# Patient Record
Sex: MALE | Race: WHITE | NOT HISPANIC OR LATINO | Employment: OTHER | URBAN - METROPOLITAN AREA
[De-identification: names, ages, dates, MRNs, and addresses within clinical notes are randomized per-mention and may not be internally consistent; named-entity substitution may affect disease eponyms.]

---

## 2017-01-03 ENCOUNTER — TELEPHONE (OUTPATIENT)
Dept: FAMILY MEDICINE | Facility: CLINIC | Age: 62
End: 2017-01-03

## 2017-01-03 NOTE — TELEPHONE ENCOUNTER
Office visit scheduled for 1/5/17 with Johnson Clements MD to discuss controlled substance agreement

## 2017-01-11 ENCOUNTER — HOSPITAL ENCOUNTER (EMERGENCY)
Facility: CLINIC | Age: 62
Discharge: HOME | End: 2017-01-11
Attending: FAMILY MEDICINE | Admitting: FAMILY MEDICINE
Payer: COMMERCIAL

## 2017-01-11 ENCOUNTER — OFFICE VISIT (OUTPATIENT)
Dept: OTOLARYNGOLOGY | Facility: CLINIC | Age: 62
End: 2017-01-11

## 2017-01-11 VITALS
TEMPERATURE: 97.9 F | DIASTOLIC BLOOD PRESSURE: 99 MMHG | WEIGHT: 265 LBS | SYSTOLIC BLOOD PRESSURE: 137 MMHG | OXYGEN SATURATION: 97 % | BODY MASS INDEX: 40.3 KG/M2

## 2017-01-11 DIAGNOSIS — R04.0 EPISTAXIS: ICD-10-CM

## 2017-01-11 DIAGNOSIS — R04.0 EPISTAXIS: Primary | ICD-10-CM

## 2017-01-11 PROCEDURE — 99281 EMR DPT VST MAYX REQ PHY/QHP: CPT

## 2017-01-11 PROCEDURE — 99283 EMERGENCY DEPT VISIT LOW MDM: CPT | Mod: Z6 | Performed by: FAMILY MEDICINE

## 2017-01-11 NOTE — PROGRESS NOTES
HISTORY OF PRESENT ILLNESS:  Arnaud Bird is a patient who I saw in 2012 for recurrent acute epistaxis.  He called the clinic this morning and has begun having difficulty with nasal bleeding.  He relates to me that he has had an upper respiratory infection, has been taking Sudafed, and has been blowing his nose a lot.  He spoke with our clinic and was not actively bleeding.  We set up an appointment for him to be seen in a couple of weeks and gave him some recommendations for local nasal cares.  Unfortunately, he subsequently developed a brisk nosebleed, placed several pieces of cotton in his nose, and his wife brought him to the emergency room.  He was not actively bleeding at the [facility].  They contacted us as they were concerned that he may have hereditary hemorrhagic telangiectasia.  I reviewed his notes, and he does not have HHT.  He had a lesion in his nose that was characterized as a telangiectasia.  We suggested they treat him as a typical nosebleed.  Unfortunately, they were not comfortable with this, and they transferred him by shuttle to our clinic.      PHYSICAL EXAMINATION:  The patient is alert, oriented, in no acute distress, not actively bleeding.  He has his left nose packed with cotton.      PROCEDURE:  Topical anesthetic decongestant solution is sprayed medial to the cotton pack, and the patient's nose is examined.  I sequentially removed cotton pieces.  He had no subsequent bleeding; however, I did see a small lesion along the floor of the nose with some fibrinous debris overlying it.  I cauterized this with silver nitrate.  As I cauterized it, there was a slight oozing so this certainly is a very likely source of his bleeding.  With repeat cauterization, it stopped easily.  The rest of his nose was cleaned and examined using a 0-degree rigid endoscope.  I see no other likely sources of his nasal bleeding.        ASSESSMENT AND PLAN:  We gave him recommendations for local  nasal cares and hydration. I will see him back in a couple of weeks to reexamine him.  He is comfortable with this plan.

## 2017-01-11 NOTE — Clinical Note
1/11/2017       RE: Arnaud Bird  [address] [facility] [address]     Dear Colleague,    Thank you for referring your patient, Arnaud Bird, to the [facility] EAR NOSE AND THROAT at [facility]. Please see a copy of my visit note below.    HISTORY OF PRESENT ILLNESS:  Arnaud Bird is a patient who I saw in 2012 for recurrent acute epistaxis.  He called the clinic this morning and has begun having difficulty with nasal bleeding.  He relates to me that he has had an upper respiratory infection, has been taking Sudafed, and has been blowing his nose a lot.  He spoke with our clinic and was not actively bleeding.  We set up an appointment for him to be seen in a couple of weeks and gave him some recommendations for local nasal cares.  Unfortunately, he subsequently developed a brisk nosebleed, placed several pieces of cotton in his nose, and his wife brought him to the emergency room.  He was not actively bleeding at the [facility].  They contacted us as they were concerned that he may have hereditary hemorrhagic telangiectasia.  I reviewed his notes, and he does not have HHT.  He had a lesion in his nose that was characterized as a telangiectasia.  We suggested they treat him as a typical nosebleed.  Unfortunately, they were not comfortable with this, and they transferred him by shuttle to our clinic.      PHYSICAL EXAMINATION:  The patient is alert, oriented, in no acute distress, not actively bleeding.  He has his left nose packed with cotton.      PROCEDURE:  Topical anesthetic decongestant solution is sprayed medial to the cotton pack, and the patient's nose is examined.  I sequentially removed cotton pieces.  He had no subsequent bleeding; however, I did see a small lesion along the floor of the nose with some fibrinous debris overlying it.  I cauterized this with silver nitrate.  As I cauterized it, there was a slight oozing so this  certainly is a very likely source of his bleeding.  With repeat cauterization, it stopped easily.  The rest of his nose was cleaned and examined using a 0-degree rigid endoscope.  I see no other likely sources of his nasal bleeding.        ASSESSMENT AND PLAN:  We gave him recommendations for local nasal cares and hydration. I will see him back in a couple of weeks to reexamine him.  He is comfortable with this plan.         Again, thank you for allowing me to participate in the care of your patient.      Sincerely,    Susy Barnes MD

## 2017-01-11 NOTE — ED AVS SNAPSHOT
[facility], Emergency Department    [address]    [place] [address]    Phone:  [phone]    Fax:  [phone]                                       Arnaud Bird   MRN: [id number]    Department:  [facility], Emergency Department   Date of Visit:  1/11/2017           Patient Information     Date Of Birth          1955        Your diagnoses for this visit were:     Epistaxis        You were seen by Roberto Lagunas MD.      Follow-up Information     Follow up with Dr Barnes at ENT clinic for further treatment.        Discharge Instructions       Discharge from the ER with plans to see your ENT specialist in the clinic today for further intervention.    Future Appointments        Provider Department Dept Phone Center    1/25/2017 3:30 PM Susy Barnes MD [facility] Ear Nose and Throat [phone] [facility]      24 Hour Appointment Hotline       To make an appointment at any [facility]3], call [phone] ([phone]). If you don't have a family doctor or clinic, we will help you find one. [facility] are conveniently located to serve the needs of you and your family.             Review of your medicines      Our records show that you are taking the medicines listed below. If these are incorrect, please call your family doctor or clinic.        Dose / Directions Last dose taken    acyclovir 400 MG tablet   Commonly known as:  ZOVIRAX   Quantity:  28 tablet        TAKE TWO TABLETS BY MOUTH TWICE DAILY   Refills:  0        albuterol 108 (90 BASE) MCG/ACT Inhaler   Commonly known as:  PROAIR HFA/PROVENTIL HFA/VENTOLIN HFA   Dose:  2 puff   Quantity:  1 Inhaler        Inhale 2 puffs into the lungs every 6 hours as needed for shortness of breath / dyspnea   Refills:  11        azithromycin 500 MG tablet   Commonly known as:  ZITHROMAX   Dose:  500 mg   Quantity:  3 tablet        Take 1 tablet (500 mg) by mouth daily   Refills:  1        clindamycin 1 % solution   Commonly  known as:  CLEOCIN T   Quantity:  60 mL        Apply topically 2 times daily Profile Rx: patient will contact pharmacy when needed   Refills:  11        diclofenac 1 % Gel topical gel   Commonly known as:  VOLTAREN   Quantity:  100 g        apply 2g topically 4 times daily as needed to involved area   Refills:  1        fluticasone 50 MCG/ACT spray   Commonly known as:  FLONASE   Dose:  2 spray   Quantity:  16 g        Spray 2 sprays into both nostrils daily   Refills:  11        fluticasone-salmeterol 100-50 MCG/DOSE diskus inhaler   Commonly known as:  ADVAIR   Dose:  1 puff   Quantity:  3 Inhaler        Inhale 1 puff into the lungs 2 times daily   Refills:  3        HYDROcodone-acetaminophen 7.5-325 MG per tablet   Commonly known as:  NORCO   Dose:  1 tablet   Quantity:  120 tablet        Take 1 tablet by mouth every 4 hours as needed for pain (4 x daily as needed  maxium 4 per day)   Refills:  0        predniSONE 20 MG tablet   Commonly known as:  DELTASONE   Dose:  20 mg   Quantity:  10 tablet        Take 1 tablet (20 mg) by mouth 2 times daily   Refills:  0        sildenafil 100 MG cap/tab   Commonly known as:  VIAGRA   Dose:   mg   Quantity:  100 tablet        Take 0.5-1 tablets ( mg) by mouth daily as needed for erectile dysfunction Take 30 min to 4 hours before intercourse.  Never use with nitroglycerin, terazosin or doxazosin.   Refills:  3        simvastatin 40 MG tablet   Commonly known as:  ZOCOR   Dose:  40 mg   Quantity:  30 tablet        Take 1 tablet (40 mg) by mouth At Bedtime   Refills:  11        SUDAFED PO   Dose:  30 mg        Take 30 mg by mouth   Refills:  0                Orders Needing Specimen Collection     None      Pending Results     No orders found from 1/10/2017 to 1/12/2017.            Pending Culture Results     No orders found from 1/10/2017 to 1/12/2017.            Thank you for choosing [facility]       Thank you for choosing [facility] for your care. Our goal is always  "to provide you with excellent care. Hearing back from our patients is one way we can continue to improve our services. Please take a few minutes to complete the written survey that you may receive in the mail after you visit with us. Thank you!        [employer] Information     [employer] lets you send messages to your doctor, view your test results, renew your prescriptions, schedule appointments and more. To sign up, go to [email or web address] . Click on \"Log in\" on the left side of the screen, which will take you to the Welcome page. Then click on \"Sign up Now\" on the right side of the page.     You will be asked to enter the access code listed below, as well as some personal information. Please follow the directions to create your username and password.     Your access code is: [email or web address]  Expires: 2017 12:36 PM     Your access code will  in 90 days. If you need help or a new code, please call your [place] clinic or [phone].        Care EveryWhere ID     This is your Care EveryWhere ID. This could be used by other organizations to access your [place] medical records  [id number]        After Visit Summary       This is your record. Keep this with you and show to your community pharmacist(s) and doctor(s) at your next visit.                  "

## 2017-01-11 NOTE — ED NOTES
Pt developed a bloody nose last night. Pt states there was a lot of blood, but got the bleeding to stop before bed. Pt woke up this AM and left side of his nose started bleeding again. Patient states he has not been able to get his nose to stop bleeding since, states he has 3-4 cotton balls in his nose right now. Pt reports having to a polyp in this nostril cauterized 4-5 years ago.

## 2017-01-11 NOTE — ED AVS SNAPSHOT
[facility], [place], Emergency Department    [address]    [place] [address]    Phone:  [phone]    Fax:  [phone]                                       Arnaud Bird   MRN: [id number]    Department:  [facility], Emergency Department   Date of Visit:  1/11/2017           After Visit Summary Signature Page     I have received my discharge instructions, and my questions have been answered. I have discussed any challenges I see with this plan with the nurse or doctor.    ..........................................................................................................................................  Patient/Patient Representative Signature      ..........................................................................................................................................  Patient Representative Print Name and Relationship to Patient    ..................................................               ................................................  Date                                            Time    ..........................................................................................................................................  Reviewed by Signature/Title    ...................................................              ..............................................  Date                                                            Time

## 2017-01-11 NOTE — ED PROVIDER NOTES
History     Chief Complaint   Patient presents with     Epistaxis     constant heavy bleeding since last night, has happened before. had to have a polyp in his nose cauterized in the past, 4-5 years ago      HPI  Arnaud Bird is a 61 year old male who presents emergency room after having extensive  Nosebleed starting last night.  Patient states that he was able to get the bleeding to stop last night then this morning had recurrence left nostril he has put packing in the left nostril patient notes that he has a history of an abnormal polyp or telangiectasia that required cauterization several years ago.  He has been doing well with no further bleeding until last night.  Patient states that he attempted to contact the ENT clinic where he is followed at [facility] and has been unable to connect with them so came to the emergency room in hopes that he would be seen by his ENT.  Patient is followed by Dr. Barnes at the ENT clinic.  Patient denies any other acute medical concerns at this time.          I have reviewed the Medications, Allergies, Past Medical and Surgical History, and Social History in the Epic system.    PERSONAL MEDICAL HISTORY  Past Medical History   Diagnosis Date     Arthritis      Unspecified asthma(493.90) 10/17/2012     Unspecified asthma, with exacerbation 10/17/2012     LEFT WORSE THAN RIGHT  10/18/2012     Carpal tunnel syndrome      mild     PAST SURGICAL HISTORY  Past Surgical History   Procedure Laterality Date     Tonsillectomy       age 2 or 3     Hc tooth extraction w/forcep       Orthopedic surgery       bilateral total knee replacements     FAMILY HISTORY  Family History   Problem Relation Age of Onset     CANCER Father      Family History Negative Father      Family History Negative Mother      SOCIAL HISTORY  Social History   Substance Use Topics     Smoking status: Never Smoker      Smokeless tobacco: Never Used     Alcohol Use: Yes      Comment: case beer a week      MEDICATIONS  No current facility-administered medications for this encounter.     Current Outpatient Prescriptions   Medication     Pseudoephedrine HCl (SUDAFED PO)     predniSONE (DELTASONE) 20 MG tablet     azithromycin (ZITHROMAX) 500 MG tablet     fluticasone (FLONASE) 50 MCG/ACT spray     clindamycin (CLEOCIN T) 1 % external solution     acyclovir (ZOVIRAX) 400 MG tablet     simvastatin (ZOCOR) 40 MG tablet     fluticasone-salmeterol (ADVAIR) 100-50 MCG/DOSE diskus inhaler     HYDROcodone-acetaminophen (NORCO) 7.5-325 MG per tablet     diclofenac (VOLTAREN) 1 % GEL     sildenafil (VIAGRA) 100 MG tablet     albuterol (PROAIR HFA, PROVENTIL HFA, VENTOLIN HFA) 108 (90 BASE) MCG/ACT inhaler     ALLERGIES  Allergies   Allergen Reactions     Seasonal Allergies            Review of Systems   Constitutional: Negative for fever.   HENT: Positive for nosebleeds.    Respiratory: Negative for shortness of breath.    Cardiovascular: Negative for chest pain.   Gastrointestinal: Negative for abdominal pain.   All other systems reviewed and are negative.      Physical Exam   BP: (!) 137/99 mmHg  Heart Rate: 85  Temp: 97.9  F (36.6  C)  Weight: 120.203 kg (265 lb)  SpO2: 97 %  Physical Exam   Constitutional: No distress.   HENT:   Head: Atraumatic.   Mouth/Throat: Oropharynx is clear and moist. No oropharyngeal exudate.   Patient has packing in the left nostril there does not appear to be active bleeding at this time no further bleeding with hemostasis.   Eyes: Pupils are equal, round, and reactive to light. No scleral icterus.   Cardiovascular: Normal heart sounds and intact distal pulses.    Pulmonary/Chest: Breath sounds normal. No respiratory distress.   Abdominal: Soft. Bowel sounds are normal. There is no tenderness.   Musculoskeletal: He exhibits no edema or tenderness.   Skin: Skin is warm. No rash noted. He is not diaphoretic.       ED Course   Procedures         Critical Care time:  none  Patient was discussed  with the on-call ENT resident patient is requesting evaluation at the ENT clinic I discussed the possibility of removing the packing and either attempting cautery or repacking and knows he is expressing preference to be seen in the ENT clinic.    Assessments & Plan (with Medical Decision Making)     I have reviewed the nursing notes.    I have reviewed the findings, diagnosis, plan and need for follow up with the patient.  Patient with epistaxis currently controlled patient is requesting evaluation at the ENT clinic and at this time patient will be transported to the ENT clinic to be seen with possible cauterization of the bleeding site.    New Prescriptions    No medications on file       Final diagnoses:   Epistaxis       1/11/2017   [facility], [place], EMERGENCY DEPARTMENT      Roberto Lagunas MD  01/12/17 0989

## 2017-01-11 NOTE — DISCHARGE INSTRUCTIONS
Discharge from the ER with plans to see your ENT specialist in the clinic today for further intervention.

## 2017-01-12 ASSESSMENT — ENCOUNTER SYMPTOMS
FEVER: 0
ABDOMINAL PAIN: 0
SHORTNESS OF BREATH: 0

## 2017-01-20 ENCOUNTER — TELEPHONE (OUTPATIENT)
Dept: FAMILY MEDICINE | Facility: CLINIC | Age: 62
End: 2017-01-20

## 2017-01-20 NOTE — TELEPHONE ENCOUNTER
Patient called reporting ongoing HA for 1 wk. He was seen at ED for epitaxis and further referred to ENT. Headaches are intermittent improved with use of caffeine. Does reports an episode of vomiting prior to ED visit. Denies vision problems, vomiting,trauma or fever now. Asked if HA could be associated with Lyme disease as he was out in the woods some time ago. Informed pt of symptoms associated with Lyme's disease. Advised try HC treatments and follow with PCP/clinic or UC if symptom persist. HC instructions-try OTC tylenol,use cold pack on forehead, and humidifier. Avoid NSAID or Aspirin due to nose bleeds. Pt verbalized understanding and agreement with plan. UC information also given.

## 2017-02-02 DIAGNOSIS — G89.4 CHRONIC PAIN SYNDROME: Primary | Chronic | ICD-10-CM

## 2017-02-02 NOTE — TELEPHONE ENCOUNTER
Reason for Call:  Medication or medication refill:    Do you use a [facility]?  Name of the pharmacy and phone number for the current request:  Written   Patient will  at [facility]    Name of the medication requested: HYDROcodone-acetaminophen (NORCO) 7.5-325 MG per tablet      Other request   Please call when ready    Patient will  at [facility]    Can we leave a detailed message on this number? YES    Phone number patient can be reached at: Home number on file [phone] (home)    Best Time:  Anytime    Call taken on 2/2/2017 at 3:07 PM by HELEN JACOBS

## 2017-02-03 NOTE — TELEPHONE ENCOUNTER
Controlled Substance Refill Request for HYDROcodone-acetaminophen (NORCO) 7.5-325 MG     Problem List Complete:  Yes  Patient is followed by SCOTTY TOLEDO MD for ongoing prescription of pain medication.  All refills should be approved by this provider only at face-to-face appointments - not by phone request.    Medication(s): Norco 7.5mg .   Maximum quantity per month: 120  Clinic visit frequency required: Q 3 months     Controlled substance agreement:  Encounter-Level CSA - 10/31/16:       checked in past 6 months?  Yes 10/31/2016

## 2017-02-06 RX ORDER — HYDROCODONE BITARTRATE AND ACETAMINOPHEN 7.5; 325 MG/1; MG/1
1 TABLET ORAL EVERY 4 HOURS PRN
Qty: 120 TABLET | Refills: 0 | Status: SHIPPED | OUTPATIENT
Start: 2017-02-06 | End: 2017-03-08

## 2017-03-08 DIAGNOSIS — G89.4 CHRONIC PAIN SYNDROME: Chronic | ICD-10-CM

## 2017-03-08 NOTE — TELEPHONE ENCOUNTER
HYDROcodone-acetaminophen (NORCO) 7.5-325 MG per tablet      Last Written Prescription Date:  02/06/17  Last Fill Quantity: 120,   # refills: 0  Last Office Visit with [facility] or [facility] prescribing provider: 1/05/17  Future Office visit:       Routing refill request to provider for review/approval because:  Drug not on the [facility], [facility] or [facility] refill protocol or controlled substance

## 2017-03-08 NOTE — TELEPHONE ENCOUNTER
Controlled Substance Refill Request for HYDROcodone-acetaminophen (NORCO) 7.5-325 MG per tablet  Problem List Complete:  Yes    Patient is followed by SCOTTY TOLEDO MD for ongoing prescription of pain medication.  All refills should be approved by this provider only at face-to-face appointments - not by phone request.    Medication(s): Norco 7.5 mg.   Maximum quantity per month: #120  Clinic visit frequency required: Q 3 months     Controlled substance agreement:  Encounter-Level CSA - 10/31/16:               Controlled Substance Agreement - Scan on 11/15/2016  7:53 AM : CONTROLLED SUBSTANCE AGREEMENT (below)            Pain Clinic evaluation in the past: No    DIRE Total Score(s):    7/2/2016   Total Score 18       Last [facility] website verification:  done on 1/20/17   [email or web address]         checked in past 6 months?  Yes 1/20/17

## 2017-03-09 DIAGNOSIS — M25.661 STIFFNESS OF KNEE JOINT, RIGHT: Chronic | ICD-10-CM

## 2017-03-09 DIAGNOSIS — M17.10 PRIMARY LOCALIZED OSTEOARTHROSIS, LOWER LEG, UNSPECIFIED LATERALITY: ICD-10-CM

## 2017-03-09 DIAGNOSIS — G56.00 CARPAL TUNNEL SYNDROME, UNSPECIFIED LATERALITY: ICD-10-CM

## 2017-03-09 DIAGNOSIS — M51.16 LUMBAR DISC DISEASE WITH RADICULOPATHY: ICD-10-CM

## 2017-03-09 RX ORDER — HYDROCODONE BITARTRATE AND ACETAMINOPHEN 7.5; 325 MG/1; MG/1
1 TABLET ORAL EVERY 4 HOURS PRN
Qty: 120 TABLET | Refills: 0 | Status: SHIPPED | OUTPATIENT
Start: 2017-03-09 | End: 2017-04-10

## 2017-03-09 NOTE — TELEPHONE ENCOUNTER
diclofenac (VOLTAREN) 1 % GEL      Last Written Prescription Date: 10/31/16  Last Quantity: 100 g, # refills: 1  Last Office Visit with [facility] or [facility] prescribing provider: 1/5/17       Creatinine   Date Value Ref Range Status   10/31/2016 0.88 0.66 - 1.25 mg/dL Final     Lab Results   Component Value Date    AST 17 07/08/2015     Lab Results   Component Value Date    ALT 35 10/31/2016     BP Readings from Last 3 Encounters:   01/11/17 (!) 137/99   12/30/16 108/64   10/31/16 118/76

## 2017-03-10 NOTE — TELEPHONE ENCOUNTER
Prescription approved per [facility] Refill Protocol.  Katelyn Delgadillo RN- Triage [employer]

## 2017-04-06 ENCOUNTER — OFFICE VISIT (OUTPATIENT)
Dept: ORTHOPEDICS | Facility: CLINIC | Age: 62
End: 2017-04-06

## 2017-04-06 DIAGNOSIS — G56.03 BILATERAL CARPAL TUNNEL SYNDROME: Primary | ICD-10-CM

## 2017-04-06 NOTE — MR AVS SNAPSHOT
After Visit Summary   2017    Arnuad Bird    MRN: [id number]           Patient Information     Date Of Birth          1955        Visit Information        Provider Department      2017 8:40 AM Anna Watkins MD [facility]        Today's Diagnoses     Bilateral carpal tunnel syndrome    -  1       Follow-ups after your visit        Who to contact     Please call your clinic at [phone] to:    Ask questions about your health    Make or cancel appointments    Discuss your medicines    Learn about your test results    Speak to your doctor   If you have compliments or concerns about an experience at your clinic, or if you wish to file a complaint, please contact [facility] Patient Relations at [phone] or email us at [email or web address]         Additional Information About Your Visit        MyChart Information     [employer] is an electronic gateway that provides easy, online access to your medical records. With [employer] Check, you can request a clinic appointment, read your test results, renew a prescription or communicate with your care team.     To sign up for [employer] visit the website at [email or web address]   You will be asked to enter the access code listed below, as well as some personal information. Please follow the directions to create your username and password.     Your access code is: [email or web address]  Expires: 2017  1:36 PM     Your access code will  in 90 days. If you need help or a new code, please contact your [facility] or call [phone] for assistance.        Care EveryWhere ID     This is your Care EveryWhere ID. This could be used by other organizations to access your [facility] medical records  [id number]         Blood Pressure from Last 3 Encounters:   17 (!) 137/99   16 108/64   10/31/16 118/76    Weight from Last 3 Encounters:   17  120.2 kg (265 lb)   12/30/16 118.4 kg (261 lb)   10/31/16 122.9 kg (271 lb)              Today, you had the following     No orders found for display       Primary Care Provider Office Phone # Fax #    Johnson Clements [phone]       [facility] [address]  [place] [address]        Thank you!     Thank you for choosing [facility]  for your care. Our goal is always to provide you with excellent care. Hearing back from our patients is one way we can continue to improve our services. Please take a few minutes to complete the written survey that you may receive in the mail after your visit with us. Thank you!             Your Updated Medication List - Protect others around you: Learn how to safely use, store and throw away your medicines at www.disposemymeds.org.          This list is accurate as of: 4/6/17 11:59 PM.  Always use your most recent med list.                   Brand Name Dispense Instructions for use    acyclovir 400 MG tablet    ZOVIRAX    28 tablet    TAKE TWO TABLETS BY MOUTH TWICE DAILY       albuterol 108 (90 BASE) MCG/ACT Inhaler    PROAIR HFA/PROVENTIL HFA/VENTOLIN HFA    1 Inhaler    Inhale 2 puffs into the lungs every 6 hours as needed for shortness of breath / dyspnea       azithromycin 500 MG tablet    ZITHROMAX    3 tablet    Take 1 tablet (500 mg) by mouth daily       clindamycin 1 % solution    CLEOCIN T    60 mL    Apply topically 2 times daily Profile Rx: patient will contact pharmacy when needed       diclofenac 1 % Gel topical gel    VOLTAREN    100 g    apply 2g topically 4 times daily as needed to involved area       fluticasone 50 MCG/ACT spray    FLONASE    16 g    Spray 2 sprays into both nostrils daily       fluticasone-salmeterol 100-50 MCG/DOSE diskus inhaler    ADVAIR    3 Inhaler    Inhale 1 puff into the lungs 2 times daily       HYDROcodone-acetaminophen 7.5-325 MG per tablet    NORCO    120 tablet    Take 1  tablet by mouth every 4 hours as needed for pain (4 x daily as needed  maxium 4 per day)       predniSONE 20 MG tablet    DELTASONE    10 tablet    Take 1 tablet (20 mg) by mouth 2 times daily       sildenafil 100 MG cap/tab    VIAGRA    100 tablet    Take 0.5-1 tablets ( mg) by mouth daily as needed for erectile dysfunction Take 30 min to 4 hours before intercourse.  Never use with nitroglycerin, terazosin or doxazosin.       simvastatin 40 MG tablet    ZOCOR    30 tablet    Take 1 tablet (40 mg) by mouth At Bedtime       SUDAFED PO      Take 30 mg by mouth

## 2017-04-06 NOTE — NURSING NOTE
Reason For Visit:   Chief Complaint   Patient presents with     RECHECK     Follow up, bilateral carpal tunnel, (injected 12/8/16)     Age: 61 year old    Smoker: No    Pain Assessment  Patient Currently in Pain: Yes (Great improvement)  Primary Pain Location: Wrist  Pain Orientation: Right, Left  Alleviating Factors: [place] steroid  Aggravating Factors:  (Still concerned about riding motorcycle)    Hand Dominance Evaluation  Hand Dominance: Right      force  R hand  level 2 force: 29.5 kg (65 lb)  L hand  level  2 force: 31.8 kg (70 lb)

## 2017-04-06 NOTE — PROGRESS NOTES
HISTORY OF PRESENT ILLNESS:  Mr. Bird is a 61-year-old, right hand-dominant man who presents with bilateral carpal tunnel syndrome.  He underwent bilateral carpal tunnel injections on 12/08/2016.  He has had excellent pain relief from his bilateral carpal tunnel injections.  He states that his pain has gone away and he has minimal to no numbness and tingling in his hands.  He is no longer using night splints, as he does not feel like they are necessary.  He has recently retired from  and feels like this may have helped his symptoms as well.  He does have some numbness in his whole hand on the left side which is positional when he is lying on his right side and goes away when he adjusts his shoulder and his neck.  His symptoms are primarily exacerbated by use of his motorcycle, which he has not been doing lately.       PHYSICAL EXAMINATION:     GENERAL:  Pleasant, age-appropriate male in no acute distress.   RESPIRATORY:  Nonlabored.   MUSCULOSKELETAL:  Bilateral upper extremities show negative Phalen's and Tinel's and Madeleine's over the carpal tunnels.  APB strength 5/5.       RESULTS:  EMG in 10/2016 showed peak latency 3.6 milliseconds right, 3.49 left.  Conduction velocity was 4.4 bilaterally.       ASSESSMENT AND PLAN:  Bilateral carpal tunnel syndrome.        At this time, he is doing well after his steroid injection.  He has mild carpal tunnel syndrome as diagnosed on EMG.  We discussed that sometimes the steroid injection can completely resolve the symptoms.  It is possible that his symptoms will return.  He was encouraged to use padded gloves while using his motorcycle and use a night splint if his symptoms return.  He will follow up on an as-needed basis if he has further questions or concerns.       The patient was seen and examined with Dr. Watkins.       Dictated by Heriberto Orellana MD, Resident     I have personally examined this patient and have reviewed the clinical presentation and  progress note with the resident. I agree with the treatment plan as outlined. The plan was formulated with the resident on the day of the resident's dictation.

## 2017-04-06 NOTE — LETTER
4/6/2017       RE: Arnaud Bird  [address]  [place] [address]     Dear Colleague,    Thank you for referring your patient, Arnaud Bird, to the [facility] at [facility]. Please see a copy of my visit note below.    HISTORY OF PRESENT ILLNESS:  Mr. Bird is a 61-year-old, right hand-dominant man who presents with bilateral carpal tunnel syndrome.  He underwent bilateral carpal tunnel injections on 12/08/2016.  He has had excellent pain relief from his bilateral carpal tunnel injections.  He states that his pain has gone away and he has minimal to no numbness and tingling in his hands.  He is no longer using night splints, as he does not feel like they are necessary.  He has recently retired from  and feels like this may have helped his symptoms as well.  He does have some numbness in his whole hand on the left side which is positional when he is lying on his right side and goes away when he adjusts his shoulder and his neck.  His symptoms are primarily exacerbated by use of his motorcycle, which he has not been doing lately.       PHYSICAL EXAMINATION:     GENERAL:  Pleasant, age-appropriate male in no acute distress.   RESPIRATORY:  Nonlabored.   MUSCULOSKELETAL:  Bilateral upper extremities show negative Phalen's and Tinel's and Madeleine's over the carpal tunnels.  APB strength 5/5.       RESULTS:  EMG in 10/2016 showed peak latency 3.6 milliseconds right, 3.49 left.  Conduction velocity was 4.4 bilaterally.       ASSESSMENT AND PLAN:  Bilateral carpal tunnel syndrome.        At this time, he is doing well after his steroid injection.  He has mild carpal tunnel syndrome as diagnosed on EMG.  We discussed that sometimes the steroid injection can completely resolve the symptoms.  It is possible that his symptoms will return.  He was encouraged to use padded gloves while using his motorcycle and use a night splint if his  symptoms return.  He will follow up on an as-needed basis if he has further questions or concerns.       The patient was seen and examined with Dr. Watkins.       Dictated by Heriberto Orellana MD, Resident     I have personally examined this patient and have reviewed the clinical presentation and progress note with the resident. I agree with the treatment plan as outlined. The plan was formulated with the resident on the day of the resident's dictation.     Again, thank you for allowing me to participate in the care of your patient.      Sincerely,    Anna Watkins MD

## 2017-04-10 DIAGNOSIS — G89.4 CHRONIC PAIN SYNDROME: Chronic | ICD-10-CM

## 2017-04-10 NOTE — TELEPHONE ENCOUNTER
Reason for Call:  Medication or medication refill:    Do you use a [facility]?  Name of the pharmacy and phone number for the current request:  not applicable    Name of the medication requested: HYDROcodone-acetaminophen (NORCO) 7.5-325 MG per tablet    Other request:     Can we leave a detailed message on this number? YES    Phone number patient can be reached at: Home number on file [phone] (home)    Best Time:     Call taken on 4/10/2017 at 8:56 AM by CATALINA KHAN

## 2017-04-10 NOTE — TELEPHONE ENCOUNTER
Controlled Substance Refill Request for HYDROcodone-acetaminophen (NORCO) 7.5-325 MG per tablet  Problem List Complete:  YesPatient is followed by SCOTTY TOLEDO MD for ongoing prescription of pain medication.  All refills should be approved by this provider only at face-to-face appointments - not by phone request.    Medication(s): Norco 7.5 mg.   Maximum quantity per month: #120  Clinic visit frequency required: Q 3 months     Controlled substance agreement:  Encounter-Level CSA - 10/31/16:               Controlled Substance Agreement - Scan on 11/15/2016  7:53 AM : CONTROLLED SUBSTANCE AGREEMENT (below)            Pain Clinic evaluation in the past: No    DIRE Total Score(s):    7/2/2016   Total Score 18       Last [facility] website verification:  done on 1/2017     checked in past 6 months?  Yes 01/2017     Last Written Prescription Date: 03/09/2017  Last Fill Quantity: 120,  # refills: 0   Last Office Visit with G, P or [facility] prescribing provider: 0105/2017

## 2017-04-13 RX ORDER — HYDROCODONE BITARTRATE AND ACETAMINOPHEN 7.5; 325 MG/1; MG/1
1 TABLET ORAL EVERY 4 HOURS PRN
Qty: 120 TABLET | Refills: 0 | Status: SHIPPED | OUTPATIENT
Start: 2017-04-13 | End: 2017-05-09

## 2017-04-27 ENCOUNTER — TELEPHONE (OUTPATIENT)
Dept: FAMILY MEDICINE | Facility: CLINIC | Age: 62
End: 2017-04-27

## 2017-04-27 NOTE — TELEPHONE ENCOUNTER
Patient is calling cortisone shot in shoulder (rotator cuff tear) can we make an appointment? Has had the shots done by Dr. Hi in the past.   Writer scheduled an appointment for the patient.  Tabby Patel RN  04/27/17  1:18 PM

## 2017-05-01 ENCOUNTER — TELEPHONE (OUTPATIENT)
Dept: FAMILY MEDICINE | Facility: CLINIC | Age: 62
End: 2017-05-01

## 2017-05-01 NOTE — TELEPHONE ENCOUNTER
Panel Management Review      Patient has the following on his problem list:     Asthma review     ACT Total Scores 10/31/2016   ACT TOTAL SCORE -   ASTHMA ER VISITS -   ASTHMA HOSPITALIZATIONS -   ACT TOTAL SCORE (Goal Greater than or Equal to 20) 22   In the past 12 months, how many times did you visit the emergency room for your asthma without being admitted to the hospital? 0   In the past 12 months, how many times were you hospitalized overnight because of your asthma? 0      1. Is Asthma diagnosis on the Problem List? Yes    2. Is Asthma listed on Health Maintenance? Yes    3. Patient is due for:  ACT and AAP    Hypertension   Last three blood pressure readings:  BP Readings from Last 3 Encounters:   01/11/17 (!) 137/99   12/30/16 108/64   10/31/16 118/76     Blood pressure: FAILED    HTN Guidelines:  Age 18-59 BP range:  Less than 140/90  Age 60-85 with Diabetes:  Less than 140/90  Age 60-85 without Diabetes:  less than 150/90      Composite cancer screening  Chart review shows that this patient is due/due soon for the following Colonoscopy  Summary:    Patient is due/failing the following:   AAP, ACT and BP CHECK    Action needed:   Patient needs office visit for asthma, pain follow up.    Type of outreach:    Phone, spoke to patient.  he states he already has an appt 5/4    Questions for provider review:    None                                                                                                                                    Radha Tucker CMA

## 2017-05-09 ENCOUNTER — OFFICE VISIT (OUTPATIENT)
Dept: FAMILY MEDICINE | Facility: CLINIC | Age: 62
End: 2017-05-09
Payer: COMMERCIAL

## 2017-05-09 VITALS
DIASTOLIC BLOOD PRESSURE: 64 MMHG | HEART RATE: 63 BPM | BODY MASS INDEX: 41.66 KG/M2 | SYSTOLIC BLOOD PRESSURE: 110 MMHG | WEIGHT: 274 LBS | TEMPERATURE: 96.8 F | RESPIRATION RATE: 20 BRPM | OXYGEN SATURATION: 98 %

## 2017-05-09 DIAGNOSIS — Z96.652 STATUS POST TOTAL LEFT KNEE REPLACEMENT: ICD-10-CM

## 2017-05-09 DIAGNOSIS — M25.512 CHRONIC LEFT SHOULDER PAIN: ICD-10-CM

## 2017-05-09 DIAGNOSIS — E78.49 OTHER HYPERLIPIDEMIA: ICD-10-CM

## 2017-05-09 DIAGNOSIS — G89.29 CHRONIC LEFT SHOULDER PAIN: ICD-10-CM

## 2017-05-09 DIAGNOSIS — E55.9 VITAMIN D DEFICIENCY: ICD-10-CM

## 2017-05-09 DIAGNOSIS — E66.01 MORBID OBESITY DUE TO EXCESS CALORIES (H): ICD-10-CM

## 2017-05-09 DIAGNOSIS — J45.30 MILD PERSISTENT ASTHMA WITHOUT COMPLICATION: Chronic | ICD-10-CM

## 2017-05-09 DIAGNOSIS — G89.4 CHRONIC PAIN SYNDROME: Primary | Chronic | ICD-10-CM

## 2017-05-09 DIAGNOSIS — E78.5 HYPERLIPIDEMIA WITH TARGET LDL LESS THAN 130: Chronic | ICD-10-CM

## 2017-05-09 DIAGNOSIS — M25.512 PAIN IN JOINT OF LEFT SHOULDER: ICD-10-CM

## 2017-05-09 PROCEDURE — 20610 DRAIN/INJ JOINT/BURSA W/O US: CPT | Performed by: FAMILY MEDICINE

## 2017-05-09 PROCEDURE — 99214 OFFICE O/P EST MOD 30 MIN: CPT | Mod: 25 | Performed by: FAMILY MEDICINE

## 2017-05-09 RX ORDER — HYDROCODONE BITARTRATE AND ACETAMINOPHEN 7.5; 325 MG/1; MG/1
1 TABLET ORAL EVERY 4 HOURS PRN
Qty: 120 TABLET | Refills: 0 | Status: SHIPPED | OUTPATIENT
Start: 2017-05-17 | End: 2017-05-09

## 2017-05-09 RX ORDER — HYDROCODONE BITARTRATE AND ACETAMINOPHEN 7.5; 325 MG/1; MG/1
1 TABLET ORAL EVERY 4 HOURS PRN
Qty: 120 TABLET | Refills: 0 | Status: SHIPPED | OUTPATIENT
Start: 2017-07-17 | End: 2017-08-14

## 2017-05-09 RX ORDER — PHENTERMINE HYDROCHLORIDE 37.5 MG/1
TABLET ORAL
Qty: 30 TABLET | Refills: 0 | Status: SHIPPED | OUTPATIENT
Start: 2017-06-09 | End: 2017-05-09

## 2017-05-09 RX ORDER — PHENTERMINE HYDROCHLORIDE 37.5 MG/1
TABLET ORAL
Qty: 30 TABLET | Refills: 0 | Status: SHIPPED | OUTPATIENT
Start: 2017-05-09 | End: 2017-05-09

## 2017-05-09 RX ORDER — PHENTERMINE HYDROCHLORIDE 37.5 MG/1
TABLET ORAL
Qty: 30 TABLET | Refills: 0 | Status: SHIPPED | OUTPATIENT
Start: 2017-07-09 | End: 2017-08-22

## 2017-05-09 RX ORDER — TOPIRAMATE 25 MG/1
25 TABLET, FILM COATED ORAL 2 TIMES DAILY
Qty: 60 TABLET | Refills: 2 | Status: SHIPPED | OUTPATIENT
Start: 2017-05-09 | End: 2017-09-04

## 2017-05-09 RX ORDER — HYDROCODONE BITARTRATE AND ACETAMINOPHEN 7.5; 325 MG/1; MG/1
1 TABLET ORAL EVERY 4 HOURS PRN
Qty: 120 TABLET | Refills: 0 | Status: SHIPPED | OUTPATIENT
Start: 2017-06-17 | End: 2017-05-09

## 2017-05-09 NOTE — PATIENT INSTRUCTIONS
CODMAN'S EXERCISES  ,THAT IS PENDULUM RANGE OF MOTION 30 EACH TWICE DAILY    THUMB UP EXERCISES INTO PAIN 30 EACH TWICE DAILY    INTERNAL  AND EXTERNAL ROTATION  with AND WITHOUT RESISTANCE OR WEIGHT 30 EACH TWICE DAILY    SWORD SHEATH EXERCISE 30 EACH TWICE DAILY    WALL STRETCH EXERCISE 30 SECONDS EACH TWICE DAILY    POSTERIOR SHOULDER STRETCH AND HOLD 3 10 SECOND STRETCHES TWICE DAILY    ISOMETRIC EXERCISE 60 DEGREES ABDUCTION, ADDUCTION, 90 DEGREE THUMB UP POSITION    AVOID PAINFUL ARC    ICE TWICE DAILY X 5 MINUTES PRIOR TO EXERCISE OR AS NEEDED FOR PAIN CONTROL  .1. MODIFIED FAST 250 CALORIES TWICE DAILY FEMALES  300 CALORIES TWICE DAILY FOR MALES   OATMEAL AND COTTAGE CHEESE WORK NICELY   COPIOUS WATER BETWEEN   5/2 PLAN DR FERGUSON [facility]  NORMAL DIET 5 DAYS PER WEEK  SEMIFAST 2 DAYS PER WEEK  LOOK UP 5-2 PLAN ON THE INTERNET    Weight Loss Tips  1. Do not eat after 6 hrs before your expected bedtime  2. Have your heaviest meal for breakfast, a slightly lighter meal at lunch and a snack 6 hrs before bed  3. No sugar/calorie drinks except milk ie no fruit juice, pop, alcohol.  4. Drink milk OR WATER  30min before meals to decrease your hunger. Also it is excellent as part of your last meal of the day snack  5. Drink lots of water  6. Increase fiber in diet: all bran cereal, salads, popcorn etc  7. Have only one small serving of fruit a day about 1/2 cup (as this is high in sugar)  8. EXERCISE is the bottom line. Without it, you will gain weight even on a low calorie diet. Best if done 2-3X a day as can    2. The only way known to prevent diabetes or keep it from getting worse is exercise, 20-40 minutes 3 times a day around the time of meals      SLEEP 8 HOURS PER DAY    BLACK AND BLUEBERRIES EVERY DAY ANTINFLAMMATORY BENEFIT     PLAIN YOGURT SEVERAL TIMES DAILY AS TOLERATED IF NOT ALLERGIC     AVOID HIGH FRUCTOSE SYRUP AND OLENE IN YOUR DIET     GREEN TEA EXTRACT    PROBIOTIC CAPSULE DAILY  HIGH  QUALITY     DON'T EAT LATE AT NIGHT    CARALLUMA FIMBRIATA HELPS WITH APPETITE    KEEP A BLESS[employer]Job JOURNAL    888 BREATHER WHEN STRESSED OR COUNT BACK FROM 100 WITH SLOW BREATHING    POSITIVE AFFIRMATIONS         FIT BIT OR PEDOMETER 10,000 STEPS PER DAY     FIT BIT JEN RECOMMENDED        TREATMENT PROGNOSIS BENEFITS AND RISKS DISCUSSED   ANATOMIC SITE: LEFT POSTERIOR SHOULDER   POSTERIOR APPROACH   PROCEDURE:  INJECTION   BETADYNE CLEANSING WITHOUT COMPLICATION   1:1 MIXTURE KENALOG 1% LIDOCAINE WITHOUT COMPLICATION   AMOUNT OF KENALOMG   NDC NUMBER [id number]  INJECTION WITH NO TOUCH TECHNIQUE  SIDE EFFECTS BENEFITS AND RISKS DISCUSSED    TREATMENT PROGNOSIS BENEFITS AND RISKS DISCUSSED   MONITOR FOR ALLERGIC AND VASCULAR SIDE EFFECTS  MEDICATION RISKS SIDE EFFECTS BENEFITS AND RISKS DISCUSSED   SCOTTY TOLEDO JR., MD   17

## 2017-05-09 NOTE — PROGRESS NOTES
"  SUBJECTIVE:                                                    Arnaud Bird is a 62 year old male who presents to clinic today for the following health issues:  Health Maintenance Due   Topic Date Due     URINE DRUG SCREEN Q1 YR  04/27/1970     COLON CANCER SCREEN (SYSTEM ASSIGNED)  11/13/2016     ASTHMA ACTION PLAN Q1 YR (NO INBASKET)  03/08/2017     ASTHMA CONTROL TEST Q6 MOS (NO INBASKET)  04/30/2017     Health Maintenance reviewed at today's visit patient asked to schedule/complete:   Asthma:  Completed at today's visit.  Colon Cancer:  Patient agrees to schedule      Musculoskeletal problem/pain      Duration: 1 month    Description  Location: left shoulder    Intensity:  2-3/10    Accompanying signs and symptoms: tingling    History  Previous similar problem: YES  Previous evaluation:  x-ray    Precipitating or alleviating factors:  Trauma or overuse: YES- curls at the gym  Aggravating factors include: lifting and laying on it    Therapies tried and outcome: heat and ice     RIGHT SHOULDER PAIN     LEFT WORSE THAN RIGHT     BOTH  PINCHING SURGICAL INTERVENTION    WATCHING 3 YO GRANDDAUGHTER    KNEES SOME STIFFNESS     RIGHT WORSE THAN LEFT BILATERAL TOTAL KNEE ARTHROPLASTY      WORK IN GARAGE    CARPAL TUNNEL SYNDROME  INJECTION FEELING BETTER     CENTRAL OBESITY NEED TO WORK ON THAT    CHRONIC LOWER BACK PAIN RADICULOPATHY NOT SIGNIFICANT OTHER MUCH  STIFFNESS AND ACHINESS    ASTHMA WELL CONTROLLED     GROIN STRAIN IMPROVED    LDL OR \"BAD\" CHOLESTEROL  GOAL < 100     CHRONIC PAIN SYNDROME     NUMBNESS IN HAND     HISTORY OF SCAPHOLUNATE WRIST COLAPSE    has Stiffness of knee joint, right; Gait difficulty; S/P TKR (total knee replacement); Bilateral carpal tunnel syndrome; Vitamin D deficiency; Plantar       Hyperlipidemia Follow-Up      Rate your low fat/cholesterol diet?: good    Taking statin?  Yes, no muscle aches from statin    Other lipid medications/supplements?:  none       Chronic Pain " Follow-Up       Type / Location of Pain:  SEE ABOVE   Analgesia/pain control:       Recent changes:  improved      Overall control: Tolerable with discomfort  Activity level/function:      Daily activities:  Able to do light housework, cooking    Work:  not applicable  Adverse effects:  No  Adherance    Taking medication as directed?  Yes    Participating in other treatments: yes  Risk Factors:    Sleep:  Good    Mood/anxiety:  controlled    Recent family or social stressors:  none noted    Other aggravating factors: none  PHQ-9 SCORE 10/31/2016   Total Score 2     SOFIYA-7 SCORE 10/31/2016   Total Score 2     Encounter-Level CSA - 10/31/2016:                 Controlled Substance Agreement - Scan on 11/15/2016  7:53 AM : CONTROLLED SUBSTANCE AGREEMENT (below)                 Problem list and histories reviewed & adjusted, as indicated.  Additional history: as documented      Patient Active Problem List   Diagnosis     Stiffness of knee joint, right     Gait difficulty     S/P TKR (total knee replacement)     Bilateral carpal tunnel syndrome     Vitamin D deficiency     Plantar fascial fibromatosis     Asthma with exacerbation     Hypertrophy of prostate without urinary obstruction     Obesity     Hyperlipidemia     Spondylosis with myelopathy, lumbar region     LEFT WORSE THAN RIGHT      CARDIOVASCULAR SCREENING; LDL GOAL LESS THAN 100     BMI 40.0-44.9, adult (H)     Mild persistent asthma     Hyperlipidemia with target LDL less than 130     Lumbar disc disease with radiculopathy     Groin strain, initial encounter     Sexual dysfunction     ACP (advance care planning)     SLAC (scapholunate advanced collapse) of wrist, right     Right wrist pain     Numbness and tingling in right hand     Chronic pain syndrome     Past Surgical History:   Procedure Laterality Date     HC TOOTH EXTRACTION W/FORCEP       ORTHOPEDIC SURGERY      bilateral total knee replacements     TONSILLECTOMY      age 2 or 3       Social History    Substance Use Topics     Smoking status: Never Smoker     Smokeless tobacco: Never Used     Alcohol use Yes      Comment: case beer a week     Family History   Problem Relation Age of Onset     CANCER Father      Family History Negative Father      Family History Negative Mother          Current Outpatient Prescriptions   Medication Sig Dispense Refill     [START ON 7/17/2017] HYDROcodone-acetaminophen (NORCO) 7.5-325 MG per tablet Take 1 tablet by mouth every 4 hours as needed for pain (4 x daily as needed  maxium 4 per day) 120 tablet 0     [START ON 7/9/2017] phentermine (ADIPEX-P) 37.5 MG tablet TAKE ONE TABLET BY MOUTH EVERY DAY IN THE MORNING. 30 tablet 0     topiramate (TOPAMAX) 25 MG tablet Take 1 tablet (25 mg) by mouth 2 times daily 60 tablet 2     diclofenac (VOLTAREN) 1 % GEL topical gel apply 2g topically 4 times daily as needed to involved area 100 g 1     fluticasone (FLONASE) 50 MCG/ACT spray Spray 2 sprays into both nostrils daily 16 g 11     clindamycin (CLEOCIN T) 1 % external solution Apply topically 2 times daily Profile Rx: patient will contact pharmacy when needed 60 mL 11     sildenafil (VIAGRA) 100 MG tablet Take 0.5-1 tablets ( mg) by mouth daily as needed for erectile dysfunction Take 30 min to 4 hours before intercourse.  Never use with nitroglycerin, terazosin or doxazosin. 100 tablet 3     acyclovir (ZOVIRAX) 400 MG tablet TAKE TWO TABLETS BY MOUTH TWICE DAILY 28 tablet 0     simvastatin (ZOCOR) 40 MG tablet Take 1 tablet (40 mg) by mouth At Bedtime 30 tablet 11     albuterol (PROAIR HFA, PROVENTIL HFA, VENTOLIN HFA) 108 (90 BASE) MCG/ACT inhaler Inhale 2 puffs into the lungs every 6 hours as needed for shortness of breath / dyspnea 1 Inhaler 11     fluticasone-salmeterol (ADVAIR) 100-50 MCG/DOSE diskus inhaler Inhale 1 puff into the lungs 2 times daily 3 Inhaler 3     Pseudoephedrine HCl (SUDAFED PO) Take 30 mg by mouth Reported on 5/9/2017       Allergies   Allergen Reactions      Seasonal Allergies      Recent Labs   Lab Test  12/30/16   0804  10/31/16   0822  07/08/15   0528  02/05/14   0750  06/23/12   1545   05/10/11   1233   A1C  5.4   --    --    --    --    --    --    LDL   --   85   --   106  104*   < >  80   HDL   --   55   --   45  50   --   50   TRIG   --   94   --   112   --    --   199*   ALT   --   35  28   --   34.0   < >   --    CR   --   0.88  0.83  1.20  0.7   < >  0.85   GFRESTIMATED   --   87  >90  Non  GFR Calc    62   --    --    --    GFRESTBLACK   --   >90  >90  African American GFR Calc    75   --    --    --    POTASSIUM   --    --   4.3  4.4  4.2   < >  4.3    < > = values in this interval not displayed.      BP Readings from Last 3 Encounters:   05/09/17 110/64   01/11/17 (!) 137/99   12/30/16 108/64    Wt Readings from Last 3 Encounters:   05/09/17 274 lb (124.3 kg)   01/11/17 265 lb (120.2 kg)   12/30/16 261 lb (118.4 kg)                  Labs reviewed in EPIC    Reviewed and updated as needed this visit by clinical staff  Tobacco  Allergies  Meds  Problems  Med Hx  Surg Hx  Fam Hx  Soc Hx        Reviewed and updated as needed this visit by Provider  Allergies  Meds  Problems         ROS: has Stiffness of knee joint, right; Gait difficulty; S/P TKR (total knee replacement); Bilateral carpal tunnel syndrome; Vitamin D deficiency; Plantar fascial fibromatosis; Asthma with exacerbation; Hypertrophy of prostate without urinary obstruction; Obesity; Hyperlipidemia; Spondylosis with myelopathy, lumbar region; LEFT WORSE THAN RIGHT ; CARDIOVASCULAR SCREENING; LDL GOAL LESS THAN 100; BMI 40.0-44.9, adult (H); Mild persistent asthma; Hyperlipidemia with target LDL less than 130; Lumbar disc disease with radiculopathy; Groin strain, initial encounter; Sexual dysfunction; ACP (advance care planning); SLAC (scapholunate advanced collapse) of wrist, right; Right wrist pain; Numbness and tingling in right hand; and Chronic pain syndrome on his  problem list.  C: NEGATIVE for fever, chills, change in weight  I: NEGATIVE for worrisome rashes, moles or lesions  E: NEGATIVE for vision changes or irritation  E/M: NEGATIVE for ear, mouth and throat problems  R: NEGATIVE for significant cough or SOB  B: NEGATIVE for masses, tenderness or discharge  CV: NEGATIVE for chest pain, palpitations or peripheral edema  GI: NEGATIVE for nausea, abdominal pain, heartburn, or change in bowel habits  : NEGATIVE for frequency, dysuria, or hematuria  M: LOWER BACK PAIN with RADICULOPATHY    WRIST  PAIN RIGHT   NEURO:  NUMBNESS OF HAND   E: NEGATIVE for temperature intolerance, skin/hair changes  H: NEGATIVE for bleeding problems  P: NEGATIVE for changes in mood or affect    OBJECTIVE:                                                    /64  Pulse 63  Temp 96.8  F (36  C) (Tympanic)  Resp 20  Wt 274 lb (124.3 kg)  SpO2 98%  BMI 41.66 kg/m2  Body mass index is 41.66 kg/(m^2).  GENERAL: healthy, alert and no distress  EYES: Eyes grossly normal to inspection, PERRL and conjunctivae and sclerae normal  HENT: ear canals and TM's normal, nose and mouth without ulcers or lesions  NECK: no adenopathy, no asymmetry, masses, or scars and thyroid normal to palpation  RESP: lungs clear to auscultation - no rales, rhonchi or wheezes  CV: regular rate and rhythm, normal S1 S2, no S3 or S4, no murmur, click or rub, no peripheral edema and peripheral pulses strong  ABDOMEN: soft, nontender, no hepatosplenomegaly, no masses and bowel sounds normal   (male): normal male genitalia without lesions or urethral discharge, no hernia  MS: no gross musculoskeletal defects noted, no edema  SKIN: no suspicious lesions or rashes  NEURO: Normal strength and tone, mentation intact and speech normal  PSYCH: mentation appears normal, affect normal/bright  LYMPH: no cervical, supraclavicular, axillary, or inguinal adenopathy    Diagnostic Test Results:  Results for orders placed or performed in  visit on 12/30/16   Hemoglobin A1c   Result Value Ref Range    Hemoglobin A1C 5.4 4.3 - 6.0 %   Hepatitis C antibody   Result Value Ref Range    Hepatitis C Antibody  NR     Nonreactive   Assay performance characteristics have not been established for newborns,   infants, and children          ASSESSMENT/PLAN:                                                        ICD-10-CM    1. Chronic pain syndrome G89.4 HYDROcodone-acetaminophen (NORCO) 7.5-325 MG per tablet     DISCONTINUED: HYDROcodone-acetaminophen (NORCO) 7.5-325 MG per tablet     DISCONTINUED: HYDROcodone-acetaminophen (NORCO) 7.5-325 MG per tablet   2. Hyperlipidemia with target LDL less than 130 E78.5    3. BMI 40.0-44.9, adult (H) Z68.41 phentermine (ADIPEX-P) 37.5 MG tablet     topiramate (TOPAMAX) 25 MG tablet     DISCONTINUED: phentermine (ADIPEX-P) 37.5 MG tablet     DISCONTINUED: phentermine (ADIPEX-P) 37.5 MG tablet     DISCONTINUED: phentermine (ADIPEX-P) 37.5 MG tablet   4. Mild persistent asthma without complication J45.30    5. Vitamin D deficiency E55.9    6. Morbid obesity due to excess calories (H) E66.01    7. Other hyperlipidemia E78.4    8. Status post total left knee replacement Z96.652       39475-09,  (TRIAMCINOLONE) and 09341   Patient Instructions     CODMAN'S EXERCISES  ,THAT IS PENDULUM RANGE OF MOTION 30 EACH TWICE DAILY    THUMB UP EXERCISES INTO PAIN 30 EACH TWICE DAILY    INTERNAL  AND EXTERNAL ROTATION  with AND WITHOUT RESISTANCE OR WEIGHT 30 EACH TWICE DAILY    SWORD SHEATH EXERCISE 30 EACH TWICE DAILY    WALL STRETCH EXERCISE 30 SECONDS EACH TWICE DAILY    POSTERIOR SHOULDER STRETCH AND HOLD 3 10 SECOND STRETCHES TWICE DAILY    ISOMETRIC EXERCISE 60 DEGREES ABDUCTION, ADDUCTION, 90 DEGREE THUMB UP POSITION    AVOID PAINFUL ARC    ICE TWICE DAILY X 5 MINUTES PRIOR TO EXERCISE OR AS NEEDED FOR PAIN CONTROL  .1. MODIFIED FAST 250 CALORIES TWICE DAILY FEMALES  300 CALORIES TWICE DAILY FOR MALES   OATMEAL AND COTTAGE CHEESE  WORK NICELY   COPIOUS WATER BETWEEN    PLAN DR FERGUSON [facility]  NORMAL DIET 5 DAYS PER WEEK  SEMIFAST 2 DAYS PER WEEK  LOOK UP 5-2 PLAN ON THE INTERNET    Weight Loss Tips  1. Do not eat after 6 hrs before your expected bedtime  2. Have your heaviest meal for breakfast, a slightly lighter meal at lunch and a snack 6 hrs before bed  3. No sugar/calorie drinks except milk ie no fruit juice, pop, alcohol.  4. Drink milk OR WATER  30min before meals to decrease your hunger. Also it is excellent as part of your last meal of the day snack  5. Drink lots of water  6. Increase fiber in diet: all bran cereal, salads, popcorn etc  7. Have only one small serving of fruit a day about 1/2 cup (as this is high in sugar)  8. EXERCISE is the bottom line. Without it, you will gain weight even on a low calorie diet. Best if done 2-3X a day as can    2. The only way known to prevent diabetes or keep it from getting worse is exercise, 20-40 minutes 3 times a day around the time of meals      SLEEP 8 HOURS PER DAY    BLACK AND BLUEBERRIES EVERY DAY ANTINFLAMMATORY BENEFIT     PLAIN YOGURT SEVERAL TIMES DAILY AS TOLERATED IF NOT ALLERGIC     AVOID HIGH FRUCTOSE SYRUP AND OLENE IN YOUR DIET     GREEN TEA EXTRACT    PROBIOTIC CAPSULE DAILY  HIGH QUALITY     DON'T EAT LATE AT NIGHT    CARALLUMA FIMBRIATA HELPS WITH APPETITE    KEEP A BLESSINGS JOURNAL    888 BREATHER WHEN STRESSED OR COUNT BACK FROM 100 WITH SLOW BREATHING    POSITIVE AFFIRMATIONS         FIT BIT OR PEDOMETER 10,000 STEPS PER DAY     FIT BIT CHRISTIANOICH RECOMMENDED        TREATMENT PROGNOSIS BENEFITS AND RISKS DISCUSSED   ANATOMIC SITE: LEFT POSTERIOR SHOULDER   POSTERIOR APPROACH   PROCEDURE:  INJECTION   BETADYNE CLEANSING WITHOUT COMPLICATION   1:1 MIXTURE KENALOG 1% LIDOCAINE WITHOUT COMPLICATION   AMOUNT OF KENALOMG   NDC NUMBER [id number]  INJECTION WITH NO TOUCH TECHNIQUE  SIDE EFFECTS BENEFITS AND RISKS DISCUSSED    TREATMENT PROGNOSIS BENEFITS  AND RISKS DISCUSSED   MONITOR FOR ALLERGIC AND VASCULAR SIDE EFFECTS  MEDICATION RISKS SIDE EFFECTS BENEFITS AND RISKS DISCUSSED   SCOTTY TOLEDO JR., MD   05/09/17                  SCOTTY TOLEDO MD  [facility]

## 2017-05-09 NOTE — MR AVS SNAPSHOT
After Visit Summary   5/9/2017    Arnaud Bird    MRN: [id number]           Patient Information     Date Of Birth          1955        Visit Information        Provider Department      5/9/2017 7:30 AM Johnson Clements MD [facility]        Today's Diagnoses     Chronic pain syndrome    -  1    Hyperlipidemia with target LDL less than 130        BMI 40.0-44.9, adult (H)        Mild persistent asthma without complication        Vitamin D deficiency        Morbid obesity due to excess calories (H)        Other hyperlipidemia        Status post total left knee replacement          Care Instructions      CODMAN'S EXERCISES  ,THAT IS PENDULUM RANGE OF MOTION 30 EACH TWICE DAILY    THUMB UP EXERCISES INTO PAIN 30 EACH TWICE DAILY    INTERNAL  AND EXTERNAL ROTATION  with AND WITHOUT RESISTANCE OR WEIGHT 30 EACH TWICE DAILY    SWORD SHEATH EXERCISE 30 EACH TWICE DAILY    WALL STRETCH EXERCISE 30 SECONDS EACH TWICE DAILY    POSTERIOR SHOULDER STRETCH AND HOLD 3 10 SECOND STRETCHES TWICE DAILY    ISOMETRIC EXERCISE 60 DEGREES ABDUCTION, ADDUCTION, 90 DEGREE THUMB UP POSITION    AVOID PAINFUL ARC    ICE TWICE DAILY X 5 MINUTES PRIOR TO EXERCISE OR AS NEEDED FOR PAIN CONTROL  .1. MODIFIED FAST 250 CALORIES TWICE DAILY FEMALES  300 CALORIES TWICE DAILY FOR MALES   OATMEAL AND COTTAGE CHEESE WORK NICELY   COPIOUS WATER BETWEEN   5/2 PLAN DR FERGUSON [facility]  NORMAL DIET 5 DAYS PER WEEK  SEMIFAST 2 DAYS PER WEEK  LOOK UP 5-2 PLAN ON THE INTERNET    Weight Loss Tips  1. Do not eat after 6 hrs before your expected bedtime  2. Have your heaviest meal for breakfast, a slightly lighter meal at lunch and a snack 6 hrs before bed  3. No sugar/calorie drinks except milk ie no fruit juice, pop, alcohol.  4. Drink milk OR WATER  30min before meals to decrease your hunger. Also it is excellent as part of your last meal of the day snack  5. Drink lots of water  6. Increase  fiber in diet: all bran cereal, salads, popcorn etc  7. Have only one small serving of fruit a day about 1/2 cup (as this is high in sugar)  8. EXERCISE is the bottom line. Without it, you will gain weight even on a low calorie diet. Best if done 2-3X a day as can    2. The only way known to prevent diabetes or keep it from getting worse is exercise, 20-40 minutes 3 times a day around the time of meals      SLEEP 8 HOURS PER DAY    BLACK AND BLUEBERRIES EVERY DAY ANTINFLAMMATORY BENEFIT     PLAIN YOGURT SEVERAL TIMES DAILY AS TOLERATED IF NOT ALLERGIC     AVOID HIGH FRUCTOSE SYRUP AND OLENE IN YOUR DIET     GREEN TEA EXTRACT    PROBIOTIC CAPSULE DAILY  HIGH QUALITY     DON'T EAT LATE AT NIGHT    CARALLUMA FIMBRIATA HELPS WITH APPETITE    KEEP A [employer] JOURNAL    888 BREATHER WHEN STRESSED OR COUNT BACK FROM 100 WITH SLOW BREATHING    POSITIVE AFFIRMATIONS         FIT BIT OR PEDOMETER 10,000 STEPS PER DAY     FIT CARYN LI RECOMMENDED              Follow-ups after your visit        Follow-up notes from your care team     Return in about 3 months (around 8/9/2017).      Who to contact     If you have questions or need follow up information about today's clinic visit or your schedule please contact [facility] directly at [phone].  Normal or non-critical lab and imaging results will be communicated to you by [email or web address], letter or phone within 4 business days after the clinic has received the results. If you do not hear from us within 7 days, please contact the clinic through [employer] or phone. If you have a critical or abnormal lab result, we will notify you by phone as soon as possible.  Submit refill requests through [employer]i or call your pharmacy and they will forward the refill request to us. Please allow 3 business days for your refill to be completed.          Additional Information About Your Visit        Towi Information     Towi lets you send messages to your doctor,  "view your test results, renew your prescriptions, schedule appointments and more. To sign up, go to [email or web address] . Click on \"Log in\" on the left side of the screen, which will take you to the Welcome page. Then click on \"Sign up Now\" on the right side of the page.     You will be asked to enter the access code listed below, as well as some personal information. Please follow the directions to create your username and password.     Your access code is: [email or web address]  Expires: 2017  8:07 AM     Your access code will  in 90 days. If you need help or a new code, please call your [employer] clinic or [phone].        Care EveryWhere ID     This is your Care EveryWhere ID. This could be used by other organizations to access your [employer] medical records  [id number]        Your Vitals Were     Pulse Temperature Respirations Pulse Oximetry BMI (Body Mass Index)       63 96.8  F (36  C) (Tympanic) 20 98% 41.66 kg/m2        Blood Pressure from Last 3 Encounters:   17 110/64   17 (!) 137/99   16 108/64    Weight from Last 3 Encounters:   17 274 lb (124.3 kg)   17 265 lb (120.2 kg)   16 261 lb (118.4 kg)              Today, you had the following     No orders found for display         Today's Medication Changes          These changes are accurate as of: 17  8:07 AM.  If you have any questions, ask your nurse or doctor.               Start taking these medicines.        Dose/Directions    HYDROcodone-acetaminophen 7.5-325 MG per tablet   Commonly known as:  NORCO   Used for:  Chronic pain syndrome   Started by:  Johnson Clements MD        Dose:  1 tablet   Start taking on:  2017   Take 1 tablet by mouth every 4 hours as needed for pain (4 x daily as needed  maxium 4 per day)   Quantity:  120 tablet   Refills:  0       phentermine 37.5 MG tablet   Commonly known as:  ADIPEX-P   Used for:  BMI 40.0-44.9, adult (H)   Started by:  Johnson Clements, " MD        Start taking on:  7/9/2017   TAKE ONE TABLET BY MOUTH EVERY DAY IN THE MORNING.   Quantity:  30 tablet   Refills:  0       topiramate 25 MG tablet   Commonly known as:  TOPAMAX   Used for:  BMI 40.0-44.9, adult (H)   Started by:  Johnson Clements MD        Dose:  25 mg   Take 1 tablet (25 mg) by mouth 2 times daily   Quantity:  60 tablet   Refills:  2            Where to get your medicines      These medications were sent to [place] [address] [place] [address]  [address] [facility] [address]     Phone:  [phone]     topiramate 25 MG tablet         Some of these will need a paper prescription and others can be bought over the counter.  Ask your nurse if you have questions.     Bring a paper prescription for each of these medications     HYDROcodone-acetaminophen 7.5-325 MG per tablet    phentermine 37.5 MG tablet                Primary Care Provider Office Phone # Fax #    Johnson Clements [phone]       [facility] [address] [facility] [address]        Thank you!     Thank you for choosing [facility]  for your care. Our goal is always to provide you with excellent care. Hearing back from our patients is one way we can continue to improve our services. Please take a few minutes to complete the written survey that you may receive in the mail after your visit with us. Thank you!             Your Updated Medication List - Protect others around you: Learn how to safely use, store and throw away your medicines at www.disposemymeds.org.          This list is accurate as of: 5/9/17  8:07 AM.  Always use your most recent med list.                   Brand Name Dispense Instructions for use    acyclovir 400 MG tablet    ZOVIRAX    28 tablet    TAKE TWO TABLETS BY MOUTH TWICE DAILY       albuterol 108 (90 BASE) MCG/ACT Inhaler    PROAIR HFA/PROVENTIL HFA/VENTOLIN HFA    1 Inhaler    Inhale 2  puffs into the lungs every 6 hours as needed for shortness of breath / dyspnea       clindamycin 1 % solution    CLEOCIN T    60 mL    Apply topically 2 times daily Profile Rx: patient will contact pharmacy when needed       diclofenac 1 % Gel topical gel    VOLTAREN    100 g    apply 2g topically 4 times daily as needed to involved area       fluticasone 50 MCG/ACT spray    FLONASE    16 g    Spray 2 sprays into both nostrils daily       fluticasone-salmeterol 100-50 MCG/DOSE diskus inhaler    ADVAIR    3 Inhaler    Inhale 1 puff into the lungs 2 times daily       HYDROcodone-acetaminophen 7.5-325 MG per tablet   Start taking on:  7/17/2017    NORCO    120 tablet    Take 1 tablet by mouth every 4 hours as needed for pain (4 x daily as needed  maxium 4 per day)       phentermine 37.5 MG tablet   Start taking on:  7/9/2017    ADIPEX-P    30 tablet    TAKE ONE TABLET BY MOUTH EVERY DAY IN THE MORNING.       sildenafil 100 MG cap/tab    VIAGRA    100 tablet    Take 0.5-1 tablets ( mg) by mouth daily as needed for erectile dysfunction Take 30 min to 4 hours before intercourse.  Never use with nitroglycerin, terazosin or doxazosin.       simvastatin 40 MG tablet    ZOCOR    30 tablet    Take 1 tablet (40 mg) by mouth At Bedtime       SUDAFED PO      Take 30 mg by mouth Reported on 5/9/2017       topiramate 25 MG tablet    TOPAMAX    60 tablet    Take 1 tablet (25 mg) by mouth 2 times daily

## 2017-05-09 NOTE — NURSING NOTE
"Chief Complaint   Patient presents with     Pain     shoulder       Initial /64  Pulse 63  Temp 96.8  F (36  C) (Tympanic)  Resp 20  Wt 274 lb (124.3 kg)  SpO2 98%  BMI 41.66 kg/m2 Estimated body mass index is 41.66 kg/(m^2) as calculated from the following:    Height as of 9/8/16: 5' 8\" (1.727 m).    Weight as of this encounter: 274 lb (124.3 kg).  Medication Reconciliation: complete   Radha Tucker CMA    "

## 2017-05-31 RX ORDER — LIDOCAINE HYDROCHLORIDE 10 MG/ML
1 INJECTION, SOLUTION INFILTRATION; PERINEURAL ONCE
Qty: 1 ML | Refills: 0 | OUTPATIENT
Start: 2017-05-31 | End: 2017-05-31

## 2017-07-28 ENCOUNTER — OFFICE VISIT (OUTPATIENT)
Dept: FAMILY MEDICINE | Facility: CLINIC | Age: 62
End: 2017-07-28
Payer: COMMERCIAL

## 2017-07-28 VITALS
RESPIRATION RATE: 20 BRPM | SYSTOLIC BLOOD PRESSURE: 131 MMHG | WEIGHT: 262 LBS | BODY MASS INDEX: 37.51 KG/M2 | HEIGHT: 70 IN | TEMPERATURE: 97.8 F | OXYGEN SATURATION: 95 % | DIASTOLIC BLOOD PRESSURE: 82 MMHG | HEART RATE: 72 BPM

## 2017-07-28 DIAGNOSIS — R42 VERTIGO: Primary | ICD-10-CM

## 2017-07-28 PROCEDURE — 99214 OFFICE O/P EST MOD 30 MIN: CPT | Performed by: FAMILY MEDICINE

## 2017-07-28 RX ORDER — MECLIZINE HYDROCHLORIDE 25 MG/1
25 TABLET ORAL EVERY 6 HOURS PRN
Qty: 30 TABLET | Refills: 1 | Status: SHIPPED | OUTPATIENT
Start: 2017-07-28 | End: 2017-07-28

## 2017-07-28 RX ORDER — MECLIZINE HYDROCHLORIDE 25 MG/1
25 TABLET ORAL EVERY 6 HOURS PRN
Qty: 30 TABLET | Refills: 1 | Status: SHIPPED | OUTPATIENT
Start: 2017-07-28 | End: 2017-12-07

## 2017-07-28 NOTE — PROGRESS NOTES
SUBJECTIVE:                                                    Arnaud Bird is a 62 year old male who presents to clinic today for the following health issues:    Dizziness      Duration: x 2-3 days    Description   Feeling faint:  no   Feeling like the surroundings are moving: YES  Loss of consciousness or falls: no     Intensity:  moderate    Accompanying signs and symptoms:   Nausea/vomitting: YES  Palpitations: no   Weakness in arms or legs: no   Vision or speech changes: no   Ringing in ears (Tinnitus): YES  Hearing loss related to dizziness: YES  Other (fevers/chills/sweating/dyspnea): YES- Headache and Ear Ache    History (similar episodes/head trauma/previous evaluation/recent bleeding): None    Precipitating or alleviating factors (new meds/chemicals): None  Worse with activity/head movement: YES    Therapies tried and outcome: None    PROBLEMS TO ADD ON...    Problem list and histories reviewed & adjusted, as indicated.  Additional history: as documented    chroinc pain now headaches and dizziness.   Patient Active Problem List   Diagnosis     Stiffness of knee joint, right     Gait difficulty     S/P TKR (total knee replacement)     Bilateral carpal tunnel syndrome     Vitamin D deficiency     Plantar fascial fibromatosis     Asthma with exacerbation     Hypertrophy of prostate without urinary obstruction     Obesity     Hyperlipidemia     Spondylosis with myelopathy, lumbar region     LEFT WORSE THAN RIGHT      CARDIOVASCULAR SCREENING; LDL GOAL LESS THAN 100     BMI 40.0-44.9, adult (H)     Mild persistent asthma     Hyperlipidemia with target LDL less than 130     Lumbar disc disease with radiculopathy     Groin strain, initial encounter     Sexual dysfunction     ACP (advance care planning)     SLAC (scapholunate advanced collapse) of wrist, right     Right wrist pain     Numbness and tingling in right hand     Chronic pain syndrome     Past Surgical History:   Procedure Laterality Date     HC  TOOTH EXTRACTION W/FORCEP       ORTHOPEDIC SURGERY      bilateral total knee replacements     TONSILLECTOMY      age 2 or 3       Social History   Substance Use Topics     Smoking status: Never Smoker     Smokeless tobacco: Never Used     Alcohol use Yes      Comment: case beer a week     Family History   Problem Relation Age of Onset     CANCER Father      Family History Negative Father      Family History Negative Mother      DIABETES No family hx of      Coronary Artery Disease No family hx of      Hypertension No family hx of      Hyperlipidemia No family hx of      CEREBROVASCULAR DISEASE No family hx of      Breast Cancer No family hx of      Colon Cancer No family hx of      Prostate Cancer No family hx of      Other Cancer No family hx of      Depression No family hx of      Anxiety Disorder No family hx of      MENTAL ILLNESS No family hx of      Substance Abuse No family hx of      Anesthesia Reaction No family hx of      Asthma No family hx of      OSTEOPOROSIS No family hx of      Genetic Disorder No family hx of      Thyroid Disease No family hx of      Obesity No family hx of      Unknown/Adopted No family hx of              Reviewed and updated as needed this visit by clinical staffTobacco  Allergies  Meds  Problems  Med Hx  Surg Hx  Fam Hx  Soc Hx        Reviewed and updated as needed this visit by Provider         ROS:  CONSTITUTIONAL:NEGATIVE for fever, chills, change in weight  INTEGUMENTARY/SKIN: NEGATIVE for worrisome rashes, moles or lesions  EYES: NEGATIVE for vision changes or irritation  ENT/MOUTH: dizziness  RESP:NEGATIVE for significant cough or SOB  CV: NEGATIVE for chest pain, palpitations or peripheral edema  GI: NEGATIVE for nausea, abdominal pain, heartburn, or change in bowel habits  : negative for dysuria, hematuria, decreased urinary stream, erectile dysfunction  MUSCULOSKELETAL: back pain  NEURO: some dizziness   ENDOCRINE: NEGATIVE for temperature intolerance, skin/hair  "changes and overweight  HEME/ALLERGY/IMMUNE: NEGATIVE for bleeding problems    OBJECTIVE:                                                    /82  Pulse 72  Temp 97.8  F (36.6  C) (Oral)  Resp 20  Ht 5' 10\" (1.778 m)  Wt 262 lb (118.8 kg)  SpO2 95%  BMI 37.59 kg/m2  Body mass index is 37.59 kg/(m^2).  GENERAL APPEARANCE: healthy, alert and mild distress  EYES: Eyes grossly normal to inspection, PERRL and conjunctivae and sclerae normal  HENT: ear canals and TM's normal and nose and mouth without ulcers or lesions  RESP: lungs clear to auscultation - no rales, rhonchi or wheezes  CV: regular rates and rhythm, normal S1 S2, no S3 or S4 and no murmur, click or rub  ABDOMEN: soft, nontender, without hepatosplenomegaly or masses and bowel sounds normal  MS: extremities normal- no gross deformities noted  SKIN: no suspicious lesions or rashes  NEURO: Normal strength and tone, mentation intact, speech normal, cranial nerves 2-12 intact and gait and balance normal.     Diagnostic test results:  Diagnostic Test Results:  Labs as ordered.        ASSESSMENT/PLAN:                                                    1. Vertigo    - meclizine (ANTIVERT) 25 MG tablet; Take 1 tablet (25 mg) by mouth every 6 hours as needed for dizziness  Dispense: 30 tablet; Refill: 1    2- chroic pain  3- overweight.   Follow up with Provider - 2-4 weeks or as needed.      Mariano Sullivan MD  [facility]    "

## 2017-07-28 NOTE — MR AVS SNAPSHOT
"              After Visit Summary   2017    Arnaud Bird    MRN: [id number]           Patient Information     Date Of Birth          1955        Visit Information        Provider Department      2017 8:45 AM Mariano Sullivan MD [facility]        Today's Diagnoses     Vertigo    -  1       Follow-ups after your visit        Who to contact     If you have questions or need follow up information about today's clinic visit or your schedule please contact [facility] directly at [phone].  Normal or non-critical lab and imaging results will be communicated to you by 4momshart, letter or phone within 4 business days after the clinic has received the results. If you do not hear from us within 7 days, please contact the clinic through 4momshart or phone. If you have a critical or abnormal lab result, we will notify you by phone as soon as possible.  Submit refill requests through [employer] or call your pharmacy and they will forward the refill request to us. Please allow 3 business days for your refill to be completed.          Additional Information About Your Visit        MyChart Information     [employer] lets you send messages to your doctor, view your test results, renew your prescriptions, schedule appointments and more. To sign up, go to [email or web address] . Click on \"Log in\" on the left side of the screen, which will take you to the Welcome page. Then click on \"Sign up Now\" on the right side of the page.     You will be asked to enter the access code listed below, as well as some personal information. Please follow the directions to create your username and password.     Your access code is: [email or web address]  Expires: 2017  8:07 AM     Your access code will  in 90 days. If you need help or a new code, please call your [facility] or [phone].        Care EveryWhere ID     This is your Care EveryWhere ID. This " "could be used by other organizations to access your [facility] medical records  [id number]        Your Vitals Were     Pulse Temperature Respirations Height Pulse Oximetry BMI (Body Mass Index)    72 97.8  F (36.6  C) (Oral) 20 5' 10\" (1.778 m) 95% 37.59 kg/m2       Blood Pressure from Last 3 Encounters:   07/28/17 131/82   05/09/17 110/64   01/11/17 (!) 137/99    Weight from Last 3 Encounters:   07/28/17 262 lb (118.8 kg)   05/09/17 274 lb (124.3 kg)   01/11/17 265 lb (120.2 kg)              We Performed the Following     Asthma Action Plan (AAP)          Today's Medication Changes          These changes are accurate as of: 7/28/17  1:05 PM.  If you have any questions, ask your nurse or doctor.               Start taking these medicines.        Dose/Directions    meclizine 25 MG tablet   Commonly known as:  ANTIVERT   Used for:  Vertigo   Started by:  Mariano Sullivan MD        Dose:  25 mg   Take 1 tablet (25 mg) by mouth every 6 hours as needed for dizziness   Quantity:  30 tablet   Refills:  1            Where to get your medicines      These medications were sent to [place] [address] [place] [facility] [address] [address]     Phone:  [phone]     meclizine 25 MG tablet                Primary Care Provider Office Phone # Fax #    Johnson Jesica Clements [phone]       [facility] [address] [facility] [address]        Equal Access to Services     [facility]NA : Hadii lainey quinteroo Sogisselle, waaxda luqadaha, qaybta kaalmada gee chapman . So [facility] [phone].    ATENCIÓN: Si habla español, tiene a stewart disposición servicios gratuitos de asistencia lingüística. Llame al [phone].    We comply with applicable federal civil rights laws and [place] laws. We do not discriminate on the basis of race, color, national origin, age, disability sex, sexual orientation or gender " identity.            Thank you!     Thank you for choosing [facility]  for your care. Our goal is always to provide you with excellent care. Hearing back from our patients is one way we can continue to improve our services. Please take a few minutes to complete the written survey that you may receive in the mail after your visit with us. Thank you!             Your Updated Medication List - Protect others around you: Learn how to safely use, store and throw away your medicines at www.disposemymeds.org.          This list is accurate as of: 7/28/17  1:05 PM.  Always use your most recent med list.                   Brand Name Dispense Instructions for use Diagnosis    acyclovir 400 MG tablet    ZOVIRAX    28 tablet    TAKE TWO TABLETS BY MOUTH TWICE DAILY    HSV (herpes simplex virus) infection       albuterol 108 (90 BASE) MCG/ACT Inhaler    PROAIR HFA/PROVENTIL HFA/VENTOLIN HFA    1 Inhaler    Inhale 2 puffs into the lungs every 6 hours as needed for shortness of breath / dyspnea    Mild persistent asthma, uncomplicated       clindamycin 1 % solution    CLEOCIN T    60 mL    Apply topically 2 times daily Profile Rx: patient will contact pharmacy when needed    Folliculitis       diclofenac 1 % Gel topical gel    VOLTAREN    100 g    apply 2g topically 4 times daily as needed to involved area    Stiffness of knee joint, right, Carpal tunnel syndrome, unspecified laterality, Primary localized osteoarthrosis, lower leg, unspecified laterality, Lumbar disc disease with radiculopathy       fluticasone 50 MCG/ACT spray    FLONASE    16 g    Spray 2 sprays into both nostrils daily    Seasonal allergic rhinitis due to pollen       fluticasone-salmeterol 100-50 MCG/DOSE diskus inhaler    ADVAIR    3 Inhaler    Inhale 1 puff into the lungs 2 times daily    Mild persistent asthma, uncomplicated       HYDROcodone-acetaminophen 7.5-325 MG per tablet    NORCO    120 tablet    Take 1 tablet by  mouth every 4 hours as needed for pain (4 x daily as needed  maxium 4 per day)    Chronic pain syndrome       meclizine 25 MG tablet    ANTIVERT    30 tablet    Take 1 tablet (25 mg) by mouth every 6 hours as needed for dizziness    Vertigo       phentermine 37.5 MG tablet    ADIPEX-P    30 tablet    TAKE ONE TABLET BY MOUTH EVERY DAY IN THE MORNING.    BMI 40.0-44.9, adult (H)       sildenafil 100 MG cap/tab    VIAGRA    100 tablet    Take 0.5-1 tablets ( mg) by mouth daily as needed for erectile dysfunction Take 30 min to 4 hours before intercourse.  Never use with nitroglycerin, terazosin or doxazosin.    Sexual dysfunction       simvastatin 40 MG tablet    ZOCOR    30 tablet    Take 1 tablet (40 mg) by mouth At Bedtime    Hyperlipidemia with target LDL less than 130       SUDAFED PO      Take 30 mg by mouth Reported on 5/9/2017        topiramate 25 MG tablet    TOPAMAX    60 tablet    Take 1 tablet (25 mg) by mouth 2 times daily    BMI 40.0-44.9, adult (H)

## 2017-07-28 NOTE — NURSING NOTE
"Chief Complaint   Patient presents with     Dizziness     /82  Pulse 72  Temp 97.8  F (36.6  C) (Oral)  Resp 20  Ht 5' 10\" (1.778 m)  Wt 262 lb (118.8 kg)  SpO2 95%  BMI 37.59 kg/m2 Estimated body mass index is 37.59 kg/(m^2) as calculated from the following:    Height as of this encounter: 5' 10\" (1.778 m).    Weight as of this encounter: 262 lb (118.8 kg).  BP completed using cuff size: nick Montero CMA    Health Maintenance Due   Topic Date Due     URINE DRUG SCREEN Q1 YR  04/27/1968     COLON CANCER SCREEN (SYSTEM ASSIGNED)  11/13/2016     ASTHMA CONTROL TEST Q6 MOS  04/30/2017     Health Maintenance reviewed at today's visit patient asked to schedule/complete:   Asthma:  Patient agrees to schedule  Colon Cancer:  Patient agrees to schedule    "

## 2017-07-29 ASSESSMENT — ASTHMA QUESTIONNAIRES: ACT_TOTALSCORE: 22

## 2017-08-01 ENCOUNTER — CARE COORDINATION (OUTPATIENT)
Dept: OTOLARYNGOLOGY | Facility: CLINIC | Age: 62
End: 2017-08-01

## 2017-08-01 NOTE — PROGRESS NOTES
Transfer call from call center: patient is waiting for call back from ENT to see neurotologist for new onset of vertigo (one week).  Has seen Dr Barnes in past to epistaxis and some sinus issues.  Now wondering if there is anything he could do until he gets in to see ENT.  Primary provider gave him some meclazine-helps but makes him very tired.  Also used some old Zofran he had around the house, which helped with nausea, but only had two tabs and is now out.  Encouraged patient to call primary and ask for more Zofran since that was effective and continue with meclazine.  Patient wondering if he should go to the ED but I suggested only if symptoms worsen or if he should start vomiting.  Patient agreed with plan, will wait to hear from ENT with appt.

## 2017-08-14 DIAGNOSIS — G89.4 CHRONIC PAIN SYNDROME: Chronic | ICD-10-CM

## 2017-08-14 NOTE — TELEPHONE ENCOUNTER
Controlled Substance Refill Request for HYDROcodone-acetaminophen (NORCO) 7.5-325 MG per tablet  Problem List Complete:  YesPatient is followed by SCOTTY TOLEDO MD for ongoing prescription of pain medication.  All refills should only be approved by this provider, or covering partner.    Medication(s):  NORCO  7.5MG PO FOUR TIMES DAILY .   Maximum quantity per month:  120   Clinic visit frequency required: Q 3 months     Controlled substance agreement:  Encounter-Level CSA - 10/31/2016:                 Controlled Substance Agreement - Scan on 11/15/2016  7:53 AM : CONTROLLED SUBSTANCE AGREEMENT (below)            Pain Clinic evaluation in the past: No    DIRE Total Score(s):    7/2/2016   Total Score 18       Last [facility] website verification:  done on 06//20/2017   [email or web address]         checked in past 6 months?  Yes 06/20/2017       Last Written Prescription Date: 07/17/2017  Last Fill Quantity: 120,  # refills: 0   Last Office Visit with [facility], [facility] or  Health prescribing provider: 0728/2017

## 2017-08-14 NOTE — TELEPHONE ENCOUNTER
Reason for Call:  Medication or medication refill:    Do you use a [facility]?  Name of the pharmacy and phone number for the current request:  [facility]     Name of the medication requested: Norco 7.5-325 mg    Other request: Please call when ready.  [place]    Can we leave a detailed message on this number? YES    Phone number patient can be reached at: Home number on file [phone] (home)    Best Time: any    Call taken on 8/14/2017 at 11:01 AM by KEANU SCHULTE

## 2017-08-15 RX ORDER — HYDROCODONE BITARTRATE AND ACETAMINOPHEN 7.5; 325 MG/1; MG/1
1 TABLET ORAL EVERY 4 HOURS PRN
Qty: 30 TABLET | Refills: 0 | Status: SHIPPED | OUTPATIENT
Start: 2017-08-15 | End: 2017-08-22

## 2017-08-15 NOTE — TELEPHONE ENCOUNTER
Called patient to let him know RX is ready for  in [place].  Told him to make an appt when he picks it up. States he was in and saw Dr Sullivan last week.

## 2017-08-22 ENCOUNTER — OFFICE VISIT (OUTPATIENT)
Dept: FAMILY MEDICINE | Facility: CLINIC | Age: 62
End: 2017-08-22
Payer: COMMERCIAL

## 2017-08-22 VITALS
RESPIRATION RATE: 20 BRPM | TEMPERATURE: 96.8 F | HEART RATE: 63 BPM | BODY MASS INDEX: 37.81 KG/M2 | WEIGHT: 263.5 LBS | DIASTOLIC BLOOD PRESSURE: 66 MMHG | OXYGEN SATURATION: 96 % | SYSTOLIC BLOOD PRESSURE: 112 MMHG

## 2017-08-22 DIAGNOSIS — G89.4 CHRONIC PAIN SYNDROME: Chronic | ICD-10-CM

## 2017-08-22 DIAGNOSIS — M1A.9XX0 CHRONIC GOUT INVOLVING TOE OF RIGHT FOOT WITHOUT TOPHUS, UNSPECIFIED CAUSE: ICD-10-CM

## 2017-08-22 DIAGNOSIS — R37 SEXUAL DYSFUNCTION: ICD-10-CM

## 2017-08-22 DIAGNOSIS — Z12.11 SCREEN FOR COLON CANCER: Primary | ICD-10-CM

## 2017-08-22 PROCEDURE — 99214 OFFICE O/P EST MOD 30 MIN: CPT | Performed by: FAMILY MEDICINE

## 2017-08-22 RX ORDER — HYDROCODONE BITARTRATE AND ACETAMINOPHEN 7.5; 325 MG/1; MG/1
1 TABLET ORAL EVERY 4 HOURS PRN
Qty: 120 TABLET | Refills: 0 | Status: SHIPPED | OUTPATIENT
Start: 2017-09-22 | End: 2017-08-22

## 2017-08-22 RX ORDER — PHENTERMINE HYDROCHLORIDE 37.5 MG/1
TABLET ORAL
Qty: 30 TABLET | Refills: 0 | Status: SHIPPED | OUTPATIENT
Start: 2017-09-22 | End: 2017-08-22

## 2017-08-22 RX ORDER — SILDENAFIL 100 MG/1
50-100 TABLET, FILM COATED ORAL DAILY PRN
Qty: 100 TABLET | Refills: 3 | Status: SHIPPED | OUTPATIENT
Start: 2017-08-22 | End: 2018-06-04

## 2017-08-22 RX ORDER — PHENTERMINE HYDROCHLORIDE 37.5 MG/1
TABLET ORAL
Qty: 30 TABLET | Refills: 0 | Status: SHIPPED | OUTPATIENT
Start: 2017-08-22 | End: 2017-08-22

## 2017-08-22 RX ORDER — HYDROCODONE BITARTRATE AND ACETAMINOPHEN 7.5; 325 MG/1; MG/1
1 TABLET ORAL EVERY 4 HOURS PRN
Qty: 120 TABLET | Refills: 0 | Status: SHIPPED | OUTPATIENT
Start: 2017-10-22 | End: 2017-11-20

## 2017-08-22 RX ORDER — PHENTERMINE HYDROCHLORIDE 37.5 MG/1
TABLET ORAL
Qty: 30 TABLET | Refills: 0 | Status: SHIPPED | OUTPATIENT
Start: 2017-10-22 | End: 2017-12-07

## 2017-08-22 RX ORDER — ALLOPURINOL 100 MG/1
100 TABLET ORAL DAILY
Qty: 30 TABLET | Refills: 1 | Status: SHIPPED | OUTPATIENT
Start: 2017-08-22 | End: 2017-10-11

## 2017-08-22 RX ORDER — HYDROCODONE BITARTRATE AND ACETAMINOPHEN 7.5; 325 MG/1; MG/1
1 TABLET ORAL EVERY 4 HOURS PRN
Qty: 120 TABLET | Refills: 0 | Status: SHIPPED | OUTPATIENT
Start: 2017-08-22 | End: 2017-08-22

## 2017-08-22 NOTE — NURSING NOTE
"Chief Complaint   Patient presents with     Pain       Initial /66  Pulse 63  Temp 96.8  F (36  C) (Tympanic)  Resp 20  Wt 263 lb 8 oz (119.5 kg)  SpO2 96%  BMI 37.81 kg/m2 Estimated body mass index is 37.81 kg/(m^2) as calculated from the following:    Height as of 7/28/17: 5' 10\" (1.778 m).    Weight as of this encounter: 263 lb 8 oz (119.5 kg).  Medication Reconciliation: complete   Radha Tucker CMA    "

## 2017-08-22 NOTE — MR AVS SNAPSHOT
"              After Visit Summary   8/22/2017    Arnaud Bird    MRN: [id number]           Patient Information     Date Of Birth          1955        Visit Information        Provider Department      8/22/2017 7:30 AM Johnson Clements MD [facility]        Today's Diagnoses     Screen for colon cancer    -  1    Chronic pain syndrome        Chronic gout involving toe 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 tophus, unspecified cause        BMI 40.0-44.9, adult (H)        Sexual dysfunction          Care Instructions      Diabetes: Exchange List    What are the exchange lists?     The exchange lists show you portions of food that equal 1 exchange. Foods are divided into food lists. The foods on each list are called exchanges because they have a similar number of calories, protein, carbohydrate and fat content. Foods from each list can be traded or \"exchanged\" for any other food on the same list because they all have a similar exchange value. A dietitian will help you plan how much food your child should eat at each meal and from what lists the foods should come from. At first you should measure your food until you are able to make good estimates about serving sizes. The following list is a sample of foods found on the exchange lists. For more information, you can buy the Exchange Lists for Meal Planning from: The American Diabetes Association [address] [place] [address] [phone] http://www.diabetes.org    Carbohydrate group     Starch List: One starch exchange contains about 15 grams of carbohydrate, 3 grams of protein, 0 to 1 grams of fat, and 80 calories. A starch exchange is sometimes called a carb exchange. Examples of one starch (carb) exchange are:     one slice of bread     1/2 hamburger or hot dog bun     3/4 cup of unsweetened cereal     1/3 cup pasta     3 cups popcorn     crackers (6 small saltines, 3 squares of justin crackers, 3 of most other crackers)     1 " "pancake or waffle (5 inch)     15 to 20 fat-free or baked potato or corn chips.     The vegetables included in the starch exchanges include:     corn (1/2 cup or 1/2 cob)     white potato (1/4 large baked with skin or 1/2 cup mashed)     yam or sweet potato (1/2 cup)     green peas (1/2 cup)     squash, winter (1 cup)     lima beans (2/3 cup).     Fruit List: 1 fruit exchange contains about 15 grams of carbohydrate and 60 calories. Examples of one fruit exchange are:     grape juice (1/3 cup)     apple or pineapple juice (1/2 cup)     orange or grapefruit juice (1/2 cup)     1 small apple     orange or peach     1/2 banana     1 cup raspberries     1/3 of a small cantaloupe     1 slice of watermelon.     Milk List: 1 milk exchange contains about 8 grams of protein and 12 grams of carbohydrate. Items on the milk list are divided into fat-free, reduced fat, and whole milk depending on the number of fat grams in the exchange. Examples of one milk exchange are: Fat-Free (0 to 3 grams of fat)     1 cup of skim or non-fat milk     1 cup of 1% milk (also includes 1/2 fat exchange)     6 ounces flavored fat-free yogurt     Reduced-Fat (5 grams of fat)     6 ounces of plain, low-fat yogurt     1 cup 2% milk (also includes one fat exchange).     Whole Milk (8 grams of fat)     8 ounces of plain yogurt (made from whole milk)     1 cup whole milk.     Vegetable List: One vegetable exchange has 5 grams of carbohydrate, 2 grams of protein, no fat, and 25 calories. One-half cup of cooked or a cup of raw vegetables is a good measure for 1 exchange of most vegetables. Raw lettuce may be taken in larger quantities, but salad dressing usually equals 1 fat exchange. Other Carbohydrates List: One \"other carbohydrate\" exchange has 15 grams of carbohydrate. Many of these foods count as a starch exchange and one or more fat exchanges.     brownie (2 inch square) = 1 carb, 1 fat exchange     fruit snack roll = 1 carb exchange     granola " bar = 1 and 1/2 carb exchanges     ice cream (1/2 cup) = 1 carb, 2 fat exchanges     frozen yogurt (1/2 cup) = 1 carb, 0 to 1 fat exchanges     tortilla chips (6-12) = 1 carb, 2 fat exchanges.     Meat and Meat Substitute Group     Meats are divided into very lean meats, lean meats, medium-fat meats, and high-fat meats. People with diabetes should try to eat more lean and medium fat meats and stay away from the high fat choices. The Very Lean meat group includes foods that contain 7 grams of protein, 0 to 1 gram of fat, and 35 calories for 1 exchange. Examples include:     1 ounce chicken or turkey (white meat, no skin)     1 ounce fresh fish     1 ounce fat-free cheese     2 egg whites     The Lean meat group includes foods that contain 7 grams of protein, 3 grams of fat, and 55 calories for 1 meat exchange. Examples include:     1 ounce chicken or turkey (dark meat, no skin)     1 ounce fish     1 ounce lean pork     1 ounce USDA Select or Choice grades of lean beef     1 ounce cheese (with 3 grams of fat or less per ounce).     The Medium-Fat group includes foods that have 7 grams of protein, 5 grams of fat, and 75 calories for 1 meat exchange. Examples include:     1 ounce of ground beef, most cuts of beef, pork, lamb or veal     1 ounce of cheese (5 grams of fat per ounce or less)     1 egg     1 ounce fried fish.     The High-Fat foods have 7 grams of protein, 8 grams of fat, and 100 calories for 1 meat exchange. This group includes:     1 ounce of pork sausage     1 ounce of spare ribs     1 oz of regular cheese (American, Swiss etc.)     1 oz of lunch meat     1 hot dog (turkey or chicken).     Fat Group     Fat List: Fat is necessary for the body and is particularly important during periods of fasting (overnight), when it is very slowly absorbed. 1 fat exchange contains 5 grams of fat and 45 calories. The monounsaturated fats and polyunsaturated fats are better for us than saturated fats. The fat list  includes: 1 exchange of monounsaturated fats equals:     1/2 Tbsp peanut butter     6 almonds     1 tsp of oil (olive, peanut, canola).     1 exchange of polyunsaturated fats equals:     1 tsp margarine     1 tsp of any vegetable oil (except coconut).     1 exchange of saturated fat includes:     1 tsp butter     1 strip of farah     2 Tbsp of cream (half and half).     Free Foods     A free food contains less than 20 calories or less than 5 grams of carbohydrate per serving. If the food has a serving size listed on its package, it should be limited to 3 servings spread throughout the day. Examples of free foods include:     4 Tbsp fat-free margarine     1 Tbsp fat-free Miracle Whip     sugar-free gelatin     diet soft drinks     catsup     soy sauce     spices.     Combination Foods     Many foods, such as casseroles, are mixed together. Your dietitian can help you figure out how many exchanges to count for combination foods. For example:     lasagna (1 cup) = 2 carb exchanges and 2 medium-fat meat exchanges     spaghetti with meatballs (1 cup) = 2 carb exchanges and 2 medium-fat meat exchanges     pizza, cheese (1/4 of 12 in.) = 2 carbs, 2 medium-fat meats     chicken noodle soup (1 cup) = 1 carb exchange     frozen entrée (less than 300 calories) = 2 carbs, 3 lean meat exchanges     macaroni and cheese (1 cup) = 2 carb exchanges and 2 medium-fat meat exchanges.     1. MODIFIED FAST 250 CALORIES TWICE DAILY FEMALES  300 CALORIES TWICE DAILY FOR MALES   OATMEAL AND COTTAGE CHEESE WORK NICELY   COPIOUS WATER BETWEEN   5/2 PLAN DR [facility]ON [facility]  NORMAL DIET 5 DAYS PER WEEK  SEMIFAST 2 DAYS PER WEEK  LOOK UP 5-2 PLAN ON THE INTERNET    Weight Loss Tips  1. Do not eat after 6 hrs before your expected bedtime  2. Have your heaviest meal for breakfast, a slightly lighter meal at lunch and a snack 6 hrs before bed  3. No sugar/calorie drinks except milk ie no fruit juice, pop, alcohol.  4. Drink milk OR WATER   30min before meals to decrease your hunger. Also it is excellent as part of your last meal of the day snack  5. Drink lots of water  6. Increase fiber in diet: all bran cereal, salads, popcorn etc  7. Have only one small serving of fruit a day about 1/2 cup (as this is high in sugar)  8. EXERCISE is the bottom line. Without it, you will gain weight even on a low calorie diet. Best if done 2-3X a day as can    2. The only way known to prevent diabetes or keep it from getting worse is exercise, 20-40 minutes 3 times a day around the time of meals      SLEEP 8 HOURS PER DAY    BLACK AND BLUEBERRIES EVERY DAY ANTINFLAMMATORY BENEFIT     PLAIN YOGURT SEVERAL TIMES DAILY AS TOLERATED IF NOT ALLERGIC     AVOID HIGH FRUCTOSE SYRUP AND OLENE IN YOUR DIET     GREEN TEA EXTRACT    PROBIOTIC CAPSULE DAILY  HIGH QUALITY     DON'T EAT LATE AT NIGHT    CARCARLEYUMA FIMBRIATA HELPS WITH APPETITE    KEEP A Boxxet JOURNAL    888 BREATHER WHEN STRESSED OR COUNT BACK FROM 100 WITH SLOW BREATHING    POSITIVE AFFIRMATIONS         FIT BIT OR PEDOMETER 10,000 STEPS PER DAY     FIT BIT JEN RECOMMENDED      (Z12.11) Screen for colon cancer  (primary encounter diagnosis)  Comment:    Plan: GASTROENTEROLOGY ADULT REF PROCEDURE ONLY             (G89.4) Chronic pain syndrome  Comment:    Plan: HYDROcodone-acetaminophen (NORCO) 7.5-325 MG         per tablet, DISCONTINUED:         HYDROcodone-acetaminophen (NORCO) 7.5-325 MG         per tablet, DISCONTINUED:         HYDROcodone-acetaminophen (NORCO) 7.5-325 MG         per tablet             (M1A.9XX0) Chronic gout involving toe 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 tophus, unspecified cause  Comment:    Plan: allopurinol (ZYLOPRIM) 100 MG tablet             (Z68.41) BMI 40.0-44.9, adult (H)  Comment:    Plan: phentermine (ADIPEX-P) 37.5 MG tablet,         DISCONTINUED: phentermine (ADIPEX-P) 37.5 MG         tablet, DISCONTINUED: phentermine (ADIPEX-P)         37.5 MG tablet             (R37) Sexual  dysfunction  Comment:    Plan: sildenafil (VIAGRA) 100 MG cap/tab                         Follow-ups after your visit        Additional Services     GASTROENTEROLOGY ADULT REF PROCEDURE ONLY       Last Lab Result: Creatinine (mg/dL)       Date                     Value                 10/31/2016               0.88             ----------  Body mass index is 37.81 kg/(m^2).     Needed:  No  Language:  English    Patient will be contacted to schedule procedure.     Please be aware that coverage of these services is subject to the terms and limitations of your health insurance plan.  Call member services at your health plan with any benefit or coverage questions.  Any procedures must be performed at a [facility] facility OR coordinated by your clinic's referral office.    Please bring the following with you to your appointment:    (1) Any X-Rays, CTs or MRIs which have been performed.  Contact the facility where they were done to arrange for  prior to your scheduled appointment.    (2) List of current medications   (3) This referral request   (4) Any documents/labs given to you for this referral                  Your next 10 appointments already scheduled     Sep 15, 2017  8:30 AM CDT   Walk In From ENT with Angélica Grey   [facility] ([facility])    [address] [place] [id number] [phone]            Sep 15, 2017  9:30 AM CDT   (Arrive by 9:15 AM)   New Patient Visit with Alex Singh MD   [facility] ([facility])    [address] [place] [id number] [phone]              Who to contact     If you have questions or need follow up information about today's clinic visit or your schedule please contact [facility] directly at [phone].  Normal or non-critical lab and imaging results will be communicated to you by  "MyChart, letter or phone within 4 business days after the clinic has received the results. If you do not hear from us within 7 days, please contact the clinic through [facility] or phone. If you have a critical or abnormal lab result, we will notify you by phone as soon as possible.  Submit refill requests through [employer] or call your pharmacy and they will forward the refill request to us. Please allow 3 business days for your refill to be completed.          Additional Information About Your Visit        [facility] Information     [employer]visor[employer] lets you send messages to your doctor, view your test results, renew your prescriptions, schedule appointments and more. To sign up, go to [email or web address] . Click on \"Log in\" on the left side of the screen, which will take you to the Welcome page. Then click on \"Sign up Now\" on the right side of the page.     You will be asked to enter the access code listed below, as well as some personal information. Please follow the directions to create your username and password.     Your access code is: [email or web address]  Expires: 2017 10:09 AM     Your access code will  in 90 days. If you need help or a new code, please call your [facility] clinic or [phone].        Care EveryWhere ID     This is your Care EveryWhere ID. This could be used by other organizations to access your [facility] medical records  [id number]        Your Vitals Were     Pulse Temperature Respirations Pulse Oximetry BMI (Body Mass Index)       63 96.8  F (36  C) (Tympanic) 20 96% 37.81 kg/m2        Blood Pressure from Last 3 Encounters:   17 112/66   17 131/82   17 110/64    Weight from Last 3 Encounters:   17 263 lb 8 oz (119.5 kg)   17 262 lb (118.8 kg)   17 274 lb (124.3 kg)              We Performed the Following     GASTROENTEROLOGY ADULT REF PROCEDURE ONLY          Today's Medication Changes          These changes are accurate as of: 17  8:05 AM.  If you have " any questions, ask your nurse or doctor.               Start taking these medicines.        Dose/Directions    allopurinol 100 MG tablet   Commonly known as:  ZYLOPRIM   Used for:  Chronic gout involving toe 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 tophus, unspecified cause   Started by:  Johnson Clements MD        Dose:  100 mg   Take 1 tablet (100 mg) by mouth daily   Quantity:  30 tablet   Refills:  1       HYDROcodone-acetaminophen 7.5-325 MG per tablet   Commonly known as:  NORCO   Used for:  Chronic pain syndrome   Started by:  Johnson Clements MD        Dose:  1 tablet   Start taking on:  10/22/2017   Take 1 tablet by mouth every 4 hours as needed for pain (4 x daily as needed  maxium 4 per day)   Quantity:  120 tablet   Refills:  0       phentermine 37.5 MG tablet   Commonly known as:  ADIPEX-P   Used for:  BMI 40.0-44.9, adult (H)   Started by:  Johnson Clements MD        Start taking on:  10/22/2017   TAKE ONE TABLET BY MOUTH EVERY DAY IN THE MORNING.   Quantity:  30 tablet   Refills:  0            Where to get your medicines      These medications were sent to [facility] [id number] [place] [facility] [address] [facility]  [address] [id number]     Phone:  [phone]     allopurinol 100 MG tablet         Some of these will need a paper prescription and others can be bought over the counter.  Ask your nurse if you have questions.     Bring a paper prescription for each of these medications     HYDROcodone-acetaminophen 7.5-325 MG per tablet    phentermine 37.5 MG tablet    sildenafil 100 MG cap/tab                Primary Care Provider Office Phone # Fax #    Johnson Clements [phone] [address]  [place] [address]        Equal Access to Services     [facility] LY : Tina Paula, wahumphreyda lueboni, qaybta kaalamanda chapman, gee mirza. So [facility] [phone].    ATENCIÓN: Si maciej stone, casper kyle stewart  disposición servicios gratuitos de asistencia lingüística. Arsen nunez [phone].    We comply with applicable federal civil rights laws and [place] laws. We do not discriminate on the basis of race, color, national origin, age, disability sex, sexual orientation or gender identity.            Thank you!     Thank you for choosing [facility]  for your care. Our goal is always to provide you with excellent care. Hearing back from our patients is one way we can continue to improve our services. Please take a few minutes to complete the written survey that you may receive in the mail after your visit with us. Thank you!             Your Updated Medication List - Protect others around you: Learn how to safely use, store and throw away your medicines at www.disposemymeds.org.          This list is accurate as of: 8/22/17  8:05 AM.  Always use your most recent med list.                   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 tophus, unspecified cause       clindamycin 1 % solution    CLEOCIN T    60 mL    Apply topically 2 times daily Profile Rx: patient will contact pharmacy when needed    Folliculitis       diclofenac 1 % Gel topical gel    VOLTAREN    100 g    apply 2g topically 4 times daily as needed to involved area    Stiffness of knee joint, right, Carpal tunnel syndrome, unspecified laterality, Primary localized osteoarthrosis, lower leg, unspecified  laterality, Lumbar disc disease with radiculopathy       fluticasone 50 MCG/ACT spray    FLONASE    16 g    Spray 2 sprays into both nostrils daily    Seasonal allergic rhinitis due to pollen       fluticasone-salmeterol 100-50 MCG/DOSE diskus inhaler    ADVAIR    3 Inhaler    Inhale 1 puff into the lungs 2 times daily    Mild persistent asthma, uncomplicated       HYDROcodone-acetaminophen 7.5-325 MG per tablet   Start taking on:  10/22/2017    NORCO    120 tablet    Take 1 tablet by mouth every 4 hours as needed for pain (4 x daily as needed  maxium 4 per day)    Chronic pain syndrome       meclizine 25 MG tablet    ANTIVERT    30 tablet    Take 1 tablet (25 mg) by mouth every 6 hours as needed for dizziness    Vertigo       phentermine 37.5 MG tablet   Start taking on:  10/22/2017    ADIPEX-P    30 tablet    TAKE ONE TABLET BY MOUTH EVERY DAY IN THE MORNING.    BMI 40.0-44.9, adult (H)       sildenafil 100 MG cap/tab    VIAGRA    100 tablet    Take 0.5-1 tablets ( mg) by mouth daily as needed for erectile dysfunction Take 30 min to 4 hours before intercourse.  Never use with nitroglycerin, terazosin or doxazosin.    Sexual dysfunction       simvastatin 40 MG tablet    ZOCOR    30 tablet    Take 1 tablet (40 mg) by mouth At Bedtime    Hyperlipidemia with target LDL less than 130       SUDAFED PO      Take 30 mg by mouth Reported on 5/9/2017        topiramate 25 MG tablet    TOPAMAX    60 tablet    Take 1 tablet (25 mg) by mouth 2 times daily    BMI 40.0-44.9, adult (H)

## 2017-08-22 NOTE — PROGRESS NOTES
SUBJECTIVE:   Arnaud Bird is a 62 year old male who presents to clinic today for the following health issues:      Chronic Pain Follow-Up       Type / Location of Pain: back, shoulders, knees  Analgesia/pain control:       Recent changes:  same      Overall control: Comfortably manageable  Activity level/function:      Daily activities:  Able to do all daily activities    Work:  not applicable  Adverse effects:  No  Adherance    Taking medication as directed?  Yes    Participating in other treatments: not applicable  Risk Factors:    Sleep:  Good    Mood/anxiety:  controlled    Recent family or social stressors:  none noted    Other aggravating factors: none  PHQ-9 SCORE 10/31/2016   Total Score 2     SOFIYA-7 SCORE 10/31/2016   Total Score 2     Encounter-Level CSA - 10/31/2016:          Controlled Substance Agreement - Scan on 11/15/2016  7:53 AM : CONTROLLED SUBSTANCE AGREEMENT (below)                  Amount of exercise or physical activity: 4-5 days/week for an average of greater than 60 minutes    Problems taking medications regularly: No    Medication side effects: none  Diet: regular (no restrictions)  OSTEOARTHRITIS STIFFNESS  TOTAL KNEE ARTHROPLASTY RIGHT   BILATERAL CARPAL TUNNEL SYNDROME   VITAMIN D REPLACEMENT    PLANTAR FASCIITIS INSOLES HELPING   ASTHMA BETTER MILD PERSISTENT   LUMBAR DISC RADICULOPATHY   SEXUAL DYSFUNCTION GOING TO ELYSE  GOUT ATTACK RECENTLY RESPONDING TO MORAN JUICE            Problem list and histories reviewed & adjusted, as indicated.  Additional history: as documented      Patient Active Problem List   Diagnosis     Stiffness of knee joint, right     Gait difficulty     S/P TKR (total knee replacement)     Bilateral carpal tunnel syndrome     Vitamin D deficiency     Plantar fascial fibromatosis     Asthma with exacerbation     Hypertrophy of prostate without urinary obstruction     Obesity     Hyperlipidemia     Spondylosis with myelopathy, lumbar region     LEFT WORSE  THAN RIGHT      CARDIOVASCULAR SCREENING; LDL GOAL LESS THAN 100     BMI 40.0-44.9, adult (H)     Mild persistent asthma     Hyperlipidemia with target LDL less than 130     Lumbar disc disease with radiculopathy     Groin strain, initial encounter     Sexual dysfunction     ACP (advance care planning)     SLAC (scapholunate advanced collapse) of wrist, right     Right wrist pain     Numbness and tingling in right hand     Chronic pain syndrome     Chronic gout involving toe 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 tophus, unspecified cause     Past Surgical History:   Procedure Laterality Date     HC TOOTH EXTRACTION W/FORCEP       ORTHOPEDIC SURGERY      bilateral total knee replacements     TONSILLECTOMY      age 2 or 3       Social History   Substance Use Topics     Smoking status: Never Smoker     Smokeless tobacco: Never Used     Alcohol use Yes      Comment: case beer a week     Family History   Problem Relation Age of Onset     CANCER Father      Family History Negative Father      Family History Negative Mother      DIABETES No family hx of      Coronary Artery Disease No family hx of      Hypertension No family hx of      Hyperlipidemia No family hx of      CEREBROVASCULAR DISEASE No family hx of      Breast Cancer No family hx of      Colon Cancer No family hx of      Prostate Cancer No family hx of      Other Cancer No family hx of      Depression No family hx of      Anxiety Disorder No family hx of      MENTAL ILLNESS No family hx of      Substance Abuse No family hx of      Anesthesia Reaction No family hx of      Asthma No family hx of      OSTEOPOROSIS No family hx of      Genetic Disorder No family hx of      Thyroid Disease No family hx of      Obesity No family hx of      Unknown/Adopted No family hx of          Current Outpatient Prescriptions   Medication Sig Dispense Refill     [START ON 10/22/2017] HYDROcodone-acetaminophen (NORCO) 7.5-325 MG per tablet Take 1 tablet by mouth every 4 hours as needed for pain (4  x daily as needed  maxium 4 per day) 120 tablet 0     allopurinol (ZYLOPRIM) 100 MG tablet Take 1 tablet (100 mg) by mouth daily 30 tablet 1     sildenafil (VIAGRA) 100 MG cap/tab Take 0.5-1 tablets ( mg) by mouth daily as needed for erectile dysfunction Take 30 min to 4 hours before intercourse.  Never use with nitroglycerin, terazosin or doxazosin. 100 tablet 3     [START ON 10/22/2017] phentermine (ADIPEX-P) 37.5 MG tablet TAKE ONE TABLET BY MOUTH EVERY DAY IN THE MORNING. 30 tablet 0     topiramate (TOPAMAX) 25 MG tablet Take 1 tablet (25 mg) by mouth 2 times daily 60 tablet 2     diclofenac (VOLTAREN) 1 % GEL topical gel apply 2g topically 4 times daily as needed to involved area 100 g 1     Pseudoephedrine HCl (SUDAFED PO) Take 30 mg by mouth Reported on 5/9/2017       fluticasone (FLONASE) 50 MCG/ACT spray Spray 2 sprays into both nostrils daily 16 g 11     clindamycin (CLEOCIN T) 1 % external solution Apply topically 2 times daily Profile Rx: patient will contact pharmacy when needed 60 mL 11     acyclovir (ZOVIRAX) 400 MG tablet TAKE TWO TABLETS BY MOUTH TWICE DAILY 28 tablet 0     simvastatin (ZOCOR) 40 MG tablet Take 1 tablet (40 mg) by mouth At Bedtime 30 tablet 11     albuterol (PROAIR HFA, PROVENTIL HFA, VENTOLIN HFA) 108 (90 BASE) MCG/ACT inhaler Inhale 2 puffs into the lungs every 6 hours as needed for shortness of breath / dyspnea 1 Inhaler 11     fluticasone-salmeterol (ADVAIR) 100-50 MCG/DOSE diskus inhaler Inhale 1 puff into the lungs 2 times daily 3 Inhaler 3     meclizine (ANTIVERT) 25 MG tablet Take 1 tablet (25 mg) by mouth every 6 hours as needed for dizziness (Patient not taking: Reported on 8/22/2017) 30 tablet 1     [DISCONTINUED] sildenafil (VIAGRA) 100 MG tablet Take 0.5-1 tablets ( mg) by mouth daily as needed for erectile dysfunction Take 30 min to 4 hours before intercourse.  Never use with nitroglycerin, terazosin or doxazosin. 100 tablet 3     Allergies   Allergen  Reactions     Seasonal Allergies      Recent Labs   Lab Test  12/30/16   0804  10/31/16   0822  07/08/15   0528  02/05/14   0750   06/23/12   1545   05/10/11   1233   A1C  5.4   --    --    --    --    --    --    --    LDL   --   85   --   106   --   104*   < >  80   HDL   --   55   --   45   --   50   --   50   TRIG   --   94   --   112   --    --    --   199*   ALT   --   35  28   --    --   34.0   < >   --    CR   --   0.88  0.83  1.20   --   0.7   < >  0.85   GFRESTIMATED   --   87  >90  Non  GFR Calc    62   < >   --    --    --    GFRESTBLACK   --   >90  >90  African American GFR Calc    75   < >   --    --    --    POTASSIUM   --    --   4.3  4.4   --   4.2   < >  4.3    < > = values in this interval not displayed.      BP Readings from Last 3 Encounters:   08/22/17 112/66   07/28/17 131/82   05/09/17 110/64    Wt Readings from Last 3 Encounters:   08/22/17 263 lb 8 oz (119.5 kg)   07/28/17 262 lb (118.8 kg)   05/09/17 274 lb (124.3 kg)                  Labs reviewed in EPIC          Reviewed and updated as needed this visit by clinical staffTobacco  Allergies  Meds  Problems  Med Hx  Surg Hx  Fam Hx  Soc Hx        Reviewed and updated as needed this visit by Provider  Allergies  Meds  Problems         ROS: has Stiffness of knee joint, right; Gait difficulty; S/P TKR (total knee replacement); Bilateral carpal tunnel syndrome; Vitamin D deficiency; Plantar fascial fibromatosis; Asthma with exacerbation; Hypertrophy of prostate without urinary obstruction; Obesity; Hyperlipidemia; Spondylosis with myelopathy, lumbar region; LEFT WORSE THAN RIGHT ; CARDIOVASCULAR SCREENING; LDL GOAL LESS THAN 100; BMI 40.0-44.9, adult (H); Mild persistent asthma; Hyperlipidemia with target LDL less than 130; Lumbar disc disease with radiculopathy; Groin strain, initial encounter; Sexual dysfunction; ACP (advance care planning); SLAC (scapholunate advanced collapse) of wrist, right; Right wrist pain;  Numbness and tingling in right hand; Chronic pain syndrome; and Chronic gout involving toe 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 tophus, unspecified cause on his problem list.    C: NEGATIVE for fever, chills, change in weight  I: NEGATIVE for worrisome rashes, moles or lesions  E: NEGATIVE for vision changes or irritation  E/M: NEGATIVE for ear, mouth and throat problems  R: NEGATIVE for significant cough or SOB  B: NEGATIVE for masses, tenderness or discharge  GI: NEGATIVE for nausea, abdominal pain, heartburn, or change in bowel habits  : NEGATIVE for frequency, dysuria, or hematuria  M: NEGATIVE for significant arthralgias or myalgia  N: NEGATIVE for weakness, dizziness or paresthesias  E: NEGATIVE for temperature intolerance, skin/hair changes  H: NEGATIVE for bleeding problems  P: NEGATIVE for changes in mood or affect    OBJECTIVE:     /66  Pulse 63  Temp 96.8  F (36  C) (Tympanic)  Resp 20  Wt 263 lb 8 oz (119.5 kg)  SpO2 96%  BMI 37.81 kg/m2  Body mass index is 37.81 kg/(m^2).  GENERAL: healthy, alert and no distress  EYES: Eyes grossly normal to inspection, PERRL and conjunctivae and sclerae normal  HENT: ear canals and TM's normal, nose and mouth without ulcers or lesions  NECK: no adenopathy, no asymmetry, masses, or scars and thyroid normal to palpation  RESP: lungs clear to auscultation - no rales, rhonchi or wheezes  CV: regular rate and rhythm, normal S1 S2, no S3 or S4, no murmur, click or rub, no peripheral edema and peripheral pulses strong  ABDOMEN: soft, nontender, no hepatosplenomegaly, no masses and bowel sounds normal    MS:  RANGE OF MOTION OF BACK RESTRICTED     MUSCLE SPASM    SACROILIAC JOINT TENDERNESS:  NNONE     FLEXION:  RESTRICTION     EXTENSION:  OK     LATERAL BENDING: OK     ROTATION  NORMAL     HEEL WALK:  NORMAL     SKIN EXAM  NORMAL     UPPER BACK RANGE OF MOTION  NORMAL     DEFORMITIES  NORMAL      STRAIGHT LEG RAISING TEST  WITHIN NORMAL LIMITS     FEMORAL STRETCH TEST  NORMAL   "NORMAL     REFLEXES ANKLES AND KNEES  NORMAL     NUMBNESS:  WITHIN NORMAL LIMITS     TENDERNESS: LUMBOSACRAL     PRONE PRESSUPS  NOT APPLICABLE     FLEXION IN SUPINE POSITION  NOT APPLICABLE     CLARISSE'S TEST  NOT APPLICABLE     SKIN: no suspicious lesions or rashes  NEURO: Normal strength and tone, mentation intact and speech normal  PSYCH: mentation appears normal, affect normal/bright    Diagnostic Test Results:  Results for orders placed or performed in visit on 12/30/16   Hemoglobin A1c   Result Value Ref Range    Hemoglobin A1C 5.4 4.3 - 6.0 %   Hepatitis C antibody   Result Value Ref Range    Hepatitis C Antibody  NR     Nonreactive   Assay performance characteristics have not been established for newborns,   infants, and children         ASSESSMENT/PLAN:           ICD-10-CM    1. Screen for colon cancer Z12.11 GASTROENTEROLOGY ADULT REF PROCEDURE ONLY   2. Chronic pain syndrome G89.4 HYDROcodone-acetaminophen (NORCO) 7.5-325 MG per tablet     DISCONTINUED: HYDROcodone-acetaminophen (NORCO) 7.5-325 MG per tablet     DISCONTINUED: HYDROcodone-acetaminophen (NORCO) 7.5-325 MG per tablet   3. Chronic gout involving toe 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 tophus, unspecified cause [id number].[id number] allopurinol (ZYLOPRIM) 100 MG tablet   4. BMI 40.0-44.9, adult (H) Z68.41 phentermine (ADIPEX-P) 37.5 MG tablet     DISCONTINUED: phentermine (ADIPEX-P) 37.5 MG tablet     DISCONTINUED: phentermine (ADIPEX-P) 37.5 MG tablet   5. Sexual dysfunction R37 sildenafil (VIAGRA) 100 MG cap/tab       Patient Instructions     Diabetes: Exchange List    What are the exchange lists?     The exchange lists show you portions of food that equal 1 exchange. Foods are divided into food lists. The foods on each list are called exchanges because they have a similar number of calories, protein, carbohydrate and fat content. Foods from each list can be traded or \"exchanged\" for any other food on the same list because they all have a similar exchange value. A " dietitian will help you plan how much food your child should eat at each meal and from what lists the foods should come from. At first you should measure your food until you are able to make good estimates about serving sizes. The following list is a sample of foods found on the exchange lists. For more information, you can buy the Exchange Lists for Meal Planning from: The American Diabetes Association [address] [place] [id number] [phone] http://www.diabetes.org    Carbohydrate group     Starch List: One starch exchange contains about 15 grams of carbohydrate, 3 grams of protein, 0 to 1 grams of fat, and 80 calories. A starch exchange is sometimes called a carb exchange. Examples of one starch (carb) exchange are:     one slice of bread     1/2 hamburger or hot dog bun     3/4 cup of unsweetened cereal     1/3 cup pasta     3 cups popcorn     crackers (6 small saltines, 3 squares of justin crackers, 3 of most other crackers)     1 pancake or waffle (5 inch)     15 to 20 fat-free or baked potato or corn chips.     The vegetables included in the starch exchanges include:     corn (1/2 cup or 1/2 cob)     white potato (1/4 large baked with skin or 1/2 cup mashed)     yam or sweet potato (1/2 cup)     green peas (1/2 cup)     squash, winter (1 cup)     lima beans (2/3 cup).     Fruit List: 1 fruit exchange contains about 15 grams of carbohydrate and 60 calories. Examples of one fruit exchange are:     grape juice (1/3 cup)     apple or pineapple juice (1/2 cup)     orange or grapefruit juice (1/2 cup)     1 small apple     orange or peach     1/2 banana     1 cup raspberries     1/3 of a small cantaloupe     1 slice of watermelon.     Milk List: 1 milk exchange contains about 8 grams of protein and 12 grams of carbohydrate. Items on the milk list are divided into fat-free, reduced fat, and whole milk depending on the number of fat grams in the exchange. Examples of one milk exchange are: Fat-Free (0 to 3  "grams of fat)     1 cup of skim or non-fat milk     1 cup of 1% milk (also includes 1/2 fat exchange)     6 ounces flavored fat-free yogurt     Reduced-Fat (5 grams of fat)     6 ounces of plain, low-fat yogurt     1 cup 2% milk (also includes one fat exchange).     Whole Milk (8 grams of fat)     8 ounces of plain yogurt (made from whole milk)     1 cup whole milk.     Vegetable List: One vegetable exchange has 5 grams of carbohydrate, 2 grams of protein, no fat, and 25 calories. One-half cup of cooked or a cup of raw vegetables is a good measure for 1 exchange of most vegetables. Raw lettuce may be taken in larger quantities, but salad dressing usually equals 1 fat exchange. Other Carbohydrates List: One \"other carbohydrate\" exchange has 15 grams of carbohydrate. Many of these foods count as a starch exchange and one or more fat exchanges.     brownie (2 inch square) = 1 carb, 1 fat exchange     fruit snack roll = 1 carb exchange     granola bar = 1 and 1/2 carb exchanges     ice cream (1/2 cup) = 1 carb, 2 fat exchanges     frozen yogurt (1/2 cup) = 1 carb, 0 to 1 fat exchanges     tortilla chips (6-12) = 1 carb, 2 fat exchanges.     Meat and Meat Substitute Group     Meats are divided into very lean meats, lean meats, medium-fat meats, and high-fat meats. People with diabetes should try to eat more lean and medium fat meats and stay away from the high fat choices. The Very Lean meat group includes foods that contain 7 grams of protein, 0 to 1 gram of fat, and 35 calories for 1 exchange. Examples include:     1 ounce chicken or turkey (white meat, no skin)     1 ounce fresh fish     1 ounce fat-free cheese     2 egg whites     The Lean meat group includes foods that contain 7 grams of protein, 3 grams of fat, and 55 calories for 1 meat exchange. Examples include:     1 ounce chicken or turkey (dark meat, no skin)     1 ounce fish     1 ounce lean pork     1 ounce USDA Select or Choice grades of lean beef     1 " ounce cheese (with 3 grams of fat or less per ounce).     The Medium-Fat group includes foods that have 7 grams of protein, 5 grams of fat, and 75 calories for 1 meat exchange. Examples include:     1 ounce of ground beef, most cuts of beef, pork, lamb or veal     1 ounce of cheese (5 grams of fat per ounce or less)     1 egg     1 ounce fried fish.     The High-Fat foods have 7 grams of protein, 8 grams of fat, and 100 calories for 1 meat exchange. This group includes:     1 ounce of pork sausage     1 ounce of spare ribs     1 oz of regular cheese (American, Swiss etc.)     1 oz of lunch meat     1 hot dog (turkey or chicken).     Fat Group     Fat List: Fat is necessary for the body and is particularly important during periods of fasting (overnight), when it is very slowly absorbed. 1 fat exchange contains 5 grams of fat and 45 calories. The monounsaturated fats and polyunsaturated fats are better for us than saturated fats. The fat list includes: 1 exchange of monounsaturated fats equals:     1/2 Tbsp peanut butter     6 almonds     1 tsp of oil (olive, peanut, canola).     1 exchange of polyunsaturated fats equals:     1 tsp margarine     1 tsp of any vegetable oil (except coconut).     1 exchange of saturated fat includes:     1 tsp butter     1 strip of farah     2 Tbsp of cream (half and half).     Free Foods     A free food contains less than 20 calories or less than 5 grams of carbohydrate per serving. If the food has a serving size listed on its package, it should be limited to 3 servings spread throughout the day. Examples of free foods include:     4 Tbsp fat-free margarine     1 Tbsp fat-free Miracle Whip     sugar-free gelatin     diet soft drinks     catsup     soy sauce     spices.     Combination Foods     Many foods, such as casseroles, are mixed together. Your dietitian can help you figure out how many exchanges to count for combination foods. For example:     lasagna (1 cup) = 2 carb exchanges  and 2 medium-fat meat exchanges     spaghetti with meatballs (1 cup) = 2 carb exchanges and 2 medium-fat meat exchanges     pizza, cheese (1/4 of 12 in.) = 2 carbs, 2 medium-fat meats     chicken noodle soup (1 cup) = 1 carb exchange     frozen entrée (less than 300 calories) = 2 carbs, 3 lean meat exchanges     macaroni and cheese (1 cup) = 2 carb exchanges and 2 medium-fat meat exchanges.     1. MODIFIED FAST 250 CALORIES TWICE DAILY FEMALES  300 CALORIES TWICE DAILY FOR MALES   OATMEAL AND COTTAGE CHEESE WORK NICELY   COPIOUS WATER BETWEEN   5/2 PLAN DR FERGUSON [facility]  NORMAL DIET 5 DAYS PER WEEK  SEMIFAST 2 DAYS PER WEEK  LOOK UP 5-2 PLAN ON THE INTERNET    Weight Loss Tips  1. Do not eat after 6 hrs before your expected bedtime  2. Have your heaviest meal for breakfast, a slightly lighter meal at lunch and a snack 6 hrs before bed  3. No sugar/calorie drinks except milk ie no fruit juice, pop, alcohol.  4. Drink milk OR WATER  30min before meals to decrease your hunger. Also it is excellent as part of your last meal of the day snack  5. Drink lots of water  6. Increase fiber in diet: all bran cereal, salads, popcorn etc  7. Have only one small serving of fruit a day about 1/2 cup (as this is high in sugar)  8. EXERCISE is the bottom line. Without it, you will gain weight even on a low calorie diet. Best if done 2-3X a day as can    2. The only way known to prevent diabetes or keep it from getting worse is exercise, 20-40 minutes 3 times a day around the time of meals      SLEEP 8 HOURS PER DAY    BLACK AND BLUEBERRIES EVERY DAY ANTINFLAMMATORY BENEFIT     PLAIN YOGURT SEVERAL TIMES DAILY AS TOLERATED IF NOT ALLERGIC     AVOID HIGH FRUCTOSE SYRUP AND OLENE IN YOUR DIET     GREEN TEA EXTRACT    PROBIOTIC CAPSULE DAILY  HIGH QUALITY     DON'T EAT LATE AT NIGHT    GALLO REYESA HELPS WITH APPETITE    KEEP A BLESSINGS JOURNAL    888 BREATHER WHEN STRESSED OR COUNT BACK FROM 100 WITH SLOW  BREATHING    POSITIVE AFFIRMATIONS         FIT BIT OR PEDOMETER 10,000 STEPS PER DAY     FIT BIT CHRISTIANOICH RECOMMENDED      (Z12.11) Screen for colon cancer  (primary encounter diagnosis)  Comment:    Plan: GASTROENTEROLOGY ADULT REF PROCEDURE ONLY             (G89.4) Chronic pain syndrome  Comment:    Plan: HYDROcodone-acetaminophen (NORCO) 7.5-325 MG         per tablet, DISCONTINUED:         HYDROcodone-acetaminophen (NORCO) 7.5-325 MG         per tablet, DISCONTINUED:         HYDROcodone-acetaminophen (NORCO) 7.5-325 MG         per tablet             (M1A.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                     SCOTTY TOLEDO MD  [facility]

## 2017-09-05 NOTE — TELEPHONE ENCOUNTER
Topiramate 25 mg       Last Written Prescription Date: 5/9/17  Last Fill Quantity: 60, # refills: 2    Last Office Visit with G, P or [facility] prescribing provider:  8/22/17   Future Office Visit:      Creatinine   Date Value Ref Range Status   10/31/2016 0.88 0.66 - 1.25 mg/dL Final

## 2017-09-07 RX ORDER — TOPIRAMATE 25 MG/1
TABLET, FILM COATED ORAL
Qty: 60 TABLET | Refills: 1 | Status: SHIPPED | OUTPATIENT
Start: 2017-09-07 | End: 2017-10-11

## 2017-09-18 DIAGNOSIS — M25.661 STIFFNESS OF KNEE JOINT, RIGHT: Chronic | ICD-10-CM

## 2017-09-18 DIAGNOSIS — G56.00 CARPAL TUNNEL SYNDROME, UNSPECIFIED LATERALITY: ICD-10-CM

## 2017-09-18 DIAGNOSIS — M51.16 LUMBAR DISC DISEASE WITH RADICULOPATHY: ICD-10-CM

## 2017-09-18 DIAGNOSIS — M17.10 PRIMARY LOCALIZED OSTEOARTHROSIS, LOWER LEG, UNSPECIFIED LATERALITY: ICD-10-CM

## 2017-09-18 NOTE — TELEPHONE ENCOUNTER
diclofenac (VOLTAREN) 1 % GEL topical gel      Last Written Prescription Date: 3/10/17  Last Quantity: 100g, # refills: 1  Last Office Visit with G, P or [facility] prescribing provider: 8/22/17       Creatinine   Date Value Ref Range Status   10/31/2016 0.88 0.66 - 1.25 mg/dL Final     Lab Results   Component Value Date    AST 17 07/08/2015     Lab Results   Component Value Date    ALT 35 10/31/2016     BP Readings from Last 3 Encounters:   08/22/17 112/66   07/28/17 131/82   05/09/17 110/64

## 2017-09-19 ENCOUNTER — TELEPHONE (OUTPATIENT)
Dept: GASTROENTEROLOGY | Facility: CLINIC | Age: 62
End: 2017-09-19

## 2017-10-11 ENCOUNTER — TELEPHONE (OUTPATIENT)
Dept: GASTROENTEROLOGY | Facility: OUTPATIENT CENTER | Age: 62
End: 2017-10-11

## 2017-10-11 DIAGNOSIS — Z12.11 ENCOUNTER FOR SCREENING COLONOSCOPY: Primary | ICD-10-CM

## 2017-10-11 DIAGNOSIS — M1A.9XX0 CHRONIC GOUT INVOLVING TOE OF RIGHT FOOT WITHOUT TOPHUS, UNSPECIFIED CAUSE: ICD-10-CM

## 2017-10-11 RX ORDER — ALLOPURINOL 100 MG/1
100 TABLET ORAL DAILY
Qty: 30 TABLET | Refills: 0 | Status: SHIPPED | OUTPATIENT
Start: 2017-10-11 | End: 2017-11-30

## 2017-10-11 NOTE — TELEPHONE ENCOUNTER
Patient taking any blood thinners ? no    Heart disease ? denies    Lung disease ? Asthma. Advised patient to bring inhaler      Sleep apnea ? denies    Diabetic ? denies    Kidney disease ? denies    Dialysis ? n/a     Electronic implanted medical devices ? denies    Are you taking any narcotic pain medication ? Norco prn. Patient denies constipation.  What is your daily dosage ?    PTSD ? n/a    Prep instructions reviewed with patient ? Instructions,  policy, MAC sedation plan reviewed. Advised patient to have someone stay with him post exam    Pharmacy : [facility]    Indication for procedure :Screen for colon cancer [Z12.11]    Referring provider :Johnson Clements MD

## 2017-10-11 NOTE — TELEPHONE ENCOUNTER
allopurinol (ZYLOPRIM) 100 MG tablet       Last Written Prescription Date: 8/22/17  Last Fill Quantity: 30, # refills: 1  Last Office Visit with G, P or [facility] prescribing provider:  8/22/17  90 day supply each fill        No results found for: URIC]  Creatinine   Date Value Ref Range Status   10/31/2016 0.88 0.66 - 1.25 mg/dL Final   ]  Lab Results   Component Value Date    WBC 9.0 07/08/2015     Lab Results   Component Value Date    RBC 4.99 07/08/2015     Lab Results   Component Value Date    HGB 15.7 07/08/2015     Lab Results   Component Value Date    HCT 46.2 07/08/2015     No components found for: MCT  Lab Results   Component Value Date    MCV 93 07/08/2015     Lab Results   Component Value Date    MCH 31.5 07/08/2015     Lab Results   Component Value Date    MCHC 34.0 07/08/2015     Lab Results   Component Value Date    RDW 13.3 07/08/2015     Lab Results   Component Value Date     07/08/2015     Lab Results   Component Value Date    AST 17 07/08/2015     Lab Results   Component Value Date    ALT 35 10/31/2016

## 2017-10-16 ENCOUNTER — TELEPHONE (OUTPATIENT)
Dept: FAMILY MEDICINE | Facility: CLINIC | Age: 62
End: 2017-10-16

## 2017-10-16 DIAGNOSIS — G89.4 CHRONIC PAIN SYNDROME: Primary | Chronic | ICD-10-CM

## 2017-10-16 DIAGNOSIS — M51.16 LUMBAR DISC DISEASE WITH RADICULOPATHY: ICD-10-CM

## 2017-10-16 NOTE — TELEPHONE ENCOUNTER
Reason for Call:  Other     Detailed comments: would like spinal epidural. Done at [facility] in [place].  It's been over 1 year.     Phone Number Patient can be reached at: Home number on file [phone] (home)    Best Time: any    Can we leave a detailed message on this number? YES    Call taken on 10/16/2017 at 3:41 PM by TR PEÑALOZA

## 2017-10-18 ENCOUNTER — TRANSFERRED RECORDS (OUTPATIENT)
Dept: HEALTH INFORMATION MANAGEMENT | Facility: CLINIC | Age: 62
End: 2017-10-18

## 2017-10-18 ENCOUNTER — DOCUMENTATION ONLY (OUTPATIENT)
Dept: GASTROENTEROLOGY | Facility: OUTPATIENT CENTER | Age: 62
End: 2017-10-18

## 2017-10-19 LAB — COPATH REPORT: NORMAL

## 2017-10-23 ENCOUNTER — TRANSFERRED RECORDS (OUTPATIENT)
Dept: HEALTH INFORMATION MANAGEMENT | Facility: CLINIC | Age: 62
End: 2017-10-23

## 2017-10-24 DIAGNOSIS — G89.4 CHRONIC PAIN SYNDROME: Primary | Chronic | ICD-10-CM

## 2017-10-25 ENCOUNTER — OFFICE VISIT (OUTPATIENT)
Dept: ORTHOPEDICS | Facility: CLINIC | Age: 62
End: 2017-10-25

## 2017-10-25 VITALS — WEIGHT: 250 LBS | RESPIRATION RATE: 16 BRPM | HEIGHT: 70 IN | BODY MASS INDEX: 35.79 KG/M2

## 2017-10-25 DIAGNOSIS — M19.012 PRIMARY OSTEOARTHRITIS OF LEFT SHOULDER: Primary | ICD-10-CM

## 2017-10-25 RX ORDER — TRIAMCINOLONE ACETONIDE 40 MG/ML
40 INJECTION, SUSPENSION INTRA-ARTICULAR; INTRAMUSCULAR ONCE
Qty: 1 ML | Refills: 0 | OUTPATIENT
Start: 2017-10-25 | End: 2017-10-25

## 2017-10-25 RX ORDER — CARISOPRODOL 350 MG/1
TABLET ORAL
Qty: 30 TABLET | OUTPATIENT
Start: 2017-10-25

## 2017-10-25 NOTE — MR AVS SNAPSHOT
After Visit Summary   10/25/2017    Arnaud Bird    MRN: [id number]           Patient Information     Date Of Birth          1955        Visit Information        Provider Department      10/25/2017 10:00 AM Carmel Chan MD [facility]        Today's Diagnoses     Primary osteoarthritis of left shoulder    -  1       Follow-ups after your visit        Follow-up notes from your care team     Return in about 6 months (around 2018), or if symptoms worsen or fail to improve.      Who to contact     Please call your clinic at [phone] to:    Ask questions about your health    Make or cancel appointments    Discuss your medicines    Learn about your test results    Speak to your doctor   If you have compliments or concerns about an experience at your clinic, or if you wish to file a complaint, please contact [facility] Patient Relations at [phone] or email us at [email or web address]         Additional Information About Your Visit        MyChart Information     Clicker is an electronic gateway that provides easy, online access to your medical records. With Clicker, you can request a clinic appointment, read your test results, renew a prescription or communicate with your care team.     To sign up for [employer] visit the website at [email or web address]   You will be asked to enter the access code listed below, as well as some personal information. Please follow the directions to create your username and password.     Your access code is: [email or web address]  Expires: 2017 10:09 AM     Your access code will  in 90 days. If you need help or a new code, please contact your [facility] or call [phone] for assistance.        Care EveryWhere ID     This is your Care EveryWhere ID. This could be used by other organizations to access your [facility] medical records  [id number]       "  Your Vitals Were     Respirations Height BMI (Body Mass Index)             16 5' 10\" (1.778 m) 35.87 kg/m2          Blood Pressure from Last 3 Encounters:   08/22/17 112/66   07/28/17 131/82   05/09/17 110/64    Weight from Last 3 Encounters:   10/25/17 250 lb (113.4 kg)   08/22/17 263 lb 8 oz (119.5 kg)   07/28/17 262 lb (118.8 kg)              We Performed the Following     Large Joint/Bursa injection and/or drainage - Unilateral (Shoulder, Knee) [20610]     TRIAMCINOLONE ACET INJ NOS          Today's Medication Changes          These changes are accurate as of: 10/25/17 10:55 AM.  If you have any questions, ask your nurse or doctor.               Start taking these medicines.        Dose/Directions    triamcinolone acetonide 40 MG/ML injection   Commonly known as:  KENALOG   Used for:  Primary osteoarthritis of left shoulder   Started by:  Carmel Chan MD        Dose:  40 mg   1 mL (40 mg) by INTRA-ARTICULAR route once for 1 dose   Quantity:  1 mL   Refills:  0            Where to get your medicines      Some of these will need a paper prescription and others can be bought over the counter.  Ask your nurse if you have questions.     You don't need a prescription for these medications     triamcinolone acetonide 40 MG/ML injection                Primary Care Provider Office Phone # Fax #    Johnson Jesica Clements [phone] [address] [facility] [address]        Equal Access to Services     [facility] LY AH: Hadii aad ku hadasho Soomaali, waaxda luqadaha, qaybta kaalmada adeegyada, waxay edwin sparks . So [facility] [phone].    ATENCIÓN: Si habla español, tiene a stewart disposición servicios gratuitos de asistencia lingüística. Llame al [phone].    We comply with applicable federal civil rights laws and [place] laws. We do not discriminate on the basis of race, color, national origin, age, disability, sex, sexual orientation, or gender " identity.            Thank you!     Thank you for choosing [facility]  for your care. Our goal is always to provide you with excellent care. Hearing back from our patients is one way we can continue to improve our services. Please take a few minutes to complete the written survey that you may receive in the mail after your visit with us. Thank you!             Your Updated Medication List - Protect others around you: Learn how to safely use, store and throw away your medicines at www.disposemymeds.org.          This list is accurate as of: 10/25/17 10:55 AM.  Always use your most recent med list.                   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 tophus, unspecified cause       clindamycin 1 % solution    CLEOCIN T    60 mL    Apply topically 2 times daily Profile Rx: patient will contact pharmacy when needed    Folliculitis       diclofenac 1 % Gel topical gel    VOLTAREN    100 g    APPLY 2GRAMS TOPICALLY 4 TIMES A DAY AS NEEDED FOR INVOLVED AREA    Stiffness of knee joint, right, Carpal tunnel syndrome, unspecified laterality, Primary localized osteoarthrosis, lower leg, unspecified laterality, Lumbar disc disease with radiculopathy       fluticasone 50 MCG/ACT spray    FLONASE    16 g    Spray 2 sprays into both nostrils daily    Seasonal allergic rhinitis due to pollen       fluticasone-salmeterol 100-50 MCG/DOSE diskus inhaler    ADVAIR    3 Inhaler    Inhale 1 puff into the  lungs 2 times daily    Mild persistent asthma, uncomplicated       HYDROcodone-acetaminophen 7.5-325 MG per tablet    NORCO    120 tablet    Take 1 tablet by mouth every 4 hours as needed for pain (4 x daily as needed  maxium 4 per day)    Chronic pain syndrome       meclizine 25 MG tablet    ANTIVERT    30 tablet    Take 1 tablet (25 mg) by mouth every 6 hours as needed for dizziness    Vertigo       phentermine 37.5 MG tablet    ADIPEX-P    30 tablet    TAKE ONE TABLET BY MOUTH EVERY DAY IN THE MORNING.    BMI 40.0-44.9, adult (H)       sildenafil 100 MG tablet    VIAGRA    100 tablet    Take 0.5-1 tablets ( mg) by mouth daily as needed for erectile dysfunction Take 30 min to 4 hours before intercourse.  Never use with nitroglycerin, terazosin or doxazosin.    Sexual dysfunction       simvastatin 40 MG tablet    ZOCOR    30 tablet    Take 1 tablet (40 mg) by mouth At Bedtime    Hyperlipidemia with target LDL less than 130       SUDAFED PO      Take 30 mg by mouth Reported on 5/9/2017        triamcinolone acetonide 40 MG/ML injection    KENALOG    1 mL    1 mL (40 mg) by INTRA-ARTICULAR route once for 1 dose    Primary osteoarthritis of left shoulder

## 2017-10-25 NOTE — TELEPHONE ENCOUNTER
Patient called back, he does not use Soma very often because it makes him sleepy. He had a flare up of pain in his shoulders, unable to get in with Dr. Johnson Cunningham.   He saw a provider at [facility] for a cortisone shot. And Couple days ago got a spinal epidural in the back which are both working.   He would like to have some Soma on hand for severe pain for spasms and trouble sleeping at night.     carisoprodol (SOMA) 350 MG tablet     Last Written Prescription Date: 1/19/16  Last Fill Quantity: 30,  # refills: 3    [facility] Office Visit with [facility] or [facility] prescribing provider: 8/22/17 Dr. Johnson Clements                                              Routing refill request to provider for review/approval because:  Drug not on the [facility] refill protocol

## 2017-10-25 NOTE — TELEPHONE ENCOUNTER
Unfortunately his provider is out of town, and the med he is requesting is a controlled substance and so we cannot fill.  Additionally it's not a very safe medicine to take with the hydrocodone he is on.   He could make an appointment with one of the providers to discuss options if he would like; or wait until PCP returns.  Dr. Kristin Levin MD/[facility]

## 2017-10-25 NOTE — TELEPHONE ENCOUNTER
Per chart review, Soma was D/c 12/30/2016.  Left voice message asking patient to call triage back.

## 2017-10-25 NOTE — LETTER
10/25/2017      RE: Arnaud Bird  [address] [facility] [address]        Subjective:   Arnaud Bird is a 62 year old male who is here for a left shoulder pain.  Nice trailer and getting ready for deer hunting.  Left shoulder hurts after shoveling.  Had a back injection and has a headache.  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  Using for chores and dragging deer.  Here for another shoulder injection.  Denies DM and is retired.    Date of injury: N/A  Date last seen: Visit date not found  Following Therapeutic Plan: Yes   Pain: Worsening  Function: Worsening  Interval History: N/A     PAST MEDICAL, SOCIAL, SURGICAL AND FAMILY HISTORY: He  has a past medical history of Arthritis; Carpal tunnel syndrome; LEFT WORSE THAN RIGHT  (10/18/2012); Unspecified asthma(493.90) (10/17/2012); and Unspecified asthma, with exacerbation (10/17/2012). He also has no past medical history of Congestive heart failure, unspecified; COPD (chronic obstructive pulmonary disease) (H); Coronary artery disease; Diabetes mellitus (H); History of blood transfusion; Hypertension; Malignant neoplasm (H); Thyroid disease; or Unspecified cerebral artery occlusion with cerebral infarction.  He  has a past surgical history that includes tonsillectomy; TOOTH EXTRACTION W/FORCEP; and orthopedic surgery.  His family history includes CANCER in his father; Family History Negative in his father and mother. There is no history of DIABETES, Coronary Artery Disease, Hypertension, Hyperlipidemia, CEREBROVASCULAR DISEASE, Breast Cancer, Colon Cancer, Prostate Cancer, Other Cancer, Depression, Anxiety Disorder, MENTAL ILLNESS, Substance Abuse, Anesthesia Reaction, Asthma, OSTEOPOROSIS, Genetic Disorder, Thyroid Disease, Obesity, or Unknown/Adopted.  He reports that he has never smoked. He has never used smokeless tobacco. He reports that he drinks alcohol. He reports that he does not use illicit  "drugs.      ALLERGIES: He is allergic to seasonal allergies.    CURRENT MEDICATIONS: He has a current medication list which includes the following prescription(s): allopurinol, diclofenac, hydrocodone-acetaminophen, sildenafil, phentermine, pseudoephedrine hcl, fluticasone, clindamycin, acyclovir, simvastatin, albuterol, fluticasone-salmeterol, and meclizine.     REVIEW OF SYSTEMS: 10 point review of systems is negative except as noted above.     Exam:   Resp 16  Ht 5' 10\" (1.778 m)  Wt 250 lb (113.4 kg)  BMI 35.87 kg/m2           CONSTITUTIONAL: healthy, alert, no distress and cooperative  HEAD: Normocephalic. No masses, lesions, tenderness or abnormalities  SKIN: no suspicious lesions or rashes  GAIT: normal  NEUROLOGIC: Non-focal, Normal muscle tone and strength, reflexes normal, sensation grossly normal.  PSYCHIATRIC: affect normal/bright and mentation appears normal.    MUSCULOSKELETAL: left shoulder pain    Palpation:  Non-tender SC joint, clavicle, acromion, subacromial space, proximal bicep tendon and upper trapezius muscle   Tender: Mild AC joint tenderness, tenderness into anterior shoulder  Range of Motion        Active:can lift in flexion and extension        Passive: all normal  Strength: rotator cuff strength full  Special tests: Negative Neer's test, Negative Ko, Negative Cross-Arm adduction,  Negative Angel's    Procedure: risks and benefits discussed, consented, 1 cc 40mg kenalog, 9 cc 1% lidocaine, posterior approach, band aid, no complications or bleeding     Assessment/Plan:   Pt is a 63 yo white male with PMHx of carpal tunnel presenting with left shoulder pain  1. Chronic left shoulder pain- OA- here for an injection, pt had 1 year of relief before  PT states PT made things worse  Continue until he can't go and then might consider surgical procedure  RTC prn    X-RAY INTERPRETATION:   X-Ray of the Left Shoulder: 3-view, ap, y, axillary ordered and interpreted in the office today was " positive for IMPRESSION:   1. On the right, moderate to high-grade osteoarthritic changes of the  glenohumeral joint and mild to moderate degenerative changes of the  acromioclavicular joint.  2. On the left, mild degenerative changes of the glenohumeral joint.  Moderate grade osteoarthritic changes of the acromioclavicular joint  with subacromial spurring.    Carmel Chan MD

## 2017-10-25 NOTE — PROGRESS NOTES
Subjective:   Arnaud Bird is a 62 year old male who is here for a left shoulder pain.  Nice trailer and getting ready for deer hunting.  Left shoulder hurts after shoveling.  Had a back injection and has a headache.  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  Using for chores and dragging deer.  Here for another shoulder injection.  Denies DM and is retired.    Date of injury: N/A  Date last seen: Visit date not found  Following Therapeutic Plan: Yes   Pain: Worsening  Function: Worsening  Interval History: N/A     PAST MEDICAL, SOCIAL, SURGICAL AND FAMILY HISTORY: He  has a past medical history of Arthritis; Carpal tunnel syndrome; LEFT WORSE THAN RIGHT  (10/18/2012); Unspecified asthma(493.90) (10/17/2012); and Unspecified asthma, with exacerbation (10/17/2012). He also has no past medical history of Congestive heart failure, unspecified; COPD (chronic obstructive pulmonary disease) (H); Coronary artery disease; Diabetes mellitus (H); History of blood transfusion; Hypertension; Malignant neoplasm (H); Thyroid disease; or Unspecified cerebral artery occlusion with cerebral infarction.  He  has a past surgical history that includes tonsillectomy; TOOTH EXTRACTION W/FORCEP; and orthopedic surgery.  His family history includes CANCER in his father; Family History Negative in his father and mother. There is no history of DIABETES, Coronary Artery Disease, Hypertension, Hyperlipidemia, CEREBROVASCULAR DISEASE, Breast Cancer, Colon Cancer, Prostate Cancer, Other Cancer, Depression, Anxiety Disorder, MENTAL ILLNESS, Substance Abuse, Anesthesia Reaction, Asthma, OSTEOPOROSIS, Genetic Disorder, Thyroid Disease, Obesity, or Unknown/Adopted.  He reports that he has never smoked. He has never used smokeless tobacco. He reports that he drinks alcohol. He reports that he does not use illicit drugs.      ALLERGIES: He is allergic to seasonal allergies.    CURRENT MEDICATIONS: He has a current  "medication list which includes the following prescription(s): allopurinol, diclofenac, hydrocodone-acetaminophen, sildenafil, phentermine, pseudoephedrine hcl, fluticasone, clindamycin, acyclovir, simvastatin, albuterol, fluticasone-salmeterol, and meclizine.     REVIEW OF SYSTEMS: 10 point review of systems is negative except as noted above.     Exam:   Resp 16  Ht 5' 10\" (1.778 m)  Wt 250 lb (113.4 kg)  BMI 35.87 kg/m2           CONSTITUTIONAL: healthy, alert, no distress and cooperative  HEAD: Normocephalic. No masses, lesions, tenderness or abnormalities  SKIN: no suspicious lesions or rashes  GAIT: normal  NEUROLOGIC: Non-focal, Normal muscle tone and strength, reflexes normal, sensation grossly normal.  PSYCHIATRIC: affect normal/bright and mentation appears normal.    MUSCULOSKELETAL: left shoulder pain    Palpation:  Non-tender SC joint, clavicle, acromion, subacromial space, proximal bicep tendon and upper trapezius muscle   Tender: Mild AC joint tenderness, tenderness into anterior shoulder  Range of Motion        Active:can lift in flexion and extension        Passive: all normal  Strength: rotator cuff strength full  Special tests: Negative Neer's test, Negative Ko, Negative Cross-Arm adduction,  Negative Angel's    Procedure: risks and benefits discussed, consented, 1 cc 40mg kenalog, 9 cc 1% lidocaine, posterior approach, band aid, no complications or bleeding     Assessment/Plan:   Pt is a 63 yo white male with PMHx of carpal tunnel presenting with left shoulder pain  1. Chronic left shoulder pain- OA- here for an injection, pt had 1 year of relief before  PT states PT made things worse  Continue until he can't go and then might consider surgical procedure  RTC prn    X-RAY INTERPRETATION:   X-Ray of the Left Shoulder: 3-view, ap, y, axillary ordered and interpreted in the office today was positive for IMPRESSION:   1. On the right, moderate to high-grade osteoarthritic changes of " the  glenohumeral joint and mild to moderate degenerative changes of the  acromioclavicular joint.  2. On the left, mild degenerative changes of the glenohumeral joint.  Moderate grade osteoarthritic changes of the acromioclavicular joint  with subacromial spurring.

## 2017-10-25 NOTE — NURSING NOTE
[address] [address]  Dept: [phone]  ______________________________________________________________________________    Patient: Arnaud Bird   : 1955   MRN: [id number]   2017    INVASIVE PROCEDURE SAFETY CHECKLIST    Date: 2017   Procedure:Left shoulder kenalog injection  Patient Name: Arnaud Bird  MRN: [id number]  YOB: 1955    Action: Complete sections as appropriate. Any discrepancy results in a HARD COPY until resolved.     PRE PROCEDURE:  Patient ID verified with 2 identifiers (name and  or MRN): Yes  Procedure and site verified with patient/designee (when able): Yes  Accurate consent documentation in medical record: Yes  H&P (or appropriate assessment) documented in medical record: Yes  H&P must be up to 20 days prior to procedure and updates within 24 hours of procedure as applicable: NA  Relevant diagnostic and radiology test results appropriately labeled and displayed as applicable: Yes  Procedure site(s) marked with provider initials: NA    TIMEOUT:  Time-Out performed immediately prior to starting procedure, including verbal and active participation of all team members addressing the following:Yes  * Correct patient identify  * Confirmed that the correct side and site are marked  * An accurate procedure consent form  * Agreement on the procedure to be done  * Correct patient position  * Relevant images and results are properly labeled and appropriately displayed  * The need to administer antibiotics or fluids for irrigation purposes during the procedure as applicable   * Safety precautions based on patient history or medication use    DURING PROCEDURE: Verification of correct person, site, and procedures any time the responsibility for care of the patient is transferred to another member of the care team.     The following medication was given:     MEDICATION:  Kenalog 40 mg; 9 ml of 1%  lidocaine  ROUTE: Sub-Acromial  SITE: Left shoulder  DOSE:  Kenalog 40 mg; 9 ml of 1% lidocaine  LOT #: Kenalog: [id number]; Lidocaine: [id number]  :Kenalog: [employer]; Lidocaine: [employer]  EXPIRATION DATE: Kenalo/19; Lidocaine:   NDC#: Kenalog 1797-5676-28; Lidocaine: 48337-637-65   Was there drug waste? Yes  Amount of drug waste (mL): 11.  Reason for waste:  As per MD Kaelyn Olivares, ATC  2017

## 2017-10-31 NOTE — TELEPHONE ENCOUNTER
PLEASE HAVE PATIENT SEE IF HE WOULD LIKE TO TRY SOMETHING OTHER THAN SOMA     POTENTIAL ADDICTION ISSUES WITH THIS     SCOTTY TOLEDO JR., MD

## 2017-11-20 DIAGNOSIS — G89.4 CHRONIC PAIN SYNDROME: Chronic | ICD-10-CM

## 2017-11-20 RX ORDER — HYDROCODONE BITARTRATE AND ACETAMINOPHEN 7.5; 325 MG/1; MG/1
1 TABLET ORAL EVERY 4 HOURS PRN
Qty: 120 TABLET | Refills: 0 | Status: SHIPPED | OUTPATIENT
Start: 2017-11-20 | End: 2017-12-07

## 2017-11-20 NOTE — TELEPHONE ENCOUNTER
Reason for Call:  Medication or medication refill:    Do you use a [facility]?  Name of the pharmacy and phone number for the current request:  [facility]     Name of the medication requested: Norco 7.5-325 mg    Other request: Please call when ready.  [place]    Can we leave a detailed message on this number? YES    Phone number patient can be reached at: Home number on file [phone] (home)    Best Time: any    Call taken on 11/20/2017 at 8:16 AM by KEANU SCHULTE

## 2017-11-20 NOTE — TELEPHONE ENCOUNTER
Controlled Substance Refill Request for Norco    Last OV  8-22-17  Last refill  10-22-17  #120      Problem List Complete:  Yes    Patient is followed by SCOTTY TOLEDO MD for ongoing prescription of pain medication.  All refills should only be approved by this provider, or covering partner.    Medication(s):  NORCO  7.5MG PO FOUR TIMES DAILY .   Maximum quantity per month:  120   Clinic visit frequency required: Q 3 months     Controlled substance agreement:  Encounter-Level CSA - 10/31/2016:                 Controlled Substance Agreement - Scan on 11/15/2016  7:53 AM : CONTROLLED SUBSTANCE AGREEMENT (below)            Pain Clinic evaluation in the past: No    DIRE Total Score(s):    7/2/2016   Total Score 18       Last [facility] website verification:  done on 06//20/2017   [email or web address]

## 2017-11-30 DIAGNOSIS — M1A.9XX0 CHRONIC GOUT INVOLVING TOE OF RIGHT FOOT WITHOUT TOPHUS, UNSPECIFIED CAUSE: ICD-10-CM

## 2017-12-04 RX ORDER — ALLOPURINOL 100 MG/1
TABLET ORAL
Qty: 90 TABLET | Refills: 3 | Status: SHIPPED | OUTPATIENT
Start: 2017-12-04 | End: 2018-05-10

## 2017-12-04 NOTE — TELEPHONE ENCOUNTER
Patient was called, he is overdue for CBC, ALT, Creat, and Uric Acid. OV scheduled for this week. Will pend this refill and labs.

## 2017-12-07 ENCOUNTER — OFFICE VISIT (OUTPATIENT)
Dept: FAMILY MEDICINE | Facility: CLINIC | Age: 62
End: 2017-12-07
Payer: COMMERCIAL

## 2017-12-07 VITALS
RESPIRATION RATE: 16 BRPM | SYSTOLIC BLOOD PRESSURE: 126 MMHG | WEIGHT: 256.5 LBS | DIASTOLIC BLOOD PRESSURE: 76 MMHG | OXYGEN SATURATION: 97 % | TEMPERATURE: 97.7 F | HEART RATE: 96 BPM | BODY MASS INDEX: 36.8 KG/M2

## 2017-12-07 DIAGNOSIS — M1A.9XX0 CHRONIC GOUT INVOLVING TOE OF RIGHT FOOT WITHOUT TOPHUS, UNSPECIFIED CAUSE: ICD-10-CM

## 2017-12-07 DIAGNOSIS — H93.13 TINNITUS, BILATERAL: Primary | ICD-10-CM

## 2017-12-07 DIAGNOSIS — G89.4 CHRONIC PAIN SYNDROME: Chronic | ICD-10-CM

## 2017-12-07 DIAGNOSIS — E78.5 HYPERLIPIDEMIA WITH TARGET LDL LESS THAN 130: Chronic | ICD-10-CM

## 2017-12-07 DIAGNOSIS — J45.30 MILD PERSISTENT ASTHMA WITHOUT COMPLICATION: Chronic | ICD-10-CM

## 2017-12-07 DIAGNOSIS — G56.03 BILATERAL CARPAL TUNNEL SYNDROME: Chronic | ICD-10-CM

## 2017-12-07 DIAGNOSIS — M25.661 STIFFNESS OF KNEE JOINT, RIGHT: Chronic | ICD-10-CM

## 2017-12-07 LAB
BASOPHILS # BLD AUTO: 0 10E9/L (ref 0–0.2)
BASOPHILS NFR BLD AUTO: 0.2 %
DIFFERENTIAL METHOD BLD: NORMAL
EOSINOPHIL # BLD AUTO: 0.1 10E9/L (ref 0–0.7)
EOSINOPHIL NFR BLD AUTO: 0.8 %
ERYTHROCYTE [DISTWIDTH] IN BLOOD BY AUTOMATED COUNT: 14.7 % (ref 10–15)
HCT VFR BLD AUTO: 47.2 % (ref 40–53)
HGB BLD-MCNC: 15.5 G/DL (ref 13.3–17.7)
LYMPHOCYTES # BLD AUTO: 1.5 10E9/L (ref 0.8–5.3)
LYMPHOCYTES NFR BLD AUTO: 23.4 %
MCH RBC QN AUTO: 30.9 PG (ref 26.5–33)
MCHC RBC AUTO-ENTMCNC: 32.8 G/DL (ref 31.5–36.5)
MCV RBC AUTO: 94 FL (ref 78–100)
MONOCYTES # BLD AUTO: 0.4 10E9/L (ref 0–1.3)
MONOCYTES NFR BLD AUTO: 6.9 %
NEUTROPHILS # BLD AUTO: 4.3 10E9/L (ref 1.6–8.3)
NEUTROPHILS NFR BLD AUTO: 68.7 %
PLATELET # BLD AUTO: 226 10E9/L (ref 150–450)
RBC # BLD AUTO: 5.01 10E12/L (ref 4.4–5.9)
WBC # BLD AUTO: 6.2 10E9/L (ref 4–11)

## 2017-12-07 PROCEDURE — 85025 COMPLETE CBC W/AUTO DIFF WBC: CPT | Performed by: FAMILY MEDICINE

## 2017-12-07 PROCEDURE — 84460 ALANINE AMINO (ALT) (SGPT): CPT | Performed by: FAMILY MEDICINE

## 2017-12-07 PROCEDURE — 84550 ASSAY OF BLOOD/URIC ACID: CPT | Performed by: FAMILY MEDICINE

## 2017-12-07 PROCEDURE — 36415 COLL VENOUS BLD VENIPUNCTURE: CPT | Performed by: FAMILY MEDICINE

## 2017-12-07 PROCEDURE — 99214 OFFICE O/P EST MOD 30 MIN: CPT | Performed by: FAMILY MEDICINE

## 2017-12-07 PROCEDURE — 82565 ASSAY OF CREATININE: CPT | Performed by: FAMILY MEDICINE

## 2017-12-07 RX ORDER — HYDROCODONE BITARTRATE AND ACETAMINOPHEN 7.5; 325 MG/1; MG/1
1 TABLET ORAL EVERY 4 HOURS PRN
Qty: 120 TABLET | Refills: 0 | Status: SHIPPED | OUTPATIENT
Start: 2017-12-20 | End: 2017-12-07

## 2017-12-07 RX ORDER — PHENTERMINE HYDROCHLORIDE 37.5 MG/1
TABLET ORAL
Qty: 30 TABLET | Refills: 0 | Status: SHIPPED | OUTPATIENT
Start: 2017-12-07 | End: 2017-12-07

## 2017-12-07 RX ORDER — HYDROCODONE BITARTRATE AND ACETAMINOPHEN 7.5; 325 MG/1; MG/1
1 TABLET ORAL EVERY 4 HOURS PRN
Qty: 120 TABLET | Refills: 0 | Status: SHIPPED | OUTPATIENT
Start: 2018-01-20 | End: 2017-12-07

## 2017-12-07 RX ORDER — PHENTERMINE HYDROCHLORIDE 37.5 MG/1
TABLET ORAL
Qty: 30 TABLET | Refills: 0 | Status: SHIPPED | OUTPATIENT
Start: 2018-01-07 | End: 2017-12-07

## 2017-12-07 RX ORDER — PHENTERMINE HYDROCHLORIDE 37.5 MG/1
TABLET ORAL
Qty: 30 TABLET | Refills: 0 | Status: SHIPPED | OUTPATIENT
Start: 2018-02-07 | End: 2018-03-26

## 2017-12-07 RX ORDER — HYDROCODONE BITARTRATE AND ACETAMINOPHEN 7.5; 325 MG/1; MG/1
1 TABLET ORAL EVERY 4 HOURS PRN
Qty: 120 TABLET | Refills: 0 | Status: SHIPPED | OUTPATIENT
Start: 2018-02-20 | End: 2018-03-26

## 2017-12-07 NOTE — MR AVS SNAPSHOT
After Visit Summary   12/7/2017    Arnaud Bird    MRN: [id number]           Patient Information     Date Of Birth          1955        Visit Information        Provider Department      12/7/2017 9:45 AM Johnson Clements MD [facility]        Today's Diagnoses     Tinnitus, bilateral    -  1    BMI 40.0-44.9, adult (H)        Chronic pain syndrome        Stiffness of knee joint, right        Bilateral carpal tunnel syndrome        Mild persistent asthma without complication        Hyperlipidemia with target LDL less than 130        Chronic gout involving toe 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 tophus, unspecified cause          Care Instructions      (H93.13) Tinnitus, bilateral  (primary encounter diagnosis)    Comment:      Plan: AUDIOLOGY ADULT REFERRAL                   (Z68.41) BMI 40.0-44.9, adult (H)    Comment:      Plan: phentermine (ADIPEX-P) 37.5 MG tablet,           DISCONTINUED: phentermine (ADIPEX-P) 37.5 MG           tablet, DISCONTINUED: phentermine (ADIPEX-P)           37.5 MG tablet                   (G89.4) Chronic pain syndrome    Comment:      Plan: HYDROcodone-acetaminophen (NORCO) 7.5-325 MG           per tablet, DISCONTINUED:           HYDROcodone-acetaminophen (NORCO) 7.5-325 MG           per tablet, DISCONTINUED:           HYDROcodone-acetaminophen (NORCO) 7.5-325 MG           per tablet                   (M25.661) Stiffness of knee joint, right    Comment:      Plan:          (G56.03) Bilateral carpal tunnel syndrome    Comment:      Plan:          (J45.30) Mild persistent asthma without complication    Comment:      Plan:          (E78.5) Hyperlipidemia with target LDL less than 130    Comment:      Plan:          (M1A.9XX0) Chronic gout involving toe 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 tophus, unspecified cause    Comment:      Plan:             '           Follow-ups after your visit        Additional Services     AUDIOLOGY ADULT REFERRAL       Your  "provider has referred you to: [facility]: [facility] [phone]   [email or web address]    Specialty Testing:  Audiogram w/Tymps and Reflexes (Comprehensive Audiology Evaluation) and  Tinnitus training bilateral                  Who to contact     If you have questions or need follow up information about today's clinic visit or your schedule please contact [facility] directly at [phone].  Normal or non-critical lab and imaging results will be communicated to you by Viralizehart, letter or phone within 4 business days after the clinic has received the results. If you do not hear from us within 7 days, please contact the clinic through Viralizehart or phone. If you have a critical or abnormal lab result, we will notify you by phone as soon as possible.  Submit refill requests through [facility] or call your pharmacy and they will forward the refill request to us. Please allow 3 business days for your refill to be completed.          Additional Information About Your Visit        MyChart Information     [facility] lets you send messages to your doctor, view your test results, renew your prescriptions, schedule appointments and more. To sign up, go to [email or web address] . Click on \"Log in\" on the left side of the screen, which will take you to the Welcome page. Then click on \"Sign up Now\" on the right side of the page.     You will be asked to enter the access code listed below, as well as some personal information. Please follow the directions to create your username and password.     Your access code is: [email or web address]  Expires: 3/7/2018 10:23 AM     Your access code will  in 90 days. If you need help or a new code, please call your [facility] or [phone].        Care EveryWhere ID     This is your Care EveryWhere ID. This could be used by other organizations to access your " [place] medical records  [id number]        Your Vitals Were     Pulse Temperature Respirations Pulse Oximetry BMI (Body Mass Index)       96 97.7  F (36.5  C) (Tympanic) 16 97% 36.8 kg/m2        Blood Pressure from Last 3 Encounters:   12/07/17 126/76   08/22/17 112/66   07/28/17 131/82    Weight from Last 3 Encounters:   12/07/17 256 lb 8 oz (116.3 kg)   10/25/17 250 lb (113.4 kg)   08/22/17 263 lb 8 oz (119.5 kg)              We Performed the Following     ALT     AUDIOLOGY ADULT REFERRAL     CBC with platelets and differential     Creatinine     Uric acid          Today's Medication Changes          These changes are accurate as of: 12/7/17 10:23 AM.  If you have any questions, ask your nurse or doctor.               Start taking these medicines.        Dose/Directions    HYDROcodone-acetaminophen 7.5-325 MG per tablet   Commonly known as:  NORCO   Used for:  Chronic pain syndrome   Started by:  Johnson Clements MD        Dose:  1 tablet   Start taking on:  2/20/2018   Take 1 tablet by mouth every 4 hours as needed for pain (4 x daily as needed  maxium 4 per day)   Quantity:  120 tablet   Refills:  0       phentermine 37.5 MG tablet   Commonly known as:  ADIPEX-P   Used for:  BMI 40.0-44.9, adult (H)   Started by:  Johnson Clements MD        Start taking on:  2/7/2018   TAKE ONE TABLET BY MOUTH EVERY DAY IN THE MORNING.   Quantity:  30 tablet   Refills:  0            Where to get your medicines      Some of these will need a paper prescription and others can be bought over the counter.  Ask your nurse if you have questions.     Bring a paper prescription for each of these medications     HYDROcodone-acetaminophen 7.5-325 MG per tablet    phentermine 37.5 MG tablet                Primary Care Provider Office Phone # Fax #    Johnson Clements [phone] [address] [facility] [id number]        Equal Access to Services     JACINTO MODI AH: Tina horton  Soalexisali, waaxda luqadaha, qaybta kaalmada adela, gee mirza. So [facility] [phone].    ATENCIÓN: Si maciej stone, tiene a stewart disposición servicios gratuitos de asistencia lingüística. Arsen al [phone].    We comply with applicable federal civil rights laws and [place] laws. We do not discriminate on the basis of race, color, national origin, age, disability, sex, sexual orientation, or gender identity.            Thank you!     Thank you for choosing [facility]  for your care. Our goal is always to provide you with excellent care. Hearing back from our patients is one way we can continue to improve our services. Please take a few minutes to complete the written survey that you may receive in the mail after your visit with us. Thank you!             Your Updated Medication List - Protect others around you: Learn how to safely use, store and throw away your medicines at www.disposemymeds.org.          This list is accurate as of: 12/7/17 10:23 AM.  Always use your most recent med list.                   Brand Name Dispense Instructions for use Diagnosis    acyclovir 400 MG tablet    ZOVIRAX    28 tablet    TAKE TWO TABLETS BY MOUTH TWICE DAILY    HSV (herpes simplex virus) infection       albuterol 108 (90 BASE) MCG/ACT Inhaler    PROAIR HFA/PROVENTIL HFA/VENTOLIN HFA    1 Inhaler    Inhale 2 puffs into the lungs every 6 hours as needed for shortness of breath / dyspnea    Mild persistent asthma, uncomplicated       allopurinol 100 MG tablet    ZYLOPRIM    90 tablet    TAKE 1 TABLET BY MOUTH EVERY DAY    Chronic gout involving toe 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 tophus, unspecified cause       clindamycin 1 % solution    CLEOCIN T    60 mL    Apply topically 2 times daily Profile Rx: patient will contact pharmacy when needed    Folliculitis       diclofenac 1 % Gel topical gel    VOLTAREN    100 g    APPLY 2GRAMS TOPICALLY 4 TIMES A DAY AS NEEDED FOR  INVOLVED AREA    Stiffness of knee joint, right, Carpal tunnel syndrome, unspecified laterality, Primary localized osteoarthrosis, lower leg, unspecified laterality, Lumbar disc disease with radiculopathy       fluticasone 50 MCG/ACT spray    FLONASE    16 g    Spray 2 sprays into both nostrils daily    Seasonal allergic rhinitis due to pollen       fluticasone-salmeterol 100-50 MCG/DOSE diskus inhaler    ADVAIR    3 Inhaler    Inhale 1 puff into the lungs 2 times daily    Mild persistent asthma, uncomplicated       HYDROcodone-acetaminophen 7.5-325 MG per tablet   Start taking on:  2/20/2018    NORCO    120 tablet    Take 1 tablet by mouth every 4 hours as needed for pain (4 x daily as needed  maxium 4 per day)    Chronic pain syndrome       phentermine 37.5 MG tablet   Start taking on:  2/7/2018    ADIPEX-P    30 tablet    TAKE ONE TABLET BY MOUTH EVERY DAY IN THE MORNING.    BMI 40.0-44.9, adult (H)       sildenafil 100 MG tablet    VIAGRA    100 tablet    Take 0.5-1 tablets ( mg) by mouth daily as needed for erectile dysfunction Take 30 min to 4 hours before intercourse.  Never use with nitroglycerin, terazosin or doxazosin.    Sexual dysfunction       simvastatin 40 MG tablet    ZOCOR    30 tablet    Take 1 tablet (40 mg) by mouth At Bedtime    Hyperlipidemia with target LDL less than 130

## 2017-12-07 NOTE — PATIENT INSTRUCTIONS
(H93.13) Tinnitus, bilateral  (primary encounter diagnosis)    Comment:      Plan: AUDIOLOGY ADULT REFERRAL                   (Z68.41) BMI 40.0-44.9, adult (H)    Comment:      Plan: phentermine (ADIPEX-P) 37.5 MG tablet,           DISCONTINUED: phentermine (ADIPEX-P) 37.5 MG           tablet, DISCONTINUED: phentermine (ADIPEX-P)           37.5 MG tablet                   (G89.4) Chronic pain syndrome    Comment:      Plan: HYDROcodone-acetaminophen (NORCO) 7.5-325 MG           per tablet, DISCONTINUED:           HYDROcodone-acetaminophen (NORCO) 7.5-325 MG           per tablet, DISCONTINUED:           HYDROcodone-acetaminophen (NORCO) 7.5-325 MG           per tablet                   (M25.661) Stiffness of knee joint, right    Comment:      Plan:          (G56.03) Bilateral carpal tunnel syndrome    Comment:      Plan:          (J45.30) Mild persistent asthma without complication    Comment:      Plan:          (E78.5) Hyperlipidemia with target LDL less than 130    Comment:      Plan:          (M1A.9XX0) Chronic gout involving toe 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 tophus, unspecified cause    Comment:      Plan:             '

## 2017-12-07 NOTE — NURSING NOTE
"Chief Complaint   Patient presents with     Recheck Medication     gout       Initial /76  Pulse 96  Temp 97.7  F (36.5  C) (Tympanic)  Resp 16  Wt 256 lb 8 oz (116.3 kg)  SpO2 97%  BMI 36.8 kg/m2 Estimated body mass index is 36.8 kg/(m^2) as calculated from the following:    Height as of 10/25/17: 5' 10\" (1.778 m).    Weight as of this encounter: 256 lb 8 oz (116.3 kg).  Medication Reconciliation: complete   Radha Tucker CMA    "

## 2017-12-07 NOTE — LETTER
[facility]  [address]  [address]  [place] [address] [phone]                                                                                                           Arnaud Bird  [address]  [facility] [address]    December 11, 2017      Dear Arnaud,    NORMAL COMPLETE BLOOD PANEL WBCS RBCS AND PLATELETS     NORMAL LIVER FUNCTION TEST   NORMAL RENAL FUNCTION   NORMAL GOUT LEVEL   KEEP THE SAME MEDICATIONS   LCJ   Results for orders placed or performed in visit on 12/07/17   CBC with platelets and differential   Result Value Ref Range    WBC 6.2 4.0 - 11.0 10e9/L    RBC Count 5.01 4.4 - 5.9 10e12/L    Hemoglobin 15.5 13.3 - 17.7 g/dL    Hematocrit 47.2 40.0 - 53.0 %    MCV 94 78 - 100 fl    MCH 30.9 26.5 - 33.0 pg    MCHC 32.8 31.5 - 36.5 g/dL    RDW 14.7 10.0 - 15.0 %    Platelet Count 226 150 - 450 10e9/L    Diff Method Automated Method     % Neutrophils 68.7 %    % Lymphocytes 23.4 %    % Monocytes 6.9 %    % Eosinophils 0.8 %    % Basophils 0.2 %    Absolute Neutrophil 4.3 1.6 - 8.3 10e9/L    Absolute Lymphocytes 1.5 0.8 - 5.3 10e9/L    Absolute Monocytes 0.4 0.0 - 1.3 10e9/L    Absolute Eosinophils 0.1 0.0 - 0.7 10e9/L    Absolute Basophils 0.0 0.0 - 0.2 10e9/L   ALT   Result Value Ref Range    ALT 37 0 - 70 U/L   Creatinine   Result Value Ref Range    Creatinine 0.85 0.66 - 1.25 mg/dL    GFR Estimate >90 >60 mL/min/1.7m2    GFR Estimate If Black >90 >60 mL/min/1.7m2   Uric acid   Result Value Ref Range    Uric Acid 5.5 3.5 - 7.2 mg/dL         Thank you for choosing [facility].  We appreciate the opportunity to serve you and look forward to supporting your healthcare needs in the future.    If you have any questions or concerns, please call me or my staff at [phone].      Sincerely,    Johnson Clements Jr MD

## 2017-12-07 NOTE — PROGRESS NOTES
SUBJECTIVE:   Arnaud Bird is a 62 year old male who presents to clinic today for the following health issues:      Medication Followup of gout meds    Taking Medication as prescribed: taked allopurinal every other day    Side Effects:  None    Medication Helping Symptoms:  yes       STIFFNESS RIGHT KNEE PAIN     RIGHT TOTAL KNEE ARTHROPLASTY     BILATERAL CARPAL TUNNEL SYNDROME     VITAMIN D REPLACEMENT      BILATERAL FOOT AND HEEL PAIN    ASTHMA EXACERBATION     BENIGN PROSTATIC HYPERTROPHY IMPROVED     MORBID OBESITY    CHRONIC LOWER BACK PAIN     RIGHT WRIST PAIN     CHRONIC PAIN SYNDROME     HISTORY OF GOUT       .  Current Outpatient Prescriptions   Medication Sig Dispense Refill     [START ON 2/7/2018] phentermine (ADIPEX-P) 37.5 MG tablet TAKE ONE TABLET BY MOUTH EVERY DAY IN THE MORNING. 30 tablet 0     [START ON 2/20/2018] HYDROcodone-acetaminophen (NORCO) 7.5-325 MG per tablet Take 1 tablet by mouth every 4 hours as needed for pain (4 x daily as needed  maxium 4 per day) 120 tablet 0     allopurinol (ZYLOPRIM) 100 MG tablet TAKE 1 TABLET BY MOUTH EVERY DAY 90 tablet 3     diclofenac (VOLTAREN) 1 % GEL topical gel APPLY 2GRAMS TOPICALLY 4 TIMES A DAY AS NEEDED FOR INVOLVED AREA 100 g 1     sildenafil (VIAGRA) 100 MG cap/tab Take 0.5-1 tablets ( mg) by mouth daily as needed for erectile dysfunction Take 30 min to 4 hours before intercourse.  Never use with nitroglycerin, terazosin or doxazosin. 100 tablet 3     fluticasone (FLONASE) 50 MCG/ACT spray Spray 2 sprays into both nostrils daily 16 g 11     clindamycin (CLEOCIN T) 1 % external solution Apply topically 2 times daily Profile Rx: patient will contact pharmacy when needed 60 mL 11     acyclovir (ZOVIRAX) 400 MG tablet TAKE TWO TABLETS BY MOUTH TWICE DAILY 28 tablet 0     simvastatin (ZOCOR) 40 MG tablet Take 1 tablet (40 mg) by mouth At Bedtime 30 tablet 11     albuterol (PROAIR HFA, PROVENTIL HFA, VENTOLIN HFA) 108 (90 BASE) MCG/ACT  inhaler Inhale 2 puffs into the lungs every 6 hours as needed for shortness of breath / dyspnea 1 Inhaler 11     fluticasone-salmeterol (ADVAIR) 100-50 MCG/DOSE diskus inhaler Inhale 1 puff into the lungs 2 times daily 3 Inhaler 3          Allergies   Allergen Reactions     Seasonal Allergies        Immunization History   Administered Date(s) Administered     Influenza (IIV3) PF 10/18/2012         reports that he drinks alcohol.      reports that he does not use illicit drugs.    family history includes CANCER in his father; Family History Negative in his father and mother. There is no history of DIABETES, Coronary Artery Disease, Hypertension, Hyperlipidemia, CEREBROVASCULAR DISEASE, Breast Cancer, Colon Cancer, Prostate Cancer, Other Cancer, Depression, Anxiety Disorder, MENTAL ILLNESS, Substance Abuse, Anesthesia Reaction, Asthma, OSTEOPOROSIS, Genetic Disorder, Thyroid Disease, Obesity, or Unknown/Adopted.    indicated that the status of his no family hx of is unknown. He indicated that his mother is . He indicated that his father is . He indicated that his sister is alive. He indicated that both of his brothers are alive.      has a past surgical history that includes tonsillectomy; TOOTH EXTRACTION W/FORCEP; and orthopedic surgery.     reports that he currently engages in sexual activity and has had female partners.  .  Pediatric History   Patient Guardian Status     Not on file.     Other Topics Concern     Parent/Sibling W/ Cabg, Mi Or Angioplasty Before 65f 55m? No     Social History Narrative         reports that he has never smoked. He has never used smokeless tobacco.    Medical, social, surgical, and family histories reviewed.    Labs reviewed in EPIC  Patient Active Problem List   Diagnosis     Stiffness of knee joint, right     Gait difficulty     S/P TKR (total knee replacement)     Bilateral carpal tunnel syndrome     Vitamin D deficiency     Plantar fascial fibromatosis     Asthma  with exacerbation     Hypertrophy of prostate without urinary obstruction     Obesity     Hyperlipidemia     Spondylosis with myelopathy, lumbar region     LEFT WORSE THAN RIGHT      CARDIOVASCULAR SCREENING; LDL GOAL LESS THAN 100     BMI 40.0-44.9, adult (H)     Mild persistent asthma     Hyperlipidemia with target LDL less than 130     Lumbar disc disease with radiculopathy     Groin strain, initial encounter     Sexual dysfunction     ACP (advance care planning)     Slac (scapholunate advanced collapse) of wrist, right     Right wrist pain     Numbness and tingling in right hand     Chronic pain syndrome     Chronic gout involving toe 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 tophus, unspecified cause     Past Surgical History:   Procedure Laterality Date     HC TOOTH EXTRACTION W/FORCEP       ORTHOPEDIC SURGERY      bilateral total knee replacements     TONSILLECTOMY      age 2 or 3       Social History   Substance Use Topics     Smoking status: Never Smoker     Smokeless tobacco: Never Used     Alcohol use Yes      Comment: case beer a week     Family History   Problem Relation Age of Onset     CANCER Father      Family History Negative Father      Family History Negative Mother      DIABETES No family hx of      Coronary Artery Disease No family hx of      Hypertension No family hx of      Hyperlipidemia No family hx of      CEREBROVASCULAR DISEASE No family hx of      Breast Cancer No family hx of      Colon Cancer No family hx of      Prostate Cancer No family hx of      Other Cancer No family hx of      Depression No family hx of      Anxiety Disorder No family hx of      MENTAL ILLNESS No family hx of      Substance Abuse No family hx of      Anesthesia Reaction No family hx of      Asthma No family hx of      OSTEOPOROSIS No family hx of      Genetic Disorder No family hx of      Thyroid Disease No family hx of      Obesity No family hx of      Unknown/Adopted No family hx of          Allergies   Allergen Reactions      Seasonal Allergies      Recent Labs   Lab Test  12/30/16   0804  10/31/16   0822  07/08/15   0528  02/05/14   0750   06/23/12   1545   05/10/11   1233   A1C  5.4   --    --    --    --    --    --    --    LDL   --   85   --   106   --   104*   < >  80   HDL   --   55   --   45   --   50   --   50   TRIG   --   94   --   112   --    --    --   199*   ALT   --   35  28   --    --   34.0   < >   --    CR   --   0.88  0.83  1.20   --   0.7   < >  0.85   GFRESTIMATED   --   87  >90  Non  GFR Calc    62   < >   --    --    --    GFRESTBLACK   --   >90  >90  African American GFR Calc    75   < >   --    --    --    POTASSIUM   --    --   4.3  4.4   --   4.2   < >  4.3    < > = values in this interval not displayed.        BP Readings from Last 6 Encounters:   12/07/17 126/76   08/22/17 112/66   07/28/17 131/82   05/09/17 110/64   01/11/17 (!) 137/99   12/30/16 108/64       Wt Readings from Last 3 Encounters:   12/07/17 256 lb 8 oz (116.3 kg)   10/25/17 250 lb (113.4 kg)   08/22/17 263 lb 8 oz (119.5 kg)         Positive symptoms or findings indicated by bold designation:     ROS: 10 point ROS neg other than the symptoms noted above in the HPI.except  has Stiffness of knee joint, right; Gait difficulty; S/P TKR (total knee replacement); Bilateral carpal tunnel syndrome; Vitamin D deficiency; Plantar fascial fibromatosis; Asthma with exacerbation; Hypertrophy of prostate without urinary obstruction; Obesity; Hyperlipidemia; Spondylosis with myelopathy, lumbar region; LEFT WORSE THAN RIGHT ; CARDIOVASCULAR SCREENING; LDL GOAL LESS THAN 100; BMI 40.0-44.9, adult (H); Mild persistent asthma; Hyperlipidemia with target LDL less than 130; Lumbar disc disease with radiculopathy; Groin strain, initial encounter; Sexual dysfunction; ACP (advance care planning); Slac (scapholunate advanced collapse) of wrist, right; Right wrist pain; Numbness and tingling in right hand; Chronic pain syndrome; and Chronic gout  involving toe 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 tophus, unspecified cause on his problem list.   Constitutional: The patient denied fatigue, fever, insomnia, night sweats, recent illness and weight loss.      Eyes: The patient denied blindness, eye pain, eye tearing, photophobia, vision change and visual disturbance. NORMAL       Ears/Nose/Throat/Neck: The patient denied dizziness, facial pain, hearing loss, nasal discharge, oral pain, otalgia, postnasal drip, sinus congestion, sore throat, tinnitus and voice change.   NORMAL HEARING AND BALANCE      Cardiovascular: The patient denied arrhythmia, chest pain/pressure, claudication, edema, exercise intolerance, fatigue, orthopnea, palpitations and syncope.      Respiratory: The patient denied asthma, chest congestion, cough, dyspnea on exertion, dyspnea/shortness of breath, hemoptysis, pedal edema, pleuritic pain, productive sputum, snoring and wheezing.     Gastrointestinal: The patient denied abdominal pain, anorexia, constipation, diarrhea, dysphagia, gastroesophageal reflux, hematochezia, hemorrhoids, melena, nausea and vomiting . GASTROESOPHAGEAL REFLUX DISEASE WITHOUT ESOPHAGITIS  CONTROLLED     Genitourinary/Nephrology: The patient denied breast complaint, dysuria, nocturia sexual dysfunction, t, urinary frequency, urinary incontinence, urinary urgency        Musculoskeletal:   TOTAL KNEE ARTHROPLASTY SHOULDER LOWER BACK PAIN AND KNEE PAIN     Dermatoligic:: The patient denied acne, dermatitis, ecchymosis, itching, mole change, rash, skin cancer, skin lesion and sores.      Neurologic: The patient denied dizziness, gait abnormality, headache, memory loss, mental status change, paresis, paresthesia, seizure, syncope, tremor and vision change.     Psychiatric: The patient denied anxiety, depression, disturbances of memory, drug abuse, insomnia, mood swings and relationship difficulties.      Endocrine: The patient denied , goiter, obesity, polyuria and thyroid disease.       Hematologic/Lymphatic: The patient denied abnormal bleeding and bruising, abnormal ecchymoses, anemia, lymph node enlargement/mass, petechiae and venous  Thrombosis.      Allergy/Immunology: The patient denied food allergy and  Allergic rhinitis or conjunctivitis.        PE:  /76  Pulse 96  Temp 97.7  F (36.5  C) (Tympanic)  Resp 16  Wt 256 lb 8 oz (116.3 kg)  SpO2 97%  BMI 36.8 kg/m2 Body mass index is 36.8 kg/(m^2).    Constitutional: general appearance, well nourished, well developed, in no acute distress, well developed, appears stated age, normal body habitus,  NORMAL     Eyes:; The patient has normal eyelids sclerae and conjunctivae :NL      Ears/Nose/Throat: external ear, overall: normal appearance; external nose, overall: benign appearance, normal moujth gums and lips  The patient has: NORMAL     Neck: thyroid, overall: normal size, normal consistency, nontender, NORMAL     Respiratory:  palpation of chest, overall: normal excursion,  NORMAL   Clear to percussion and auscultation  NORMAL     Tachypnea  NORMAL  Color   NORMAL     Cardiovascular:  Good color with no peripheral edema  NORMAL   Regular sinus rhythm without murmur. Physiologic heart sounds Heart is unelarged  .   Chest/Breast: normal shape  NORMAL      Abdominal exam,  Liver and spleen are  unenlarged  NORMAL       Tenderness  NORMAL   Scars  NORMAL     Urogenital; no renal, flank or bladder  tenderness; NORMAL     Lymphatic: neck nodes, NORMAL    Other nodes  Na     Musculoskeletal:  Brief ortho exam normal except:       Integument: inspection of skin, no rash, lesions; and, palpation, no induration, no tenderness.  NORMAL     Neurologic mental status, overall: alert and oriented; gait, no ataxia, no unsteadiness; coordination, no tremors; cranial nerves, overall: normal motor, overall: normal bulk, tone. NORMAL     Psychiatric: orientation/consciousness, overall: oriented to person, place and time; behavior/psychomotor activity,  no tics, normal psychomotor activity; mood and affect, overall: normal mood and affect; appearance, overall: well-groomed, good eye contact; speech, overall: normal quality, no aphasia and normal quality, quantity, intact. NORMAL     Diagnostic Test Results:  Results for orders placed or performed in visit on 12/07/17   CBC with platelets and differential   Result Value Ref Range    WBC 6.2 4.0 - 11.0 10e9/L    RBC Count 5.01 4.4 - 5.9 10e12/L    Hemoglobin 15.5 13.3 - 17.7 g/dL    Hematocrit 47.2 40.0 - 53.0 %    MCV 94 78 - 100 fl    MCH 30.9 26.5 - 33.0 pg    MCHC 32.8 31.5 - 36.5 g/dL    RDW 14.7 10.0 - 15.0 %    Platelet Count 226 150 - 450 10e9/L    Diff Method Automated Method     % Neutrophils 68.7 %    % Lymphocytes 23.4 %    % Monocytes 6.9 %    % Eosinophils 0.8 %    % Basophils 0.2 %    Absolute Neutrophil 4.3 1.6 - 8.3 10e9/L    Absolute Lymphocytes 1.5 0.8 - 5.3 10e9/L    Absolute Monocytes 0.4 0.0 - 1.3 10e9/L    Absolute Eosinophils 0.1 0.0 - 0.7 10e9/L    Absolute Basophils 0.0 0.0 - 0.2 10e9/L         ICD-10-CM    1. Tinnitus, bilateral H93.13 AUDIOLOGY ADULT REFERRAL   2. BMI 40.0-44.9, adult (H) Z68.41 phentermine (ADIPEX-P) 37.5 MG tablet     DISCONTINUED: phentermine (ADIPEX-P) 37.5 MG tablet     DISCONTINUED: phentermine (ADIPEX-P) 37.5 MG tablet   3. Chronic pain syndrome G89.4 HYDROcodone-acetaminophen (NORCO) 7.5-325 MG per tablet     DISCONTINUED: HYDROcodone-acetaminophen (NORCO) 7.5-325 MG per tablet     DISCONTINUED: HYDROcodone-acetaminophen (NORCO) 7.5-325 MG per tablet   4. Stiffness of knee joint, right M25.661    5. Bilateral carpal tunnel syndrome G56.03    6. Mild persistent asthma without complication J45.30    7. Hyperlipidemia with target LDL less than 130 E78.5    8. Chronic gout involving toe 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 tophus, unspecified cause [id number] CBC with platelets and differential     ALT     Creatinine     Uric acid        .    Side effects benefits and risks thoroughly  discussed. .he may come in early if unimproved or getting worse          Importance of adhering to regimen discussed and if medications were dispensed, the importance of taking medications discussed and bringing in the medications after every visit for chronic problems         Please drink 2 glasses of water prior to meals and walk 15-30 minutes after meals    I spent 25 MINUTES SPENT   with patient discussing the following issues   The primary encounter diagnosis was Tinnitus, bilateral. Diagnoses of BMI 40.0-44.9, adult (H), Chronic pain syndrome, Stiffness of knee joint, right, Bilateral carpal tunnel syndrome, Mild persistent asthma without complication, Hyperlipidemia with target LDL less than 130, and Chronic gout involving toe 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 tophus, unspecified cause were also pertinent to this visit. over half of which involved counseling and coordination of care.    Patient Instructions     (H93.13) Tinnitus, bilateral  (primary encounter diagnosis)    Comment:      Plan: AUDIOLOGY ADULT REFERRAL                   (Z68.41) BMI 40.0-44.9, adult (H)    Comment:      Plan: phentermine (ADIPEX-P) 37.5 MG tablet,           DISCONTINUED: phentermine (ADIPEX-P) 37.5 MG           tablet, DISCONTINUED: phentermine (ADIPEX-P)           37.5 MG tablet                   (G89.4) Chronic pain syndrome    Comment:      Plan: HYDROcodone-acetaminophen (NORCO) 7.5-325 MG           per tablet, DISCONTINUED:           HYDROcodone-acetaminophen (NORCO) 7.5-325 MG           per tablet, DISCONTINUED:           HYDROcodone-acetaminophen (NORCO) 7.5-325 MG           per tablet                   (M25.661) Stiffness of knee joint, right    Comment:      Plan:          (G56.03) Bilateral carpal tunnel syndrome    Comment:      Plan:          (J45.30) Mild persistent asthma without complication    Comment:      Plan:          (E78.5) Hyperlipidemia with target LDL less than 130    Comment:      Plan:          (M1A.9XX0) Chronic  gout involving toe 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 tophus, unspecified cause    Comment:      Plan:             '         ALL THE ABOVE PROBLEMS ARE STABLE AND MED CHANGES AS NOTED    Diet:  MEDITERRANEAN DIET AND WEIGHT LOSS     Exercise:  UPPER EXTREMETIES AND LOWER EXTREMITY AND AEROBIC AND FOOT AND KNEE PAIN AND LOWER BACK PAIN   Exercises Range of motion, balance, isometric, and strengthening exercises 30 repetitions twice daily of involved joints      .SCOTTY TOLEDO MD 12/7/2017 7:34 PM  December 7, 2017

## 2017-12-08 LAB
ALT SERPL W P-5'-P-CCNC: 37 U/L (ref 0–70)
CREAT SERPL-MCNC: 0.85 MG/DL (ref 0.66–1.25)
GFR SERPL CREATININE-BSD FRML MDRD: >90 ML/MIN/1.7M2
URATE SERPL-MCNC: 5.5 MG/DL (ref 3.5–7.2)

## 2017-12-09 NOTE — PROGRESS NOTES
Please send normal lab letter when labs are complete  NORMAL COMPLETE BLOOD PANEL WBCS RBCS AND PLATELETS    NORMAL LIVER FUNCTION TEST  NORMAL RENAL FUNCTION  NORMAL GOUT LEVEL   KEEP THE SAME MEDICATIONS   MARLENA

## 2017-12-20 ENCOUNTER — OFFICE VISIT (OUTPATIENT)
Dept: FAMILY MEDICINE | Facility: CLINIC | Age: 62
End: 2017-12-20
Payer: COMMERCIAL

## 2017-12-20 VITALS
DIASTOLIC BLOOD PRESSURE: 82 MMHG | TEMPERATURE: 98.7 F | BODY MASS INDEX: 37.44 KG/M2 | WEIGHT: 261.5 LBS | OXYGEN SATURATION: 98 % | SYSTOLIC BLOOD PRESSURE: 122 MMHG | HEIGHT: 70 IN | HEART RATE: 101 BPM

## 2017-12-20 DIAGNOSIS — R05.9 COUGH: ICD-10-CM

## 2017-12-20 DIAGNOSIS — J45.30 MILD PERSISTENT ASTHMA WITHOUT COMPLICATION: Chronic | ICD-10-CM

## 2017-12-20 DIAGNOSIS — J10.1 INFLUENZA A: Primary | ICD-10-CM

## 2017-12-20 LAB
DEPRECATED S PYO AG THROAT QL EIA: NORMAL
FLUAV+FLUBV AG SPEC QL: NEGATIVE
FLUAV+FLUBV AG SPEC QL: POSITIVE
SPECIMEN SOURCE: ABNORMAL
SPECIMEN SOURCE: NORMAL

## 2017-12-20 PROCEDURE — 87804 INFLUENZA ASSAY W/OPTIC: CPT | Performed by: FAMILY MEDICINE

## 2017-12-20 PROCEDURE — 87880 STREP A ASSAY W/OPTIC: CPT | Performed by: FAMILY MEDICINE

## 2017-12-20 PROCEDURE — 87081 CULTURE SCREEN ONLY: CPT | Performed by: FAMILY MEDICINE

## 2017-12-20 PROCEDURE — 99214 OFFICE O/P EST MOD 30 MIN: CPT | Performed by: FAMILY MEDICINE

## 2017-12-20 RX ORDER — OSELTAMIVIR PHOSPHATE 75 MG/1
75 CAPSULE ORAL 2 TIMES DAILY
Qty: 10 CAPSULE | Refills: 0 | Status: SHIPPED | OUTPATIENT
Start: 2017-12-20 | End: 2018-02-15

## 2017-12-20 NOTE — PATIENT INSTRUCTIONS
Influenza (Adult)    Influenza is also called the flu. It is a viral illness that affects the air passages of your lungs. It is different from the common cold. The flu can easily be passed from one to person to another. It may be spread through the air by coughing and sneezing. Or it can be spread by touching the sick person and then touching your own eyes, nose, or mouth.  The flu starts 1 to 3 days after you are exposed to the flu virus. It may last for 1 to 2 weeks but many people feel tired or fatigued for many weeks afterward. You usually don t need to take antibiotics unless you have a complication. This might be an ear or sinus infection or pneumonia.  Symptoms of the flu may be mild or severe. They can include extreme tiredness (wanting to stay in bed all day), chills, fevers, muscle aches, soreness with eye movement, headache, and a dry, hacking cough.  Home care  Follow these guidelines when caring for yourself at home:    Avoid being around cigarette smoke, whether yours or other people s.    Acetaminophen or ibuprofen will help ease your fever, muscle aches, and headache. Don t give aspirin to anyone younger than 18 who has the flu. Aspirin can harm the liver.    Nausea and loss of appetite are common with the flu. Eat light meals. Drink 6 to 8 glasses of liquids every day. Good choices are water, sport drinks, soft drinks without caffeine, juices, tea, and soup. Extra fluids will also help loosen secretions in your nose and lungs.    Over-the-counter cold medicines will not make the flu go away faster. But the medicines may help with coughing, sore throat, and congestion in your nose and sinuses. Don t use a decongestant if you have high blood pressure.    Stay home until your fever has been gone for at least 24 hours without using medicine to reduce fever.  Follow-up care  Follow up with your healthcare provider, or as advised, if you are not getting better over the next week.  If you are age 65 or  older, talk with your provider about getting a pneumococcal vaccine every 5 years. You should also get this vaccine if you have chronic asthma or COPD. All adults should get a flu vaccine every fall. Ask your provider about this.  When to seek medical advice  Call your healthcare provider right away if any of these occur:    Cough with lots of colored mucus (sputum) or blood in your mucus    Chest pain, shortness of breath, wheezing, or trouble breathing    Severe headache, or face, neck, or ear pain    New rash with fever    Fever of 100.4 F (38 C) or higher, or as directed by your healthcare provider    Confusion, behavior change, or seizure    Severe weakness or dizziness    You get a new fever or cough after getting better for a few days  Date Last Reviewed: 1/1/2017 2000-2017 [employer]. [address] [place] [address], [place] [address]. All rights reserved. This information is not intended as a substitute for professional medical care. Always follow your healthcare professional's instructions.

## 2017-12-20 NOTE — MR AVS SNAPSHOT
After Visit Summary   12/20/2017    Arnaud Bird    MRN: [id number]           Patient Information     Date Of Birth          1955        Visit Information        Provider Department      12/20/2017 2:40 PM Kristin Levin MD [facility]        Today's Diagnoses     Cough    -  1    Mild persistent asthma without complication        Influenza A          Care Instructions      Influenza (Adult)    Influenza is also called the flu. It is a viral illness that affects the air passages of your lungs. It is different from the common cold. The flu can easily be passed from one to person to another. It may be spread through the air by coughing and sneezing. Or it can be spread by touching the sick person and then touching your own eyes, nose, or mouth.  The flu starts 1 to 3 days after you are exposed to the flu virus. It may last for 1 to 2 weeks but many people feel tired or fatigued for many weeks afterward. You usually don t need to take antibiotics unless you have a complication. This might be an ear or sinus infection or pneumonia.  Symptoms of the flu may be mild or severe. They can include extreme tiredness (wanting to stay in bed all day), chills, fevers, muscle aches, soreness with eye movement, headache, and a dry, hacking cough.  Home care  Follow these guidelines when caring for yourself at home:    Avoid being around cigarette smoke, whether yours or other people s.    Acetaminophen or ibuprofen will help ease your fever, muscle aches, and headache. Don t give aspirin to anyone younger than 18 who has the flu. Aspirin can harm the liver.    Nausea and loss of appetite are common with the flu. Eat light meals. Drink 6 to 8 glasses of liquids every day. Good choices are water, sport drinks, soft drinks without caffeine, juices, tea, and soup. Extra fluids will also help loosen secretions in your nose and lungs.    Over-the-counter cold medicines will  not make the flu go away faster. But the medicines may help with coughing, sore throat, and congestion in your nose and sinuses. Don t use a decongestant if you have high blood pressure.    Stay home until your fever has been gone for at least 24 hours without using medicine to reduce fever.  Follow-up care  Follow up with your healthcare provider, or as advised, if you are not getting better over the next week.  If you are age 65 or older, talk with your provider about getting a pneumococcal vaccine every 5 years. You should also get this vaccine if you have chronic asthma or COPD. All adults should get a flu vaccine every fall. Ask your provider about this.  When to seek medical advice  Call your healthcare provider right away if any of these occur:    Cough with lots of colored mucus (sputum) or blood in your mucus    Chest pain, shortness of breath, wheezing, or trouble breathing    Severe headache, or face, neck, or ear pain    New rash with fever    Fever of 100.4 F (38 C) or higher, or as directed by your healthcare provider    Confusion, behavior change, or seizure    Severe weakness or dizziness    You get a new fever or cough after getting better for a few days  Date Last Reviewed: 1/1/2017 2000-2017 [employer]. [address] [place] [address]. All rights reserved. This information is not intended as a substitute for professional medical care. Always follow your healthcare professional's instructions.                Follow-ups after your visit        Who to contact     If you have questions or need follow up information about today's clinic visit or your schedule please contact [facility] directly at [phone].  Normal or non-critical lab and imaging results will be communicated to you by MyChart, letter or phone within 4 business days after the clinic has received the results. If you do not hear from us within 7 days, please contact the  "clinic through MyCosm[facility]art or phone. If you have a critical or abnormal lab result, we will notify you by phone as soon as possible.  Submit refill requests through [employer] or call your pharmacy and they will forward the refill request to us. Please allow 3 business days for your refill to be completed.          Additional Information About Your Visit        MyCosmikharSiamosoci Information     Zipnosis lets you send messages to your doctor, view your test results, renew your prescriptions, schedule appointments and more. To sign up, go to [email or web address] . Click on \"Log in\" on the left side of the screen, which will take you to the Welcome page. Then click on \"Sign up Now\" on the right side of the page.     You will be asked to enter the access code listed below, as well as some personal information. Please follow the directions to create your username and password.     Your access code is: [email or web address]  Expires: 3/7/2018 10:23 AM     Your access code will  in 90 days. If you need help or a new code, please call your [facility] clinic or [phone].        Care EveryWhere ID     This is your Care EveryWhere ID. This could be used by other organizations to access your [facility] medical records  [id number]        Your Vitals Were     Pulse Temperature Height Pulse Oximetry BMI (Body Mass Index)       101 98.7  F (37.1  C) (Tympanic) 5' 10\" (1.778 m) 98% 37.52 kg/m2        Blood Pressure from Last 3 Encounters:   17 122/82   17 126/76   17 112/66    Weight from Last 3 Encounters:   17 261 lb 8 oz (118.6 kg)   17 256 lb 8 oz (116.3 kg)   10/25/17 250 lb (113.4 kg)              We Performed the Following     Beta strep group A culture     Influenza A/B antigen     Strep, Rapid Screen          Today's Medication Changes          These changes are accurate as of: 17  3:20 PM.  If you have any questions, ask your nurse or doctor.               Start taking these medicines.        " Dose/Directions    fluticasone 100 MCG/BLIST Aepb   Commonly known as:  FLOVENT DISKUS   Used for:  Mild persistent asthma without complication   Started by:  Kristin Levin MD        Dose:  1 puff   Inhale 1 puff into the lungs 2 times daily   Quantity:  1 Inhaler   Refills:  1       oseltamivir 75 MG capsule   Commonly known as:  TAMIFLU   Used for:  Influenza A   Started by:  Kristin Levin MD        Dose:  75 mg   Take 1 capsule (75 mg) by mouth 2 times daily   Quantity:  10 capsule   Refills:  0         Stop taking these medicines if you haven't already. Please contact your care team if you have questions.     fluticasone-salmeterol 100-50 MCG/DOSE diskus inhaler   Commonly known as:  ADVAIR   Stopped by:  Kristin Levin MD                Where to get your medicines      These medications were sent to [facility] [id number] [place] [facility] [address] [facility]  [address] [facility] [address]     Phone:  [phone]     fluticasone 100 MCG/BLIST Aepb    oseltamivir 75 MG capsule                Primary Care Provider Office Phone # Fax #    Johnson Jesica Clements [phone] [address] [facility] [address]        Equal Access to Services     ARVIND MODI : Hadii lainey ray hadasho Soalexisali, waaxda luqadaha, qaybta kaalmada adeegyada, gee mirza. So [facility] [phone].    ATENCIÓN: Si habla español, tiene a stewart disposición servicios gratuitos de asistencia lingüística. Llame al [phone].    We comply with applicable federal civil rights laws and [place] laws. We do not discriminate on the basis of race, color, national origin, age, disability, sex, sexual orientation, or gender identity.            Thank you!     Thank you for choosing [facility]  for your care. Our goal is always to provide you with excellent care. Hearing back from our patients is one way we can continue to improve our  services. Please take a few minutes to complete the written survey that you may receive in the mail after your visit with us. Thank you!             Your Updated Medication List - Protect others around you: Learn how to safely use, store and throw away your medicines at www.disposemymeds.org.          This list is accurate as of: 12/20/17  3:20 PM.  Always use your most recent med list.                   Brand Name Dispense Instructions for use Diagnosis    acyclovir 400 MG tablet    ZOVIRAX    28 tablet    TAKE TWO TABLETS BY MOUTH TWICE DAILY    HSV (herpes simplex virus) infection       albuterol 108 (90 BASE) MCG/ACT Inhaler    PROAIR HFA/PROVENTIL HFA/VENTOLIN HFA    1 Inhaler    Inhale 2 puffs into the lungs every 6 hours as needed for shortness of breath / dyspnea    Mild persistent asthma, uncomplicated       allopurinol 100 MG tablet    ZYLOPRIM    90 tablet    TAKE 1 TABLET BY MOUTH EVERY DAY    Chronic gout involving toe 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 tophus, unspecified cause       clindamycin 1 % solution    CLEOCIN T    60 mL    Apply topically 2 times daily Profile Rx: patient will contact pharmacy when needed    Folliculitis       diclofenac 1 % Gel topical gel    VOLTAREN    100 g    APPLY 2GRAMS TOPICALLY 4 TIMES A DAY AS NEEDED FOR INVOLVED AREA    Stiffness of knee joint, right, Carpal tunnel syndrome, unspecified laterality, Primary localized osteoarthrosis, lower leg, unspecified laterality, Lumbar disc disease with radiculopathy       fluticasone 100 MCG/BLIST Aepb    FLOVENT DISKUS    1 Inhaler    Inhale 1 puff into the lungs 2 times daily    Mild persistent asthma without complication       fluticasone 50 MCG/ACT spray    FLONASE    16 g    Spray 2 sprays into both nostrils daily    Seasonal allergic rhinitis due to pollen       HYDROcodone-acetaminophen 7.5-325 MG per tablet   Start taking on:  2/20/2018    NORCO    120 tablet    Take 1 tablet by mouth every 4 hours as needed for pain (4 x daily as  needed  maxium 4 per day)    Chronic pain syndrome       oseltamivir 75 MG capsule    TAMIFLU    10 capsule    Take 1 capsule (75 mg) by mouth 2 times daily    Influenza A       phentermine 37.5 MG tablet   Start taking on:  2/7/2018    ADIPEX-P    30 tablet    TAKE ONE TABLET BY MOUTH EVERY DAY IN THE MORNING.    BMI 40.0-44.9, adult (H)       sildenafil 100 MG tablet    VIAGRA    100 tablet    Take 0.5-1 tablets ( mg) by mouth daily as needed for erectile dysfunction Take 30 min to 4 hours before intercourse.  Never use with nitroglycerin, terazosin or doxazosin.    Sexual dysfunction       simvastatin 40 MG tablet    ZOCOR    30 tablet    Take 1 tablet (40 mg) by mouth At Bedtime    Hyperlipidemia with target LDL less than 130

## 2017-12-20 NOTE — PROGRESS NOTES
SUBJECTIVE:   Arnaud Bird is a 62 year old male who presents to clinic today for the following health issues:      Acute Illness   Acute illness concerns: URI   Onset: x 1 day     Fever: YES    Chills/Sweats: YES    Headache (location?): YES    Sinus Pressure:YES    Conjunctivitis:  YES: bilateral    Ear Pain: YES: right    Rhinorrhea: YES     Congestion: YES    Sore Throat: YES     Cough: YES-productive of yellow sputum, productive of green sputum    Wheeze: YES    Decreased Appetite: YES    Nausea: no    Vomiting: no    Diarrhea:  no    Dysuria/Freq.: no    Fatigue/Achiness: YES    Sick/Strep Exposure: YES     Therapies Tried and outcome: Nyquil - shows improvement     62 year old with morbid obesity and mild asthma not on a controller with 1 day history of upper respiratory infection.    Course of illness is same.    Severity moderate  Current and Associated symptoms: chills, runny nose, stuffy nose, cough - productive, headache, body aches and fatigue  Treatment measures tried include Tylenol/Ibuprofen and OTC Cough med.  Predisposing factors include ill contact: Family member .    Past Medical History:   Diagnosis Date     Arthritis      Carpal tunnel syndrome     mild     LEFT WORSE THAN RIGHT  10/18/2012     Unspecified asthma(493.90) 10/17/2012     Unspecified asthma, with exacerbation 10/17/2012     Current Outpatient Prescriptions   Medication Sig Dispense Refill     fluticasone (FLOVENT DISKUS) 100 MCG/BLIST AEPB Inhale 1 puff into the lungs 2 times daily 1 Inhaler 1     oseltamivir (TAMIFLU) 75 MG capsule Take 1 capsule (75 mg) by mouth 2 times daily 10 capsule 0     [START ON 2/7/2018] phentermine (ADIPEX-P) 37.5 MG tablet TAKE ONE TABLET BY MOUTH EVERY DAY IN THE MORNING. 30 tablet 0     [START ON 2/20/2018] HYDROcodone-acetaminophen (NORCO) 7.5-325 MG per tablet Take 1 tablet by mouth every 4 hours as needed for pain (4 x daily as needed  maxium 4 per day) 120 tablet 0     allopurinol  (ZYLOPRIM) 100 MG tablet TAKE 1 TABLET BY MOUTH EVERY DAY 90 tablet 3     diclofenac (VOLTAREN) 1 % GEL topical gel APPLY 2GRAMS TOPICALLY 4 TIMES A DAY AS NEEDED FOR INVOLVED AREA 100 g 1     fluticasone (FLONASE) 50 MCG/ACT spray Spray 2 sprays into both nostrils daily 16 g 11     clindamycin (CLEOCIN T) 1 % external solution Apply topically 2 times daily Profile Rx: patient will contact pharmacy when needed 60 mL 11     simvastatin (ZOCOR) 40 MG tablet Take 1 tablet (40 mg) by mouth At Bedtime 30 tablet 11     sildenafil (VIAGRA) 100 MG cap/tab Take 0.5-1 tablets ( mg) by mouth daily as needed for erectile dysfunction Take 30 min to 4 hours before intercourse.  Never use with nitroglycerin, terazosin or doxazosin. 100 tablet 3     acyclovir (ZOVIRAX) 400 MG tablet TAKE TWO TABLETS BY MOUTH TWICE DAILY 28 tablet 0     albuterol (PROAIR HFA, PROVENTIL HFA, VENTOLIN HFA) 108 (90 BASE) MCG/ACT inhaler Inhale 2 puffs into the lungs every 6 hours as needed for shortness of breath / dyspnea 1 Inhaler 11     Social History   Substance Use Topics     Smoking status: Never Smoker     Smokeless tobacco: Never Used     Alcohol use Yes      Comment: case beer a week       ROS:  CONSTITUTIONAL:NEGATIVE for fever, chills, change in weight  INTEGUMENTARY/SKIN: NEGATIVE for worrisome rashes, moles or lesions  EYES: NEGATIVE for vision changes or irritation  ENT/MOUTH: NEGATIVE for ear, mouth and throat problems  CV: NEGATIVE for chest pain, palpitations or peripheral edema  GI: NEGATIVE for nausea, abdominal pain, heartburn, or change in bowel habits  MUSCULOSKELETAL: NEGATIVE for significant arthralgias or myalgia  NEURO: NEGATIVE for weakness, dizziness or paresthesias  ENDOCRINE: NEGATIVE for temperature intolerance, skin/hair changes  PSYCHIATRIC: NEGATIVE for changes in mood or affect    OBJECTIVE:  /82 (BP Location: Left arm, Patient Position: Chair, Cuff Size: Adult Large)  Pulse 101  Temp 98.7  F (37.1  C)  "(Tympanic)  Ht 5' 10\" (1.778 m)  Wt 261 lb 8 oz (118.6 kg)  SpO2 98%  BMI 37.52 kg/m2  GENERAL APPEARANCE: alert, ill but in no acute distress, cooperative and over weight  EYES: EOMI,  PERRL, conjunctiva clear  HENT: TM's normal bilaterally, nose and mouth without erythema, ulcers or lesions and rhinorrhea clear  NECK: supple, nontender, no lymphadenopathy  RESP: lungs clear to auscultation - no rales, rhonchi or wheezes  CV: regular rates and rhythm, normal S1 S2, no murmur noted  ABDOMEN:  soft, nontender, no HSM or masses and bowel sounds normal  NEURO: Normal strength and tone, sensory exam grossly normal,  normal speech and mentation  SKIN: no suspicious lesions or rashes    Results for orders placed or performed in visit on 12/20/17   Strep, Rapid Screen   Result Value Ref Range    Specimen Description Throat     Rapid Strep A Screen       NEGATIVE: No Group A streptococcal antigen detected by immunoassay, await culture report.   Influenza A/B antigen   Result Value Ref Range    Influenza A/B Agn Specimen Nasopharyngeal     Influenza A Positive (A) NEG^Negative    Influenza B Negative NEG^Negative       ASSESSMENT:  Influenza.  Tamiflu as also has asthma.  For controller - can't afford advair, switch to flovent.  Follow up if worsens or doesn't improve.    PLAN:  Tylenol, Ibuprofen, Fluids, Rest, OTC cough suppressant/expectorant and start asthma controller med; sent flovent as advair too expensive for patient.    Patient Instructions     Influenza (Adult)    Influenza is also called the flu. It is a viral illness that affects the air passages of your lungs. It is different from the common cold. The flu can easily be passed from one to person to another. It may be spread through the air by coughing and sneezing. Or it can be spread by touching the sick person and then touching your own eyes, nose, or mouth.  The flu starts 1 to 3 days after you are exposed to the flu virus. It may last for 1 to 2 weeks " but many people feel tired or fatigued for many weeks afterward. You usually don t need to take antibiotics unless you have a complication. This might be an ear or sinus infection or pneumonia.  Symptoms of the flu may be mild or severe. They can include extreme tiredness (wanting to stay in bed all day), chills, fevers, muscle aches, soreness with eye movement, headache, and a dry, hacking cough.  Home care  Follow these guidelines when caring for yourself at home:    Avoid being around cigarette smoke, whether yours or other people s.    Acetaminophen or ibuprofen will help ease your fever, muscle aches, and headache. Don t give aspirin to anyone younger than 18 who has the flu. Aspirin can harm the liver.    Nausea and loss of appetite are common with the flu. Eat light meals. Drink 6 to 8 glasses of liquids every day. Good choices are water, sport drinks, soft drinks without caffeine, juices, tea, and soup. Extra fluids will also help loosen secretions in your nose and lungs.    Over-the-counter cold medicines will not make the flu go away faster. But the medicines may help with coughing, sore throat, and congestion in your nose and sinuses. Don t use a decongestant if you have high blood pressure.    Stay home until your fever has been gone for at least 24 hours without using medicine to reduce fever.  Follow-up care  Follow up with your healthcare provider, or as advised, if you are not getting better over the next week.  If you are age 65 or older, talk with your provider about getting a pneumococcal vaccine every 5 years. You should also get this vaccine if you have chronic asthma or COPD. All adults should get a flu vaccine every fall. Ask your provider about this.  When to seek medical advice  Call your healthcare provider right away if any of these occur:    Cough with lots of colored mucus (sputum) or blood in your mucus    Chest pain, shortness of breath, wheezing, or trouble breathing    Severe  headache, or face, neck, or ear pain    New rash with fever    Fever of 100.4 F (38 C) or higher, or as directed by your healthcare provider    Confusion, behavior change, or seizure    Severe weakness or dizziness    You get a new fever or cough after getting better for a few days  Date Last Reviewed: 1/1/2017 2000-2017 [employer]. [address] [place] [address]. All rights reserved. This information is not intended as a substitute for professional medical care. Always follow your healthcare professional's instructions.

## 2017-12-20 NOTE — PROGRESS NOTES
Positive flu swab was reviewed with patient in office; see my office visit note for details.   Kristin Levin MD

## 2017-12-21 LAB
BACTERIA SPEC CULT: NORMAL
SPECIMEN SOURCE: NORMAL

## 2017-12-29 ENCOUNTER — TELEPHONE (OUTPATIENT)
Dept: FAMILY MEDICINE | Facility: CLINIC | Age: 62
End: 2017-12-29

## 2017-12-29 NOTE — TELEPHONE ENCOUNTER
"Patient called reporting \"feeling crappy\" after finishing tamiflu. He has sinus HA wondered if he could another dose of tamiflu. Informed pt medication is only given within 3 days of onset of symptoms. He should try symptom Tx. If worsening symptoms breathing, cough or fever asked him to follow up with clinic or UC. Pt asked if labs showed he had protein deficiency. Informed him current labs are normal. He verbalized understanding and agreement with plan.  "

## 2018-02-07 DIAGNOSIS — J45.30 MILD PERSISTENT ASTHMA WITHOUT COMPLICATION: Chronic | ICD-10-CM

## 2018-02-08 NOTE — TELEPHONE ENCOUNTER
"Requested Prescriptions   Pending Prescriptions Disp Refills     fluticasone (FLOVENT DISKUS) 100 MCG/BLIST AEPB  Last Written Prescription Date:  12/20/17  Last Fill Quantity: 1 inhaler,  # refills: 1   Last Office Visit  12/20/2017        with  [facility], [facility] or [facility] prescribing provider:     Future Office Visit:        1 Inhaler 1     Sig: Inhale 1 puff into the lungs 2 times daily    Inhaled Steroids Protocol Failed    2/7/2018 10:41 AM       Failed - Asthma control test 20 or greater in last 6 months    Please review ACT score.          Passed - Patient is age 12 or older       Passed - Recent (6 mo) or future visit with authorizing provider's specialty    Patient had office visit in the last 6 months or has a visit in the next 30 days with authorizing provider.  See \"Patient Info\" tab in inbasket, or \"Choose Columns\" in Meds & Orders section of the refill encounter.              "

## 2018-02-08 NOTE — TELEPHONE ENCOUNTER
Medication is being filled for 1 time refill only due to:  Patient needs to be seen because Needs asthma follow up,call and schedule appointment..

## 2018-02-14 NOTE — TELEPHONE ENCOUNTER
APPT INFO    Date /Time: 3/7/18 at 7:30AM   Reason for Appt: Tinnitus   Ref Provider/Clinic: [facility]   Are there internal records? Yes/No?  IF YES, list clinic names: [facility]   Are there outside records? Yes/No? No   Patient Contact (Y/N) & Call Details: No   Action: Chart reviewed

## 2018-02-15 ENCOUNTER — OFFICE VISIT (OUTPATIENT)
Dept: FAMILY MEDICINE | Facility: CLINIC | Age: 63
End: 2018-02-15
Payer: COMMERCIAL

## 2018-02-15 ENCOUNTER — RADIANT APPOINTMENT (OUTPATIENT)
Dept: GENERAL RADIOLOGY | Facility: CLINIC | Age: 63
End: 2018-02-15
Attending: FAMILY MEDICINE
Payer: COMMERCIAL

## 2018-02-15 VITALS
WEIGHT: 266 LBS | OXYGEN SATURATION: 98 % | BODY MASS INDEX: 38.08 KG/M2 | SYSTOLIC BLOOD PRESSURE: 120 MMHG | RESPIRATION RATE: 18 BRPM | DIASTOLIC BLOOD PRESSURE: 80 MMHG | HEIGHT: 70 IN | HEART RATE: 89 BPM

## 2018-02-15 DIAGNOSIS — M54.2 NECK PAIN: ICD-10-CM

## 2018-02-15 DIAGNOSIS — M54.2 NECK PAIN: Primary | ICD-10-CM

## 2018-02-15 DIAGNOSIS — S90.851A ACUTE FOREIGN BODY OF RIGHT HEEL, INITIAL ENCOUNTER: ICD-10-CM

## 2018-02-15 PROCEDURE — 99214 OFFICE O/P EST MOD 30 MIN: CPT | Mod: 25 | Performed by: FAMILY MEDICINE

## 2018-02-15 PROCEDURE — 72040 X-RAY EXAM NECK SPINE 2-3 VW: CPT | Mod: FY

## 2018-02-15 NOTE — NURSING NOTE
"Chief Complaint   Patient presents with     Neck Pain       Initial /80  Pulse 89  Resp 18  Ht 5' 10\" (1.778 m)  Wt 266 lb (120.7 kg)  SpO2 98%  BMI 38.17 kg/m2 Estimated body mass index is 38.17 kg/(m^2) as calculated from the following:    Height as of this encounter: 5' 10\" (1.778 m).    Weight as of this encounter: 266 lb (120.7 kg).  Medication Reconciliation: nick Dickerson CMA      "

## 2018-02-15 NOTE — PROGRESS NOTES
SUBJECTIVE:   Arnaud Bird is a 62 year old male who presents to clinic today for the following health issues:      Neck Pain      Duration: since November    Description:  Location: neck pain  Radiation: none    Intensity:  moderate    Accompanying signs and symptoms: none    History (similar episodes/previous evaluation): None    Precipitating or alleviating factors: None    Therapies tried and outcome: pt has been to chiropractor without relief.  Pt would like to try PT    Pt also would like to have what he thinks is a sliver in the bottom of right foot looked at      Foreign body      Duration: Weeks    Description (location/character/radiation): Right heel    Intensity:  mild    Accompanying signs and symptoms: none    History (similar episodes/previous evaluation): None    Precipitating or alleviating factors: None    Therapies tried and outcome: None       Problem list and histories reviewed & adjusted, as indicated.  Additional history: as documented    Patient Active Problem List   Diagnosis     Stiffness of knee joint, right     Gait difficulty     S/P TKR (total knee replacement)     Bilateral carpal tunnel syndrome     Vitamin D deficiency     Plantar fascial fibromatosis     Asthma with exacerbation     Hypertrophy of prostate without urinary obstruction     Obesity     Hyperlipidemia     Spondylosis with myelopathy, lumbar region     LEFT WORSE THAN RIGHT      CARDIOVASCULAR SCREENING; LDL GOAL LESS THAN 100     BMI 40.0-44.9, adult (H)     Mild persistent asthma     Hyperlipidemia with target LDL less than 130     Lumbar disc disease with radiculopathy     Groin strain, initial encounter     Sexual dysfunction     ACP (advance care planning)     Slac (scapholunate advanced collapse) of wrist, right     Right wrist pain     Numbness and tingling in right hand     Chronic pain syndrome     Chronic gout involving toe 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 tophus, unspecified cause     Past Surgical History:  "  Procedure Laterality Date     HC TOOTH EXTRACTION W/FORCEP       ORTHOPEDIC SURGERY      bilateral total knee replacements     TONSILLECTOMY      age 2 or 3       Social History   Substance Use Topics     Smoking status: Never Smoker     Smokeless tobacco: Never Used     Alcohol use Yes      Comment: case beer a week     Family History   Problem Relation Age of Onset     CANCER Father      Family History Negative Father      Family History Negative Mother      DIABETES No family hx of      Coronary Artery Disease No family hx of      Hypertension No family hx of      Hyperlipidemia No family hx of      CEREBROVASCULAR DISEASE No family hx of      Breast Cancer No family hx of      Colon Cancer No family hx of      Prostate Cancer No family hx of      Other Cancer No family hx of      Depression No family hx of      Anxiety Disorder No family hx of      MENTAL ILLNESS No family hx of      Substance Abuse No family hx of      Anesthesia Reaction No family hx of      Asthma No family hx of      OSTEOPOROSIS No family hx of      Genetic Disorder No family hx of      Thyroid Disease No family hx of      Obesity No family hx of      Unknown/Adopted No family hx of            Reviewed and updated as needed this visit by clinical staff  Tobacco  Allergies  Meds  Med Hx  Surg Hx  Fam Hx  Soc Hx      Reviewed and updated as needed this visit by Provider         ROS:  Constitutional, HEENT, cardiovascular, pulmonary, gi and gu systems are negative, except as otherwise noted.    OBJECTIVE:                                                    /80  Pulse 89  Resp 18  Ht 5' 10\" (1.778 m)  Wt 266 lb (120.7 kg)  SpO2 98%  BMI 38.17 kg/m2  Body mass index is 38.17 kg/(m^2).  GENERAL APPEARANCE: healthy, alert and no distress  NECK: no adenopathy, no asymmetry, masses, or scars and no bruits  SKIN: Right heel did show a 1 mm black spot.         ASSESSMENT/PLAN:                                                        " ICD-10-CM    1. Neck pain M54.2 XR Cervical Spine 2/3 Views   2. Acute foreign body of right heel, initial encounter S90.851A        Patient Instructions   We did do an x-ray of the patient's neck and he does have a bone spur.  The remainder of his neck x-ray looked normal to me.    I pared the area that appeared to be a foreign body in his right heel and to we get down to normal tissue.  I did not actually see a foreign body be removed but the area was clean and dry.  Cautery was accomplished with silver nitrate.  Patient tolerated the procedure well.    We will treat symptomatically.  I did refill his Norco which he takes up to 4 times a day.  We talked about adding up to 5 extra strength Tylenol along with his Norco for his neck pain.  If not improving would consider getting an MRI of his neck.  He will let us know.      Bruce Tadeo MD  [facility]

## 2018-02-15 NOTE — PATIENT INSTRUCTIONS
We did do an x-ray of the patient's neck and he does have a bone spur.  The remainder of his neck x-ray looked normal to me.    I pared the area that appeared to be a foreign body in his right heel and to we get down to normal tissue.  I did not actually see a foreign body be removed but the area was clean and dry.  Cautery was accomplished with silver nitrate.  Patient tolerated the procedure well.    We will treat symptomatically.  I did refill his Norco which he takes up to 4 times a day.  We talked about adding up to 5 extra strength Tylenol along with his Norco for his neck pain.  If not improving would consider getting an MRI of his neck.  He will let us know.

## 2018-02-15 NOTE — MR AVS SNAPSHOT
After Visit Summary   2/15/2018    Arnaud Bird    MRN: [id number]           Patient Information     Date Of Birth          1955        Visit Information        Provider Department      2/15/2018 9:30 AM Bruce Tadeo MD [facility]        Today's Diagnoses     Neck pain    -  1    Acute foreign body of right heel, initial encounter          Care Instructions    We did do an x-ray of the patient's neck and he does have a bone spur.  The remainder of his neck x-ray looked normal to me.    I pared the area that appeared to be a foreign body in his right heel and to we get down to normal tissue.  I did not actually see a foreign body be removed but the area was clean and dry.  Cautery was accomplished with silver nitrate.  Patient tolerated the procedure well.    We will treat symptomatically.  I did refill his Norco which he takes up to 4 times a day.  We talked about adding up to 5 extra strength Tylenol along with his Norco for his neck pain.  If not improving would consider getting an MRI of his neck.  He will let us know.          Follow-ups after your visit        Your next 10 appointments already scheduled     Mar 07, 2018  7:00 AM CST   Walk In From ENT with Angélica Mishra   [facility] Audiology ([facility])    [address] [place] [address] [phone]            Mar 07, 2018  7:30 AM CST   (Arrive by 7:15 AM)   NEW NEUROTOLOGY VISIT with Radha Neumann MD   [facility] Ear Nose and Throat ([facility])    [address] [place] [address] [phone]              Who to contact     If you have questions or need follow up information about today's clinic visit or your schedule please contact [facility] directly at [phone].  Normal or non-critical lab and imaging results will be communicated to  "you by MyChart, letter or phone within 4 business days after the clinic has received the results. If you do not hear from us within 7 days, please contact the clinic through [employer] or phone. If you have a critical or abnormal lab result, we will notify you by phone as soon as possible.  Submit refill requests through [employer] or call your pharmacy and they will forward the refill request to us. Please allow 3 business days for your refill to be completed.          Additional Information About Your Visit        [employer] Information     [employer] lets you send messages to your doctor, view your test results, renew your prescriptions, schedule appointments and more. To sign up, go to [email or web address] . Click on \"Log in\" on the left side of the screen, which will take you to the Welcome page. Then click on \"Sign up Now\" on the right side of the page.     You will be asked to enter the access code listed below, as well as some personal information. Please follow the directions to create your username and password.     Your access code is: [email or web address]  Expires: 3/7/2018 10:23 AM     Your access code will  in 90 days. If you need help or a new code, please call your [place] clinic or [phone].        Care EveryWhere ID     This is your Care EveryWhere ID. This could be used by other organizations to access your [place] medical records  [id number]        Your Vitals Were     Pulse Respirations Height Pulse Oximetry BMI (Body Mass Index)       89 18 5' 10\" (1.778 m) 98% 38.17 kg/m2        Blood Pressure from Last 3 Encounters:   02/15/18 120/80   17 122/82   17 126/76    Weight from Last 3 Encounters:   02/15/18 266 lb (120.7 kg)   17 261 lb 8 oz (118.6 kg)   17 256 lb 8 oz (116.3 kg)                 Today's Medication Changes          These changes are accurate as of 2/15/18 11:07 AM.  If you have any questions, ask your nurse or doctor.               Stop taking these medicines if " you haven't already. Please contact your care team if you have questions.     oseltamivir 75 MG capsule   Commonly known as:  TAMIFLU   Stopped by:  Bruce Tadeo MD                    Primary Care Provider Office Phone # Fax #    Johnson Jesica Clements [phone] [address]  [facility] [address]        Equal Access to Services     [facility]: Hadii aad ku hadasho Soomaali, waaxda luqadaha, qaybta kaalmada adeegyada, waxay idiin hayaan adeeg kharash la'aan ah. So [facility] [phone].    ATENCIÓN: Si habla español, tiene a stewart disposición servicios gratuitos de asistencia lingüística. Rickyame al [phone].    We comply with applicable federal civil rights laws and [place] laws. We do not discriminate on the basis of race, color, national origin, age, disability, sex, sexual orientation, or gender identity.            Thank you!     Thank you for choosing [facility]  for your care. Our goal is always to provide you with excellent care. Hearing back from our patients is one way we can continue to improve our services. Please take a few minutes to complete the written survey that you may receive in the mail after your visit with us. Thank you!             Your Updated Medication List - Protect others around you: Learn how to safely use, store and throw away your medicines at www.disposemymeds.org.          This list is accurate as of 2/15/18 11:07 AM.  Always use your most recent med list.                   Brand Name Dispense Instructions for use Diagnosis    acyclovir 400 MG tablet    ZOVIRAX    28 tablet    TAKE TWO TABLETS BY MOUTH TWICE DAILY    HSV (herpes simplex virus) infection       albuterol 108 (90 BASE) MCG/ACT Inhaler    PROAIR HFA/PROVENTIL HFA/VENTOLIN HFA    1 Inhaler    Inhale 2 puffs into the lungs every 6 hours as needed for shortness of breath / dyspnea    Mild persistent asthma, uncomplicated       allopurinol 100 MG  tablet    ZYLOPRIM    90 tablet    TAKE 1 TABLET BY MOUTH EVERY DAY    Chronic gout involving toe 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 tophus, unspecified cause       clindamycin 1 % solution    CLEOCIN T    60 mL    Apply topically 2 times daily Profile Rx: patient will contact pharmacy when needed    Folliculitis       diclofenac 1 % Gel topical gel    VOLTAREN    100 g    APPLY 2GRAMS TOPICALLY 4 TIMES A DAY AS NEEDED FOR INVOLVED AREA    Stiffness of knee joint, right, Carpal tunnel syndrome, unspecified laterality, Primary localized osteoarthrosis, lower leg, unspecified laterality, Lumbar disc disease with radiculopathy       fluticasone 100 MCG/BLIST Aepb    FLOVENT DISKUS    1 Inhaler    Inhale 1 puff into the lungs 2 times daily    Mild persistent asthma without complication       fluticasone 50 MCG/ACT spray    FLONASE    16 g    Spray 2 sprays into both nostrils daily    Seasonal allergic rhinitis due to pollen       HYDROcodone-acetaminophen 7.5-325 MG per tablet   Start taking on:  2/20/2018    NORCO    120 tablet    Take 1 tablet by mouth every 4 hours as needed for pain (4 x daily as needed  maxium 4 per day)    Chronic pain syndrome       phentermine 37.5 MG tablet    ADIPEX-P    30 tablet    TAKE ONE TABLET BY MOUTH EVERY DAY IN THE MORNING.    BMI 40.0-44.9, adult (H)       sildenafil 100 MG tablet    VIAGRA    100 tablet    Take 0.5-1 tablets ( mg) by mouth daily as needed for erectile dysfunction Take 30 min to 4 hours before intercourse.  Never use with nitroglycerin, terazosin or doxazosin.    Sexual dysfunction       simvastatin 40 MG tablet    ZOCOR    30 tablet    Take 1 tablet (40 mg) by mouth At Bedtime    Hyperlipidemia with target LDL less than 130

## 2018-02-16 ASSESSMENT — ASTHMA QUESTIONNAIRES: ACT_TOTALSCORE: 25

## 2018-02-24 DIAGNOSIS — M17.10 PRIMARY LOCALIZED OSTEOARTHROSIS, LOWER LEG, UNSPECIFIED LATERALITY: ICD-10-CM

## 2018-02-24 DIAGNOSIS — G56.00 CARPAL TUNNEL SYNDROME, UNSPECIFIED LATERALITY: ICD-10-CM

## 2018-02-24 DIAGNOSIS — M51.16 LUMBAR DISC DISEASE WITH RADICULOPATHY: ICD-10-CM

## 2018-02-24 DIAGNOSIS — M25.661 STIFFNESS OF KNEE JOINT, RIGHT: Chronic | ICD-10-CM

## 2018-02-24 NOTE — TELEPHONE ENCOUNTER
diclofenac (VOLTAREN) 1 % GEL topical gel  Last Written Prescription Date:  09/18/2017  Last Fill Quantity: 100g,   # refills: 1  Last Office Visit: 02/15/2018 ladarius Tadeo  Future Office visit:       Routing refill request to provider for review/approval because:  Drug not on the [facility], [facility] or [facility] refill protocol or controlled substance

## 2018-03-02 DIAGNOSIS — H93.19 TINNITUS: Primary | ICD-10-CM

## 2018-03-07 ENCOUNTER — OFFICE VISIT (OUTPATIENT)
Dept: AUDIOLOGY | Facility: CLINIC | Age: 63
End: 2018-03-07
Attending: FAMILY MEDICINE
Payer: COMMERCIAL

## 2018-03-07 ENCOUNTER — PRE VISIT (OUTPATIENT)
Dept: OTOLARYNGOLOGY | Facility: CLINIC | Age: 63
End: 2018-03-07

## 2018-03-07 ENCOUNTER — OFFICE VISIT (OUTPATIENT)
Dept: OTOLARYNGOLOGY | Facility: CLINIC | Age: 63
End: 2018-03-07
Payer: COMMERCIAL

## 2018-03-07 VITALS — BODY MASS INDEX: 40.62 KG/M2 | WEIGHT: 268 LBS | HEIGHT: 68 IN

## 2018-03-07 DIAGNOSIS — H93.13 TINNITUS OF BOTH EARS: Primary | ICD-10-CM

## 2018-03-07 DIAGNOSIS — H93.13 TINNITUS OF BOTH EARS: ICD-10-CM

## 2018-03-07 DIAGNOSIS — H90.A32 MIXED CONDUCTIVE AND SENSORINEURAL HEARING LOSS OF LEFT EAR WITH RESTRICTED HEARING OF RIGHT EAR: ICD-10-CM

## 2018-03-07 DIAGNOSIS — H90.3 SENSORINEURAL HEARING LOSS (SNHL) OF BOTH EARS: Primary | ICD-10-CM

## 2018-03-07 ASSESSMENT — PAIN SCALES - GENERAL: PAINLEVEL: NO PAIN (0)

## 2018-03-07 NOTE — MR AVS SNAPSHOT
After Visit Summary   3/7/2018    Arnaud Bird    MRN: [id number]           Patient Information     Date Of Birth          1955        Visit Information        Provider Department      3/7/2018 7:00 AM Anitra Simpson AuD M [facility] Audiology        Today's Diagnoses     Sensorineural hearing loss (SNHL) of both ears    -  1    Tinnitus of both ears           Follow-ups after your visit        Who to contact     Please call your clinic at [phone] to:    Ask questions about your health    Make or cancel appointments    Discuss your medicines    Learn about your test results    Speak to your doctor            Additional Information About Your Visit        MyChart Information     [employer] is an electronic gateway that provides easy, online access to your medical records. With [employer], you can request a clinic appointment, read your test results, renew a prescription or communicate with your care team.     To sign up for [employer] visit the website at [email or web address]   You will be asked to enter the access code listed below, as well as some personal information. Please follow the directions to create your username and password.     Your access code is: [email or web address]  Expires: 2018  2:27 PM     Your access code will  in 90 days. If you need help or a new code, please contact your [facility] or call [phone] for assistance.        Care EveryWhere ID     This is your Care EveryWhere ID. This could be used by other organizations to access your [place] medical records  [id number]         Blood Pressure from Last 3 Encounters:   02/15/18 120/80   17 122/82   17 126/76    Weight from Last 3 Encounters:   18 121.6 kg (268 lb)   02/15/18 120.7 kg (266 lb)   17 118.6 kg (261 lb 8 oz)              We Performed the Following     AUDIOGRAM/TYMPANOGRAM - INTERFACE     AUDIOLOGY ADULT REFERRAL     [facility]  Eval & Speech Recog ([id number])     Tymps / Reflex   ([id number])        Primary Care Provider Office Phone # Fax #    Johnson Jesica Clements [phone] [address]  [facility] [address]        Equal Access to Services     ARV[facility] LY : Hadii aad ku hadasho Soomaali, waaxda luqadaha, qaybta kaalmada adeegyada, waxay idiin hayaan adeeg kharash la'aan ah. So [facility] [phone].    ATENCIÓN: Si habla español, tiene a stewart disposición servicios gratuitos de asistencia lingüística. [facility] [phone].    We comply with applicable federal civil rights laws and [place] laws. We do not discriminate on the basis of race, color, national origin, age, disability, sex, sexual orientation, or gender identity.            Thank you!     Thank you for choosing [facility] AUDIOLOGY  for your care. Our goal is always to provide you with excellent care. Hearing back from our patients is one way we can continue to improve our services. Please take a few minutes to complete the written survey that you may receive in the mail after your visit with us. Thank you!             Your Updated Medication List - Protect others around you: Learn how to safely use, store and throw away your medicines at www.disposemymeds.org.          This list is accurate as of 3/7/18  2:27 PM.  Always use your most recent med list.                   Brand Name Dispense Instructions for use Diagnosis    acyclovir 400 MG tablet    ZOVIRAX    28 tablet    TAKE TWO TABLETS BY MOUTH TWICE DAILY    HSV (herpes simplex virus) infection       albuterol 108 (90 BASE) MCG/ACT Inhaler    PROAIR HFA/PROVENTIL HFA/VENTOLIN HFA    1 Inhaler    Inhale 2 puffs into the lungs every 6 hours as needed for shortness of breath / dyspnea    Mild persistent asthma, uncomplicated       allopurinol 100 MG tablet    ZYLOPRIM    90 tablet    TAKE 1 TABLET BY MOUTH EVERY DAY    Chronic gout involving toe 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 tophus, unspecified cause        clindamycin 1 % solution    CLEOCIN T    60 mL    Apply topically 2 times daily Profile Rx: patient will contact pharmacy when needed    Folliculitis       diclofenac 1 % Gel topical gel    VOLTAREN    100 g    APPLY 2 GRAMS TOPCAILLY TO AFFECTED AREA FOUR TIMES DAILY AS NEEDED    Stiffness of knee joint, right, Carpal tunnel syndrome, unspecified laterality, Primary localized osteoarthrosis, lower leg, unspecified laterality, Lumbar disc disease with radiculopathy       fluticasone 100 MCG/BLIST Aepb    FLOVENT DISKUS    1 Inhaler    Inhale 1 puff into the lungs 2 times daily    Mild persistent asthma without complication       fluticasone 50 MCG/ACT spray    FLONASE    16 g    Spray 2 sprays into both nostrils daily    Seasonal allergic rhinitis due to pollen       HYDROcodone-acetaminophen 7.5-325 MG per tablet    NORCO    120 tablet    Take 1 tablet by mouth every 4 hours as needed for pain (4 x daily as needed  maxium 4 per day)    Chronic pain syndrome       phentermine 37.5 MG tablet    ADIPEX-P    30 tablet    TAKE ONE TABLET BY MOUTH EVERY DAY IN THE MORNING.    BMI 40.0-44.9, adult (H)       sildenafil 100 MG tablet    VIAGRA    100 tablet    Take 0.5-1 tablets ( mg) by mouth daily as needed for erectile dysfunction Take 30 min to 4 hours before intercourse.  Never use with nitroglycerin, terazosin or doxazosin.    Sexual dysfunction       simvastatin 40 MG tablet    ZOCOR    30 tablet    Take 1 tablet (40 mg) by mouth At Bedtime    Hyperlipidemia with target LDL less than 130

## 2018-03-07 NOTE — NURSING NOTE
Chief Complaint   Patient presents with     Consult     New Neurotology - Tinnitus, bilateral. No pain today.        KELLY Coelho LPN

## 2018-03-07 NOTE — LETTER
3/7/2018       RE: Arnaud Bird  [address]  [facility] [address]     Dear Colleague,    Thank you for referring your patient, Arnaud Bird, to the [facility] EAR NOSE AND THROAT at [facility]. Please see a copy of my visit note below.    Arnaud Bird is seen in consultation from Dr. Clements.  He is a 62 year old male being seen for tinnitus.  He reports bilateral tinnitus although both ears are not always the same tone or loudness.  He has had it for many years but it has been more bothersome in the last year.  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  No noise exposure other than .  He doesn't notice much hearing loss other than he has to ask his partner to turn up the TV when she's watching and he wants to watch.  No otalgia or otorrhea, no vertigo.  The tinnitus bothers him the most when he wakes up in the middle of the night and makes it difficult to go back to sleep.  However, he has a tone generator on his phone which he turns to match the tinnitus and then gradually turns down to a low hum which improves the tinnitus and then he can go back to sleep.    Past Medical History:   Diagnosis Date     Arthritis      Carpal tunnel syndrome     mild     LEFT WORSE THAN RIGHT  10/18/2012     Unspecified asthma(493.90) 10/17/2012     Unspecified asthma, with exacerbation 10/17/2012       Past Surgical History:   Procedure Laterality Date     HC TOOTH EXTRACTION W/FORCEP       ORTHOPEDIC SURGERY      bilateral total knee replacements     TONSILLECTOMY      age 2 or 3       Family History   Problem Relation Age of Onset     CANCER Father      Family History Negative Father      Family History Negative Mother      DIABETES No family hx of      Coronary Artery Disease No family hx of      Hypertension No family hx of      Hyperlipidemia No family hx of      CEREBROVASCULAR DISEASE No family hx of      Breast Cancer No  family hx of      Colon Cancer No family hx of      Prostate Cancer No family hx of      Other Cancer No family hx of      Depression No family hx of      Anxiety Disorder No family hx of      MENTAL ILLNESS No family hx of      Substance Abuse No family hx of      Anesthesia Reaction No family hx of      Asthma No family hx of      OSTEOPOROSIS No family hx of      Genetic Disorder No family hx of      Thyroid Disease No family hx of      Obesity No family hx of      Unknown/Adopted No family hx of        Social History   Substance Use Topics     Smoking status: Never Smoker     Smokeless tobacco: Never Used     Alcohol use Yes      Comment: case beer a week       Patient Supplied Answers to Review of Systems   ENT ROS 3/7/2018   Ears, Nose, Throat Ringing/noise in ears   The remainder of the 10 point review of systems is otherwise negative.    Physical examination:  Constitutional:  In no acute distress, appears stated age  Eyes:  Extraocular movements intact, no spontaneous nystagmus  Ears:  Both ears examined under the microscope.  Ear canals clear, TMs intact with aerated middle ears.  Respiratory:  No increased work of breathing, wheezing or stridor  Musculoskeletal:  Good upper extremity strength  Skin:  No rashes on the head and neck  Neurologic:  House Brackman 1/6 bilaterally, ambulating normally  Psychiatric:  Alert, normal affect, answering questions appropriately    Audiogram:  Right normal downsloping to moderate upsloping to mild sensorineural hearing loss with a notch at 3Khz, left normal/mild downsloping to moderate/severe upsloping to moderate mixed hearing loss with the same sensorineural thresholds as the right, 100% speech discrimination bilaterally, normal tympanogram right and negative pressure left, present reflexes except the left contralateral.    Assessment and plan:  Bilateral tinnitus with bilateral high frequency sensorineural hearing loss with a noise notch and a slight conductive  component on the left but nothing concerning on exam.  We had a discussion regarding the central etiology of tinnitus and he already knew this.  He has already discovered how to mask the sound himself and is doing a type of tinnitus retraining.  There are no medications or surgical procedures that can stop the tinnitus and all treatments are aimed at decreasing the bothersome nature of the tinnitus.  We discussed cognitive behavioral therapy but he's not interested.  He will continue to use his home therapies.    Again, thank you for allowing me to participate in the care of your patient.      Sincerely,    Radha Neumann MD

## 2018-03-07 NOTE — PATIENT INSTRUCTIONS
Please call our clinic for any questions,concerns,or worsening symptoms.      Clinic #[phone]       Option 3  for the triage nurse.

## 2018-03-07 NOTE — MR AVS SNAPSHOT
After Visit Summary   3/7/2018    Arnaud Bird    MRN: [id number]           Patient Information     Date Of Birth          1955        Visit Information        Provider Department      3/7/2018 7:30 AM Radha Neumann MD [facility] Ear Nose and Throat        Today's Diagnoses     Tinnitus of both ears    -  1    Mixed conductive and sensorineural hearing loss of left ear with restricted hearing of right ear          Care Instructions       Please call our clinic for any questions,concerns,or worsening symptoms.      Clinic #[phone]       Option 3  for the triage nurse.          Follow-ups after your visit        Follow-up notes from your care team     Return if symptoms worsen or fail to improve.      Who to contact     Please call your clinic at [phone] to:    Ask questions about your health    Make or cancel appointments    Discuss your medicines    Learn about your test results    Speak to your doctor            Additional Information About Your Visit        MyChart Information     [employer] is an electronic gateway that provides easy, online access to your medical records. With [employer]n, you can request a clinic appointment, read your test results, renew a prescription or communicate with your care team.     To sign up for [employer] visit the website at [email or web address]   You will be asked to enter the access code listed below, as well as some personal information. Please follow the directions to create your username and password.     Your access code is: [email or web address]  Expires: 2018  2:27 PM     Your access code will  in 90 days. If you need help or a new code, please contact your [facility] or call [phone] for assistance.        Care EveryWhere ID     This is your Care EveryWhere ID. This could be used by other organizations to access your [facility] medical records  [id number]        Your Vitals Were     Height BMI (Body Mass  "Index)                1.727 m (5' 8\") 40.75 kg/m2           Blood Pressure from Last 3 Encounters:   02/15/18 120/80   12/20/17 122/82   12/07/17 126/76    Weight from Last 3 Encounters:   03/07/18 121.6 kg (268 lb)   02/15/18 120.7 kg (266 lb)   12/20/17 118.6 kg (261 lb 8 oz)              We Performed the Following     BINOCULAR MICROSCOPY        Primary Care Provider Office Phone # Fax #    Johnson Jesica Clements [phone] [address] [facility] [address]        Equal Access to Services     [facility] : Hadkayleen Paula, wanetta heath, qaybta kaalmada adela, gee sparks . So [facility] [phone].    ATENCIÓN: Si habla español, tiene a stewart disposición servicios gratuitos de asistencia lingüística. Llame al [phone].    We comply with applicable federal civil rights laws and [place] laws. We do not discriminate on the basis of race, color, national origin, age, disability, sex, sexual orientation, or gender identity.            Thank you!     Thank you for choosing [facility] EAR NOSE AND THROAT  for your care. Our goal is always to provide you with excellent care. Hearing back from our patients is one way we can continue to improve our services. Please take a few minutes to complete the written survey that you may receive in the mail after your visit with us. Thank you!             Your Updated Medication List - Protect others around you: Learn how to safely use, store and throw away your medicines at www.disposemymeds.org.          This list is accurate as of 3/7/18 11:59 PM.  Always use your most recent med list.                   Brand Name Dispense Instructions for use Diagnosis    acyclovir 400 MG tablet    ZOVIRAX    28 tablet    TAKE TWO TABLETS BY MOUTH TWICE DAILY    HSV (herpes simplex virus) infection       albuterol 108 (90 BASE) MCG/ACT Inhaler    PROAIR HFA/PROVENTIL HFA/VENTOLIN HFA    1 Inhaler    Inhale 2 puffs into " the lungs every 6 hours as needed for shortness of breath / dyspnea    Mild persistent asthma, uncomplicated       allopurinol 100 MG tablet    ZYLOPRIM    90 tablet    TAKE 1 TABLET BY MOUTH EVERY DAY    Chronic gout involving toe 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 tophus, unspecified cause       clindamycin 1 % solution    CLEOCIN T    60 mL    Apply topically 2 times daily Profile Rx: patient will contact pharmacy when needed    Folliculitis       diclofenac 1 % Gel topical gel    VOLTAREN    100 g    APPLY 2 GRAMS TOPCAILLY TO AFFECTED AREA FOUR TIMES DAILY AS NEEDED    Stiffness of knee joint, right, Carpal tunnel syndrome, unspecified laterality, Primary localized osteoarthrosis, lower leg, unspecified laterality, Lumbar disc disease with radiculopathy       fluticasone 100 MCG/BLIST Aepb    FLOVENT DISKUS    1 Inhaler    Inhale 1 puff into the lungs 2 times daily    Mild persistent asthma without complication       fluticasone 50 MCG/ACT spray    FLONASE    16 g    Spray 2 sprays into both nostrils daily    Seasonal allergic rhinitis due to pollen       HYDROcodone-acetaminophen 7.5-325 MG per tablet    NORCO    120 tablet    Take 1 tablet by mouth every 4 hours as needed for pain (4 x daily as needed  maxium 4 per day)    Chronic pain syndrome       phentermine 37.5 MG tablet    ADIPEX-P    30 tablet    TAKE ONE TABLET BY MOUTH EVERY DAY IN THE MORNING.    BMI 40.0-44.9, adult (H)       sildenafil 100 MG tablet    VIAGRA    100 tablet    Take 0.5-1 tablets ( mg) by mouth daily as needed for erectile dysfunction Take 30 min to 4 hours before intercourse.  Never use with nitroglycerin, terazosin or doxazosin.    Sexual dysfunction       simvastatin 40 MG tablet    ZOCOR    30 tablet    Take 1 tablet (40 mg) by mouth At Bedtime    Hyperlipidemia with target LDL less than 130

## 2018-03-07 NOTE — PROGRESS NOTES
AUDIOLOGY REPORT    SUMMARY: Audiology visit completed. See audiogram for results.      RECOMMENDATIONS: Follow-up with ENT.    Daniel Neves.  Licensed Audiologist  [place] #[id number]

## 2018-03-07 NOTE — PROGRESS NOTES
Arnaud Bird is seen in consultation from Dr. Clements.  He is a 62 year old male being seen for tinnitus.  He reports bilateral tinnitus although both ears are not always the same tone or loudness.  He has had it for many years but it has been more bothersome in the last year.  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  No noise exposure other than .  He doesn't notice much hearing loss other than he has to ask his partner to turn up the TV when she's watching and he wants to watch.  No otalgia or otorrhea, no vertigo.  The tinnitus bothers him the most when he wakes up in the middle of the night and makes it difficult to go back to sleep.  However, he has a tone generator on his phone which he turns to match the tinnitus and then gradually turns down to a low hum which improves the tinnitus and then he can go back to sleep.    Past Medical History:   Diagnosis Date     Arthritis      Carpal tunnel syndrome     mild     LEFT WORSE THAN RIGHT  10/18/2012     Unspecified asthma(493.90) 10/17/2012     Unspecified asthma, with exacerbation 10/17/2012       Past Surgical History:   Procedure Laterality Date     HC TOOTH EXTRACTION W/FORCEP       ORTHOPEDIC SURGERY      bilateral total knee replacements     TONSILLECTOMY      age 2 or 3       Family History   Problem Relation Age of Onset     CANCER Father      Family History Negative Father      Family History Negative Mother      DIABETES No family hx of      Coronary Artery Disease No family hx of      Hypertension No family hx of      Hyperlipidemia No family hx of      CEREBROVASCULAR DISEASE No family hx of      Breast Cancer No family hx of      Colon Cancer No family hx of      Prostate Cancer No family hx of      Other Cancer No family hx of      Depression No family hx of      Anxiety Disorder No family hx of      MENTAL ILLNESS No family hx of      Substance Abuse No family hx of      Anesthesia Reaction No family  hx of      Asthma No family hx of      OSTEOPOROSIS No family hx of      Genetic Disorder No family hx of      Thyroid Disease No family hx of      Obesity No family hx of      Unknown/Adopted No family hx of        Social History   Substance Use Topics     Smoking status: Never Smoker     Smokeless tobacco: Never Used     Alcohol use Yes      Comment: case beer a week       Patient Supplied Answers to Review of Systems  [facility] ROS 3/7/2018   Ears, Nose, Throat Ringing/noise in ears   The remainder of the 10 point review of systems is otherwise negative.    Physical examination:  Constitutional:  In no acute distress, appears stated age  Eyes:  Extraocular movements intact, no spontaneous nystagmus  Ears:  Both ears examined under the microscope.  Ear canals clear, TMs intact with aerated middle ears.  Respiratory:  No increased work of breathing, wheezing or stridor  Musculoskeletal:  Good upper extremity strength  Skin:  No rashes on the head and neck  Neurologic:  House Brackman 1/6 bilaterally, ambulating normally  Psychiatric:  Alert, normal affect, answering questions appropriately    Audiogram:  Right normal downsloping to moderate upsloping to mild sensorineural hearing loss with a notch at 3Khz, left normal/mild downsloping to moderate/severe upsloping to moderate mixed hearing loss with the same sensorineural thresholds as the right, 100% speech discrimination bilaterally, normal tympanogram right and negative pressure left, present reflexes except the left contralateral.    Assessment and plan:  Bilateral tinnitus with bilateral high frequency sensorineural hearing loss with a noise notch and a slight conductive component on the left but nothing concerning on exam.  We had a discussion regarding the central etiology of tinnitus and he already knew this.  He has already discovered how to mask the sound himself and is doing a type of tinnitus retraining.  There are no medications or surgical procedures that  can stop the tinnitus and all treatments are aimed at decreasing the bothersome nature of the tinnitus.  We discussed cognitive behavioral therapy but he's not interested.  He will continue to use his home therapies.

## 2018-03-19 DIAGNOSIS — G89.4 CHRONIC PAIN SYNDROME: Chronic | ICD-10-CM

## 2018-03-19 RX ORDER — HYDROCODONE BITARTRATE AND ACETAMINOPHEN 7.5; 325 MG/1; MG/1
1 TABLET ORAL EVERY 4 HOURS PRN
Qty: 120 TABLET | Refills: 0 | OUTPATIENT
Start: 2018-03-19

## 2018-03-19 NOTE — TELEPHONE ENCOUNTER
Patient is requesting copy of cervical x-ray from 02/15/2018. He agreed to sign consent form when he picks up Rx refill. Copy of X-ray was left at the .

## 2018-03-19 NOTE — TELEPHONE ENCOUNTER
Controlled Substance Refill Request for HYDROcodone-acetaminophen (NORCO) 7.5-325 MG per tablet  Problem List Complete:  Yes  Patient is followed by SCOTTY TOLEDO MD for ongoing prescription of pain medication.  All refills should only be approved by this provider, or covering partner.    Medication(s):  NORCO  7.5MG PO FOUR TIMES DAILY .   Maximum quantity per month:  120   Clinic visit frequency required: Q 3 months     Controlled substance agreement:  Encounter-Level CSA - 10/31/2016:                 Controlled Substance Agreement - Scan on 11/15/2016  7:53 AM : CONTROLLED SUBSTANCE AGREEMENT (below)            Pain Clinic evaluation in the past: No    DIRE Total Score(s):    7/2/2016   Total Score 18       Last [facility] website verification:  done on 06//20/2017   [email or web address]     checked in past 6 months?  Yes 03/19/2018- no concerns     Last Written Prescription Date:  02/20/2018  Last Fill Quantity: 120,  # refills: 0  Last office visit: 2/15/2018   Future Office Visit:

## 2018-03-19 NOTE — TELEPHONE ENCOUNTER
Reason for Call:  Medication or medication refill:    Do you use a [facility]?  Name of the pharmacy and phone number for the current request:      Name of the medication requested:HYDROcodone-acetaminophen (NORCO) 7.5-325 MG per tablet     Other request: pu at     Can we leave a detailed message on this number? YES    Phone number patient can be reached at: Home number on file [phone] (home)    Best Time:     Call taken on 3/19/2018 at 2:36 PM by MARSHAL CHACON

## 2018-03-23 RX ORDER — HYDROCODONE BITARTRATE AND ACETAMINOPHEN 7.5; 325 MG/1; MG/1
1 TABLET ORAL EVERY 4 HOURS PRN
Qty: 120 TABLET | Refills: 0 | Status: CANCELLED | OUTPATIENT
Start: 2018-03-23

## 2018-03-23 NOTE — TELEPHONE ENCOUNTER
Per huddle with PCP, OV needed. Pt was informed and future appointment was scheduled. Ok to wait for refill until appointment.

## 2018-03-23 NOTE — TELEPHONE ENCOUNTER
Patient called requesting status of his Norco prescription. He would like to  today. Rx pended. Please advice.

## 2018-03-26 ENCOUNTER — OFFICE VISIT (OUTPATIENT)
Dept: FAMILY MEDICINE | Facility: CLINIC | Age: 63
End: 2018-03-26
Payer: COMMERCIAL

## 2018-03-26 VITALS
HEART RATE: 72 BPM | WEIGHT: 270 LBS | SYSTOLIC BLOOD PRESSURE: 126 MMHG | RESPIRATION RATE: 20 BRPM | BODY MASS INDEX: 41.05 KG/M2 | TEMPERATURE: 97.5 F | OXYGEN SATURATION: 97 % | DIASTOLIC BLOOD PRESSURE: 78 MMHG

## 2018-03-26 DIAGNOSIS — E55.9 VITAMIN D DEFICIENCY: ICD-10-CM

## 2018-03-26 DIAGNOSIS — E78.49 OTHER HYPERLIPIDEMIA: ICD-10-CM

## 2018-03-26 DIAGNOSIS — M50.30 DDD (DEGENERATIVE DISC DISEASE), CERVICAL: ICD-10-CM

## 2018-03-26 DIAGNOSIS — G56.03 BILATERAL CARPAL TUNNEL SYNDROME: Chronic | ICD-10-CM

## 2018-03-26 DIAGNOSIS — J45.30 MILD PERSISTENT ASTHMA WITHOUT COMPLICATION: Chronic | ICD-10-CM

## 2018-03-26 DIAGNOSIS — M75.41 IMPINGEMENT SYNDROME, SHOULDER, RIGHT: ICD-10-CM

## 2018-03-26 DIAGNOSIS — G43.009 MIGRAINE WITHOUT AURA AND WITHOUT STATUS MIGRAINOSUS, NOT INTRACTABLE: ICD-10-CM

## 2018-03-26 DIAGNOSIS — G89.4 CHRONIC PAIN SYNDROME: Chronic | ICD-10-CM

## 2018-03-26 DIAGNOSIS — E78.5 HYPERLIPIDEMIA WITH TARGET LDL LESS THAN 130: Primary | Chronic | ICD-10-CM

## 2018-03-26 DIAGNOSIS — M47.16 SPONDYLOSIS WITH MYELOPATHY, LUMBAR REGION: ICD-10-CM

## 2018-03-26 DIAGNOSIS — M1A.9XX0 CHRONIC GOUT INVOLVING TOE OF RIGHT FOOT WITHOUT TOPHUS, UNSPECIFIED CAUSE: ICD-10-CM

## 2018-03-26 LAB
CHOLEST SERPL-MCNC: 169 MG/DL
HDLC SERPL-MCNC: 58 MG/DL
LDLC SERPL CALC-MCNC: 88 MG/DL
NONHDLC SERPL-MCNC: 111 MG/DL
TRIGL SERPL-MCNC: 117 MG/DL

## 2018-03-26 PROCEDURE — 36415 COLL VENOUS BLD VENIPUNCTURE: CPT | Performed by: FAMILY MEDICINE

## 2018-03-26 PROCEDURE — 80061 LIPID PANEL: CPT | Performed by: FAMILY MEDICINE

## 2018-03-26 PROCEDURE — 99214 OFFICE O/P EST MOD 30 MIN: CPT | Performed by: FAMILY MEDICINE

## 2018-03-26 RX ORDER — HYDROCODONE BITARTRATE AND ACETAMINOPHEN 7.5; 325 MG/1; MG/1
1 TABLET ORAL EVERY 4 HOURS PRN
Qty: 120 TABLET | Refills: 0 | Status: SHIPPED | OUTPATIENT
Start: 2018-03-26 | End: 2018-03-26

## 2018-03-26 RX ORDER — TOPIRAMATE 25 MG/1
25 TABLET, FILM COATED ORAL 2 TIMES DAILY
Qty: 180 TABLET | Refills: 3 | Status: SHIPPED | OUTPATIENT
Start: 2018-03-26 | End: 2018-05-10 | Stop reason: SINTOL

## 2018-03-26 RX ORDER — HYDROCODONE BITARTRATE AND ACETAMINOPHEN 7.5; 325 MG/1; MG/1
1 TABLET ORAL EVERY 4 HOURS PRN
Qty: 120 TABLET | Refills: 0 | Status: SHIPPED | OUTPATIENT
Start: 2018-05-26 | End: 2018-06-22

## 2018-03-26 RX ORDER — ALLOPURINOL 300 MG/1
300 TABLET ORAL DAILY
Qty: 90 TABLET | Refills: 3 | Status: SHIPPED | OUTPATIENT
Start: 2018-03-26 | End: 2019-03-24

## 2018-03-26 RX ORDER — HYDROCODONE BITARTRATE AND ACETAMINOPHEN 7.5; 325 MG/1; MG/1
1 TABLET ORAL EVERY 4 HOURS PRN
Qty: 120 TABLET | Refills: 0 | Status: SHIPPED | OUTPATIENT
Start: 2018-04-26 | End: 2018-03-26

## 2018-03-26 NOTE — NURSING NOTE
"Chief Complaint   Patient presents with     Recheck Medication       Initial /78  Pulse 72  Temp 97.5  F (36.4  C) (Tympanic)  Resp 20  Wt 270 lb (122.5 kg)  SpO2 97%  BMI 41.05 kg/m2 Estimated body mass index is 41.05 kg/(m^2) as calculated from the following:    Height as of 3/7/18: 5' 8\" (1.727 m).    Weight as of this encounter: 270 lb (122.5 kg).  Medication Reconciliation: complete   Radha Tucker CMA    "

## 2018-03-26 NOTE — PATIENT INSTRUCTIONS
TREATMENT PROGNOSIS BENEFITS AND RISKS DISCUSSED     ANATOMIC SITE:  RIGHT POSTERIOR SHOULDER     PROCEDURE:  RIGHT POSTERIOR SHOULDER     1% LIDOCAINE ANESTHESI    BETADYNE CLEANSING WITHOUT COMPLICATION     1:1 MIXTURE KENALOG 1% LIDOCAINE WITHOUT COMPLICATION     AMOUNT OF KENALOMG     NDC NUMBER [id number]    INJECTION WITH NO TOUCH TECHNIQUE    SIDE EFFECTS BENEFITS AND RISKS DISCUSSED      TREATMENT PROGNOSIS BENEFITS AND RISKS DISCUSSED     MONITOR FOR ALLERGIC AND VASCULAR SIDE EFFECTS    MEDICATION RISKS SIDE EFFECTS BENEFITS AND RISKS DISCUSSED     SCOTTY TOLEDO JR., MD     18    (E78.5) Hyperlipidemia with target LDL less than 130  (primary encounter diagnosis)    Comment:      Plan: Lipid panel reflex to direct LDL Fasting                   (G89.4) Chronic pain syndrome    Comment:      Plan: HYDROcodone-acetaminophen (NORCO) 7.5-325 MG           per tablet, DISCONTINUED:           HYDROcodone-acetaminophen (NORCO) 7.5-325 MG           per tablet, DISCONTINUED:           HYDROcodone-acetaminophen (NORCO) 7.5-325 MG           per tablet                   (Z68.41) BMI 40.0-44.9, adult (H)    Comment:      Plan:          (J45.30) Mild persistent asthma without complication    Comment:      Plan:          (M47.16) Spondylosis with myelopathy, lumbar region    Comment:      Plan:          (G56.03) Bilateral carpal tunnel syndrome    Comment:      Plan:          (E55.9) Vitamin D deficiency    Comment:      Plan:          (E78.4) Other hyperlipidemia    Comment:      Plan:          (G43.009) Migraine without aura and without status migrainosus, not intractable    Comment:      Plan: topiramate (TOPAMAX) 25 MG tablet                RIGHT POSTERIOR SHOULDER PAIN     INCREASE ALLOPURINOL 300MG PER DAY     90 MINUTES OF EXERCISE PER DAY RECOMMENDED    RIGHT AND LEFT ROTATION INTO PAIN SITTING UP 30 TIMES TWICE DAILY  RIGHT AND LEFT LATERAL BENDING INTO PAIN 30 TIMES TWICE DAILY  NECK  EXTENSION 30 TIME TWICE DAILY  NECK FLEXION  30 TIMES TWICE DAILY  MASSAGE BACK OF NECK MULTIPLE TIMES THROUGHOUT DAY AS TOLERATED  PRONE POSITION NECK ROTATION RIGHT AND LEFT 30 TIMES TWICE DAILY  NECK EXTENSION 30 TIMES TWICE DAILY  ROTATE NECK IN [place] RIGHTWARD AND LEFT NAVA  AVOID ANY EXERCISE WHICH RESULTS IN SHOOTING NERVE PAIN DOWN THE ARM OR SHOULDER BLADE    Merrill' Flexion Versus Allen   Extension Exercises For   Low Back Pain   Examples of Merrill' Flexion Exercises  1. Pelvic tilt.  Please press the small of your back against the floor.  Start with 5-10  and increase to 100 count over one month   2. Single Knee to chest. Lie on your back with legs in bent position. Alternate one leg and the other very slowly bringing the knee to chest.  Start with a count of 5-10   Over one month work up to 100  3. Double knee to chest.  Lie on your back with knees in bent position.  Bring both knees to the chest slowly hold for a count of 5-to 10. Over one month work to 100  4. Partial sit-up or crunch.  Lie on your back in bent leg position.  Please bring your body with arms crossed in front  To 30 degrees of flexion. Start with 5-10 over one month work up to 100 or more  5. Sit back.  Please sit on the side of the bed or a stair landing  And lie backwards until the abdominal muscles start to quiver.  Hold for a count of 5-10 and over a month work up to 100.  5. Hamstring stretch.  Please extend your legs while sitting on the floor as tolerated for 5-10 count.  Gradually increase to count of 30 over one month      Alternately find a stair landing or sturdy chair and place heel  In a comfortable level of extension  And stretch one hamstring at a time for 5-10 seconds.  Increase to count of 30 over one month                         Squat.  Stand with legs comfortably apart and lower the body slowly by flexing the knees  for count of 5-10 over one month increase to 30.  Useful for anterior disc protrusion, facette  joint arthritis spond-10ylolysis, spondylolisthesis and spinal stenosis   ROTATION AND LATERAL BENDING AS TOLERATED RIGHT OR LEFT   PILLOWS UNDER KNEES AT BED TIME  STRETCHING FORWARDS AND DOWN WHEN SITTING ON CHAIR  MAY SIT IN RELAXED MANNER   WHEN IN PREVENTION PHASE START WITH WITH SENA EXERCISES AND FOLLOW UP  WITH JOANN EXERCISES AS TOLERATED   SCOTTY SHEPARD'S EXERCISES  ,THAT IS PENDULUM RANGE OF MOTION 30 EACH TWICE DAILY  THUMB UP EXERCISES INTO PAIN 30 EACH TWICE DAILY  INTERNAL  AND EXTERNAL ROTATION  with AND WITHOUT RESISTANCE OR WEIGHT 30 EACH TWICE DAILY  SWORD SHEATH EXERCISE 30 EACH TWICE DAILY  WALL STRETCH EXERCISE 30 SECONDS EACH TWICE DAILY  POSTERIOR SHOULDER STRETCH AND HOLD 3 10 SECOND STRETCHES TWICE DAILY  ISOMETRIC EXERCISE 60 DEGREES ABDUCTION, ADDUCTION, 90 DEGREE THUMB UP POSITION  AVOID PAINFUL ARC  ICE TWICE DAILY X 5 MINUTES PRIOR TO EXERCISE OR AS NEEDED FOR PAIN CONTROL

## 2018-03-26 NOTE — MR AVS SNAPSHOT
After Visit Summary   3/26/2018    Arnaud Bird    MRN: [id number]           Patient Information     Date Of Birth          1955        Visit Information        Provider Department      3/26/2018 7:45 AM Scotty Toledo MD [facility]        Today's Diagnoses     Hyperlipidemia with target LDL less than 130    -  1    Chronic pain syndrome        BMI 40.0-44.9, adult (H)        Mild persistent asthma without complication        Spondylosis with myelopathy, lumbar region        Bilateral carpal tunnel syndrome        Vitamin D deficiency        Other hyperlipidemia        Migraine without aura and without status migrainosus, not intractable        Impingement syndrome, shoulder, right        Chronic gout involving toe 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 tophus, unspecified cause        DDD (degenerative disc disease), cervical          Care Instructions      TREATMENT PROGNOSIS BENEFITS AND RISKS DISCUSSED     ANATOMIC SITE:  RIGHT POSTERIOR SHOULDER     PROCEDURE:  RIGHT POSTERIOR SHOULDER     1% LIDOCAINE ANESTHESI    BETADYNE CLEANSING WITHOUT COMPLICATION     1:1 MIXTURE KENALOG 1% LIDOCAINE WITHOUT COMPLICATION     AMOUNT OF KENALOMG     NDC NUMBER [id number]    INJECTION WITH NO TOUCH TECHNIQUE    SIDE EFFECTS BENEFITS AND RISKS DISCUSSED      TREATMENT PROGNOSIS BENEFITS AND RISKS DISCUSSED     MONITOR FOR ALLERGIC AND VASCULAR SIDE EFFECTS    MEDICATION RISKS SIDE EFFECTS BENEFITS AND RISKS DISCUSSED     SCOTTY TOLEDO JR., MD     18    (E78.5) Hyperlipidemia with target LDL less than 130  (primary encounter diagnosis)    Comment:      Plan: Lipid panel reflex to direct LDL Fasting                   (G89.4) Chronic pain syndrome    Comment:      Plan: HYDROcodone-acetaminophen (NORCO) 7.5-325 MG           per tablet, DISCONTINUED:           HYDROcodone-acetaminophen (NORCO) 7.5-325 MG           per tablet, DISCONTINUED:            HYDROcodone-acetaminophen (NORCO) 7.5-325 MG           per tablet                   (Z68.41) BMI 40.0-44.9, adult (H)    Comment:      Plan:          (J45.30) Mild persistent asthma without complication    Comment:      Plan:          (M47.16) Spondylosis with myelopathy, lumbar region    Comment:      Plan:          (G56.03) Bilateral carpal tunnel syndrome    Comment:      Plan:          (E55.9) Vitamin D deficiency    Comment:      Plan:          (E78.4) Other hyperlipidemia    Comment:      Plan:          (G43.009) Migraine without aura and without status migrainosus, not intractable    Comment:      Plan: topiramate (TOPAMAX) 25 MG tablet                RIGHT POSTERIOR SHOULDER PAIN     INCREASE ALLOPURINOL 300MG PER DAY                      Follow-ups after your visit        Additional Services     [facility] PT, HAND, AND CHIROPRACTIC REFERRAL       **This order will print in the [facility] Scheduling Office**    Physical Therapy, Hand Therapy and Chiropractic Care are available through:    *[facility]  *[facility]  *[facility]    Call one number to schedule at any of the above locations: [phone].    Your provider has referred you to: Physical Therapy at [facility] or [facility]    Indication/Reason for Referral:  NECK AND RIGHT SHOULDER PAIN   RIGHT CHRONIC LOWER BACK PAIN   GOUT RIGHT FOOT   Onset of Illness:  MANY YEARS   NECK FROM November   Therapy Orders: Evaluate and Treat  Special Programs:  NECK AND RIGHT SHOULDER IMPINGEMENT  DISC RIGHT NECK   Special Request: None  CERVICAL TRACTION DEVICE AT HOME  DME ORDER GIVEN TO PATIENT     Tomeka Eckert      Additional Comments for the Therapist or Chiropractor:  PLEASE SET UP HOME NECK TRACTION DEVICE    Please be aware that coverage of these services is subject to the terms and limitations of your health insurance plan.  Call member services at your health plan with any benefit or coverage questions.      Please bring  "the following to your appointment:    *Your personal calendar for scheduling future appointments  *Comfortable clothing                  Follow-up notes from your care team     Return in about 3 months (around 2018).      Future tests that were ordered for you today     Open Future Orders        Priority Expected Expires Ordered    **Uric acid FUTURE 2mo Routine 2018 2018 3/26/2018            Who to contact     If you have questions or need follow up information about today's clinic visit or your schedule please contact [facility] directly at [phone].  Normal or non-critical lab and imaging results will be communicated to you by NetPlenishhart, letter or phone within 4 business days after the clinic has received the results. If you do not hear from us within 7 days, please contact the clinic through NetPlenishhart or phone. If you have a critical or abnormal lab result, we will notify you by phone as soon as possible.  Submit refill requests through [employer] or call your pharmacy and they will forward the refill request to us. Please allow 3 business days for your refill to be completed.          Additional Information About Your Visit        NetPlenishharInnovac[employer] Information     [employer] lets you send messages to your doctor, view your test results, renew your prescriptions, schedule appointments and more. To sign up, go to [email or web address] . Click on \"Log in\" on the left side of the screen, which will take you to the Welcome page. Then click on \"Sign up Now\" on the right side of the page.     You will be asked to enter the access code listed below, as well as some personal information. Please follow the directions to create your username and password.     Your access code is: [email or web address]  Expires: 2018  3:27 PM     Your access code will  in 90 days. If you need help or a new code, please call your [facility] or [phone].        Care EveryWhere ID     This is your " Care EveryWhere ID. This could be used by other organizations to access your [place] medical records  [id number]        Your Vitals Were     Pulse Temperature Respirations Pulse Oximetry BMI (Body Mass Index)       72 97.5  F (36.4  C) (Tympanic) 20 97% 41.05 kg/m2        Blood Pressure from Last 3 Encounters:   03/26/18 126/78   02/15/18 120/80   12/20/17 122/82    Weight from Last 3 Encounters:   03/26/18 270 lb (122.5 kg)   03/07/18 268 lb (121.6 kg)   02/15/18 266 lb (120.7 kg)              We Performed the Following     [facility] PT, HAND, AND CHIROPRACTIC REFERRAL     Lipid panel reflex to direct LDL Fasting          Today's Medication Changes          These changes are accurate as of 3/26/18  8:33 AM.  If you have any questions, ask your nurse or doctor.               Start taking these medicines.        Dose/Directions    HYDROcodone-acetaminophen 7.5-325 MG per tablet   Commonly known as:  NORCO   Used for:  Chronic pain syndrome   Started by:  Johnson Clements MD        Dose:  1 tablet   Start taking on:  5/26/2018   Take 1 tablet by mouth every 4 hours as needed for pain (4 x daily as needed  maxium 4 per day)   Quantity:  120 tablet   Refills:  0       order for DME   Used for:  Chronic pain syndrome   Started by:  Johnson Clements MD        Equipment being ordered:  CERVICAL TRACTION DEVICE ONE AS DIRECTED   Quantity:  1 each   Refills:  0       topiramate 25 MG tablet   Commonly known as:  TOPAMAX   Used for:  Migraine without aura and without status migrainosus, not intractable   Started by:  Johnson Clements MD        Dose:  25 mg   Take 1 tablet (25 mg) by mouth 2 times daily   Quantity:  180 tablet   Refills:  3         These medicines have changed or have updated prescriptions.        Dose/Directions    * allopurinol 100 MG tablet   Commonly known as:  ZYLOPRIM   This may have changed:  Another medication with the same name was added. Make sure you understand how and when to  take each.   Used for:  Chronic gout involving toe 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 tophus, unspecified cause   Changed by:  Johnson Clements MD        TAKE 1 TABLET BY MOUTH EVERY DAY   Quantity:  90 tablet   Refills:  3       * allopurinol 300 MG tablet   Commonly known as:  ZYLOPRIM   This may have changed:  You were already taking a medication with the same name, and this prescription was added. Make sure you understand how and when to take each.   Used for:  Chronic gout involving toe 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 tophus, unspecified cause   Changed by:  Johnson Clements MD        Dose:  300 mg   Take 1 tablet (300 mg) by mouth daily   Quantity:  90 tablet   Refills:  3       * Notice:  This list has 2 medication(s) that are the same as other medications prescribed for you. Read the directions carefully, and ask your doctor or other care provider to review them with you.         Where to get your medicines      These medications were sent to [facility] [id number] [place] [address] [address]     Phone:  [phone]     allopurinol 300 MG tablet    topiramate 25 MG tablet         Some of these will need a paper prescription and others can be bought over the counter.  Ask your nurse if you have questions.     Bring a paper prescription for each of these medications     HYDROcodone-acetaminophen 7.5-325 MG per tablet    order for DME                Primary Care Provider Office Phone # Fax #    Johnson Clements [phone] [address] [facility] [address]        Equal Access to Services     ARVIND MODI : Hadii lainey ray hadasho Soomaali, waaxda luqadaha, qaybta kaalmada adeegyada, gee sparks . So [facility] [phone].    ATENCIÓN: Si habla español, tiene a stewart disposición servicios gratuitos de asistencia lingüística. Llame al [phone].    We comply with applicable federal civil rights laws and [place]  laws. We do not discriminate on the basis of race, color, national origin, age, disability, sex, sexual orientation, or gender identity.            Thank you!     Thank you for choosing [facility]  for your care. Our goal is always to provide you with excellent care. Hearing back from our patients is one way we can continue to improve our services. Please take a few minutes to complete the written survey that you may receive in the mail after your visit with us. Thank you!             Your Updated Medication List - Protect others around you: Learn how to safely use, store and throw away your medicines at www.disposemymeds.org.          This list is accurate as of 3/26/18  8:33 AM.  Always use your most recent med list.                   Brand Name Dispense Instructions for use Diagnosis    acyclovir 400 MG tablet    ZOVIRAX    28 tablet    TAKE TWO TABLETS BY MOUTH TWICE DAILY    HSV (herpes simplex virus) infection       albuterol 108 (90 BASE) MCG/ACT Inhaler    PROAIR HFA/PROVENTIL HFA/VENTOLIN HFA    1 Inhaler    Inhale 2 puffs into the lungs every 6 hours as needed for shortness of breath / dyspnea    Mild persistent asthma, uncomplicated       * allopurinol 100 MG tablet    ZYLOPRIM    90 tablet    TAKE 1 TABLET BY MOUTH EVERY DAY    Chronic gout involving toe 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 tophus, unspecified cause       * allopurinol 300 MG tablet    ZYLOPRIM    90 tablet    Take 1 tablet (300 mg) by mouth daily    Chronic gout involving toe 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 tophus, unspecified cause       clindamycin 1 % solution    CLEOCIN T    60 mL    Apply topically 2 times daily Profile Rx: patient will contact pharmacy when needed    Folliculitis       diclofenac 1 % Gel topical gel    VOLTAREN    100 g    APPLY 2 GRAMS TOPCAILLY TO AFFECTED AREA FOUR TIMES DAILY AS NEEDED    Stiffness of knee joint, right, Carpal tunnel syndrome, unspecified laterality, Primary localized osteoarthrosis,  lower leg, unspecified laterality, Lumbar disc disease with radiculopathy       fluticasone 100 MCG/BLIST Aepb    FLOVENT DISKUS    1 Inhaler    Inhale 1 puff into the lungs 2 times daily    Mild persistent asthma without complication       fluticasone 50 MCG/ACT spray    FLONASE    16 g    Spray 2 sprays into both nostrils daily    Seasonal allergic rhinitis due to pollen       HYDROcodone-acetaminophen 7.5-325 MG per tablet   Start taking on:  5/26/2018    NORCO    120 tablet    Take 1 tablet by mouth every 4 hours as needed for pain (4 x daily as needed  maxium 4 per day)    Chronic pain syndrome       order for DME     1 each    Equipment being ordered:  CERVICAL TRACTION DEVICE ONE AS DIRECTED    Chronic pain syndrome       sildenafil 100 MG tablet    VIAGRA    100 tablet    Take 0.5-1 tablets ( mg) by mouth daily as needed for erectile dysfunction Take 30 min to 4 hours before intercourse.  Never use with nitroglycerin, terazosin or doxazosin.    Sexual dysfunction       simvastatin 40 MG tablet    ZOCOR    30 tablet    Take 1 tablet (40 mg) by mouth At Bedtime    Hyperlipidemia with target LDL less than 130       topiramate 25 MG tablet    TOPAMAX    180 tablet    Take 1 tablet (25 mg) by mouth 2 times daily    Migraine without aura and without status migrainosus, not intractable       * Notice:  This list has 2 medication(s) that are the same as other medications prescribed for you. Read the directions carefully, and ask your doctor or other care provider to review them with you.

## 2018-03-26 NOTE — PROGRESS NOTES
SUBJECTIVE:   Arnaud Bird is a 62 year old male who presents to clinic today for the following health issues:      Medication Followup of pain meds    Taking Medication as prescribed: yes    Side Effects:  None    Medication Helping Symptoms:  yes       Chronic Pain Follow-Up  Right neck and shoulder pain  Chronic lower back pain   OSTEOARTHRITIS KNEES   TOTAL KNEE ARTHROPLASTY BILATERAL 2009   ANKLES   GOUT RIGHT FOOT BIG TOE   CONCENTRATED MORAN JUICE        Type / Location of Pain:  SEE ABOVE   Analgesia/pain control:       Recent changes:  worse      Overall control: Tolerable with discomfort  Activity level/function:      Daily activities:  Able to do light housework, cooking    Work:  Able to work part time with limitations  Adverse effects:  No  Adherance    Taking medication as directed?  Yes    Participating in other treatments: yes  Risk Factors:    Sleep:  Good    Mood/anxiety:  controlled    Recent family or social stressors:  none noted    Other aggravating factors: none  PHQ-9 SCORE 10/31/2016   Total Score 2     SOFIYA-7 SCORE 10/31/2016   Total Score 2     Encounter-Level CSA - 10/31/2016:          Controlled Substance Agreement - Scan on 11/15/2016  7:53 AM : CONTROLLED SUBSTANCE AGREEMENT (below)                        Topic Date Due     LIPID MONITORING Q1 YEAR  10/31/2017               .  Current Outpatient Prescriptions   Medication Sig Dispense Refill     [START ON 5/26/2018] HYDROcodone-acetaminophen (NORCO) 7.5-325 MG per tablet Take 1 tablet by mouth every 4 hours as needed for pain (4 x daily as needed  maxium 4 per day) 120 tablet 0     topiramate (TOPAMAX) 25 MG tablet Take 1 tablet (25 mg) by mouth 2 times daily 180 tablet 3     allopurinol (ZYLOPRIM) 300 MG tablet Take 1 tablet (300 mg) by mouth daily 90 tablet 3     order for DME Equipment being ordered:  CERVICAL TRACTION DEVICE  ONE  AS DIRECTED 1 each 0     diclofenac (VOLTAREN) 1 % GEL topical gel APPLY 2 GRAMS TOPCAILLY TO  AFFECTED AREA FOUR TIMES DAILY AS NEEDED 100 g 1     allopurinol (ZYLOPRIM) 100 MG tablet TAKE 1 TABLET BY MOUTH EVERY DAY 90 tablet 3     sildenafil (VIAGRA) 100 MG cap/tab Take 0.5-1 tablets ( mg) by mouth daily as needed for erectile dysfunction Take 30 min to 4 hours before intercourse.  Never use with nitroglycerin, terazosin or doxazosin. 100 tablet 3     fluticasone (FLONASE) 50 MCG/ACT spray Spray 2 sprays into both nostrils daily 16 g 11     clindamycin (CLEOCIN T) 1 % external solution Apply topically 2 times daily Profile Rx: patient will contact pharmacy when needed 60 mL 11     acyclovir (ZOVIRAX) 400 MG tablet TAKE TWO TABLETS BY MOUTH TWICE DAILY 28 tablet 0     simvastatin (ZOCOR) 40 MG tablet Take 1 tablet (40 mg) by mouth At Bedtime 30 tablet 11     albuterol (PROAIR HFA, PROVENTIL HFA, VENTOLIN HFA) 108 (90 BASE) MCG/ACT inhaler Inhale 2 puffs into the lungs every 6 hours as needed for shortness of breath / dyspnea 1 Inhaler 11     fluticasone (FLOVENT DISKUS) 100 MCG/BLIST AEPB Inhale 1 puff into the lungs 2 times daily (Patient not taking: Reported on 3/26/2018) 1 Inhaler 0            Allergies   Allergen Reactions     Seasonal Allergies          Immunization History   Administered Date(s) Administered     Influenza (IIV3) PF 10/18/2012               reports that he drinks alcohol.        reports that he does not use illicit drugs.      family history includes CANCER in his father; Family History Negative in his father and mother. There is no history of DIABETES, Coronary Artery Disease, Hypertension, Hyperlipidemia, CEREBROVASCULAR DISEASE, Breast Cancer, Colon Cancer, Prostate Cancer, Other Cancer, Depression, Anxiety Disorder, MENTAL ILLNESS, Substance Abuse, Anesthesia Reaction, Asthma, OSTEOPOROSIS, Genetic Disorder, Thyroid Disease, Obesity, or Unknown/Adopted.      indicated that the status of his no family hx of is unknown. He indicated that his mother is . He indicated  that his father is . He indicated that his sister is alive. He indicated that both of his brothers are alive.        has a past surgical history that includes tonsillectomy; TOOTH EXTRACTION W/FORCEP; and orthopedic surgery.       reports that he currently engages in sexual activity and has had female partners.    .  Pediatric History   Patient Guardian Status     Not on file.     Other Topics Concern     Parent/Sibling W/ Cabg, Mi Or Angioplasty Before 65f 55m? No     Social History Narrative             reports that he has never smoked. He has never used smokeless tobacco.        Medical, social, surgical, and family histories reviewed.        Labs reviewed in EPIC  Patient Active Problem List   Diagnosis     Stiffness of knee joint, right     Gait difficulty     S/P TKR (total knee replacement)     Bilateral carpal tunnel syndrome     Vitamin D deficiency     Plantar fascial fibromatosis     Asthma with exacerbation     Hypertrophy of prostate without urinary obstruction     Obesity     Hyperlipidemia     Spondylosis with myelopathy, lumbar region     LEFT WORSE THAN RIGHT      CARDIOVASCULAR SCREENING; LDL GOAL LESS THAN 100     BMI 40.0-44.9, adult (H)     Mild persistent asthma     Hyperlipidemia with target LDL less than 130     Lumbar disc disease with radiculopathy     Groin strain, initial encounter     Sexual dysfunction     ACP (advance care planning)     Slac (scapholunate advanced collapse) of wrist, right     Right wrist pain     Numbness and tingling in right hand     Chronic pain syndrome     Chronic gout involving toe 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 tophus, unspecified cause     Impingement syndrome, shoulder, right       Past Surgical History:   Procedure Laterality Date     HC TOOTH EXTRACTION W/FORCEP       ORTHOPEDIC SURGERY      bilateral total knee replacements     TONSILLECTOMY      age 2 or 3         Social History   Substance Use Topics     Smoking status: Never Smoker     Smokeless tobacco:  Never Used     Alcohol use Yes      Comment: case beer a week       Family History   Problem Relation Age of Onset     CANCER Father      Family History Negative Father      Family History Negative Mother      DIABETES No family hx of      Coronary Artery Disease No family hx of      Hypertension No family hx of      Hyperlipidemia No family hx of      CEREBROVASCULAR DISEASE No family hx of      Breast Cancer No family hx of      Colon Cancer No family hx of      Prostate Cancer No family hx of      Other Cancer No family hx of      Depression No family hx of      Anxiety Disorder No family hx of      MENTAL ILLNESS No family hx of      Substance Abuse No family hx of      Anesthesia Reaction No family hx of      Asthma No family hx of      OSTEOPOROSIS No family hx of      Genetic Disorder No family hx of      Thyroid Disease No family hx of      Obesity No family hx of      Unknown/Adopted No family hx of              Current Outpatient Prescriptions   Medication Sig Dispense Refill     [START ON 5/26/2018] HYDROcodone-acetaminophen (NORCO) 7.5-325 MG per tablet Take 1 tablet by mouth every 4 hours as needed for pain (4 x daily as needed  maxium 4 per day) 120 tablet 0     topiramate (TOPAMAX) 25 MG tablet Take 1 tablet (25 mg) by mouth 2 times daily 180 tablet 3     allopurinol (ZYLOPRIM) 300 MG tablet Take 1 tablet (300 mg) by mouth daily 90 tablet 3     order for DME Equipment being ordered:  CERVICAL TRACTION DEVICE  ONE  AS DIRECTED 1 each 0     diclofenac (VOLTAREN) 1 % GEL topical gel APPLY 2 GRAMS TOPCAILLY TO AFFECTED AREA FOUR TIMES DAILY AS NEEDED 100 g 1     allopurinol (ZYLOPRIM) 100 MG tablet TAKE 1 TABLET BY MOUTH EVERY DAY 90 tablet 3     sildenafil (VIAGRA) 100 MG cap/tab Take 0.5-1 tablets ( mg) by mouth daily as needed for erectile dysfunction Take 30 min to 4 hours before intercourse.  Never use with nitroglycerin, terazosin or doxazosin. 100 tablet 3     fluticasone (FLONASE) 50 MCG/ACT  spray Spray 2 sprays into both nostrils daily 16 g 11     clindamycin (CLEOCIN T) 1 % external solution Apply topically 2 times daily Profile Rx: patient will contact pharmacy when needed 60 mL 11     acyclovir (ZOVIRAX) 400 MG tablet TAKE TWO TABLETS BY MOUTH TWICE DAILY 28 tablet 0     simvastatin (ZOCOR) 40 MG tablet Take 1 tablet (40 mg) by mouth At Bedtime 30 tablet 11     albuterol (PROAIR HFA, PROVENTIL HFA, VENTOLIN HFA) 108 (90 BASE) MCG/ACT inhaler Inhale 2 puffs into the lungs every 6 hours as needed for shortness of breath / dyspnea 1 Inhaler 11     fluticasone (FLOVENT DISKUS) 100 MCG/BLIST AEPB Inhale 1 puff into the lungs 2 times daily (Patient not taking: Reported on 3/26/2018) 1 Inhaler 0         Recent Labs   Lab Test  12/07/17   1023  12/30/16   0804  10/31/16   0822  07/08/15   0528  02/05/14   0750   06/23/12   1545   05/10/11   1233   A1C   --   5.4   --    --    --    --    --    --    --    LDL   --    --   85   --   106   --   104*   < >  80   HDL   --    --   55   --   45   --   50   --   50   TRIG   --    --   94   --   112   --    --    --   199*   ALT  37   --   35  28   --    --   34.0   < >   --    CR  0.85   --   0.88  0.83  1.20   --   0.7   < >  0.85   GFRESTIMATED  >90   --   87  >90  Non  GFR Calc    62   < >   --    --    --    GFRESTBLACK  >90   --   >90  >90  African American GFR Calc    75   < >   --    --    --    POTASSIUM   --    --    --   4.3  4.4   --   4.2   < >  4.3    < > = values in this interval not displayed.            BP Readings from Last 6 Encounters:   03/26/18 126/78   02/15/18 120/80   12/20/17 122/82   12/07/17 126/76   08/22/17 112/66   07/28/17 131/82         Wt Readings from Last 3 Encounters:   03/26/18 270 lb (122.5 kg)   03/07/18 268 lb (121.6 kg)   02/15/18 266 lb (120.7 kg)               Positive symptoms or findings indicated by bold designation:         ROS: 10 point ROS neg other than the symptoms noted above in the HPI.except   has Stiffness of knee joint, right; Gait difficulty; S/P TKR (total knee replacement); Bilateral carpal tunnel syndrome; Vitamin D deficiency; Plantar fascial fibromatosis; Asthma with exacerbation; Hypertrophy of prostate without urinary obstruction; Obesity; Hyperlipidemia; Spondylosis with myelopathy, lumbar region; LEFT WORSE THAN RIGHT ; CARDIOVASCULAR SCREENING; LDL GOAL LESS THAN 100; BMI 40.0-44.9, adult (H); Mild persistent asthma; Hyperlipidemia with target LDL less than 130; Lumbar disc disease with radiculopathy; Groin strain, initial encounter; Sexual dysfunction; ACP (advance care planning); Slac (scapholunate advanced collapse) of wrist, right; Right wrist pain; Numbness and tingling in right hand; Chronic pain syndrome; and Chronic gout involving toe 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 tophus, unspecified cause on his problem list.  Review Of Systems    Skin: negative    Eyes: negative    Ears/Nose/Throat: hearing loss, tinnitus    Respiratory: No shortness of breath, dyspnea on exertion, cough, or hemoptysis and     Asthma well controlled     Cardiovascular: negative    Gastrointestinal: negative    Genitourinary: negative    Musculoskeletal:  See history of present illness     Neurologic: negative    Psychiatric: negative    Hematologic/Lymphatic/Immunologic: negative    Endocrine: negative    Exogenous obesity                 PE:  /78  Pulse 72  Temp 97.5  F (36.4  C) (Tympanic)  Resp 20  Wt 270 lb (122.5 kg)  SpO2 97%  BMI 41.05 kg/m2 Body mass index is 41.05 kg/(m^2).        Constitutional: general appearance, well nourished, well developed, in no acute distress, well developed, appears stated age, normal body habitus,          Eyes:; The patient has normal eyelids sclerae and conjunctivae :          Ears/Nose/Throat: external ear, overall: normal appearance; external nose, overall: benign appearance, normal moujth gums and lips           Neck: thyroid, overall: normal size, normal consistency,  nontender,          Respiratory:  palpation of chest, overall: normal excursion,    Clear to percussion and auscultation     NO Tachypnea    NORMAL  Color          Cardiovascular:  Good color with no peripheral edema    Regular sinus rhythm without murmur.  Physiologic heart sounds   Heart is unelarged    .     Chest/Breast: normal shape           Abdominal exam,  Liver and spleen are  unenlarged        Tenderness    Scars              Urogenital; no renal, flank or bladder  tenderness;          Lymphatic: neck nodes,     Other nodes         Musculoskeletal:  Brief ortho exam normal except:   RIGHT DECREASE RANGE OF MOTION OF NECK     PAIN with EXTENSION   FULL RANGE OF MOTION OTHERWISE    PAIN RIGHT SHOULDER  POSTERIOR         Integument: inspection of skin, no rash, lesions; and, palpation, no induration, no tenderness.          Neurologic mental status, overall: alert and oriented; gait, no ataxia, no unsteadiness; coordination, no tremors; cranial nerves, overall: normal motor, overall: normal bulk, tone.          Psychiatric: orientation/consciousness, overall: oriented to person, place and time; behavior/psychomotor activity, no tics, normal psychomotor activity; mood and affect, overall: normal mood and affect; appearance, overall: well-groomed, good eye contact; speech, overall: normal quality, no aphasia and normal quality, quantity, intact.    Patient Instructions   TREATMENT PROGNOSIS BENEFITS AND RISKS DISCUSSED   ANATOMIC SITE:  RIGHT POSTERIOR SHOULDER   PROCEDURE:  RIGHT POSTERIOR SHOULDER   1% LIDOCAINE ANESTHESI  BETADYNE CLEANSING WITHOUT COMPLICATION   1:1 MIXTURE KENALOG 1% LIDOCAINE WITHOUT COMPLICATION   AMOUNT OF KENALOMG   NDC NUMBER [id number]  INJECTION WITH NO TOUCH TECHNIQUE  SIDE EFFECTS BENEFITS AND RISKS DISCUSSED    TREATMENT PROGNOSIS BENEFITS AND RISKS DISCUSSED   MONITOR FOR ALLERGIC AND VASCULAR SIDE EFFECTS  MEDICATION RISKS SIDE EFFECTS BENEFITS AND RISKS  DISCUSSED   SCOTTY TOLEDO JR., MD   03/26/18  Diagnostic Test Results:  Results for orders placed or performed in visit on 02/15/18   XR Cervical Spine 2/3 Views    Narrative    CERVICAL SPINE TWO TO THREE VIEWS   2/15/2018 10:06 AM     HISTORY:  Neck pain.    COMPARISON: None.      Impression    IMPRESSION: Vertebral body C2-C7 well seen on the lateral view. There  is minimal grade 1 anterolisthesis of C4 on C5 and C5 on C6. No  definite loss of vertebral body height. There is moderate loss of  intervertebral disc space with degenerative endplate changes at C6-C7  and to a lesser extent the other levels in the cervical spine.  Multiple levels of facet hypertrophy seen on the frontal view. No  definite prevertebral soft tissue swelling. The base of the dens is  unremarkable. Lateral masses of C1 appear normally aligned on C2.    FILIBERTO DUNCAN MD           ICD-10-CM    1. Hyperlipidemia with target LDL less than 130 E78.5 Lipid panel reflex to direct LDL Fasting   2. Chronic pain syndrome G89.4 HYDROcodone-acetaminophen (NORCO) 7.5-325 MG per tablet     order for DME     DISCONTINUED: HYDROcodone-acetaminophen (NORCO) 7.5-325 MG per tablet     DISCONTINUED: HYDROcodone-acetaminophen (NORCO) 7.5-325 MG per tablet   3. BMI 40.0-44.9, adult (H) Z68.41    4. Mild persistent asthma without complication J45.30    5. Spondylosis with myelopathy, lumbar region M47.16    6. Bilateral carpal tunnel syndrome G56.03    7. Vitamin D deficiency E55.9    8. Other hyperlipidemia E78.4    9. Migraine without aura and without status migrainosus, not intractable G43.009 topiramate (TOPAMAX) 25 MG tablet   10. Impingement syndrome, shoulder, right M75.41 [facility], [facility] REFERRAL   11. Chronic gout involving toe 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 tophus, unspecified cause M1A.9XX0 allopurinol (ZYLOPRIM) 300 MG tablet     **Uric acid FUTURE 2mo   12. DDD (degenerative disc disease), cervical M50.30 [facility], AND CHIROPRACTIC  REFERRAL    RT ARM AND SHOULDER RADICULOPATHY              .    Side effects benefits and risks thoroughly discussed. .he may come in early if unimproved or getting worse          Please drink 2 glasses of water prior to meals and walk 15-30 minutes after meals        I spent 25 MINUTES SPENT  with patient discussing the following issues   The primary encounter diagnosis was Hyperlipidemia with target LDL less than 130. Diagnoses of Chronic pain syndrome, BMI 40.0-44.9, adult (H), Mild persistent asthma without complication, Spondylosis with myelopathy, lumbar region, Bilateral carpal tunnel syndrome, Vitamin D deficiency, Other hyperlipidemia, Migraine without aura and without status migrainosus, not intractable, Impingement syndrome, shoulder, right, Chronic gout involving toe 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 tophus, unspecified cause, and DDD (degenerative disc disease), cervical were also pertinent to this visit. over half of which involved counseling and coordination of care.      Patient Instructions   TREATMENT PROGNOSIS BENEFITS AND RISKS DISCUSSED   ANATOMIC SITE:  RIGHT POSTERIOR SHOULDER   PROCEDURE:  RIGHT POSTERIOR SHOULDER   1% LIDOCAINE ANESTHESI  BETADYNE CLEANSING WITHOUT COMPLICATION   1:1 MIXTURE KENALOG 1% LIDOCAINE WITHOUT COMPLICATION   AMOUNT OF KENALOMG   NDC NUMBER [id number]  INJECTION WITH NO TOUCH TECHNIQUE  SIDE EFFECTS BENEFITS AND RISKS DISCUSSED    TREATMENT PROGNOSIS BENEFITS AND RISKS DISCUSSED   MONITOR FOR ALLERGIC AND VASCULAR SIDE EFFECTS  MEDICATION RISKS SIDE EFFECTS BENEFITS AND RISKS DISCUSSED   SCOTTY TOLEDO JR., MD   18  (E78.5) Hyperlipidemia with target LDL less than 130  (primary encounter diagnosis)  Comment:    Plan: Lipid panel reflex to direct LDL Fasting             (G89.4) Chronic pain syndrome  Comment:    Plan: HYDROcodone-acetaminophen (NORCO) 7.5-325 MG         per tablet, DISCONTINUED:         HYDROcodone-acetaminophen (NORCO) 7.5-325 MG          per tablet, DISCONTINUED:         HYDROcodone-acetaminophen (NORCO) 7.5-325 MG         per tablet             (Z68.41) BMI 40.0-44.9, adult (H)  Comment:    Plan:      (J45.30) Mild persistent asthma without complication  Comment:    Plan:      (M47.16) Spondylosis with myelopathy, lumbar region  Comment:    Plan:      (G56.03) Bilateral carpal tunnel syndrome  Comment:    Plan:      (E55.9) Vitamin D deficiency  Comment:    Plan:      (E78.4) Other hyperlipidemia  Comment:    Plan:      (G43.009) Migraine without aura and without status migrainosus, not intractable  Comment:    Plan: topiramate (TOPAMAX) 25 MG tablet            RIGHT POSTERIOR SHOULDER PAIN   INCREASE ALLOPURINOL 300MG PER DAY   90 MINUTES OF EXERCISE PER DAY RECOMMENDED    RIGHT AND LEFT ROTATION INTO PAIN SITTING UP 30 TIMES TWICE DAILY  RIGHT AND LEFT LATERAL BENDING INTO PAIN 30 TIMES TWICE DAILY  NECK EXTENSION 30 TIME TWICE DAILY  NECK FLEXION  30 TIMES TWICE DAILY  MASSAGE BACK OF NECK MULTIPLE TIMES THROUGHOUT DAY AS TOLERATED  PRONE POSITION NECK ROTATION RIGHT AND LEFT 30 TIMES TWICE DAILY  NECK EXTENSION 30 TIMES TWICE DAILY  ROTATE NECK IN [place] RIGHTWARD AND LEFT NAVA  AVOID ANY EXERCISE WHICH RESULTS IN SHOOTING NERVE PAIN DOWN THE ARM OR SHOULDER BLADE    Merrill' Flexion Versus Allen   Extension Exercises For   Low Back Pain   Examples of Merrill' Flexion Exercises  1. Pelvic tilt.  Please press the small of your back against the floor.  Start with 5-10  and increase to 100 count over one month   2. Single Knee to chest. Lie on your back with legs in bent position. Alternate one leg and the other very slowly bringing the knee to chest.  Start with a count of 5-10   Over one month work up to 100  3. Double knee to chest.  Lie on your back with knees in bent position.  Bring both knees to the chest slowly hold for a count of 5-to 10. Over one month work to 100  4. Partial sit-up or crunch.  Lie on your back in bent leg  position.  Please bring your body with arms crossed in front  To 30 degrees of flexion. Start with 5-10 over one month work up to 100 or more  5. Sit back.  Please sit on the side of the bed or a stair landing  And lie backwards until the abdominal muscles start to quiver.  Hold for a count of 5-10 and over a month work up to 100.  5. Hamstring stretch.  Please extend your legs while sitting on the floor as tolerated for 5-10 count.  Gradually increase to count of 30 over one month      Alternately find a stair landing or sturdy chair and place heel  In a comfortable level of extension  And stretch one hamstring at a time for 5-10 seconds.  Increase to count of 30 over one month                         Squat.  Stand with legs comfortably apart and lower the body slowly by flexing the knees  for count of 5-10 over one month increase to 30.  Useful for anterior disc protrusion, facette joint arthritis spond-10ylolysis, spondylolisthesis and spinal stenosis   ROTATION AND LATERAL BENDING AS TOLERATED RIGHT OR LEFT   PILLOWS UNDER KNEES AT BED TIME  STRETCHING FORWARDS AND DOWN WHEN SITTING ON CHAIR  MAY SIT IN RELAXED MANNER   WHEN IN PREVENTION PHASE START WITH WITH SENA EXERCISES AND FOLLOW UP  WITH JOANN EXERCISES AS TOLERATED   SCOTTY SHEPARD'S EXERCISES  ,THAT IS PENDULUM RANGE OF MOTION 30 EACH TWICE DAILY  THUMB UP EXERCISES INTO PAIN 30 EACH TWICE DAILY  INTERNAL  AND EXTERNAL ROTATION  with AND WITHOUT RESISTANCE OR WEIGHT 30 EACH TWICE DAILY  SWORD SHEATH EXERCISE 30 EACH TWICE DAILY  WALL STRETCH EXERCISE 30 SECONDS EACH TWICE DAILY  POSTERIOR SHOULDER STRETCH AND HOLD 3 10 SECOND STRETCHES TWICE DAILY  ISOMETRIC EXERCISE 60 DEGREES ABDUCTION, ADDUCTION, 90 DEGREE THUMB UP POSITION  AVOID PAINFUL ARC  ICE TWICE DAILY X 5 MINUTES PRIOR TO EXERCISE OR AS NEEDED FOR PAIN CONTROL                   ALL THE ABOVE PROBLEMS ARE STABLE AND MED CHANGES AS NOTED        Diet:  MEDITERRANEAN  DIET         Exercise: UPPER EXTREMETIES AND NECK  AND LOWER BACK PAIN AND AEROBIC   Exercises Range of motion, balance, isometric, and strengthening exercises 30 repetitions twice daily of involved joints            .SCOTTY TOLEDO MD 3/26/2018 7:57 AM  March 26, 2018

## 2018-03-26 NOTE — LETTER
"March 27, 2018    Arnaud Bird  [address] [id number]      Dear ,    We are writing to inform you of your test results.  NORMAL TOTAL CHOLESTEROL   NORMAL TRIGLYCERIDES   NORMAL HDL OR \"GOOD\" CHOLESTEROL   NORMAL LDL OR \"BAD\" CHOLESTEROL   NORMAL VERY LOW DENSITY CHOLESTEROL       Resulted Orders   Lipid panel reflex to direct LDL Fasting   Result Value Ref Range    Cholesterol 169 <200 mg/dL    Triglycerides 117 <150 mg/dL      Comment:      Fasting specimen    HDL Cholesterol 58 >39 mg/dL    LDL Cholesterol Calculated 88 <100 mg/dL      Comment:      Desirable:       <100 mg/dl    Non HDL Cholesterol 111 <130 mg/dL       If you have any questions or concerns, please call the clinic at the number listed above.       Sincerely,        SCOTTY TOLEDO MD                "

## 2018-04-02 ENCOUNTER — THERAPY VISIT (OUTPATIENT)
Dept: PHYSICAL THERAPY | Facility: CLINIC | Age: 63
End: 2018-04-02
Payer: COMMERCIAL

## 2018-04-02 ENCOUNTER — RADIANT APPOINTMENT (OUTPATIENT)
Dept: GENERAL RADIOLOGY | Facility: CLINIC | Age: 63
End: 2018-04-02
Attending: FAMILY MEDICINE
Payer: COMMERCIAL

## 2018-04-02 ENCOUNTER — OFFICE VISIT (OUTPATIENT)
Dept: ORTHOPEDICS | Facility: CLINIC | Age: 63
End: 2018-04-02
Payer: COMMERCIAL

## 2018-04-02 VITALS
HEART RATE: 90 BPM | WEIGHT: 270 LBS | DIASTOLIC BLOOD PRESSURE: 96 MMHG | SYSTOLIC BLOOD PRESSURE: 156 MMHG | HEIGHT: 68 IN | BODY MASS INDEX: 40.92 KG/M2

## 2018-04-02 DIAGNOSIS — M54.12 CERVICAL RADICULOPATHY: Primary | ICD-10-CM

## 2018-04-02 DIAGNOSIS — M25.562 ACUTE PAIN OF LEFT KNEE: ICD-10-CM

## 2018-04-02 DIAGNOSIS — M25.562 ACUTE PAIN OF LEFT KNEE: Primary | ICD-10-CM

## 2018-04-02 PROCEDURE — G8981 BODY POS CURRENT STATUS: HCPCS | Mod: GP | Performed by: PHYSICAL THERAPIST

## 2018-04-02 PROCEDURE — 97140 MANUAL THERAPY 1/> REGIONS: CPT | Mod: GP | Performed by: PHYSICAL THERAPIST

## 2018-04-02 PROCEDURE — G8982 BODY POS GOAL STATUS: HCPCS | Mod: GP | Performed by: PHYSICAL THERAPIST

## 2018-04-02 PROCEDURE — 97161 PT EVAL LOW COMPLEX 20 MIN: CPT | Mod: GP | Performed by: PHYSICAL THERAPIST

## 2018-04-02 NOTE — PROGRESS NOTES
[facility] Initial Evaluation  Subjective:  [facility]                  [facility] - [facility]  Physical Therapy Initial Examination/Evaluation  April 2, 2018    Arnaud Bird is a 62 year old  male referred to physical therapy by Dr. Clements for treatment of shoulder pain, ddd cervical spine with Precautions/Restrictions/MD instructions none      Subjective:  Referring MD visit date: 3/26/18  DOI/onset: November 2017  Mechanism of injury: Patient notes onset of R neck/UT pain while deer hunting in the fall.  He sought treatment from a chiropractor with mild relief of pain.  DOS none  Previous treatment: chiropractor.  Imaging: xray  Chief Complaint:   Pain with sleeping, turning neck,   Pain: rest 5-7 /10, activity <5/10 R UT, cervical bilaterally Described as: sharp Alleviated by: pain meds Frequency: constant Progression of symptoms since initial onset: staying the same Time of day when pain is worse: same all the time  Sleeping: moderately affected  Social history:  Recently retired    Occupation: retired   Job duties:  none    Current HEP/exercise regimen[place] [facility] [employer] - has been too sore lately to go  Patient's goals are reduce pain, improve mobility    Pertinent PMH: Obesity, chronic pain  General Health Reported by Patient: Fair  Return to MD:  PRN       Objective:  System              Cervical/Thoracic Evaluation    AROM:  AROM Cervical:    Flexion:            100%  Extension:       75%  Rotation:         Left: 50%     Right: 25%  Side Bend:      Left: 50%     Right:  25%        Cervical Myotomes:          C5 (Deltoid):  Left: 5    Right: 5  C6 (Biceps):  Left: 5    Right: 5  C7 (Triceps):  Left: 5    Right: 5  C8 (Thumb Ext): Left: 5    Right: 5  T1 (Intrinsics): Left: 5    Right: 5  DTR's:  not assessed          Cervical Dermatomes:  normal                    Cervical Palpation:      Tenderness present at Right:    Upper Trap;  Levator; Erector Spinae and Suboccipitals      Spinal Segmental Conclusions:    Level:  Hypo at C3, C4, C5 and C6             Shoulder Evaluation:  ROM:  AROM:    Flexion:  Left:  120    Right:  120    Abduction:  Left: 100   Right:  100    Internal Rotation:  Left:  T12    Right:  T12  External Rotation:  Left:  60    Right:  60                      Strength:            Internal Rotation:  Left:5/5      Pain:-    Right: 5/5      Pain:-  External Rotation:   Left:5/5      Pain:-   Right:5/5      Pain:-                                                     General     ROS    Assessment/Plan:    Patient is a 62 year old male with cervical and right side shoulder complaints.    Patient has the following significant findings with corresponding treatment plan.                Diagnosis 1:  Cervical radiculopathy, shoulder pain    Pain -  hot/cold therapy, US, electric stimulation, mechanical traction, manual therapy, splint/taping/bracing/orthotics, self management, education, directional preference exercise and home program  Decreased ROM/flexibility - manual therapy and therapeutic exercise  Decreased joint mobility - manual therapy and therapeutic exercise  Decreased strength - therapeutic exercise and therapeutic activities  Impaired balance - neuro re-education and therapeutic activities  Decreased proprioception - neuro re-education and therapeutic activities  Impaired muscle performance - neuro re-education  Decreased function - therapeutic activities    Therapy Evaluation Codes:   1) History comprised of:   Personal factors that impact the plan of care:      None.    Comorbidity factors that impact the plan of care are:      Osteoarthritis and Overweight.     Medications impacting care: Pain.  2) Examination of Body Systems comprised of:   Body structures and functions that impact the plan of care:      Cervical spine and Shoulder.   Activity limitations that impact the plan of care are:      Lifting,  Reading/Computer work, Sleeping, Laying down and turning head.  3) Clinical presentation characteristics are:   Stable/Uncomplicated.  4) Decision-Making    Low complexity using standardized patient assessment instrument and/or measureable assessment of functional outcome.  Cumulative Therapy Evaluation is: Low complexity.    Previous and current functional limitations:  (See Goal Flow Sheet for this information)    Short term and Long term goals: (See Goal Flow Sheet for this information)     Communication ability:  Patient appears to be able to clearly communicate and understand verbal and written communication and follow directions correctly.  Treatment Explanation - The following has been discussed with the patient:   RX ordered/plan of care  Anticipated outcomes  Possible risks and side effects  This patient would benefit from PT intervention to resume normal activities.   Rehab potential is good.    Frequency:  1 X week, once daily  Duration:  for 8 weeks  Discharge Plan:  Achieve all LTG.  Independent in home treatment program.  Reach maximal therapeutic benefit.    Please refer to the daily flowsheet for treatment today, total treatment time and time spent performing 1:1 timed codes.

## 2018-04-02 NOTE — MR AVS SNAPSHOT
After Visit Summary   4/2/2018    Arnaud Bird    MRN: [id number]           Patient Information     Date Of Birth          1955        Visit Information        Provider Department      4/2/2018 11:20 AM Leanne Mendez, PT [facility]        Today's Diagnoses     Cervical radiculopathy    -  1       Follow-ups after your visit        Your next 10 appointments already scheduled     Apr 23, 2018 10:10 AM CDT   [facility] Spine with Alfa Wright PT   [facility] [place] ([facility])    [address] [place] [id number] [phone]            Apr 30, 2018 11:30 AM CDT   [facility] Spine with Alfa Wright PT   [facility] [place] ([facility])    [address] [place] [id number] [phone]            May 07, 2018 10:10 AM CDT   [facility] Spine with Alfa Wright PT   [facility] [place] ([facility])    [address] [place] [address] [phone]              Who to contact     If you have questions or need follow up information about today's clinic visit or your schedule please contact [facility] PH[facility] THERAP[facility] directly at [phone].  Normal or non-critical lab and imaging results will be communicated to you by ReadyPulsehart, letter or phone within 4 business days after the clinic has received the results. If you do not hear from us within 7 days, please contact the clinic through MyChart or phone. If you have a critical or abnormal lab result, we will notify you by phone as soon as possible.  Submit refill requests through Bilims or call your pharmacy and they will forward the refill request to us. Please allow 3 business days for your refill to be completed.          Additional Information About Your Visit        ReadyPulseharditlo Information     Bilims lets you send messages to your doctor, view your test results, renew your prescriptions, schedule appointments and more.  "To sign up, go to [email or web address] . Click on \"Log in\" on the left side of the screen, which will take you to the Welcome page. Then click on \"Sign up Now\" on the right side of the page.     You will be asked to enter the access code listed below, as well as some personal information. Please follow the directions to create your username and password.     Your access code is: [email or web address]  Expires: 2018  1:24 PM     Your access code will  in 90 days. If you need help or a new code, please call your [facility] clinic or [phone].        Care EveryWhere ID     This is your Care EveryWhere ID. This could be used by other organizations to access your [facility] medical records  [id number]         Blood Pressure from Last 3 Encounters:   18 (!) 156/96   18 126/78   02/15/18 120/80    Weight from Last 3 Encounters:   18 122.5 kg (270 lb)   18 122.5 kg (270 lb)   18 121.6 kg (268 lb)              We Performed the Following     HC PT EVAL, LOW COMPLEXITY     [facility] INITIAL EVAL REPORT     MANUAL THER TECH,1+REGIONS,EA 15 MIN        Primary Care Provider Office Phone # Fax #    Johnson Jesica Clements [phone] [address] [facility] [address]        Equal Access to Services     [facility]: Hadii aad ku hadasho Soomaali, waaxda luqadaha, qaybta kaalmada adela, gee sparks . So [facility] [phone].    ATENCIÓN: Si habla español, tiene a stewart disposición servicios gratuitos de asistencia lingüística. Llame al [phone].    We comply with applicable federal civil rights laws and [place] laws. We do not discriminate on the basis of race, color, national origin, age, disability, sex, sexual orientation, or gender identity.            Thank you!     Thank you for choosing [facility] PHYSICAL THERAPY [facility]  for your care. Our goal is always to provide you with excellent care. Hearing back from our patients is one way we can " continue to improve our services. Please take a few minutes to complete the written survey that you may receive in the mail after your visit with us. Thank you!             Your Updated Medication List - Protect others around you: Learn how to safely use, store and throw away your medicines at www.disposemymeds.org.          This list is accurate as of 4/2/18  3:48 PM.  Always use your most recent med list.                   Brand Name Dispense Instructions for use Diagnosis    acyclovir 400 MG tablet    ZOVIRAX    28 tablet    TAKE TWO TABLETS BY MOUTH TWICE DAILY    HSV (herpes simplex virus) infection       albuterol 108 (90 BASE) MCG/ACT Inhaler    PROAIR HFA/PROVENTIL HFA/VENTOLIN HFA    1 Inhaler    Inhale 2 puffs into the lungs every 6 hours as needed for shortness of breath / dyspnea    Mild persistent asthma, uncomplicated       * allopurinol 100 MG tablet    ZYLOPRIM    90 tablet    TAKE 1 TABLET BY MOUTH EVERY DAY    Chronic gout involving toe 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 tophus, unspecified cause       * allopurinol 300 MG tablet    ZYLOPRIM    90 tablet    Take 1 tablet (300 mg) by mouth daily    Chronic gout involving toe 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 tophus, unspecified cause       clindamycin 1 % solution    CLEOCIN T    60 mL    Apply topically 2 times daily Profile Rx: patient will contact pharmacy when needed    Folliculitis       diclofenac 1 % Gel topical gel    VOLTAREN    100 g    APPLY 2 GRAMS TOPCAILLY TO AFFECTED AREA FOUR TIMES DAILY AS NEEDED    Stiffness of knee joint, right, Carpal tunnel syndrome, unspecified laterality, Primary localized osteoarthrosis, lower leg, unspecified laterality, Lumbar disc disease with radiculopathy       fluticasone 100 MCG/BLIST Aepb    FLOVENT DISKUS    1 Inhaler    Inhale 1 puff into the lungs 2 times daily    Mild persistent asthma without complication       fluticasone 50 MCG/ACT spray    FLONASE    16 g    Spray 2 sprays into both nostrils daily    Seasonal allergic  rhinitis due to pollen       HYDROcodone-acetaminophen 7.5-325 MG per tablet   Start taking on:  5/26/2018    NORCO    120 tablet    Take 1 tablet by mouth every 4 hours as needed for pain (4 x daily as needed  maxium 4 per day)    Chronic pain syndrome       order for DME     1 each    Equipment being ordered:  CERVICAL TRACTION DEVICE ONE AS DIRECTED    Chronic pain syndrome       sildenafil 100 MG tablet    VIAGRA    100 tablet    Take 0.5-1 tablets ( mg) by mouth daily as needed for erectile dysfunction Take 30 min to 4 hours before intercourse.  Never use with nitroglycerin, terazosin or doxazosin.    Sexual dysfunction       simvastatin 40 MG tablet    ZOCOR    30 tablet    Take 1 tablet (40 mg) by mouth At Bedtime    Hyperlipidemia with target LDL less than 130       topiramate 25 MG tablet    TOPAMAX    180 tablet    Take 1 tablet (25 mg) by mouth 2 times daily    Migraine without aura and without status migrainosus, not intractable       * Notice:  This list has 2 medication(s) that are the same as other medications prescribed for you. Read the directions carefully, and ask your doctor or other care provider to review them with you.

## 2018-04-02 NOTE — MR AVS SNAPSHOT
After Visit Summary   2018    Arnaud Bird    MRN: [id number]           Patient Information     Date Of Birth          1955        Visit Information        Provider Department      2018 12:30 PM Nicholas Gurrola MD [facility]        Today's Diagnoses     Acute pain of left knee    -  1       Follow-ups after your visit        Your next 10 appointments already scheduled     2018 10:10 AM CDT   [facility] Spine with Alfa Wright PT   [facility] [place] ([facility] [place])    [address] [place] [address] [phone]            2018 11:30 AM CDT   [facility] Spine with Alfa Wright PT   [facility] [place] ([facility] [place])    [address] [place] [address] [phone]            May 07, 2018 10:10 AM CDT   [facility] Spine with Alfa Wright PT   [facility] [place] ([facility] [place])    [address] [place] [address] [phone]              Who to contact     Please call your clinic at [phone] to:    Ask questions about your health    Make or cancel appointments    Discuss your medicines    Learn about your test results    Speak to your doctor            Additional Information About Your Visit        MyChart Information     dloHaitit is an electronic gateway that provides easy, online access to your medical records. With [employer], you can request a clinic appointment, read your test results, renew a prescription or communicate with your care team.     To sign up for dloHaitit visit the website at [email or web address]   You will be asked to enter the access code listed below, as well as some personal information. Please follow the directions to create your username and password.     Your access code is: [email or web address]  Expires: 2018  1:24 PM     Your access code will  in 90 days. If you need help or a new code,  "please contact your [facility] or call [phone] for assistance.        Care EveryWhere ID     This is your Care EveryWhere ID. This could be used by other organizations to access your [place] medical records  [id number]        Your Vitals Were     Pulse Height BMI (Body Mass Index)             90 5' 8\" (1.727 m) 41.05 kg/m2          Blood Pressure from Last 3 Encounters:   04/02/18 (!) 156/96   03/26/18 126/78   02/15/18 120/80    Weight from Last 3 Encounters:   04/02/18 270 lb (122.5 kg)   03/26/18 270 lb (122.5 kg)   03/07/18 268 lb (121.6 kg)               Primary Care Provider Office Phone # Fax #    Johnson Jesica Clements [phone] [address] [facility] [address]        Equal Access to Services     ARVIND MODI : Hadii aad ku hadasho Sogisselle, waaxda luqadaha, qaybta kaalmada adeegyada, gee sparks . So [facility] [phone].    ATENCIÓN: Si fifila español, tiene a stewart disposición servicios gratuitos de asistencia lingüística. Llame al [phone].    We comply with applicable federal civil rights laws and [place] laws. We do not discriminate on the basis of race, color, national origin, age, disability, sex, sexual orientation, or gender identity.            Thank you!     Thank you for choosing [facility] SPORTS AND ORTHOPAEDIC WALK IN CLINIC  for your care. Our goal is always to provide you with excellent care. Hearing back from our patients is one way we can continue to improve our services. Please take a few minutes to complete the written survey that you may receive in the mail after your visit with us. Thank you!             Your Updated Medication List - Protect others around you: Learn how to safely use, store and throw away your medicines at www.disposemymeds.org.          This list is accurate as of 4/2/18 11:59 PM.  Always use your most recent med list.                   Brand Name Dispense Instructions " for use Diagnosis    acyclovir 400 MG tablet    ZOVIRAX    28 tablet    TAKE TWO TABLETS BY MOUTH TWICE DAILY    HSV (herpes simplex virus) infection       albuterol 108 (90 BASE) MCG/ACT Inhaler    PROAIR HFA/PROVENTIL HFA/VENTOLIN HFA    1 Inhaler    Inhale 2 puffs into the lungs every 6 hours as needed for shortness of breath / dyspnea    Mild persistent asthma, uncomplicated       * allopurinol 100 MG tablet    ZYLOPRIM    90 tablet    TAKE 1 TABLET BY MOUTH EVERY DAY    Chronic gout involving toe 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 tophus, unspecified cause       * allopurinol 300 MG tablet    ZYLOPRIM    90 tablet    Take 1 tablet (300 mg) by mouth daily    Chronic gout involving toe 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 tophus, unspecified cause       clindamycin 1 % solution    CLEOCIN T    60 mL    Apply topically 2 times daily Profile Rx: patient will contact pharmacy when needed    Folliculitis       diclofenac 1 % Gel topical gel    VOLTAREN    100 g    APPLY 2 GRAMS TOPCAILLY TO AFFECTED AREA FOUR TIMES DAILY AS NEEDED    Stiffness of knee joint, right, Carpal tunnel syndrome, unspecified laterality, Primary localized osteoarthrosis, lower leg, unspecified laterality, Lumbar disc disease with radiculopathy       fluticasone 100 MCG/BLIST Aepb    FLOVENT DISKUS    1 Inhaler    Inhale 1 puff into the lungs 2 times daily    Mild persistent asthma without complication       fluticasone 50 MCG/ACT spray    FLONASE    16 g    Spray 2 sprays into both nostrils daily    Seasonal allergic rhinitis due to pollen       HYDROcodone-acetaminophen 7.5-325 MG per tablet   Start taking on:  5/26/2018    NORCO    120 tablet    Take 1 tablet by mouth every 4 hours as needed for pain (4 x daily as needed  maxium 4 per day)    Chronic pain syndrome       order for DME     1 each    Equipment being ordered:  CERVICAL TRACTION DEVICE ONE AS DIRECTED    Chronic pain syndrome       sildenafil 100 MG tablet    VIAGRA    100 tablet    Take 0.5-1 tablets  ( mg) by mouth daily as needed for erectile dysfunction Take 30 min to 4 hours before intercourse.  Never use with nitroglycerin, terazosin or doxazosin.    Sexual dysfunction       simvastatin 40 MG tablet    ZOCOR    30 tablet    Take 1 tablet (40 mg) by mouth At Bedtime    Hyperlipidemia with target LDL less than 130       topiramate 25 MG tablet    TOPAMAX    180 tablet    Take 1 tablet (25 mg) by mouth 2 times daily    Migraine without aura and without status migrainosus, not intractable       * Notice:  This list has 2 medication(s) that are the same as other medications prescribed for you. Read the directions carefully, and ask your doctor or other care provider to review them with you.

## 2018-04-02 NOTE — PROGRESS NOTES
"[facility] Sports and Orthopedic Walk-in Clinic Note      Patient is a 62 year old male who presents to the office today for: Left knee pain.  Bilateral TKA in 2009.  Struck his medial left knee last week while moving furniture.  Sustained some significant bruising and pain shortly thereafter.  This is improved.  He also struck the anterior superior knee which resulted in less bruising but has.  Resulted in persistent pain no new clicking or locking.  Has been icing and heating which has provided some relief.  His biggest concern is for the knee replacement.      Past Medical History, Current Medications, and Allergies are reviewed in the electronic medical record as appropriate.     ROS: Pertinent items are noted in HPI.  Constitutional: negative for fevers, chills and malaise  Cardiovascular: negative for dyspnea, fatigue, lower extremity edema  Integument/breast: negative for rash, skin lesion(s) and skin color change  Neurological: negative for paresthesia and weakness      EXAM:Ht 5' 8\" (1.727 m)  Wt 270 lb (122.5 kg)  BMI 41.05 kg/m2    Patient is alert, No acute distress, pleasant and conversational.    Gait: nonantalgic. Normal heel toe gait.    left knee:   Skin intact.  There is slight resolving ecchymosis over the medial knee and distal hamstring.  No effusion.  No other soft tissue swelling.    AROM: Symmetric and without pain.  Some clicking noted over the lateral knee.    Palpation: No medial or lateral facet joint tenderness.  No posterior medial or posterior lateral joint line tenderness     Special Tests:  Negative bounce test, negative forced flexion   No ligamentous laxity or pain with valgus or varus stress.  Negative Lachman's, Anterior Drawer and Posterior Drawer     Full Isometric quad strength, extensor mechanism in place     Neurovascularly intact in the lower extremity    Hip and Ankle with full AROM and nontender      Radiographs: xrays of bilateral knees performed and reviewed " independently demonstrating well aligned arthroplasty bilaterally.  No sign of periprosthetic fracture or loosening.    Assessment: Patient is a 62 year old male with knee pain after trauma.  Suspect soft tissue injury.  No sign of injury to arthroplasty.    Recommendations:   Reviewed imaging and assessment with the patient in detail.  Discussed treatment of soft tissue contusions including ice, compression, OTC pain medications as needed.  Discussed expected course for healing and recommended follow-up if the injury does not heal expectantly.    Nicholas Gurrola MD

## 2018-04-02 NOTE — PROGRESS NOTES
SPORTS & ORTHOPEDIC WALK-IN VISIT 4/2/2018    Primary Care Physician: Dr. Clements    History of knee replacements in 09. Was moving furniture last week and hit his knee on something. Bruised. Not getting better.     Reason for visit:     What part of your body is injured / painful?  left knee    What caused the injury /pain? Hit knee on furniture    How long ago did your injury occur or pain begin? a week ago    What are your most bothersome symptoms? Pain, bruising    How would you characterize your symptom?  aching    What makes your symptoms better? Nothing - has tried heat, ice, voltaren gel    What makes your symptoms worse? Movement    Have you been previously seen for this problem? No    Medical History:    Any recent changes to your medical history? No    Any new medication prescribed since last visit? No    Have you had surgery on this body part before? Yes Date: 2009, Surgical Procedure: Knee replacement    Social History:    Occupation: Retired    Handedness: Right    Review of Systems:    Do you have fever, chills, weight loss? No    Do you have any vision problems? No    Do you have any chest pain or edema? No    Do you have any shortness of breath or wheezing?  No    Do you have stomach problems? Yes, from taking too much ibuprofen     Do you have any numbness or focal weakness? Yes, hands go numb with sleep or long drives    Do you have diabetes? No    Do you have problems with bleeding or clotting? No    Do you have an rashes or other skin lesions? No

## 2018-04-17 DIAGNOSIS — E78.5 HYPERLIPIDEMIA WITH TARGET LDL LESS THAN 130: Chronic | ICD-10-CM

## 2018-04-17 NOTE — TELEPHONE ENCOUNTER
"Requested Prescriptions   Pending Prescriptions Disp Refills     simvastatin (ZOCOR) 40 MG tablet [Pharmacy Med Name: SIMVASTATIN 40 MG TABLET]  Last Written Prescription Date:  10/31/16  Last Fill Quantity: 30,  # refills: 11   Last office visit: 3/26/2018 with prescribing provider:  Starr   Future Office Visit:     30 tablet 4     Sig: TAKE 1 TABLET (40 MG) BY MOUTH AT BEDTIME    Statins Protocol Passed    4/17/2018 12:46 PM       Passed - LDL on file in past 12 months    Recent Labs   Lab Test  03/26/18   0832   LDL  88            Passed - No abnormal creatine kinase in past 12 months    No lab results found.            Passed - Recent (12 mo) or future (30 days) visit within the authorizing provider's specialty    Patient had office visit in the last 12 months or has a visit in the next 30 days with authorizing provider or within the authorizing provider's specialty.  See \"Patient Info\" tab in inbasket, or \"Choose Columns\" in Meds & Orders section of the refill encounter.           Passed - Patient is age 18 or older          "

## 2018-04-18 RX ORDER — SIMVASTATIN 40 MG
TABLET ORAL
Qty: 90 TABLET | Refills: 2 | Status: SHIPPED | OUTPATIENT
Start: 2018-04-18 | End: 2018-09-27

## 2018-04-20 PROBLEM — G43.009 MIGRAINE WITHOUT AURA AND WITHOUT STATUS MIGRAINOSUS, NOT INTRACTABLE: Status: ACTIVE | Noted: 2018-04-20

## 2018-04-23 ENCOUNTER — THERAPY VISIT (OUTPATIENT)
Dept: PHYSICAL THERAPY | Facility: CLINIC | Age: 63
End: 2018-04-23
Payer: COMMERCIAL

## 2018-04-23 DIAGNOSIS — M54.12 CERVICAL RADICULOPATHY: ICD-10-CM

## 2018-04-23 PROCEDURE — 97012 MECHANICAL TRACTION THERAPY: CPT | Mod: GP | Performed by: PHYSICAL THERAPIST

## 2018-04-23 PROCEDURE — 97110 THERAPEUTIC EXERCISES: CPT | Mod: GP | Performed by: PHYSICAL THERAPIST

## 2018-04-30 ENCOUNTER — THERAPY VISIT (OUTPATIENT)
Dept: PHYSICAL THERAPY | Facility: CLINIC | Age: 63
End: 2018-04-30
Payer: COMMERCIAL

## 2018-04-30 DIAGNOSIS — M54.12 CERVICAL RADICULOPATHY: ICD-10-CM

## 2018-04-30 PROCEDURE — 97112 NEUROMUSCULAR REEDUCATION: CPT | Mod: GP | Performed by: PHYSICAL THERAPIST

## 2018-04-30 PROCEDURE — 97110 THERAPEUTIC EXERCISES: CPT | Mod: GP | Performed by: PHYSICAL THERAPIST

## 2018-04-30 PROCEDURE — 97012 MECHANICAL TRACTION THERAPY: CPT | Mod: GP | Performed by: PHYSICAL THERAPIST

## 2018-05-07 ENCOUNTER — THERAPY VISIT (OUTPATIENT)
Dept: PHYSICAL THERAPY | Facility: CLINIC | Age: 63
End: 2018-05-07
Payer: COMMERCIAL

## 2018-05-07 DIAGNOSIS — M54.12 CERVICAL RADICULOPATHY: ICD-10-CM

## 2018-05-07 PROCEDURE — 97530 THERAPEUTIC ACTIVITIES: CPT | Mod: GP | Performed by: PHYSICAL THERAPIST

## 2018-05-07 PROCEDURE — 97112 NEUROMUSCULAR REEDUCATION: CPT | Mod: GP | Performed by: PHYSICAL THERAPIST

## 2018-05-07 PROCEDURE — 97012 MECHANICAL TRACTION THERAPY: CPT | Mod: GP | Performed by: PHYSICAL THERAPIST

## 2018-05-07 NOTE — MR AVS SNAPSHOT
"              After Visit Summary   2018    Arnaud Bird    MRN: [id number]           Patient Information     Date Of Birth          1955        Visit Information        Provider Department      2018 10:50 AM Alfa Wright PT [facility]        Today's Diagnoses     Cervical radiculopathy           Follow-ups after your visit        Who to contact     If you have questions or need follow up information about today's clinic visit or your schedule please contact [facility] directly at [phone].  Normal or non-critical lab and imaging results will be communicated to you by [employer], letter or phone within 4 business days after the clinic has received the results. If you do not hear from us within 7 days, please contact the clinic through [employer] or phone. If you have a critical or abnormal lab result, we will notify you by phone as soon as possible.  Submit refill requests through [employer] or call your pharmacy and they will forward the refill request to us. Please allow 3 business days for your refill to be completed.          Additional Information About Your Visit        MyChart Information     [employer] lets you send messages to your doctor, view your test results, renew your prescriptions, schedule appointments and more. To sign up, go to [email or web address] . Click on \"Log in\" on the left side of the screen, which will take you to the Welcome page. Then click on \"Sign up Now\" on the right side of the page.     You will be asked to enter the access code listed below, as well as some personal information. Please follow the directions to create your username and password.     Your access code is: [email or web address]  Expires: 2018  1:24 PM     Your access code will  in 90 days. If you need help or a new code, please call your [place] clinic or [phone].        Care EveryWhere ID     This is your Care EveryWhere ID. This " could be used by other organizations to access your [place] medical records  [id number]         Blood Pressure from Last 3 Encounters:   04/02/18 (!) 156/96   03/26/18 126/78   02/15/18 120/80    Weight from Last 3 Encounters:   04/02/18 122.5 kg (270 lb)   03/26/18 122.5 kg (270 lb)   03/07/18 121.6 kg (268 lb)              We Performed the Following     Mechanical Traction Therapy     Neuromuscular Re-Education     Therapeutic Activities        Primary Care Provider Office Phone # Fax #    Johnson Jesica Clements [phone]       [address] [facility] [address]        Equal Access to Services     ARVIND MODI : Hadii lainey Paula, waaxda ivana, qaybta kaalmada adeojyaclarisa, gee sparks . So [facility] [phone].    ATENCIÓN: Si habla español, tiene a stewart disposición servicios gratuitos de asistencia lingüística. Llame al [phone].    We comply with applicable federal civil rights laws and [place] laws. We do not discriminate on the basis of race, color, national origin, age, disability, sex, sexual orientation, or gender identity.            Thank you!     Thank you for choosing [facility]  for your care. Our goal is always to provide you with excellent care. Hearing back from our patients is one way we can continue to improve our services. Please take a few minutes to complete the written survey that you may receive in the mail after your visit with us. Thank you!             Your Updated Medication List - Protect others around you: Learn how to safely use, store and throw away your medicines at www.disposemymeds.org.          This list is accurate as of 5/7/18 12:00 PM.  Always use your most recent med list.                   Brand Name Dispense Instructions for use Diagnosis    acyclovir 400 MG tablet    ZOVIRAX    28 tablet    TAKE TWO TABLETS BY MOUTH TWICE DAILY    HSV (herpes simplex virus) infection        albuterol 108 (90 Base) MCG/ACT Inhaler    PROAIR HFA/PROVENTIL HFA/VENTOLIN HFA    1 Inhaler    Inhale 2 puffs into the lungs every 6 hours as needed for shortness of breath / dyspnea    Mild persistent asthma, uncomplicated       * allopurinol 100 MG tablet    ZYLOPRIM    90 tablet    TAKE 1 TABLET BY MOUTH EVERY DAY    Chronic gout involving toe 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 tophus, unspecified cause       * allopurinol 300 MG tablet    ZYLOPRIM    90 tablet    Take 1 tablet (300 mg) by mouth daily    Chronic gout involving toe 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 tophus, unspecified cause       clindamycin 1 % solution    CLEOCIN T    60 mL    Apply topically 2 times daily Profile Rx: patient will contact pharmacy when needed    Folliculitis       diclofenac 1 % Gel topical gel    VOLTAREN    100 g    APPLY 2 GRAMS TOPCAILLY TO AFFECTED AREA FOUR TIMES DAILY AS NEEDED    Stiffness of knee joint, right, Carpal tunnel syndrome, unspecified laterality, Primary localized osteoarthrosis, lower leg, unspecified laterality, Lumbar disc disease with radiculopathy       * fluticasone 100 MCG/BLIST Aepb    FLOVENT DISKUS    1 Inhaler    Inhale 1 puff into the lungs 2 times daily    Mild persistent asthma without complication       * FLOVENT DISKUS 100 MCG/BLIST Aepb   Generic drug:  fluticasone     60 each    INHALE 1 PUFF BY MOUTH 2 TIMES DAILY    Mild persistent asthma without complication       fluticasone 50 MCG/ACT spray    FLONASE    16 g    Spray 2 sprays into both nostrils daily    Seasonal allergic rhinitis due to pollen       HYDROcodone-acetaminophen 7.5-325 MG per tablet   Start taking on:  5/26/2018    NORCO    120 tablet    Take 1 tablet by mouth every 4 hours as needed for pain (4 x daily as needed  maxium 4 per day)    Chronic pain syndrome       order for DME     1 each    Equipment being ordered:  CERVICAL TRACTION DEVICE ONE AS DIRECTED    Chronic pain syndrome       sildenafil 100 MG tablet    VIAGRA    100 tablet    Take  0.5-1 tablets ( mg) by mouth daily as needed for erectile dysfunction Take 30 min to 4 hours before intercourse.  Never use with nitroglycerin, terazosin or doxazosin.    Sexual dysfunction       simvastatin 40 MG tablet    ZOCOR    90 tablet    TAKE 1 TABLET (40 MG) BY MOUTH AT BEDTIME    Hyperlipidemia with target LDL less than 130       topiramate 25 MG tablet    TOPAMAX    180 tablet    Take 1 tablet (25 mg) by mouth 2 times daily    Migraine without aura and without status migrainosus, not intractable       * Notice:  This list has 4 medication(s) that are the same as other medications prescribed for you. Read the directions carefully, and ask your doctor or other care provider to review them with you.

## 2018-05-10 ENCOUNTER — OFFICE VISIT (OUTPATIENT)
Dept: FAMILY MEDICINE | Facility: CLINIC | Age: 63
End: 2018-05-10
Payer: COMMERCIAL

## 2018-05-10 VITALS
BODY MASS INDEX: 40.14 KG/M2 | TEMPERATURE: 98 F | DIASTOLIC BLOOD PRESSURE: 78 MMHG | OXYGEN SATURATION: 97 % | WEIGHT: 264 LBS | HEART RATE: 95 BPM | SYSTOLIC BLOOD PRESSURE: 116 MMHG | RESPIRATION RATE: 20 BRPM

## 2018-05-10 DIAGNOSIS — J45.30 MILD PERSISTENT ASTHMA WITHOUT COMPLICATION: Chronic | ICD-10-CM

## 2018-05-10 DIAGNOSIS — M54.12 CERVICAL RADICULOPATHY: ICD-10-CM

## 2018-05-10 DIAGNOSIS — M47.16 SPONDYLOSIS WITH MYELOPATHY, LUMBAR REGION: Primary | Chronic | ICD-10-CM

## 2018-05-10 PROCEDURE — 99214 OFFICE O/P EST MOD 30 MIN: CPT | Performed by: FAMILY MEDICINE

## 2018-05-10 NOTE — PROGRESS NOTES
"  SUBJECTIVE:   Arnaud Bird is a 63 year old male who presents to clinic today for the following health issues:      Back Pain       Duration: 1 week        Specific cause: lifting, turning/bending    Description:   Location of pain: low back left  Character of pain: sharp, dull ache and constant  Pain radiation:none  New numbness or weakness in legs, not attributed to pain:  no     Intensity: Currently 3-4/10    History:   Pain interferes with job: interferes with home projects  History of back problems: recurrent self limited episodes of low back pain in the past  Any previous MRI or X-rays: None  Sees a specialist for back pain:  No  Therapies tried without relief: rest    Alleviating factors:   Improved by: home traction      Precipitating factors:  Worsened by: Bending    Functional and Psychosocial Screen (Tomeka STarT Back):      Not performed today  NECK AND BACK TRACTION SOME HELP   WANTS INJECTION AS WELL          Accompanying Signs & Symptoms:  Risk of Fracture:  None  Risk of Cauda Equina:  None  Risk of Infection:  None  Risk of Cancer:  None  Risk of Ankylosing Spondylitis:  Onset at age <35, male, AND morning back stiffness. no   TOPAMAX CAUSED DIZZINESS AND POOR ATTENTION STOPPED THIS ON HIS OWN BUT HAS LOST SIGNIFICANT WEIGHT        TOTAL KNEE ARTHROPLASTY IN PAST     BILATERAL CARPAL TUNNEL SYNDROME     VITAMIN D REPLACEMENT      OBESITY CENTRAL     BENIGN PROSTATIC HYPERTROPHY WITH MINIMAL SYMPTOMS     LDL OR \"BAD\" CHOLESTEROL  GOAL < 100 CONTROLLED WITHOUT COMPLICATIONS     LUMBAR DISC with RADICULOPATHY     MILD PERSISTENT ASTHMA NOT TAKING PREVENTATIVE DUE TO COST     SEXUAL DYSFUNCTION    GOUT IMPROVED ON HIGHER DOSAGE OF ALLOPURINOL             .  Current Outpatient Prescriptions   Medication Sig Dispense Refill     acyclovir (ZOVIRAX) 400 MG tablet TAKE TWO TABLETS BY MOUTH TWICE DAILY 28 tablet 0     albuterol (PROAIR HFA, PROVENTIL HFA, VENTOLIN HFA) 108 (90 BASE) MCG/ACT inhaler " Inhale 2 puffs into the lungs every 6 hours as needed for shortness of breath / dyspnea 1 Inhaler 11     allopurinol (ZYLOPRIM) 300 MG tablet Take 1 tablet (300 mg) by mouth daily 90 tablet 3     beclomethasone (QVAR) 40 MCG/ACT Inhaler Inhale 2 puffs into the lungs 2 times daily 1 Inhaler 11     clindamycin (CLEOCIN T) 1 % external solution Apply topically 2 times daily Profile Rx: patient will contact pharmacy when needed 60 mL 11     diclofenac (VOLTAREN) 1 % GEL topical gel APPLY 2 GRAMS TOPCAILLY TO AFFECTED AREA FOUR TIMES DAILY AS NEEDED 100 g 1     FLOVENT DISKUS 100 MCG/BLIST AEPB INHALE 1 PUFF BY MOUTH 2 TIMES DAILY 60 each 5     fluticasone (FLONASE) 50 MCG/ACT spray Spray 2 sprays into both nostrils daily 16 g 11     fluticasone (FLOVENT DISKUS) 100 MCG/BLIST AEPB Inhale 1 puff into the lungs 2 times daily 1 Inhaler 0     [START ON 5/26/2018] HYDROcodone-acetaminophen (NORCO) 7.5-325 MG per tablet Take 1 tablet by mouth every 4 hours as needed for pain (4 x daily as needed  maxium 4 per day) 120 tablet 0     order for DME Equipment being ordered:  CERVICAL TRACTION DEVICE  ONE  AS DIRECTED 1 each 0     sildenafil (VIAGRA) 100 MG cap/tab Take 0.5-1 tablets ( mg) by mouth daily as needed for erectile dysfunction Take 30 min to 4 hours before intercourse.  Never use with nitroglycerin, terazosin or doxazosin. 100 tablet 3     simvastatin (ZOCOR) 40 MG tablet TAKE 1 TABLET (40 MG) BY MOUTH AT BEDTIME 90 tablet 2            Allergies   Allergen Reactions     Seasonal Allergies          Immunization History   Administered Date(s) Administered     Influenza (IIV3) PF 10/18/2012               reports that he drinks alcohol.        reports that he does not use illicit drugs.      family history includes CANCER in his father; Family History Negative in his father and mother. There is no history of DIABETES, Coronary Artery Disease, Hypertension, Hyperlipidemia, CEREBROVASCULAR DISEASE, Breast Cancer, Colon  Cancer, Prostate Cancer, Other Cancer, Depression, Anxiety Disorder, MENTAL ILLNESS, Substance Abuse, Anesthesia Reaction, Asthma, OSTEOPOROSIS, Genetic Disorder, Thyroid Disease, Obesity, or Unknown/Adopted.      indicated that the status of his no family hx of is unknown. He indicated that his mother is . He indicated that his father is . He indicated that his sister is alive. He indicated that both of his brothers are alive.        has a past surgical history that includes tonsillectomy; TOOTH EXTRACTION W/FORCEP; and orthopedic surgery.       reports that he currently engages in sexual activity and has had female partners.    .  Pediatric History   Patient Guardian Status     Not on file.     Other Topics Concern     Parent/Sibling W/ Cabg, Mi Or Angioplasty Before 65f 55m? No     Social History Narrative             reports that he has never smoked. He has never used smokeless tobacco.        Medical, social, surgical, and family histories reviewed.        Labs reviewed in EPIC  Patient Active Problem List   Diagnosis     Stiffness of knee joint, right     Gait difficulty     S/P TKR (total knee replacement)     Bilateral carpal tunnel syndrome     Vitamin D deficiency     Plantar fascial fibromatosis     Asthma with exacerbation     Hypertrophy of prostate without urinary obstruction     Obesity     Hyperlipidemia     Spondylosis with myelopathy, lumbar region     LEFT WORSE THAN RIGHT      CARDIOVASCULAR SCREENING; LDL GOAL LESS THAN 100     BMI 40.0-44.9, adult (H)     Mild persistent asthma     Hyperlipidemia with target LDL less than 130     Lumbar disc disease with radiculopathy     Groin strain, initial encounter     Sexual dysfunction     ACP (advance care planning)     Slac (scapholunate advanced collapse) of wrist, right     Right wrist pain     Numbness and tingling in right hand     Chronic pain syndrome     Chronic gout involving toe 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 tophus, unspecified cause      Impingement syndrome, shoulder, right     Cervical radiculopathy     Migraine without aura and without status migrainosus, not intractable       Past Surgical History:   Procedure Laterality Date     HC TOOTH EXTRACTION W/FORCEP       ORTHOPEDIC SURGERY      bilateral total knee replacements     TONSILLECTOMY      age 2 or 3         Social History   Substance Use Topics     Smoking status: Never Smoker     Smokeless tobacco: Never Used     Alcohol use Yes      Comment: case beer a week       Family History   Problem Relation Age of Onset     CANCER Father      Family History Negative Father      Family History Negative Mother      DIABETES No family hx of      Coronary Artery Disease No family hx of      Hypertension No family hx of      Hyperlipidemia No family hx of      CEREBROVASCULAR DISEASE No family hx of      Breast Cancer No family hx of      Colon Cancer No family hx of      Prostate Cancer No family hx of      Other Cancer No family hx of      Depression No family hx of      Anxiety Disorder No family hx of      MENTAL ILLNESS No family hx of      Substance Abuse No family hx of      Anesthesia Reaction No family hx of      Asthma No family hx of      OSTEOPOROSIS No family hx of      Genetic Disorder No family hx of      Thyroid Disease No family hx of      Obesity No family hx of      Unknown/Adopted No family hx of              Current Outpatient Prescriptions   Medication Sig Dispense Refill     acyclovir (ZOVIRAX) 400 MG tablet TAKE TWO TABLETS BY MOUTH TWICE DAILY 28 tablet 0     albuterol (PROAIR HFA, PROVENTIL HFA, VENTOLIN HFA) 108 (90 BASE) MCG/ACT inhaler Inhale 2 puffs into the lungs every 6 hours as needed for shortness of breath / dyspnea 1 Inhaler 11     allopurinol (ZYLOPRIM) 300 MG tablet Take 1 tablet (300 mg) by mouth daily 90 tablet 3     beclomethasone (QVAR) 40 MCG/ACT Inhaler Inhale 2 puffs into the lungs 2 times daily 1 Inhaler 11     clindamycin (CLEOCIN T) 1 % external  solution Apply topically 2 times daily Profile Rx: patient will contact pharmacy when needed 60 mL 11     diclofenac (VOLTAREN) 1 % GEL topical gel APPLY 2 GRAMS TOPCAILLY TO AFFECTED AREA FOUR TIMES DAILY AS NEEDED 100 g 1     FLOVENT DISKUS 100 MCG/BLIST AEPB INHALE 1 PUFF BY MOUTH 2 TIMES DAILY 60 each 5     fluticasone (FLONASE) 50 MCG/ACT spray Spray 2 sprays into both nostrils daily 16 g 11     fluticasone (FLOVENT DISKUS) 100 MCG/BLIST AEPB Inhale 1 puff into the lungs 2 times daily 1 Inhaler 0     [START ON 5/26/2018] HYDROcodone-acetaminophen (NORCO) 7.5-325 MG per tablet Take 1 tablet by mouth every 4 hours as needed for pain (4 x daily as needed  maxium 4 per day) 120 tablet 0     order for DME Equipment being ordered:  CERVICAL TRACTION DEVICE  ONE  AS DIRECTED 1 each 0     sildenafil (VIAGRA) 100 MG cap/tab Take 0.5-1 tablets ( mg) by mouth daily as needed for erectile dysfunction Take 30 min to 4 hours before intercourse.  Never use with nitroglycerin, terazosin or doxazosin. 100 tablet 3     simvastatin (ZOCOR) 40 MG tablet TAKE 1 TABLET (40 MG) BY MOUTH AT BEDTIME 90 tablet 2         Recent Labs   Lab Test  03/26/18   0832  12/07/17   1023  12/30/16   0804  10/31/16   0822  07/08/15   0528  02/05/14   0750   A1C   --    --   5.4   --    --    --    LDL  88   --    --   85   --   106   HDL  58   --    --   55   --   45   TRIG  117   --    --   94   --   112   ALT   --   37   --   35  28   --    CR   --   0.85   --   0.88  0.83  1.20   GFRESTIMATED   --   >90   --   87  >90  Non  GFR Calc    62   GFRESTBLACK   --   >90   --   >90  >90  African American GFR Calc    75   POTASSIUM   --    --    --    --   4.3  4.4            BP Readings from Last 6 Encounters:   05/10/18 116/78   04/02/18 (!) 156/96   03/26/18 126/78   02/15/18 120/80   12/20/17 122/82   12/07/17 126/76         Wt Readings from Last 3 Encounters:   05/10/18 264 lb (119.7 kg)   04/02/18 270 lb (122.5 kg)   03/26/18  270 lb (122.5 kg)               Positive symptoms or findings indicated by bold designation:         ROS: 10 point ROS neg other than the symptoms noted above in the HPI.except  has Stiffness of knee joint, right; Gait difficulty; S/P TKR (total knee replacement); Bilateral carpal tunnel syndrome; Vitamin D deficiency; Plantar fascial fibromatosis; Asthma with exacerbation; Hypertrophy of prostate without urinary obstruction; Obesity; Hyperlipidemia; Spondylosis with myelopathy, lumbar region; LEFT WORSE THAN RIGHT ; CARDIOVASCULAR SCREENING; LDL GOAL LESS THAN 100; BMI 40.0-44.9, adult (H); Mild persistent asthma; Hyperlipidemia with target LDL less than 130; Lumbar disc disease with radiculopathy; Groin strain, initial encounter; Sexual dysfunction; ACP (advance care planning); Slac (scapholunate advanced collapse) of wrist, right; Right wrist pain; Numbness and tingling in right hand; Chronic pain syndrome; Chronic gout involving toe 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 tophus, unspecified cause; Impingement syndrome, shoulder, right; Cervical radiculopathy; and Migraine without aura and without status migrainosus, not intractable on his problem list.  Review Of Systems    Skin: negative    Eyes: negative    Ears/Nose/Throat: negative    Respiratory: No shortness of breath, dyspnea on exertion, cough, or hemoptysis    Cardiovascular: negative    Gastrointestinal: negative    Genitourinary: negative    Musculoskeletal: back pain, neck pain, arthritis, joint pain and joint stiffness    Neurologic: negative    Psychiatric: negative    Hematologic/Lymphatic/Immunologic: negative    Endocrine: negative OBESITY                PE:  /78 (Cuff Size: Adult Large)  Pulse 95  Temp 98  F (36.7  C) (Tympanic)  Resp 20  Wt 264 lb (119.7 kg)  SpO2 97%  BMI 40.14 kg/m2 Body mass index is 40.14 kg/(m^2).        Constitutional: general appearance, well nourished, well developed, in no acute distress, well developed, appears stated  age, normal body habitus,          Eyes:; The patient has normal eyelids sclerae and conjunctivae :          Ears/Nose/Throat: external ear, overall: normal appearance; external nose, overall: benign appearance, normal moujth gums and lips           Neck: thyroid, overall: normal size, normal consistency, nontender,          Respiratory:  palpation of chest, overall: normal excursion,    Clear to percussion and auscultation     NO Tachypnea    NORMAL  Color          Cardiovascular:  Good color with no peripheral edema    Regular sinus rhythm without murmur.  Physiologic heart sounds   Heart is unelarged    .     Chest/Breast: normal shape           Abdominal exam,  Liver and spleen are  unenlarged  CENTRAL OBESITY       Tenderness    Scars              Urogenital; no renal, flank or bladder  tenderness;          Lymphatic: neck nodes,     Other nodes         Musculoskeletal:  Brief ortho exam normal except:   DECREASE RANGE OF MOTION OF BACK     NEGATIVE STRAIGHT LEG RAISING TEST         Integument: inspection of skin, no rash, lesions; and, palpation, no induration, no tenderness.          Neurologic mental status, overall: alert and oriented; gait, no ataxia, no unsteadiness; coordination, no tremors; cranial nerves, overall: normal motor, overall: normal bulk, tone.          Psychiatric: orientation/consciousness, overall: oriented to person, place and time; behavior/psychomotor activity, no tics, normal psychomotor activity; mood and affect, overall: normal mood and affect; appearance, overall: well-groomed, good eye contact; speech, overall: normal quality, no aphasia and normal quality, quantity, intact.        Diagnostic Test Results:  Results for orders placed or performed in visit on 04/02/18   XR Knee Bilateral 1/2 Views    Narrative    Exam: Bilateral knees, 2 views each. 4/2/2018.    COMPARISON: 5/7/2009.    CLINICAL HISTORY: Acute pain of left knee.    FINDINGS: AP and lateral views of the bilateral  knees were obtained.  Postsurgical changes of bilateral total knee arthroplasties. The  hardware appears intact. Unchanged sclerosis along the the posterior  distal right femur, presumed to represent a healed nonossifying  fibroma. Small amount of fluid in the right knee joint.      Impression    IMPRESSION: Post changes of bilateral total knee arthroplasties,  without complication, as above.    AUDREY EISENBERG MD           ICD-10-CM    1. Spondylosis with myelopathy, lumbar region M47.16 RADIOLOGY REFERRAL    WANTS ANOTHER BACK INJECTION WITH INCOMPLETE RESPONSE TO LUMBAR TRACTION    2. Mild persistent asthma without complication J45.30 beclomethasone (QVAR) 40 MCG/ACT Inhaler    CHANGING TO QVAR 2 PUFFS INHALED BID FROM FLOVENT DUE TO COSTS    3. Cervical radiculopathy M54.12     RESPONDING TO TRACTION              .    Side effects benefits and risks thoroughly discussed. .he may come in early if unimproved or getting worse          Please drink 2 glasses of water prior to meals and walk 15-30 minutes after meals        I spent 25 MINUTES SPENT  with patient discussing the following issues   The primary encounter diagnosis was Spondylosis with myelopathy, lumbar region. Diagnoses of Mild persistent asthma without complication and Cervical radiculopathy were also pertinent to this visit. over half of which involved counseling and coordination of care.      Patient Instructions   (M47.16) Spondylosis with myelopathy, lumbar region  (primary encounter diagnosis)  Comment: WANTS ANOTHER BACK INJECTION WITH INCOMPLETE RESPONSE TO LUMBAR TRACTION   Plan: RADIOLOGY REFERRAL    Assessment: Lower back pain    Plan: do these exercises at least twice daily:  Standing or sitting exercise can be done every two hours    Merrill' Flexion Versus Allen   Extension Exercises For   Low Back Pain   Examples of Merrill' Flexion Exercises  1. Pelvic tilt.  Please press the small of your back against the floor.  Start with 5-10   and increase to 100 count over one month   2. Single Knee to chest. Lie on your back with legs in bent position. Alternate one leg and the other very slowly bringing the knee to chest.  Start with a count of 5-10   Over one month work up to 100  3. Double knee to chest.  Lie on your back with knees in bent position.  Bring both knees to the chest slowly hold for a count of 5-to 10. Over one month work to 100  4. Partial sit-up or crunch.  Lie on your back in bent leg position.  Please bring your body with arms crossed in front  To 30 degrees of flexion. Start with 5-10 over one month work up to 100 or more  5. Sit back.  Please sit on the side of the bed or a stair landing  And lie backwards until the abdominal muscles start to quiver.  Hold for a count of 5-10 and over a month work up to 100.  5. Hamstring stretch.  Please extend your legs while sitting on the floor as tolerated for 5-10 count.  Gradually increase to count of 30 over one month      Alternately find a stair landing or sturdy chair and place heel  In a comfortable level of extension  And stretch one hamstring at a time for 5-10 seconds.  Increase to count of 30 over one month                         Squat.  Stand with legs comfortably apart and lower the body slowly by flexing the knees  for count of 5-10 over one month increase to 30.  Useful for anterior disc protrusion, facette joint arthritis spond-10ylolysis, spondylolisthesis and spinal stenosis    Usha method or extension exercises  Useful disc bulging posteriorly  1. Prone pressups  Please lie on your abdomen.   Please do a push with the upper half of your body only.  This should not cause the pain to shoot down your buttock thigh leg or foot  Start with 10 and repeat x 3 one minute apart.  Repeat x 10 every 1-2 hours  Pain tends to increase in the center of back  And leaves buttock thighs legs and foot over time  You may shift your pelvis in opposite direction of buttock and leg pain to  achieve even better results  2. Superman with arms extended  Or at the side Simultaneously lift your arms and legs off the floor. Start with 5-10 over one month increase to 100.  3. Cat stretch or cobra Start  As if in extended position of prone pressup but hold the stretch for 5 or 10 count. Over one moth to count of 30.  4.  Extensions can be done in standing position  As well if prone pressup is inconvenient.  Shift your pelvis in opposite direction of limb pain.  Put your hands  Flat on your buttocks and lean backwards without loss of balance.  Count 10 x 3 times then 10 extensions hourly                  (J45.30) Mild persistent asthma without complication  Comment: CHANGING TO QVAR 2 PUFFS INHALED BID FROM FLOVENT DUE TO COSTS   Plan: beclomethasone (QVAR) 40 MCG/ACT Inhaler  2 PUFFS INHALED TWICE DAILY              (M54.12) Cervical radiculopathy  Comment: RESPONDING TO TRACTION  RIGHT AND LEFT ROTATION INTO PAIN SITTING UP 30 TIMES TWICE DAILY  RIGHT AND LEFT LATERAL BENDING INTO PAIN 30 TIMES TWICE DAILY  NECK EXTENSION 30 TIME TWICE DAILY  NECK FLEXION  30 TIMES TWICE DAILY  MASSAGE BACK OF NECK MULTIPLE TIMES THROUGHOUT DAY AS TOLERATED  PRONE POSITION NECK ROTATION RIGHT AND LEFT 30 TIMES TWICE DAILY  NECK EXTENSION 30 TIMES TWICE DAILY  ROTATE NECK IN [place] RIGHTWARD AND LEFT NAVA  AVOID ANY EXERCISE WHICH RESULTS IN SHOOTING NERVE PAIN DOWN THE ARM OR SHOULDER BLADE    Plan:                                ALL THE ABOVE PROBLEMS ARE STABLE AND MED CHANGES AS NOTED        Diet: MEDITERRANEAN DIET AND WEIGHT LOSS         Exercise:  90 MINUTES OF EXERCISE PER DAY RECOMMENDED    Exercises Range of motion, balance, isometric, and strengthening exercises 30 repetitions twice daily of involved joints            .SCOTTY TOLEDO MD 5/10/2018 3:14 PM  May 10, 2018

## 2018-05-10 NOTE — PATIENT INSTRUCTIONS
(M47.16) Spondylosis with myelopathy, lumbar region  (primary encounter diagnosis)    Comment: WANTS ANOTHER BACK INJECTION WITH INCOMPLETE RESPONSE TO LUMBAR TRACTION     Plan: RADIOLOGY REFERRAL    Assessment: Lower back pain    Plan: do these exercises at least twice daily:  Standing or sitting exercise can be done every two hours    Merrill' Flexion Versus Allen   Extension Exercises For   Low Back Pain   Examples of Merrill' Flexion Exercises  1. Pelvic tilt.  Please press the small of your back against the floor.  Start with 5-10  and increase to 100 count over one month   2. Single Knee to chest. Lie on your back with legs in bent position. Alternate one leg and the other very slowly bringing the knee to chest.  Start with a count of 5-10   Over one month work up to 100  3. Double knee to chest.  Lie on your back with knees in bent position.  Bring both knees to the chest slowly hold for a count of 5-to 10. Over one month work to 100  4. Partial sit-up or crunch.  Lie on your back in bent leg position.  Please bring your body with arms crossed in front  To 30 degrees of flexion. Start with 5-10 over one month work up to 100 or more  5. Sit back.  Please sit on the side of the bed or a stair landing  And lie backwards until the abdominal muscles start to quiver.  Hold for a count of 5-10 and over a month work up to 100.  5. Hamstring stretch.  Please extend your legs while sitting on the floor as tolerated for 5-10 count.  Gradually increase to count of 30 over one month      Alternately find a stair landing or sturdy chair and place heel  In a comfortable level of extension  And stretch one hamstring at a time for 5-10 seconds.  Increase to count of 30 over one month                         Squat.  Stand with legs comfortably apart and lower the body slowly by flexing the knees  for count of 5-10 over one month increase to 30.  Useful for anterior disc protrusion, facette joint arthritis  spond-10ylolysis, spondylolisthesis and spinal stenosis    Usha method or extension exercises  Useful disc bulging posteriorly  1. Prone pressups  Please lie on your abdomen.   Please do a push with the upper half of your body only.  This should not cause the pain to shoot down your buttock thigh leg or foot  Start with 10 and repeat x 3 one minute apart.  Repeat x 10 every 1-2 hours  Pain tends to increase in the center of back  And leaves buttock thighs legs and foot over time  You may shift your pelvis in opposite direction of buttock and leg pain to achieve even better results  2. Superman with arms extended  Or at the side Simultaneously lift your arms and legs off the floor. Start with 5-10 over one month increase to 100.  3. Cat stretch or cobra Start  As if in extended position of prone pressup but hold the stretch for 5 or 10 count. Over one moth to count of 30.  4.  Extensions can be done in standing position  As well if prone pressup is inconvenient.  Shift your pelvis in opposite direction of limb pain.  Put your hands  Flat on your buttocks and lean backwards without loss of balance.  Count 10 x 3 times then 10 extensions hourly                            (J45.30) Mild persistent asthma without complication    Comment: CHANGING TO QVAR 2 PUFFS INHALED BID FROM FLOVENT DUE TO COSTS     Plan: beclomethasone (QVAR) 40 MCG/ACT Inhaler    2 PUFFS INHALED TWICE DAILY                    (M54.12) Cervical radiculopathy    Comment: RESPONDING TO TRACTION  RIGHT AND LEFT ROTATION INTO PAIN SITTING UP 30 TIMES TWICE DAILY  RIGHT AND LEFT LATERAL BENDING INTO PAIN 30 TIMES TWICE DAILY  NECK EXTENSION 30 TIME TWICE DAILY  NECK FLEXION  30 TIMES TWICE DAILY  MASSAGE BACK OF NECK MULTIPLE TIMES THROUGHOUT DAY AS TOLERATED  PRONE POSITION NECK ROTATION RIGHT AND LEFT 30 TIMES TWICE DAILY  NECK EXTENSION 30 TIMES TWICE DAILY  ROTATE NECK IN [place] RIGHTWARD AND LEFT NAVA  AVOID ANY EXERCISE WHICH RESULTS IN SHOOTING  NERVE PAIN DOWN THE ARM OR SHOULDER BLADE        Plan:

## 2018-05-10 NOTE — LETTER
My Migraine Action Plan      Date: 5/10/2018     My Name: Arnaud Bird   YOB: 1955  My Pharmacy: [id number] [place] [facility] [place], [place] - [address]       My (Preventative) Control Medicine: ***        My Rescue Medicine: ***   My Doctor: Johnson Clements     My Clinic: [facility]  [address]  [address]  [place] [address] [phone]        GREEN ZONE = Good Control    My headache plan is working.   I can do what I need to do.           I WILL:     ? Keep managing my triggers.  ? Write in my migraine diary each time I have a headache.  ? Keep taking my preventive (controller) medicine daily.  ? Take my relief and rescue medicine as needed.             YELLOW ZONE = Not Enough Control    My headache plan isn t always working.   My headaches keep me from doing   some of the things I need to do.       I WILL:     ? Set goals to control my triggers and act on them.  ? Write in my migraine diary each time I have a headache and review it for                      patterns or new triggers.  ? Keep taking my preventive (controller) medicine daily.  ? Take my relief and rescue medicine as needed.  ? Call my doctor or clinic at if I stay in the Yellow Zone.             RED ZONE = Poor or No Control    My headache plan has  failed. I can t do anything  when I have one. My  medicines aren t working.           I WILL:   ? Set goals to control my triggers and act on them.  ? Write in my migraine diary each time I have a headache and review it for                      patterns or new triggers.  ? Keep taking my preventive (controller) medicine daily.  ? Take my relief and rescue medicine as needed.  ? Call my doctor or clinic or go to urgent care or an ER if I m having the worst                  headache of my life.  ? Call my doctor or clinic or go to urgent care or an ER if my medicine doesn t work.  ? Let my doctor or clinic know within 2  weeks if I have gone to an urgent care or             emergency department.          Provider specific instructions:  ***

## 2018-05-10 NOTE — MR AVS SNAPSHOT
After Visit Summary   5/10/2018    Arnaud Bird    MRN: [id number]           Patient Information     Date Of Birth          1955        Visit Information        Provider Department      5/10/2018 2:45 PM Johnson Clements MD [facility]        Today's Diagnoses     Spondylosis with myelopathy, lumbar region    -  1      Care Instructions      (M47.16) Spondylosis with myelopathy, lumbar region  (primary encounter diagnosis)    Comment:      Plan: RADIOLOGY REFERRAL                         Follow-ups after your visit        Additional Services     RADIOLOGY REFERRAL       Your provider has referred you to: [facility] RADIOLOGY  [phone]  [phone]   INTERVENTIONAL RADIOLOGY   EPIDURAL STEROID INJECTION   [facility]  [phone]  [phone]   INTERVENTIONAL RADIOLOGY   EPIDURAL STEROID INJECTION   LUMBAR DISC  DISEASE   LAST DONE 1 YEAR AGO       Please be aware that coverage of these services is subject to the terms and limitations of your health insurance plan.  Call member services at your health plan with any benefit or coverage questions.      Please bring the following to your appointment:    >>   Any x-rays, CTs or MRIs which have been performed.  Contact the facility where they were done to arrange for  prior to your scheduled appointment.    >>   List of current medications   >>   This referral request   >>   Any documents/labs given to you for this referral    Prior authorization is required for MRI/MRA, CT, Dexa Scans and Worker's Compensation cases.  Associated Diagnoses   Lumbar disc disease with radiculopathy [M51.16        Please be aware that coverage of these services is subject to the terms and limitations of your health insurance plan.  Call member services at your health plan with any benefit or coverage questions.      Please bring the following to your appointment:    >>   Any x-rays, CTs or MRIs which have  "been performed.  Contact the facility where they were done to arrange for  prior to your scheduled appointment.    >>   List of current medications   >>   This referral request   >>   Any documents/labs given to you for this referral    Prior authorization is required for MRI/MRA, CT, Dexa Scans and Worker's Compensation cases.                  Who to contact     If you have questions or need follow up information about today's clinic visit or your schedule please contact [facility] directly at [phone].  Normal or non-critical lab and imaging results will be communicated to you by VirtualQubehart, letter or phone within 4 business days after the clinic has received the results. If you do not hear from us within 7 days, please contact the clinic through VirtualQubehart or phone. If you have a critical or abnormal lab result, we will notify you by phone as soon as possible.  Submit refill requests through GLO or call your pharmacy and they will forward the refill request to us. Please allow 3 business days for your refill to be completed.          Additional Information About Your Visit        GLO Information     GLO lets you send messages to your doctor, view your test results, renew your prescriptions, schedule appointments and more. To sign up, go to [email or web address] . Click on \"Log in\" on the left side of the screen, which will take you to the Welcome page. Then click on \"Sign up Now\" on the right side of the page.     You will be asked to enter the access code listed below, as well as some personal information. Please follow the directions to create your username and password.     Your access code is: [email or web address]  Expires: 2018  1:24 PM     Your access code will  in 90 days. If you need help or a new code, please call your [facility] or [phone].        Care EveryWhere ID     This is your Care EveryWhere ID. This could be used by other " organizations to access your [place] medical records  [id number]        Your Vitals Were     Pulse Temperature Respirations Pulse Oximetry BMI (Body Mass Index)       95 98  F (36.7  C) (Tympanic) 20 97% 40.14 kg/m2        Blood Pressure from Last 3 Encounters:   05/10/18 116/78   04/02/18 (!) 156/96   03/26/18 126/78    Weight from Last 3 Encounters:   05/10/18 264 lb (119.7 kg)   04/02/18 270 lb (122.5 kg)   03/26/18 270 lb (122.5 kg)              We Performed the Following     MIGRAINE ACTION PLAN     RADIOLOGY REFERRAL          Today's Medication Changes          These changes are accurate as of 5/10/18  3:21 PM.  If you have any questions, ask your nurse or doctor.               Stop taking these medicines if you haven't already. Please contact your care team if you have questions.     topiramate 25 MG tablet   Commonly known as:  TOPAMAX   Stopped by:  Johnson Clements MD                    Primary Care Provider Office Phone # Fax #    Johnson Clements [phone] [address] [address]        Equal Access to Services     ARVIND MODI AH: Hadii lainey ku hadasho Soalexisali, waaxda luqadaha, qaybta kaalmada adeegyada, gee mirza. So [facility] [phone].    ATENCIÓN: Si habla español, tiene a stewart disposición servicios gratuitos de asistencia lingüística. Llame al [phone].    We comply with applicable federal civil rights laws and [place] laws. We do not discriminate on the basis of race, color, national origin, age, disability, sex, sexual orientation, or gender identity.            Thank you!     Thank you for choosing [facility]  for your care. Our goal is always to provide you with excellent care. Hearing back from our patients is one way we can continue to improve our services. Please take a few minutes to complete the written survey that you may receive in the mail after your visit with  us. Thank you!             Your Updated Medication List - Protect others around you: Learn how to safely use, store and throw away your medicines at www.disposemymeds.org.          This list is accurate as of 5/10/18  3:21 PM.  Always use your most recent med list.                   Brand Name Dispense Instructions for use Diagnosis    acyclovir 400 MG tablet    ZOVIRAX    28 tablet    TAKE TWO TABLETS BY MOUTH TWICE DAILY    HSV (herpes simplex virus) infection       albuterol 108 (90 Base) MCG/ACT Inhaler    PROAIR HFA/PROVENTIL HFA/VENTOLIN HFA    1 Inhaler    Inhale 2 puffs into the lungs every 6 hours as needed for shortness of breath / dyspnea    Mild persistent asthma, uncomplicated       allopurinol 300 MG tablet    ZYLOPRIM    90 tablet    Take 1 tablet (300 mg) by mouth daily    Chronic gout involving toe 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 tophus, unspecified cause       clindamycin 1 % solution    CLEOCIN T    60 mL    Apply topically 2 times daily Profile Rx: patient will contact pharmacy when needed    Folliculitis       diclofenac 1 % Gel topical gel    VOLTAREN    100 g    APPLY 2 GRAMS TOPCAILLY TO AFFECTED AREA FOUR TIMES DAILY AS NEEDED    Stiffness of knee joint, right, Carpal tunnel syndrome, unspecified laterality, Primary localized osteoarthrosis, lower leg, unspecified laterality, Lumbar disc disease with radiculopathy       * fluticasone 100 MCG/BLIST Aepb    FLOVENT DISKUS    1 Inhaler    Inhale 1 puff into the lungs 2 times daily    Mild persistent asthma without complication       * FLOVENT DISKUS 100 MCG/BLIST Aepb   Generic drug:  fluticasone     60 each    INHALE 1 PUFF BY MOUTH 2 TIMES DAILY    Mild persistent asthma without complication       fluticasone 50 MCG/ACT spray    FLONASE    16 g    Spray 2 sprays into both nostrils daily    Seasonal allergic rhinitis due to pollen       HYDROcodone-acetaminophen 7.5-325 MG per tablet   Start taking on:  5/26/2018    NORCO    120 tablet    Take 1 tablet  by mouth every 4 hours as needed for pain (4 x daily as needed  maxium 4 per day)    Chronic pain syndrome       order for DME     1 each    Equipment being ordered:  CERVICAL TRACTION DEVICE ONE AS DIRECTED    Chronic pain syndrome       sildenafil 100 MG tablet    VIAGRA    100 tablet    Take 0.5-1 tablets ( mg) by mouth daily as needed for erectile dysfunction Take 30 min to 4 hours before intercourse.  Never use with nitroglycerin, terazosin or doxazosin.    Sexual dysfunction       simvastatin 40 MG tablet    ZOCOR    90 tablet    TAKE 1 TABLET (40 MG) BY MOUTH AT BEDTIME    Hyperlipidemia with target LDL less than 130       * Notice:  This list has 2 medication(s) that are the same as other medications prescribed for you. Read the directions carefully, and ask your doctor or other care provider to review them with you.

## 2018-05-11 ENCOUNTER — TRANSFERRED RECORDS (OUTPATIENT)
Dept: HEALTH INFORMATION MANAGEMENT | Facility: CLINIC | Age: 63
End: 2018-05-11

## 2018-05-21 DIAGNOSIS — B00.9 HSV (HERPES SIMPLEX VIRUS) INFECTION: ICD-10-CM

## 2018-05-21 NOTE — TELEPHONE ENCOUNTER
"Requested Prescriptions   Pending Prescriptions Disp Refills     acyclovir (ZOVIRAX) 400 MG tablet [Pharmacy Med Name: ACYCLOVIR 400 MG TABLET] 28 tablet 0      Last Written Prescription Date:  10/31/16  Last Fill Quantity: 28,  # refills: 0   Last office visit: 5/10/2018 with prescribing provider:     Future Office Visit:     Sig: TAKE TWO TABLETS BY MOUTH TWICE DAILY    Antivirals for Herpes Protocol Passed    5/21/2018 11:19 AM       Passed - Patient is age 12 or older       Passed - Recent (12 mo) or future (30 days) visit within the authorizing provider's specialty    Patient had office visit in the last 12 months or has a visit in the next 30 days with authorizing provider or within the authorizing provider's specialty.  See \"Patient Info\" tab in inbasket, or \"Choose Columns\" in Meds & Orders section of the refill encounter.           Passed - Normal serum creatinine on file in past 12 months    Recent Labs   Lab Test  12/07/17   1023   CR  0.85               "

## 2018-05-23 RX ORDER — ACYCLOVIR 400 MG/1
TABLET ORAL
Qty: 28 TABLET | Refills: 3 | Status: SHIPPED | OUTPATIENT
Start: 2018-05-23 | End: 2018-11-18

## 2018-06-04 ENCOUNTER — OFFICE VISIT (OUTPATIENT)
Dept: FAMILY MEDICINE | Facility: CLINIC | Age: 63
End: 2018-06-04
Payer: COMMERCIAL

## 2018-06-04 VITALS
SYSTOLIC BLOOD PRESSURE: 120 MMHG | WEIGHT: 264.5 LBS | OXYGEN SATURATION: 96 % | BODY MASS INDEX: 40.22 KG/M2 | TEMPERATURE: 98.3 F | DIASTOLIC BLOOD PRESSURE: 80 MMHG | HEART RATE: 78 BPM

## 2018-06-04 DIAGNOSIS — R53.83 FATIGUE, UNSPECIFIED TYPE: Primary | ICD-10-CM

## 2018-06-04 DIAGNOSIS — F11.90 CHRONIC, CONTINUOUS USE OF OPIOIDS: ICD-10-CM

## 2018-06-04 DIAGNOSIS — R37 SEXUAL DYSFUNCTION: ICD-10-CM

## 2018-06-04 DIAGNOSIS — F10.10 EXCESSIVE DRINKING ALCOHOL: ICD-10-CM

## 2018-06-04 LAB
BASOPHILS # BLD AUTO: 0 10E9/L (ref 0–0.2)
BASOPHILS NFR BLD AUTO: 0.2 %
DIFFERENTIAL METHOD BLD: NORMAL
EOSINOPHIL # BLD AUTO: 0.1 10E9/L (ref 0–0.7)
EOSINOPHIL NFR BLD AUTO: 1.6 %
ERYTHROCYTE [DISTWIDTH] IN BLOOD BY AUTOMATED COUNT: 13.9 % (ref 10–15)
ERYTHROCYTE [SEDIMENTATION RATE] IN BLOOD BY WESTERGREN METHOD: 9 MM/H (ref 0–20)
FOLATE SERPL-MCNC: 7.4 NG/ML
HCT VFR BLD AUTO: 46.9 % (ref 40–53)
HGB BLD-MCNC: 15.8 G/DL (ref 13.3–17.7)
LYMPHOCYTES # BLD AUTO: 1.3 10E9/L (ref 0.8–5.3)
LYMPHOCYTES NFR BLD AUTO: 23.8 %
MCH RBC QN AUTO: 31.5 PG (ref 26.5–33)
MCHC RBC AUTO-ENTMCNC: 33.7 G/DL (ref 31.5–36.5)
MCV RBC AUTO: 93 FL (ref 78–100)
MONOCYTES # BLD AUTO: 0.4 10E9/L (ref 0–1.3)
MONOCYTES NFR BLD AUTO: 8 %
NEUTROPHILS # BLD AUTO: 3.6 10E9/L (ref 1.6–8.3)
NEUTROPHILS NFR BLD AUTO: 66.4 %
PLATELET # BLD AUTO: 209 10E9/L (ref 150–450)
RBC # BLD AUTO: 5.02 10E12/L (ref 4.4–5.9)
VIT B12 SERPL-MCNC: 475 PG/ML (ref 193–986)
WBC # BLD AUTO: 5.5 10E9/L (ref 4–11)

## 2018-06-04 PROCEDURE — 82728 ASSAY OF FERRITIN: CPT | Performed by: PHYSICIAN ASSISTANT

## 2018-06-04 PROCEDURE — 84443 ASSAY THYROID STIM HORMONE: CPT | Performed by: PHYSICIAN ASSISTANT

## 2018-06-04 PROCEDURE — 85652 RBC SED RATE AUTOMATED: CPT | Performed by: PHYSICIAN ASSISTANT

## 2018-06-04 PROCEDURE — 99214 OFFICE O/P EST MOD 30 MIN: CPT | Performed by: PHYSICIAN ASSISTANT

## 2018-06-04 PROCEDURE — 82607 VITAMIN B-12: CPT | Performed by: PHYSICIAN ASSISTANT

## 2018-06-04 PROCEDURE — 36415 COLL VENOUS BLD VENIPUNCTURE: CPT | Performed by: PHYSICIAN ASSISTANT

## 2018-06-04 PROCEDURE — 82746 ASSAY OF FOLIC ACID SERUM: CPT | Performed by: PHYSICIAN ASSISTANT

## 2018-06-04 PROCEDURE — 85025 COMPLETE CBC W/AUTO DIFF WBC: CPT | Performed by: PHYSICIAN ASSISTANT

## 2018-06-04 PROCEDURE — 84403 ASSAY OF TOTAL TESTOSTERONE: CPT | Performed by: PHYSICIAN ASSISTANT

## 2018-06-04 PROCEDURE — 86618 LYME DISEASE ANTIBODY: CPT | Performed by: PHYSICIAN ASSISTANT

## 2018-06-04 PROCEDURE — 80053 COMPREHEN METABOLIC PANEL: CPT | Performed by: PHYSICIAN ASSISTANT

## 2018-06-04 RX ORDER — SILDENAFIL 100 MG/1
50-100 TABLET, FILM COATED ORAL DAILY PRN
Qty: 100 TABLET | Refills: 3 | Status: SHIPPED | OUTPATIENT
Start: 2018-06-04 | End: 2019-03-19

## 2018-06-04 RX ORDER — PHENTERMINE HYDROCHLORIDE 37.5 MG/1
TABLET ORAL
Qty: 30 TABLET | Refills: 0 | Status: SHIPPED | OUTPATIENT
Start: 2018-06-04 | End: 2018-08-22

## 2018-06-04 NOTE — MR AVS SNAPSHOT
After Visit Summary   6/4/2018    Arnaud Bird    MRN: [id number]           Patient Information     Date Of Birth          1955        Visit Information        Provider Department      6/4/2018 8:10 AM Lauryn Coronel PA-C [facility]        Today's Diagnoses     Fatigue, unspecified type    -  1    Excessive drinking alcohol        Chronic, continuous use of opioids        BMI 40.0-44.9, adult (H)        Sexual dysfunction           Follow-ups after your visit        Follow-up notes from your care team     Return if symptoms worsen or fail to improve.      Who to contact     If you have questions or need follow up information about today's clinic visit or your schedule please contact [facility] directly at [phone].  Normal or non-critical lab and imaging results will be communicated to you by MyChart, letter or phone within 4 business days after the clinic has received the results. If you do not hear from us within 7 days, please contact the clinic through MyChart or phone. If you have a critical or abnormal lab result, we will notify you by phone as soon as possible.  Submit refill requests through ClassBug or call your pharmacy and they will forward the refill request to us. Please allow 3 business days for your refill to be completed.          Additional Information About Your Visit        Care EveryWhere ID     This is your Care EveryWhere ID. This could be used by other organizations to access your [place] medical records  [id number]        Your Vitals Were     Pulse Temperature Pulse Oximetry BMI (Body Mass Index)          78 98.3  F (36.8  C) (Tympanic) 96% 40.22 kg/m2         Blood Pressure from Last 3 Encounters:   06/04/18 120/80   05/10/18 116/78   04/02/18 (!) 156/96    Weight from Last 3 Encounters:   06/04/18 264 lb 8 oz (120 kg)   05/10/18 264 lb (119.7 kg)   04/02/18 270 lb (122.5 kg)               We Performed the Following     CBC with platelets differential     Comprehensive metabolic panel     Erythrocyte sedimentation rate auto     Ferritin     Folate     Lyme Disease Calli with reflex to WB Serum     Testosterone, total     TSH with free T4 reflex     Vitamin B12          Today's Medication Changes          These changes are accurate as of 6/4/18  9:25 AM.  If you have any questions, ask your nurse or doctor.               Start taking these medicines.        Dose/Directions    phentermine 37.5 MG tablet   Commonly known as:  ADIPEX-P   Used for:  BMI 40.0-44.9, adult (H)   Started by:  Lauryn Coronel PA-C        TAKE ONE TABLET BY MOUTH EVERY DAY IN THE MORNING.   Quantity:  30 tablet   Refills:  0         These medicines have changed or have updated prescriptions.        Dose/Directions    sildenafil 100 MG tablet   Commonly known as:  VIAGRA   This may have changed:  reasons to take this   Used for:  Sexual dysfunction   Changed by:  Lauryn Coronel PA-C        Dose:   mg   Take 0.5-1 tablets ( mg) by mouth daily as needed Take 30 min to 4 hours before intercourse.  Never use with nitroglycerin, terazosin or doxazosin.   Quantity:  100 tablet   Refills:  3            Where to get your medicines      Some of these will need a paper prescription and others can be bought over the counter.  Ask your nurse if you have questions.     Bring a paper prescription for each of these medications     phentermine 37.5 MG tablet    sildenafil 100 MG tablet                Primary Care Provider Office Phone # Fax #    Johnson Jesica Clements [phone] [address] [facility] [address]        Equal Access to Services     [facility] CenterYULIANA : Tina Paula, wahumphreyda ivana, qaybta kaalmatala dukeay edwin sparks . So [facility] [phone].    ATENCIÓN: Si habla español, tiene a stewart disposición servicios gratuitos de asistencia  lingüística. Arsen al [phone].    We comply with applicable federal civil rights laws and [place] laws. We do not discriminate on the basis of race, color, national origin, age, disability, sex, sexual orientation, or gender identity.            Thank you!     Thank you for choosing [facility]  for your care. Our goal is always to provide you with excellent care. Hearing back from our patients is one way we can continue to improve our services. Please take a few minutes to complete the written survey that you may receive in the mail after your visit with us. Thank you!             Your Updated Medication List - Protect others around you: Learn how to safely use, store and throw away your medicines at www.disposemymeds.org.          This list is accurate as of 6/4/18  9:25 AM.  Always use your most recent med list.                   Brand Name Dispense Instructions for use Diagnosis    acyclovir 400 MG tablet    ZOVIRAX    28 tablet    TAKE TWO TABLETS BY MOUTH TWICE DAILY    HSV (herpes simplex virus) infection       albuterol 108 (90 Base) MCG/ACT Inhaler    PROAIR HFA/PROVENTIL HFA/VENTOLIN HFA    1 Inhaler    Inhale 2 puffs into the lungs every 6 hours as needed for shortness of breath / dyspnea    Mild persistent asthma, uncomplicated       allopurinol 300 MG tablet    ZYLOPRIM    90 tablet    Take 1 tablet (300 mg) by mouth daily    Chronic gout involving toe 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 tophus, unspecified cause       beclomethasone 40 MCG/ACT Inhaler    QVAR    1 Inhaler    Inhale 2 puffs into the lungs 2 times daily    Mild persistent asthma without complication       clindamycin 1 % solution    CLEOCIN T    60 mL    Apply topically 2 times daily Profile Rx: patient will contact pharmacy when needed    Folliculitis       diclofenac 1 % Gel topical gel    VOLTAREN    100 g    APPLY 2 GRAMS TOPCAILLY TO AFFECTED AREA FOUR TIMES DAILY AS NEEDED    Stiffness of knee joint,  right, Carpal tunnel syndrome, unspecified laterality, Primary localized osteoarthrosis, lower leg, unspecified laterality, Lumbar disc disease with radiculopathy       FLOVENT DISKUS 100 MCG/BLIST Aepb   Generic drug:  fluticasone     60 each    INHALE 1 PUFF BY MOUTH 2 TIMES DAILY    Mild persistent asthma without complication       fluticasone 50 MCG/ACT spray    FLONASE    16 g    Spray 2 sprays into both nostrils daily    Seasonal allergic rhinitis due to pollen       HYDROcodone-acetaminophen 7.5-325 MG per tablet    NORCO    120 tablet    Take 1 tablet by mouth every 4 hours as needed for pain (4 x daily as needed  maxium 4 per day)    Chronic pain syndrome       order for DME     1 each    Equipment being ordered:  CERVICAL TRACTION DEVICE ONE AS DIRECTED    Chronic pain syndrome       phentermine 37.5 MG tablet    ADIPEX-P    30 tablet    TAKE ONE TABLET BY MOUTH EVERY DAY IN THE MORNING.    BMI 40.0-44.9, adult (H)       sildenafil 100 MG tablet    VIAGRA    100 tablet    Take 0.5-1 tablets ( mg) by mouth daily as needed Take 30 min to 4 hours before intercourse.  Never use with nitroglycerin, terazosin or doxazosin.    Sexual dysfunction       simvastatin 40 MG tablet    ZOCOR    90 tablet    TAKE 1 TABLET (40 MG) BY MOUTH AT BEDTIME    Hyperlipidemia with target LDL less than 130

## 2018-06-04 NOTE — PROGRESS NOTES
"  SUBJECTIVE:   Arnaud Bird is a 63 year old male who presents to clinic today for the following health issues:      fatigue      Duration: 2-3 weeks    Description (location/character/radiation): fatigue    Intensity:  moderate, severe    Accompanying signs and symptoms: no energy    History (similar episodes/previous evaluation): None    Precipitating or alleviating factors: None    Therapies tried and outcome: None       HPI additional notes:    Chief Complaint   Patient presents with     Fatigue     Arnaud presents today with fatigue x2-3 weeks. Describes as lack of energy, rather than sleepiness. Sleep has actually improved since improvement in cervicalgia about 1 month ago. Denies f/c/s, CP, SOB, orthopnea, dizziness or lightheadedness, abd pain, n/v/d, focal numbness or weakness. Patient endorses frequent HA, approx 1 per week, relieved by OTC pain reliever or rest. Endorses myalgias and joint pain, all chronic, on daily opioid therapy for multiple chronic pain issues. Was at cabin several weeks ago, did removal multiple ticks, both wood and deer ticks. Patient reports GI condition 30 years ago in which he was unable to absorb protein and became quite deficient, sx resolve after course of prednisone. Patient somewhat evasive about EtOH use, \"usually not much, but some days I drink more.\" When asked further about EtOH use, patient states anywhere from 2-5 beers per day.    Requests refills of phentermine and Viagra.     ROS:    A Comprehensive greater than 10 system review of systems was carried out.    Pertinent positives and negatives are noted above.  Otherwise negative for contributory information.      Chart Review:  History   Smoking Status     Never Smoker   Smokeless Tobacco     Never Used       Patient Active Problem List   Diagnosis     Stiffness of knee joint, right     Gait difficulty     S/P TKR (total knee replacement)     Bilateral carpal tunnel syndrome     Vitamin D deficiency     Plantar " fascial fibromatosis     Asthma with exacerbation     Hypertrophy of prostate without urinary obstruction     Obesity     Hyperlipidemia     Spondylosis with myelopathy, lumbar region     LEFT WORSE THAN RIGHT      CARDIOVASCULAR SCREENING; LDL GOAL LESS THAN 100     BMI 40.0-44.9, adult (H)     Mild persistent asthma     Hyperlipidemia with target LDL less than 130     Lumbar disc disease with radiculopathy     Groin strain, initial encounter     Sexual dysfunction     ACP (advance care planning)     Slac (scapholunate advanced collapse) of wrist, right     Right wrist pain     Numbness and tingling in right hand     Chronic pain syndrome     Chronic gout involving toe 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 tophus, unspecified cause     Impingement syndrome, shoulder, right     Cervical radiculopathy     Migraine without aura and without status migrainosus, not intractable     Past Surgical History:   Procedure Laterality Date     HC TOOTH EXTRACTION W/FORCEP       ORTHOPEDIC SURGERY      bilateral total knee replacements     TONSILLECTOMY      age 2 or 3     Problem list, Medication list, Allergies, Medical/Social/Surg hx reviewed in [employer], updated as appropriate.   OBJECTIVE:                                                    /80 (Cuff Size: Adult Large)  Pulse 78  Temp 98.3  F (36.8  C) (Tympanic)  Wt 264 lb 8 oz (120 kg)  SpO2 96%  BMI 40.22 kg/m2  Body mass index is 40.22 kg/(m^2).  GENERAL: obese, no acute distress  PSYCH: pleasant, cooperative  EYES: no discharge, no injection  HENT:  Normocephalic. Moist mucus membranes. Ear canals clear, TMs pearly gray w/o effusion. Oropharynx pink, uvula midline.  NECK:  Supple, symmetric, no YVONNE  LUNGS:  Clear to auscultation bilaterally without rhonchi, rales, or wheeze. Chest rise symmetric and no tenderness to palpation.  HEART:  Regular rate & rhythm. No murmur, gallop, or rub.  EXTREMITIES:  No gross deformities, moves all 4 limbs spontaneously, no peripheral edema  SKIN:   Warm and dry, no rash or suspicious lesions    NEUROLOGIC: A&Ox3. CN 2-12 grossly intact, sensation grossly intact. Gait normal.    Diagnostic test results: none     ASSESSMENT/PLAN:                                                          ICD-10-CM    1. Fatigue, unspecified type R53.83 Comprehensive metabolic panel     CBC with platelets differential     TSH with free T4 reflex     Folate     Erythrocyte sedimentation rate auto     Lyme Disease Calli with reflex to WB Serum     Ferritin     Vitamin B12     Testosterone, total   2. Excessive drinking alcohol F10.10    3. Chronic, continuous use of opioids F11.90    4. BMI 40.0-44.9, adult (H) Z68.41 phentermine (ADIPEX-P) 37.5 MG tablet   5. Sexual dysfunction R37 sildenafil (VIAGRA) 100 MG tablet     Testosterone, total     Patient here with non-specific complaint of fatigue/malaise, unclear etiology. Given recent tick exposure, will screen for Lyme disease. Patient without cardiopulmonary complaints and benign exam, so w/u for this was not pursued. Patient with continuous opioid use and hx sexual dysfunction; gynecomastia on exam but may be due to body habitus/obesity, will check testosterone for completeness. Does endorse excessive alcohol use (>2 beers per day) so will also check CMP, CBC, folate, B12. Will also check TSH, ESR, ferritin for completeness.    Phentermine and Viagra refilled as above as no s/s concerning for angina and benign exam.    Please see patient instructions for treatment details.  25 minutes total spent during this visit, >50% spent in counseling and coordination of patient care.    Follow up pending w/u as above    Lauryn Coronel PA-C  [facility]

## 2018-06-05 LAB
ALBUMIN SERPL-MCNC: 3.7 G/DL (ref 3.4–5)
ALP SERPL-CCNC: 117 U/L (ref 40–150)
ALT SERPL W P-5'-P-CCNC: 40 U/L (ref 0–70)
ANION GAP SERPL CALCULATED.3IONS-SCNC: 10 MMOL/L (ref 3–14)
AST SERPL W P-5'-P-CCNC: 18 U/L (ref 0–45)
B BURGDOR IGG+IGM SER QL: 0.18 (ref 0–0.89)
BILIRUB SERPL-MCNC: 0.8 MG/DL (ref 0.2–1.3)
BUN SERPL-MCNC: 18 MG/DL (ref 7–30)
CALCIUM SERPL-MCNC: 8.8 MG/DL (ref 8.5–10.1)
CHLORIDE SERPL-SCNC: 109 MMOL/L (ref 94–109)
CO2 SERPL-SCNC: 22 MMOL/L (ref 20–32)
CREAT SERPL-MCNC: 0.7 MG/DL (ref 0.66–1.25)
FERRITIN SERPL-MCNC: 161 NG/ML (ref 26–388)
GFR SERPL CREATININE-BSD FRML MDRD: >90 ML/MIN/1.7M2
GLUCOSE SERPL-MCNC: 105 MG/DL (ref 70–99)
POTASSIUM SERPL-SCNC: 4.2 MMOL/L (ref 3.4–5.3)
PROT SERPL-MCNC: 7.1 G/DL (ref 6.8–8.8)
SODIUM SERPL-SCNC: 141 MMOL/L (ref 133–144)
TSH SERPL DL<=0.005 MIU/L-ACNC: 2.02 MU/L (ref 0.4–4)

## 2018-06-06 ENCOUNTER — TELEPHONE (OUTPATIENT)
Dept: FAMILY MEDICINE | Facility: CLINIC | Age: 63
End: 2018-06-06

## 2018-06-06 DIAGNOSIS — R79.89 LOW TESTOSTERONE IN MALE: ICD-10-CM

## 2018-06-06 DIAGNOSIS — R53.83 FATIGUE, UNSPECIFIED TYPE: Primary | ICD-10-CM

## 2018-06-06 LAB — TESTOST SERPL-MCNC: 163 NG/DL (ref 240–950)

## 2018-06-06 NOTE — TELEPHONE ENCOUNTER
Patient was called with providers message below. He asked if supplenst would help numbers. Informed him lab provider would like to check lab two more times and then will advise on treatment. He verbalized agreement with plan. Lab appointment was scheduled.

## 2018-06-06 NOTE — TELEPHONE ENCOUNTER
Please contact patient regarding his recent lab results:    - Your testosterone level came back low, which may be causing your symptoms. As testosterone levels can vary, I recommend we recheck this lab two more times to verify the results. Please schedule two lab appointments, approx 1 week apart, for a recheck. The most accurate time to check testosterone is early in the morning.    - The good news is the remaining labs were completely normal: no Lyme infection, normal kidney and liver function, normal electrolyte levels (sodium, potassium, etc), normal iron level, normal blood counts (no inflammation or anemia), normal B12 and folate levels.

## 2018-06-06 NOTE — PROGRESS NOTES
Result reviewed. Testosterone level low; recommend repeat check x2. Remainder labs wnl. Patient informed of results via telephone.

## 2018-06-12 DIAGNOSIS — R79.89 LOW TESTOSTERONE IN MALE: ICD-10-CM

## 2018-06-12 DIAGNOSIS — R53.83 FATIGUE, UNSPECIFIED TYPE: ICD-10-CM

## 2018-06-12 DIAGNOSIS — M1A.9XX0 CHRONIC GOUT INVOLVING TOE OF RIGHT FOOT WITHOUT TOPHUS, UNSPECIFIED CAUSE: ICD-10-CM

## 2018-06-12 PROCEDURE — 84403 ASSAY OF TOTAL TESTOSTERONE: CPT | Performed by: PHYSICIAN ASSISTANT

## 2018-06-12 PROCEDURE — 84550 ASSAY OF BLOOD/URIC ACID: CPT | Performed by: PHYSICIAN ASSISTANT

## 2018-06-12 PROCEDURE — 84270 ASSAY OF SEX HORMONE GLOBUL: CPT | Performed by: PHYSICIAN ASSISTANT

## 2018-06-12 PROCEDURE — 36415 COLL VENOUS BLD VENIPUNCTURE: CPT | Performed by: PHYSICIAN ASSISTANT

## 2018-06-12 NOTE — LETTER
June 14, 2018      Arnaud Bird  [address] [address]      We are writing to inform you of your test results.  Dear Arnaud,     1. Your uric acid test results are normal.     Let me know if you have any questions about this, or other concerns.       Results for orders placed or performed in visit on 06/12/18   -Testosterone Free and Total        Result                                            Value                         Ref Range                        Testosterone Total                                203 (L)                       240 - 950 ng/dL                  Sex Hormone Binding Globulin                      18                            11 - 80 nmol/L                  Free Testosterone Calculated                      5.25                          4.7 - 24.4 ng/dL           -**Uric acid FUTURE 2mo        Result                                            Value                         Ref Range                        Uric Acid                                         4.1                           3.5 - 7.2 mg/dL        Resulted Orders   **Uric acid FUTURE 2mo   Result Value Ref Range    Uric Acid 4.1 3.5 - 7.2 mg/dL       If you have any questions or concerns, please call the clinic at the number listed above.       Kristin Grover MD   Family Medicine   [facility]   [phone]          LAB

## 2018-06-13 LAB
SHBG SERPL-SCNC: 18 NMOL/L (ref 11–80)
TESTOST FREE SERPL-MCNC: 5.25 NG/DL (ref 4.7–24.4)
TESTOST SERPL-MCNC: 203 NG/DL (ref 240–950)
URATE SERPL-MCNC: 4.1 MG/DL (ref 3.5–7.2)

## 2018-06-13 NOTE — PROGRESS NOTES
Hi Team 3:  Please sent a lab result with the following note.  Thank you!    Dear Arnaud,    1. Your uric acid test results are normal.    Let me know if you have any questions about this, or other concerns.    Best,  Kristin Levin MD  Family Medicine  [facility]  [phone]         Results for orders placed or performed in visit on 06/12/18  -Testosterone Free and Total       Result                                            Value                         Ref Range                       Testosterone Total                                203 (L)                       240 - 950 ng/dL                 Sex Hormone Binding Globulin                      18                            11 - 80 nmol/L                  Free Testosterone Calculated                      5.25                          4.7 - 24.4 ng/dL           -**Uric acid FUTURE 2mo       Result                                            Value                         Ref Range                       Uric Acid                                         4.1                           3.5 - 7.2 mg/dL

## 2018-06-18 ENCOUNTER — TELEPHONE (OUTPATIENT)
Dept: FAMILY MEDICINE | Facility: CLINIC | Age: 63
End: 2018-06-18

## 2018-06-18 NOTE — TELEPHONE ENCOUNTER
- Testosterone level vary throughout the day and from day-to-day, which is why we need 3 separate results to get an accurate picture of average testosterone level. Nugenix, while advertised as a testosterone replacement, doesn't actually contain any testosterone (in fact the supplement it contains is recommended to breastfeeding women to increase their milk supply) so wouldn't affect his blood levels. OTC supplements are not regulated and are not required to show proof of efficacy (i.e. They don't need to prove they work the way they claim to work); if patient is taking to increase his testosterone, there is no evidence Nugenix will actually help with this (despite what the bottle claims).    - The gout medication won't cause fatigue or low energy; it may increase liver enzymes, which can decrease energy level. His liver enzymes were checked during his appointment 6/4/2018 and were normal, so his gout medication likely isn't causing his symptoms.

## 2018-06-18 NOTE — TELEPHONE ENCOUNTER
Patient called back and said he has taken nugenix this week, wonders if that could be what caused the variability in the results.    Also asked if the increase in his gout medication could be causing his lack of motivation or feelings of depression that he is experiencing, could it be?     Routing back to provider for review and advice.

## 2018-06-18 NOTE — TELEPHONE ENCOUNTER
Testosterone higher than previous check but still low. Still needs one additional recheck to verify low testosterone given variability in results. Future lab ordered, please have patient schedule lab-only visit any time this week. Most accurate result is early morning, which would be preferred appointment time.

## 2018-06-18 NOTE — TELEPHONE ENCOUNTER
Reason for Call:  Other     Detailed comments: would like lab results from last week    Phone Number Patient can be reached at: Home number on file [phone] (home)    Best Time: today    Can we leave a detailed message on this number? YES    Call taken on 6/18/2018 at 10:41 AM by TR PEÑALOZA

## 2018-06-19 DIAGNOSIS — R53.83 FATIGUE, UNSPECIFIED TYPE: ICD-10-CM

## 2018-06-19 DIAGNOSIS — R79.89 LOW TESTOSTERONE IN MALE: ICD-10-CM

## 2018-06-19 PROCEDURE — 84270 ASSAY OF SEX HORMONE GLOBUL: CPT | Performed by: PHYSICIAN ASSISTANT

## 2018-06-19 PROCEDURE — 84403 ASSAY OF TOTAL TESTOSTERONE: CPT | Performed by: PHYSICIAN ASSISTANT

## 2018-06-19 PROCEDURE — 36415 COLL VENOUS BLD VENIPUNCTURE: CPT | Performed by: PHYSICIAN ASSISTANT

## 2018-06-19 NOTE — TELEPHONE ENCOUNTER
Pt stated that he had his 3rd testosterone lab today.  Thanked me for message from [employer] and will wait for lab results.

## 2018-06-21 ENCOUNTER — TELEPHONE (OUTPATIENT)
Dept: FAMILY MEDICINE | Facility: CLINIC | Age: 63
End: 2018-06-21

## 2018-06-21 DIAGNOSIS — E29.1 HYPOTESTOSTERONEMIA IN MALE: Primary | ICD-10-CM

## 2018-06-21 LAB
SHBG SERPL-SCNC: 18 NMOL/L (ref 11–80)
TESTOST FREE SERPL-MCNC: 4.83 NG/DL (ref 4.7–24.4)
TESTOST SERPL-MCNC: 188 NG/DL (ref 240–950)

## 2018-06-21 NOTE — TELEPHONE ENCOUNTER
Please contact patient regarding his recent lab results:    - Your 3rd testosterone level was again low, confirming low testosterone as the cause of your symptoms. At this time, we need to figure out why your testosterone level is low to make sure we treat it correctly. I have placed a referral to endocrinology for you; please schedule an appointment at your earliest convenience for further evaluation.     [facility]  [phone]    -OR-  [facility]: Endocrinology Clinic [facility] [phone]

## 2018-06-21 NOTE — TELEPHONE ENCOUNTER
Patient called back and was given phone numbers for Endocrinology. Referral has been placed for further evaluation.

## 2018-06-22 DIAGNOSIS — G89.4 CHRONIC PAIN SYNDROME: Chronic | ICD-10-CM

## 2018-06-22 NOTE — TELEPHONE ENCOUNTER
Reason for Call:  Medication or medication refill:    Do you use a [facility]?  Name of the pharmacy and phone number for the current request:  n/a    Name of the medication requested: HYDROcodone-acetaminophen (NORCO) 7.5-325 MG per tablet    Other request:     Can we leave a detailed message on this number? YES    Phone number patient can be reached at: Home number on file [phone] (home)    Best Time:     Call taken on 6/22/2018 at 3:54 PM by CATALINA KHAN

## 2018-06-25 RX ORDER — HYDROCODONE BITARTRATE AND ACETAMINOPHEN 7.5; 325 MG/1; MG/1
1 TABLET ORAL EVERY 4 HOURS PRN
Qty: 120 TABLET | Refills: 0 | Status: SHIPPED | OUTPATIENT
Start: 2018-06-25 | End: 2018-07-23

## 2018-06-25 NOTE — TELEPHONE ENCOUNTER
Controlled Substance Refill Request for HYDROcodone-acetaminophen (NORCO) 7.5-325 MG per tablet  Problem List Complete:  Yes  Patient is followed by SCOTTY TOLEDO MD for ongoing prescription of pain medication.  All refills should only be approved by this provider, or covering partner.     Medication(s):  NORCO  7.5MG PO FOUR TIMES DAILY .   Maximum quantity per month:  120   Clinic visit frequency required: Q 3 months      Controlled substance agreement:  Encounter-Level CSA - 10/31/2016:                     Controlled Substance Agreement - Scan on 11/15/2016  7:53 AM : CONTROLLED SUBSTANCE AGREEMENT (below)         Pain Clinic evaluation in the past: No     DIRE Total Score(s):     7/2/2016   Total Score 18         Last [facility] website verification:  done on 06//20/2017   [email or web address]      Last Written Prescription Date:  5/26/2018  Last Fill Quantity: 120 tablet,   # refills: 0    Last Office Visit with [facility] primary care provider: 6/4/2018    Processing:  Patient will  in clinic

## 2018-06-25 NOTE — TELEPHONE ENCOUNTER
Called patient, RX ready for  in [facility]. He will come on Tues.to

## 2018-06-27 ENCOUNTER — TELEPHONE (OUTPATIENT)
Dept: FAMILY MEDICINE | Facility: CLINIC | Age: 63
End: 2018-06-27

## 2018-07-23 ENCOUNTER — TELEPHONE (OUTPATIENT)
Dept: FAMILY MEDICINE | Facility: CLINIC | Age: 63
End: 2018-07-23

## 2018-07-23 DIAGNOSIS — G89.4 CHRONIC PAIN SYNDROME: Chronic | ICD-10-CM

## 2018-07-23 NOTE — TELEPHONE ENCOUNTER
Controlled Substance Refill Request for HYDROcodone-acetaminophen (NORCO) 7.5-325 MG per tablet  Problem List Complete:  Yes  Patient is followed by SCOTTY TOLEDO MD for ongoing prescription of pain medication.  All refills should only be approved by this provider, or covering partner.    Medication(s):  NORCO  7.5MG PO FOUR TIMES DAILY .   Maximum quantity per month:  120   Clinic visit frequency required: Q 3 months     Controlled substance agreement:  Encounter-Level CSA - 10/31/2016:                 Controlled Substance Agreement - Scan on 11/15/2016  7:53 AM : CONTROLLED SUBSTANCE AGREEMENT (below)            Pain Clinic evaluation in the past: No    DIRE Total Score(s):    7/2/2016   Total Score 18       Last [facility] website verification:  done on 06//20/2017   [email or web address]   checked in past 3 months?  Yes 07/23/2018- no concerns  Last

## 2018-07-23 NOTE — TELEPHONE ENCOUNTER
Reason for Call:  Medication or medication refill:    Do you use a [facility]?  Name of the pharmacy and phone number for the current request:  [facility]    Name of the medication requested: HYDROcodone-acetaminophen (NORCO) 7.5-325     Other request: call when done. Picks up at [facility]    Can we leave a detailed message on this number? YES    Phone number patient can be reached at: Home number on file [phone] (home)    Best Time: any    Call taken on 7/23/2018 at 3:14 PM by TR PEÑALOZA

## 2018-07-26 RX ORDER — HYDROCODONE BITARTRATE AND ACETAMINOPHEN 7.5; 325 MG/1; MG/1
1 TABLET ORAL EVERY 4 HOURS PRN
Qty: 120 TABLET | Refills: 0 | Status: SHIPPED | OUTPATIENT
Start: 2018-07-26 | End: 2018-08-22

## 2018-07-26 NOTE — TELEPHONE ENCOUNTER
Patient called he states he only has two pills left  Please check into the status of the prescription and let him know

## 2018-08-22 ENCOUNTER — TELEPHONE (OUTPATIENT)
Dept: FAMILY MEDICINE | Facility: CLINIC | Age: 63
End: 2018-08-22

## 2018-08-22 DIAGNOSIS — G89.4 CHRONIC PAIN SYNDROME: Chronic | ICD-10-CM

## 2018-08-22 NOTE — TELEPHONE ENCOUNTER
Reason for Call:  Medication or medication refill:    Do you use a [facility]?  Name of the pharmacy and phone number for the current request:  n/a    Name of the medication requested: HYDROcodone-acetaminophen (NORCO) 7.5-325 MG per tablet and phentermine (ADIPEX-P) 37.5 MG tablet    Other request:     Can we leave a detailed message on this number? YES    Phone number patient can be reached at: Home number on file [phone] (home)    Best Time: any    Call taken on 8/22/2018 at 12:12 PM by CATALINA KHAN

## 2018-08-22 NOTE — TELEPHONE ENCOUNTER
Requested Prescriptions   Pending Prescriptions Disp Refills     HYDROcodone-acetaminophen (NORCO) 7.5-325 MG per tablet      Last Written Prescription Date:  7/26/18  Last Fill Quantity: 120,   # refills: 0  Last Office Visit: 6/4/18 [facility]  Future Office visit:       Routing refill request to provider for review/approval because:  Drug not on the [facility] or  Health refill protocol or controlled substance   120 tablet 0     Sig: Take 1 tablet by mouth every 4 hours as needed for pain (4 x daily as needed  maxium 4 per day)    There is no refill protocol information for this order        phentermine (ADIPEX-P) 37.5 MG tablet      Last Written Prescription Date:  6/4/18  Last Fill Quantity: 30,   # refills: 0  Last Office Visit: 6/4/18 [facility]  Future Office visit:       Routing refill request to provider for review/approval because:  Drug not on the [facility] or  Health refill protocol or controlled substance   30 tablet 0     Sig: TAKE ONE TABLET BY MOUTH EVERY DAY IN THE MORNING.    There is no refill protocol information for this order

## 2018-08-23 RX ORDER — HYDROCODONE BITARTRATE AND ACETAMINOPHEN 7.5; 325 MG/1; MG/1
1 TABLET ORAL EVERY 4 HOURS PRN
Qty: 120 TABLET | Refills: 0 | Status: SHIPPED | OUTPATIENT
Start: 2018-08-23 | End: 2018-09-27

## 2018-08-23 RX ORDER — PHENTERMINE HYDROCHLORIDE 37.5 MG/1
TABLET ORAL
Qty: 30 TABLET | Refills: 0 | Status: SHIPPED | OUTPATIENT
Start: 2018-08-23 | End: 2018-09-27

## 2018-08-23 NOTE — TELEPHONE ENCOUNTER
Controlled Substance Refill Request for Hydrocodone-acetaminophen (NORCO) 7.5-325 MG, phentermine(ADIPEX-P) 37.5 MGProblem List Complete:  Yes   checked in past 3 months?  Yes 8/23/18 no concerns

## 2018-08-24 ENCOUNTER — RADIANT APPOINTMENT (OUTPATIENT)
Dept: GENERAL RADIOLOGY | Facility: CLINIC | Age: 63
End: 2018-08-24
Attending: FAMILY MEDICINE
Payer: COMMERCIAL

## 2018-08-24 ENCOUNTER — OFFICE VISIT (OUTPATIENT)
Dept: ORTHOPEDICS | Facility: CLINIC | Age: 63
End: 2018-08-24
Payer: COMMERCIAL

## 2018-08-24 DIAGNOSIS — M25.511 RIGHT SHOULDER PAIN, UNSPECIFIED CHRONICITY: Primary | ICD-10-CM

## 2018-08-24 DIAGNOSIS — M19.011 PRIMARY OSTEOARTHRITIS OF RIGHT SHOULDER: ICD-10-CM

## 2018-08-24 DIAGNOSIS — M25.511 RIGHT SHOULDER PAIN, UNSPECIFIED CHRONICITY: ICD-10-CM

## 2018-08-24 RX ORDER — TRIAMCINOLONE ACETONIDE 40 MG/ML
40 INJECTION, SUSPENSION INTRA-ARTICULAR; INTRAMUSCULAR ONCE
Qty: 1 ML | Refills: 0 | OUTPATIENT
Start: 2018-08-24 | End: 2018-08-24

## 2018-08-24 NOTE — PROGRESS NOTES
Subjective:   Arnaud Bird is a RHD 63 year old male who is c/o right shoulder pain. He states that he is wanting a right shoulder injection today, but cannot recall the last time he had an injection in his right shoulder. He states that left shoulder is fine.  Flew down to [place], in a hotel with different pillows.  Can't sleep  Discussed risks and benefits of GH injection via ultrasound.  Pt agrees to procedure.    Date of injury: N/A  Date last seen: Visit date not found      PAST MEDICAL, SOCIAL, SURGICAL AND FAMILY HISTORY: He  has a past medical history of Arthritis; Carpal tunnel syndrome; LEFT WORSE THAN RIGHT  (10/18/2012); Unspecified asthma(493.90) (10/17/2012); and Unspecified asthma, with exacerbation (10/17/2012). He also has no past medical history of Congestive heart failure, unspecified; COPD (chronic obstructive pulmonary disease) (H); Coronary artery disease; Diabetes mellitus (H); History of blood transfusion; Hypertension; Malignant neoplasm (H); Thyroid disease; or Unspecified cerebral artery occlusion with cerebral infarction.  He  has a past surgical history that includes tonsillectomy; TOOTH EXTRACTION W/FORCEP; and orthopedic surgery.  His family history includes Cancer in his father; Family History Negative in his father and mother. There is no history of Diabetes, Coronary Artery Disease, Hypertension, Hyperlipidemia, Cerebrovascular Disease, Breast Cancer, Colon Cancer, Prostate Cancer, Other Cancer, Depression, Anxiety Disorder, Mental Illness, Substance Abuse, Anesthesia Reaction, Asthma, Osteoperosis, Genetic Disorder, Thyroid Disease, Obesity, or Unknown/Adopted.  He reports that he has never smoked. He has never used smokeless tobacco. He reports that he drinks alcohol. He reports that he does not use illicit drugs.    ALLERGIES: He is allergic to seasonal allergies.    CURRENT MEDICATIONS: He has a current medication list which includes the following prescription(s): acyclovir,  albuterol, allopurinol, beclomethasone, clindamycin, diclofenac, flovent diskus, fluticasone, hydrocodone-acetaminophen, order for dme, phentermine, sildenafil, and simvastatin.     REVIEW OF SYSTEMS: 10 point review of systems is negative except as noted above.     Exam:   There were no vitals taken for this visit.           CONSTITUTIONAL: alert, moderate distress, cooperative and obese  HEAD: Normocephalic. No masses, lesions, tenderness or abnormalities  SKIN: no suspicious lesions or rashes  GAIT: normal  NEUROLOGIC: Non-focal  PSYCHIATRIC: affect normal/bright and mentation appears normal.    PROBLEM: Right shoulder osteoarthritis     SUBJECTIVE:  US-guided corticosteroid injection for sx of shoulder arthritis.     OBJECTIVE: Pt guarding against any shoulder ROM, hard time sleeping     ASSESSMENT: Right glenohumeral osteoarthritis     PLAN: US-guide corticosteroid injection right shoulder     PrOCEDURE: The indications, risks, expected benefits were discussed.  Formal consent was obtained. The humeral head, glenoid, hyperechoic triangle indicating the posterior labrum were identified by ultrasound.  Initially, 5 mL ropivicaine  was injected with US guidance along the injection track for anesthesia.  Using sterile technique, a syringe with a 80 mm , 22-gauge gauge needle containing 1 mL Kenalog 40 mg/mL and 4 mL ropivicaine  were injected using an US guided posterior approach into the right glenohumeral joint.  US used to guide needle placement and verify proper needle position.  Images  indicating proper needle placement were recorded.  Upon completion of the injection, the wound was dressed with a bandaid. Follow up with me in 4 weeks.         Assessment/Plan:   Pt is a 64 yo male with PMHx of carpal tunnel syndrome presenting with right shoulder OA  1. Right shoulder OA- US guided GH injection administered today  RTC 4 weeks  X-RAY INTERPRETATION:   X-Ray of the Right Shoulder: 3-view, ap, y, axillary   ordered and interpreted in the office today was positive for GH OA, AC OA

## 2018-08-24 NOTE — NURSING NOTE
[address] [address]  Dept: [phone]  ______________________________________________________________________________    Patient: Arnaud Bird   : 1955   MRN: [id number]   2018    INVASIVE PROCEDURE SAFETY CHECKLIST    Date: 2018  Procedure: Right glenohumeral US guided kenalog injection  Patient Name: Arnaud Bird  MRN: [id number]  YOB: 1955    Action: Complete sections as appropriate. Any discrepancy results in a HARD COPY until resolved.     PRE PROCEDURE:  Patient ID verified with 2 identifiers (name and  or MRN): Yes  Procedure and site verified with patient/designee (when able): Yes  Accurate consent documentation in medical record: Yes  H&P (or appropriate assessment) documented in medical record: Yes  H&P must be up to 20 days prior to procedure and updates within 24 hours of procedure as applicable: NA  Relevant diagnostic and radiology test results appropriately labeled and displayed as applicable: Yes  Procedure site(s) marked with provider initials: NA    TIMEOUT:  Time-Out performed immediately prior to starting procedure, including verbal and active participation of all team members addressing the following:Yes  * Correct patient identify  * Confirmed that the correct side and site are marked  * An accurate procedure consent form  * Agreement on the procedure to be done  * Correct patient position  * Relevant images and results are properly labeled and appropriately displayed  * The need to administer antibiotics or fluids for irrigation purposes during the procedure as applicable   * Safety precautions based on patient history or medication use    DURING PROCEDURE: Verification of correct person, site, and procedures any time the responsibility for care of the patient is transferred to another member of the care team.     The following medication was given:     MEDICATION:  Kenalog 40  mg  ROUTE: Intraarticular  SITE: Right shoulder  DOSE: 40 mg  LOT #: [id number]  : [employer]  EXPIRATION DATE: 04/2020  NDC#: [address]-7  Was there drug waste? No     MEDICATION:  Ropivacaine 0.5%  ROUTE: Intraarticular  SITE: Right shoulder  DOSE: 8 mL  LOT #: [id number]  : [employer]  EXPIRATION DATE: 03/22  NDC#: 50636-856-04  Was there drug waste? Yes  Amount of drug waste (mL): 12.  Reason for waste:  Single use vial      Frances Rogers, ATC  August 24, 2018

## 2018-08-24 NOTE — LETTER
8/24/2018      RE: Arnaud Bird  [address] [facility] [address]        Subjective:   Arnaud Bird is a RHD 63 year old male who is c/o right shoulder pain. He states that he is wanting a right shoulder injection today, but cannot recall the last time he had an injection in his right shoulder. He states that left shoulder is fine.  Flew down to [place], in a hotel with different pillows.  Can't sleep  Discussed risks and benefits of GH injection via ultrasound.  Pt agrees to procedure.    Date of injury: N/A  Date last seen: Visit date not found      PAST MEDICAL, SOCIAL, SURGICAL AND FAMILY HISTORY: He  has a past medical history of Arthritis; Carpal tunnel syndrome; LEFT WORSE THAN RIGHT  (10/18/2012); Unspecified asthma(493.90) (10/17/2012); and Unspecified asthma, with exacerbation (10/17/2012). He also has no past medical history of Congestive heart failure, unspecified; COPD (chronic obstructive pulmonary disease) (H); Coronary artery disease; Diabetes mellitus (H); History of blood transfusion; Hypertension; Malignant neoplasm (H); Thyroid disease; or Unspecified cerebral artery occlusion with cerebral infarction.  He  has a past surgical history that includes tonsillectomy; TOOTH EXTRACTION W/FORCEP; and orthopedic surgery.  His family history includes Cancer in his father; Family History Negative in his father and mother. There is no history of Diabetes, Coronary Artery Disease, Hypertension, Hyperlipidemia, Cerebrovascular Disease, Breast Cancer, Colon Cancer, Prostate Cancer, Other Cancer, Depression, Anxiety Disorder, Mental Illness, Substance Abuse, Anesthesia Reaction, Asthma, Osteoperosis, Genetic Disorder, Thyroid Disease, Obesity, or Unknown/Adopted.  He reports that he has never smoked. He has never used smokeless tobacco. He reports that he drinks alcohol. He reports that he does not use illicit drugs.    ALLERGIES: He is allergic to seasonal allergies.    CURRENT MEDICATIONS:  He has a current medication list which includes the following prescription(s): acyclovir, albuterol, allopurinol, beclomethasone, clindamycin, diclofenac, flovent diskus, fluticasone, hydrocodone-acetaminophen, order for dme, phentermine, sildenafil, and simvastatin.     REVIEW OF SYSTEMS: 10 point review of systems is negative except as noted above.     Exam:   There were no vitals taken for this visit.           CONSTITUTIONAL: alert, moderate distress, cooperative and obese  HEAD: Normocephalic. No masses, lesions, tenderness or abnormalities  SKIN: no suspicious lesions or rashes  GAIT: normal  NEUROLOGIC: Non-focal  PSYCHIATRIC: affect normal/bright and mentation appears normal.    PROBLEM: Right shoulder osteoarthritis     SUBJECTIVE:  US-guided corticosteroid injection for sx of shoulder arthritis.     OBJECTIVE: Pt guarding against any shoulder ROM, hard time sleeping     ASSESSMENT: Right glenohumeral osteoarthritis     PLAN: US-guide corticosteroid injection right shoulder     PrOCEDURE: The indications, risks, expected benefits were discussed.  Formal consent was obtained. The humeral head, glenoid, hyperechoic triangle indicating the posterior labrum were identified by ultrasound.  Initially, 5 mL ropivicaine  was injected with US guidance along the injection track for anesthesia.  Using sterile technique, a syringe with a 80 mm , 22-gauge gauge needle containing 1 mL Kenalog 40 mg/mL and 4 mL ropivicaine  were injected using an US guided posterior approach into the right glenohumeral joint.  US used to guide needle placement and verify proper needle position.  Images  indicating proper needle placement were recorded.  Upon completion of the injection, the wound was dressed with a bandaid. Follow up with me in 4 weeks.         Assessment/Plan:   Pt is a 62 yo male with PMHx of carpal tunnel syndrome presenting with right shoulder OA  1. Right shoulder OA- US guided GH injection administered today  RTC 4  weeks  X-RAY INTERPRETATION:   X-Ray of the Right Shoulder: 3-view, ap, y, axillary  ordered and interpreted in the office today was positive for GH OA, AC OA    Carmel Chan MD

## 2018-08-24 NOTE — MR AVS SNAPSHOT
After Visit Summary   2018    Arnaud Bird    MRN: [id number]           Patient Information     Date Of Birth          1955        Visit Information        Provider Department      2018 1:25 PM Carmel Chan MD [facility]        Today's Diagnoses     Right shoulder pain, unspecified chronicity    -  1       Follow-ups after your visit        Your next 10 appointments already scheduled     Aug 30, 2018  9:15 AM CDT   SHORT with Johnson Clements MD   [facility]    [address]  [address]  [place] [address] [phone]            Sep 26, 2018  8:30 AM CDT   (Arrive by 8:15 AM)   Return Visit with Carmel Chan MD   [facility] ([facility])    [address] [facility] [id number] [phone]              Who to contact     Please call your clinic at [phone] to:    Ask questions about your health    Make or cancel appointments    Discuss your medicines    Learn about your test results    Speak to your doctor            Additional Information About Your Visit        MyChart Information     [employer] is an electronic gateway that provides easy, online access to your medical records. With [employer], you can request a clinic appointment, read your test results, renew a prescription or communicate with your care team.     To sign up for [facility] visit the website at [email or web address]   You will be asked to enter the access code listed below, as well as some personal information. Please follow the directions to create your username and password.     Your access code is: [email or web address]  Expires: 2018  2:40 PM     Your access code will  in 90 days. If you need help or a new code, please contact your [facility] or call [phone] for  assistance.        Care EveryWhere ID     This is your Care EveryWhere ID. This could be used by other organizations to access your [place] medical records  [id number]         Blood Pressure from Last 3 Encounters:   06/04/18 120/80   05/10/18 116/78   04/02/18 (!) 156/96    Weight from Last 3 Encounters:   06/04/18 264 lb 8 oz (120 kg)   05/10/18 264 lb (119.7 kg)   04/02/18 270 lb (122.5 kg)               Primary Care Provider Office Phone # Fax #    Johnson Jesica Clements [phone]       [address] [facility] [place] [address]        Equal Access to Services     ARVIND MODI : Hadii lainey ray hadasho Sogisselle, waaxda luqadaha, qaybta kaalmada adeegyada, gee sparks . So [facility] [phone].    ATENCIÓN: Si habla español, tiene a stewart disposición servicios gratuitos de asistencia lingüística. [facility] [phone].    We comply with applicable federal civil rights laws and [place] laws. We do not discriminate on the basis of race, color, national origin, age, disability, sex, sexual orientation, or gender identity.            Thank you!     Thank you for choosing [facility]  for your care. Our goal is always to provide you with excellent care. Hearing back from our patients is one way we can continue to improve our services. Please take a few minutes to complete the written survey that you may receive in the mail after your visit with us. Thank you!             Your Updated Medication List - Protect others around you: Learn how to safely use, store and throw away your medicines at www.disposemymeds.org.          This list is accurate as of 8/24/18  2:40 PM.  Always use your most recent med list.                   Brand Name Dispense Instructions for use Diagnosis    acyclovir 400 MG tablet    ZOVIRAX    28 tablet    TAKE TWO TABLETS BY MOUTH TWICE DAILY    HSV (herpes simplex virus) infection       albuterol 108 (90 Base) MCG/ACT inhaler    PROAIR  HFA/PROVENTIL HFA/VENTOLIN HFA    1 Inhaler    Inhale 2 puffs into the lungs every 6 hours as needed for shortness of breath / dyspnea    Mild persistent asthma, uncomplicated       allopurinol 300 MG tablet    ZYLOPRIM    90 tablet    Take 1 tablet (300 mg) by mouth daily    Chronic gout involving toe 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 tophus, unspecified cause       beclomethasone 40 MCG/ACT Inhaler    QVAR    1 Inhaler    Inhale 2 puffs into the lungs 2 times daily    Mild persistent asthma without complication       clindamycin 1 % solution    CLEOCIN T    60 mL    Apply topically 2 times daily Profile Rx: patient will contact pharmacy when needed    Folliculitis       diclofenac 1 % Gel topical gel    VOLTAREN    100 g    APPLY 2 GRAMS TOPCAILLY TO AFFECTED AREA FOUR TIMES DAILY AS NEEDED    Stiffness of knee joint, right, Carpal tunnel syndrome, unspecified laterality, Primary localized osteoarthrosis, lower leg, unspecified laterality, Lumbar disc disease with radiculopathy       FLOVENT DISKUS 100 MCG/BLIST Aepb   Generic drug:  fluticasone     60 each    INHALE 1 PUFF BY MOUTH 2 TIMES DAILY    Mild persistent asthma without complication       fluticasone 50 MCG/ACT spray    FLONASE    16 g    Spray 2 sprays into both nostrils daily    Seasonal allergic rhinitis due to pollen       HYDROcodone-acetaminophen 7.5-325 MG per tablet    NORCO    120 tablet    Take 1 tablet by mouth every 4 hours as needed for pain (4 x daily as needed  maxium 4 per day)    Chronic pain syndrome       order for DME     1 each    Equipment being ordered:  CERVICAL TRACTION DEVICE ONE AS DIRECTED    Chronic pain syndrome       phentermine 37.5 MG tablet    ADIPEX-P    30 tablet    TAKE ONE TABLET BY MOUTH EVERY DAY IN THE MORNING.    BMI 40.0-44.9, adult (H)       sildenafil 100 MG tablet    VIAGRA    100 tablet    Take 0.5-1 tablets ( mg) by mouth daily as needed Take 30 min to 4 hours before intercourse.  Never use with nitroglycerin,  terazosin or doxazosin.    Sexual dysfunction       simvastatin 40 MG tablet    ZOCOR    90 tablet    TAKE 1 TABLET (40 MG) BY MOUTH AT BEDTIME    Hyperlipidemia with target LDL less than 130

## 2018-09-05 DIAGNOSIS — G56.00 CARPAL TUNNEL SYNDROME, UNSPECIFIED LATERALITY: ICD-10-CM

## 2018-09-05 DIAGNOSIS — M17.10 PRIMARY LOCALIZED OSTEOARTHROSIS, LOWER LEG, UNSPECIFIED LATERALITY: ICD-10-CM

## 2018-09-05 DIAGNOSIS — M25.661 STIFFNESS OF KNEE JOINT, RIGHT: Chronic | ICD-10-CM

## 2018-09-05 DIAGNOSIS — M51.16 LUMBAR DISC DISEASE WITH RADICULOPATHY: ICD-10-CM

## 2018-09-05 NOTE — TELEPHONE ENCOUNTER
"Requested Prescriptions   Pending Prescriptions Disp Refills     diclofenac (VOLTAREN) 1 % GEL topical gel [Pharmacy Med Name: DICLOFENAC SODIUM 1% GEL]  Last Written Prescription Date:  2/27/2018  Last Fill Quantity: 100g,  # refills: 1   Last office visit: 6/4/2018 with prescribing provider:  Lauryn   Future Office Visit:     100 g 1     Sig: APPLY 2 GRAMS TOPICALLY TO AFFECTED AREA FOUR TIMES DAILY AS NEEDED    Topical Steroids and Nonsteroidals Protocol Passed    9/5/2018  9:48 AM       Passed - Patient is age 6 or older       Passed - Authorizing prescriber's most recent note related to this medication read.    If refill request is for ophthalmic use, please forward request to provider for approval.         Passed - High potency steroid not ordered       Passed - Recent (12 mo) or future (30 days) visit within the authorizing provider's specialty    Patient had office visit in the last 12 months or has a visit in the next 30 days with authorizing provider or within the authorizing provider's specialty.  See \"Patient Info\" tab in inbasket, or \"Choose Columns\" in Meds & Orders section of the refill encounter.              "

## 2018-09-06 NOTE — TELEPHONE ENCOUNTER
Prescription approved per [facility] Refill Protocol for 12 months of refills since last appointment, which was 6/4/18

## 2018-09-24 DIAGNOSIS — G89.4 CHRONIC PAIN SYNDROME: Chronic | ICD-10-CM

## 2018-09-24 NOTE — TELEPHONE ENCOUNTER
Reason for Call:  Medication or medication refill:    Do you use a [facility]?  Name of the pharmacy and phone number for the current request:  written,  at [facility]    Name of the medication requested: Hydrocodone     Other request: Arnaud has a scheduled appointment with Dr. DANYELLE Clements on 10/2/2018.  Arnaud requests a refill before that appointment, yet this week on Wed 9/26/18.     Can we leave a detailed message on this number? YES    Phone number patient can be reached at: Cell number on file:    Telephone Information:   Mobile [phone]       Best Time: any    Call taken on 9/24/2018 at 10:07 AM by Sherice De Leon

## 2018-09-25 RX ORDER — HYDROCODONE BITARTRATE AND ACETAMINOPHEN 7.5; 325 MG/1; MG/1
1 TABLET ORAL EVERY 4 HOURS PRN
Qty: 120 TABLET | Refills: 0 | OUTPATIENT
Start: 2018-09-25

## 2018-09-25 NOTE — TELEPHONE ENCOUNTER
Hydrocodone-acetaminophen (NORCO) 7.5 -325 MG     Last Written Prescription Date:  8/23/18  Last Fill Quantity: 120,   # refills: 0  Last Office Visit: 6/4/18Future Office visit:    Next 5 appointments (look out 90 days)     Oct 02, 2018  8:00 AM CDT   SHORT with [facility]edo [facility]    [facility] [address] [id number] [phone]                   Routing refill request to provider for review/approval because:  Drug not on the [facility], [facility] or [facility] refill protocol or controlled substance  Patient is followed by SCOTTY TOLEDO MD for ongoing prescription of pain medication.  All refills should only be approved by this provider, or covering partner.     Medication(s):  NORCO  7.5MG PO FOUR TIMES DAILY .   Maximum quantity per month:  120   Clinic visit frequency required: Q 3 months      Controlled substance agreement:  Encounter-Level CSA - 10/31/2016:                     Controlled Substance Agreement - Scan on 11/15/2016  7:53 AM : CONTROLLED SUBSTANCE AGREEMENT (below)         Pain Clinic evaluation in the past: No     DIRE Total Score(s):     7/2/2016   Total Score 18         Last [facility] website verification:  done on 09/25/18

## 2018-09-26 ENCOUNTER — OFFICE VISIT (OUTPATIENT)
Dept: ORTHOPEDICS | Facility: CLINIC | Age: 63
End: 2018-09-26
Payer: COMMERCIAL

## 2018-09-26 VITALS — SYSTOLIC BLOOD PRESSURE: 135 MMHG | HEART RATE: 93 BPM | RESPIRATION RATE: 16 BRPM | DIASTOLIC BLOOD PRESSURE: 88 MMHG

## 2018-09-26 DIAGNOSIS — E66.01 MORBID OBESITY (H): ICD-10-CM

## 2018-09-26 DIAGNOSIS — M54.2 CERVICALGIA: Primary | ICD-10-CM

## 2018-09-26 NOTE — LETTER
9/26/2018      RE: Arnaud Bird  [address] [facility] [address]        Subjective:   Arnaud Bird is a RHD 63 year old male who is f/u for right shoulder injection. Right shoulder still clicks and pops.  Pt reports the injection has made a difference.  He reports continued issues with neck.  Pt has appt later with Chiropractic.  Loud pop into the neck, seems to help.  Doing everything left handed for a while.  Pt reports can hold tools now.  Cleaning up the hunting trailer, damp and mold spots.  Eyes have been burning and sneezing and wheezing.  Annoying ache all the time.  Did some PT.  Neck traction- seemed to make it worse.  Couldn't turn head and couldn't drive, good days and bad days.  Right knee is swollen, wearing brace again.  Up and down ladders, did too much  On garage floor to fix car girlfriend blew up.  Using chair and cushion to get up, not kneeling on TKA  Trip to [place], [employer] festival- not dieting well, running with 3 year olds.    Vicodin in a.m. And within half hour he's up and around.  Seeing MD for labs soon.    Date last seen: 8/24/2018  Following Therapeutic Plan: Yes   Pain: Improving  Function: Improving  Interval History: CSI, chiropractor, pain medications     PAST MEDICAL, SOCIAL, SURGICAL AND FAMILY HISTORY: He  has a past medical history of Arthritis; Carpal tunnel syndrome; LEFT WORSE THAN RIGHT  (10/18/2012); Unspecified asthma(493.90) (10/17/2012); and Unspecified asthma, with exacerbation (10/17/2012). He also has no past medical history of Congestive heart failure, unspecified; COPD (chronic obstructive pulmonary disease) (H); Coronary artery disease; Diabetes mellitus (H); History of blood transfusion; Hypertension; Malignant neoplasm (H); Thyroid disease; or Unspecified cerebral artery occlusion with cerebral infarction.  He  has a past surgical history that includes tonsillectomy; TOOTH EXTRACTION W/FORCEP; and orthopedic surgery.  His family  history includes Cancer in his father; Family History Negative in his father and mother. There is no history of Diabetes, Coronary Artery Disease, Hypertension, Hyperlipidemia, Cerebrovascular Disease, Breast Cancer, Colon Cancer, Prostate Cancer, Other Cancer, Depression, Anxiety Disorder, Mental Illness, Substance Abuse, Anesthesia Reaction, Asthma, Osteoporosis, Genetic Disorder, Thyroid Disease, Obesity, or Unknown/Adopted.  He reports that he has never smoked. He has never used smokeless tobacco. He reports that he drinks alcohol. He reports that he does not use illicit drugs.    ALLERGIES: He is allergic to seasonal allergies.    CURRENT MEDICATIONS: He has a current medication list which includes the following prescription(s): acyclovir, albuterol, allopurinol, beclomethasone, clindamycin, diclofenac, flovent diskus, fluticasone, hydrocodone-acetaminophen, order for dme, phentermine, sildenafil, and simvastatin.     REVIEW OF SYSTEMS: 10 point review of systems is negative except as noted above.     Exam:   There were no vitals taken for this visit.           CONSTITUTIONAL: alert, no distress, cooperative and obese  HEAD: Normocephalic. No masses, lesions, tenderness or abnormalities  SKIN: no suspicious lesions or rashes  GAIT: antalgic  NEUROLOGIC: Non-focal  PSYCHIATRIC: affect normal/bright and mentation appears normal.    MUSCULOSKELETAL: neck pain  Inspection: rigid cervical spine  Tender:  left paracervical muscles, right paracervical muscles  Non-tender:  spinous processes, medial border of scapula, superior angle of scapula, left trapezius muscles, right trapezius muscles  Range of Motion:  Full ROM of cervical spine  Strength: Full strength of all neck muscles  Special tests:  Spurling's - negative - left, Spurling's - negative - right           Assessment/Plan:   Pt is a 62 yo obese male with PMHx of neck OA, right TKA presenting with neck pain  1. Neck pain- full ROM, seeing chiro today, declines  numbness or radiation, PT- declined  2. Right GH OA- CSI was helpful, can obtain injections q3-4 months  3. Obesity- weight loss and nutritional support recommended, nutrition referral- [place] location  Weight loss likely to help knee pain    RTC 6 weeks  X-RAY INTERPRETATION:   Cervical x-ray 2/2018  IMPRESSION: Vertebral body C2-C7 well seen on the lateral view. There  is minimal grade 1 anterolisthesis of C4 on C5 and C5 on C6. No  definite loss of vertebral body height. There is moderate loss of  intervertebral disc space with degenerative endplate changes at C6-C7  and to a lesser extent the other levels in the cervical spine.  Multiple levels of facet hypertrophy seen on the frontal view. No  definite prevertebral soft tissue swelling. The base of the dens is  unremarkable. Lateral masses of C1 appear normally aligned on C2.    Carmel Chan MD

## 2018-09-26 NOTE — PROGRESS NOTES
Subjective:   Arnaud Bird is a RHD 63 year old male who is f/u for right shoulder injection. Right shoulder still clicks and pops.  Pt reports the injection has made a difference.  He reports continued issues with neck.  Pt has appt later with Chiropractic.  Loud pop into the neck, seems to help.  Doing everything left handed for a while.  Pt reports can hold tools now.  Cleaning up the hunting trailer, damp and mold spots.  Eyes have been burning and sneezing and wheezing.  Annoying ache all the time.  Did some PT.  Neck traction- seemed to make it worse.  Couldn't turn head and couldn't drive, good days and bad days.  Right knee is swollen, wearing brace again.  Up and down ladders, did too much  On garage floor to fix car girlfriend blew up.  Using chair and cushion to get up, not kneeling on TKA  Trip to [place], [employer] festival- not dieting well, running with 3 year olds.    Vicodin in a.m. And within half hour he's up and around.  Seeing MD for labs soon.    Date last seen: 8/24/2018  Following Therapeutic Plan: Yes   Pain: Improving  Function: Improving  Interval History: CSI, chiropractor, pain medications     PAST MEDICAL, SOCIAL, SURGICAL AND FAMILY HISTORY: He  has a past medical history of Arthritis; Carpal tunnel syndrome; LEFT WORSE THAN RIGHT  (10/18/2012); Unspecified asthma(493.90) (10/17/2012); and Unspecified asthma, with exacerbation (10/17/2012). He also has no past medical history of Congestive heart failure, unspecified; COPD (chronic obstructive pulmonary disease) (H); Coronary artery disease; Diabetes mellitus (H); History of blood transfusion; Hypertension; Malignant neoplasm (H); Thyroid disease; or Unspecified cerebral artery occlusion with cerebral infarction.  He  has a past surgical history that includes tonsillectomy; TOOTH EXTRACTION W/FORCEP; and orthopedic surgery.  His family history includes Cancer in his father; Family History Negative in his father and mother. There  is no history of Diabetes, Coronary Artery Disease, Hypertension, Hyperlipidemia, Cerebrovascular Disease, Breast Cancer, Colon Cancer, Prostate Cancer, Other Cancer, Depression, Anxiety Disorder, Mental Illness, Substance Abuse, Anesthesia Reaction, Asthma, Osteoporosis, Genetic Disorder, Thyroid Disease, Obesity, or Unknown/Adopted.  He reports that he has never smoked. He has never used smokeless tobacco. He reports that he drinks alcohol. He reports that he does not use illicit drugs.    ALLERGIES: He is allergic to seasonal allergies.    CURRENT MEDICATIONS: He has a current medication list which includes the following prescription(s): acyclovir, albuterol, allopurinol, beclomethasone, clindamycin, diclofenac, flovent diskus, fluticasone, hydrocodone-acetaminophen, order for dme, phentermine, sildenafil, and simvastatin.     REVIEW OF SYSTEMS: 10 point review of systems is negative except as noted above.     Exam:   There were no vitals taken for this visit.           CONSTITUTIONAL: alert, no distress, cooperative and obese  HEAD: Normocephalic. No masses, lesions, tenderness or abnormalities  SKIN: no suspicious lesions or rashes  GAIT: antalgic  NEUROLOGIC: Non-focal  PSYCHIATRIC: affect normal/bright and mentation appears normal.    MUSCULOSKELETAL: neck pain  Inspection: rigid cervical spine  Tender:  left paracervical muscles, right paracervical muscles  Non-tender:  spinous processes, medial border of scapula, superior angle of scapula, left trapezius muscles, right trapezius muscles  Range of Motion:  Full ROM of cervical spine  Strength: Full strength of all neck muscles  Special tests:  Spurling's - negative - left, Spurling's - negative - right           Assessment/Plan:   Pt is a 62 yo obese male with PMHx of neck OA, right TKA presenting with neck pain  1. Neck pain- full ROM, seeing chiro today, declines numbness or radiation, PT- declined  2. Right GH OA- CSI was helpful, can obtain injections  q3-4 months  3. Obesity- weight loss and nutritional support recommended, nutrition referral- [place] location  Weight loss likely to help knee pain    RTC 6 weeks  X-RAY INTERPRETATION:   Cervical x-ray 2/2018  IMPRESSION: Vertebral body C2-C7 well seen on the lateral view. There  is minimal grade 1 anterolisthesis of C4 on C5 and C5 on C6. No  definite loss of vertebral body height. There is moderate loss of  intervertebral disc space with degenerative endplate changes at C6-C7  and to a lesser extent the other levels in the cervical spine.  Multiple levels of facet hypertrophy seen on the frontal view. No  definite prevertebral soft tissue swelling. The base of the dens is  unremarkable. Lateral masses of C1 appear normally aligned on C2.

## 2018-09-26 NOTE — MR AVS SNAPSHOT
After Visit Summary   9/26/2018    Arnuad Bird    MRN: [id number]           Patient Information     Date Of Birth          1955        Visit Information        Provider Department      9/26/2018 8:30 AM Carmel Chan MD [facility]        Today's Diagnoses     Cervicalgia    -  1    Morbid obesity (H)           Follow-ups after your visit        Additional Services     NUTRITION REFERRAL       Your provider has referred you to: PREFERRED PROVIDERS: [facility]    Please be aware that coverage of these services is subject to the terms and limitations of your health insurance plan.  Call member services at your health plan with any benefit or coverage questions.      Please bring the following with you to your appointment:    (1) This referral request  (2) Any documents given to you regarding this referral  (3) Any specific questions you have about diet and/or food choices                  Follow-up notes from your care team     Return if symptoms worsen or fail to improve.      Your next 10 appointments already scheduled     Sep 27, 2018  9:45 AM CDT   SHORT with Johnson Clements MD   [facility]    [address] [place] [id number] [phone]              Who to contact     Please call your clinic at [phone] to:    Ask questions about your health    Make or cancel appointments    Discuss your medicines    Learn about your test results    Speak to your doctor            Additional Information About Your Visit        MyChart Information     [email or web address] is an electronic gateway that provides easy, online access to your medical records. With Caternat, you can request a clinic appointment, read your test results, renew a prescription or communicate with your care team.     To sign up for [email or web address] visit the website at [email or web address]   You will be  asked to enter the access code listed below, as well as some personal information. Please follow the directions to create your username and password.     Your access code is: [email or web address]  Expires: 2018  2:40 PM     Your access code will  in 90 days. If you need help or a new code, please contact your [facility] or call [phone] for assistance.        Care EveryWhere ID     This is your Care EveryWhere ID. This could be used by other organizations to access your [facility] medical records  [id number]        Your Vitals Were     Pulse Respirations                93 16           Blood Pressure from Last 3 Encounters:   18 135/88   18 120/80   05/10/18 116/78    Weight from Last 3 Encounters:   18 264 lb 8 oz (120 kg)   05/10/18 264 lb (119.7 kg)   18 270 lb (122.5 kg)              We Performed the Following     NUTRITION REFERRAL        Primary Care Provider Office Phone # Fax #    Johnson Jesica Clements [phone] [address] [facility] [address]        Equal Access to Services     [facility] CenterYULIANA : Hadii aad ku hadasho Soomaali, waaxda luqadaha, qaybta kaalmada adeegyada, gee jasson kilo mirza. So [facility] [phone].    ATENCIÓN: Si habla español, tiene a stewart disposición servicios gratuitos de asistencia lingüística. Llame al [phone].    We comply with applicable federal civil rights laws and [place] laws. We do not discriminate on the basis of race, color, national origin, age, disability, sex, sexual orientation, or gender identity.            Thank you!     Thank you for choosing [facility]  for your care. Our goal is always to provide you with excellent care. Hearing back from our patients is one way we can continue to improve our services. Please take a few minutes to complete the written survey that you may receive in the mail after your visit with us. Thank you!              Your Updated Medication List - Protect others around you: Learn how to safely use, store and throw away your medicines at www.disposemymeds.org.          This list is accurate as of 9/26/18  9:56 AM.  Always use your most recent med list.                   Brand Name Dispense Instructions for use Diagnosis    acyclovir 400 MG tablet    ZOVIRAX    28 tablet    TAKE TWO TABLETS BY MOUTH TWICE DAILY    HSV (herpes simplex virus) infection       albuterol 108 (90 Base) MCG/ACT inhaler    PROAIR HFA/PROVENTIL HFA/VENTOLIN HFA    1 Inhaler    Inhale 2 puffs into the lungs every 6 hours as needed for shortness of breath / dyspnea    Mild persistent asthma, uncomplicated       allopurinol 300 MG tablet    ZYLOPRIM    90 tablet    Take 1 tablet (300 mg) by mouth daily    Chronic gout involving toe 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 tophus, unspecified cause       beclomethasone 40 MCG/ACT Inhaler    QVAR    1 Inhaler    Inhale 2 puffs into the lungs 2 times daily    Mild persistent asthma without complication       clindamycin 1 % solution    CLEOCIN T    60 mL    Apply topically 2 times daily Profile Rx: patient will contact pharmacy when needed    Folliculitis       diclofenac 1 % Gel topical gel    VOLTAREN    100 g    APPLY 2 GRAMS TOPICALLY TO AFFECTED AREA FOUR TIMES DAILY AS NEEDED    Stiffness of knee joint, right, Carpal tunnel syndrome, unspecified laterality, Primary localized osteoarthrosis, lower leg, unspecified laterality, Lumbar disc disease with radiculopathy       FLOVENT DISKUS 100 MCG/BLIST Aepb   Generic drug:  fluticasone     60 each    INHALE 1 PUFF BY MOUTH 2 TIMES DAILY    Mild persistent asthma without complication       fluticasone 50 MCG/ACT spray    FLONASE    16 g    Spray 2 sprays into both nostrils daily    Seasonal allergic rhinitis due to pollen       HYDROcodone-acetaminophen 7.5-325 MG per tablet    NORCO    120 tablet    Take 1 tablet by mouth every 4 hours as needed for pain (4 x daily as needed   maxium 4 per day)    Chronic pain syndrome       order for DME     1 each    Equipment being ordered:  CERVICAL TRACTION DEVICE ONE AS DIRECTED    Chronic pain syndrome       phentermine 37.5 MG tablet    ADIPEX-P    30 tablet    TAKE ONE TABLET BY MOUTH EVERY DAY IN THE MORNING.    BMI 40.0-44.9, adult (H)       sildenafil 100 MG tablet    VIAGRA    100 tablet    Take 0.5-1 tablets ( mg) by mouth daily as needed Take 30 min to 4 hours before intercourse.  Never use with nitroglycerin, terazosin or doxazosin.    Sexual dysfunction       simvastatin 40 MG tablet    ZOCOR    90 tablet    TAKE 1 TABLET (40 MG) BY MOUTH AT BEDTIME    Hyperlipidemia with target LDL less than 130

## 2018-09-27 ENCOUNTER — OFFICE VISIT (OUTPATIENT)
Dept: FAMILY MEDICINE | Facility: CLINIC | Age: 63
End: 2018-09-27
Payer: COMMERCIAL

## 2018-09-27 VITALS
WEIGHT: 259 LBS | SYSTOLIC BLOOD PRESSURE: 132 MMHG | DIASTOLIC BLOOD PRESSURE: 78 MMHG | TEMPERATURE: 97.3 F | HEART RATE: 67 BPM | OXYGEN SATURATION: 98 % | BODY MASS INDEX: 39.38 KG/M2 | RESPIRATION RATE: 16 BRPM

## 2018-09-27 DIAGNOSIS — G89.29 CHRONIC RIGHT SHOULDER PAIN: ICD-10-CM

## 2018-09-27 DIAGNOSIS — M54.2 CHRONIC NECK PAIN: ICD-10-CM

## 2018-09-27 DIAGNOSIS — G89.29 CHRONIC NECK PAIN: ICD-10-CM

## 2018-09-27 DIAGNOSIS — M51.16 LUMBAR DISC DISEASE WITH RADICULOPATHY: ICD-10-CM

## 2018-09-27 DIAGNOSIS — Z11.4 SCREENING FOR HIV (HUMAN IMMUNODEFICIENCY VIRUS): ICD-10-CM

## 2018-09-27 DIAGNOSIS — Z23 NEED FOR PROPHYLACTIC VACCINATION AND INOCULATION AGAINST INFLUENZA: ICD-10-CM

## 2018-09-27 DIAGNOSIS — E78.5 HYPERLIPIDEMIA WITH TARGET LDL LESS THAN 130: Chronic | ICD-10-CM

## 2018-09-27 DIAGNOSIS — M25.661 STIFFNESS OF KNEE JOINT, RIGHT: Chronic | ICD-10-CM

## 2018-09-27 DIAGNOSIS — Z23 NEED FOR PROPHYLACTIC VACCINATION AGAINST STREPTOCOCCUS PNEUMONIAE (PNEUMOCOCCUS): ICD-10-CM

## 2018-09-27 DIAGNOSIS — M25.511 CHRONIC RIGHT SHOULDER PAIN: ICD-10-CM

## 2018-09-27 DIAGNOSIS — J45.30 MILD PERSISTENT ASTHMA WITHOUT COMPLICATION: Chronic | ICD-10-CM

## 2018-09-27 DIAGNOSIS — G89.4 CHRONIC PAIN SYNDROME: Primary | Chronic | ICD-10-CM

## 2018-09-27 PROCEDURE — 99214 OFFICE O/P EST MOD 30 MIN: CPT | Performed by: FAMILY MEDICINE

## 2018-09-27 RX ORDER — SIMVASTATIN 40 MG
TABLET ORAL
Qty: 90 TABLET | Refills: 2 | Status: SHIPPED | OUTPATIENT
Start: 2018-09-27 | End: 2019-09-19

## 2018-09-27 RX ORDER — HYDROCODONE BITARTRATE AND ACETAMINOPHEN 7.5; 325 MG/1; MG/1
1 TABLET ORAL EVERY 4 HOURS PRN
Qty: 120 TABLET | Refills: 0 | Status: SHIPPED | OUTPATIENT
Start: 2018-10-27 | End: 2018-09-27

## 2018-09-27 RX ORDER — ALBUTEROL SULFATE 90 UG/1
2 AEROSOL, METERED RESPIRATORY (INHALATION) EVERY 6 HOURS PRN
Qty: 1 INHALER | Refills: 11 | Status: SHIPPED | OUTPATIENT
Start: 2018-09-27 | End: 2019-03-19

## 2018-09-27 RX ORDER — HYDROCODONE BITARTRATE AND ACETAMINOPHEN 7.5; 325 MG/1; MG/1
1 TABLET ORAL EVERY 4 HOURS PRN
Qty: 120 TABLET | Refills: 0 | Status: SHIPPED | OUTPATIENT
Start: 2018-11-27 | End: 2018-12-27

## 2018-09-27 RX ORDER — HYDROCODONE BITARTRATE AND ACETAMINOPHEN 7.5; 325 MG/1; MG/1
1 TABLET ORAL EVERY 4 HOURS PRN
Qty: 120 TABLET | Refills: 0 | Status: SHIPPED | OUTPATIENT
Start: 2018-09-27 | End: 2018-09-27

## 2018-09-27 RX ORDER — PHENTERMINE HYDROCHLORIDE 37.5 MG/1
TABLET ORAL
Qty: 30 TABLET | Refills: 0 | Status: SHIPPED | OUTPATIENT
Start: 2018-10-27 | End: 2018-09-27

## 2018-09-27 RX ORDER — PHENTERMINE HYDROCHLORIDE 37.5 MG/1
TABLET ORAL
Qty: 30 TABLET | Refills: 0 | Status: SHIPPED | OUTPATIENT
Start: 2018-11-27 | End: 2018-11-18

## 2018-09-27 RX ORDER — PHENTERMINE HYDROCHLORIDE 37.5 MG/1
TABLET ORAL
Qty: 30 TABLET | Refills: 0 | Status: SHIPPED | OUTPATIENT
Start: 2018-09-27 | End: 2018-09-27

## 2018-09-27 NOTE — PATIENT INSTRUCTIONS
"   What You Can Do to Reduce Cholesterol Levels  and Reduce Risk of Diabetes, Heart, and Blood Vessel Disease  1. Eat six to eight servings of green or root vegetables or fruit (raw or cooked) per day. You need 35 grams of fiber per day.  Examples: carrots apples  broccoli oranges  spinach grapefruit  lettuce pears  bananas  2. Use up to 2 cup of walnuts, almonds, or pecans as a source of \"good\" fat per day.  3. Drink one to three servings of soy milk (great for cereal) or 2 cup of soynuts per day.  4. Use margarine with reduced trans or saturated fats (e.g. Benecol, Smart Balance, olive oil margarine) as replacement for butter or margarine.  5. Eat fewer or half-portions of desserts, baked goods, chips, cookies, processed flour and sugar, pasta, butter, cream, non-skim dairy, ice cream.  6. Use olive or canola oil as the only oils for cooking and dressings.  7. Use one tablespoon of ground flaxseed or Natural Ovens \"Energy Mix\" per day (great on cereal or over salad).  8. Eat one to two servings per week of wild salmon or water-packed tuna.  9. Limit beef, lamb, and pork to at most one serving per day. (Range-fed beef, if you can get it, is much preferred and allows for greater use in diet).  10. Eat three to four servings per day of whole grains (legumes, rice, wheat, oats).  11. Limit sodas and beer at most to three per week (only special occasions).  12. 65 percent of us need to lose weight and all of us need to keep moving! You should be walking at least 150 minutes per week. You should lose approximately seven percent of your weight in the next year if overweight. Check with me for more details.  1. Andrew Weil's paperback book, The Healthy Kitchen, is a great addition to your healthy lifestyle library.  2. American Diabetic Association (www.diabetes.org) - a wealth of information on nutritional excellence.  3. Other great websites for Mediterranean diets are available.    1. MODIFIED FAST 250 CALORIES TWICE " DAILY FEMALES  300 CALORIES TWICE DAILY FOR MALES   OATMEAL AND COTTAGE CHEESE WORK NICELY   COPIOUS WATER BETWEEN   5/2 PLAN DR FERGUSON [facility]  NORMAL DIET 5 DAYS PER WEEK  SEMIFAST 2 DAYS PER WEEK  LOOK UP 5-2 PLAN ON THE INTERNET    Weight Loss Tips  1. Do not eat after 6 hrs before your expected bedtime  2. Have your heaviest meal for breakfast, a slightly lighter meal at lunch and a snack 6 hrs before bed  3. No sugar/calorie drinks except milk ie no fruit juice, pop, alcohol.  4. Drink milk OR WATER  30min before meals to decrease your hunger. Also it is excellent as part of your last meal of the day snack  5. Drink lots of water  6. Increase fiber in diet: all bran cereal, salads, popcorn etc  7. Have only one small serving of fruit a day about 1/2 cup (as this is high in sugar)  8. EXERCISE is the bottom line. Without it, you will gain weight even on a low calorie diet. Best if done 2-3X a day as can    2. The only way known to prevent diabetes or keep it from getting worse is exercise, 20-40 minutes 3 times a day around the time of meals      SLEEP 8 HOURS PER DAY    BLACK AND BLUEBERRIES EVERY DAY ANTINFLAMMATORY BENEFIT     PLAIN YOGURT SEVERAL TIMES DAILY AS TOLERATED IF NOT ALLERGIC     AVOID HIGH FRUCTOSE SYRUP AND OLENE IN YOUR DIET     GREEN TEA EXTRACT    PROBIOTIC CAPSULE DAILY  HIGH QUALITY     DON'T EAT LATE AT NIGHT    GALLO REYESA HELPS WITH APPETITE    KEEP A BLESSINGS JOURNAL    888 BREATHER WHEN STRESSED OR COUNT BACK FROM 100 WITH SLOW BREATHING    POSITIVE AFFIRMATIONS         FIT BIT OR PEDOMETER 10,000 STEPS PER DAY     FIT CARYN ALVARADOICH RECOMMENDED

## 2018-09-27 NOTE — MR AVS SNAPSHOT
"              After Visit Summary   9/27/2018    Arnaud Bird    MRN: [id number]           Patient Information     Date Of Birth          1955        Visit Information        Provider Department      9/27/2018 9:45 AM Johnson Clements MD [facility]        Today's Diagnoses     Chronic pain syndrome    -  1    Chronic right shoulder pain        Chronic neck pain        Mild persistent asthma without complication        Hyperlipidemia with target LDL less than 130        Lumbar disc disease with radiculopathy        Stiffness of knee joint, right        Screening for HIV (human immunodeficiency virus)        Need for prophylactic vaccination and inoculation against influenza        Need for prophylactic vaccination against Streptococcus pneumoniae (pneumococcus)        BMI 40.0-44.9, adult (H)          Care Instructions       What You Can Do to Reduce Cholesterol Levels  and Reduce Risk of Diabetes, Heart, and Blood Vessel Disease  1. Eat six to eight servings of green or root vegetables or fruit (raw or cooked) per day. You need 35 grams of fiber per day.  Examples: carrots apples  broccoli oranges  spinach grapefruit  lettuce pears  bananas  2. Use up to 2 cup of walnuts, almonds, or pecans as a source of \"good\" fat per day.  3. Drink one to three servings of soy milk (great for cereal) or 2 cup of soynuts per day.  4. Use margarine with reduced trans or saturated fats (e.g. Benecol, Smart Balance, olive oil margarine) as replacement for butter or margarine.  5. Eat fewer or half-portions of desserts, baked goods, chips, cookies, processed flour and sugar, pasta, butter, cream, non-skim dairy, ice cream.  6. Use olive or canola oil as the only oils for cooking and dressings.  7. Use one tablespoon of ground flaxseed or Natural Ovens \"Energy Mix\" per day (great on cereal or over salad).  8. Eat one to two servings per week of wild salmon or water-packed tuna.  9. " Limit beef, lamb, and pork to at most one serving per day. (Range-fed beef, if you can get it, is much preferred and allows for greater use in diet).  10. Eat three to four servings per day of whole grains (legumes, rice, wheat, oats).  11. Limit sodas and beer at most to three per week (only special occasions).  12. 65 percent of us need to lose weight and all of us need to keep moving! You should be walking at least 150 minutes per week. You should lose approximately seven percent of your weight in the next year if overweight. Check with me for more details.  1. Andrew Weil's paperback book, The Healthy Kitchen, is a great addition to your healthy lifestyle library.  2. American Diabetic Association (www.diabetes.org) - a wealth of information on nutritional excellence.  3. Other great websites for Mediterranean diets are available.    1. MODIFIED FAST 250 CALORIES TWICE DAILY FEMALES  300 CALORIES TWICE DAILY FOR MALES   OATMEAL AND COTTAGE CHEESE WORK NICELY   COPIOUS WATER BETWEEN   5/2 PLAN DR FERGUSON [facility]  NORMAL DIET 5 DAYS PER WEEK  SEMIFAST 2 DAYS PER WEEK  LOOK UP 5-2 PLAN ON THE INTERNET    Weight Loss Tips  1. Do not eat after 6 hrs before your expected bedtime  2. Have your heaviest meal for breakfast, a slightly lighter meal at lunch and a snack 6 hrs before bed  3. No sugar/calorie drinks except milk ie no fruit juice, pop, alcohol.  4. Drink milk OR WATER  30min before meals to decrease your hunger. Also it is excellent as part of your last meal of the day snack  5. Drink lots of water  6. Increase fiber in diet: all bran cereal, salads, popcorn etc  7. Have only one small serving of fruit a day about 1/2 cup (as this is high in sugar)  8. EXERCISE is the bottom line. Without it, you will gain weight even on a low calorie diet. Best if done 2-3X a day as can    2. The only way known to prevent diabetes or keep it from getting worse is exercise, 20-40 minutes 3 times a day around the time of  meals      SLEEP 8 HOURS PER DAY    BLACK AND BLUEBERRIES EVERY DAY ANTINFLAMMATORY BENEFIT     PLAIN YOGURT SEVERAL TIMES DAILY AS TOLERATED IF NOT ALLERGIC     AVOID HIGH FRUCTOSE SYRUP AND OLENE IN YOUR DIET     GREEN TEA EXTRACT    PROBIOTIC CAPSULE DAILY  HIGH QUALITY     DON'T EAT LATE AT NIGHT    GALLO REYESA HELPS WITH APPETITE    KEEP A BLESSDAVI LUXURY BRAND GROUP JOURNAL    888 BREATHER WHEN STRESSED OR COUNT BACK FROM 100 WITH SLOW BREATHING    POSITIVE AFFIRMATIONS         FIT BIT OR PEDOMETER 10,000 STEPS PER DAY     FIT CARYN LI RECOMMENDED                Follow-ups after your visit        Follow-up notes from your care team     Return in about 3 months (around 12/27/2018) for PAIN VISIT.      Your next 10 appointments already scheduled     Oct 02, 2018  8:30 AM CDT   [facility]    [address] [place] [id number] [phone]           Bring a current list of meds and any records pertaining to this visit. For Physicals, please bring immunization records and any forms needing to be filled out. Please arrive 10 minutes early to complete paperwork.              Who to contact     If you have questions or need follow up information about today's clinic visit or your schedule please contact [facility] directly at [phone].  Normal or non-critical lab and imaging results will be communicated to you by MyChart, letter or phone within 4 business days after the clinic has received the results. If you do not hear from us within 7 days, please contact the clinic through [employer] or phone. If you have a critical or abnormal lab result, we will notify you by phone as soon as possible.  Submit refill requests through [employer] or call your pharmacy and they will forward the refill request to us. Please allow 3 business days for your refill to be completed.          Additional Information  "About Your Visit        [employer] Information     [employer] lets you send messages to your doctor, view your test results, renew your prescriptions, schedule appointments and more. To sign up, go to [email or web address] . Click on \"Log in\" on the left side of the screen, which will take you to the Welcome page. Then click on \"Sign up Now\" on the right side of the page.     You will be asked to enter the access code listed below, as well as some personal information. Please follow the directions to create your username and password.     Your access code is: [email or web address]  Expires: 2018  9:35 AM     Your access code will  in 90 days. If you need help or a new code, please call your [place] clinic or [phone].        Care EveryWhere ID     This is your Care EveryWhere ID. This could be used by other organizations to access your [place] medical records  [id number]        Your Vitals Were     Pulse Temperature Respirations Pulse Oximetry BMI (Body Mass Index)       67 97.3  F (36.3  C) (Tympanic) 16 98% 39.38 kg/m2        Blood Pressure from Last 3 Encounters:   18 132/78   18 135/88   18 120/80    Weight from Last 3 Encounters:   18 259 lb (117.5 kg)   18 264 lb 8 oz (120 kg)   05/10/18 264 lb (119.7 kg)              Today, you had the following     No orders found for display         Today's Medication Changes          These changes are accurate as of 18 10:25 AM.  If you have any questions, ask your nurse or doctor.               Start taking these medicines.        Dose/Directions    HYDROcodone-acetaminophen 7.5-325 MG per tablet   Commonly known as:  NORCO   Used for:  Chronic pain syndrome   Started by:  Johnson Clements MD        Dose:  1 tablet   Start taking on:  2018   Take 1 tablet by mouth every 4 hours as needed for pain (4 x daily as needed  maxium 4 per day)   Quantity:  120 tablet   Refills:  0       phentermine 37.5 MG tablet   Commonly " known as:  ADIPEX-P   Used for:  BMI 40.0-44.9, adult (H)   Started by:  Johnson Clements MD        Start taking on:  11/27/2018   TAKE ONE TABLET BY MOUTH EVERY DAY IN THE MORNING.   Quantity:  30 tablet   Refills:  0         These medicines have changed or have updated prescriptions.        Dose/Directions    * albuterol 108 (90 Base) MCG/ACT inhaler   Commonly known as:  PROAIR HFA/PROVENTIL HFA/VENTOLIN HFA   This may have changed:  Another medication with the same name was added. Make sure you understand how and when to take each.   Used for:  Mild persistent asthma, uncomplicated   Changed by:  Johnson Clements MD        Dose:  2 puff   Inhale 2 puffs into the lungs every 6 hours as needed for shortness of breath / dyspnea   Quantity:  1 Inhaler   Refills:  11       * albuterol 108 (90 Base) MCG/ACT inhaler   Commonly known as:  PROAIR HFA/PROVENTIL HFA/VENTOLIN HFA   This may have changed:  You were already taking a medication with the same name, and this prescription was added. Make sure you understand how and when to take each.   Used for:  Mild persistent asthma without complication   Changed by:  Johnson Clements MD        Dose:  2 puff   Inhale 2 puffs into the lungs every 6 hours as needed for shortness of breath / dyspnea or wheezing   Quantity:  1 Inhaler   Refills:  11       * Notice:  This list has 2 medication(s) that are the same as other medications prescribed for you. Read the directions carefully, and ask your doctor or other care provider to review them with you.         Where to get your medicines      These medications were sent to [facility] [address] [place] [address] [address]     Phone:  [phone]     albuterol 108 (90 Base) MCG/ACT inhaler    simvastatin 40 MG tablet         Some of these will need a paper prescription and others can be bought over the counter.  Ask your nurse if you have questions.     Bring  a paper prescription for each of these medications     HYDROcodone-acetaminophen 7.5-325 MG per tablet    phentermine 37.5 MG tablet               Information about OPIOIDS     PRESCRIPTION OPIOIDS: WHAT YOU NEED TO KNOW   We gave you an opioid (narcotic) pain medicine. It is important to manage your pain, but opioids are not always the best choice. You should first try all the other options your care team gave you. Take this medicine for as short a time (and as few doses) as possible.    Some activities can increase your pain, such as bandage changes or therapy sessions. It may help to take your pain medicine 30 to 60 minutes before these activities. Reduce your stress by getting enough sleep, working on hobbies you enjoy and practicing relaxation or meditation. Talk to your care team about ways to manage your pain beyond prescription opioids.    These medicines have risks:    DO NOT drive when on new or higher doses of pain medicine. These medicines can affect your alertness and reaction times, and you could be arrested for driving under the influence (DUI). If you need to use opioids long-term, talk to your care team about driving.    DO NOT operate heavy machinery    DO NOT do any other dangerous activities while taking these medicines.    DO NOT drink any alcohol while taking these medicines.     If the opioid prescribed includes acetaminophen, DO NOT take with any other medicines that contain acetaminophen. Read all labels carefully. Look for the word  acetaminophen  or  Tylenol.  Ask your pharmacist if you have questions or are unsure.    You can get addicted to pain medicines, especially if you have a history of addiction (chemical, alcohol or substance dependence). Talk to your care team about ways to reduce this risk.    All opioids tend to cause constipation. Drink plenty of water and eat foods that have a lot of fiber, such as fruits, vegetables, prune juice, apple juice and high-fiber cereal. Take a  laxative (Miralax, milk of magnesia, Colace, Senna) if you don t move your bowels at least every other day. Other side effects include upset stomach, sleepiness, dizziness, throwing up, tolerance (needing more of the medicine to have the same effect), physical dependence and slowed breathing.    Store your pills in a secure place, locked if possible. We will not replace any lost or stolen medicine. If you don t finish your medicine, please throw away (dispose) as directed by your pharmacist. The [place] Pollution Control Agency has more information about safe disposal: https://www.pca.[facility].[place].us/living-green/managing-unwanted-medications         Primary Care Provider Office Phone # Fax #    Johnson Clements [phone] [address] [facility] [address]        Equal Access to Services     JACINTO MODI : Hadii lainey ray hadasho Sogisselle, waaxda luqadaha, qaybta kaalmada adeojyada, gee sparks . So [facility] [phone].    ATENCIÓN: Si habla español, tiene a stewart disposición servicios gratuitos de asistencia lingüística. Arsen al [phone].    We comply with applicable federal civil rights laws and [place] laws. We do not discriminate on the basis of race, color, national origin, age, disability, sex, sexual orientation, or gender identity.            Thank you!     Thank you for choosing [facility]  for your care. Our goal is always to provide you with excellent care. Hearing back from our patients is one way we can continue to improve our services. Please take a few minutes to complete the written survey that you may receive in the mail after your visit with us. Thank you!             Your Updated Medication List - Protect others around you: Learn how to safely use, store and throw away your medicines at www.disposemymeds.org.          This list is accurate as of 9/27/18 10:25 AM.  Always use your most recent med list.                    Brand Name Dispense Instructions for use Diagnosis    acyclovir 400 MG tablet    ZOVIRAX    28 tablet    TAKE TWO TABLETS BY MOUTH TWICE DAILY    HSV (herpes simplex virus) infection       * albuterol 108 (90 Base) MCG/ACT inhaler    PROAIR HFA/PROVENTIL HFA/VENTOLIN HFA    1 Inhaler    Inhale 2 puffs into the lungs every 6 hours as needed for shortness of breath / dyspnea    Mild persistent asthma, uncomplicated       * albuterol 108 (90 Base) MCG/ACT inhaler    PROAIR HFA/PROVENTIL HFA/VENTOLIN HFA    1 Inhaler    Inhale 2 puffs into the lungs every 6 hours as needed for shortness of breath / dyspnea or wheezing    Mild persistent asthma without complication       allopurinol 300 MG tablet    ZYLOPRIM    90 tablet    Take 1 tablet (300 mg) by mouth daily    Chronic gout involving toe 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 tophus, unspecified cause       beclomethasone 40 MCG/ACT Inhaler    QVAR    1 Inhaler    Inhale 2 puffs into the lungs 2 times daily    Mild persistent asthma without complication       clindamycin 1 % solution    CLEOCIN T    60 mL    Apply topically 2 times daily Profile Rx: patient will contact pharmacy when needed    Folliculitis       diclofenac 1 % Gel topical gel    VOLTAREN    100 g    APPLY 2 GRAMS TOPICALLY TO AFFECTED AREA FOUR TIMES DAILY AS NEEDED    Stiffness of knee joint, right, Carpal tunnel syndrome, unspecified laterality, Primary localized osteoarthrosis, lower leg, unspecified laterality, Lumbar disc disease with radiculopathy       FLOVENT DISKUS 100 MCG/BLIST Aepb   Generic drug:  fluticasone     60 each    INHALE 1 PUFF BY MOUTH 2 TIMES DAILY    Mild persistent asthma without complication       fluticasone 50 MCG/ACT spray    FLONASE    16 g    Spray 2 sprays into both nostrils daily    Seasonal allergic rhinitis due to pollen       HYDROcodone-acetaminophen 7.5-325 MG per tablet   Start taking on:  11/27/2018    NORCO    120 tablet    Take 1 tablet by mouth every 4  hours as needed for pain (4 x daily as needed  maxium 4 per day)    Chronic pain syndrome       order for DME     1 each    Equipment being ordered:  CERVICAL TRACTION DEVICE ONE AS DIRECTED    Chronic pain syndrome       phentermine 37.5 MG tablet   Start taking on:  11/27/2018    ADIPEX-P    30 tablet    TAKE ONE TABLET BY MOUTH EVERY DAY IN THE MORNING.    BMI 40.0-44.9, adult (H)       sildenafil 100 MG tablet    VIAGRA    100 tablet    Take 0.5-1 tablets ( mg) by mouth daily as needed Take 30 min to 4 hours before intercourse.  Never use with nitroglycerin, terazosin or doxazosin.    Sexual dysfunction       simvastatin 40 MG tablet    ZOCOR    90 tablet    TAKE 1 TABLET (40 MG) BY MOUTH AT BEDTIME    Hyperlipidemia with target LDL less than 130       * Notice:  This list has 2 medication(s) that are the same as other medications prescribed for you. Read the directions carefully, and ask your doctor or other care provider to review them with you.

## 2018-09-27 NOTE — PROGRESS NOTES
"  SUBJECTIVE:   Arnaud Bird is a 63 year old male who presents to clinic today for the following health issues:      Chronic Pain Follow-Up       Type / Location of Pain: right shoulder, back  Analgesia/pain control:       Recent changes:  same      Overall control: Comfortably manageable  Activity level/function:      Daily activities:  Able to do all daily activities    Work:  retired  Adverse effects:  No  Adherance    Taking medication as directed?  Yes    Participating in other treatments: not applicable  Risk Factors:    Sleep:  Poor    Mood/anxiety:  controlled    Recent family or social stressors:  none noted    Other aggravating factors: none  PHQ-9 SCORE 10/31/2016   Total Score 2     SOFIYA-7 SCORE 10/31/2016   Total Score 2     Encounter-Level CSA - 10/31/2016:          Controlled Substance Agreement - Scan on 11/15/2016  7:53 AM : CONTROLLED SUBSTANCE AGREEMENT (below)                Amount of exercise or physical activity: 6-7 days/week for an average of greater than 60 minutes    Problems taking medications regularly: No    Medication side effects: none    Diet: seeing a dietician soon    SIGNIFICANT OBESITY     OSTEOARTHRITIS WITH BILATERAL TOTAL KNEE ARTHROPLASTY ONGOING PAIN     BILATERAL CARPAL TUNNEL SYNDROME IMPROVED     VITAMIN D REPLACEMENT      BILATERAL PLANTAR FASCIITIS     ASTHMA     OBESITY     LDL OR \"BAD\" CHOLESTEROL  GOAL < 100     LUMBAR DISC DISEASE with RADICULOPATHY     SEXUAL DYSFUNCTION     RIGHT WRIST PAIN     NUMBNESS OF RIGHT HAND     CHRONIC PAIN SYNDROME     MIGRAINE WITHOUT AURA HISTORY       Back Pain Follow Up      Description:   Location of pain:  center  Character of pain: sharp  Pain radiation: radiates into the right buttocks, radiates into the left buttocks and radiates below the knee   Since last visit, pain is:  unchanged  New numbness or weakness in legs, not attributed to pain:  no     Intensity: moderate    History:   Pain interferes with job:  NOT APPLICABLE "   Therapies tried without relief: acetaminophen (Tylenol) and chiropractor  Therapies tried with relief: opioids           Accompanying Signs & Symptoms:  Risk of Fracture:  None  Risk of Cauda Equina:  None  Risk of Infection:  None  Risk of Cancer:  None        Problem list and histories reviewed & adjusted, as indicated.  Additional history: as documented    .SCOTTY TOLEDO MD .9/27/2018 12:40 PM .September 27, 2018                Topic Date Due     ASTHMA ACTION PLAN Q1 YR  07/28/2018     ASTHMA CONTROL TEST Q6 MOS  09/26/2018               .  Current Outpatient Prescriptions   Medication Sig Dispense Refill     acyclovir (ZOVIRAX) 400 MG tablet TAKE TWO TABLETS BY MOUTH TWICE DAILY 28 tablet 3     albuterol (PROAIR HFA/PROVENTIL HFA/VENTOLIN HFA) 108 (90 Base) MCG/ACT inhaler Inhale 2 puffs into the lungs every 6 hours as needed for shortness of breath / dyspnea or wheezing 1 Inhaler 11     allopurinol (ZYLOPRIM) 300 MG tablet Take 1 tablet (300 mg) by mouth daily 90 tablet 3     clindamycin (CLEOCIN T) 1 % external solution Apply topically 2 times daily Profile Rx: patient will contact pharmacy when needed 60 mL 11     diclofenac (VOLTAREN) 1 % GEL topical gel APPLY 2 GRAMS TOPICALLY TO AFFECTED AREA FOUR TIMES DAILY AS NEEDED 100 g 1     FLOVENT DISKUS 100 MCG/BLIST AEPB INHALE 1 PUFF BY MOUTH 2 TIMES DAILY 60 each 5     fluticasone (FLONASE) 50 MCG/ACT spray Spray 2 sprays into both nostrils daily 16 g 11     [START ON 11/27/2018] HYDROcodone-acetaminophen (NORCO) 7.5-325 MG per tablet Take 1 tablet by mouth every 4 hours as needed for pain (4 x daily as needed  maxium 4 per day) 120 tablet 0     order for DME Equipment being ordered:  CERVICAL TRACTION DEVICE  ONE  AS DIRECTED 1 each 0     [START ON 11/27/2018] phentermine (ADIPEX-P) 37.5 MG tablet TAKE ONE TABLET BY MOUTH EVERY DAY IN THE MORNING. 30 tablet 0     sildenafil (VIAGRA) 100 MG tablet Take 0.5-1 tablets ( mg) by mouth daily as needed  Take 30 min to 4 hours before intercourse.  Never use with nitroglycerin, terazosin or doxazosin. 100 tablet 3     simvastatin (ZOCOR) 40 MG tablet TAKE 1 TABLET (40 MG) BY MOUTH AT BEDTIME 90 tablet 2     albuterol (PROAIR HFA, PROVENTIL HFA, VENTOLIN HFA) 108 (90 BASE) MCG/ACT inhaler Inhale 2 puffs into the lungs every 6 hours as needed for shortness of breath / dyspnea (Patient not taking: Reported on 2018) 1 Inhaler 11     beclomethasone (QVAR) 40 MCG/ACT Inhaler Inhale 2 puffs into the lungs 2 times daily (Patient not taking: Reported on 2018) 1 Inhaler 11     [DISCONTINUED] simvastatin (ZOCOR) 40 MG tablet TAKE 1 TABLET (40 MG) BY MOUTH AT BEDTIME 90 tablet 2              Allergies   Allergen Reactions     Seasonal Allergies            Immunization History   Administered Date(s) Administered     Influenza (IIV3) PF 10/18/2012               reports that he drinks alcohol.          reports that he does not use illicit drugs.        family history includes Cancer in his father; Family History Negative in his father and mother. There is no history of Diabetes, Coronary Artery Disease, Hypertension, Hyperlipidemia, Cerebrovascular Disease, Breast Cancer, Colon Cancer, Prostate Cancer, Other Cancer, Depression, Anxiety Disorder, Mental Illness, Substance Abuse, Anesthesia Reaction, Asthma, Osteoporosis, Genetic Disorder, Thyroid Disease, Obesity, or Unknown/Adopted.        indicated that the status of his no family hx of is unknown. He indicated that his mother is . He indicated that his father is . He indicated that his sister is alive. He indicated that both of his brothers are alive.          has a past surgical history that includes tonsillectomy; TOOTH EXTRACTION W/FORCEP; and orthopedic surgery.         reports that he currently engages in sexual activity and has had female partners.    .  Pediatric History   Patient Guardian Status     Not on file.     Other Topics Concern      Parent/Sibling W/ Cabg, Mi Or Angioplasty Before 65f 55m? No     Social History Narrative               reports that he has never smoked. He has never used smokeless tobacco.        Medical, social, surgical, and family histories reviewed.        Labs reviewed in EPIC  Patient Active Problem List   Diagnosis     Stiffness of knee joint, right     Gait difficulty     S/P TKR (total knee replacement)     Bilateral carpal tunnel syndrome     Vitamin D deficiency     Plantar fascial fibromatosis     Asthma with exacerbation     Hypertrophy of prostate without urinary obstruction     Obesity     Hyperlipidemia     Spondylosis with myelopathy, lumbar region     LEFT WORSE THAN RIGHT      CARDIOVASCULAR SCREENING; LDL GOAL LESS THAN 100     BMI 40.0-44.9, adult (H)     Mild persistent asthma     Hyperlipidemia with target LDL less than 130     Lumbar disc disease with radiculopathy     Groin strain, initial encounter     Sexual dysfunction     ACP (advance care planning)     Slac (scapholunate advanced collapse) of wrist, right     Right wrist pain     Numbness and tingling in right hand     Chronic pain syndrome     Chronic gout involving toe 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 tophus, unspecified cause     Impingement syndrome, shoulder, right     Cervical radiculopathy     Migraine without aura and without status migrainosus, not intractable       Past Surgical History:   Procedure Laterality Date     HC TOOTH EXTRACTION W/FORCEP       ORTHOPEDIC SURGERY      bilateral total knee replacements     TONSILLECTOMY      age 2 or 3         Social History   Substance Use Topics     Smoking status: Never Smoker     Smokeless tobacco: Never Used     Alcohol use Yes      Comment: case beer a week       Family History   Problem Relation Age of Onset     Cancer Father      Family History Negative Father      Family History Negative Mother      Diabetes No family hx of      Coronary Artery Disease No family hx of      Hypertension No family hx of       Hyperlipidemia No family hx of      Cerebrovascular Disease No family hx of      Breast Cancer No family hx of      Colon Cancer No family hx of      Prostate Cancer No family hx of      Other Cancer No family hx of      Depression No family hx of      Anxiety Disorder No family hx of      Mental Illness No family hx of      Substance Abuse No family hx of      Anesthesia Reaction No family hx of      Asthma No family hx of      Osteoporosis No family hx of      Genetic Disorder No family hx of      Thyroid Disease No family hx of      Obesity No family hx of      Unknown/Adopted No family hx of              Current Outpatient Prescriptions   Medication Sig Dispense Refill     acyclovir (ZOVIRAX) 400 MG tablet TAKE TWO TABLETS BY MOUTH TWICE DAILY 28 tablet 3     albuterol (PROAIR HFA/PROVENTIL HFA/VENTOLIN HFA) 108 (90 Base) MCG/ACT inhaler Inhale 2 puffs into the lungs every 6 hours as needed for shortness of breath / dyspnea or wheezing 1 Inhaler 11     allopurinol (ZYLOPRIM) 300 MG tablet Take 1 tablet (300 mg) by mouth daily 90 tablet 3     clindamycin (CLEOCIN T) 1 % external solution Apply topically 2 times daily Profile Rx: patient will contact pharmacy when needed 60 mL 11     diclofenac (VOLTAREN) 1 % GEL topical gel APPLY 2 GRAMS TOPICALLY TO AFFECTED AREA FOUR TIMES DAILY AS NEEDED 100 g 1     FLOVENT DISKUS 100 MCG/BLIST AEPB INHALE 1 PUFF BY MOUTH 2 TIMES DAILY 60 each 5     fluticasone (FLONASE) 50 MCG/ACT spray Spray 2 sprays into both nostrils daily 16 g 11     [START ON 11/27/2018] HYDROcodone-acetaminophen (NORCO) 7.5-325 MG per tablet Take 1 tablet by mouth every 4 hours as needed for pain (4 x daily as needed  maxium 4 per day) 120 tablet 0     order for DME Equipment being ordered:  CERVICAL TRACTION DEVICE  ONE  AS DIRECTED 1 each 0     [START ON 11/27/2018] phentermine (ADIPEX-P) 37.5 MG tablet TAKE ONE TABLET BY MOUTH EVERY DAY IN THE MORNING. 30 tablet 0     sildenafil (VIAGRA) 100 MG  tablet Take 0.5-1 tablets ( mg) by mouth daily as needed Take 30 min to 4 hours before intercourse.  Never use with nitroglycerin, terazosin or doxazosin. 100 tablet 3     simvastatin (ZOCOR) 40 MG tablet TAKE 1 TABLET (40 MG) BY MOUTH AT BEDTIME 90 tablet 2     albuterol (PROAIR HFA, PROVENTIL HFA, VENTOLIN HFA) 108 (90 BASE) MCG/ACT inhaler Inhale 2 puffs into the lungs every 6 hours as needed for shortness of breath / dyspnea (Patient not taking: Reported on 9/27/2018) 1 Inhaler 11     beclomethasone (QVAR) 40 MCG/ACT Inhaler Inhale 2 puffs into the lungs 2 times daily (Patient not taking: Reported on 9/27/2018) 1 Inhaler 11     [DISCONTINUED] simvastatin (ZOCOR) 40 MG tablet TAKE 1 TABLET (40 MG) BY MOUTH AT BEDTIME 90 tablet 2           Recent Labs   Lab Test  06/04/18   0842  03/26/18   0832  12/07/17   1023  12/30/16   0804  10/31/16   0822  07/08/15   0528  02/05/14   0750   A1C   --    --    --   5.4   --    --    --    LDL   --   88   --    --   85   --   106   HDL   --   58   --    --   55   --   45   TRIG   --   117   --    --   94   --   112   ALT  40   --   37   --   35  28   --    CR  0.70   --   0.85   --   0.88  0.83  1.20   GFRESTIMATED  >90   --   >90   --   87  >90  Non  GFR Calc    62   GFRESTBLACK  >90   --   >90   --   >90  >90  African American GFR Calc    75   POTASSIUM  4.2   --    --    --    --   4.3  4.4   TSH  2.02   --    --    --    --    --    --             BP Readings from Last 6 Encounters:   09/27/18 132/78   09/26/18 135/88   06/04/18 120/80   05/10/18 116/78   04/02/18 (!) 156/96   03/26/18 126/78           Wt Readings from Last 3 Encounters:   09/27/18 259 lb (117.5 kg)   06/04/18 264 lb 8 oz (120 kg)   05/10/18 264 lb (119.7 kg)                 Positive symptoms or findings indicated by bold designation:         ROS: 10 point ROS neg other than the symptoms noted above in the HPI.except  has Stiffness of knee joint, right; Gait difficulty; S/P TKR (total  knee replacement); Bilateral carpal tunnel syndrome; Vitamin D deficiency; Plantar fascial fibromatosis; Asthma with exacerbation; Hypertrophy of prostate without urinary obstruction; Obesity; Hyperlipidemia; Spondylosis with myelopathy, lumbar region; LEFT WORSE THAN RIGHT ; CARDIOVASCULAR SCREENING; LDL GOAL LESS THAN 100; BMI 40.0-44.9, adult (H); Mild persistent asthma; Hyperlipidemia with target LDL less than 130; Lumbar disc disease with radiculopathy; Groin strain, initial encounter; Sexual dysfunction; ACP (advance care planning); Slac (scapholunate advanced collapse) of wrist, right; Right wrist pain; Numbness and tingling in right hand; Chronic pain syndrome; Chronic gout involving toe 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 tophus, unspecified cause; Impingement syndrome, shoulder, right; Cervical radiculopathy; and Migraine without aura and without status migrainosus, not intractable on his problem list.  Review Of Systems    Skin: negative    Eyes: negative    Ears/Nose/Throat: negative    Respiratory: No shortness of breath, dyspnea on exertion, cough, or hemoptysis    Cardiovascular: negative    Gastrointestinal: reflux    Genitourinary: negative    Musculoskeletal: BILATERAL TOTAL KNEE ARTHROPLASTY     RIGHT SHOULDER PAIN with RESPONSE TO INJECTION     LOWER BACK PAIN with RADICULOPATHY     Neurologic: negative    Psychiatric: negative    Hematologic/Lymphatic/Immunologic: negative    Endocrine: MORBID OBESITY                 PE:  /78 (Cuff Size: Adult Large)  Pulse 67  Temp 97.3  F (36.3  C) (Tympanic)  Resp 16  Wt 259 lb (117.5 kg)  SpO2 98%  BMI 39.38 kg/m2 Body mass index is 39.38 kg/(m^2).        Constitutional: general appearance, well nourished, well developed, in no acute distress, well developed, appears stated age, normal body habitus,  SEVERE OBESITY IMPROVED           Eyes:; The patient has normal eyelids sclerae and conjunctivae :          Ears/Nose/Throat: external ear, overall: normal  appearance; external nose, overall: benign appearance, normal moujth gums and lips           Neck: thyroid, overall: normal size, normal consistency, nontender,          Respiratory:  palpation of chest, overall: normal excursion,    Clear to percussion and auscultation     NO Tachypnea    NORMAL  Color          Cardiovascular:  Good color with no peripheral edema    Regular sinus rhythm without murmur.  Physiologic heart sounds   Heart is unelarged    .     Chest/Breast: normal shape           Abdominal exam,  Liver and spleen are  unenlarged        Tenderness    Scars  CENTRAL OBESITY               Urogenital; no renal, flank or bladder  tenderness;          Lymphatic: neck nodes,     Other nodes         Musculoskeletal:  Brief ortho exam normal except:  NORMAL RANGE OF MOTION SHOULDER AND BILATERAL TOTAL KNEE ARTHROPLASTY               Integument: inspection of skin, no rash, lesions; and, palpation, no induration, no tenderness.          Neurologic mental status, overall: alert and oriented; gait, no ataxia, no unsteadiness; coordination, no tremors; cranial nerves, overall: normal motor, overall: normal bulk, tone.          Psychiatric: orientation/consciousness, overall: oriented to person, place and time; behavior/psychomotor activity, no tics, normal psychomotor activity; mood and affect, overall: normal mood and affect; appearance, overall: well-groomed, good eye contact; speech, overall: normal quality, no aphasia and normal quality, quantity, intact.        Diagnostic Test Results:  Results for orders placed or performed in visit on 08/24/18   X-ray rt Shoulder G/E 3 vw    Narrative    4 views right shoulder radiographs 8/24/2018 2:48 PM    History: ; Right shoulder pain, unspecified chronicity    Additional History from EMR: None    Comparison: 7/20/2016    Findings:    AP, Grashey, transscapular Y, axillary  views of the right  shoulder  were obtained.     No acute osseous abnormality.  Glenohumeral and  acromioclavicular  joints are congruent.    Mild to moderate degenerative changes of the acromioclavicular joint.  Advanced degenerative change of the glenohumeral joint. Minimal  sclerosis at the greater tuberosity, likely secondary to rotator cuff  disease.    Bony proliferation along the posterior aspect of the glenoid, best  evident on the axillary view is likely secondary to prior trauma.    Os acromiale.    Soft tissue is unremarkable.  The visualized lung is clear.      Impression    Impression:  1. No acute osseous abnormality.  2. Advanced glenohumeral joint degenerative change.  3. Os acromiale.    I have personally reviewed the examination and initial interpretation  and I agree with the findings.    JUVENCIO HIDALGO           ICD-10-CM    1. Chronic pain syndrome G89.4 HYDROcodone-acetaminophen (NORCO) 7.5-325 MG per tablet     DISCONTINUED: HYDROcodone-acetaminophen (NORCO) 7.5-325 MG per tablet     DISCONTINUED: HYDROcodone-acetaminophen (NORCO) 7.5-325 MG per tablet   2. Chronic right shoulder pain M25.511     G89.29    3. Chronic neck pain M54.2     G89.29    4. Mild persistent asthma without complication J45.30 albuterol (PROAIR HFA/PROVENTIL HFA/VENTOLIN HFA) 108 (90 Base) MCG/ACT inhaler   5. Hyperlipidemia with target LDL less than 130 E78.5 simvastatin (ZOCOR) 40 MG tablet   6. Lumbar disc disease with radiculopathy M51.16    7. Stiffness of knee joint, right M25.661    8. Screening for HIV (human immunodeficiency virus) Z11.4    9. Need for prophylactic vaccination and inoculation against influenza Z23    10. Need for prophylactic vaccination against Streptococcus pneumoniae (pneumococcus) Z23    11. BMI 40.0-44.9, adult (H) Z68.41 phentermine (ADIPEX-P) 37.5 MG tablet     DISCONTINUED: phentermine (ADIPEX-P) 37.5 MG tablet     DISCONTINUED: phentermine (ADIPEX-P) 37.5 MG tablet              .    Side effects benefits and risks thoroughly discussed. .he may come in early if unimproved or  "getting worse          Please drink 2 glasses of water prior to meals and walk 15-30 minutes after meals        I spent  25 MINUTES SPENT  with patient discussing the following issues   The primary encounter diagnosis was Chronic pain syndrome. Diagnoses of Chronic right shoulder pain, Chronic neck pain, Mild persistent asthma without complication, Hyperlipidemia with target LDL less than 130, Lumbar disc disease with radiculopathy, Stiffness of knee joint, right, Screening for HIV (human immunodeficiency virus), Need for prophylactic vaccination and inoculation against influenza, Need for prophylactic vaccination against Streptococcus pneumoniae (pneumococcus), and BMI 40.0-44.9, adult (H) were also pertinent to this visit. over half of which involved counseling and coordination of care.      Patient Instructions      What You Can Do to Reduce Cholesterol Levels  and Reduce Risk of Diabetes, Heart, and Blood Vessel Disease  1. Eat six to eight servings of green or root vegetables or fruit (raw or cooked) per day. You need 35 grams of fiber per day.  Examples: carrots apples  broccoli oranges  spinach grapefruit  lettuce pears  bananas  2. Use up to 2 cup of walnuts, almonds, or pecans as a source of \"good\" fat per day.  3. Drink one to three servings of soy milk (great for cereal) or 2 cup of soynuts per day.  4. Use margarine with reduced trans or saturated fats (e.g. Benecol, Smart Balance, olive oil margarine) as replacement for butter or margarine.  5. Eat fewer or half-portions of desserts, baked goods, chips, cookies, processed flour and sugar, pasta, butter, cream, non-skim dairy, ice cream.  6. Use olive or canola oil as the only oils for cooking and dressings.  7. Use one tablespoon of ground flaxseed or Natural Ovens \"Energy Mix\" per day (great on cereal or over salad).  8. Eat one to two servings per week of wild salmon or water-packed tuna.  9. Limit beef, lamb, and pork to at most one serving per day. " (Range-fed beef, if you can get it, is much preferred and allows for greater use in diet).  10. Eat three to four servings per day of whole grains (legumes, rice, wheat, oats).  11. Limit sodas and beer at most to three per week (only special occasions).  12. 65 percent of us need to lose weight and all of us need to keep moving! You should be walking at least 150 minutes per week. You should lose approximately seven percent of your weight in the next year if overweight. Check with me for more details.  1. Andrew Weil's paperback book, The Healthy Kitchen, is a great addition to your healthy lifestyle library.  2. American Diabetic Association (www.diabetes.org) - a wealth of information on nutritional excellence.  3. Other great websites for Mediterranean diets are available.    1. MODIFIED FAST 250 CALORIES TWICE DAILY FEMALES  300 CALORIES TWICE DAILY FOR MALES   OATMEAL AND COTTAGE CHEESE WORK NICELY   COPIOUS WATER BETWEEN   5/2 PLAN DR FERGUSON [facility]  NORMAL DIET 5 DAYS PER WEEK  SEMIFAST 2 DAYS PER WEEK  LOOK UP 5-2 PLAN ON THE INTERNET    Weight Loss Tips  1. Do not eat after 6 hrs before your expected bedtime  2. Have your heaviest meal for breakfast, a slightly lighter meal at lunch and a snack 6 hrs before bed  3. No sugar/calorie drinks except milk ie no fruit juice, pop, alcohol.  4. Drink milk OR WATER  30min before meals to decrease your hunger. Also it is excellent as part of your last meal of the day snack  5. Drink lots of water  6. Increase fiber in diet: all bran cereal, salads, popcorn etc  7. Have only one small serving of fruit a day about 1/2 cup (as this is high in sugar)  8. EXERCISE is the bottom line. Without it, you will gain weight even on a low calorie diet. Best if done 2-3X a day as can    2. The only way known to prevent diabetes or keep it from getting worse is exercise, 20-40 minutes 3 times a day around the time of meals      SLEEP 8 HOURS PER DAY    BLACK AND BLUEBERRIES  EVERY DAY ANTINFLAMMATORY BENEFIT     PLAIN YOGURT SEVERAL TIMES DAILY AS TOLERATED IF NOT ALLERGIC     AVOID HIGH FRUCTOSE SYRUP AND OLENE IN YOUR DIET     GREEN TEA EXTRACT    PROBIOTIC CAPSULE DAILY  HIGH QUALITY     DON'T EAT LATE AT NIGHT    NIYASOCORRO REYESA HELPS WITH APPETITE    KEEP A [employer] JOURNAL    888 BREATHER WHEN STRESSED OR COUNT BACK FROM 100 WITH SLOW BREATHING    POSITIVE AFFIRMATIONS         FIT BIT OR PEDOMETER 10,000 STEPS PER DAY     FIT BIT JEN RECOMMENDED                  ALL THE ABOVE PROBLEMS ARE STABLE AND MED CHANGES AS NOTED        Diet:  MEDITERRANEAN DIET AND DIABETES AND WEIGHT LOSS         Exercise:  AS TOLERATED 90 MINUTES OF EXERCISE PER DAY RECOMMENDED  ENCOURAGE   Exercises Range of motion, balance, isometric, and strengthening exercises 30 repetitions twice daily of involved joints            .SCOTTY TOLEDO MD 9/27/2018 12:40 PM  September 27, 2018

## 2018-10-02 ENCOUNTER — OFFICE VISIT (OUTPATIENT)
Dept: NUTRITION | Facility: CLINIC | Age: 63
End: 2018-10-02
Payer: COMMERCIAL

## 2018-10-02 PROCEDURE — 97802 MEDICAL NUTRITION INDIV IN: CPT

## 2018-10-02 PROCEDURE — 99207 ZZC NO CHARGE LOS: CPT

## 2018-10-02 NOTE — PATIENT INSTRUCTIONS
Medical Nutrition Therapy    My Healthy Eating and Activity Goals:      Use plate planning method at meals (1/4 plate starch, 1/4 plate protein, 1/2 plate vegetables)  Eat 3 meals a day - work towards regular meal pattern each day   Increase physical activity: goal of 150 minutes/week physical activity   Consider using an carl to help track calories - MyFitnessPal or LoseIt!  Goal of ~1700 kcals/day

## 2018-10-02 NOTE — MR AVS SNAPSHOT
"              After Visit Summary   10/2/2018    Arnaud Bird    MRN: [id number]           Patient Information     Date Of Birth          1955        Visit Information        Provider Department      10/2/2018 8:30 AM  NUTR[facility] RESOURCE [facility]        Care Instructions    Medical Nutrition Therapy    My Healthy Eating and Activity Goals:      Use plate planning method at meals (1/4 plate starch, 1/4 plate protein, 1/2 plate vegetables)  Eat 3 meals a day - work towards regular meal pattern each day   Increase physical activity: goal of 150 minutes/week physical activity   Consider using an carl to help track calories - MyFitnessPal or LoseIt!  Goal of ~1700 kcals/day             Follow-ups after your visit        Follow-up notes from your care team     Return if symptoms worsen or fail to improve.      Who to contact     If you have questions or need follow up information about today's clinic visit or your schedule please contact [facility] directly at [phone].  Normal or non-critical lab and imaging results will be communicated to you by XPlacehart, letter or phone within 4 business days after the clinic has received the results. If you do not hear from us within 7 days, please contact the clinic through [employer] or phone. If you have a critical or abnormal lab result, we will notify you by phone as soon as possible.  Submit refill requests through Hiddenbed or call your pharmacy and they will forward the refill request to us. Please allow 3 business days for your refill to be completed.          Additional Information About Your Visit        XPlacehart Information     Hiddenbed lets you send messages to your doctor, view your test results, renew your prescriptions, schedule appointments and more. To sign up, go to [email or web address] . Click on \"Log in\" on the left side of the screen, which will take you to the Welcome page. Then click on \"Sign up Now\" on the right side of the " page.     You will be asked to enter the access code listed below, as well as some personal information. Please follow the directions to create your username and password.     Your access code is: [email or web address]  Expires: 2018  9:35 AM     Your access code will  in 90 days. If you need help or a new code, please call your [facility] clinic or [phone].        Care EveryWhere ID     This is your Care EveryWhere ID. This could be used by other organizations to access your [facility] medical records  [id number]         Blood Pressure from Last 3 Encounters:   18 132/78   18 135/88   18 120/80    Weight from Last 3 Encounters:   18 117.5 kg (259 lb)   18 120 kg (264 lb 8 oz)   05/10/18 119.7 kg (264 lb)              Today, you had the following     No orders found for display       Primary Care Provider Office Phone # Fax #    Johnson Jesica Clements [phone] [address] [facility] [address]        Equal Access to Services     [facility]: Hadii aad ku hadasho Soomaali, waaxda luqadaha, qaybta kaalmada adeegyada, gee jasson kilo sparks . So [facility] [phone].    ATENCIÓN: Si habla español, tiene a stewart disposición servicios gratuitos de asistencia lingüística. Llame al [phone].    We comply with applicable federal civil rights laws and [place] laws. We do not discriminate on the basis of race, color, national origin, age, disability, sex, sexual orientation, or gender identity.            Thank you!     Thank you for choosing [facility]  for your care. Our goal is always to provide you with excellent care. Hearing back from our patients is one way we can continue to improve our services. Please take a few minutes to complete the written survey that you may receive in the mail after your visit with us. Thank you!             Your Updated Medication List - Protect others around you: Learn how to safely use, store  and throw away your medicines at www.disposemymeds.org.          This list is accurate as of 10/2/18  9:22 AM.  Always use your most recent med list.                   Brand Name Dispense Instructions for use Diagnosis    acyclovir 400 MG tablet    ZOVIRAX    28 tablet    TAKE TWO TABLETS BY MOUTH TWICE DAILY    HSV (herpes simplex virus) infection       * albuterol 108 (90 Base) MCG/ACT inhaler    PROAIR HFA/PROVENTIL HFA/VENTOLIN HFA    1 Inhaler    Inhale 2 puffs into the lungs every 6 hours as needed for shortness of breath / dyspnea    Mild persistent asthma, uncomplicated       * albuterol 108 (90 Base) MCG/ACT inhaler    PROAIR HFA/PROVENTIL HFA/VENTOLIN HFA    1 Inhaler    Inhale 2 puffs into the lungs every 6 hours as needed for shortness of breath / dyspnea or wheezing    Mild persistent asthma without complication       allopurinol 300 MG tablet    ZYLOPRIM    90 tablet    Take 1 tablet (300 mg) by mouth daily    Chronic gout involving toe 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 tophus, unspecified cause       beclomethasone 40 MCG/ACT Inhaler    QVAR    1 Inhaler    Inhale 2 puffs into the lungs 2 times daily    Mild persistent asthma without complication       clindamycin 1 % solution    CLEOCIN T    60 mL    Apply topically 2 times daily Profile Rx: patient will contact pharmacy when needed    Folliculitis       diclofenac 1 % Gel topical gel    VOLTAREN    100 g    APPLY 2 GRAMS TOPICALLY TO AFFECTED AREA FOUR TIMES DAILY AS NEEDED    Stiffness of knee joint, right, Carpal tunnel syndrome, unspecified laterality, Primary localized osteoarthrosis, lower leg, unspecified laterality, Lumbar disc disease with radiculopathy       FLOVENT DISKUS 100 MCG/BLIST Aepb   Generic drug:  fluticasone     60 each    INHALE 1 PUFF BY MOUTH 2 TIMES DAILY    Mild persistent asthma without complication       fluticasone 50 MCG/ACT spray    FLONASE    16 g    Spray 2 sprays into both nostrils daily    Seasonal allergic rhinitis due to  pollen       HYDROcodone-acetaminophen 7.5-325 MG per tablet   Start taking on:  11/27/2018    NORCO    120 tablet    Take 1 tablet by mouth every 4 hours as needed for pain (4 x daily as needed  maxium 4 per day)    Chronic pain syndrome       order for DME     1 each    Equipment being ordered:  CERVICAL TRACTION DEVICE ONE AS DIRECTED    Chronic pain syndrome       phentermine 37.5 MG tablet   Start taking on:  11/27/2018    ADIPEX-P    30 tablet    TAKE ONE TABLET BY MOUTH EVERY DAY IN THE MORNING.    BMI 40.0-44.9, adult (H)       sildenafil 100 MG tablet    VIAGRA    100 tablet    Take 0.5-1 tablets ( mg) by mouth daily as needed Take 30 min to 4 hours before intercourse.  Never use with nitroglycerin, terazosin or doxazosin.    Sexual dysfunction       simvastatin 40 MG tablet    ZOCOR    90 tablet    TAKE 1 TABLET (40 MG) BY MOUTH AT BEDTIME    Hyperlipidemia with target LDL less than 130       * Notice:  This list has 2 medication(s) that are the same as other medications prescribed for you. Read the directions carefully, and ask your doctor or other care provider to review them with you.

## 2018-10-02 NOTE — PROGRESS NOTES
Medical Nutrition Therapy  Visit Type:Initial assessment and intervention    Arnaud Bird presents today for MNT and education related to weight management.   He is accompanied by significant other, Analisa.     ASSESSMENT:   Patient comments/concerns relating to nutrition: looking for weight loss. Very limited in ability to perform physical activity due to knee & shoulder pain. [facility] so is not going often. He is retired and feels he drinks a lot of beer, although has switched to light beer and cut back significantly. His partner eats a lot of sweets and so he too is eating these more often, when they are around. His PCP told him to stick to a 1600 kcal/day diet and participate in 12 hr fasting daily as well as log his intake. He has not logged his intake at all and feels the fasting is not a sustainable approach for him. He is interested in using an carl on his phone to help him log intake but doesn't want to provide a ton of information to carl.     NUTRITION HISTORY:    Breakfast: fruit smoothie - bananas, grapefruit, cherry juice, honey, tomasz OR eggs, toast, milk, [employer] energy drink  Lunch: on the run, skips  Dinner: venison roast, potatoes & carrots  Snacks: jerky, crackers & cheese, energy bars, cakes & pastries on occasion, when they're around, watermelon, radishes & peppers & celery  Beverages: 3-4 beers/night, carbonated water, skim milk (1/2 gallon/week)    Misses meals? Skips lunch regularly or is on the go, varying schedule of meals   Eats out:  1 meals/per week     Previous diet education:  No     Food allergies/intolerances: not assessed    Diet is high in: carbs  Diet is low in: fat (unsaturated) and protein    EXERCISE: minimal exercise, patient very limited due to pain & arthritis     SOCIO/ECONOMIC:   Lives with: significant other    MEDICATIONS:  Current Outpatient Prescriptions   Medication     acyclovir (ZOVIRAX) 400 MG tablet     albuterol  (PROAIR HFA, PROVENTIL HFA, VENTOLIN HFA) 108 (90 BASE) MCG/ACT inhaler     albuterol (PROAIR HFA/PROVENTIL HFA/VENTOLIN HFA) 108 (90 Base) MCG/ACT inhaler     allopurinol (ZYLOPRIM) 300 MG tablet     beclomethasone (QVAR) 40 MCG/ACT Inhaler     clindamycin (CLEOCIN T) 1 % external solution     diclofenac (VOLTAREN) 1 % GEL topical gel     FLOVENT DISKUS 100 MCG/BLIST AEPB     fluticasone (FLONASE) 50 MCG/ACT spray     [START ON 11/27/2018] HYDROcodone-acetaminophen (NORCO) 7.5-325 MG per tablet     order for DME     [START ON 11/27/2018] phentermine (ADIPEX-P) 37.5 MG tablet     sildenafil (VIAGRA) 100 MG tablet     simvastatin (ZOCOR) 40 MG tablet     No current facility-administered medications for this visit.        LABS:  Last Basic Metabolic Panel:  Lab Results   Component Value Date     06/04/2018      Lab Results   Component Value Date    POTASSIUM 4.2 06/04/2018     Lab Results   Component Value Date    CHLORIDE 109 06/04/2018     Lab Results   Component Value Date    BELIA 8.8 06/04/2018     Lab Results   Component Value Date    CO2 22 06/04/2018     Lab Results   Component Value Date    BUN 18 06/04/2018     Lab Results   Component Value Date    CR 0.70 06/04/2018     Lab Results   Component Value Date     06/04/2018       ANTHROPOMETRICS:  Vitals: There were no vitals taken for this visit.  There is no height or weight on file to calculate BMI.      Wt Readings from Last 5 Encounters:   09/27/18 117.5 kg (259 lb)   06/04/18 120 kg (264 lb 8 oz)   05/10/18 119.7 kg (264 lb)   04/02/18 122.5 kg (270 lb)   03/26/18 122.5 kg (270 lb)       Weight Change: 11 lb weight loss x 6 months, represents 4% loss     ESTIMATED KCAL REQUIREMENTS:  6181-6408 kcal per day, based on REE with deficit to support weight loss     NUTRITION DIAGNOSIS: Food- and nutrition-related knowledge deficit related to no previous diet education as evidenced by inconsistent meal schedule reflected in diet recall, patient report  of need for weight loss.     NUTRITION INTERVENTION:  Nutrition Prescription: Energy Intake: 1700 kcal/day  3 meals/day + 1 snack (max)  Education given to support: general nutrition guidelines, weight reduction, consistent meals, exercise, fiber, behavior modification, portion control and heart healthy diet  Focused on need for consistent meal schedule and modifying portion sizes to look more like plate planner. Also encouraged protein, carbohydrate and some fat at each meal to promote satiety.   Education Materials Provided: My Plate Planner/Choose My Plate and 100 Calorie Snacks  Motivational Interviewing    PATIENT'S BEHAVIOR CHANGE GOALS:   See Patient Instructions for patient stated behavior change goals. AVS was printed and given to patient at today's appointment.    MONITOR / EVALUATE:  RD will monitor/evaluate:  Food and nutrition knowledge / skills  Food / Beverage / Nutrient intake   Weight change    FOLLOW-UP:  Follow up with RD as needed.  Call RD with questions/concerns.     Nithya Fish RD, LD   Time spent in minutes: 50  Encounter: Individual

## 2018-11-07 DIAGNOSIS — G56.00 CARPAL TUNNEL SYNDROME, UNSPECIFIED LATERALITY: ICD-10-CM

## 2018-11-07 DIAGNOSIS — M25.661 STIFFNESS OF KNEE JOINT, RIGHT: Chronic | ICD-10-CM

## 2018-11-07 DIAGNOSIS — M17.10 PRIMARY LOCALIZED OSTEOARTHROSIS, LOWER LEG, UNSPECIFIED LATERALITY: ICD-10-CM

## 2018-11-07 DIAGNOSIS — M51.16 LUMBAR DISC DISEASE WITH RADICULOPATHY: ICD-10-CM

## 2018-11-07 NOTE — TELEPHONE ENCOUNTER
"Requested Prescriptions   Pending Prescriptions Disp Refills     diclofenac (VOLTAREN) 1 % GEL topical gel [Pharmacy Med Name: DICLOFENAC SODIUM 1% GEL] 100 g 1      Last Written Prescription Date:  9/6/18  Last Fill Quantity: 100g,  # refills: 1   Last office visit: 9/27/2018 with prescribing provider:     Future Office Visit:     Sig: APPLY 2 GRAMS TOPICALLY TO AFFECTED AREA FOUR TIMES DAILY AS NEEDED    Topical Steroids and Nonsteroidals Protocol Passed    11/7/2018  1:48 PM       Passed - Patient is age 6 or older       Passed - Authorizing prescriber's most recent note related to this medication read.    If refill request is for ophthalmic use, please forward request to provider for approval.         Passed - High potency steroid not ordered       Passed - Recent (12 mo) or future (30 days) visit within the authorizing provider's specialty    Patient had office visit in the last 12 months or has a visit in the next 30 days with authorizing provider or within the authorizing provider's specialty.  See \"Patient Info\" tab in inbasket, or \"Choose Columns\" in Meds & Orders section of the refill encounter.                "

## 2018-11-17 DIAGNOSIS — J30.1 SEASONAL ALLERGIC RHINITIS DUE TO POLLEN: ICD-10-CM

## 2018-11-18 ENCOUNTER — OFFICE VISIT (OUTPATIENT)
Dept: URGENT CARE | Facility: URGENT CARE | Age: 63
End: 2018-11-18
Payer: COMMERCIAL

## 2018-11-18 VITALS
HEIGHT: 70 IN | OXYGEN SATURATION: 97 % | BODY MASS INDEX: 37.08 KG/M2 | DIASTOLIC BLOOD PRESSURE: 85 MMHG | WEIGHT: 259 LBS | SYSTOLIC BLOOD PRESSURE: 120 MMHG | TEMPERATURE: 96.9 F | HEART RATE: 96 BPM

## 2018-11-18 DIAGNOSIS — J01.10 ACUTE NON-RECURRENT FRONTAL SINUSITIS: Primary | ICD-10-CM

## 2018-11-18 DIAGNOSIS — R05.9 COUGH: ICD-10-CM

## 2018-11-18 PROCEDURE — 99214 OFFICE O/P EST MOD 30 MIN: CPT | Performed by: PHYSICIAN ASSISTANT

## 2018-11-18 RX ORDER — BENZONATATE 200 MG/1
200 CAPSULE ORAL 3 TIMES DAILY PRN
Qty: 21 CAPSULE | Refills: 0 | Status: SHIPPED | OUTPATIENT
Start: 2018-11-18 | End: 2018-12-27

## 2018-11-18 RX ORDER — FLUTICASONE PROPIONATE 50 MCG
1-2 SPRAY, SUSPENSION (ML) NASAL DAILY
Qty: 1 BOTTLE | Refills: 11 | Status: SHIPPED | OUTPATIENT
Start: 2018-11-18 | End: 2018-12-27

## 2018-11-18 NOTE — MR AVS SNAPSHOT
After Visit Summary   11/18/2018    Arnaud Bird    MRN: [id number]           Patient Information     Date Of Birth          1955        Visit Information        Provider Department      11/18/2018 10:15 AM Marie Presley PA-C [facility]        Today's Diagnoses     Acute non-recurrent frontal sinusitis    -  1    Cough          Care Instructions      Fluids and Rest  Start sinus rinses  Can use SUDAFED for one more day, then STOP  Sinusitis (Antibiotic Treatment)    The sinuses are air-filled spaces within the bones of the face. They connect to the inside of the nose. Sinusitis is an inflammation of the tissue that lines the sinuses. Sinusitis can occur during a cold. It can also happen due to allergies to pollens and other particles in the air. Sinusitis can cause symptoms of sinus congestion and a feeling of fullness. A sinus infection causes fever, headache, and facial pain. There is often green or yellow fluid draining from the nose or into the back of the throat (post-nasal drip). You have been given antibiotics to treat this condition.  Home care    Take the full course of antibiotics as instructed. Do not stop taking them, even when you feel better.    Drink plenty of water, hot tea, and other liquids. This may help thin nasal mucus. It also may help your sinuses drain fluids.    Heat may help soothe painful areas of your face. Use a towel soaked in hot water. Or,  the shower and direct the warm spray onto your face. Using a vaporizer along with a menthol rub at night may also help soothe symptoms.     An expectorant with guaifenesin may help thin nasal mucus and help your sinuses drain fluids.    You can use an over-the-counter decongestant, unless a similar medicine was prescribed to you. Nasal sprays work the fastest. Use one that contains phenylephrine or oxymetazoline. First blow your nose gently. Then use the spray. Do not use these  medicines more often than directed on the label. If you do, your symptoms may get worse. You may also take pills that contain pseudoephedrine. Don t use products that combine multiple medicines. This is because side effects may be increased. Read labels. You can also ask the pharmacist for help. (People with high blood pressure should not use decongestants. They can raise blood pressure.)    Over-the-counter antihistamines may help if allergies contributed to your sinusitis.      Do not use nasal rinses or irrigation during an acute sinus infection, unless your healthcare provider tells you to. Rinsing may spread the infection to other areas in your sinuses.    Use acetaminophen or ibuprofen to control pain, unless another pain medicine was prescribed to you. If you have chronic liver or kidney disease or ever had a stomach ulcer, talk with your healthcare provider before using these medicines. (Aspirin should never be taken by anyone under age 18 who is ill with a fever. It may cause severe liver damage.)    Don't smoke. This can make symptoms worse.  Follow-up care  Follow up with your healthcare provider or our staff if you are better in 1 week.  When to seek medical advice  Call your healthcare provider if any of these occur:    Facial pain or headache that gets worse    Stiff neck    Unusual drowsiness or confusion    Swelling of your forehead or eyelids    Vision problems, such as blurred or double vision    Fever of 100.4 F (38 C) or higher, or as directed by your healthcare provider    Seizure    Breathing problems    Symptoms don't go away in 10 days  Prevention  Here are steps you can take to help prevent an infection:    Keep good hand washing habits.    Don t have close contact with people who have sore throats, colds, or other upper respiratory infections.    Don t smoke, and stay away from secondhand smoke.    Stay up to date with of your vaccines.  Date Last Reviewed: 11/1/2017 2000-2018 The  [employer]. [address] [place] [address]. All rights reserved. This information is not intended as a substitute for professional medical care. Always follow your healthcare professional's instructions.                Follow-ups after your visit        Who to contact     If you have questions or need follow up information about today's clinic visit or your schedule please contact [facility] directly at [phone].  Normal or non-critical lab and imaging results will be communicated to you by [employer], letter or phone within 4 business days after the clinic has received the results. If you do not hear from us within 7 days, please contact the clinic through [employer] or phone. If you have a critical or abnormal lab result, we will notify you by phone as soon as possible.  Submit refill requests through [employer] or call your pharmacy and they will forward the refill request to us. Please allow 3 business days for your refill to be completed.          Additional Information About Your Visit        [employer] Information     [employer] lets you send messages to your doctor, view your test results, renew your prescriptions, schedule appointments and more. To sign up, go to [email or web address] . Click on \"Log in\" on the left side of the screen, which will take you to the Welcome page. Then click on \"Sign up Now\" on the right side of the page.     You will be asked to enter the access code listed below, as well as some personal information. Please follow the directions to create your username and password.     Your access code is: [email or web address]  Expires: 2018  8:35 AM     Your access code will  in 90 days. If you need help or a new code, please call your [place] clinic or [phone].        Care EveryWhere ID     This is your Care EveryWhere ID. This could be used by other organizations to access your [place] medical records  [id number]        Your Vitals Were     " "Pulse Temperature Height Pulse Oximetry BMI (Body Mass Index)       108 96.9  F (36.1  C) (Tympanic) 5' 10\" (1.778 m) 97% 37.16 kg/m2        Blood Pressure from Last 3 Encounters:   11/18/18 120/85   09/27/18 132/78   09/26/18 135/88    Weight from Last 3 Encounters:   11/18/18 259 lb (117.5 kg)   09/27/18 259 lb (117.5 kg)   06/04/18 264 lb 8 oz (120 kg)              Today, you had the following     No orders found for display         Today's Medication Changes          These changes are accurate as of 11/18/18 10:46 AM.  If you have any questions, ask your nurse or doctor.               Start taking these medicines.        Dose/Directions    amoxicillin-clavulanate 875-125 MG per tablet   Commonly known as:  AUGMENTIN   Used for:  Acute non-recurrent frontal sinusitis   Started by:  Marie Presley PA-C        Dose:  1 tablet   Take 1 tablet by mouth 2 times daily   Quantity:  20 tablet   Refills:  0       benzonatate 200 MG capsule   Commonly known as:  TESSALON   Used for:  Acute non-recurrent frontal sinusitis, Cough   Started by:  Marie Presley PA-C        Dose:  200 mg   Take 1 capsule (200 mg) by mouth 3 times daily as needed for cough   Quantity:  21 capsule   Refills:  0         These medicines have changed or have updated prescriptions.        Dose/Directions    * fluticasone 50 MCG/ACT spray   Commonly known as:  FLONASE   This may have changed:  Another medication with the same name was added. Make sure you understand how and when to take each.   Used for:  Seasonal allergic rhinitis due to pollen   Changed by:  Marie Presely PA-C        Dose:  2 spray   Spray 2 sprays into both nostrils daily   Quantity:  16 g   Refills:  11       * fluticasone 50 MCG/ACT spray   Commonly known as:  FLONASE   This may have changed:  You were already taking a medication with the same name, and this prescription was added. Make sure you understand how and when to take each.   Used for:  Acute " non-recurrent frontal sinusitis   Changed by:  Marie Presley PA-C        Dose:  1-2 spray   Spray 1-2 sprays into both nostrils daily   Quantity:  1 Bottle   Refills:  11       * Notice:  This list has 2 medication(s) that are the same as other medications prescribed for you. Read the directions carefully, and ask your doctor or other care provider to review them with you.      Stop taking these medicines if you haven't already. Please contact your care team if you have questions.     acyclovir 400 MG tablet   Commonly known as:  ZOVIRAX   Stopped by:  Marie Presley PA-C           clindamycin 1 % solution   Commonly known as:  CLEOCIN T   Stopped by:  Marie Presley PA-C           FLOVENT DISKUS 100 MCG/BLIST Aepb   Generic drug:  fluticasone   Stopped by:  Marie Presley PA-C           order for DME   Stopped by:  Marie Presley PA-C           phentermine 37.5 MG tablet   Commonly known as:  ADIPEX-P   Stopped by:  Marie Presley PA-C                Where to get your medicines      These medications were sent to [facility] [address] [place] [facility] [address] [facility]  [address] [facility] [address]     Phone:  [phone]     amoxicillin-clavulanate 875-125 MG per tablet    benzonatate 200 MG capsule    fluticasone 50 MCG/ACT spray                Primary Care Provider Office Phone # Fax #    Johnson Jesica Clements [phone]       [address] [facility] [address]        Equal Access to Services     [facility] AH: Hadii aad ku hadasho Soomaali, waaxda luqadaha, qaybta kaalmada adeegyaclarisa, waxay idiin hayaan adeeg kharash la'aan . So [facility] [phone].    ATENCIÓN: Si habla español, tiene a stewart disposición servicios gratuitos de asistencia lingüística. Llame al [phone].    We comply with applicable federal civil rights laws and [place] laws. We do not discriminate on the basis of race, color, national origin, age,  disability, sex, sexual orientation, or gender identity.            Thank you!     Thank you for choosing [facility]  for your care. Our goal is always to provide you with excellent care. Hearing back from our patients is one way we can continue to improve our services. Please take a few minutes to complete the written survey that you may receive in the mail after your visit with us. Thank you!             Your Updated Medication List - Protect others around you: Learn how to safely use, store and throw away your medicines at www.disposemymeds.org.          This list is accurate as of 11/18/18 10:46 AM.  Always use your most recent med list.                   Brand Name Dispense Instructions for use Diagnosis    * albuterol 108 (90 Base) MCG/ACT inhaler    PROAIR HFA/PROVENTIL HFA/VENTOLIN HFA    1 Inhaler    Inhale 2 puffs into the lungs every 6 hours as needed for shortness of breath / dyspnea    Mild persistent asthma, uncomplicated       * albuterol 108 (90 Base) MCG/ACT inhaler    PROAIR HFA/PROVENTIL HFA/VENTOLIN HFA    1 Inhaler    Inhale 2 puffs into the lungs every 6 hours as needed for shortness of breath / dyspnea or wheezing    Mild persistent asthma without complication       allopurinol 300 MG tablet    ZYLOPRIM    90 tablet    Take 1 tablet (300 mg) by mouth daily    Chronic gout involving toe 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 tophus, unspecified cause       amoxicillin-clavulanate 875-125 MG per tablet    AUGMENTIN    20 tablet    Take 1 tablet by mouth 2 times daily    Acute non-recurrent frontal sinusitis       beclomethasone 40 MCG/ACT Aers IS A DISCONTINUED MEDICATION    QVAR    1 Inhaler    Inhale 2 puffs into the lungs 2 times daily    Mild persistent asthma without complication       benzonatate 200 MG capsule    TESSALON    21 capsule    Take 1 capsule (200 mg) by mouth 3 times daily as needed for cough    Acute non-recurrent frontal sinusitis, Cough       diclofenac 1 % Gel  topical gel    VOLTAREN    100 g    APPLY 2 GRAMS TOPICALLY TO AFFECTED AREA FOUR TIMES DAILY AS NEEDED    Stiffness of knee joint, right, Carpal tunnel syndrome, unspecified laterality, Primary localized osteoarthrosis, lower leg, unspecified laterality, Lumbar disc disease with radiculopathy       * fluticasone 50 MCG/ACT spray    FLONASE    16 g    Spray 2 sprays into both nostrils daily    Seasonal allergic rhinitis due to pollen       * fluticasone 50 MCG/ACT spray    FLONASE    1 Bottle    Spray 1-2 sprays into both nostrils daily    Acute non-recurrent frontal sinusitis       HYDROcodone-acetaminophen 7.5-325 MG per tablet   Start taking on:  11/27/2018    NORCO    120 tablet    Take 1 tablet by mouth every 4 hours as needed for pain (4 x daily as needed  maxium 4 per day)    Chronic pain syndrome       sildenafil 100 MG tablet    VIAGRA    100 tablet    Take 0.5-1 tablets ( mg) by mouth daily as needed Take 30 min to 4 hours before intercourse.  Never use with nitroglycerin, terazosin or doxazosin.    Sexual dysfunction       simvastatin 40 MG tablet    ZOCOR    90 tablet    TAKE 1 TABLET (40 MG) BY MOUTH AT BEDTIME    Hyperlipidemia with target LDL less than 130       * Notice:  This list has 4 medication(s) that are the same as other medications prescribed for you. Read the directions carefully, and ask your doctor or other care provider to review them with you.

## 2018-11-18 NOTE — PATIENT INSTRUCTIONS
Fluids and Rest  Start sinus rinses  Can use SUDAFED for one more day, then STOP  Sinusitis (Antibiotic Treatment)    The sinuses are air-filled spaces within the bones of the face. They connect to the inside of the nose. Sinusitis is an inflammation of the tissue that lines the sinuses. Sinusitis can occur during a cold. It can also happen due to allergies to pollens and other particles in the air. Sinusitis can cause symptoms of sinus congestion and a feeling of fullness. A sinus infection causes fever, headache, and facial pain. There is often green or yellow fluid draining from the nose or into the back of the throat (post-nasal drip). You have been given antibiotics to treat this condition.  Home care    Take the full course of antibiotics as instructed. Do not stop taking them, even when you feel better.    Drink plenty of water, hot tea, and other liquids. This may help thin nasal mucus. It also may help your sinuses drain fluids.    Heat may help soothe painful areas of your face. Use a towel soaked in hot water. Or,  the shower and direct the warm spray onto your face. Using a vaporizer along with a menthol rub at night may also help soothe symptoms.     An expectorant with guaifenesin may help thin nasal mucus and help your sinuses drain fluids.    You can use an over-the-counter decongestant, unless a similar medicine was prescribed to you. Nasal sprays work the fastest. Use one that contains phenylephrine or oxymetazoline. First blow your nose gently. Then use the spray. Do not use these medicines more often than directed on the label. If you do, your symptoms may get worse. You may also take pills that contain pseudoephedrine. Don t use products that combine multiple medicines. This is because side effects may be increased. Read labels. You can also ask the pharmacist for help. (People with high blood pressure should not use decongestants. They can raise blood  pressure.)    Over-the-counter antihistamines may help if allergies contributed to your sinusitis.      Do not use nasal rinses or irrigation during an acute sinus infection, unless your healthcare provider tells you to. Rinsing may spread the infection to other areas in your sinuses.    Use acetaminophen or ibuprofen to control pain, unless another pain medicine was prescribed to you. If you have chronic liver or kidney disease or ever had a stomach ulcer, talk with your healthcare provider before using these medicines. (Aspirin should never be taken by anyone under age 18 who is ill with a fever. It may cause severe liver damage.)    Don't smoke. This can make symptoms worse.  Follow-up care  Follow up with your healthcare provider or our staff if you are better in 1 week.  When to seek medical advice  Call your healthcare provider if any of these occur:    Facial pain or headache that gets worse    Stiff neck    Unusual drowsiness or confusion    Swelling of your forehead or eyelids    Vision problems, such as blurred or double vision    Fever of 100.4 F (38 C) or higher, or as directed by your healthcare provider    Seizure    Breathing problems    Symptoms don't go away in 10 days  Prevention  Here are steps you can take to help prevent an infection:    Keep good hand washing habits.    Don t have close contact with people who have sore throats, colds, or other upper respiratory infections.    Don t smoke, and stay away from secondhand smoke.    Stay up to date with of your vaccines.  Date Last Reviewed: 11/1/2017 2000-2018 [employer]. [address] [place] [address], [place] [address]. All rights reserved. This information is not intended as a substitute for professional medical care. Always follow your healthcare professional's instructions.

## 2018-11-18 NOTE — PROGRESS NOTES
SUBJECTIVE:   Arnaud Bird is a 63 year old male presenting with a chief complaint of runny nose, stuffy nose, cough - productive and facial pain/pressure.  Onset of symptoms was 1 week(s) ago.  Course of illness is worsening.    Severity moderately severe  Current and Associated symptoms: AS above  Treatment measures tried include Flonase.  Predisposing factors include seasonal allergies.  Denies fever/chills, HA, CP, pleuritic chest pain, abd pain, N/V/D, rash, or any other symptoms.   Patient denies history of DVT/PE, recent travel/surgery, tobacco use, leg pain or swelling.    Past Medical History:   Diagnosis Date     Arthritis      Carpal tunnel syndrome     mild     LEFT WORSE THAN RIGHT  10/18/2012     Unspecified asthma(493.90) 10/17/2012     Unspecified asthma, with exacerbation 10/17/2012     Current Outpatient Prescriptions   Medication Sig Dispense Refill     albuterol (PROAIR HFA, PROVENTIL HFA, VENTOLIN HFA) 108 (90 BASE) MCG/ACT inhaler Inhale 2 puffs into the lungs every 6 hours as needed for shortness of breath / dyspnea 1 Inhaler 11     albuterol (PROAIR HFA/PROVENTIL HFA/VENTOLIN HFA) 108 (90 Base) MCG/ACT inhaler Inhale 2 puffs into the lungs every 6 hours as needed for shortness of breath / dyspnea or wheezing 1 Inhaler 11     allopurinol (ZYLOPRIM) 300 MG tablet Take 1 tablet (300 mg) by mouth daily 90 tablet 3     amoxicillin-clavulanate (AUGMENTIN) 875-125 MG per tablet Take 1 tablet by mouth 2 times daily 20 tablet 0     beclomethasone (QVAR) 40 MCG/ACT Inhaler Inhale 2 puffs into the lungs 2 times daily 1 Inhaler 11     benzonatate (TESSALON) 200 MG capsule Take 1 capsule (200 mg) by mouth 3 times daily as needed for cough 21 capsule 0     diclofenac (VOLTAREN) 1 % GEL topical gel APPLY 2 GRAMS TOPICALLY TO AFFECTED AREA FOUR TIMES DAILY AS NEEDED 100 g 3     fluticasone (FLONASE) 50 MCG/ACT spray Spray 1-2 sprays into both nostrils daily 1 Bottle 11     fluticasone (FLONASE) 50  "MCG/ACT spray Spray 2 sprays into both nostrils daily 16 g 11     [START ON 11/27/2018] HYDROcodone-acetaminophen (NORCO) 7.5-325 MG per tablet Take 1 tablet by mouth every 4 hours as needed for pain (4 x daily as needed  maxium 4 per day) 120 tablet 0     sildenafil (VIAGRA) 100 MG tablet Take 0.5-1 tablets ( mg) by mouth daily as needed Take 30 min to 4 hours before intercourse.  Never use with nitroglycerin, terazosin or doxazosin. 100 tablet 3     simvastatin (ZOCOR) 40 MG tablet TAKE 1 TABLET (40 MG) BY MOUTH AT BEDTIME 90 tablet 2     Social History   Substance Use Topics     Smoking status: Never Smoker     Smokeless tobacco: Never Used     Alcohol use Yes      Comment: case beer a week       ROS:  Review of systems negative except as stated above.    OBJECTIVE:  /85  Pulse 96  Temp 96.9  F (36.1  C) (Tympanic)  Ht 5' 10\" (1.778 m)  Wt 259 lb (117.5 kg)  SpO2 97%  BMI 37.16 kg/m2    GENERAL APPEARANCE: healthy, alert and no distress  EYES: EOMI,  PERRL, conjunctiva clear  HENT: ear canals and TM's normal.  Nose and mouth without ulcers, erythema or lesions  NECK: supple, nontender, no lymphadenopathy  RESP: lungs clear to auscultation - no rales, rhonchi or wheezes  CV: regular rates and rhythm, normal S1 S2, no murmur noted  ABDOMEN:  soft, nontender, no HSM or masses and bowel sounds normal  NEURO: Normal strength and tone, sensory exam grossly normal,  normal speech and mentation  SKIN: no suspicious lesions or rashes    ASSESSMENT / PLAN:  1. Acute non-recurrent frontal sinusitis  Fluids and rest   Sinus rinses  Can take Sudafed for one more day, then stop  RED FLAG symptoms discussed  - amoxicillin-clavulanate (AUGMENTIN) 875-125 MG per tablet; Take 1 tablet by mouth 2 times daily  Dispense: 20 tablet; Refill: 0  - fluticasone (FLONASE) 50 MCG/ACT spray; Spray 1-2 sprays into both nostrils daily  Dispense: 1 Bottle; Refill: 11  - benzonatate (TESSALON) 200 MG capsule; Take 1 capsule " (200 mg) by mouth 3 times daily as needed for cough  Dispense: 21 capsule; Refill: 0    2. Cough    - benzonatate (TESSALON) 200 MG capsule; Take 1 capsule (200 mg) by mouth 3 times daily as needed for cough  Dispense: 21 capsule; Refill: 0    I have discussed the patient's diagnosis and my plan of treatment with the patient. Patient is aware to come back in with worsening symptoms or if no relief despite treatment plan.  Patient verbalizes understanding. All questions were addressed and answered.   Marie Presley PA-C

## 2018-11-19 RX ORDER — FLUTICASONE PROPIONATE 50 MCG
SPRAY, SUSPENSION (ML) NASAL
Qty: 16 ML | Refills: 7 | OUTPATIENT
Start: 2018-11-19

## 2018-11-19 NOTE — TELEPHONE ENCOUNTER
"Requested Prescriptions   Pending Prescriptions Disp Refills     fluticasone (FLONASE) 50 MCG/ACT spray [Pharmacy Med Name: FLUTICASONE PROP 50 MCG SPRAY]  Last Written Prescription Date:  11/18/2018  Last Fill Quantity: 1 BOTTLE,  # refills: 11   Last office visit: 9/27/2018 with prescribing provider:  DANYELLE TOLEDO   Future Office Visit:     16 mL 7     Sig: USE 2 SPRAYS IN BOTH NOSTRILS DAILY    Inhaled Steroids Protocol Failed    11/17/2018  6:01 PM       Failed - Asthma control assessment score within normal limits in last 6 months    Please review ACT score.   ACT Total Scores 10/31/2016 7/28/2017 2/15/2018   ACT TOTAL SCORE - - -   ASTHMA ER VISITS - - -   ASTHMA HOSPITALIZATIONS - - -   ACT TOTAL SCORE (Goal Greater than or Equal to 20) 22 22 25   In the past 12 months, how many times did you visit the emergency room for your asthma without being admitted to the hospital? 0 0 0   In the past 12 months, how many times were you hospitalized overnight because of your asthma? 0 0 0            Passed - Patient is age 12 or older       Passed - Recent (6 mo) or future (30 days) visit within the authorizing provider's specialty    Patient had office visit in the last 6 months or has a visit in the next 30 days with authorizing provider or within the authorizing provider's specialty.  See \"Patient Info\" tab in inbasket, or \"Choose Columns\" in Meds & Orders section of the refill encounter.              "

## 2018-11-19 NOTE — TELEPHONE ENCOUNTER
Duplicate.  Medication was filled yesterday 11/18/2018.    ADELINE NapolesN, RN  [employer] [facility]

## 2018-11-25 ENCOUNTER — APPOINTMENT (OUTPATIENT)
Dept: GENERAL RADIOLOGY | Facility: CLINIC | Age: 63
End: 2018-11-25
Attending: EMERGENCY MEDICINE
Payer: COMMERCIAL

## 2018-11-25 ENCOUNTER — HOSPITAL ENCOUNTER (EMERGENCY)
Facility: CLINIC | Age: 63
Discharge: HOME | End: 2018-11-25
Attending: EMERGENCY MEDICINE | Admitting: EMERGENCY MEDICINE
Payer: COMMERCIAL

## 2018-11-25 VITALS
WEIGHT: 257.8 LBS | OXYGEN SATURATION: 98 % | SYSTOLIC BLOOD PRESSURE: 136 MMHG | DIASTOLIC BLOOD PRESSURE: 98 MMHG | TEMPERATURE: 96.5 F | RESPIRATION RATE: 17 BRPM | BODY MASS INDEX: 36.91 KG/M2 | HEIGHT: 70 IN | HEART RATE: 98 BPM

## 2018-11-25 DIAGNOSIS — M25.512 ACUTE PAIN OF LEFT SHOULDER: ICD-10-CM

## 2018-11-25 PROCEDURE — 99284 EMERGENCY DEPT VISIT MOD MDM: CPT | Mod: Z6 | Performed by: EMERGENCY MEDICINE

## 2018-11-25 PROCEDURE — 25000128 H RX IP 250 OP 636: Performed by: EMERGENCY MEDICINE

## 2018-11-25 PROCEDURE — 99284 EMERGENCY DEPT VISIT MOD MDM: CPT | Mod: 25 | Performed by: EMERGENCY MEDICINE

## 2018-11-25 PROCEDURE — 73030 X-RAY EXAM OF SHOULDER: CPT | Mod: LT

## 2018-11-25 PROCEDURE — 96372 THER/PROPH/DIAG INJ SC/IM: CPT | Performed by: EMERGENCY MEDICINE

## 2018-11-25 PROCEDURE — 73000 X-RAY EXAM OF COLLAR BONE: CPT | Mod: LT

## 2018-11-25 RX ORDER — KETOROLAC TROMETHAMINE 30 MG/ML
30 INJECTION, SOLUTION INTRAMUSCULAR; INTRAVENOUS ONCE
Status: COMPLETED | OUTPATIENT
Start: 2018-11-25 | End: 2018-11-25

## 2018-11-25 RX ORDER — CYCLOBENZAPRINE HCL 10 MG
10 TABLET ORAL 3 TIMES DAILY PRN
Qty: 20 TABLET | Refills: 0 | Status: SHIPPED | OUTPATIENT
Start: 2018-11-25 | End: 2018-12-01

## 2018-11-25 RX ADMIN — KETOROLAC TROMETHAMINE 30 MG: 30 INJECTION, SOLUTION INTRAMUSCULAR at 07:28

## 2018-11-25 ASSESSMENT — ENCOUNTER SYMPTOMS
FEVER: 0
HEADACHES: 0
ARTHRALGIAS: 0
DIFFICULTY URINATING: 0
COLOR CHANGE: 0
NECK STIFFNESS: 0
ABDOMINAL PAIN: 0
SHORTNESS OF BREATH: 0
CONFUSION: 0
EYE REDNESS: 0

## 2018-11-25 NOTE — ED AVS SNAPSHOT
[facility], Emergency Department    [address]    [place] [address]    Phone:  [phone]    Fax:  [phone]                                       Arnaud Bird   MRN: [id number]    Department:  [facility], Emergency Department   Date of Visit:  11/25/2018           Patient Information     Date Of Birth          1955        Your diagnoses for this visit were:     Acute pain of left shoulder        You were seen by Jacek Farley MD.      Follow-up Information     Follow up with Johnson Clements MD.    Specialty:  Family Practice    Contact information:    [address]  [facility] [address]  [phone]        Discharge References/Attachments     ARTHRITIS, WHAT IS? (ENGLISH)    AC ARTHRITIS (ACROMIOCLAVICULAR ARTHRITIS) (ENGLISH)      24 Hour Appointment Hotline       To make an appointment at any [facility] clinic, call [phone] ([phone]). If you don't have a family doctor or clinic, we will help you find one. [facility] clinics are conveniently located to serve the needs of you and your family.             Review of your medicines      START taking        Dose / Directions Last dose taken    cyclobenzaprine 10 MG tablet   Commonly known as:  FLEXERIL   Dose:  10 mg   Quantity:  20 tablet        Take 1 tablet (10 mg) by mouth 3 times daily as needed for muscle spasms   Refills:  0          Our records show that you are taking the medicines listed below. If these are incorrect, please call your family doctor or clinic.        Dose / Directions Last dose taken    * albuterol 108 (90 Base) MCG/ACT inhaler   Commonly known as:  PROAIR HFA/PROVENTIL HFA/VENTOLIN HFA   Dose:  2 puff   Quantity:  1 Inhaler        Inhale 2 puffs into the lungs every 6 hours as needed for shortness of breath / dyspnea   Refills:  11        * albuterol 108 (90 Base) MCG/ACT inhaler   Commonly known as:  PROAIR HFA/PROVENTIL HFA/VENTOLIN HFA   Dose:  2 puff   Quantity:  1 Inhaler         Inhale 2 puffs into the lungs every 6 hours as needed for shortness of breath / dyspnea or wheezing   Refills:  11        allopurinol 300 MG tablet   Commonly known as:  ZYLOPRIM   Dose:  300 mg   Quantity:  90 tablet        Take 1 tablet (300 mg) by mouth daily   Refills:  3        amoxicillin-clavulanate 875-125 MG per tablet   Commonly known as:  AUGMENTIN   Dose:  1 tablet   Quantity:  20 tablet        Take 1 tablet by mouth 2 times daily   Refills:  0        beclomethasone 40 MCG/ACT Aers IS A DISCONTINUED MEDICATION   Commonly known as:  QVAR   Dose:  2 puff   Quantity:  1 Inhaler        Inhale 2 puffs into the lungs 2 times daily   Refills:  11        benzonatate 200 MG capsule   Commonly known as:  TESSALON   Dose:  200 mg   Quantity:  21 capsule        Take 1 capsule (200 mg) by mouth 3 times daily as needed for cough   Refills:  0        diclofenac 1 % topical gel   Commonly known as:  VOLTAREN   Quantity:  100 g        APPLY 2 GRAMS TOPICALLY TO AFFECTED AREA FOUR TIMES DAILY AS NEEDED   Refills:  3        * fluticasone 50 MCG/ACT spray   Commonly known as:  FLONASE   Dose:  2 spray   Quantity:  16 g        Spray 2 sprays into both nostrils daily   Refills:  11        * fluticasone 50 MCG/ACT spray   Commonly known as:  FLONASE   Dose:  1-2 spray   Quantity:  1 Bottle        Spray 1-2 sprays into both nostrils daily   Refills:  11        HYDROcodone-acetaminophen 7.5-325 MG per tablet   Commonly known as:  NORCO   Dose:  1 tablet   Quantity:  120 tablet   Start taking on:  11/27/2018        Take 1 tablet by mouth every 4 hours as needed for pain (4 x daily as needed  maxium 4 per day)   Refills:  0        sildenafil 100 MG tablet   Commonly known as:  VIAGRA   Dose:   mg   Quantity:  100 tablet        Take 0.5-1 tablets ( mg) by mouth daily as needed Take 30 min to 4 hours before intercourse.  Never use with nitroglycerin, terazosin or doxazosin.   Refills:  3        simvastatin 40 MG tablet    Commonly known as:  ZOCOR   Quantity:  90 tablet        TAKE 1 TABLET (40 MG) BY MOUTH AT BEDTIME   Refills:  2        * Notice:  This list has 4 medication(s) that are the same as other medications prescribed for you. Read the directions carefully, and ask your doctor or other care provider to review them with you.            Prescriptions were sent or printed at these locations (1 Prescription)                   Other Prescriptions                Printed at Department/Unit printer (1 of 1)         cyclobenzaprine (FLEXERIL) 10 MG tablet                Procedures and tests performed during your visit     Clavicle XR, left    XR Shoulder Left G/E 3 Views      Orders Needing Specimen Collection     None      Pending Results     Date and Time Order Name Status Description    11/25/2018 0724 XR Shoulder Left G/E 3 Views Preliminary     11/25/2018 0724 Clavicle XR, left Preliminary             Pending Culture Results     No orders found from 11/23/2018 to 11/26/2018.            Pending Results Instructions     If you had any lab results that were not finalized at the time of your Discharge, you can call the ED Lab Result RN at [phone]. You will be contacted by this team for any positive Lab results or changes in treatment. The nurses are available 7 days a week from 10A to 6:30P.  You can leave a message 24 hours per day and they will return your call.        Thank you for choosing [facility]       Thank you for choosing [facility] for your care. Our goal is always to provide you with excellent care. Hearing back from our patients is one way we can continue to improve our services. Please take a few minutes to complete the written survey that you may receive in the mail after you visit with us. Thank you!        [employer] Information     [employer] lets you send messages to your doctor, view your test results, renew your prescriptions, schedule appointments and more. To sign up, go to [email or web address] . Click on  "\"Log in\" on the left side of the screen, which will take you to the Welcome page. Then click on \"Sign up Now\" on the right side of the page.     You will be asked to enter the access code listed below, as well as some personal information. Please follow the directions to create your username and password.     Your access code is: [email or web address]  Expires: 2018  8:35 AM     Your access code will  in 90 days. If you need help or a new code, please call your [facility] clinic or [phone].        Care EveryWhere ID     This is your Care EveryWhere ID. This could be used by other organizations to access your [facility] medical records  [id number]        Equal Access to Services     JACINTO MODI : Tina Paula, wanetta heath, trinidad kaalamanda chapman, gee mirza. So [facility] [phone].    ATENCIÓN: Si habla español, tiene a stewart disposición servicios gratuitos de asistencia lingüística. Llame al [phone].    We comply with applicable federal civil rights laws and [place] laws. We do not discriminate on the basis of race, color, national origin, age, disability, sex, sexual orientation, or gender identity.            After Visit Summary       This is your record. Keep this with you and show to your community pharmacist(s) and doctor(s) at your next visit.                  "

## 2018-11-25 NOTE — ED PROVIDER NOTES
"  History     Chief Complaint   Patient presents with     Shoulder Pain     patient woke up with severe shoulder pain. denies any injuries     HPI  Arnaud Bird is a 63 year old male with a history of chronic pain, osteoarthritis of right shoulder, asthma, obesity who presents with complaints of left shoulder and clavicle pain.  Patient states that approximately 10 hours earlier he began developing pain that is described as sharp and appears over the distal end of his left clavicle as well as the proximal.  He states that movement of his left arm makes the pain worse.  He also notes that the pain shoots into the left side of his neck.  He states that he is tried ibuprofen without significant relief.  He states that he has a history of recent right shoulder injection but has never had issues with his left shoulder.  He notes that he has a distant history of left shoulder separation.  I have reviewed the Medications, Allergies, Past Medical and Surgical History, and Social History in the Epic system.    Review of Systems   Constitutional: Negative for fever.   HENT: Negative for congestion.    Eyes: Negative for redness.   Respiratory: Negative for shortness of breath.    Cardiovascular: Negative for chest pain.   Gastrointestinal: Negative for abdominal pain.   Genitourinary: Negative for difficulty urinating.   Musculoskeletal: Negative for arthralgias and neck stiffness.   Skin: Negative for color change.   Neurological: Negative for headaches.   Psychiatric/Behavioral: Negative for confusion.       Physical Exam   BP: (!) 136/98  Pulse: 98  Temp: 96.5  F (35.8  C)  Resp: 17  Height: 177.8 cm (5' 10\")  Weight: 116.9 kg (257 lb 12.8 oz)  SpO2: 98 %      Physical Exam   Constitutional: No distress.   HENT:   Head: Atraumatic.   Mouth/Throat: Oropharynx is clear and moist. No oropharyngeal exudate.   Eyes: Pupils are equal, round, and reactive to light. No scleral icterus.   Cardiovascular: Normal heart sounds " and intact distal pulses.    Pulmonary/Chest: Breath sounds normal. No respiratory distress.   Abdominal: Soft. Bowel sounds are normal. There is no tenderness.   Musculoskeletal: He exhibits no edema.        Left shoulder: He exhibits tenderness and pain. He exhibits normal range of motion, no bony tenderness, no swelling, no effusion, no crepitus, no deformity and no laceration.        Cervical back: He exhibits decreased range of motion, tenderness ( Diffuse) and pain. He exhibits no bony tenderness, no swelling, no edema, no deformity, no laceration and no spasm.   Skin: Skin is warm. No rash noted. He is not diaphoretic.       ED Course     ED Course     Procedures           Results for orders placed or performed during the hospital encounter of 11/25/18 (from the past 24 hour(s))   Clavicle XR, left    Narrative    CLAVICLE LEFT TWO VIEWS   SHOULDER LEFT THREE OR MORE VIEWS  11/25/2018 7:52 AM     COMPARISON: None    HISTORY: Pain       Impression    IMPRESSION: There is mild osteoarthritic degenerative change of the  acromioclavicular joint. There are no fractures. Glenohumeral  alignment is normal.   XR Shoulder Left G/E 3 Views    Narrative    CLAVICLE LEFT TWO VIEWS   SHOULDER LEFT THREE OR MORE VIEWS  11/25/2018 7:52 AM     COMPARISON: None    HISTORY: Pain       Impression    IMPRESSION: There is mild osteoarthritic degenerative change of the  acromioclavicular joint. There are no fractures. Glenohumeral  alignment is normal.       Labs Ordered and Resulted from Time of ED Arrival Up to the Time of Departure from the ED - No data to display         Assessments & Plan (with Medical Decision Making)   63-year-old male presents with complaints of left shoulder pain.  He has a history of chronic pain, right shoulder pain and cervical pain related to arthritis and is currently on 4 Vicodin per day.  His exam reveals left acromioclavicular tenderness as well as diffuse left paraspinous neck tenderness.  X-ray  of his shoulder and clavicle reveal acromioclavicular osteoarthritis.  Patient will continue using Norco may try ibuprofen.  He can use Voltaren gel which is already prescribed as well as Flexeril as a muscle relaxant.  Have recommended follow-up with his primary physician.    I have reviewed the nursing notes.    I have reviewed the findings, diagnosis, plan and need for follow up with the patient.    Discharge Medication List as of 11/25/2018  8:39 AM      START taking these medications    Details   cyclobenzaprine (FLEXERIL) 10 MG tablet Take 1 tablet (10 mg) by mouth 3 times daily as needed for muscle spasms, Disp-20 tablet, R-0, Local Print             Final diagnoses:   Acute pain of left shoulder       11/25/2018   [facility], [place], EMERGENCY DEPARTMENT     Jacek Farley MD  11/25/18 3485

## 2018-11-25 NOTE — ED NOTES
Pt discharging home. Discussed AVS and rx. Pt reports pain is much improved. Declined reassessment of vitals. Answered all questions at this time.

## 2018-11-25 NOTE — ED NOTES
Patient claimed that he woke up due to severe left shoulder pain radiating to left neck area. Denies any injuries or falls prior or during the pain. Voltaren gel applied immediately but no relief from pain per pt.

## 2018-11-25 NOTE — ED AVS SNAPSHOT
[facility], [place], Emergency Department    [address]    [facility] [place] [facility] [place] [place] [address]    Phone:  [phone]    Fax:  [phone]                                       Arnaud Bird   MRN: [id number]    Department:  [facility], Emergency Department   Date of Visit:  11/25/2018           After Visit Summary Signature Page     I have received my discharge instructions, and my questions have been answered. I have discussed any challenges I see with this plan with the nurse or doctor.    ..........................................................................................................................................  Patient/Patient Representative Signature      ..........................................................................................................................................  Patient Representative Print Name and Relationship to Patient    ..................................................               ................................................  Date                                   Time    ..........................................................................................................................................  Reviewed by Signature/Title    ...................................................              ..............................................  Date                                               Time          22EPIC Rev 08/18

## 2018-12-03 ENCOUNTER — TELEPHONE (OUTPATIENT)
Dept: ORTHOPEDICS | Facility: CLINIC | Age: 63
End: 2018-12-03

## 2018-12-03 NOTE — TELEPHONE ENCOUNTER
The patient states that he managed to set up an appointment with Dr. Chan on Wednesday 12/5 for bilateral US guided shoulder CSI. The appointment time was verified, and it was confirmed that it has been at least 3 months since his last injection.

## 2018-12-03 NOTE — TELEPHONE ENCOUNTER
----- Message from Aida Patterson ATC sent at 11/28/2018  2:50 PM CST -----  Regarding: Call patient - Rocio  Contact: [phone]  Patient left VM on [facility] phone. Would like Dr. Chan or her staff to call him back.    Thanks!  Aida

## 2018-12-05 ENCOUNTER — OFFICE VISIT (OUTPATIENT)
Dept: ORTHOPEDICS | Facility: CLINIC | Age: 63
End: 2018-12-05
Payer: COMMERCIAL

## 2018-12-05 VITALS — WEIGHT: 257 LBS | RESPIRATION RATE: 16 BRPM | HEIGHT: 70 IN | BODY MASS INDEX: 36.79 KG/M2

## 2018-12-05 DIAGNOSIS — M19.012 OSTEOARTHRITIS OF GLENOHUMERAL JOINTS, BILATERAL: Primary | ICD-10-CM

## 2018-12-05 DIAGNOSIS — M19.011 OSTEOARTHRITIS OF GLENOHUMERAL JOINTS, BILATERAL: Primary | ICD-10-CM

## 2018-12-05 RX ADMIN — ROPIVACAINE HYDROCHLORIDE 3 ML: 5 INJECTION, SOLUTION EPIDURAL; INFILTRATION; PERINEURAL at 14:47

## 2018-12-05 RX ADMIN — TRIAMCINOLONE ACETONIDE 40 MG: 40 INJECTION, SUSPENSION INTRA-ARTICULAR; INTRAMUSCULAR at 14:47

## 2018-12-05 NOTE — LETTER
12/5/2018      RE: Arnaud Bird  [address]  [facility] [address]       Large Joint Injection/Arthocentesis  Date/Time: 12/5/2018 2:47 PM  Performed by: OCTAVIO CHAN  Authorized by: OCTAVIO CHAN     Indications:  Osteoarthritis  Needle Size:  22 G  Guidance: ultrasound    Approach:  Posterior  Location:  Shoulder  Laterality:  Bilateral  Site:  Bilateral glenohumeral  Medications (Right):  40 mg triamcinolone 40 MG/ML; 3 mL ropivacaine 5 MG/ML  Medications (Left):  40 mg triamcinolone 40 MG/ML; 3 mL ropivacaine 5 MG/ML  Outcome:  Tolerated well, no immediate complications  Procedure discussed: discussed risks, benefits, and alternatives    Consent Given by:  Patient  Timeout: timeout called immediately prior to procedure    Prep: patient was prepped and draped in usual sterile fashion     8 mL of ropivacaine used per shoulder.  Waste: 4 mL ropivacaine, single vial use.  Scribed by Frances Rogers MS, ATC for Dr. Chan on 12/5/2018 at 2:49 PM, based on the provider s statements to me.         I agree with the aboveinjection documentation.    ELROY Chan MD    PROBLEM: Right and left  shoulder osteoarthritis      SUBJECTIVE:  US-guided corticosteroid injection for sx of bilateral shoulder arthritis.      OBJECTIVE: Pt guarding against any shoulder ROM, hard time sleeping.        ASSESSMENT: Right severe glenohumeral osteoarthritis and left mild GH OA      PLAN: US-guide corticosteroid injection right and left shoulder      PrOCEDURE: The indications, risks, expected benefits were discussed.  Formal consent was obtained. The humeral head, glenoid, hyperechoic triangle indicating the posterior labrum were identified by ultrasound.  Initially, 5 mL ropivicaine  was injected with US guidance along the injection track for anesthesia.  Using sterile technique, a syringe with a 80 mm , 22-gauge gauge needle containing 1 mL Kenalog 40 mg/mL and 4 mL ropivicaine  were  injected using an US guided posterior approach into the right and left glenohumeral joint.  US used to guide needle placement and verify proper needle position.  Images  indicating proper needle placement were recorded.  Upon completion of the injection, the wound was dressed with a bandaid. Follow up with me in 4 weeks.    Carmel Chan MD

## 2018-12-05 NOTE — PROGRESS NOTES
Large Joint Injection/Arthocentesis  Date/Time: 12/5/2018 2:47 PM  Performed by: OCTAVIO RAMOS  Authorized by: OCTAVIO RAMOS     Indications:  Osteoarthritis  Needle Size:  22 G  Guidance: ultrasound    Approach:  Posterior  Location:  Shoulder  Laterality:  Bilateral  Site:  Bilateral glenohumeral  Medications (Right):  40 mg triamcinolone 40 MG/ML; 3 mL ropivacaine 5 MG/ML  Medications (Left):  40 mg triamcinolone 40 MG/ML; 3 mL ropivacaine 5 MG/ML  Outcome:  Tolerated well, no immediate complications  Procedure discussed: discussed risks, benefits, and alternatives    Consent Given by:  Patient  Timeout: timeout called immediately prior to procedure    Prep: patient was prepped and draped in usual sterile fashion     8 mL of ropivacaine used per shoulder.  Waste: 4 mL ropivacaine, single vial use.  Scribed by Frances Rogers MS, ATC for Dr. Ramos on 12/5/2018 at 2:49 PM, based on the provider s statements to me.         I agree with the aboveinjection documentation.    ELROY Ramos MD

## 2018-12-05 NOTE — MR AVS SNAPSHOT
"              After Visit Summary   2018    Arnaud Bird    MRN: [id number]           Patient Information     Date Of Birth          1955        Visit Information        Provider Department      2018 2:00 PM Carmel Chan MD [facility]         Follow-ups after your visit        Your next 10 appointments already scheduled     2019  9:50 AM CST   (Arrive by 9:35 AM)   Return Visit with MD ANY Martinez [facility] ([facility])    [address] [place] [id number] [phone]              Who to contact     Please call your clinic at [phone] to:    Ask questions about your health    Make or cancel appointments    Discuss your medicines    Learn about your test results    Speak to your doctor            Additional Information About Your Visit        MyChart Information     [employer] is an electronic gateway that provides easy, online access to your medical records. With [employer], you can request a clinic appointment, read your test results, renew a prescription or communicate with your care team.     To sign up for [employer] visit the website at [email or web address]   You will be asked to enter the access code listed below, as well as some personal information. Please follow the directions to create your username and password.     Your access code is: [email or web address]  Expires: 3/5/2019  2:51 PM     Your access code will  in 90 days. If you need help or a new code, please contact your [facility] or call [phone] for assistance.        Care EveryWhere ID     This is your Care EveryWhere ID. This could be used by other organizations to access your [place] medical records  [id number]        Your Vitals Were     Respirations Height BMI (Body Mass Index)             16 5' 10\" (1.778 m) 36.88 kg/m2          Blood Pressure from Last 3 Encounters: "   11/25/18 (!) 136/98   11/18/18 120/85   09/27/18 132/78    Weight from Last 3 Encounters:   12/05/18 257 lb (116.6 kg)   11/25/18 257 lb 12.8 oz (116.9 kg)   11/18/18 259 lb (117.5 kg)              We Performed the Following     Large Joint Injection/Arthocentesis        Primary Care Provider Office Phone # Fax #    Johnson Jesica Clements [phone] [address] [facility] [address]        Equal Access to Services     [facility]: Hadii aad ku hadasho Soomaali, waaxda luqadaha, qaybta kaalmada adeegyada, gee sparks . So [facility] [phone].    ATENCIÓN: Si habla español, tiene a stewart disposición servicios gratuitos de asistencia lingüística. [facility] [phone].    We comply with applicable federal civil rights laws and [place] laws. We do not discriminate on the basis of race, color, national origin, age, disability, sex, sexual orientation, or gender identity.            Thank you!     Thank you for choosing [facility]  for your care. Our goal is always to provide you with excellent care. Hearing back from our patients is one way we can continue to improve our services. Please take a few minutes to complete the written survey that you may receive in the mail after your visit with us. Thank you!             Your Updated Medication List - Protect others around you: Learn how to safely use, store and throw away your medicines at www.disposemymeds.org.          This list is accurate as of 12/5/18  2:52 PM.  Always use your most recent med list.                   Brand Name Dispense Instructions for use Diagnosis    * albuterol 108 (90 Base) MCG/ACT inhaler    PROAIR HFA/PROVENTIL HFA/VENTOLIN HFA    1 Inhaler    Inhale 2 puffs into the lungs every 6 hours as needed for shortness of breath / dyspnea    Mild persistent asthma, uncomplicated       * albuterol 108 (90 Base) MCG/ACT inhaler    PROAIR HFA/PROVENTIL HFA/VENTOLIN HFA    1 Inhaler     Inhale 2 puffs into the lungs every 6 hours as needed for shortness of breath / dyspnea or wheezing    Mild persistent asthma without complication       allopurinol 300 MG tablet    ZYLOPRIM    90 tablet    Take 1 tablet (300 mg) by mouth daily    Chronic gout involving toe 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 tophus, unspecified cause       amoxicillin-clavulanate 875-125 MG tablet    AUGMENTIN    20 tablet    Take 1 tablet by mouth 2 times daily    Acute non-recurrent frontal sinusitis       beclomethasone 40 MCG/ACT Aers IS A DISCONTINUED MEDICATION    QVAR    1 Inhaler    Inhale 2 puffs into the lungs 2 times daily    Mild persistent asthma without complication       benzonatate 200 MG capsule    TESSALON    21 capsule    Take 1 capsule (200 mg) by mouth 3 times daily as needed for cough    Acute non-recurrent frontal sinusitis, Cough       diclofenac 1 % topical gel    VOLTAREN    100 g    APPLY 2 GRAMS TOPICALLY TO AFFECTED AREA FOUR TIMES DAILY AS NEEDED    Stiffness of knee joint, right, Carpal tunnel syndrome, unspecified laterality, Primary localized osteoarthrosis, lower leg, unspecified laterality, Lumbar disc disease with radiculopathy       * fluticasone 50 MCG/ACT nasal spray    FLONASE    16 g    Spray 2 sprays into both nostrils daily    Seasonal allergic rhinitis due to pollen       * fluticasone 50 MCG/ACT nasal spray    FLONASE    1 Bottle    Spray 1-2 sprays into both nostrils daily    Acute non-recurrent frontal sinusitis       HYDROcodone-acetaminophen 7.5-325 MG per tablet    NORCO    120 tablet    Take 1 tablet by mouth every 4 hours as needed for pain (4 x daily as needed  maxium 4 per day)    Chronic pain syndrome       sildenafil 100 MG tablet    VIAGRA    100 tablet    Take 0.5-1 tablets ( mg) by mouth daily as needed Take 30 min to 4 hours before intercourse.  Never use with nitroglycerin, terazosin or doxazosin.    Sexual dysfunction       simvastatin 40 MG tablet    ZOCOR    90 tablet     TAKE 1 TABLET (40 MG) BY MOUTH AT BEDTIME    Hyperlipidemia with target LDL less than 130       * Notice:  This list has 4 medication(s) that are the same as other medications prescribed for you. Read the directions carefully, and ask your doctor or other care provider to review them with you.

## 2018-12-06 RX ORDER — TRIAMCINOLONE ACETONIDE 40 MG/ML
40 INJECTION, SUSPENSION INTRA-ARTICULAR; INTRAMUSCULAR
Status: DISCONTINUED | OUTPATIENT
Start: 2018-12-05 | End: 2021-05-28

## 2018-12-06 RX ORDER — ROPIVACAINE HYDROCHLORIDE 5 MG/ML
3 INJECTION, SOLUTION EPIDURAL; INFILTRATION; PERINEURAL
Status: DISCONTINUED | OUTPATIENT
Start: 2018-12-05 | End: 2021-05-28

## 2018-12-06 NOTE — PROGRESS NOTES
PROBLEM: Right and left  shoulder osteoarthritis      SUBJECTIVE:  US-guided corticosteroid injection for sx of bilateral shoulder arthritis.      OBJECTIVE: Pt guarding against any shoulder ROM, hard time sleeping.        ASSESSMENT: Right severe glenohumeral osteoarthritis and left mild GH OA      PLAN: US-guide corticosteroid injection right and left shoulder      PrOCEDURE: The indications, risks, expected benefits were discussed.  Formal consent was obtained. The humeral head, glenoid, hyperechoic triangle indicating the posterior labrum were identified by ultrasound.  Initially, 5 mL ropivicaine  was injected with US guidance along the injection track for anesthesia.  Using sterile technique, a syringe with a 80 mm , 22-gauge gauge needle containing 1 mL Kenalog 40 mg/mL and 4 mL ropivicaine  were injected using an US guided posterior approach into the right and left glenohumeral joint.  US used to guide needle placement and verify proper needle position.  Images  indicating proper needle placement were recorded.  Upon completion of the injection, the wound was dressed with a bandaid. Follow up with me in 4 weeks.

## 2018-12-27 ENCOUNTER — OFFICE VISIT (OUTPATIENT)
Dept: FAMILY MEDICINE | Facility: CLINIC | Age: 63
End: 2018-12-27
Payer: COMMERCIAL

## 2018-12-27 VITALS
WEIGHT: 253 LBS | OXYGEN SATURATION: 97 % | DIASTOLIC BLOOD PRESSURE: 76 MMHG | SYSTOLIC BLOOD PRESSURE: 126 MMHG | TEMPERATURE: 97.6 F | RESPIRATION RATE: 16 BRPM | HEART RATE: 70 BPM | BODY MASS INDEX: 36.3 KG/M2

## 2018-12-27 DIAGNOSIS — J20.9 ACUTE BRONCHITIS WITH SYMPTOMS > 10 DAYS: ICD-10-CM

## 2018-12-27 DIAGNOSIS — E66.01 MORBID OBESITY (H): ICD-10-CM

## 2018-12-27 DIAGNOSIS — Z11.4 SCREENING FOR HIV (HUMAN IMMUNODEFICIENCY VIRUS): Primary | ICD-10-CM

## 2018-12-27 DIAGNOSIS — F90.8 ATTENTION DEFICIT HYPERACTIVITY DISORDER (ADHD), OTHER TYPE: Primary | ICD-10-CM

## 2018-12-27 DIAGNOSIS — G89.4 CHRONIC PAIN SYNDROME: Chronic | ICD-10-CM

## 2018-12-27 PROCEDURE — 99214 OFFICE O/P EST MOD 30 MIN: CPT | Performed by: FAMILY MEDICINE

## 2018-12-27 RX ORDER — HYDROCODONE BITARTRATE AND ACETAMINOPHEN 7.5; 325 MG/1; MG/1
1 TABLET ORAL EVERY 4 HOURS PRN
Qty: 120 TABLET | Refills: 0 | Status: SHIPPED | OUTPATIENT
Start: 2018-12-27 | End: 2018-12-27

## 2018-12-27 RX ORDER — HYDROCODONE BITARTRATE AND ACETAMINOPHEN 7.5; 325 MG/1; MG/1
1 TABLET ORAL EVERY 4 HOURS PRN
Qty: 120 TABLET | Refills: 0 | Status: SHIPPED | OUTPATIENT
Start: 2019-02-27 | End: 2019-03-19

## 2018-12-27 RX ORDER — HYDROCODONE BITARTRATE AND ACETAMINOPHEN 7.5; 325 MG/1; MG/1
1 TABLET ORAL EVERY 4 HOURS PRN
Qty: 120 TABLET | Refills: 0 | Status: SHIPPED | OUTPATIENT
Start: 2019-01-27 | End: 2018-12-27

## 2018-12-27 RX ORDER — AZITHROMYCIN 250 MG/1
250 TABLET, FILM COATED ORAL ONCE
Status: DISCONTINUED | OUTPATIENT
Start: 2018-12-27 | End: 2019-03-19

## 2018-12-27 NOTE — PROGRESS NOTES
SUBJECTIVE:   Arnaud Bird is a 63 year old male who presents to clinic today for the following health issues:      Medication Followup of med check    Taking Medication as prescribed: yes    Side Effects:  None    Medication Helping Symptoms:  yes     CORTISONE SHOT BOTH SHOULDERS     SEEMS TO HELP A BIT     NOT GONE     UPPER RESPIRATORY INFECTION WITH SINUS INFECTION     IMPROVED     WEIGHT IS IMPROVED     RIGHT KNEE SORE BILATERAL TOTAL KNEE ARTHROPLASTY     CARPAL TUNNEL SYNDROME  RIGHT WORSE THAN LEFT     ASTHMA OK     SINUS INFECTION     WANT FURTHER ANTIBIOTIC TREATMENT     ALLERGY PILLS MAKE HIM GROGGY     BENIGN PROSTATIC HYPERTROPHY SYMPTOMS     OBESITY     LOWER BACK PAIN with RADICULOPATHY     CHRONIC PAIN SYNDROME     UNABLE TO WORK UNDER CAR     CERVICAL RADICULOPATHY     GOUT ALLOPURINOL PREVENTS     STICK ON PATCHES HELP     HEADACHES 2-3 TIMES PER WEEK    BAD ONCE PER WEEK         Chronic Pain Follow-Up   NORCO 7.5MG PO FOUR TIMES DAILY   DON'T HELP HEADACHES   VOLTAREN GEL        Type / Location of Pain:  BOTH SHOULDER  RIGHT KNEE PAIN   PLANTAR FASCIITIS     Analgesia/pain control:       Recent changes:  same      Overall control: Tolerable with discomfort  Activity level/function:      Daily activities:  Unable to perform most daily activities - chores, hobbies, social activities, driving    Work:  not applicable  Adverse effects:  No  Adherance    Taking medication as directed?  Yes    Participating in other treatments: yes  Risk Factors:    Sleep:  Good    Mood/anxiety:  controlled    Recent family or social stressors:  none noted    Other aggravating factors: none  PHQ-9 SCORE 10/31/2016   PHQ-9 Total Score 2     SOFIYA-7 SCORE 10/31/2016   Total Score 2     Encounter-Level CSA - 10/31/2016:    Controlled Substance Agreement - Scan on 11/15/2016  7:53 AM: CONTROLLED SUBSTANCE AGREEMENT (below)       Patient-Level CSA:    There are no patient-level csa.         Problem list and histories  reviewed & adjusted, as indicated.  Additional history: as documented      Patient Active Problem List   Diagnosis     Stiffness of knee joint, right     Gait difficulty     S/P TKR (total knee replacement)     Bilateral carpal tunnel syndrome     Vitamin D deficiency     Plantar fascial fibromatosis     Asthma with exacerbation     Hypertrophy of prostate without urinary obstruction     Obesity     Hyperlipidemia     Spondylosis with myelopathy, lumbar region     LEFT WORSE THAN RIGHT      CARDIOVASCULAR SCREENING; LDL GOAL LESS THAN 100     BMI 40.0-44.9, adult (H)     Mild persistent asthma     Hyperlipidemia with target LDL less than 130     Lumbar disc disease with radiculopathy     Groin strain, initial encounter     Sexual dysfunction     ACP (advance care planning)     Slac (scapholunate advanced collapse) of wrist, right     Right wrist pain     Numbness and tingling in right hand     Chronic pain syndrome     Chronic gout involving toe 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 tophus, unspecified cause     Impingement syndrome, shoulder, right     Cervical radiculopathy     Migraine without aura and without status migrainosus, not intractable     Past Surgical History:   Procedure Laterality Date     HC TOOTH EXTRACTION W/FORCEP       ORTHOPEDIC SURGERY      bilateral total knee replacements     TONSILLECTOMY      age 2 or 3       Social History     Tobacco Use     Smoking status: Never Smoker     Smokeless tobacco: Never Used   Substance Use Topics     Alcohol use: Yes     Comment: case beer a week     Family History   Problem Relation Age of Onset     Cancer Father      Family History Negative Father      Family History Negative Mother      Diabetes No family hx of      Coronary Artery Disease No family hx of      Hypertension No family hx of      Hyperlipidemia No family hx of      Cerebrovascular Disease No family hx of      Breast Cancer No family hx of      Colon Cancer No family hx of      Prostate Cancer No family hx  of      Other Cancer No family hx of      Depression No family hx of      Anxiety Disorder No family hx of      Mental Illness No family hx of      Substance Abuse No family hx of      Anesthesia Reaction No family hx of      Asthma No family hx of      Osteoporosis No family hx of      Genetic Disorder No family hx of      Thyroid Disease No family hx of      Obesity No family hx of      Unknown/Adopted No family hx of          Current Outpatient Medications   Medication Sig Dispense Refill     albuterol (PROAIR HFA, PROVENTIL HFA, VENTOLIN HFA) 108 (90 BASE) MCG/ACT inhaler Inhale 2 puffs into the lungs every 6 hours as needed for shortness of breath / dyspnea 1 Inhaler 11     allopurinol (ZYLOPRIM) 300 MG tablet Take 1 tablet (300 mg) by mouth daily 90 tablet 3     beclomethasone (QVAR) 40 MCG/ACT Inhaler Inhale 2 puffs into the lungs 2 times daily 1 Inhaler 11     diclofenac (VOLTAREN) 1 % GEL topical gel APPLY 2 GRAMS TOPICALLY TO AFFECTED AREA FOUR TIMES DAILY AS NEEDED 100 g 3     fluticasone (FLONASE) 50 MCG/ACT spray Spray 2 sprays into both nostrils daily 16 g 11     HYDROcodone-acetaminophen (NORCO) 7.5-325 MG per tablet Take 1 tablet by mouth every 4 hours as needed for pain (4 x daily as needed  maxium 4 per day) 120 tablet 0     sildenafil (VIAGRA) 100 MG tablet Take 0.5-1 tablets ( mg) by mouth daily as needed Take 30 min to 4 hours before intercourse.  Never use with nitroglycerin, terazosin or doxazosin. 100 tablet 3     simvastatin (ZOCOR) 40 MG tablet TAKE 1 TABLET (40 MG) BY MOUTH AT BEDTIME 90 tablet 2     albuterol (PROAIR HFA/PROVENTIL HFA/VENTOLIN HFA) 108 (90 Base) MCG/ACT inhaler Inhale 2 puffs into the lungs every 6 hours as needed for shortness of breath / dyspnea or wheezing (Patient not taking: Reported on 12/27/2018) 1 Inhaler 11     Albuterol Sulfate (VENTOLIN HFA) 108 (90 BASE) MCG/ACT AERS Inhale 2 puffs into the lungs 4 times daily as needed.       simvastatin (ZOCOR) 40 MG  tablet Take 1 tablet by mouth daily.       Allergies   Allergen Reactions     Seasonal Allergies      Recent Labs   Lab Test 06/04/18  0842 03/26/18  0832 12/07/17  1023 12/30/16  0804 10/31/16  0822 07/08/15  0528 02/05/14  0750   A1C  --   --   --  5.4  --   --   --    LDL  --  88  --   --  85  --  106   HDL  --  58  --   --  55  --  45   TRIG  --  117  --   --  94  --  112   ALT 40  --  37  --  35 28  --    CR 0.70  --  0.85  --  0.88 0.83 1.20   GFRESTIMATED >90  --  >90  --  87 >90  Non  GFR Calc   62   GFRESTBLACK >90  --  >90  --  >90 >90   GFR Calc   75   POTASSIUM 4.2  --   --   --   --  4.3 4.4   TSH 2.02  --   --   --   --   --   --       BP Readings from Last 3 Encounters:   12/27/18 126/76   11/25/18 (!) 136/98   11/18/18 120/85    Wt Readings from Last 3 Encounters:   12/27/18 114.8 kg (253 lb)   12/05/18 116.6 kg (257 lb)   11/25/18 116.9 kg (257 lb 12.8 oz)                  Labs reviewed in EPIC    Reviewed and updated as needed this visit by clinical staff  Allergies  Meds  Problems  Med Hx  Surg Hx  Fam Hx       Reviewed and updated as needed this visit by Provider  Allergies  Meds  Problems  Med Hx  Surg Hx  Fam Hx         ROS: has Stiffness of knee joint, right; Gait difficulty; S/P TKR (total knee replacement); Bilateral carpal tunnel syndrome; Vitamin D deficiency; Plantar fascial fibromatosis; Asthma with exacerbation; Hypertrophy of prostate without urinary obstruction; Obesity; Hyperlipidemia; Spondylosis with myelopathy, lumbar region; LEFT WORSE THAN RIGHT ; CARDIOVASCULAR SCREENING; LDL GOAL LESS THAN 100; BMI 40.0-44.9, adult (H); Mild persistent asthma; Hyperlipidemia with target LDL less than 130; Lumbar disc disease with radiculopathy; Groin strain, initial encounter; Sexual dysfunction; ACP (advance care planning); Slac (scapholunate advanced collapse) of wrist, right; Right wrist pain; Numbness and tingling in right hand; Chronic pain  syndrome; Chronic gout involving toe 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 tophus, unspecified cause; Impingement syndrome, shoulder, right; Cervical radiculopathy; and Migraine without aura and without status migrainosus, not intractable on their problem list.   Constitutional, HEENT, cardiovascular, pulmonary, gi and gu systems are negative, except as otherwise noted.    OBJECTIVE:     /76   Pulse 70   Temp 97.6  F (36.4  C) (Tympanic)   Resp 16   Wt 114.8 kg (253 lb)   SpO2 97%   BMI 36.30 kg/m    Body mass index is 36.3 kg/m .  GENERAL: healthy, alert and no distress  EYES: Eyes grossly normal to inspection, PERRL and conjunctivae and sclerae normal  HENT: ear canals and TM's normal, nose and mouth without ulcers or lesions  NECK: no adenopathy, no asymmetry, masses, or scars and thyroid normal to palpation  RESP: lungs clear to auscultation - no rales, rhonchi or wheezes  CV: regular rate and rhythm, normal S1 S2, no S3 or S4, no murmur, click or rub, no peripheral edema and peripheral pulses strong  ABDOMEN: soft, nontender, no hepatosplenomegaly, no masses and bowel sounds normal  MS: no gross musculoskeletal defects noted, no edema  SKIN: no suspicious lesions or rashes  NEURO: Normal strength and tone, mentation intact and speech normal  BACK: no CVA tenderness, no paralumbar tenderness    Diagnostic Test Results:  Results for orders placed or performed in visit on 12/05/18   Large Joint Injection/Arthocentesis    Narrative    Carmel Ramos MD     12/6/2018 10:11 AM  Large Joint Injection/Arthocentesis  Date/Time: 12/5/2018 2:47 PM  Performed by: CARMEL RAMOS  Authorized by: CARMEL RAMOS     Indications:  Osteoarthritis  Needle Size:  22 G  Guidance: ultrasound    Approach:  Posterior  Location:  Shoulder  Laterality:  Bilateral  Site:  Bilateral glenohumeral  Medications (Right):  40 mg triamcinolone 40 MG/ML; 3 mL ropivacaine 5   MG/ML  Medications (Left):  40 mg  triamcinolone 40 MG/ML; 3 mL ropivacaine 5   MG/ML  Outcome:  Tolerated well, no immediate complications  Procedure discussed: discussed risks, benefits, and alternatives    Consent Given by:  Patient  Timeout: timeout called immediately prior to procedure    Prep: patient was prepped and draped in usual sterile fashion     8 mL of ropivacaine used per shoulder.  Waste: 4 mL ropivacaine, single vial use.  Scribed by Frances Rogers MS, ATC for Dr. Chan on 12/5/2018 at 2:49   PM, based on the provider s statements to me.         ASSESSMENT/PLAN:           ICD-10-CM    1. Screening for HIV (human immunodeficiency virus) Z11.4    2. Chronic pain syndrome G89.4 HYDROcodone-acetaminophen (NORCO) 7.5-325 MG per tablet     DISCONTINUED: HYDROcodone-acetaminophen (NORCO) 7.5-325 MG per tablet     DISCONTINUED: HYDROcodone-acetaminophen (NORCO) 7.5-325 MG per tablet   3. Acute bronchitis with symptoms > 10 days J20.9 azithromycin (ZITHROMAX) tablet 250 mg       There are no Patient Instructions on file for this visit.    SCOTTY TOLEDO MD  [facility]

## 2018-12-27 NOTE — LETTER
My Asthma Action Plan  Name: Arnaud Bird   YOB: 1955  Date: 12/27/2018   My doctor: SCOTTY TOLEDO MD   My clinic: [facility]        My Control Medicine: { :904832}  My Rescue Medicine: { :480308}  {AAP include Oral Steroid:850662} My Asthma Severity: { :145022}  Avoid your asthma triggers: { :863093}        {Is patient a child or adult?:682013}       GREEN ZONE   Good Control    I feel good    No cough or wheeze    Can work, sleep and play without asthma symptoms       Take your asthma control medicine every day.     1. If exercise triggers your asthma, take your rescue medication    15 minutes before exercise or sports, and    During exercise if you have asthma symptoms  2. Spacer to use with inhaler: If you have a spacer, make sure to use it with your inhaler             YELLOW ZONE Getting Worse  I have ANY of these:    I do not feel good    Cough or wheeze    Chest feels tight    Wake up at night   1. Keep taking your Green Zone medications  2. Start taking your rescue medicine:    every 20 minutes for up to 1 hour. Then every 4 hours for 24-48 hours.  3. If you stay in the Yellow Zone for more than 12-24 hours, contact your doctor.  4. If you do not return to the Green Zone in 12-24 hours or you get worse, start taking your oral steroid medicine if prescribed by your provider.           RED ZONE Medical Alert - Get Help  I have ANY of these:    I feel awful    Medicine is not helping    Breathing getting harder    Trouble walking or talking    Nose opens wide to breathe       1. Take your rescue medicine NOW  2. If your provider has prescribed an oral steroid medicine, start taking it NOW  3. Call your doctor NOW  4. If you are still in the Red Zone after 20 minutes and you have not reached your doctor:    Take your rescue medicine again and    Call 911 or go to the emergency room right away    See your regular doctor within 2 weeks of an Emergency  Room or Urgent Care visit for follow-up treatment.          Annual Reminders:  Meet with Asthma Educator,  Flu Shot in the Fall, consider Pneumonia Vaccination for patients with asthma (aged 19 and older).    Pharmacy: [phone] [place] [place] - [facility] [address] [facility]                      Asthma Triggers  How To Control Things That Make Your Asthma Worse    Triggers are things that make your asthma worse.  Look at the list below to help you find your triggers and what you can do about them.  You can help prevent asthma flare-ups by staying away from your triggers.      Trigger                                                          What you can do   Cigarette Smoke  Tobacco smoke can make asthma worse. Do not allow smoking in your home, car or around you.  Be sure no one smokes at a child s day care or school.  If you smoke, ask your health care provider for ways to help you quit.  Ask family members to quit too.  Ask your health care provider for a referral to Quit Plan to help you quit smoking, or call 3-870-155-PLAN.     Colds, Flu, Bronchitis  These are common triggers of asthma. Wash your hands often.  Don t touch your eyes, nose or mouth.  Get a flu shot every year.     Dust Mites  These are tiny bugs that live in cloth or carpet. They are too small to see. Wash sheets and blankets in hot water every week.   Encase pillows and mattress in dust mite proof covers.  Avoid having carpet if you can. If you have carpet, vacuum weekly.   Use a dust mask and HEPA vacuum.   Pollen and Outdoor Mold  Some people are allergic to trees, grass, or weed pollen, or molds. Try to keep your windows closed.  Limit time out doors when pollen count is high.   Ask you health care provider about taking medicine during allergy season.     Animal Dander  Some people are allergic to skin flakes, urine or saliva from pets with fur or feathers. Keep pets with fur or feathers out of your home.    If you can t keep the pet  outdoors, then keep the pet out of your bedroom.  Keep the bedroom door closed.  Keep pets off cloth furniture and away from stuffed toys.     Mice, Rats, and Cockroaches  Some people are allergic to the waste from these pests.   Cover food and garbage.  Clean up spills and food crumbs.  Store grease in the refrigerator.   Keep food out of the bedroom.   Indoor Mold  This can be a trigger if your home has high moisture. Fix leaking faucets, pipes, or other sources of water.   Clean moldy surfaces.  Dehumidify basement if it is damp and smelly.   Smoke, Strong Odors, and Sprays  These can reduce air quality. Stay away from strong odors and sprays, such as perfume, powder, hair spray, paints, smoke incense, paint, cleaning products, candles and new carpet.   Exercise or Sports  Some people with asthma have this trigger. Be active!  Ask your doctor about taking medicine before sports or exercise to prevent symptoms.    Warm up for 5-10 minutes before and after sports or exercise.     Other Triggers of Asthma  Cold air:  Cover your nose and mouth with a scarf.  Sometimes laughing or crying can be a trigger.  Some medicines and food can trigger asthma.

## 2018-12-27 NOTE — PATIENT INSTRUCTIONS
(Z11.4) Screening for HIV (human immunodeficiency virus)  (primary encounter diagnosis)  Comment:    Plan:      (G89.4) Chronic pain syndrome  Comment:    Plan: HYDROcodone-acetaminophen (NORCO) 7.5-325 MG         per tablet, DISCONTINUED:         HYDROcodone-acetaminophen (NORCO) 7.5-325 MG         per tablet, DISCONTINUED:         HYDROcodone-acetaminophen (NORCO) 7.5-325 MG         per tablet             (J20.9) Acute bronchitis with symptoms > 10 days  Comment:    Plan: azithromycin (ZITHROMAX) tablet 250 mg              SCOTTY TOLEDO JR., MD

## 2018-12-27 NOTE — TELEPHONE ENCOUNTER
phentermine (ADIPEX-P) 37.5 MG tablet      DISCONTINUED  Last Written Prescription Date:  9/27/2018 END: 9/27/2018  Last Fill Quantity: 30 tablet,  # refills: 0   Last office visit: 12/27/2018 with prescribing provider:  DANYELLE Clements   Future Office Visit:        Routing refill request to provider for review/approval because:  Drug not on the [facility], [facility] or [facility] refill protocol or controlled substance  Drug not active on patient's medication list

## 2018-12-28 ENCOUNTER — TELEPHONE (OUTPATIENT)
Dept: FAMILY MEDICINE | Facility: CLINIC | Age: 63
End: 2018-12-28

## 2018-12-28 DIAGNOSIS — J20.9 ACUTE BRONCHITIS, UNSPECIFIED ORGANISM: Primary | ICD-10-CM

## 2018-12-28 RX ORDER — AZITHROMYCIN 250 MG/1
TABLET, FILM COATED ORAL
Qty: 6 TABLET | Refills: 0 | Status: SHIPPED | OUTPATIENT
Start: 2018-12-28 | End: 2019-03-19

## 2018-12-28 NOTE — TELEPHONE ENCOUNTER
The patient called stating that he expected a zithromax prescription to be sent to the pharmacy.     Per chart review, this was ordered as a clinically administered medication.     Routing to provider team, for qty dispense and directions, days supply. Did the provider want to dispense just one tablet?

## 2018-12-28 NOTE — TELEPHONE ENCOUNTER
I double checked with staff, azithromycin was not administered.     Nothing in the MAR.     Call made to the patient to let him know the prescription was sent over.

## 2018-12-28 NOTE — TELEPHONE ENCOUNTER
No should have been a Z-pack for 5 day course.  I am pretty sure that nothing was administered in clinic yesterday.  I sent correct Rx to his pharmacy

## 2019-01-02 ENCOUNTER — OFFICE VISIT (OUTPATIENT)
Dept: ORTHOPEDICS | Facility: CLINIC | Age: 64
End: 2019-01-02
Payer: COMMERCIAL

## 2019-01-02 VITALS — WEIGHT: 253 LBS | RESPIRATION RATE: 16 BRPM | HEIGHT: 70 IN | BODY MASS INDEX: 36.22 KG/M2

## 2019-01-02 DIAGNOSIS — M19.011 LOCALIZED OSTEOARTHRITIS OF BOTH SHOULDER REGIONS: Primary | ICD-10-CM

## 2019-01-02 DIAGNOSIS — M19.012 LOCALIZED OSTEOARTHRITIS OF BOTH SHOULDER REGIONS: Primary | ICD-10-CM

## 2019-01-02 ASSESSMENT — MIFFLIN-ST. JEOR: SCORE: 1938.85

## 2019-01-02 NOTE — PROGRESS NOTES
Subjective:   Arnaud Bird is a 63 year old male who is here following up on bilateral shoulder osteoarthritis. He was given ultrasound guided shoulder injections on 12/5/18.  Shoulders are much better, not doing as much.  [place] jerked shoulder and hurt so bad.  Took a pain pill, something wrong- told it was arthritis and given muscle relaxers to help him sleep.  Tumeric capsules taken on advice from family member with Crohn's.    Date of injury: NA  Date last seen: 12/5/2018  Following Therapeutic Plan: Yes, CSI   Pain: Improving  Function: Improving  Interval History:  Patient reports shoulders are tolerable    PAST MEDICAL, SOCIAL, SURGICAL AND FAMILY HISTORY: He  has a past medical history of Arthritis, Carpal tunnel syndrome, LEFT WORSE THAN RIGHT  (10/18/2012), Unspecified asthma(493.90) (10/17/2012), and Unspecified asthma, with exacerbation (10/17/2012). He also has no past medical history of Congestive heart failure, unspecified, COPD (chronic obstructive pulmonary disease) (H), Coronary artery disease, Diabetes mellitus (H), History of blood transfusion, Hypertension, Malignant neoplasm (H), Thyroid disease, or Unspecified cerebral artery occlusion with cerebral infarction.  He  has a past surgical history that includes tonsillectomy; TOOTH EXTRACTION W/FORCEP; and orthopedic surgery.  His family history includes Cancer in his father; Family History Negative in his father and mother.  He reports that  has never smoked. he has never used smokeless tobacco. He reports that he drinks alcohol. He reports that he does not use drugs.    ALLERGIES: He is allergic to seasonal allergies.    CURRENT MEDICATIONS: He has a current medication list which includes the following prescription(s): albuterol, allopurinol, azithromycin, beclomethasone, diclofenac, fluticasone, hydrocodone-acetaminophen, sildenafil, simvastatin, albuterol, ventolin hfa, and simvastatin, and the following  "Facility-Administered Medications: azithromycin, ropivacaine, ropivacaine, triamcinolone, and triamcinolone.     REVIEW OF SYSTEMS: 10 point review of systems is negative except as noted above.     Exam:   Resp 16   Ht 1.778 m (5' 10\")   Wt 114.8 kg (253 lb)   BMI 36.30 kg/m             CONSTITUTIONAL: healthy, alert, no distress and cooperative  HEAD: Normocephalic. No masses, lesions, tenderness or abnormalities  SKIN: no suspicious lesions or rashes  GAIT: normal  NEUROLOGIC: Non-focal, Normal muscle tone and strength, reflexes normal, sensation grossly normal.  PSYCHIATRIC: affect normal/bright and mentation appears normal.    MUSCULOSKELETAL: bilateral shoulder pain    Palpation:  Non-tender SC joint, clavicle, AC joint, acromion, subacromial space, proximal bicep tendon and upper trapezius muscle   Range of Motion        Active:90 degrees right and left flexion, abduction 110 on right and left shoulder 90 degrees        Passive: all normal  Strength: rotator cuff strength full  Special tests: positive Neer's test, positive Ko, Negative Cross-Arm adduction, Negative Angel's         Assessment/Plan:   Pt is a 62 yo white male with PMHx of bilateral TKA presenting with bilateral shoulder pain  1. Bilateral shoulder pain- OA  Improving with cortisone injections  US guided shoulder injections requested if calls in for re-injection  RTC in 3-6 months  X-RAY INTERPRETATION:   none  "

## 2019-01-02 NOTE — LETTER
1/2/2019      RE: Arnaud Bird  [address]  [facility] [id number]        Subjective:   Arnaud Bird is a 63 year old male who is here following up on bilateral shoulder osteoarthritis. He was given ultrasound guided shoulder injections on 12/5/18.  Shoulders are much better, not doing as much.  [place], jerked shoulder and hurt so bad.  Took a pain pill, something wrong- told it was arthritis and given muscle relaxers to help him sleep.  Tumeric capsules taken on advice from family member with Crohn's.    Date of injury: NA  Date last seen: 12/5/2018  Following Therapeutic Plan: Yes, CSI   Pain: Improving  Function: Improving  Interval History:  Patient reports shoulders are tolerable    PAST MEDICAL, SOCIAL, SURGICAL AND FAMILY HISTORY: He  has a past medical history of Arthritis, Carpal tunnel syndrome, LEFT WORSE THAN RIGHT  (10/18/2012), Unspecified asthma(493.90) (10/17/2012), and Unspecified asthma, with exacerbation (10/17/2012). He also has no past medical history of Congestive heart failure, unspecified, COPD (chronic obstructive pulmonary disease) (H), Coronary artery disease, Diabetes mellitus (H), History of blood transfusion, Hypertension, Malignant neoplasm (H), Thyroid disease, or Unspecified cerebral artery occlusion with cerebral infarction.  He  has a past surgical history that includes tonsillectomy; TOOTH EXTRACTION W/FORCEP; and orthopedic surgery.  His family history includes Cancer in his father; Family History Negative in his father and mother.  He reports that  has never smoked. he has never used smokeless tobacco. He reports that he drinks alcohol. He reports that he does not use drugs.    ALLERGIES: He is allergic to seasonal allergies.    CURRENT MEDICATIONS: He has a current medication list which includes the following prescription(s): albuterol, allopurinol, azithromycin, beclomethasone, diclofenac, fluticasone, hydrocodone-acetaminophen,  "sildenafil, simvastatin, albuterol, ventolin hfa, and simvastatin, and the following Facility-Administered Medications: azithromycin, ropivacaine, ropivacaine, triamcinolone, and triamcinolone.     REVIEW OF SYSTEMS: 10 point review of systems is negative except as noted above.     Exam:   Resp 16   Ht 1.778 m (5' 10\")   Wt 114.8 kg (253 lb)   BMI 36.30 kg/m              CONSTITUTIONAL: healthy, alert, no distress and cooperative  HEAD: Normocephalic. No masses, lesions, tenderness or abnormalities  SKIN: no suspicious lesions or rashes  GAIT: normal  NEUROLOGIC: Non-focal, Normal muscle tone and strength, reflexes normal, sensation grossly normal.  PSYCHIATRIC: affect normal/bright and mentation appears normal.    MUSCULOSKELETAL: bilateral shoulder pain    Palpation:  Non-tender SC joint, clavicle, AC joint, acromion, subacromial space, proximal bicep tendon and upper trapezius muscle   Range of Motion        Active:90 degrees right and left flexion, abduction 110 on right and left shoulder 90 degrees        Passive: all normal  Strength: rotator cuff strength full  Special tests: positive Neer's test, positive Ko, Negative Cross-Arm adduction, Negative Angel's         Assessment/Plan:   Pt is a 62 yo white male with PMHx of bilateral TKA presenting with bilateral shoulder pain  1. Bilateral shoulder pain- OA  Improving with cortisone injections  US guided shoulder injections requested if calls in for re-injection  RTC in 3-6 months  X-RAY INTERPRETATION:   none    Carmel Chan MD    "

## 2019-01-03 RX ORDER — PHENTERMINE HYDROCHLORIDE 37.5 MG/1
TABLET ORAL
Qty: 30 TABLET | Refills: 0 | Status: SHIPPED | OUTPATIENT
Start: 2019-01-03 | End: 2019-03-13

## 2019-03-13 DIAGNOSIS — E66.01 MORBID OBESITY (H): ICD-10-CM

## 2019-03-13 NOTE — TELEPHONE ENCOUNTER
phentermine (ADIPEX-P) 37.5 MG tablet      Last Written Prescription Date:  1/3/2019  Last Fill Quantity: 30 tablet,  # refills: 0   Last office visit: 12/27/2018 with prescribing provider:  DANYELLE Clements   Future Office Visit:   Next 5 appointments (look out 90 days)    Mar 19, 2019  8:45 AM CDT  [facility] with Johnson [facility]ments MD  [facility] [address] [place] [id number] [phone]           Routing refill request to provider for review/approval because:  Drug not on the [facility], [facility] or [facility] refill protocol or controlled substance

## 2019-03-13 NOTE — TELEPHONE ENCOUNTER
Reason for Call:  Medication or medication refill:    Do you use a [facility]?  Name of the pharmacy and phone number for the current request:  pu at [facility]    Name of the medication requestedphentermine (ADIPEX-P) 37.5 MG tablet:     Other request: call to [facility] when ready    Can we leave a detailed message on this number? YES    Phone number patient can be reached at: Home number on file [phone] (home)    Best Time:     Call taken on 3/13/2019 at 3:30 PM by MARSHAL CHACON

## 2019-03-14 RX ORDER — PHENTERMINE HYDROCHLORIDE 37.5 MG/1
1 TABLET ORAL EVERY MORNING
Qty: 30 TABLET | Refills: 0 | Status: SHIPPED | OUTPATIENT
Start: 2019-03-14 | End: 2019-03-19

## 2019-03-14 NOTE — TELEPHONE ENCOUNTER
Routing refill request to provider for review/approval because:  Please decline this current order. RN Triage cannot decline.     Talked with pharmacy this afternoon. They have a script for him from 1/19/2019 that has not been filled yet.     Patient was contacted. He can pick this up at his pharmacy anytime.

## 2019-03-19 ENCOUNTER — OFFICE VISIT (OUTPATIENT)
Dept: FAMILY MEDICINE | Facility: CLINIC | Age: 64
End: 2019-03-19
Payer: COMMERCIAL

## 2019-03-19 VITALS
TEMPERATURE: 97.6 F | BODY MASS INDEX: 37.31 KG/M2 | HEART RATE: 91 BPM | DIASTOLIC BLOOD PRESSURE: 76 MMHG | SYSTOLIC BLOOD PRESSURE: 120 MMHG | OXYGEN SATURATION: 98 % | RESPIRATION RATE: 20 BRPM | WEIGHT: 260 LBS

## 2019-03-19 DIAGNOSIS — Z11.4 SCREENING FOR HIV (HUMAN IMMUNODEFICIENCY VIRUS): ICD-10-CM

## 2019-03-19 DIAGNOSIS — E66.01 MORBID OBESITY (H): ICD-10-CM

## 2019-03-19 DIAGNOSIS — R37 SEXUAL DYSFUNCTION: ICD-10-CM

## 2019-03-19 DIAGNOSIS — E78.5 HYPERLIPIDEMIA WITH TARGET LDL LESS THAN 130: ICD-10-CM

## 2019-03-19 DIAGNOSIS — M51.16 LUMBAR DISC DISEASE WITH RADICULOPATHY: ICD-10-CM

## 2019-03-19 DIAGNOSIS — M17.10 PRIMARY LOCALIZED OSTEOARTHROSIS, LOWER LEG, UNSPECIFIED LATERALITY: ICD-10-CM

## 2019-03-19 DIAGNOSIS — T15.01XA FOREIGN BODY OF RIGHT CORNEA, INITIAL ENCOUNTER: Primary | ICD-10-CM

## 2019-03-19 DIAGNOSIS — M25.661 STIFFNESS OF KNEE JOINT, RIGHT: Chronic | ICD-10-CM

## 2019-03-19 DIAGNOSIS — G89.4 CHRONIC PAIN SYNDROME: Chronic | ICD-10-CM

## 2019-03-19 DIAGNOSIS — G56.00 CARPAL TUNNEL SYNDROME, UNSPECIFIED LATERALITY: ICD-10-CM

## 2019-03-19 PROCEDURE — 99214 OFFICE O/P EST MOD 30 MIN: CPT | Performed by: FAMILY MEDICINE

## 2019-03-19 RX ORDER — SILDENAFIL 100 MG/1
50-100 TABLET, FILM COATED ORAL DAILY PRN
Qty: 100 TABLET | Refills: 11 | Status: SHIPPED | OUTPATIENT
Start: 2019-03-19 | End: 2020-04-23

## 2019-03-19 RX ORDER — ERYTHROMYCIN 5 MG/G
0.5 OINTMENT OPHTHALMIC 4 TIMES DAILY
Qty: 3.5 G | Refills: 1 | Status: SHIPPED | OUTPATIENT
Start: 2019-03-19 | End: 2020-06-25

## 2019-03-19 RX ORDER — PHENTERMINE HYDROCHLORIDE 37.5 MG/1
1 TABLET ORAL EVERY MORNING
Qty: 30 TABLET | Refills: 2 | Status: SHIPPED | OUTPATIENT
Start: 2019-04-14 | End: 2019-06-18

## 2019-03-19 RX ORDER — HYDROCODONE BITARTRATE AND ACETAMINOPHEN 7.5; 325 MG/1; MG/1
1 TABLET ORAL EVERY 4 HOURS PRN
Qty: 120 TABLET | Refills: 0 | Status: SHIPPED | OUTPATIENT
Start: 2019-03-19 | End: 2019-04-25

## 2019-03-19 ASSESSMENT — PATIENT HEALTH QUESTIONNAIRE - PHQ9
5. POOR APPETITE OR OVEREATING: NOT AT ALL
SUM OF ALL RESPONSES TO PHQ QUESTIONS 1-9: 0

## 2019-03-19 ASSESSMENT — ANXIETY QUESTIONNAIRES
GAD7 TOTAL SCORE: 0
5. BEING SO RESTLESS THAT IT IS HARD TO SIT STILL: NOT AT ALL
7. FEELING AFRAID AS IF SOMETHING AWFUL MIGHT HAPPEN: NOT AT ALL
3. WORRYING TOO MUCH ABOUT DIFFERENT THINGS: NOT AT ALL
2. NOT BEING ABLE TO STOP OR CONTROL WORRYING: NOT AT ALL
1. FEELING NERVOUS, ANXIOUS, OR ON EDGE: NOT AT ALL
6. BECOMING EASILY ANNOYED OR IRRITABLE: NOT AT ALL

## 2019-03-19 NOTE — LETTER
[facility]  03/19/19    Patient: Arnaud Bird  YOB: 1955  Medical Record Number: [id number]                                                                  Opioid / Opioid Plus Controlled Substance Agreement    I understand that my care provider has prescribed an opioid (narcotic) controlled substance to help manage my condition(s). I am taking this medicine to help me function or work. I know this is strong medicine, and that it can cause serious side effects. Opioid medicine can be sedating, addicting and may cause a dependency on the drug. They can affect my ability to drive or think, and cause depression. They need to be taken exactly as prescribed. Combining opioids with certain medicines or chemicals (such as cocaine, sedatives and tranquilizers, sleeping pills, meth) can be dangerous or even fatal. Also, if I stop opioids suddenly, I may have severe withdrawal symptoms. Last, I understand that opioids do not work for all types of pain nor for all patients. If not helpful, I may be asked to stop them.        The risks, benefits, and side effects of these medicine(s) were explained to me. I agree that:    1. I will take part in other treatments as advised by my care team. This may be psychiatry or counseling, physical therapy, behavioral therapy, group treatment or a referral to a pain clinic. I will reduce or stop my medicine when my care team tells me to do so.  2. I will take my medicines as prescribed. I will not change the dose or schedule unless my care team tells me to. There will be no refills if I  run out early.   I may be contactedwithout warning and asked to complete a urine drug test or pill count at any time.   3. I will keep all my appointments, and understand this is part of the monitoring of opioids. My care team may require an office visit for EVERY opioid/controlled substance refill. If I miss appointments or don t follow instructions, my  care team may stop my medicine.  4. I will not ask other providers to prescribe controlled substances, and I will not accept controlled substances from other people. If I need another prescribed controlled substance for a new reason, I will tell my care team within 1 business day.  5. I will use one pharmacy to fill all of my controlled substance prescriptions, and it is up to me to make sure that I do not run out of my medicines on weekends or holidays. If my care team is willing to refill my opioid prescription without a visit, I must request refills only during office hours, refills may take up to 3 days to process, and it may take up to 5 to 7 days for my medicine to be mailed and ready at my pharmacy. Prescriptions will not be mailed anywhere except my pharmacy.        [id number]  Rev 12/18         Registration to scan to EHR                             Page 1 of 2               Controlled Substance Agreement Opioid        [facility]  03/19/19  Patient: Arnaud Bird  YOB: 1955  Medical Record Number: [id number]                                                                  6. I am responsible for my prescriptions. If the medicine/prescription is lost or stolen, it will not be replaced. I also agree not to share controlled substance medicines with anyone.  7. I agree to not use ANY illegal or recreational drugs. This includes marijuana, cocaine, bath salts or other drugs. I agree not to use alcohol unless my care team says I may.          I agree to give urine samples whenever asked. If I don t give a urine sample, the care team may stop my medicine.    8. If I enroll in the [place] Medical Marijuana program, I will tell my care team. I will also sign an agreement to share my medical records with my care team.   9. I will bring in my list of medicines (or my medicine bottles) each time I come to the clinic.   10. I will tell my care team right away if I become  pregnant or have a new medical problem treated outside of my regular clinic.  11. I understand that this medicine can affect my thinking and judgment. It may be unsafe for me to drive, use machinery and do dangerous tasks. I will not do any of these things until I know how the medicine affects me. If my dose changes, I will wait to see how it affects me. I will contact my care team if I have concerns about medicine side effects.    I understand that if I do not follow any of the conditions above, my prescriptions or treatment may be stopped.      I agree that my provider, clinic care team, and pharmacy may work with any city, state or federal law enforcement agency that investigates the misuse, sale, or other diversion of my controlled medicine. I will allow my provider to discuss my care with or share a copy of this agreement with any other treating provider, pharmacy or emergency room where I receive care. I agree to give up (waive) any right of privacy or confidentiality with respect to these consents.     I have read this agreement and have asked questions about anything I did not understand.      ________________________________________________________________________  Patient signature - Date/Time -  Arnaud Bird                                      ________________________________________________________________________  Witness signature                                                            ________________________________________________________________________  Provider signature - SCOTTY TOLEDO MD      [id number]  Rev 12/18         Registration to scan to EHR                         Page 2 of 2                   Controlled Substance Agreement Opioid           Page 1 of 2  Opioid Pain Medicines (also known as Narcotics)  What You Need to Know    What are opioids?   Opioids are pain medicines that must be prescribed by a doctor.  They are also known as narcotics.    Examples are:     morphine (MS  Contin, Caryn)    oxycodone (Oxycontin)    oxycodone and acetaminophen (Percocet)    hydrocodone and acetaminophen (Vicodin, Norco)     fentanyl patch (Duragesic)     hydromorphone (Dilaudid)     methadone     What do opioids do well?   Opioids are best for short-term pain after a surgery or injury. They also work well for cancer pain. Unlike other pain medicines, they do not cause liver or kidney failure or ulcers. They may help some people with long-lasting (chronic) pain.     What do opioids NOT do well?   Opioids never get rid of pain entirely, and they do not work well for most patients with chronic pain. Opioids do not reduce swelling, one of the causes of pain. They also don t work well for nerve pain.                           For informational purposes only.  Not to replace the advice of your care provider.  Copyright [facility]. All right reserved. [employer] 327240-Tba 02/18.      Page 2 of 2    Risks and side effects   Talk to your doctor before you start or decide to keep taking one of these medicines. Side effects include:    Lowering your breathing rate enough to cause death    Overdose, including death, especially if taking higher than prescribed doses    Long-term opioid use    Worse depression symptoms; less pleasure in things you usually enjoy    Feeling tired or sluggish    Slower thoughts or cloudy thinking    Being more sensitive to pain over time; pain is harder to control    Trouble sleeping or restless sleep    Changes in hormone levels (for example, less testosterone)    Changes in sex drive or ability to have sex    Constipation    Unsafe driving    Itching and sweating    Feeling dizzy    Nausea, vomiting and dry mouth    What else should I know about opioids?  When someone takes opioids for too long or too often, they become dependent. This means that if you stop or reduce the medicine too quickly, you will have withdrawal symptoms.    Dependence is not the same as  addiction. Addiction is when people keep using a substance that harms their body, their mind or their relations with others. If you have a history of drug or alcohol abuse, taking opioids can cause a relapse.    Over time, opioids don t work as well. Most people will need higher and higher doses. The higher the dose, the more serious the side effects. We don t know the long-term effects of opioids.      Prescribed opioids aren't the best way to manage chronic pain    Other ways to manage pain include:      Ibuprofen or acetaminophen.  You should always try this first.      Treat health problems that may be causing pain.      acupuncture or massage, deep breathing, meditation, visual imagery, aromatherapy.      Use heat or ice at the pain site      Physical therapy and exercise      Stop smoking      See a counselor or therapist                                                  People who have used opioids for a long time may have a lower quality of life, worse depression, higher levels of pain and more visits to doctors.    Never share your opioids with others. Be sure to store opioids in a secure place, locked if possible.Young children can easily swallow them and overdose.     You can overdose on opioids.  Signs of overdose include decrease or loss of consciousness, slowed breathing, trouble waking and blue lips.  If someone is worried about overdose, they should call 911.    If you are at risk for overdose, you may get naloxone (Narcan, a medicine that reverses the effects of opioids.  If you overdose, a friend or family member can give you Narcan while waiting for the ambulance.  They need to know the signs of overdose and how to give Narcan.    While you're taking opioids:    Don't use alcohol or street drugs. Taking them together can cause death.    Don't take any of these medicines unless your doctor says its okay.  Taking these with opioids can cause death.    Benzodiazepines (such as lorazepam         or  diazepam)    Muscle relaxers (such as cyclobenzaprine)    sleeping pills    other opioids    Safe disposal of opioids  Find your area drug take-back program, your pharmacy mail-back program, buy a special disposal bag (such as Deterra) from your pharmacy or flush them down the toilet.  Use the guidelines at:  www.fda.gov/drugs/resourcesforyou

## 2019-03-19 NOTE — PROGRESS NOTES
SUBJECTIVE:   Arnaud Bird is a 63 year old male who presents to clinic today for the following health issues:      Eye(s) Problem right eye   Visual acuity  20/25  Right eye  Left eye 20/25       Duration: 4 days    Description:  Location: right  Pain: YES  Redness: YES  Discharge: no    Accompanying signs and symptoms: na    History (Trauma, foreign body exposure,): was working on a car at the time.     May have gotten something in the eye    Precipitating or alleviating factors (contact use): None    Therapies tried and outcome: drops    Indeed he does have a corneal foreign body at about 2:00 in the right cornea    Side effects benefits and risks of treatment thoroughly discussed    Initially tried a swab but it was firmly embedded    After application of tetracaine no complication    Then took a small 25-gauge needle with lateral movement and remove the foreign body    Nevertheless I feel would be wise to refer to ophthalmology for follow-up    Evaluation of the whole foreign body    I did not see a rust ring    His visual acuity was 2025 before and after the procedure    There was a fluorescein positive abrasion after removal     In the past we have used patches but this is not recommended anymore    I did recommend Ilotycin ointment 4 times daily and a follow-up is scheduled for [facility] at [facility]    On the tetracaine wears off you probably have a significant amount of discomfort suggested using ibuprofen and Tylenol    Initially it will be more severe and gradually over 2448 hrs. that should get better      Medication Followup of pain meds    Taking Medication as prescribed: yes    Side Effects:  None    Medication Helping Symptoms:  Having more back pain recently       Redness and pain in both eyes was working on a car think he might of gotten something in the eye so will be needing to check to see if there is any rest and there    Right eye is involved     Personal history  of hyperlipidemia controlled current regimen simvastatin 40 mg    History of bilateral carpal tunnel syndrome    Mild persistent asthma with episodes of exacerbation with a recently    Right total knee replacement history history of migraine headaches nothing recently    History of gout involving the toe in the past 12 no exacerbations recently on allopurinol 300 mg daily to prevent gout    History of bilateral carpal tunnel syndrome with right wrist pain    History of BENIGN PROSTATIC HYPERTROPHY nocturia    History of cervical disc disease    History of chronic pain syndrome on Norco 7.5/325 as needed    History of lumbar disc disease    History of plantar fasciitis    Moderate obesity on phentermine 37.5 mg for weight    And gait difficulties    History of sexual dysfunction on Viagra as needed     prescription for Viagra was refilled today    Seasonal allergic rhinitis on Flonase as needed as needed 1-2 puffs    Using Voltaren gel in the back of the knees for pain          Problem list and histories reviewed & adjusted, as indicated.  Additional history: as documented      Patient Active Problem List   Diagnosis     Stiffness of knee joint, right     Gait difficulty     S/P TKR (total knee replacement)     Bilateral carpal tunnel syndrome     Vitamin D deficiency     Plantar fascial fibromatosis     Asthma with exacerbation     Hypertrophy of prostate without urinary obstruction     Obesity     Hyperlipidemia     Spondylosis with myelopathy, lumbar region     LEFT WORSE THAN RIGHT      CARDIOVASCULAR SCREENING; LDL GOAL LESS THAN 100     BMI 40.0-44.9, adult (H)     Mild persistent asthma     Hyperlipidemia with target LDL less than 130     Lumbar disc disease with radiculopathy     Groin strain, initial encounter     Sexual dysfunction     ACP (advance care planning)     Slac (scapholunate advanced collapse) of wrist, right     Right wrist pain     Numbness and tingling in right hand     Chronic pain syndrome      Chronic gout involving toe 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 tophus, unspecified cause     Impingement syndrome, shoulder, right     Cervical radiculopathy     Migraine without aura and without status migrainosus, not intractable     Past Surgical History:   Procedure Laterality Date     HC TOOTH EXTRACTION W/FORCEP       ORTHOPEDIC SURGERY      bilateral total knee replacements     TONSILLECTOMY      age 2 or 3       Social History     Tobacco Use     Smoking status: Never Smoker     Smokeless tobacco: Never Used   Substance Use Topics     Alcohol use: Yes     Comment: case beer a week     Family History   Problem Relation Age of Onset     Cancer Father      Family History Negative Father      Family History Negative Mother      Diabetes No family hx of      Coronary Artery Disease No family hx of      Hypertension No family hx of      Hyperlipidemia No family hx of      Cerebrovascular Disease No family hx of      Breast Cancer No family hx of      Colon Cancer No family hx of      Prostate Cancer No family hx of      Other Cancer No family hx of      Depression No family hx of      Anxiety Disorder No family hx of      Mental Illness No family hx of      Substance Abuse No family hx of      Anesthesia Reaction No family hx of      Asthma No family hx of      Osteoporosis No family hx of      Genetic Disorder No family hx of      Thyroid Disease No family hx of      Obesity No family hx of      Unknown/Adopted No family hx of          Current Outpatient Medications   Medication Sig Dispense Refill     albuterol (PROAIR HFA, PROVENTIL HFA, VENTOLIN HFA) 108 (90 BASE) MCG/ACT inhaler Inhale 2 puffs into the lungs every 6 hours as needed for shortness of breath / dyspnea 1 Inhaler 11     allopurinol (ZYLOPRIM) 300 MG tablet Take 1 tablet (300 mg) by mouth daily 90 tablet 3     beclomethasone (QVAR) 40 MCG/ACT Inhaler Inhale 2 puffs into the lungs 2 times daily 1 Inhaler 11     diclofenac (VOLTAREN) 1 % topical gel APPLY 2  GRAMS TOPICALLY TO AFFECTED AREA FOUR TIMES DAILY AS NEEDED 100 g 11     erythromycin (ROMYCIN) 5 MG/GM ophthalmic ointment Place 0.5 inches into the right eye 4 times daily 3.5 g 1     fluticasone (FLONASE) 50 MCG/ACT spray Spray 2 sprays into both nostrils daily 16 g 11     HYDROcodone-acetaminophen (NORCO) 7.5-325 MG per tablet Take 1 tablet by mouth every 4 hours as needed for pain (4 x daily as needed  maxium 4 per day) 120 tablet 0     [START ON 4/14/2019] phentermine (ADIPEX-P) 37.5 MG tablet Take 1 tablet (37.5 mg) by mouth every morning 30 tablet 2     sildenafil (VIAGRA) 100 MG tablet Take 0.5-1 tablets ( mg) by mouth daily as needed Take 30 min to 4 hours before intercourse.  Never use with nitroglycerin, terazosin or doxazosin. 100 tablet 11     simvastatin (ZOCOR) 40 MG tablet TAKE 1 TABLET (40 MG) BY MOUTH AT BEDTIME 90 tablet 2     Albuterol Sulfate (VENTOLIN HFA) 108 (90 BASE) MCG/ACT AERS Inhale 2 puffs into the lungs 4 times daily as needed.       simvastatin (ZOCOR) 40 MG tablet Take 1 tablet by mouth daily.       Allergies   Allergen Reactions     Seasonal Allergies      Recent Labs   Lab Test 06/04/18  0842 03/26/18  0832 12/07/17  1023 12/30/16  0804 10/31/16  0822 07/08/15  0528 02/05/14  0750   A1C  --   --   --  5.4  --   --   --    LDL  --  88  --   --  85  --  106   HDL  --  58  --   --  55  --  45   TRIG  --  117  --   --  94  --  112   ALT 40  --  37  --  35 28  --    CR 0.70  --  0.85  --  0.88 0.83 1.20   GFRESTIMATED >90  --  >90  --  87 >90  Non  GFR Calc   62   GFRESTBLACK >90  --  >90  --  >90 >90   GFR Calc   75   POTASSIUM 4.2  --   --   --   --  4.3 4.4   TSH 2.02  --   --   --   --   --   --       BP Readings from Last 3 Encounters:   03/19/19 120/76   12/27/18 126/76   11/25/18 (!) 136/98    Wt Readings from Last 3 Encounters:   03/19/19 117.9 kg (260 lb)   01/02/19 114.8 kg (253 lb)   12/27/18 114.8 kg (253 lb)                  Labs  reviewed in EPIC    Reviewed and updated as needed this visit by clinical staff  Tobacco  Allergies  Meds  Med Hx  Surg Hx  Fam Hx  Soc Hx      Reviewed and updated as needed this visit by Provider         ROS: has Stiffness of knee joint, right; Gait difficulty; S/P TKR (total knee replacement); Bilateral carpal tunnel syndrome; Vitamin D deficiency; Plantar fascial fibromatosis; Asthma with exacerbation; Hypertrophy of prostate without urinary obstruction; Obesity; Hyperlipidemia; Spondylosis with myelopathy, lumbar region; LEFT WORSE THAN RIGHT ; CARDIOVASCULAR SCREENING; LDL GOAL LESS THAN 100; BMI 40.0-44.9, adult (H); Mild persistent asthma; Hyperlipidemia with target LDL less than 130; Lumbar disc disease with radiculopathy; Groin strain, initial encounter; Sexual dysfunction; ACP (advance care planning); Slac (scapholunate advanced collapse) of wrist, right; Right wrist pain; Numbness and tingling in right hand; Chronic pain syndrome; Chronic gout involving toe 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 tophus, unspecified cause; Impingement syndrome, shoulder, right; Cervical radiculopathy; and Migraine without aura and without status migrainosus, not intractable on their problem list.     Constitutional, HEENT, cardiovascular, pulmonary, gi and gu systems are negative, except as otherwise noted.    OBJECTIVE:     /76   Pulse 91   Temp 97.6  F (36.4  C) (Tympanic)   Resp 20   Wt 117.9 kg (260 lb)   SpO2 98%   BMI 37.31 kg/m    Body mass index is 37.31 kg/m .  GENERAL: healthy, alert and no distress  EYES: Eyes grossly normal to inspection, PERRL and conjunctivae and sclerae normal    Corneal foreign body     Right eye at 2 oclock    Numbed with tetracaine without complication     0.5 milimeters lesion     Right eye    fluoroscein stain positive after removal     Tangential removal procedure without complication      Right eye is injected and red   Vision  With glasses   HENT: ear canals and TM's normal, nose  and mouth without ulcers or lesions  NECK: no adenopathy, no asymmetry, masses, or scars and thyroid normal to palpation  RESP: lungs clear to auscultation - no rales, rhonchi or wheezes  CV: regular rate and rhythm, normal S1 S2, no S3 or S4, no murmur, click or rub, no peripheral edema and peripheral pulses strong  ABDOMEN: soft, nontender, no hepatosplenomegaly, no masses and bowel sounds normal  MS: no gross musculoskeletal defects noted, no edema  SKIN: no suspicious lesions or rashes  BACK: no CVA tenderness, no paralumbar tenderness  PSYCH: mentation appears normal, affect normal/bright    Diagnostic Test Results:  Results for orders placed or performed in visit on 12/05/18   Large Joint Injection/Arthocentesis    Narrative    Carmel Ramos MD     12/6/2018 10:11 AM  Large Joint Injection/Arthocentesis  Date/Time: 12/5/2018 2:47 PM  Performed by: CARMEL RAMOS  Authorized by: CARMEL RAMOS     Indications:  Osteoarthritis  Needle Size:  22 G  Guidance: ultrasound    Approach:  Posterior  Location:  Shoulder  Laterality:  Bilateral  Site:  Bilateral glenohumeral  Medications (Right):  40 mg triamcinolone 40 MG/ML; 3 mL ropivacaine 5   MG/ML  Medications (Left):  40 mg triamcinolone 40 MG/ML; 3 mL ropivacaine 5   MG/ML  Outcome:  Tolerated well, no immediate complications  Procedure discussed: discussed risks, benefits, and alternatives    Consent Given by:  Patient  Timeout: timeout called immediately prior to procedure    Prep: patient was prepped and draped in usual sterile fashion     8 mL of ropivacaine used per shoulder.  Waste: 4 mL ropivacaine, single vial use.  Scribed by Frances Rogers, MS, ATC for Dr. Ramos on 12/5/2018 at 2:49   PM, based on the provider s statements to me.         ASSESSMENT/PLAN:           ICD-10-CM    1. Foreign body of right cornea, initial encounter T15.01XA erythromycin (ROMYCIN) 5 MG/GM ophthalmic ointment     OPHTHALMOLOGY ADULT  REFERRAL   2. Screening for HIV (human immunodeficiency virus) Z11.4    3. Hyperlipidemia with target LDL less than 130 E78.5 Lipid panel reflex to direct LDL Fasting   4. Morbid obesity (H) E66.01 phentermine (ADIPEX-P) 37.5 MG tablet   5. Stiffness of knee joint, right M25.661 diclofenac (VOLTAREN) 1 % topical gel   6. Carpal tunnel syndrome, unspecified laterality G56.00 diclofenac (VOLTAREN) 1 % topical gel   7. Primary localized osteoarthrosis, lower leg, unspecified laterality M17.10 diclofenac (VOLTAREN) 1 % topical gel   8. Lumbar disc disease with radiculopathy M51.16 diclofenac (VOLTAREN) 1 % topical gel   9. Chronic pain syndrome G89.4 HYDROcodone-acetaminophen (NORCO) 7.5-325 MG per tablet   10. Sexual dysfunction R37 sildenafil (VIAGRA) 100 MG tablet       There are no Patient Instructions on file for this visit.    SCOTTY TOLEDO MD  [facility]

## 2019-03-19 NOTE — LETTER
My Asthma Action Plan  Name: Arnaud Bird   YOB: 1955  Date: 3/19/2019   My doctor: SCOTTY TOLEDO MD   My clinic: [facility]        My Control Medicine: { :119065}  My Rescue Medicine: { :831314}  {AAP include Oral Steroid:233996} My Asthma Severity: { :279325}  Avoid your asthma triggers: { :995628}        {Is patient a child or adult?:021285}       GREEN ZONE   Good Control    I feel good    No cough or wheeze    Can work, sleep and play without asthma symptoms       Take your asthma control medicine every day.     1. If exercise triggers your asthma, take your rescue medication    15 minutes before exercise or sports, and    During exercise if you have asthma symptoms  2. Spacer to use with inhaler: If you have a spacer, make sure to use it with your inhaler             YELLOW ZONE Getting Worse  I have ANY of these:    I do not feel good    Cough or wheeze    Chest feels tight    Wake up at night   1. Keep taking your Green Zone medications  2. Start taking your rescue medicine:    every 20 minutes for up to 1 hour. Then every 4 hours for 24-48 hours.  3. If you stay in the Yellow Zone for more than 12-24 hours, contact your doctor.  4. If you do not return to the Green Zone in 12-24 hours or you get worse, start taking your oral steroid medicine if prescribed by your provider.           RED ZONE Medical Alert - Get Help  I have ANY of these:    I feel awful    Medicine is not helping    Breathing getting harder    Trouble walking or talking    Nose opens wide to breathe       1. Take your rescue medicine NOW  2. If your provider has prescribed an oral steroid medicine, start taking it NOW  3. Call your doctor NOW  4. If you are still in the Red Zone after 20 minutes and you have not reached your doctor:    Take your rescue medicine again and    Call 911 or go to the emergency room right away    See your regular doctor within 2 weeks of an Emergency  Room or Urgent Care visit for follow-up treatment.          Annual Reminders:  Meet with Asthma Educator,  Flu Shot in the Fall, consider Pneumonia Vaccination for patients with asthma (aged 19 and older).    Pharmacy: [id number] [place] [facility] - [place], [place] - [facility]                      Asthma Triggers  How To Control Things That Make Your Asthma Worse    Triggers are things that make your asthma worse.  Look at the list below to help you find your triggers and what you can do about them.  You can help prevent asthma flare-ups by staying away from your triggers.      Trigger                                                          What you can do   Cigarette Smoke  Tobacco smoke can make asthma worse. Do not allow smoking in your home, car or around you.  Be sure no one smokes at a child s day care or school.  If you smoke, ask your health care provider for ways to help you quit.  Ask family members to quit too.  Ask your health care provider for a referral to Quit Plan to help you quit smoking, or call 8-529-337-PLAN.     Colds, Flu, Bronchitis  These are common triggers of asthma. Wash your hands often.  Don t touch your eyes, nose or mouth.  Get a flu shot every year.     Dust Mites  These are tiny bugs that live in cloth or carpet. They are too small to see. Wash sheets and blankets in hot water every week.   Encase pillows and mattress in dust mite proof covers.  Avoid having carpet if you can. If you have carpet, vacuum weekly.   Use a dust mask and HEPA vacuum.   Pollen and Outdoor Mold  Some people are allergic to trees, grass, or weed pollen, or molds. Try to keep your windows closed.  Limit time out doors when pollen count is high.   Ask you health care provider about taking medicine during allergy season.     Animal Dander  Some people are allergic to skin flakes, urine or saliva from pets with fur or feathers. Keep pets with fur or feathers out of your home.    If you can t keep the pet  outdoors, then keep the pet out of your bedroom.  Keep the bedroom door closed.  Keep pets off cloth furniture and away from stuffed toys.     Mice, Rats, and Cockroaches  Some people are allergic to the waste from these pests.   Cover food and garbage.  Clean up spills and food crumbs.  Store grease in the refrigerator.   Keep food out of the bedroom.   Indoor Mold  This can be a trigger if your home has high moisture. Fix leaking faucets, pipes, or other sources of water.   Clean moldy surfaces.  Dehumidify basement if it is damp and smelly.   Smoke, Strong Odors, and Sprays  These can reduce air quality. Stay away from strong odors and sprays, such as perfume, powder, hair spray, paints, smoke incense, paint, cleaning products, candles and new carpet.   Exercise or Sports  Some people with asthma have this trigger. Be active!  Ask your doctor about taking medicine before sports or exercise to prevent symptoms.    Warm up for 5-10 minutes before and after sports or exercise.     Other Triggers of Asthma  Cold air:  Cover your nose and mouth with a scarf.  Sometimes laughing or crying can be a trigger.  Some medicines and food can trigger asthma.

## 2019-03-19 NOTE — Clinical Note
[facility]-up on the eye abr[place] follow-up with me in 1 month follow-up of chronic pain syndrome

## 2019-03-19 NOTE — PATIENT INSTRUCTIONS
(T15.01XA) Foreign body of right cornea, initial encounter  (primary encounter diagnosis)  Comment:    Plan: erythromycin (ROMYCIN) 5 MG/GM ophthalmic   4 times daily  Benefits and risks discussion  Treatment discussion        ointment, OPHTHALMOLOGY ADULT REFERRAL              (Z11.4) Screening for HIV (human immunodeficiency virus)  Comment:    Plan:      (E78.5) Hyperlipidemia with target LDL less than 130  Comment:    Plan: Lipid panel reflex to direct LDL Fasting             (E66.01) Morbid obesity (H)  Comment:    Plan: phentermine (ADIPEX-P) 37.5 MG tablet             (M25.661) Stiffness of knee joint, right  Comment:    Plan: diclofenac (VOLTAREN) 1 % topical gel             (G56.00) Carpal tunnel syndrome, unspecified laterality  Comment:    Plan: diclofenac (VOLTAREN) 1 % topical gel             (M17.10) Primary localized osteoarthrosis, lower leg, unspecified laterality  Comment:    Plan: diclofenac (VOLTAREN) 1 % topical gel             (M51.16) Lumbar disc disease with radiculopathy  Comment:    Plan: diclofenac (VOLTAREN) 1 % topical gel             (G89.4) Chronic pain syndrome  Comment:    Plan: HYDROcodone-acetaminophen (NORCO) 7.5-325 MG         per tablet  Refill for upcoming Norco prescription 4 times daily #120  With 1 refill             (R37) Sexual dysfunction  Comment:    Plan: sildenafil (VIAGRA) 100 MG tablet   sildenafil prescription is refilled in [place]

## 2019-03-20 ASSESSMENT — ASTHMA QUESTIONNAIRES: ACT_TOTALSCORE: 20

## 2019-03-20 ASSESSMENT — ANXIETY QUESTIONNAIRES: GAD7 TOTAL SCORE: 0

## 2019-03-28 ENCOUNTER — TRANSFERRED RECORDS (OUTPATIENT)
Dept: HEALTH INFORMATION MANAGEMENT | Facility: CLINIC | Age: 64
End: 2019-03-28

## 2019-04-08 PROBLEM — M54.12 CERVICAL RADICULOPATHY: Status: RESOLVED | Noted: 2018-04-02 | Resolved: 2019-04-08

## 2019-04-08 NOTE — PROGRESS NOTES
Arnaud Bird was seen 4 times for evaluation and treatment.  Patient did not return for further treatment and current status is unknown.  Due to short treatment duration, no objective or functional changes were made.  Please see goal flow sheet from episode noted date below and initial evaluation for further information.  No linked episodes

## 2019-04-09 ENCOUNTER — TELEPHONE (OUTPATIENT)
Dept: ORTHOPEDICS | Facility: CLINIC | Age: 64
End: 2019-04-09

## 2019-04-09 NOTE — TELEPHONE ENCOUNTER
[facility]    Phone Message    May a detailed message be left on voicemail: yes    Reason for Call: Other: Pt of Dr. Chan calling stating he is ready for another US guided injection in his shoulders.  His last one was done on 01/02/19, please call pt to get this scheduled     Action Taken: Message routed to:  [facility] ([facility] Sports

## 2019-04-12 ENCOUNTER — OFFICE VISIT (OUTPATIENT)
Dept: ORTHOPEDICS | Facility: CLINIC | Age: 64
End: 2019-04-12
Payer: COMMERCIAL

## 2019-04-12 VITALS — HEIGHT: 70 IN | WEIGHT: 263 LBS | BODY MASS INDEX: 37.65 KG/M2 | RESPIRATION RATE: 18 BRPM

## 2019-04-12 DIAGNOSIS — M19.011 LOCALIZED OSTEOARTHRITIS OF SHOULDER REGIONS, BILATERAL: Primary | ICD-10-CM

## 2019-04-12 DIAGNOSIS — M19.012 LOCALIZED OSTEOARTHRITIS OF SHOULDER REGIONS, BILATERAL: Primary | ICD-10-CM

## 2019-04-12 RX ORDER — ROPIVACAINE HYDROCHLORIDE 5 MG/ML
3 INJECTION, SOLUTION EPIDURAL; INFILTRATION; PERINEURAL
Status: DISCONTINUED | OUTPATIENT
Start: 2019-04-12 | End: 2021-05-28

## 2019-04-12 RX ORDER — TRIAMCINOLONE ACETONIDE 40 MG/ML
40 INJECTION, SUSPENSION INTRA-ARTICULAR; INTRAMUSCULAR
Status: DISCONTINUED | OUTPATIENT
Start: 2019-04-12 | End: 2021-05-28

## 2019-04-12 RX ORDER — ROPIVACAINE HYDROCHLORIDE 5 MG/ML
2 INJECTION, SOLUTION EPIDURAL; INFILTRATION; PERINEURAL
Status: DISCONTINUED | OUTPATIENT
Start: 2019-04-12 | End: 2021-05-28

## 2019-04-12 RX ADMIN — ROPIVACAINE HYDROCHLORIDE 3 ML: 5 INJECTION, SOLUTION EPIDURAL; INFILTRATION; PERINEURAL at 14:16

## 2019-04-12 RX ADMIN — ROPIVACAINE HYDROCHLORIDE 2 ML: 5 INJECTION, SOLUTION EPIDURAL; INFILTRATION; PERINEURAL at 14:16

## 2019-04-12 RX ADMIN — TRIAMCINOLONE ACETONIDE 40 MG: 40 INJECTION, SUSPENSION INTRA-ARTICULAR; INTRAMUSCULAR at 14:16

## 2019-04-12 ASSESSMENT — MIFFLIN-ST. JEOR: SCORE: 1984.21

## 2019-04-12 NOTE — PROGRESS NOTES
PROBLEM: Right and left  shoulder osteoarthritis      SUBJECTIVE:  US-guided corticosteroid injection for sx of bilateral shoulder arthritis.      OBJECTIVE: Pt guarding against any shoulder ROM, hard time sleeping.        ASSESSMENT: Right severe glenohumeral osteoarthritis and left mild GH OA      PLAN: US-guide corticosteroid injection right and left shoulder      PrOCEDURE: The indications, risks, expected benefits were discussed.  Formal consent was obtained. The humeral head, glenoid, hyperechoic triangle indicating the posterior labrum were identified by ultrasound.  Initially, 5 mL ropivicaine  was injected with US guidance along the injection track for anesthesia.  Using sterile technique, a syringe with a 80 mm , 22-gauge gauge needle containing 1 mL Kenalog 40 mg/mL and 4 mL ropivicaine  were injected using an US guided posterior approach into the right and left glenohumeral joint.  US used to guide needle placement and verify proper needle position.  Images  indicating proper needle placement were recorded.  Upon completion of the injection, the wound was dressed with a bandaid. Follow up with me in 4 weeks.    Large Joint Injection/Arthocentesis: bilateral glenohumeral  Date/Time: 4/12/2019 2:16 PM  Performed by: Carmel Chan MD  Authorized by: Carmel Chan MD     Indications:  Osteoarthritis  Needle Size:  22 G  Approach:  Posterolateral  Location:  Shoulder  Laterality:  Bilateral      Site:  Bilateral glenohumeral      Medications (Right):  40 mg triamcinolone 40 MG/ML; 3 mL ropivacaine 5 MG/ML; 2 mL ropivacaine 5 MG/ML  Medications (Left):  40 mg triamcinolone 40 MG/ML; 3 mL ropivacaine 5 MG/ML; 2 mL ropivacaine 5 MG/ML  Procedure discussed: discussed risks, benefits, and alternatives    Consent Given by:  Patient  Timeout: timeout called immediately prior to procedure    Prep: patient was prepped and draped in usual sterile fashion     19 ml of ropivacaine was used in  total and 1ml of ropivacaine was wasted per MD       I agree with the following injection documentation.  ELROY Chan MD

## 2019-04-12 NOTE — LETTER
4/12/2019      RE: Arnaud Bird  [address]  [facility] [id number]       PROBLEM: Right and left  shoulder osteoarthritis      SUBJECTIVE:  US-guided corticosteroid injection for sx of bilateral shoulder arthritis.      OBJECTIVE: Pt guarding against any shoulder ROM, hard time sleeping.        ASSESSMENT: Right severe glenohumeral osteoarthritis and left mild GH OA      PLAN: US-guide corticosteroid injection right and left shoulder      PrOCEDURE: The indications, risks, expected benefits were discussed.  Formal consent was obtained. The humeral head, glenoid, hyperechoic triangle indicating the posterior labrum were identified by ultrasound.  Initially, 5 mL ropivicaine  was injected with US guidance along the injection track for anesthesia.  Using sterile technique, a syringe with a 80 mm , 22-gauge gauge needle containing 1 mL Kenalog 40 mg/mL and 4 mL ropivicaine  were injected using an US guided posterior approach into the right and left glenohumeral joint.  US used to guide needle placement and verify proper needle position.  Images  indicating proper needle placement were recorded.  Upon completion of the injection, the wound was dressed with a bandaid. Follow up with me in 4 weeks.    Large Joint Injection/Arthocentesis: bilateral glenohumeral  Date/Time: 4/12/2019 2:16 PM  Performed by: Carmel Chan MD  Authorized by: Carmel Chan MD     Indications:  Osteoarthritis  Needle Size:  22 G  Approach:  Posterolateral  Location:  Shoulder  Laterality:  Bilateral      Site:  Bilateral glenohumeral      Medications (Right):  40 mg triamcinolone 40 MG/ML; 3 mL ropivacaine 5 MG/ML; 2 mL ropivacaine 5 MG/ML  Medications (Left):  40 mg triamcinolone 40 MG/ML; 3 mL ropivacaine 5 MG/ML; 2 mL ropivacaine 5 MG/ML  Procedure discussed: discussed risks, benefits, and alternatives    Consent Given by:  Patient  Timeout: timeout called immediately prior to procedure     Prep: patient was prepped and draped in usual sterile fashion     19 ml of ropivacaine was used in total and 1ml of ropivacaine was wasted per MD Carmel Chan MD

## 2019-04-25 DIAGNOSIS — G89.4 CHRONIC PAIN SYNDROME: Chronic | ICD-10-CM

## 2019-04-25 RX ORDER — HYDROCODONE BITARTRATE AND ACETAMINOPHEN 7.5; 325 MG/1; MG/1
1 TABLET ORAL EVERY 4 HOURS PRN
Qty: 120 TABLET | Refills: 0 | Status: SHIPPED | OUTPATIENT
Start: 2019-04-25 | End: 2019-05-30

## 2019-04-25 NOTE — TELEPHONE ENCOUNTER
RX [place] ([place]) reviewed:  reviewed- no concerns    [place]:  [email or web address]

## 2019-04-25 NOTE — TELEPHONE ENCOUNTER
Reason for Call:  Medication or medication refill:    Do you use a [facility]?  Name of the pharmacy and phone number for the current request:  Written   will  at [facility]    Name of the medication requested: HYDROcodone-acetaminophen (NORCO) 7.5-325 MG per tablet    Other request:   Patient only has enough for 2 days    Can we leave a detailed message on this number? YES    Phone number patient can be reached at: Home number on file [phone] (home)    Best Time: anytime    Call taken on 4/25/2019 at 9:46 AM by HELEN JACOBS

## 2019-04-25 NOTE — TELEPHONE ENCOUNTER
Controlled Substance Refill Request for HYDROcodone-acetaminophen (NORCO) 7.5-325 MG per tablet  Problem List Complete:  Yes    Patient is followed by SCOTTY TOLEDO MD for ongoing prescription of pain medication.  All refills should only be approved by this provider, or covering partner.     Medication(s):  NORCO  7.5MG PO FOUR TIMES DAILY .   Maximum quantity per month:  120   Clinic visit frequency required: Q 3 months      Controlled substance agreement:  Encounter-Level CSA - 10/31/2016:                     Controlled Substance Agreement - Scan on 11/15/2016  7:53 AM : CONTROLLED SUBSTANCE AGREEMENT (below)                Pain Clinic evaluation in the past: No     DIRE Total Score(s):    7/2/2016   Total Score 18         Last [facility] website verification:  done on 06//20/2017   [email or web address]         Last Written Prescription Date:  3/19/2019  Last Fill Quantity: 120 tablet,  # refills: 0   Last office visit: 3/19/2019 with prescribing provider:  DANYELLE Toledo   Future Office Visit:        Controlled substance agreement:   Encounter-Level CSA - 10/31/2016:    Controlled Substance Agreement - Scan on 11/15/2016  7:53 AM: CONTROLLED SUBSTANCE AGREEMENT (below)       Patient-Level CSA:    Controlled Substance Agreement - Opioid - Scan on 3/19/2019 11:01 AM (below)           Last Urine Drug Screen: No results found for: CDAUT, No results found for: COMDAT, No results found for: THC13, PCP13, COC13, MAMP13, OPI13, AMP13, BZO13, TCA13, MTD13, BAR13, OXY13, PPX13, BUP13     Processing:  Patient will  in clinic    [email or web address]   checked in past 3 months?  No, route to RN

## 2019-04-25 NOTE — TELEPHONE ENCOUNTER
Pt notified script is ready for pickup at [facility].    Princess GEREMIAS Best, DOMINGO

## 2019-05-07 ENCOUNTER — TELEPHONE (OUTPATIENT)
Dept: FAMILY MEDICINE | Facility: CLINIC | Age: 64
End: 2019-05-07

## 2019-05-07 DIAGNOSIS — M51.16 LUMBAR DISC DISEASE WITH RADICULOPATHY: Primary | ICD-10-CM

## 2019-05-07 NOTE — TELEPHONE ENCOUNTER
Reason for Call: Request for an order or referral:    Order or referral being requested:     Date needed: as soon as possible    Has the patient been seen by the PCP for this problem? YES    Additional comments: Would like [facility] injection.  Has been 1 year    Phone number Patient can be reached at:  Home number on file [phone] (home)    Best Time:  Today.  Please call patient when done    Can we leave a detailed message on this number?  YES    Call taken on 5/7/2019 at 9:49 AM by TR PEÑALOZA

## 2019-05-07 NOTE — TELEPHONE ENCOUNTER
Patient would like a referral for injection. Last injection noted was 5/11/2018, [facility].

## 2019-05-08 NOTE — PATIENT INSTRUCTIONS
Referrals done per [facility] please print out and fax and notify patient  And make sure they do not need any further imaging before this is done  Recommended epidural steroid injection he had similar injection a year ago  [facility]  [phone]  [phone]   SCOTTY TOLEDO JR., MD

## 2019-05-14 ENCOUNTER — TRANSFERRED RECORDS (OUTPATIENT)
Dept: HEALTH INFORMATION MANAGEMENT | Facility: CLINIC | Age: 64
End: 2019-05-14

## 2019-05-28 DIAGNOSIS — G89.4 CHRONIC PAIN SYNDROME: Chronic | ICD-10-CM

## 2019-05-28 NOTE — TELEPHONE ENCOUNTER
Controlled Substance Refill Request for HYDROcodone-acetaminophen (NORCO) 7.5-325 MG per tablet  Problem List Complete:  Yes    Patient is followed by SCOTTY TOLEDO MD for ongoing prescription of pain medication.  All refills should only be approved by this provider, or covering partner.     Medication(s):  NORCO  7.5MG PO FOUR TIMES DAILY .   Maximum quantity per month:  120   Clinic visit frequency required: Q 3 months      Controlled substance agreement:  Encounter-Level CSA - 10/31/2016:                     Controlled Substance Agreement - Scan on 11/15/2016  7:53 AM : CONTROLLED SUBSTANCE AGREEMENT (below)                Pain Clinic evaluation in the past: No     DIRE Total Score(s):    7/2/2016   Total Score 18         Last [facility] website verification:  4/25/2019, no concerns.    [email or web address]      Last Written Prescription Date:  4/25/2019  Last Fill Quantity: 120 tablet,  # refills: 0   Last office visit: 3/19/2019 with prescribing provider:  DANYELLE Toledo   Future Office Visit:        Controlled substance agreement:   Encounter-Level CSA - 10/31/2016:    Controlled Substance Agreement - Scan on 11/15/2016  7:53 AM: CONTROLLED SUBSTANCE AGREEMENT (below)       Patient-Level CSA:    Controlled Substance Agreement - Opioid - Scan on 3/19/2019 11:01 AM (below)           Last Urine Drug Screen: No results found for: CDAUT, No results found for: COMDAT, No results found for: THC13, PCP13, COC13, MAMP13, OPI13, AMP13, BZO13, TCA13, MTD13, BAR13, OXY13, PPX13, BUP13     Processing:  Patient will  in clinic    [email or web address]   checked in past 3 months?  Yes 4/25/2019

## 2019-05-28 NOTE — TELEPHONE ENCOUNTER
Reason for Call:  Medication or medication refill:    Do you use a [facility]?  Name of the pharmacy and phone number for the current request:  n/a    Name of the medication requested:   HYDROcodone-acetaminophen (NORCO) 7.5-325 MG per tablet 120 tablet         Other request:     Can we leave a detailed message on this number? YES    Phone number patient can be reached at: Home number on file [phone] (home)    Best Time:     Call taken on 5/28/2019 at 9:21 AM by CATALINA KHAN

## 2019-05-29 NOTE — TELEPHONE ENCOUNTER
RX monitoring program ([place]) reviewed:  not reviewed/not due - last done on 4/25/19 no concerns    [place] profile:  [email or web address]  Routing refill request to provider for review/approval because:  Drug not on the [facility] refill protocol  Going out of town on 6/1 and needs asap

## 2019-05-29 NOTE — TELEPHONE ENCOUNTER
Patient states he will be going out of town on 6/1/2019 and he needs this filled as soon as possible.

## 2019-05-30 RX ORDER — HYDROCODONE BITARTRATE AND ACETAMINOPHEN 7.5; 325 MG/1; MG/1
1 TABLET ORAL EVERY 4 HOURS PRN
Qty: 120 TABLET | Refills: 0 | Status: SHIPPED | OUTPATIENT
Start: 2019-05-30 | End: 2019-06-18

## 2019-06-05 ENCOUNTER — OFFICE VISIT (OUTPATIENT)
Dept: URGENT CARE | Facility: URGENT CARE | Age: 64
End: 2019-06-05
Payer: COMMERCIAL

## 2019-06-05 VITALS
HEART RATE: 87 BPM | WEIGHT: 263 LBS | BODY MASS INDEX: 37.74 KG/M2 | SYSTOLIC BLOOD PRESSURE: 133 MMHG | OXYGEN SATURATION: 97 % | DIASTOLIC BLOOD PRESSURE: 89 MMHG | TEMPERATURE: 98.7 F

## 2019-06-05 DIAGNOSIS — R04.0 EPISTAXIS: Primary | ICD-10-CM

## 2019-06-05 PROCEDURE — 30901 CONTROL OF NOSEBLEED: CPT | Mod: RT | Performed by: INTERNAL MEDICINE

## 2019-06-05 PROCEDURE — 99213 OFFICE O/P EST LOW 20 MIN: CPT | Mod: 25 | Performed by: INTERNAL MEDICINE

## 2019-06-05 ASSESSMENT — ENCOUNTER SYMPTOMS: EYE ITCHING: 1

## 2019-06-05 NOTE — PROGRESS NOTES
SUBJECTIVE:   Arnaud Bird is a 64 year old male presenting with a chief complaint of   Chief Complaint   Patient presents with     Urgent Care     Pt in clinic to have eval for nose bleed.     Nose Bleeds       He is an established patient of [facility].    Adult    Onset of symptoms was 1 week(s) ago.    Current and Associated symptoms:  after blowing nose right nasal bleed, gushing for 15 minutes  Recurred 4 since then.    viagra causes nasal congestion  appointment with ENT July 24.  \\Treatment measures tried include ice pack.  Predisposing factors include seasonal allergies.    Hx nosebleeds  2 years ago, had cautery by ENT & polyp was cause.  left side  Use of sudafed    Has seasaonal allergies  Using nasal steroid.  Last use couple weeks    Review of Systems   HENT: Positive for congestion.    Eyes: Positive for itching.       Past Medical History:   Diagnosis Date     Arthritis      Carpal tunnel syndrome     mild     LEFT WORSE THAN RIGHT  10/18/2012     Unspecified asthma(493.90) 10/17/2012     Unspecified asthma, with exacerbation 10/17/2012     Family History   Problem Relation Age of Onset     Cancer Father      Family History Negative Father      Family History Negative Mother      Diabetes No family hx of      Coronary Artery Disease No family hx of      Hypertension No family hx of      Hyperlipidemia No family hx of      Cerebrovascular Disease No family hx of      Breast Cancer No family hx of      Colon Cancer No family hx of      Prostate Cancer No family hx of      Other Cancer No family hx of      Depression No family hx of      Anxiety Disorder No family hx of      Mental Illness No family hx of      Substance Abuse No family hx of      Anesthesia Reaction No family hx of      Asthma No family hx of      Osteoporosis No family hx of      Genetic Disorder No family hx of      Thyroid Disease No family hx of      Obesity No family hx of      Unknown/Adopted No family hx of      Current  Outpatient Medications   Medication Sig Dispense Refill     albuterol (PROAIR HFA, PROVENTIL HFA, VENTOLIN HFA) 108 (90 BASE) MCG/ACT inhaler Inhale 2 puffs into the lungs every 6 hours as needed for shortness of breath / dyspnea 1 Inhaler 11     allopurinol (ZYLOPRIM) 300 MG tablet TAKE 1 TABLET (300 MG) BY MOUTH DAILY 90 tablet 3     amoxicillin-clavulanate (AUGMENTIN) 875-125 MG tablet Take 1 tablet by mouth 2 times daily for 5 days 10 tablet 0     beclomethasone (QVAR) 40 MCG/ACT Inhaler Inhale 2 puffs into the lungs 2 times daily 1 Inhaler 11     diclofenac (VOLTAREN) 1 % topical gel APPLY 2 GRAMS TOPICALLY TO AFFECTED AREA FOUR TIMES DAILY AS NEEDED 100 g 11     fluticasone (FLONASE) 50 MCG/ACT spray Spray 2 sprays into both nostrils daily 16 g 11     phentermine (ADIPEX-P) 37.5 MG tablet Take 1 tablet (37.5 mg) by mouth every morning 30 tablet 2     sildenafil (VIAGRA) 100 MG tablet Take 0.5-1 tablets ( mg) by mouth daily as needed Take 30 min to 4 hours before intercourse.  Never use with nitroglycerin, terazosin or doxazosin. 100 tablet 11     simvastatin (ZOCOR) 40 MG tablet TAKE 1 TABLET (40 MG) BY MOUTH AT BEDTIME 90 tablet 2     Albuterol Sulfate (VENTOLIN HFA) 108 (90 BASE) MCG/ACT AERS Inhale 2 puffs into the lungs 4 times daily as needed.       erythromycin (ROMYCIN) 5 MG/GM ophthalmic ointment Place 0.5 inches into the right eye 4 times daily (Patient not taking: Reported on 6/5/2019) 3.5 g 1     HYDROcodone-acetaminophen (NORCO) 7.5-325 MG per tablet Take 1 tablet by mouth every 4 hours as needed for pain (4 x daily as needed  maxium 4 per day) (Patient not taking: Reported on 6/5/2019) 120 tablet 0     simvastatin (ZOCOR) 40 MG tablet Take 1 tablet by mouth daily.       Social History     Tobacco Use     Smoking status: Never Smoker     Smokeless tobacco: Never Used   Substance Use Topics     Alcohol use: Yes     Comment: case beer a week       OBJECTIVE  /89   Pulse 87   Temp 98.7   F (37.1  C) (Oral)   Wt 119.3 kg (263 lb)   SpO2 97%   BMI 37.74 kg/m      Physical Exam   Constitutional: He appears well-developed and well-nourished.   HENT:   Close examination of anterior nare - increased vascularity.     Vitals reviewed.    lumincaine soaked cotton balls packed into right for 10 minutes  without complications      Labs:  No results found for this or any previous visit (from the past 24 hour(s)).        ASSESSMENT:      ICD-10-CM    1. Epistaxis R04.0 amoxicillin-clavulanate (AUGMENTIN) 875-125 MG tablet        Medical Decision Making:  discharge nasal steroid  saline    Patient Instructions   Chemical cautery with Lumincaine of anterior nare  Concern that actual site of bleed may be posterior nare and would need scope to see this.  Has ENT appointment July    Has used self-packing in past for nose bleeds  So I supply one packing nasal set & nose clamp to [place].  Would use afrin otc to moisten nasal packing    If packed more than 1 day, would have start antibiotics 2 x day for 5 days with close follow up with Otolaryngology           Patient Education     Nosebleed (Adult)    Bleeding from the nose most commonly occurs because of injury or drying and cracking of the inner lining of the nose. Most nosebleeds are because of dry air or nose-picking. They can occur during a common cold or an allergy attack. They can also occur on a very hot day, or from dry air in the winter.  If the bleeding site is found, it may be cauterized. This means it is treated to cause a blood clot to form. This may be done with a chemical, heat, or electricity. If the bleeding continues after the site is cauterized, or if the site cannot be found, packing may be put in your nose. This is to apply pressure and stop the bleeding. The packing may be made of gauze or sponge. A small balloon catheter is sometimes used. These must be removed by your healthcare provider. Some types of packing dissolve on their own. If you  are taking blood thinning (anticoagulant) medicine, you may have a blood test.  Home care    If packing was put in your nose, unless told otherwise, do not pull on it or try to remove it yourself. You will be given an appointment to have it removed. You may also have been given antibiotics to prevent a sinus infection. If so, finish all of the medicine.    Don't blow your nose for 12 hours after the bleeding stops. This will allow a strong blood clot to form. Don't pick your nose. This may restart bleeding.    Don't drink alcohol or hot liquids for the next 2 days. Alcohol or hot liquids in your mouth can dilate blood vessels in your nose. This can cause bleeding to start again.    Don't take ibuprofen, naproxen, or medicines that contain aspirin. These thin the blood and may cause your nose to bleed. You may take acetaminophen for pain, unless another pain medicine was prescribed.    If the bleeding starts again, sit up and lean forward to prevent swallowing blood. Pinch your nose tightly on both sides, as shown above, for 10 to 15 minutes. Time yourself. Don t release the pressure on your nose until 10 minutes is up. If bleeding does not stop, continue to pinch your nose and call your healthcare provider or return to this facility.    If you have a cold, allergies, or dry nasal membranes, lubricate the nasal passages. Apply a small amount of petroleum jelly inside the nose with a cotton swab twice a day (morning and night).    Don't overheat your home. This can dry the air and make your condition worse.    Put a humidifier in the room where you sleep. This will add moisture to the air. Clean the humidifier as advised by the .    Use a saline nasal spray to keep nasal passages moist.    Don't pick your nose. Keep fingernails trimmed to decrease risk of bleeds.    Don't smoke.  Follow-up care  Follow up with your healthcare provider, or as advised. Nasal packing should be rechecked or removed within 2 to  3 days.  When to seek medical advice  Call your healthcare provider right away if any of these occur.    You have another nosebleed that you cannot control    Dizziness, weakness, or fainting    You become tired or confused    Fever of 100.4 F (38 C) or higher, or as directed by your healthcare provider    Headache    Sinus or facial pain    Shortness of breath or trouble breathing  Date Last Reviewed: 11/1/2017 2000-2018 [employer]. [address] [place] [address], [place] [address]. All rights reserved. This information is not intended as a substitute for professional medical care. Always follow your healthcare professional's instructions.

## 2019-06-05 NOTE — PATIENT INSTRUCTIONS
Chemical cautery with Lumincaine of anterior nare  Concern that actual site of bleed may be posterior nare and would need scope to see this.  Has ENT appointment July    Has used self-packing in past for nose bleeds  So I supply one packing nasal set & nose clamp to Arnaud.  Would use afrin otc to moisten nasal packing    If packed more than 1 day, would have start antibiotics 2 x day for 5 days with close follow up with Otolaryngology           Patient Education     Nosebleed (Adult)    Bleeding from the nose most commonly occurs because of injury or drying and cracking of the inner lining of the nose. Most nosebleeds are because of dry air or nose-picking. They can occur during a common cold or an allergy attack. They can also occur on a very hot day, or from dry air in the winter.  If the bleeding site is found, it may be cauterized. This means it is treated to cause a blood clot to form. This may be done with a chemical, heat, or electricity. If the bleeding continues after the site is cauterized, or if the site cannot be found, packing may be put in your nose. This is to apply pressure and stop the bleeding. The packing may be made of gauze or sponge. A small balloon catheter is sometimes used. These must be removed by your healthcare provider. Some types of packing dissolve on their own. If you are taking blood thinning (anticoagulant) medicine, you may have a blood test.  Home care    If packing was put in your nose, unless told otherwise, do not pull on it or try to remove it yourself. You will be given an appointment to have it removed. You may also have been given antibiotics to prevent a sinus infection. If so, finish all of the medicine.    Don't blow your nose for 12 hours after the bleeding stops. This will allow a strong blood clot to form. Don't pick your nose. This may restart bleeding.    Don't drink alcohol or hot liquids for the next 2 days. Alcohol or hot liquids in your mouth can dilate blood  vessels in your nose. This can cause bleeding to start again.    Don't take ibuprofen, naproxen, or medicines that contain aspirin. These thin the blood and may cause your nose to bleed. You may take acetaminophen for pain, unless another pain medicine was prescribed.    If the bleeding starts again, sit up and lean forward to prevent swallowing blood. Pinch your nose tightly on both sides, as shown above, for 10 to 15 minutes. Time yourself. Don t release the pressure on your nose until 10 minutes is up. If bleeding does not stop, continue to pinch your nose and call your healthcare provider or return to this facility.    If you have a cold, allergies, or dry nasal membranes, lubricate the nasal passages. Apply a small amount of petroleum jelly inside the nose with a cotton swab twice a day (morning and night).    Don't overheat your home. This can dry the air and make your condition worse.    Put a humidifier in the room where you sleep. This will add moisture to the air. Clean the humidifier as advised by the .    Use a saline nasal spray to keep nasal passages moist.    Don't pick your nose. Keep fingernails trimmed to decrease risk of bleeds.    Don't smoke.  Follow-up care  Follow up with your healthcare provider, or as advised. Nasal packing should be rechecked or removed within 2 to 3 days.  When to seek medical advice  Call your healthcare provider right away if any of these occur.    You have another nosebleed that you cannot control    Dizziness, weakness, or fainting    You become tired or confused    Fever of 100.4 F (38 C) or higher, or as directed by your healthcare provider    Headache    Sinus or facial pain    Shortness of breath or trouble breathing  Date Last Reviewed: 11/1/2017 2000-2018 [employer]. [address] [place] [address], [place] [address]. All rights reserved. This information is not intended as a substitute for professional medical care. Always follow your  healthcare professional's instructions.

## 2019-06-12 DIAGNOSIS — L73.9 FOLLICULITIS: ICD-10-CM

## 2019-06-12 RX ORDER — CLINDAMYCIN PHOSPHATE 11.9 MG/ML
SOLUTION TOPICAL 2 TIMES DAILY
Qty: 60 ML | Refills: 11 | Status: SHIPPED | OUTPATIENT
Start: 2019-06-12 | End: 2020-06-25

## 2019-06-12 NOTE — TELEPHONE ENCOUNTER
Routing refill request to provider for review/approval because:  Drug not active on patient's medication list

## 2019-06-18 ENCOUNTER — OFFICE VISIT (OUTPATIENT)
Dept: FAMILY MEDICINE | Facility: CLINIC | Age: 64
End: 2019-06-18
Payer: COMMERCIAL

## 2019-06-18 VITALS
BODY MASS INDEX: 37.59 KG/M2 | SYSTOLIC BLOOD PRESSURE: 130 MMHG | HEART RATE: 72 BPM | WEIGHT: 262 LBS | OXYGEN SATURATION: 98 % | TEMPERATURE: 97.5 F | DIASTOLIC BLOOD PRESSURE: 88 MMHG | RESPIRATION RATE: 16 BRPM

## 2019-06-18 DIAGNOSIS — G89.4 CHRONIC PAIN SYNDROME: Chronic | ICD-10-CM

## 2019-06-18 DIAGNOSIS — Z23 NEED FOR VACCINATION: Primary | ICD-10-CM

## 2019-06-18 DIAGNOSIS — R04.0 BLEEDING NOSE: ICD-10-CM

## 2019-06-18 PROCEDURE — 99214 OFFICE O/P EST MOD 30 MIN: CPT | Mod: 25 | Performed by: FAMILY MEDICINE

## 2019-06-18 PROCEDURE — 90471 IMMUNIZATION ADMIN: CPT | Performed by: FAMILY MEDICINE

## 2019-06-18 PROCEDURE — 90715 TDAP VACCINE 7 YRS/> IM: CPT | Performed by: FAMILY MEDICINE

## 2019-06-18 RX ORDER — OXYMETAZOLINE HYDROCHLORIDE 0.05 G/100ML
2 SPRAY NASAL 2 TIMES DAILY
Qty: 15 ML | Refills: 0 | Status: SHIPPED | OUTPATIENT
Start: 2019-06-18 | End: 2020-05-19

## 2019-06-18 RX ORDER — HYDROCODONE BITARTRATE AND ACETAMINOPHEN 7.5; 325 MG/1; MG/1
1 TABLET ORAL EVERY 4 HOURS PRN
Qty: 120 TABLET | Refills: 0 | Status: SHIPPED | OUTPATIENT
Start: 2019-08-18 | End: 2019-07-29

## 2019-06-18 RX ORDER — HYDROCODONE BITARTRATE AND ACETAMINOPHEN 7.5; 325 MG/1; MG/1
1 TABLET ORAL EVERY 4 HOURS PRN
Qty: 120 TABLET | Refills: 0 | Status: SHIPPED | OUTPATIENT
Start: 2019-07-18 | End: 2019-06-18

## 2019-06-18 RX ORDER — HYDROCODONE BITARTRATE AND ACETAMINOPHEN 7.5; 325 MG/1; MG/1
1 TABLET ORAL EVERY 4 HOURS PRN
Qty: 120 TABLET | Refills: 0 | Status: SHIPPED | OUTPATIENT
Start: 2019-06-18 | End: 2019-06-18

## 2019-06-18 NOTE — PROGRESS NOTES
Screening Questionnaire for Adult Immunization    Are you sick today?   No   Do you have allergies to medications, food, a vaccine component or latex?   No   Have you ever had a serious reaction after receiving a vaccination?   No   Do you have a long-term health problem with heart disease, lung disease, asthma, kidney disease, metabolic disease (e.g. diabetes), anemia, or other blood disorder?   No   Do you have cancer, leukemia, HIV/AIDS, or any other immune system problem?   No   In the past 3 months, have you taken medications that affect  your immune system, such as prednisone, other steroids, or anticancer drugs; drugs for the treatment of rheumatoid arthritis, Crohn s disease, or psoriasis; or have you had radiation treatments?   No   Have you had a seizure, or a brain or other nervous system problem?   No   During the past year, have you received a transfusion of blood or blood     products, or been given immune (gamma) globulin or antiviral drug?   No   For women: Are you pregnant or is there a chance you could become        pregnant during the next month?   No   Have you received any vaccinations in the past 4 weeks?   No     Immunization questionnaire answers were all negative.        Per orders of Dr. Clements, injection of Tdap given by Radha Tucker. Patient instructed to remain in clinic for 15 minutes afterwards, and to report any adverse reaction to me immediately.       Screening performed by Radha Tucker on 6/18/2019 at 10:02 AM.

## 2019-06-18 NOTE — PATIENT INSTRUCTIONS
(Z23) Need for vaccination  (primary encounter diagnosis)    Comment:      Plan: TDAP VACCINE (ADACEL) [61989.002], 1st            Administration  [33288]                    (G89.4) Chronic pain syndrome    Comment:    Plan: HYDROcodone-acetaminophen (NORCO) 7.5-325 MG           per tablet, DISCONTINUED:           HYDROcodone-acetaminophen (NORCO) 7.5-325 MG           per tablet, DISCONTINUED:           HYDROcodone-acetaminophen (NORCO) 7.5-325 MG           per tablet                   (R04.0) Bleeding nose    Comment:   RIGHT NOSTRIL     Plan: oxymetazoline (AFRIN) 0.05 % nasal spray           PRN NOSE BLEEDING

## 2019-06-18 NOTE — PROGRESS NOTES
Subjective     Arnaud Bird is a 64 year old male who presents to clinic today for the following health issues:    HPI   ED/UC Followup:    Facility:  [facility]  Date of visit: 6/5  Reason for visit: nose bleeds  Current Status: better    WENT TO [facility]  RIGHT SIDED   POLYP LEFT SIDE IN PAST   SEEMS BETTER NOW   FIRST BLED HEAVILY     Medication Followup of med check    Taking Medication as prescribed: yes    Side Effects:  None    Medication Helping Symptoms:  yes       Hyperlipidemia Follow-Up      Are you having any of the following symptoms? (Select all that apply)  No complaints of shortness of breath, chest pain or pressure.  No increased sweating or nausea with activity.  No left-sided neck or arm pain.  No complaints of pain in calves when walking 1-2 blocks.    Are you regularly taking any medication or supplement to lower your cholesterol?   Yes-      Are you having muscle aches or other side effects that you think could be caused by your cholesterol lowering medication?  No      Hypertension Follow-up      Do you check your blood pressure regularly outside of the clinic? Yes     Are you following a low salt diet? Yes    Are your blood pressures ever more than 140 on the top number (systolic) OR more   than 90 on the bottom number (diastolic), for example 140/90? Yes  Chronic Pain Follow-Up  BILATERAL SHOULDER PAIN   LOWER BACK PAIN   KNEE PAIN        Type / Location of Pain:    Analgesia/pain control:       Recent changes:  same      Overall control: Tolerable with discomfort  Activity level/function:      Daily activities:  Able to do moderate activities    Work:  Able to work part time with limitations  Adverse effects:  No  Adherance    Taking medication as directed?  Yes    Participating in other treatments: yes  Risk Factors:    Sleep:  Good    Mood/anxiety:  controlled    Recent family or social stressors:  none noted    Other aggravating factors: none  PHQ-9 SCORE 10/31/2016 3/19/2019   PHQ-9  Total Score 2 0     SOFIYA-7 SCORE 10/31/2016 3/19/2019   Total Score 2 0     Encounter-Level CSA - 10/31/2016:    Controlled Substance Agreement - Scan on 11/15/2016  7:53 AM: CONTROLLED SUBSTANCE AGREEMENT (below)       Patient-Level CSA:    Controlled Substance Agreement - Opioid - Scan on 3/19/2019 11:01 AM (below)           Amount of exercise or physical activity: 6-7 days/week for an average of 30-45 minutes    Problems taking medications regularly: No    Medication side effects: none    Diet: low salt and low fat/cholesterol             There are no preventive care reminders to display for this patient.          .  Current Outpatient Medications   Medication Sig Dispense Refill     albuterol (PROAIR HFA, PROVENTIL HFA, VENTOLIN HFA) 108 (90 BASE) MCG/ACT inhaler Inhale 2 puffs into the lungs every 6 hours as needed for shortness of breath / dyspnea 1 Inhaler 11     allopurinol (ZYLOPRIM) 300 MG tablet TAKE 1 TABLET (300 MG) BY MOUTH DAILY 90 tablet 3     beclomethasone (QVAR) 40 MCG/ACT Inhaler Inhale 2 puffs into the lungs 2 times daily 1 Inhaler 11     clindamycin (CLEOCIN T) 1 % external solution Apply topically 2 times daily Profile Rx: patient will contact pharmacy when needed 60 mL 11     diclofenac (VOLTAREN) 1 % topical gel APPLY 2 GRAMS TOPICALLY TO AFFECTED AREA FOUR TIMES DAILY AS NEEDED 100 g 11     erythromycin (ROMYCIN) 5 MG/GM ophthalmic ointment Place 0.5 inches into the right eye 4 times daily 3.5 g 1     fluticasone (FLONASE) 50 MCG/ACT spray Spray 2 sprays into both nostrils daily 16 g 11     [START ON 8/18/2019] HYDROcodone-acetaminophen (NORCO) 7.5-325 MG per tablet Take 1 tablet by mouth every 4 hours as needed for pain (4 x daily as needed  maxium 4 per day) 120 tablet 0     oxymetazoline (AFRIN) 0.05 % nasal spray Spray 2 sprays into both nostrils 2 times daily 15 mL 0     sildenafil (VIAGRA) 100 MG tablet Take 0.5-1 tablets ( mg) by mouth daily as needed Take 30 min to 4 hours  before intercourse.  Never use with nitroglycerin, terazosin or doxazosin. 100 tablet 11     simvastatin (ZOCOR) 40 MG tablet TAKE 1 TABLET (40 MG) BY MOUTH AT BEDTIME 90 tablet 2            Allergies   Allergen Reactions     Seasonal Allergies          Immunization History   Administered Date(s) Administered     Influenza (IIV3) PF 10/18/2012     TDAP Vaccine (Adacel) 2019               reports that he drinks alcohol.        reports that he does not use drugs.      family history includes Cancer in his father; Family History Negative in his father and mother.      indicated that the status of his no family hx of is unknown. He indicated that his mother is . He indicated that his father is . He indicated that his sister is alive. He indicated that both of his brothers are alive.         has a past surgical history that includes tonsillectomy; TOOTH EXTRACTION W/FORCEP; and orthopedic surgery.       reports that he currently engages in sexual activity and has had partners who are Female.    .  Pediatric History   Patient Guardian Status     Not on file     Other Topics Concern     Parent/sibling w/ CABG, MI or angioplasty before 65F 55M? No   Social History Narrative     Not on file             reports that he has never smoked. He has never used smokeless tobacco.        Medical, social, surgical, and family histories reviewed.        Labs reviewed in EPIC  Patient Active Problem List   Diagnosis     Stiffness of knee joint, right     Gait difficulty     S/P TKR (total knee replacement)     Bilateral carpal tunnel syndrome     Vitamin D deficiency     Plantar fascial fibromatosis     Asthma with exacerbation     Hypertrophy of prostate without urinary obstruction     Obesity     Hyperlipidemia     Spondylosis with myelopathy, lumbar region     LEFT WORSE THAN RIGHT      CARDIOVASCULAR SCREENING; LDL GOAL LESS THAN 100     BMI 40.0-44.9, adult (H)     Mild persistent asthma     Hyperlipidemia  with target LDL less than 130     Lumbar disc disease with radiculopathy     Groin strain, initial encounter     Sexual dysfunction     ACP (advance care planning)     Slac (scapholunate advanced collapse) of wrist, right     Right wrist pain     Numbness and tingling in right hand     Chronic pain syndrome     Chronic gout involving toe 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 tophus, unspecified cause     Impingement syndrome, shoulder, right     Migraine without aura and without status migrainosus, not intractable       Past Surgical History:   Procedure Laterality Date     HC TOOTH EXTRACTION W/FORCEP       ORTHOPEDIC SURGERY      bilateral total knee replacements     TONSILLECTOMY      age 2 or 3         Social History     Tobacco Use     Smoking status: Never Smoker     Smokeless tobacco: Never Used   Substance Use Topics     Alcohol use: Yes     Comment: case beer a week       Family History   Problem Relation Age of Onset     Cancer Father      Family History Negative Father      Family History Negative Mother      Diabetes No family hx of      Coronary Artery Disease No family hx of      Hypertension No family hx of      Hyperlipidemia No family hx of      Cerebrovascular Disease No family hx of      Breast Cancer No family hx of      Colon Cancer No family hx of      Prostate Cancer No family hx of      Other Cancer No family hx of      Depression No family hx of      Anxiety Disorder No family hx of      Mental Illness No family hx of      Substance Abuse No family hx of      Anesthesia Reaction No family hx of      Asthma No family hx of      Osteoporosis No family hx of      Genetic Disorder No family hx of      Thyroid Disease No family hx of      Obesity No family hx of      Unknown/Adopted No family hx of              Current Outpatient Medications   Medication Sig Dispense Refill     albuterol (PROAIR HFA, PROVENTIL HFA, VENTOLIN HFA) 108 (90 BASE) MCG/ACT inhaler Inhale 2 puffs into the lungs every 6 hours as needed  for shortness of breath / dyspnea 1 Inhaler 11     allopurinol (ZYLOPRIM) 300 MG tablet TAKE 1 TABLET (300 MG) BY MOUTH DAILY 90 tablet 3     beclomethasone (QVAR) 40 MCG/ACT Inhaler Inhale 2 puffs into the lungs 2 times daily 1 Inhaler 11     clindamycin (CLEOCIN T) 1 % external solution Apply topically 2 times daily Profile Rx: patient will contact pharmacy when needed 60 mL 11     diclofenac (VOLTAREN) 1 % topical gel APPLY 2 GRAMS TOPICALLY TO AFFECTED AREA FOUR TIMES DAILY AS NEEDED 100 g 11     erythromycin (ROMYCIN) 5 MG/GM ophthalmic ointment Place 0.5 inches into the right eye 4 times daily 3.5 g 1     fluticasone (FLONASE) 50 MCG/ACT spray Spray 2 sprays into both nostrils daily 16 g 11     [START ON 8/18/2019] HYDROcodone-acetaminophen (NORCO) 7.5-325 MG per tablet Take 1 tablet by mouth every 4 hours as needed for pain (4 x daily as needed  maxium 4 per day) 120 tablet 0     oxymetazoline (AFRIN) 0.05 % nasal spray Spray 2 sprays into both nostrils 2 times daily 15 mL 0     sildenafil (VIAGRA) 100 MG tablet Take 0.5-1 tablets ( mg) by mouth daily as needed Take 30 min to 4 hours before intercourse.  Never use with nitroglycerin, terazosin or doxazosin. 100 tablet 11     simvastatin (ZOCOR) 40 MG tablet TAKE 1 TABLET (40 MG) BY MOUTH AT BEDTIME 90 tablet 2         Recent Labs   Lab Test 06/04/18  0842 03/26/18  0832 12/07/17  1023 12/30/16  0804 10/31/16  0822 07/08/15  0528 02/05/14  0750   A1C  --   --   --  5.4  --   --   --    LDL  --  88  --   --  85  --  106   HDL  --  58  --   --  55  --  45   TRIG  --  117  --   --  94  --  112   ALT 40  --  37  --  35 28  --    CR 0.70  --  0.85  --  0.88 0.83 1.20   GFRESTIMATED >90  --  >90  --  87 >90  Non  GFR Calc   62   GFRESTBLACK >90  --  >90  --  >90 >90   GFR Calc   75   POTASSIUM 4.2  --   --   --   --  4.3 4.4   TSH 2.02  --   --   --   --   --   --             BP Readings from Last 6 Encounters:   06/18/19 130/88    06/05/19 133/89   03/19/19 120/76   12/27/18 126/76   11/25/18 (!) 136/98   11/18/18 120/85         Wt Readings from Last 3 Encounters:   06/18/19 118.8 kg (262 lb)   06/05/19 119.3 kg (263 lb)   04/12/19 119.3 kg (263 lb)               Positive symptoms or findings indicated by bold designation:         ROS: 10 point ROS neg other than the symptoms noted above in the HPI.except  has Stiffness of knee joint, right; Gait difficulty; S/P TKR (total knee replacement); Bilateral carpal tunnel syndrome; Vitamin D deficiency; Plantar fascial fibromatosis; Asthma with exacerbation; Hypertrophy of prostate without urinary obstruction; Obesity; Hyperlipidemia; Spondylosis with myelopathy, lumbar region; LEFT WORSE THAN RIGHT ; CARDIOVASCULAR SCREENING; LDL GOAL LESS THAN 100; BMI 40.0-44.9, adult (H); Mild persistent asthma; Hyperlipidemia with target LDL less than 130; Lumbar disc disease with radiculopathy; Groin strain, initial encounter; Sexual dysfunction; ACP (advance care planning); Slac (scapholunate advanced collapse) of wrist, right; Right wrist pain; Numbness and tingling in right hand; Chronic pain syndrome; Chronic gout involving toe 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 tophus, unspecified cause; Impingement syndrome, shoulder, right; and Migraine without aura and without status migrainosus, not intractable on their problem list.  Review Of Systems    Skin: negative    Eyes: negative    Ears/Nose/Throat: negative    Respiratory: No shortness of breath, dyspnea on exertion, cough, or hemoptysis    Cardiovascular: negative    Gastrointestinal: negative    Genitourinary: negative    Musculoskeletal: SEE HISTORY OF PRESENT ILLNESS     CHRONIC LOWER BACK PAIN KNEE PAIN AND SHOULDER PAINS     Neurologic: negative    Psychiatric: negative    Hematologic/Lymphatic/Immunologic: negative    Endocrine:  OBESITY                 PE:  /88   Pulse 72   Temp 97.5  F (36.4  C) (Tympanic)   Resp 16   Wt 118.8 kg (262 lb)   SpO2 98%    BMI 37.59 kg/m   Body mass index is 37.59 kg/m .        Constitutional: general appearance, well nourished, well developed, in no acute distress, well developed, appears stated age, normal body habitus,          Eyes:; The patient has normal eyelids sclerae and conjunctivae :          Ears/Nose/Throat: external ear, overall: normal appearance; external nose, overall: benign appearance, normal moujth gums and lips           Neck: thyroid, overall: normal size, normal consistency, nontender,          Respiratory:  palpation of chest, overall: normal excursion,    Clear to percussion and auscultation     NO Tachypnea    NORMAL  Color          Cardiovascular:  Good color with no peripheral edema    Regular sinus rhythm without murmur.  Physiologic heart sounds   Heart is unelarged    .     Chest/Breast: normal shape           Abdominal exam,  Liver and spleen are  unenlarged        Tenderness    Scars  CENTRAL OBESITY              Urogenital; no renal, flank or bladder  tenderness;          Lymphatic: neck nodes,     Other nodes          Musculoskeletal:  Brief ortho exam normal except:  DECREASE RANGE OF MOTION OF LOWER BACK     DECREASE RANGE OF MOTION OF SHOULDERS     PAIN with INTERNAL  AND EXTERNAL ROTATION         Integument: inspection of skin, no rash, lesions; and, palpation, no induration, no tenderness.          Neurologic mental status, overall: alert and oriented; gait, no ataxia, no unsteadiness; coordination, no tremors; cranial nerves, overall: normal motor, overall: normal bulk, tone.          Psychiatric: orientation/consciousness, overall: oriented to person, place and time; behavior/psychomotor activity, no tics, normal psychomotor activity; mood and affect, overall: normal mood and affect; appearance, overall: well-groomed, good eye contact; speech, overall: normal quality, no aphasia and normal quality, quantity, intact.        Diagnostic Test Results:  Results for orders placed or performed in  visit on 04/12/19   Large Joint Injection/Arthocentesis: bilateral glenohumeral    Narrative    Carmel Chan MD     5/3/2019  7:11 AM  Large Joint Injection/Arthocentesis: bilateral glenohumeral  Date/Time: 4/12/2019 2:16 PM  Performed by: Carmel Chan MD  Authorized by: Carmel Chan MD     Indications:  Osteoarthritis  Needle Size:  22 G  Approach:  Posterolateral  Location:  Shoulder  Laterality:  Bilateral      Site:  Bilateral glenohumeral      Medications (Right):  40 mg triamcinolone 40 MG/ML; 3 mL ropivacaine 5   MG/ML; 2 mL ropivacaine 5 MG/ML  Medications (Left):  40 mg triamcinolone 40 MG/ML; 3 mL ropivacaine 5   MG/ML; 2 mL ropivacaine 5 MG/ML  Procedure discussed: discussed risks, benefits, and alternatives    Consent Given by:  Patient  Timeout: timeout called immediately prior to procedure    Prep: patient was prepped and draped in usual sterile fashion     19 ml of ropivacaine was used in total and 1ml of ropivacaine was wasted   per MD            ICD-10-CM    1. Need for vaccination Z23 TDAP VACCINE (ADACEL) [34178.002]     1st  Administration  [36694]   2. Chronic pain syndrome G89.4 HYDROcodone-acetaminophen (NORCO) 7.5-325 MG per tablet     DISCONTINUED: HYDROcodone-acetaminophen (NORCO) 7.5-325 MG per tablet     DISCONTINUED: HYDROcodone-acetaminophen (NORCO) 7.5-325 MG per tablet   3. Bleeding nose R04.0 oxymetazoline (AFRIN) 0.05 % nasal spray              .    Side effects benefits and risks thoroughly discussed. .he may come in early if unimproved or getting worse          Please drink 2 glasses of water prior to meals and walk 15-30 minutes after meals        I spent  25 MINUTES SPENT  with patient discussing the following issues   The primary encounter diagnosis was Need for vaccination. Diagnoses of Chronic pain syndrome and Bleeding nose were also pertinent to this visit. over half of which involved counseling and coordination of  care.      Patient Instructions   (Z23) Need for vaccination  (primary encounter diagnosis)  Comment:    Plan: TDAP VACCINE (ADACEL) [54839.002], 1st          Administration  [45759]              (G89.4) Chronic pain syndrome  Comment:  Plan: HYDROcodone-acetaminophen (NORCO) 7.5-325 MG         per tablet, DISCONTINUED:         HYDROcodone-acetaminophen (NORCO) 7.5-325 MG         per tablet, DISCONTINUED:         HYDROcodone-acetaminophen (NORCO) 7.5-325 MG         per tablet             (R04.0) Bleeding nose  Comment:   RIGHT NOSTRIL   Plan: oxymetazoline (AFRIN) 0.05 % nasal spray         PRN NOSE BLEEDING              ALL THE ABOVE PROBLEMS ARE STABLE AND MED CHANGES AS NOTED        Diet:  MEDITERRANEAN DIET         Exercise:  AS TOLERATED   Exercises Range of motion, balance, isometric, and strengthening exercises 30 repetitions twice daily of involved joints            .SCOTTY TOLEDO MD 6/18/2019 9:41 AM  June 18, 2019

## 2019-07-18 ENCOUNTER — TELEPHONE (OUTPATIENT)
Dept: ORTHOPEDICS | Facility: CLINIC | Age: 64
End: 2019-07-18

## 2019-07-18 NOTE — TELEPHONE ENCOUNTER
[facility]    Phone Message    May a detailed message be left on voicemail: yes    Reason for Call: Other: Pt needing call back to schedule bilateral USG shoulder injections with Dr. Chan     Action Taken: Message routed to:  [facility] ([facility] sports med

## 2019-07-22 NOTE — TELEPHONE ENCOUNTER
Patient was wondering if his 2 appointments on 7/24 would be in conflict, I stated that they would not be.

## 2019-07-24 ENCOUNTER — OFFICE VISIT (OUTPATIENT)
Dept: ORTHOPEDICS | Facility: CLINIC | Age: 64
End: 2019-07-24
Payer: COMMERCIAL

## 2019-07-24 ENCOUNTER — OFFICE VISIT (OUTPATIENT)
Dept: OTOLARYNGOLOGY | Facility: CLINIC | Age: 64
End: 2019-07-24
Payer: COMMERCIAL

## 2019-07-24 VITALS
BODY MASS INDEX: 38.02 KG/M2 | RESPIRATION RATE: 16 BRPM | HEART RATE: 92 BPM | WEIGHT: 265 LBS | SYSTOLIC BLOOD PRESSURE: 139 MMHG | DIASTOLIC BLOOD PRESSURE: 86 MMHG

## 2019-07-24 VITALS — HEIGHT: 70 IN | WEIGHT: 262 LBS | BODY MASS INDEX: 37.51 KG/M2

## 2019-07-24 DIAGNOSIS — R04.0 EPISTAXIS: Primary | ICD-10-CM

## 2019-07-24 DIAGNOSIS — M19.012 LOCALIZED OSTEOARTHRITIS OF SHOULDERS, BILATERAL: Primary | ICD-10-CM

## 2019-07-24 DIAGNOSIS — M19.011 LOCALIZED OSTEOARTHRITIS OF SHOULDERS, BILATERAL: Primary | ICD-10-CM

## 2019-07-24 DIAGNOSIS — M19.011 ARTHRITIS OF RIGHT ACROMIOCLAVICULAR JOINT: ICD-10-CM

## 2019-07-24 RX ADMIN — ROPIVACAINE HYDROCHLORIDE 1 ML: 5 INJECTION, SOLUTION EPIDURAL; INFILTRATION; PERINEURAL at 12:10

## 2019-07-24 RX ADMIN — TRIAMCINOLONE ACETONIDE 20 MG: 40 INJECTION, SUSPENSION INTRA-ARTICULAR; INTRAMUSCULAR at 12:10

## 2019-07-24 RX ADMIN — TRIAMCINOLONE ACETONIDE 40 MG: 40 INJECTION, SUSPENSION INTRA-ARTICULAR; INTRAMUSCULAR at 11:46

## 2019-07-24 RX ADMIN — ROPIVACAINE HYDROCHLORIDE 3 ML: 5 INJECTION, SOLUTION EPIDURAL; INFILTRATION; PERINEURAL at 11:46

## 2019-07-24 ASSESSMENT — PAIN SCALES - GENERAL
PAINLEVEL: MILD PAIN (2)
PAINLEVEL: MODERATE PAIN (4)

## 2019-07-24 ASSESSMENT — MIFFLIN-ST. JEOR: SCORE: 1974.67

## 2019-07-24 NOTE — PROGRESS NOTES
Otolaryngology Clinic      Name: Arnaud Bird  MRN: [id number]  Age: 64 year old  : 2019      Chief Complaint:   Epistaxis      History of Present Illness:   Arnaud Bird is a 64 year old male with a history of recurrent acute epistaxis who presents for follow up of epistaxis. I last saw the patient on 17 for management of epistaxis. Today, patient reports 6 weeks of recurrent epistaxis from his right naris, the first was more severe and has had recurrent mild nosebleeds following this. No other concerns today.     Review of Systems:   Pertinent items are noted in HPI or as in patient entered ROS below, remainder of complete ROS is negative.    ENT ROS 3/7/2018   Ears, Nose, Throat Ringing/noise in ears         Physical Exam:   /86   Pulse 92   Resp 16   Wt 120.2 kg (265 lb)   BMI 38.02 kg/m       General Assessment   The patient is alert, oriented and in no acute distress.   Head/Face/Scalp  Grossly normal.   Nose  External nose is straight, skin is normal. Intranasal exam (anterior rhinoscopy) reveals normal moist mucosa, turbinate tissue without edema, erythema or crusting.  Septum mainly in the midline.      Procedure:  Endoscopy indicated due to recurrent epistaxis.  Topical anesthetic/decongestant spray applied.  Rigid scope used for visualization.  Findings: Right septal spur with vascular area cauterized with silver nitrate.    Assessment and Plan:  There are no diagnoses linked to this encounter.     Mr. Bird presents due to recurrent epistaxis, no severe nosebleeds. On exam, did visualize a possible source on the right septum and cauterized with silver nitrate. Return if not improving or if symptoms worsen.     Follow-up: No follow-ups on file.         Scribe Disclosure:  Laura READ, am serving as a scribe to document services personally performed by Susy Barnes MD at this visit, based upon the provider's statements to me. All documentation has  been reviewed by the aforementioned provider prior to being entered into the official medical record.

## 2019-07-24 NOTE — LETTER
2019       RE: Arnaud Bird  [address]  [place] [address]     Dear Colleague,    Thank you for referring your patient, Arnaud Bird, to the [facility] at [facility]. Please see a copy of my visit note below.      Otolaryngology Clinic      Name: Arnaud Bird  MRN: [id number]  Age: 64 year old  : 2019      Chief Complaint:   Epistaxis      History of Present Illness:   Arnaud Bird is a 64 year old male with a history of recurrent acute epistaxis who presents for follow up of epistaxis. I last saw the patient on 17 for management of epistaxis. Today, patient reports 6 weeks of recurrent epistaxis from his right naris, the first was more severe and has had recurrent mild nosebleeds following this. No other concerns today.     Review of Systems:   Pertinent items are noted in HPI or as in patient entered ROS below, remainder of complete ROS is negative.    ENT ROS 3/7/2018   Ears, Nose, Throat Ringing/noise in ears         Physical Exam:   /86   Pulse 92   Resp 16   Wt 120.2 kg (265 lb)   BMI 38.02 kg/m        General Assessment   The patient is alert, oriented and in no acute distress.   Head/Face/Scalp  Grossly normal.   Nose  External nose is straight, skin is normal. Intranasal exam (anterior rhinoscopy) reveals normal moist mucosa, turbinate tissue without edema, erythema or crusting.  Septum mainly in the midline.      Procedure:  Endoscopy indicated due to recurrent epistaxis.  Topical anesthetic/decongestant spray applied.  Rigid scope used for visualization.  Findings: Right septal spur with vascular area cauterized with silver nitrate.    Assessment and Plan:  There are no diagnoses linked to this encounter.     Mr. Bird presents due to recurrent epistaxis, no severe nosebleeds. On exam, did visualize a possible source on the right septum and cauterized with silver  nitrate. Return if not improving or if symptoms worsen.     Follow-up: No follow-ups on file.         Scribe Disclosure:  I, Laura Lam, am serving as a scribe to document services personally performed by Susy Barnes MD at this visit, based upon the provider's statements to me. All documentation has been reviewed by the aforementioned provider prior to being entered into the official medical record.    Again, thank you for allowing me to participate in the care of your patient.      Sincerely,    Susy Barnes MD

## 2019-07-24 NOTE — LETTER
7/24/2019      RE: Arnaud Bird  [address]  [facility] [address]       Large Joint Injection/Arthocentesis: bilateral glenohumeral  Date/Time: 7/24/2019 11:46 AM  Performed by: Carmel Chan MD  Authorized by: Carmel Chan MD     Indications:  Osteoarthritis  Needle Size:  22 G  Guidance: ultrasound    Approach:  Posterior  Location:  Shoulder  Laterality:  Bilateral      Site:  Bilateral glenohumeral  Medications (Right):  40 mg triamcinolone 40 MG/ML; 3 mL ropivacaine 5 MG/ML  Medications (Left):  40 mg triamcinolone 40 MG/ML; 3 mL ropivacaine 5 MG/ML  Outcome:  Tolerated well, no immediate complications  Procedure discussed: discussed risks, benefits, and alternatives    Consent Given by:  Patient  Timeout: timeout called immediately prior to procedure    Prep: patient was prepped and draped in usual sterile fashion     8 mL of ropivacaine was used for each knee.  Scribed by Frances Rogers MS, ATC for Dr. Chan on 7/24/2019 at 11:48 AM, based on the provider s statements to me.    Medium Joint Injection/Arthrocentesis: R acromioclavicular  Date/Time: 7/24/2019 12:10 PM  Performed by: Carmel Chan MD  Authorized by: Carmel Chan MD     Indications:  Pain  Needle Size:  25 G  Guidance: ultrasound    Approach:  Superior  Location:  Shoulder  Site:  R acromioclavicular  Medications:  20 mg triamcinolone 40 MG/ML; 1 mL ropivacaine 5 MG/ML  Outcome:  Tolerated well, no immediate complications  Procedure discussed: discussed risks, benefits, and alternatives    Consent Given by:  Patient  Timeout: timeout called immediately prior to procedure    Prep: patient was prepped and draped in usual sterile fashion     Scribed by Frances Rogers MS, ATC for Dr. Chan on 7/24/2019 at 12:11 PM, based on the provider's statements to me.            PROBLEM: Right and left  shoulder osteoarthritis, right AC arthrosis      SUBJECTIVE:   US-guided corticosteroid injection for sx of bilateral shoulder arthritis and AC joint.      OBJECTIVE: Pt guarding against any shoulder ROM, hard time sleeping.        ASSESSMENT: Right severe glenohumeral osteoarthritis and left mild GH OA, AC joint arthritis      PLAN: US-guide corticosteroid injection right and left shoulder, right AC joint      PROCEDURE: The indications, risks, expected benefits were discussed.  Formal consent was obtained. The humeral head, glenoid, hyperechoic triangle indicating the posterior labrum were identified by ultrasound.  Initially, 5 mL ropivicaine  was injected with US guidance along the injection track for anesthesia.  Using sterile technique, a syringe with a 80 mm , 22-gauge gauge needle containing 1 mL Kenalog 40 mg/mL and 4 mL ropivicaine  were injected using an US guided posterior approach into the right and left glenohumeral joint.  US used to guide needle placement and verify proper needle position.  Images  indicating proper needle placement were recorded.      PROCEDURE: The indications, risks, expected benefits were discussed.  Formal consent was obtained. The humeral head, glenoid, hyperechoic triangle indicating the posterior labrum were identified by ultrasound.  Using sterile technique, a syringe with a 25 1.5 gauge needle containing 1.0 mL Kenalog 20 mg/mL and 2 mL 1.0% lidocaine  were injected using an US guided anterior AC approach.  US used to guide needle placement out of plane and verify proper needle position.  Images and video indicating proper needle placement were recorded.  Upon completion of the injection, the wound was dressed with a bandaid.  Upon completion of the injection, the wound was dressed with a bandaid. Follow up with me in 4 weeks.    Carmel Chan MD

## 2019-07-24 NOTE — PATIENT INSTRUCTIONS
1. You were seen in the ENT Clinic today by .  If you have any questions or concerns after your appointment, please call   - Option 1: ENT Clinic: [phone]  - Option 2: Frances ('s Nurse): [phone]    2.   Plan to return to clinic     ISABEL Daniels  [facility]- Otolaryngology  [phone]

## 2019-07-24 NOTE — NURSING NOTE
[address] [address]  Dept: [phone]  ______________________________________________________________________________    Patient: Arnaud Bird   : 1955   MRN: [id number]   2019    INVASIVE PROCEDURE SAFETY CHECKLIST    Date: 2019   Procedure: Bilateral glenohumeral joint US guided kenalog injections and Right AC joint US guided kenalog injection  Patient Name: Arnaud Bird  MRN: [id number]  YOB: 1955    Action: Complete sections as appropriate. Any discrepancy results in a HARD COPY until resolved.     PRE PROCEDURE:  Patient ID verified with 2 identifiers (name and  or MRN): Yes  Procedure and site verified with patient/designee (when able): Yes  Accurate consent documentation in medical record: Yes  H&P (or appropriate assessment) documented in medical record: Yes  H&P must be up to 20 days prior to procedure and updates within 24 hours of procedure as applicable: NA  Relevant diagnostic and radiology test results appropriately labeled and displayed as applicable: Yes  Procedure site(s) marked with provider initials: NA    TIMEOUT:  Time-Out performed immediately prior to starting procedure, including verbal and active participation of all team members addressing the following:Yes  * Correct patient identify  * Confirmed that the correct side and site are marked  * An accurate procedure consent form  * Agreement on the procedure to be done  * Correct patient position  * Relevant images and results are properly labeled and appropriately displayed  * The need to administer antibiotics or fluids for irrigation purposes during the procedure as applicable   * Safety precautions based on patient history or medication use    DURING PROCEDURE: Verification of correct person, site, and procedures any time the responsibility for care of the patient is transferred to another member of the care team.        Prior to injection, verified patient identity using patient's name and date of birth.  Due to injection administration, patient instructed to remain in clinic for 15 minutes  afterwards, and to report any adverse reaction to me immediately.    Joint injection was performed.      Drug Amount Wasted:  Yes: 3 mg/ml ropivacaine and 0.5 mL kenalog  Vial/Syringe: Single dose vial  Expiration Date:  06/22 and 04/2021      Frances Rogers, ATC  July 24, 2019

## 2019-07-24 NOTE — NURSING NOTE
Chief Complaint   Patient presents with     RECHECK     follow up, nosebleeds     Doreen Schneider LPN

## 2019-07-24 NOTE — PROGRESS NOTES
Large Joint Injection/Arthocentesis: bilateral glenohumeral  Date/Time: 7/24/2019 11:46 AM  Performed by: Carmel Chan MD  Authorized by: Carmel Chan MD     Indications:  Osteoarthritis  Needle Size:  22 G  Guidance: ultrasound    Approach:  Posterior  Location:  Shoulder  Laterality:  Bilateral      Site:  Bilateral glenohumeral  Medications (Right):  40 mg triamcinolone 40 MG/ML; 3 mL ropivacaine 5 MG/ML  Medications (Left):  40 mg triamcinolone 40 MG/ML; 3 mL ropivacaine 5 MG/ML  Outcome:  Tolerated well, no immediate complications  Procedure discussed: discussed risks, benefits, and alternatives    Consent Given by:  Patient  Timeout: timeout called immediately prior to procedure    Prep: patient was prepped and draped in usual sterile fashion     8 mL of ropivacaine was used for each knee.  Scribed by Frances Rogers MS, ATC for Dr. Chan on 7/24/2019 at 11:48 AM, based on the provider s statements to me.    Medium Joint Injection/Arthrocentesis: R acromioclavicular  Date/Time: 7/24/2019 12:10 PM  Performed by: Carmel Chan MD  Authorized by: Carmel Chan MD     Indications:  Pain  Needle Size:  25 G  Guidance: ultrasound    Approach:  Superior  Location:  Shoulder  Site:  R acromioclavicular  Medications:  20 mg triamcinolone 40 MG/ML; 1 mL ropivacaine 5 MG/ML  Outcome:  Tolerated well, no immediate complications  Procedure discussed: discussed risks, benefits, and alternatives    Consent Given by:  Patient  Timeout: timeout called immediately prior to procedure    Prep: patient was prepped and draped in usual sterile fashion     Scribed by Frances Rogers MS, ATC for Dr. Chan on 7/24/2019 at 12:11 PM, based on the provider's statements to me.

## 2019-07-25 RX ORDER — TRIAMCINOLONE ACETONIDE 40 MG/ML
40 INJECTION, SUSPENSION INTRA-ARTICULAR; INTRAMUSCULAR
Status: DISCONTINUED | OUTPATIENT
Start: 2019-07-24 | End: 2021-05-28

## 2019-07-25 RX ORDER — TRIAMCINOLONE ACETONIDE 40 MG/ML
20 INJECTION, SUSPENSION INTRA-ARTICULAR; INTRAMUSCULAR
Status: DISCONTINUED | OUTPATIENT
Start: 2019-07-24 | End: 2021-05-28

## 2019-07-25 RX ORDER — ROPIVACAINE HYDROCHLORIDE 5 MG/ML
3 INJECTION, SOLUTION EPIDURAL; INFILTRATION; PERINEURAL
Status: DISCONTINUED | OUTPATIENT
Start: 2019-07-24 | End: 2021-05-28

## 2019-07-25 RX ORDER — ROPIVACAINE HYDROCHLORIDE 5 MG/ML
1 INJECTION, SOLUTION EPIDURAL; INFILTRATION; PERINEURAL
Status: DISCONTINUED | OUTPATIENT
Start: 2019-07-24 | End: 2021-05-28

## 2019-07-25 NOTE — PROGRESS NOTES
PROBLEM: Right and left  shoulder osteoarthritis, right AC arthrosis      SUBJECTIVE:  US-guided corticosteroid injection for sx of bilateral shoulder arthritis and AC joint.      OBJECTIVE: Pt guarding against any shoulder ROM, hard time sleeping.        ASSESSMENT: Right severe glenohumeral osteoarthritis and left mild GH OA, AC joint arthritis      PLAN: US-guide corticosteroid injection right and left shoulder, right AC joint      PROCEDURE: The indications, risks, expected benefits were discussed.  Formal consent was obtained. The humeral head, glenoid, hyperechoic triangle indicating the posterior labrum were identified by ultrasound.  Initially, 5 mL ropivicaine  was injected with US guidance along the injection track for anesthesia.  Using sterile technique, a syringe with a 80 mm , 22-gauge gauge needle containing 1 mL Kenalog 40 mg/mL and 4 mL ropivicaine  were injected using an US guided posterior approach into the right and left glenohumeral joint.  US used to guide needle placement and verify proper needle position.  Images  indicating proper needle placement were recorded.      PROCEDURE: The indications, risks, expected benefits were discussed.  Formal consent was obtained. The humeral head, glenoid, hyperechoic triangle indicating the posterior labrum were identified by ultrasound.  Using sterile technique, a syringe with a 25 1.5 gauge needle containing 1.0 mL Kenalog 20 mg/mL and 2 mL 1.0% lidocaine  were injected using an US guided anterior AC approach.  US used to guide needle placement out of plane and verify proper needle position.  Images and video indicating proper needle placement were recorded.  Upon completion of the injection, the wound was dressed with a bandaid.  Upon completion of the injection, the wound was dressed with a bandaid. Follow up with me in 4 weeks.

## 2019-07-29 DIAGNOSIS — G89.4 CHRONIC PAIN SYNDROME: Chronic | ICD-10-CM

## 2019-07-29 DIAGNOSIS — G89.29 CHRONIC LOW BACK PAIN, UNSPECIFIED BACK PAIN LATERALITY, WITH SCIATICA PRESENCE UNSPECIFIED: Primary | ICD-10-CM

## 2019-07-29 DIAGNOSIS — M54.5 CHRONIC LOW BACK PAIN, UNSPECIFIED BACK PAIN LATERALITY, WITH SCIATICA PRESENCE UNSPECIFIED: Primary | ICD-10-CM

## 2019-07-29 RX ORDER — HYDROCODONE BITARTRATE AND ACETAMINOPHEN 7.5; 325 MG/1; MG/1
1 TABLET ORAL EVERY 4 HOURS PRN
Qty: 120 TABLET | Refills: 0 | Status: SHIPPED | OUTPATIENT
Start: 2019-07-29 | End: 2019-08-27

## 2019-07-29 RX ORDER — HYDROCODONE BITARTRATE AND ACETAMINOPHEN 7.5; 325 MG/1; MG/1
1 TABLET ORAL EVERY 4 HOURS PRN
Qty: 120 TABLET | Refills: 0 | Status: SHIPPED | OUTPATIENT
Start: 2019-08-18 | End: 2019-07-29

## 2019-07-29 NOTE — TELEPHONE ENCOUNTER
Because of change in lab cannot give 3 prescriptions at a time  We will give a refill of his narcotic prescription today

## 2019-08-11 DIAGNOSIS — B00.9 HSV (HERPES SIMPLEX VIRUS) INFECTION: Primary | ICD-10-CM

## 2019-08-12 NOTE — TELEPHONE ENCOUNTER
Acyclovir 400mg     Last Written Prescription Date:  unknown  Last Fill Quantity: ?,   # refills: ?  Last Office Visit: 6/18/19 with [facility]  Future Office visit:    Next 5 appointments (look out 90 days)    Sep 09, 2019  8:30 AM CDT  [facility] [address] [place] [id number] [phone]           Routing refill request to provider for review/approval because:  Drug not active on patient's medication list

## 2019-08-13 RX ORDER — ACYCLOVIR 400 MG/1
TABLET ORAL
Qty: 28 TABLET | Refills: 3 | Status: SHIPPED | OUTPATIENT
Start: 2019-08-13 | End: 2020-06-25

## 2019-08-23 DIAGNOSIS — G89.4 CHRONIC PAIN SYNDROME: Chronic | ICD-10-CM

## 2019-08-23 NOTE — TELEPHONE ENCOUNTER
Reason for Call:  Medication or medication refill:    Do you use a [facility]?  Name of the pharmacy and phone number for the current request: The patient will be picking up the prescription in clinic    Name of the medication requested: HYDROcodone-acetaminophen (NORCO) 7.5-325 MG per tablet       Other request: The patient is requesting a refill on this medication    Can we leave a detailed message on this number? YES    Phone number patient can be reached at: Cell number on file:    Telephone Information:   Mobile [phone]       Best Time: Any    Call taken on 8/23/2019 at 2:18 PM by Ambreen Navas

## 2019-08-26 NOTE — TELEPHONE ENCOUNTER
Controlled Substance Refill Request for   Requested Prescriptions   Pending Prescriptions Disp Refills     HYDROcodone-acetaminophen (NORCO) 7.5-325 MG per tablet  Last Written Prescription Date:  7/29/2019  Last Fill Quantity: 120,  # refills: 0   Last office visit: 6/18/2019 with prescribing provider:  Starr   Future Office Visit:   Next 5 appointments (look out 90 days)    Sep 09, 2019  8:30 AM CDT  PHYSICAL with Scotty Toledo MD  [facility] [address] [address]  [phone]          120 tablet 0     Sig: Take 1 tablet by mouth every 4 hours as needed for pain (4 x daily as needed  maxium 4 per day)       There is no refill protocol information for this order          Problem List Complete:  Yes  Patient is followed by SCOTTY TOLEDO MD for ongoing prescription of pain medication.  All refills should only be approved by this provider, or covering partner.    Medication(s):  NORCO  7.5MG PO FOUR TIMES DAILY .   Maximum quantity per month:  120   Clinic visit frequency required: Q 3 months     Controlled substance agreement:  Encounter-Level CSA - 10/31/2016:                 Controlled Substance Agreement - Scan on 11/15/2016  7:53 AM : CONTROLLED SUBSTANCE AGREEMENT (below)            Pain Clinic evaluation in the past: No    DIRE Total Score(s):    7/2/2016   Total Score 18       Last MNP website verification:  8/26/2019, no concerns.    [email or web address]   checked in past 3 months?  Yes 8/26/2019, no concerns   RX [place] ([place]) reviewed:  reviewed- no concerns    [place] profile:  [email or web address]  Routing refill request to provider for review/approval because:  Drug not on the [facility] refill protocol

## 2019-08-27 RX ORDER — HYDROCODONE BITARTRATE AND ACETAMINOPHEN 7.5; 325 MG/1; MG/1
1 TABLET ORAL EVERY 4 HOURS PRN
Qty: 120 TABLET | Refills: 0 | Status: SHIPPED | OUTPATIENT
Start: 2019-08-27 | End: 2019-10-01

## 2019-09-09 ENCOUNTER — OFFICE VISIT (OUTPATIENT)
Dept: FAMILY MEDICINE | Facility: CLINIC | Age: 64
End: 2019-09-09
Payer: COMMERCIAL

## 2019-09-09 VITALS
WEIGHT: 259 LBS | DIASTOLIC BLOOD PRESSURE: 100 MMHG | OXYGEN SATURATION: 96 % | HEART RATE: 73 BPM | SYSTOLIC BLOOD PRESSURE: 152 MMHG | BODY MASS INDEX: 37.16 KG/M2 | TEMPERATURE: 98.5 F

## 2019-09-09 DIAGNOSIS — Z23 INFLUENZA VACCINE NEEDED: ICD-10-CM

## 2019-09-09 DIAGNOSIS — E78.5 HYPERLIPIDEMIA WITH TARGET LDL LESS THAN 130: Chronic | ICD-10-CM

## 2019-09-09 DIAGNOSIS — J45.30 MILD PERSISTENT ASTHMA WITHOUT COMPLICATION: Chronic | ICD-10-CM

## 2019-09-09 DIAGNOSIS — G89.4 CHRONIC PAIN SYNDROME: Chronic | ICD-10-CM

## 2019-09-09 DIAGNOSIS — M51.16 LUMBAR DISC DISEASE WITH RADICULOPATHY: ICD-10-CM

## 2019-09-09 DIAGNOSIS — Z00.00 WELL ADULT EXAM: Primary | ICD-10-CM

## 2019-09-09 DIAGNOSIS — L57.0 SENILE KERATOSIS: ICD-10-CM

## 2019-09-09 DIAGNOSIS — R09.81 NASAL CONGESTION: ICD-10-CM

## 2019-09-09 DIAGNOSIS — M47.16 SPONDYLOSIS WITH MYELOPATHY, LUMBAR REGION: ICD-10-CM

## 2019-09-09 DIAGNOSIS — Z00.00 ENCOUNTER FOR MEDICARE ANNUAL WELLNESS EXAM: ICD-10-CM

## 2019-09-09 DIAGNOSIS — B07.9 VIRAL WARTS, UNSPECIFIED TYPE: ICD-10-CM

## 2019-09-09 PROCEDURE — 36415 COLL VENOUS BLD VENIPUNCTURE: CPT | Performed by: FAMILY MEDICINE

## 2019-09-09 PROCEDURE — G0008 ADMIN INFLUENZA VIRUS VAC: HCPCS | Performed by: FAMILY MEDICINE

## 2019-09-09 PROCEDURE — 80048 BASIC METABOLIC PNL TOTAL CA: CPT | Performed by: FAMILY MEDICINE

## 2019-09-09 PROCEDURE — 84460 ALANINE AMINO (ALT) (SGPT): CPT | Performed by: FAMILY MEDICINE

## 2019-09-09 PROCEDURE — 80307 DRUG TEST PRSMV CHEM ANLYZR: CPT | Mod: 90 | Performed by: FAMILY MEDICINE

## 2019-09-09 PROCEDURE — 90670 PCV13 VACCINE IM: CPT | Performed by: FAMILY MEDICINE

## 2019-09-09 PROCEDURE — 99207 ZZC FOOT EXAM  NO CHARGE: CPT | Mod: 25 | Performed by: FAMILY MEDICINE

## 2019-09-09 PROCEDURE — 80061 LIPID PANEL: CPT | Performed by: FAMILY MEDICINE

## 2019-09-09 PROCEDURE — 90662 IIV NO PRSV INCREASED AG IM: CPT | Performed by: FAMILY MEDICINE

## 2019-09-09 PROCEDURE — 99397 PER PM REEVAL EST PAT 65+ YR: CPT | Mod: 25 | Performed by: FAMILY MEDICINE

## 2019-09-09 PROCEDURE — G0009 ADMIN PNEUMOCOCCAL VACCINE: HCPCS | Performed by: FAMILY MEDICINE

## 2019-09-09 PROCEDURE — 99000 SPECIMEN HANDLING OFFICE-LAB: CPT | Performed by: FAMILY MEDICINE

## 2019-09-09 RX ORDER — IPRATROPIUM BROMIDE 42 UG/1
2 SPRAY, METERED NASAL 4 TIMES DAILY
Qty: 15 ML | Refills: 1 | Status: SHIPPED | OUTPATIENT
Start: 2019-09-09 | End: 2019-12-06

## 2019-09-09 ASSESSMENT — ACTIVITIES OF DAILY LIVING (ADL): CURRENT_FUNCTION: NO ASSISTANCE NEEDED

## 2019-09-09 NOTE — PATIENT INSTRUCTIONS
"   What You Can Do to Reduce Cholesterol Levels  and Reduce Risk of Diabetes, Heart, and Blood Vessel Disease  1. Eat six to eight servings of green or root vegetables or fruit (raw or cooked) per day. You need 35 grams of fiber per day.  Examples: carrots apples  broccoli oranges  spinach grapefruit  lettuce pears  bananas  2. Use up to 2 cup of walnuts, almonds, or pecans as a source of \"good\" fat per day.  3. Drink one to three servings of soy milk (great for cereal) or 2 cup of soynuts per day.  4. Use margarine with reduced trans or saturated fats (e.g. Benecol, Smart Balance, olive oil margarine) as replacement for butter or margarine.  5. Eat fewer or half-portions of desserts, baked goods, chips, cookies, processed flour and sugar, pasta, butter, cream, non-skim dairy, ice cream.  6. Use olive or canola oil as the only oils for cooking and dressings.  7. Use one tablespoon of ground flaxseed or Natural Ovens \"Energy Mix\" per day (great on cereal or over salad).  8. Eat one to two servings per week of wild salmon or water-packed tuna.  9. Limit beef, lamb, and pork to at most one serving per day. (Range-fed beef, if you can get it, is much preferred and allows for greater use in diet).  10. Eat three to four servings per day of whole grains (legumes, rice, wheat, oats).  11. Limit sodas and beer at most to three per week (only special occasions).  12. 65 percent of us need to lose weight and all of us need to keep moving! You should be walking at least 150 minutes per week. You should lose approximately seven percent of your weight in the next year if overweight. Check with me for more details.  1. Andrew Weil's paperback book, The Healthy Kitchen, is a great addition to your healthy lifestyle library.  2. American Diabetic Association (www.diabetes.org) - a wealth of information on nutritional excellence.  3. Other great websites for Mediterranean diets are available.    HEAT OR ICE WHICHEVER WORKS X 5 " MINUTES    FOOT STRETCHES AGAINST WALL    HEEL RAISES X 30 SECONDS     LONGITUDINAL SUPPORTS IN SHOES    THICKER THE BETTER    TENNIS BALL CAN OR SOUP CAN RUB BACK AND FORTH     PHYSICAL THERAPY     NIGHT SPLINTS    NSAIDS IBUPROFEN OR ALEVE 2 TABS TWICE DAILY     STEROIDS ORALLY    INJECTION     SURGERY LAST RESORT    (Z00.00) Well adult exam  (primary encounter diagnosis)  Comment:    Plan: Basic metabolic panel             (M51.16) Lumbar disc disease with radiculopathy  Comment:    Plan:      (G89.4) Chronic pain syndrome  Comment:    Plan: Pain Drug Scr UR W Rptd Meds             (J45.30) Mild persistent asthma without complication  Comment:    Plan:      (E78.5) Hyperlipidemia with target LDL less than 130  Comment:    Plan: Lipid panel reflex to direct LDL Fasting, ALT             (M47.16) Spondylosis with myelopathy, lumbar region  Comment:    Plan:      (R09.81) Nasal congestion  Comment:    Plan: ipratropium (ATROVENT) 0.06 % nasal spray             (B07.9) Viral warts, unspecified type  Comment:    Plan: DERMATOLOGY REFERRAL             (L57.0) Senile keratosis  Comment:     Plan: DERMATOLOGY REFERRAL                        Patient Education   Personalized Prevention Plan  You are due for the preventive services outlined below.  Your care team is available to assist you in scheduling these services.  If you have already completed any of these items, please share that information with your care team to update in your medical record.  Health Maintenance Due   Topic Date Due     URINE DRUG SCREEN  1955     Zoster (Shingles) Vaccine (1 of 2) 04/27/2003     Annual Wellness Visit  04/27/2018     Pneumococcal Vaccine (1 of 2 - PCV13) 04/27/2018     AORTIC ANEURYSM SCREENING (SYSTEM ASSIGNED)  04/27/2018     FALL RISK ASSESSMENT  12/20/2018     Cholesterol Lab  03/26/2019     Flu Vaccine (1) 09/01/2019     Asthma Control Test  09/19/2019       Exercise for a Healthier Heart     Exercise with a friend. When  activity is fun, you're more likely to stick with it.   You may wonder how you can improve the health of your heart. If you re thinking about exercise, you re on the right track. You don t need to become an athlete, but you do need a certain amount of brisk exercise to help strengthen your heart. If you have been diagnosed with a heart condition, your doctor may recommend exercise to help stabilize your condition. To help make exercise a habit, choose safe, fun activities.  Be sure to check with your healthcare provider before starting an exercise program.   Why exercise?  Exercising regularly offers many healthy rewards. It can help you do all of the following:    Improve your blood cholesterol level to help prevent further heart trouble    Lower your blood pressure to help prevent a stroke or heart attack    Control diabetes, or reduce your risk of getting this disease    Improve your heart and lung function    Reach and maintain a healthy weight    Make your muscles stronger and more limber so you can stay active    Prevent falls and fractures by slowing the loss of bone mass (osteoporosis)    Manage stress better    Reduce your blood pressure    Improve your sense of self and your body image  Exercise tips  Ease into your routine. Set small goals. Then build on them.  Exercise on most days. Aim for a total of 150 or more minutes of moderate to  vigorous intensity activity each week. Consider 40 minutes, 3 to 4 times a week. For best results, activity should last for 40 minutes on average. It is OK to work up to the 40 minute period over time. Examples of moderate-intensity activity is walking 1 mile in 15 minutes or 30 to 45 minutes of yard work.  Step up your daily activity level. Along with your exercise program, try being more active throughout the day. Walk instead of drive. Do more household tasks or yard work.  Choose one or more activities you enjoy. Walking is one of the easiest things you can do. You  can also try swimming, riding a bike, dancing, or taking an exercise class.  Stop exercising and call your doctor if you:    Have chest pain or feel dizzy or lightheaded    Feel burning, tightness, pressure, or heaviness in your chest, neck, shoulders, back, or arms    Have unusual shortness of breath    Have increased joint or muscle pain    Have palpitations or an irregular heartbeat   Date Last Reviewed: 5/1/2016 2000-2018 [employer]. [address] [place] [address]. All rights reserved. This information is not intended as a substitute for professional medical care. Always follow your healthcare professional's instructions.         Patient Education   Alcohol Use     Many people can enjoy a glass of wine or beer without any negative consequences to their health. According to the Centers for Disease Control and Prevention (CDC), having one or fewer drinks per day for women and two or fewer per day for men is considered moderate drinking.     When people drink more than moderately, it can become concerning. Excessive drinking is defined as consuming 15 drinks or more per week for men and 8 drinks or more per week for women. There are various health problems associated with excessive drinking, which include:    Damage to vital organs like the heart, brain, liver and pancreas    Harm to the digestive tract    Weaken the immune system    Higher risk for heart disease and cancer    There are many resources available to people who are addicted to alcohol. A counselor or other health care provider can give you support. So can a , , or rabbi who is trained in substance abuse counseling. Friends and family may also help once you are connected with professionals.

## 2019-09-09 NOTE — PROGRESS NOTES
"SUBJECTIVE:   Arnaud Bird is a 64 year old male who presents for Preventive Visit.      Are you in the first 12 months of your Medicare coverage?  No    Healthy Habits:     In general, how would you rate your overall health?  Good    Frequency of exercise:  1 day/week    Duration of exercise:  Less than 15 minutes    Do you usually eat at least 4 servings of fruit and vegetables a day, include whole grains    & fiber and avoid regularly eating high fat or \"junk\" foods?  Yes    Taking medications regularly:  Yes    Medication side effects:  Significant flushing    Ability to successfully perform activities of daily living:  No assistance needed    Home Safety:  No safety concerns identified    Hearing Impairment:  No hearing concerns    In the past 6 months, have you been bothered by leaking of urine?  No    In general, how would you rate your overall mental or emotional health?  Good      PHQ-2 Total Score: 0    Additional concerns today:  No    Do y  Health Maintenance    New information from outside sources is available for reconciliation    1955  URINE DRUG SCREEN      04/27/2003  ZOSTER IMMUNIZATION (1 of 2)      04/27/2018  MEDICARE ANNUAL WELLNESS VISIT      04/27/2018  PNEUMOCOCCAL IMMUNIZATION 65+ LOW/MEDIUM RISK (1 of 2 - PCV13)      04/27/2018  AORTIC ANEURYSM SCREENING (SYSTEM ASSIGNED)     12/20/2018  FALL RISK ASSESSMENT      03/26/2019  LIPID      09/01/2019  INFLUENZA VACCINE (1)      DELIVERING PARTS     13$ PER HOURS     WALKS TO WORK     DISCOUNT ON PARTS     $1300 ON PARTS     NEEDS URINE DRUG SCREEN    NO FALLING IN  LAST YEAR    SHINGRIX DISCUSSION     SIDE EFFECTS BENEFITS AND RISKS DISCUSSED      TREATMENT PROGNOSIS BENEFITS AND RISKS DISCUSSED     WELLNESS VISIT TODAY     NIGHT TIME CONGESTION     TAKING SUDAFED LAST NIGHT     NEEDS LIPID PANEL TODAY     SENILE KERATOSIS IN FRONT OF RIGHT EAR     PLANTAR FASCIITIS     LOWER BACK PAIN     HOME TRACTION DEVICE    FIXING CARS IN " GARAGE        ou feel safe in your environment? Yes    Do you have a Health Care Directive? No: Advance care planning reviewed with patient; information given to patient to review.      Fall risk  Fallen 2 or more times in the past year?: No  Any fall with injury in the past year?: No    Cognitive Screening   1) Repeat 3 items (Leader, Season, Table)    2) Clock draw: NORMAL  3) 3 item recall: Recalls 2 objects   Results: NORMAL clock, 1-2 items recalled: COGNITIVE IMPAIRMENT LESS LIKELY    Mini-CogTM Copyright ISAAK Wilcox. Licensed by the author for use in [facility]; reprinted with permission (stefany@[facility]). All rights reserved.      Do you have sleep apnea, excessive snoring or daytime drowsiness?: no    Reviewed and updated as needed this visit by clinical staff  Tobacco  Allergies  Meds  Med Hx  Surg Hx  Fam Hx  Soc Hx        Reviewed and updated as needed this visit by Provider        Social History     Tobacco Use     Smoking status: Never Smoker     Smokeless tobacco: Never Used   Substance Use Topics     Alcohol use: Yes     Comment: case beer a week         Alcohol Use 9/9/2019   Prescreen: >3 drinks/day or >7 drinks/week? Yes   AUDIT SCORE  5     AUDIT - Alcohol Use Disorders Identification Test - Reproduced from the World Health Organization Audit 2001 (Second Edition) 9/9/2019   1.  How often do you have a drink containing alcohol? 2 to 3 times a week   2.  How many drinks containing alcohol do you have on a typical day when you are drinking? 3 or 4   3.  How often do you have five or more drinks on one occasion? Less than monthly   4.  How often during the last year have you found that you were not able to stop drinking once you had started? Never   5.  How often during the last year have you failed to do what was normally expected of you because of drinking? Never   6.  How often during the last year have you needed a first drink in the morning to get yourself going after a heavy  drinking session? Never   7.  How often during the last year have you had a feeling of guilt or remorse after drinking? Never   8.  How often during the last year have you been unable to remember what happened the night before because of your drinking? Never   9.  Have you or someone else been injured because of your drinking? No   10. Has a relative, friend, doctor or other health care worker been concerned about your drinking or suggested you cut down? No   TOTAL SCORE 5           Pt also has a mole on right side of face by ear would like looked at and a wart re treated that has been addresses previously     Current providers sharing in care for this patient include:   Patient Care Team:  Johnson Clements MD as PCP - General (Family Practice)  Johnson Clements MD as Assigned PCP  Carmel Chan MD as MD (Family Medicine - Sports Medicine)  Alex Singh MD as MD (Otolaryngology)  Nithya Fish RD as Diabetes Educator (Dietitian, Registered)    The following health maintenance items are reviewed in Epic and correct as of today:  Health Maintenance   Topic Date Due     URINE DRUG SCREEN  1955     ZOSTER IMMUNIZATION (1 of 2) 04/27/2003     MEDICARE ANNUAL WELLNESS VISIT  04/27/2018     PNEUMOCOCCAL IMMUNIZATION 65+ LOW/MEDIUM RISK (1 of 2 - PCV13) 04/27/2018     AORTIC ANEURYSM SCREENING (SYSTEM ASSIGNED)  04/27/2018     FALL RISK ASSESSMENT  12/20/2018     LIPID  03/26/2019     INFLUENZA VACCINE (1) 09/01/2019     ASTHMA CONTROL TEST  09/19/2019     SOFIYA ASSESSMENT  03/19/2020     ASTHMA ACTION PLAN  03/19/2020     PHQ-9  03/19/2020     COLONOSCOPY  10/18/2020     ADVANCE CARE PLANNING  03/08/2021     DTAP/TDAP/TD IMMUNIZATION (2 - Td) 06/18/2029     HEPATITIS C SCREENING  Completed     MIGRAINE ACTION PLAN  Completed     IPV IMMUNIZATION  Aged Out     MENINGITIS IMMUNIZATION  Aged Out       Lab work is in process  Labs reviewed in EPIC  BP Readings from Last 3  Encounters:   09/09/19 (!) 152/100   07/24/19 139/86   06/18/19 130/88    Wt Readings from Last 3 Encounters:   09/09/19 117.5 kg (259 lb)   07/24/19 120.2 kg (265 lb)   07/24/19 118.8 kg (262 lb)                  Patient Active Problem List   Diagnosis     Stiffness of knee joint, right     Gait difficulty     S/P TKR (total knee replacement)     Bilateral carpal tunnel syndrome     Vitamin D deficiency     Plantar fascial fibromatosis     Asthma with exacerbation     Hypertrophy of prostate without urinary obstruction     Obesity     Hyperlipidemia     Spondylosis with myelopathy, lumbar region     LEFT WORSE THAN RIGHT      CARDIOVASCULAR SCREENING; LDL GOAL LESS THAN 100     BMI 40.0-44.9, adult (H)     Mild persistent asthma     Hyperlipidemia with target LDL less than 130     Lumbar disc disease with radiculopathy     Groin strain, initial encounter     Sexual dysfunction     ACP (advance care planning)     Slac (scapholunate advanced collapse) of wrist, right     Right wrist pain     Numbness and tingling in right hand     Chronic pain syndrome     Chronic gout involving toe 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 tophus, unspecified cause     Impingement syndrome, shoulder, right     Migraine without aura and without status migrainosus, not intractable     Past Surgical History:   Procedure Laterality Date     HC TOOTH EXTRACTION W/FORCEP       ORTHOPEDIC SURGERY      bilateral total knee replacements     TONSILLECTOMY      age 2 or 3       Social History     Tobacco Use     Smoking status: Never Smoker     Smokeless tobacco: Never Used   Substance Use Topics     Alcohol use: Yes     Comment: case beer a week     Family History   Problem Relation Age of Onset     Cancer Father      Family History Negative Father      Family History Negative Mother      Cancer Mother      Stomach Problem Mother      Diabetes No family hx of      Coronary Artery Disease No family hx of      Hypertension No family hx of      Hyperlipidemia No  family hx of      Cerebrovascular Disease No family hx of      Breast Cancer No family hx of      Colon Cancer No family hx of      Prostate Cancer No family hx of      Other Cancer No family hx of      Depression No family hx of      Anxiety Disorder No family hx of      Mental Illness No family hx of      Substance Abuse No family hx of      Anesthesia Reaction No family hx of      Asthma No family hx of      Osteoporosis No family hx of      Genetic Disorder No family hx of      Thyroid Disease No family hx of      Obesity No family hx of      Unknown/Adopted No family hx of          Current Outpatient Medications   Medication Sig Dispense Refill     acyclovir (ZOVIRAX) 400 MG tablet TAKE TWO TABLETS BY MOUTH TWICE DAILY 28 tablet 3     albuterol (PROAIR HFA, PROVENTIL HFA, VENTOLIN HFA) 108 (90 BASE) MCG/ACT inhaler Inhale 2 puffs into the lungs every 6 hours as needed for shortness of breath / dyspnea 1 Inhaler 11     allopurinol (ZYLOPRIM) 300 MG tablet TAKE 1 TABLET (300 MG) BY MOUTH DAILY 90 tablet 3     beclomethasone (QVAR) 40 MCG/ACT Inhaler Inhale 2 puffs into the lungs 2 times daily 1 Inhaler 11     clindamycin (CLEOCIN T) 1 % external solution Apply topically 2 times daily Profile Rx: patient will contact pharmacy when needed 60 mL 11     diclofenac (VOLTAREN) 1 % topical gel APPLY 2 GRAMS TOPICALLY TO AFFECTED AREA FOUR TIMES DAILY AS NEEDED 100 g 11     fluticasone (FLONASE) 50 MCG/ACT spray Spray 2 sprays into both nostrils daily 16 g 11     HYDROcodone-acetaminophen (NORCO) 7.5-325 MG per tablet Take 1 tablet by mouth every 4 hours as needed for pain (4 x daily as needed  maxium 4 per day) 120 tablet 0     oxymetazoline (AFRIN) 0.05 % nasal spray Spray 2 sprays into both nostrils 2 times daily 15 mL 0     sildenafil (VIAGRA) 100 MG tablet Take 0.5-1 tablets ( mg) by mouth daily as needed Take 30 min to 4 hours before intercourse.  Never use with nitroglycerin, terazosin or doxazosin. 100  tablet 11     simvastatin (ZOCOR) 40 MG tablet TAKE 1 TABLET (40 MG) BY MOUTH AT BEDTIME 90 tablet 2     erythromycin (ROMYCIN) 5 MG/GM ophthalmic ointment Place 0.5 inches into the right eye 4 times daily (Patient not taking: Reported on 9/9/2019) 3.5 g 1     naloxone (NARCAN) 4 MG/0.1ML nasal spray Spray 1 spray (4 mg) into one nostril alternating nostrils once as needed for opioid reversal Every 2-3 minutes until patient responsive or EMS arrives (Patient not taking: Reported on 9/9/2019) 0.2 mL 0     Allergies   Allergen Reactions     Seasonal Allergies      Recent Labs   Lab Test 06/04/18  0842 03/26/18  0832 12/07/17  1023 12/30/16  0804 10/31/16  0822 07/08/15  0528 02/05/14  0750   A1C  --   --   --  5.4  --   --   --    LDL  --  88  --   --  85  --  106   HDL  --  58  --   --  55  --  45   TRIG  --  117  --   --  94  --  112   ALT 40  --  37  --  35 28  --    CR 0.70  --  0.85  --  0.88 0.83 1.20   GFRESTIMATED >90  --  >90  --  87 >90  Non  GFR Calc   62   GFRESTBLACK >90  --  >90  --  >90 >90   GFR Calc   75   POTASSIUM 4.2  --   --   --   --  4.3 4.4   TSH 2.02  --   --   --   --   --   --           Review of Systems     has Stiffness of knee joint, right; Gait difficulty; S/P TKR (total knee replacement); Bilateral carpal tunnel syndrome; Vitamin D deficiency; Plantar fascial fibromatosis; Asthma with exacerbation; Hypertrophy of prostate without urinary obstruction; Obesity; Hyperlipidemia; Spondylosis with myelopathy, lumbar region; LEFT WORSE THAN RIGHT ; CARDIOVASCULAR SCREENING; LDL GOAL LESS THAN 100; BMI 40.0-44.9, adult (H); Mild persistent asthma; Hyperlipidemia with target LDL less than 130; Lumbar disc disease with radiculopathy; Groin strain, initial encounter; Sexual dysfunction; ACP (advance care planning); Slac (scapholunate advanced collapse) of wrist, right; Right wrist pain; Numbness and tingling in right hand; Chronic pain syndrome; Chronic gout  "involving toe 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 tophus, unspecified cause; Impingement syndrome, shoulder, right; and Migraine without aura and without status migrainosus, not intractable on their problem list.    Review Of Systems  Skin:  WART RIGHT THUMB   SENILE KERATOSIS FRONT OF RIGHT EAR     Eyes: negative for,  GLASSES FOR DETAIL     Ears/Nose/Throat:  TINNITUS IN BOTH EARS USING MASKING SOUND      Respiratory: No shortness of breath, dyspnea on exertion, cough, or hemoptysis    OBSTRUCTIVE SLEEP APNEA HAS BEEN CHECKED     Cardiovascular: negative    Gastrointestinal: negative    Genitourinary: NOCTURIA     Musculoskeletal: negative    Neurologic: negative    Psychiatric: negative    Hematologic/Lymphatic/Immunologic: negative    Endocrine: negative    RECURRENT GOUT     PLANTAR FASCIITIS     TOTAL KNEE ARTHROPLASTY BILATERAL     SHOULDER  PAINS     WEATHER CHANGES     SHOULDER PAIN BILLTERAL RIGHT WORSE THAN LEFT       OBJECTIVE:   There were no vitals taken for this visit. Estimated body mass index is 38.02 kg/m  as calculated from the following:    Height as of 7/24/19: 1.778 m (5' 10\").    Weight as of 7/24/19: 120.2 kg (265 lb).  Physical Exam  GENERAL: healthy, alert and no distress  EYES: Eyes grossly normal to inspection, PERRL and conjunctivae and sclerae normal  HENT: ear canals and TM's normal, nose and mouth without ulcers or lesions  NECK: no adenopathy, no asymmetry, masses, or scars and thyroid normal to palpation  RESP: lungs clear to auscultation - no rales, rhonchi or wheezes  BREAST: normal without masses, tenderness or nipple discharge and no palpable axillary masses or adenopathy  CV: regular rate and rhythm, normal S1 S2, no S3 or S4, no murmur, click or rub, no peripheral edema and peripheral pulses strong  ABDOMEN: soft, nontender, no hepatosplenomegaly, no masses and bowel sounds normal   (male): normal male genitalia without lesions or urethral discharge, no hernia  MS: no gross " musculoskeletal defects noted, no edema  SKIN: no suspicious lesions or rashes  NEURO: Normal strength and tone, mentation intact and speech normal  PSYCH: mentation appears normal, affect normal/bright  LYMPH: no cervical, supraclavicular, axillary, or inguinal adenopathy  Diabetic foot exam:  no trophic changes or ulcerative lesions, normal sensory exam, normal monofilament exam and BILATERAL DECREASE PULSES    Diagnostic Test Results:  Labs reviewed in Epic  Results for orders placed or performed in visit on 07/24/19   Large Joint Injection/Arthocentesis: bilateral glenohumeral    Frances Gleason ATC     7/25/2019  3:18 PM  Large Joint Injection/Arthocentesis: bilateral glenohumeral  Date/Time: 7/24/2019 11:46 AM  Performed by: Carmel Chan MD  Authorized by: Carmel Chan MD     Indications:  Osteoarthritis  Needle Size:  22 G  Guidance: ultrasound    Approach:  Posterior  Location:  Shoulder  Laterality:  Bilateral      Site:  Bilateral glenohumeral  Medications (Right):  40 mg triamcinolone 40 MG/ML; 3 mL ropivacaine 5   MG/ML  Medications (Left):  40 mg triamcinolone 40 MG/ML; 3 mL ropivacaine 5   MG/ML  Outcome:  Tolerated well, no immediate complications  Procedure discussed: discussed risks, benefits, and alternatives    Consent Given by:  Patient  Timeout: timeout called immediately prior to procedure    Prep: patient was prepped and draped in usual sterile fashion     8 mL of ropivacaine was used for each knee.  Scribed by Frances Rogers MS, ATC for Dr. Chan on 7/24/2019 at 11:48   AM, based on the provider s statements to me.     Medium Joint Injection/Arthrocentesis: R acromioclavicular    Frances Gleason ATC     7/25/2019  3:18 PM  Medium Joint Injection/Arthrocentesis: R acromioclavicular  Date/Time: 7/24/2019 12:10 PM  Performed by: Carmel Chan MD  Authorized by: Carmel Chan MD     Indications:   "Pain  Needle Size:  25 G  Guidance: ultrasound    Approach:  Superior  Location:  Shoulder  Site:  R acromioclavicular  Medications:  20 mg triamcinolone 40 MG/ML; 1 mL ropivacaine 5 MG/ML  Outcome:  Tolerated well, no immediate complications  Procedure discussed: discussed risks, benefits, and alternatives    Consent Given by:  Patient  Timeout: timeout called immediately prior to procedure    Prep: patient was prepped and draped in usual sterile fashion     Scribed by Frances Rogers MS, ATC for Dr. Chan on 7/24/2019 at   12:11 PM, based on the provider's statements to me.         ASSESSMENT / PLAN:   No diagnosis found.    End of Life Planning:  Patient currently has an advanced directive: Yes.  Practitioner is supportive of decision.    COUNSELING:  Reviewed preventive health counseling, as reflected in patient instructions       Regular exercise       Healthy diet/nutrition       Vision screening       Hearing screening    Estimated body mass index is 38.02 kg/m  as calculated from the following:    Height as of 7/24/19: 1.778 m (5' 10\").    Weight as of 7/24/19: 120.2 kg (265 lb).    Weight management plan: Discussed healthy diet and exercise guidelines     reports that he has never smoked. He has never used smokeless tobacco.      Appropriate preventive services were discussed with this patient, including applicable screening as appropriate for cardiovascular disease, diabetes, osteopenia/osteoporosis, and glaucoma.  As appropriate for age/gender, discussed screening for colorectal cancer, prostate cancer, breast cancer, and cervical cancer. Checklist reviewing preventive services available has been given to the patient.    Reviewed patients plan of care and provided an AVS. The Basic Care Plan (routine screening as documented in Health Maintenance) for Arnaud meets the Care Plan requirement. This Care Plan has been established and reviewed with the Patient.    Counseling Resources:  ATP IV " Guidelines  Pooled Cohorts Equation Calculator  Breast Cancer Risk Calculator  FRAX Risk Assessment  ICSI Preventive Guidelines  Dietary Guidelines for Americans, 2010  USDA's MyPlate  ASA Prophylaxis  Lung CA Screening    SCOTTY TOLEDO MD  [facility]    Identified Health Risks:    He is at risk for lack of exercise and has been provided with information to increase physical activity for the benefit of his well-being.  The patient reports that he drinks more than one alcoholic drink per day but denies binge or excessive drinking. He was counseled and given information about possible harmful effects of excessive alcohol intake.

## 2019-09-09 NOTE — LETTER
"September 13, 2019      Arnaud Bird  [address]  [facility] [address]        Dear ,    We are writing to inform you of your test results.     NORMAL GLUCOSE, RENAL AND BLOOD SALTS     NORMAL LIVER FUNCTION TEST   PAIN SCREEN WITHIN NORMAL LIMITS   NORMAL TOTAL CHOLESTEROL   NORMAL TRIGLYCERIDES   NORMAL HDL OR \"GOOD\" CHOLESTEROL   NORMAL VERY LOW DENSITY CHOLESTEROL   BORDERLINE  VERY LOW DENSITY CHOLESTEROL     Resulted Orders   Basic metabolic panel   Result Value Ref Range    Sodium 141 133 - 144 mmol/L    Potassium 4.5 3.4 - 5.3 mmol/L    Chloride 107 94 - 109 mmol/L    Carbon Dioxide 27 20 - 32 mmol/L    Anion Gap 7 3 - 14 mmol/L    Glucose 84 70 - 99 mg/dL      Comment:      Fasting specimen    Urea Nitrogen 14 7 - 30 mg/dL    Creatinine 0.74 0.66 - 1.25 mg/dL    GFR Estimate >90 >60 mL/min/[1.73_m2]      Comment:      Non  GFR Calc  Starting 12/18/2018, serum creatinine based estimated GFR (eGFR) will be   calculated using the Chronic Kidney Disease Epidemiology Collaboration   (CKD-EPI) equation.      GFR Estimate If Black >90 >60 mL/min/[1.73_m2]      Comment:       GFR Calc  Starting 12/18/2018, serum creatinine based estimated GFR (eGFR) will be   calculated using the Chronic Kidney Disease Epidemiology Collaboration   (CKD-EPI) equation.      Calcium 9.0 8.5 - 10.1 mg/dL   Lipid panel reflex to direct LDL Fasting   Result Value Ref Range    Cholesterol 184 <200 mg/dL    Triglycerides 97 <150 mg/dL      Comment:      Fasting specimen    HDL Cholesterol 61 >39 mg/dL    LDL Cholesterol Calculated 104 (H) <100 mg/dL      Comment:      Above desirable:  100-129 mg/dl  Borderline High:  130-159 mg/dL  High:             160-189 mg/dL  Very high:       >189 mg/dl      Non HDL Cholesterol 123 <130 mg/dL   ALT   Result Value Ref Range    ALT 34 0 - 70 U/L   Pain Drug Scr UR W Rptd Meds   Result Value Ref Range    Pain Drug SCR UR W RPTD Meds FINAL  "      Comment:      (Note)  ====================================================================  TOX[facility]RE COMP DRUG ANALYSIS,UR  ====================================================================  Test                             Result       Flag       Units        Drug Present and Declared for Prescription Verification   Hydrocodone                    665          EXPECTED   ng/mg creat   Hydromorphone                  93           EXPECTED   ng/mg creat   Dihydrocodeine                 149          EXPECTED   ng/mg creat   Norhydrocodone                 1732         EXPECTED   ng/mg creat    Sources of hydrocodone include scheduled prescription    medications. Hydromorphone, dihydrocodeine and norhydrocodone are    expected metabolites of hydrocodone. Hydromorphone and    dihydrocodeine are also available as scheduled prescription    medications.   Acetaminophen                  PRESENT      EXPECTED                Drug Absent but Declared for Prescription Verification   Diclofenac                       Not Detected UNEXPECTED                Diclofenac, as indicated in the declared medication list, is not    always detected even when used as directed.  ====================================================================  Test                      Result    Flag   Units      Ref Range        Creatinine              171              mg/dL      >=20            ====================================================================  Declared Medications:  The flagging and interpretation on this report are based on the  following declared medications.  Unexpected results may arise from  inaccuracies in the declared medications.  **Note: The testing scope of this panel includes these medications:  Hydrocodone (Norco)  **Note: The testing scope of this panel does not include small to  moderate amounts of these reported medications:  Acetaminophen (Norco)  Diclofenac (Voltaren)  **Note: The testing scope of this panel  does not include following  reported medications:  Acyclovir  Albuterol  A  llopurinol  Amoxicillin (Augmentin)  Beclomethasone (QVAR)  Clavulinate (Augmentin)  Clindamycin (Cleocin)  Erythromycin  Fluticasone  Ipratropium (Atrovent)  Naloxone (Narcan)  Oxymetazoline (Afrin)  Sildenafil (Viagra)  Simvastatin (Zocor)  ====================================================================  For clinical consultation, please call [phone].  ====================================================================  Analysis performed by [facility] [place] [address]         If you have any questions or concerns, please call the clinic at the number listed above.       Sincerely,        SCOTTY TOLEDO MD

## 2019-09-10 LAB
ALT SERPL W P-5'-P-CCNC: 34 U/L (ref 0–70)
ANION GAP SERPL CALCULATED.3IONS-SCNC: 7 MMOL/L (ref 3–14)
BUN SERPL-MCNC: 14 MG/DL (ref 7–30)
CALCIUM SERPL-MCNC: 9 MG/DL (ref 8.5–10.1)
CHLORIDE SERPL-SCNC: 107 MMOL/L (ref 94–109)
CHOLEST SERPL-MCNC: 184 MG/DL
CO2 SERPL-SCNC: 27 MMOL/L (ref 20–32)
CREAT SERPL-MCNC: 0.74 MG/DL (ref 0.66–1.25)
GFR SERPL CREATININE-BSD FRML MDRD: >90 ML/MIN/{1.73_M2}
GLUCOSE SERPL-MCNC: 84 MG/DL (ref 70–99)
HDLC SERPL-MCNC: 61 MG/DL
LDLC SERPL CALC-MCNC: 104 MG/DL
NONHDLC SERPL-MCNC: 123 MG/DL
POTASSIUM SERPL-SCNC: 4.5 MMOL/L (ref 3.4–5.3)
SODIUM SERPL-SCNC: 141 MMOL/L (ref 133–144)
TRIGL SERPL-MCNC: 97 MG/DL

## 2019-09-12 LAB — PAIN DRUG SCR UR W RPTD MEDS: NORMAL

## 2019-09-19 DIAGNOSIS — E78.5 HYPERLIPIDEMIA WITH TARGET LDL LESS THAN 130: Chronic | ICD-10-CM

## 2019-09-19 RX ORDER — SIMVASTATIN 40 MG
TABLET ORAL
Qty: 90 TABLET | Refills: 3 | Status: SHIPPED | OUTPATIENT
Start: 2019-09-19 | End: 2020-05-19

## 2019-09-19 NOTE — TELEPHONE ENCOUNTER
"Requested Prescriptions   Pending Prescriptions Disp Refills     simvastatin (ZOCOR) 40 MG tablet [Pharmacy Med Name: SIMVASTATIN 40 MG TABLET]  Last Written Prescription Date:  9/27/2018  Last Fill Quantity: 90 tablet,  # refills: 2   Last office visit: 9/9/2019 with prescribing provider:  DANYELLE Clements   Future Office Visit:   Next 5 appointments (look out 90 days)    Dec 09, 2019 10:15 AM CST  Office Visit with [facility]ments MD  [facility] [address] [id number] [phone]          90 tablet 2     Sig: TAKE 1 TABLET (40 MG) BY MOUTH AT BEDTIME       [facility] Protocol Passed - 9/19/2019  1:56 AM        Passed - LDL on file in past 12 months     Recent Labs   Lab Test 09/09/19  0945   *             Passed - No abnormal creatine kinase in past 12 months     No lab results found.             Passed - Recent (12 mo) or future (30 days) visit within the authorizing provider's specialty     Patient had office visit in the last 12 months or has a visit in the next 30 days with authorizing provider or within the authorizing provider's specialty.  See \"Patient Info\" tab in inbasket, or \"Choose Columns\" in Meds & Orders section of the refill encounter.              Passed - Medication is active on med list        Passed - Patient is age 18 or older           "

## 2019-09-28 ENCOUNTER — TELEPHONE (OUTPATIENT)
Dept: FAMILY MEDICINE | Facility: CLINIC | Age: 64
End: 2019-09-28

## 2019-09-28 DIAGNOSIS — G89.4 CHRONIC PAIN SYNDROME: Chronic | ICD-10-CM

## 2019-09-28 NOTE — TELEPHONE ENCOUNTER
Reason for Call:  Medication or medication refill:    Do you use a [facility]?  Name of the pharmacy and phone number for the current  Name of the [facility]    Other request: HYDROCODONE    Can we leave a detailed message on this number? YES    Phone number patient can be reached at: Home number on file [phone] (home)    Best Time: ANY    Call taken on 9/28/2019 at 9:21 AM by JOSE ALEX

## 2019-09-28 NOTE — TELEPHONE ENCOUNTER
Controlled Substance Refill Request for HYDROcodone-acetaminophen (NORCO) 7.5-325 MG per tablet    Problem List Complete:    Yes    Patient is followed by SCOTTY TOLEDO MD for ongoing prescription of pain medication.  All refills should only be approved by this provider, or covering partner.     Medication(s):  NORCO  7.5MG PO FOUR TIMES DAILY .   Maximum quantity per month:  120   Clinic visit frequency required: Q 3 months      Controlled substance agreement:  Encounter-Level CSA - 10/31/2016:                     Controlled Substance Agreement - Scan on 11/15/2016  7:53 AM : CONTROLLED SUBSTANCE AGREEMENT (below)                Pain Clinic evaluation in the past: No     DIRE Total Score(s):    7/2/2016   Total Score 18         Last [facility] website verification:  8/26/2019, no concerns.    [email or web address]      Last Written Prescription Date:  08/27/2019  Last Fill Quantity: 120 tablet,  # refills: 0   Last office visit: 9/9/2019 with prescribing provider:  DANYELLE Toledo   Future Office Visit:   Next 5 appointments (look out 90 days)    Dec 09, 2019 10:15 AM CST  Office Visit with Scotty Toledo MD  [facility] ([facility]) [address] [place] [id number]  [phone]             Future Office visit:   Next 5 appointments (look out 90 days)    Dec 09, 2019 10:15 AM CST  Office Visit with Scotty Toledo MD  [facility] ([facility]) [address] [place] [address]  [address] [place] [phone]  [phone]          Controlled substance agreement:   Encounter-Level CSA - 10/31/2016:    Controlled Substance Agreement - Scan on 11/15/2016  7:53 AM: CONTROLLED SUBSTANCE AGREEMENT     Patient-Level CSA:    Controlled Substance Agreement - Opioid - Scan on 3/19/2019 11:01 AM         Last Urine Drug Screen:   Pain Drug SCR UR W RPTD Meds    Date Value Ref Range Status   09/09/2019 FINAL  Final     Comment:     (Note)  ====================================================================  [facility] COMP DRUG ANALYSIS,UR  ====================================================================  Test                             Result       Flag       Units        Drug Present and Declared for Prescription Verification   Hydrocodone                    665          EXPECTED   ng/mg creat   Hydromorphone                  93           EXPECTED   ng/mg creat   Dihydrocodeine                 149          EXPECTED   ng/mg creat   Norhydrocodone                 1732         EXPECTED   ng/mg creat    Sources of hydrocodone include scheduled prescription    medications. Hydromorphone, dihydrocodeine and norhydrocodone are    expected metabolites of hydrocodone. Hydromorphone and    dihydrocodeine are also available as scheduled prescription    medications.   Acetaminophen                  PRESENT      EXPECTED                Drug Absent but Declared for Prescription Verification   Diclofenac                     Not Detected UNEXPECTED                Diclofenac, as indicated in the declared medication list, is not    always detected even when used as directed.  ====================================================================  Test                      Result    Flag   Units      Ref Range        Creatinine              171              mg/dL      >=20            ====================================================================  Declared Medications:  The flagging and interpretation on this report are based on the  following declared medications.  Unexpected results may arise from  inaccuracies in the declared medications.  **Note: The testing scope of this panel includes these medications:  Hydrocodone (Norco)  **Note: The testing scope of this panel does not include small to  moderate amounts of these reported medications:  Acetaminophen (Norco)  Diclofenac  (Voltaren)  **Note: The testing scope of this panel does not include following  reported medications:  Acyclovir  Albuterol  Allopurinol  Amoxicillin (Augmentin)  Beclomethasone (QVAR)  Clavulinate (Augmentin)  Clindamycin (Cleocin)  Erythromycin  Fluticasone  Ipratropium (Atrovent)  Naloxone (Narcan)  Oxymetazoline (Afrin)  Sildenafil (Viagra)  Simvastatin (Zocor)  ====================================================================  For clinical consultation, please call [phone].  ====================================================================  Analysis performed by [employer] [place] [address]     , No results found for: COMDAT, No results found for: THC13, PCP13, COC13, MAMP13, OPI13, AMP13, BZO13, TCA13, MTD13, BAR13, OXY13, PPX13, BUP13     Processing:  Patient will  in clinic    [email or web address]   checked in past 3 months?  Yes 08/26/2019

## 2019-10-01 RX ORDER — HYDROCODONE BITARTRATE AND ACETAMINOPHEN 7.5; 325 MG/1; MG/1
1 TABLET ORAL EVERY 4 HOURS PRN
Qty: 120 TABLET | Refills: 0 | Status: SHIPPED | OUTPATIENT
Start: 2019-10-01 | End: 2019-10-01

## 2019-10-01 RX ORDER — HYDROCODONE BITARTRATE AND ACETAMINOPHEN 7.5; 325 MG/1; MG/1
1 TABLET ORAL EVERY 6 HOURS PRN
Qty: 120 TABLET | Refills: 0 | Status: CANCELLED | OUTPATIENT
Start: 2019-10-01

## 2019-10-01 RX ORDER — HYDROCODONE BITARTRATE AND ACETAMINOPHEN 7.5; 325 MG/1; MG/1
1 TABLET ORAL EVERY 6 HOURS PRN
Qty: 120 TABLET | Refills: 0 | Status: SHIPPED | OUTPATIENT
Start: 2019-10-01 | End: 2019-10-25

## 2019-10-01 NOTE — TELEPHONE ENCOUNTER
Pharmacist called from [facility] requesting clarification about Norco Rx:    HYDROcodone-acetaminophen (NORCO) 7.5-325 MG per tablet 120 tablet 0 10/1/2019  No   Sig - Route: Take 1 tablet by mouth every 4 hours as needed for pain (4 x daily as needed  maxium 4 per day) - Oral     The directions seem confusing. Pharmacist will cancer this Rx and is requesting another script sent electronically. She said that if pt is to take 4 max daily then it will be 1 tablet by mouth every 6 hours. Provider to review.

## 2019-10-01 NOTE — TELEPHONE ENCOUNTER
Called patient to notify that rx was sent to pharmacy. Instructed on direction change. He verbalizes understanding, but is frustrated - as he takes it every 4 hours. Instructed to take as directed.     No further action needed at this time.

## 2019-10-01 NOTE — TELEPHONE ENCOUNTER
Patient states he takes medication every 4 hours (which would be 6/day). However, rx says Max of 4 per day.     Huddled with care team to notify provider.   He is currently out of medication and is hoping to have it filled yet today. Routing to provider again to clarify sig.

## 2019-10-08 ENCOUNTER — OFFICE VISIT (OUTPATIENT)
Dept: DERMATOLOGY | Facility: CLINIC | Age: 64
End: 2019-10-08
Payer: COMMERCIAL

## 2019-10-08 DIAGNOSIS — B07.9 VIRAL WARTS, UNSPECIFIED TYPE: Primary | ICD-10-CM

## 2019-10-08 DIAGNOSIS — L82.1 SEBORRHEIC KERATOSIS: ICD-10-CM

## 2019-10-08 DIAGNOSIS — L82.0 SEBORRHEIC KERATOSIS, INFLAMED: ICD-10-CM

## 2019-10-08 DIAGNOSIS — D18.01 CHERRY ANGIOMA: ICD-10-CM

## 2019-10-08 ASSESSMENT — PAIN SCALES - GENERAL: PAINLEVEL: NO PAIN (0)

## 2019-10-08 NOTE — PROGRESS NOTES
[facility] Dermatology Note      Dermatology Problem List:  1. Wart, R 1st finger.  - Cryo 10/8/2019   - Brendan acid plasters  2. History of lesion removal, R neck, in youth. ?possible nevus sebaceous?    CC:   Chief Complaint   Patient presents with     Derm Problem     Arnaud is here today for wart on right thumb and a spot on the right side of his face.         Encounter Date: Oct 8, 2019    History of Present Illness:  Mr. Arnaud Bird is a 64 year old male who presents for evaluation of wart and lesion on right cheek. Patient has had wart on his right thumb for many years. It was frozen once and then just came back. He will sand it down and put tomasz on it. It is irritating because he works as a . Also notes spot on his right cheek and upper chest. These are asymptomatic. Lastly has lesion on his left abdomen, which catches on his suspenders. No painful, bleeding, non-healing, or otherwise symptomatic lesions. Otherwise in usual state of health. No additional skin concerns.    Had large lesion removed from his right neck in youth, he's not sure what it was but says it looks like the one on his right cheek.     Past Medical History:   Patient Active Problem List   Diagnosis     Stiffness of knee joint, right     Gait difficulty     S/P TKR (total knee replacement)     Bilateral carpal tunnel syndrome     Vitamin D deficiency     Plantar fascial fibromatosis     Asthma with exacerbation     Hypertrophy of prostate without urinary obstruction     Obesity     Hyperlipidemia     Spondylosis with myelopathy, lumbar region     LEFT WORSE THAN RIGHT      CARDIOVASCULAR SCREENING; LDL GOAL LESS THAN 100     BMI 40.0-44.9, adult (H)     Mild persistent asthma     Hyperlipidemia with target LDL less than 130     Lumbar disc disease with radiculopathy     Groin strain, initial encounter     Sexual dysfunction     ACP (advance care planning)     Slac (scapholunate advanced collapse) of wrist,  right     Right wrist pain     Numbness and tingling in right hand     Chronic pain syndrome     Chronic gout involving toe 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 tophus, unspecified cause     Impingement syndrome, shoulder, right     Migraine without aura and without status migrainosus, not intractable     Past Medical History:   Diagnosis Date     Arthritis      Benign positional vertigo      Carpal tunnel syndrome     mild     Hearing problem      LEFT WORSE THAN RIGHT  10/18/2012     Migraines      Nasal polyps      Unspecified asthma(493.90) 10/17/2012     Unspecified asthma, with exacerbation 10/17/2012     Past Surgical History:   Procedure Laterality Date     HC TOOTH EXTRACTION W/FORCEP       ORTHOPEDIC SURGERY      bilateral total knee replacements     TONSILLECTOMY      age 2 or 3       Social History:  Patient reports that he has never smoked. He has never used smokeless tobacco. He reports current alcohol use. He reports that he does not use drugs.    Family History:  Family History   Problem Relation Age of Onset     Cancer Father      Family History Negative Father      Family History Negative Mother      Cancer Mother      Stomach Problem Mother      Diabetes No family hx of      Coronary Artery Disease No family hx of      Hypertension No family hx of      Hyperlipidemia No family hx of      Cerebrovascular Disease No family hx of      Breast Cancer No family hx of      Colon Cancer No family hx of      Prostate Cancer No family hx of      Other Cancer No family hx of      Depression No family hx of      Anxiety Disorder No family hx of      Mental Illness No family hx of      Substance Abuse No family hx of      Anesthesia Reaction No family hx of      Asthma No family hx of      Osteoporosis No family hx of      Genetic Disorder No family hx of      Thyroid Disease No family hx of      Obesity No family hx of      Unknown/Adopted No family hx of        Medications:  Current Outpatient Medications   Medication Sig  Dispense Refill     acyclovir (ZOVIRAX) 400 MG tablet TAKE TWO TABLETS BY MOUTH TWICE DAILY 28 tablet 3     albuterol (PROAIR HFA, PROVENTIL HFA, VENTOLIN HFA) 108 (90 BASE) MCG/ACT inhaler Inhale 2 puffs into the lungs every 6 hours as needed for shortness of breath / dyspnea 1 Inhaler 11     allopurinol (ZYLOPRIM) 300 MG tablet TAKE 1 TABLET (300 MG) BY MOUTH DAILY 90 tablet 3     beclomethasone (QVAR) 40 MCG/ACT Inhaler Inhale 2 puffs into the lungs 2 times daily 1 Inhaler 11     clindamycin (CLEOCIN T) 1 % external solution Apply topically 2 times daily Profile Rx: patient will contact pharmacy when needed 60 mL 11     diclofenac (VOLTAREN) 1 % topical gel APPLY 2 GRAMS TOPICALLY TO AFFECTED AREA FOUR TIMES DAILY AS NEEDED 100 g 11     erythromycin (ROMYCIN) 5 MG/GM ophthalmic ointment Place 0.5 inches into the right eye 4 times daily 3.5 g 1     fluticasone (FLONASE) 50 MCG/ACT spray Spray 2 sprays into both nostrils daily 16 g 11     HYDROcodone-acetaminophen (NORCO) 7.5-325 MG per tablet Take 1 tablet by mouth every 6 hours as needed for pain (4 x daily as needed  maxium 4 per day) 120 tablet 0     ipratropium (ATROVENT) 0.06 % nasal spray Spray 2 sprays into both nostrils 4 times daily 15 mL 1     naloxone (NARCAN) 4 MG/0.1ML nasal spray Spray 1 spray (4 mg) into one nostril alternating nostrils once as needed for opioid reversal Every 2-3 minutes until patient responsive or EMS arrives 0.2 mL 0     oxymetazoline (AFRIN) 0.05 % nasal spray Spray 2 sprays into both nostrils 2 times daily 15 mL 0     salicylic acid (MEDIPLAST) 40 % miscellaneous Cut to fit wart, apply every night, put duct tape to secure in place, then remove in the morning. 25 each 3     sildenafil (VIAGRA) 100 MG tablet Take 0.5-1 tablets ( mg) by mouth daily as needed Take 30 min to 4 hours before intercourse.  Never use with nitroglycerin, terazosin or doxazosin. 100 tablet 11     simvastatin (ZOCOR) 40 MG tablet TAKE 1 TABLET (40  MG) BY MOUTH AT BEDTIME 90 tablet 3     Allergies   Allergen Reactions     Seasonal Allergies          Review of Systems:  -Constitutional: Otherwise feeling well today, in usual state of health.  -Skin: As above in HPI. No additional skin concerns.    Physical exam:  GEN: This is a well developed, well-nourished male in no acute distress, in a pleasant mood.    SKIN: Examination of the face, upper chest, abdomen, and hands was performed.  -Jerez skin type: II  -Lesion of concern on right thumb is hyperkeratotic plaque.  -Lesion of concern on right cheek and left abdomen is waxy, stuck-on brown plaque.  -Lesion of concern on right upper chest is bright red papule  -No other lesions of concern on areas examined.     Impression/Plan:  Verruca vulgaris, R thumb. Counseled on natural history and viral etiology of this condition. Counseled that these are often recalcitrant to treatment. Advised that higher rates of success can be observed with combination monthly cryotherapy in office and home treatments with salicylic acid plasters. Patient agreeable to this plan today.    Cryotherapy procedure note: After verbal consent and discussion of risks and benefits including but no limited to dyspigmentation/scar, blister, and pain, 1 lesino in above location was(were) treated with 1-2mm freeze border for 2 cycles with liquid nitrogen. Post cryotherapy instructions were provided.    Start salicylic acid plasters qHS followed by duct tape.    2. Seborrheic keratoses. Benign nature discussed. Given symptomatic nature of lesion on right abdomen, will treat with cryo today.    Cryotherapy procedure note: After verbal consent and discussion of risks and benefits including but no limited to dyspigmentation/scar, blister, and pain, 1 lesion in above location was(were) treated with 1-2mm freeze border for 2 cycles with liquid nitrogen. Post cryotherapy instructions were provided.    3. Cherry angiomas. Discussed the natural  history and benign nature of this lesion. Reassurance provided that no additional treatment is necessary.     CC Johnson Clements MD  [address] [place] [address] on close of this encounter.    Follow-up in 1 months, earlier for new or changing lesions.       Staff Involved:  Staff Only    Gisella Steve MD    Department of Dermatology  [facility]: Phone: [phone], Fax: [phone]

## 2019-10-08 NOTE — LETTER
10/8/2019       RE: Arnaud Bird  [address]  [facility] [address]     Dear Colleague,    Thank you for referring your patient, Arnaud Bird, to the [facility] DERMATOLOGY at [facility]. Please see a copy of my visit note below.    [facility] Dermatology Note      Dermatology Problem List:  1. Wart, R 1st finger.  - Cryo 10/8/2019   - Brendan acid plasters  2. History of lesion removal, R neck, in youth. ?possible nevus sebaceous?    CC:   Chief Complaint   Patient presents with     Derm Problem     Arnaud is here today for wart on right thumb and a spot on the right side of his face.         Encounter Date: Oct 8, 2019    History of Present Illness:  Mr. Arnaud Bird is a 64 year old male who presents for evaluation of wart and lesion on right cheek. Patient has had wart on his right thumb for many years. It was frozen once and then just came back. He will sand it down and put tomasz on it. It is irritating because he works as a . Also notes spot on his right cheek and upper chest. These are asymptomatic. Lastly has lesion on his left abdomen, which catches on his suspenders. No painful, bleeding, non-healing, or otherwise symptomatic lesions. Otherwise in usual state of health. No additional skin concerns.    Had large lesion removed from his right neck in youth, he's not sure what it was but says it looks like the one on his right cheek.     Past Medical History:   Patient Active Problem List   Diagnosis     Stiffness of knee joint, right     Gait difficulty     S/P TKR (total knee replacement)     Bilateral carpal tunnel syndrome     Vitamin D deficiency     Plantar fascial fibromatosis     Asthma with exacerbation     Hypertrophy of prostate without urinary obstruction     Obesity     Hyperlipidemia     Spondylosis with myelopathy, lumbar region     LEFT WORSE THAN RIGHT      CARDIOVASCULAR SCREENING; LDL GOAL LESS THAN  100     BMI 40.0-44.9, adult (H)     Mild persistent asthma     Hyperlipidemia with target LDL less than 130     Lumbar disc disease with radiculopathy     Groin strain, initial encounter     Sexual dysfunction     ACP (advance care planning)     Slac (scapholunate advanced collapse) of wrist, right     Right wrist pain     Numbness and tingling in right hand     Chronic pain syndrome     Chronic gout involving toe 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 tophus, unspecified cause     Impingement syndrome, shoulder, right     Migraine without aura and without status migrainosus, not intractable     Past Medical History:   Diagnosis Date     Arthritis      Benign positional vertigo      Carpal tunnel syndrome     mild     Hearing problem      LEFT WORSE THAN RIGHT  10/18/2012     Migraines      Nasal polyps      Unspecified asthma(493.90) 10/17/2012     Unspecified asthma, with exacerbation 10/17/2012     Past Surgical History:   Procedure Laterality Date     HC TOOTH EXTRACTION W/FORCEP       ORTHOPEDIC SURGERY      bilateral total knee replacements     TONSILLECTOMY      age 2 or 3       Social History:  Patient reports that he has never smoked. He has never used smokeless tobacco. He reports current alcohol use. He reports that he does not use drugs.    Family History:  Family History   Problem Relation Age of Onset     Cancer Father      Family History Negative Father      Family History Negative Mother      Cancer Mother      Stomach Problem Mother      Diabetes No family hx of      Coronary Artery Disease No family hx of      Hypertension No family hx of      Hyperlipidemia No family hx of      Cerebrovascular Disease No family hx of      Breast Cancer No family hx of      Colon Cancer No family hx of      Prostate Cancer No family hx of      Other Cancer No family hx of      Depression No family hx of      Anxiety Disorder No family hx of      Mental Illness No family hx of      Substance Abuse No family hx of      Anesthesia  Reaction No family hx of      Asthma No family hx of      Osteoporosis No family hx of      Genetic Disorder No family hx of      Thyroid Disease No family hx of      Obesity No family hx of      Unknown/Adopted No family hx of        Medications:  Current Outpatient Medications   Medication Sig Dispense Refill     acyclovir (ZOVIRAX) 400 MG tablet TAKE TWO TABLETS BY MOUTH TWICE DAILY 28 tablet 3     albuterol (PROAIR HFA, PROVENTIL HFA, VENTOLIN HFA) 108 (90 BASE) MCG/ACT inhaler Inhale 2 puffs into the lungs every 6 hours as needed for shortness of breath / dyspnea 1 Inhaler 11     allopurinol (ZYLOPRIM) 300 MG tablet TAKE 1 TABLET (300 MG) BY MOUTH DAILY 90 tablet 3     beclomethasone (QVAR) 40 MCG/ACT Inhaler Inhale 2 puffs into the lungs 2 times daily 1 Inhaler 11     clindamycin (CLEOCIN T) 1 % external solution Apply topically 2 times daily Profile Rx: patient will contact pharmacy when needed 60 mL 11     diclofenac (VOLTAREN) 1 % topical gel APPLY 2 GRAMS TOPICALLY TO AFFECTED AREA FOUR TIMES DAILY AS NEEDED 100 g 11     erythromycin (ROMYCIN) 5 MG/GM ophthalmic ointment Place 0.5 inches into the right eye 4 times daily 3.5 g 1     fluticasone (FLONASE) 50 MCG/ACT spray Spray 2 sprays into both nostrils daily 16 g 11     HYDROcodone-acetaminophen (NORCO) 7.5-325 MG per tablet Take 1 tablet by mouth every 6 hours as needed for pain (4 x daily as needed  maxium 4 per day) 120 tablet 0     ipratropium (ATROVENT) 0.06 % nasal spray Spray 2 sprays into both nostrils 4 times daily 15 mL 1     naloxone (NARCAN) 4 MG/0.1ML nasal spray Spray 1 spray (4 mg) into one nostril alternating nostrils once as needed for opioid reversal Every 2-3 minutes until patient responsive or EMS arrives 0.2 mL 0     oxymetazoline (AFRIN) 0.05 % nasal spray Spray 2 sprays into both nostrils 2 times daily 15 mL 0     salicylic acid (MEDIPLAST) 40 % miscellaneous Cut to fit wart, apply every night, put duct tape to secure in place, then  remove in the morning. 25 each 3     sildenafil (VIAGRA) 100 MG tablet Take 0.5-1 tablets ( mg) by mouth daily as needed Take 30 min to 4 hours before intercourse.  Never use with nitroglycerin, terazosin or doxazosin. 100 tablet 11     simvastatin (ZOCOR) 40 MG tablet TAKE 1 TABLET (40 MG) BY MOUTH AT BEDTIME 90 tablet 3     Allergies   Allergen Reactions     Seasonal Allergies          Review of Systems:  -Constitutional: Otherwise feeling well today, in usual state of health.  -Skin: As above in HPI. No additional skin concerns.    Physical exam:  GEN: This is a well developed, well-nourished male in no acute distress, in a pleasant mood.    SKIN: Examination of the face, upper chest, abdomen, and hands was performed.  -Jerez skin type: II  -Lesion of concern on right thumb is hyperkeratotic plaque.  -Lesion of concern on right cheek and left abdomen is waxy, stuck-on brown plaque.  -Lesion of concern on right upper chest is bright red papule  -No other lesions of concern on areas examined.     Impression/Plan:  Verruca vulgaris, R thumb. Counseled on natural history and viral etiology of this condition. Counseled that these are often recalcitrant to treatment. Advised that higher rates of success can be observed with combination monthly cryotherapy in office and home treatments with salicylic acid plasters. Patient agreeable to this plan today.    Cryotherapy procedure note: After verbal consent and discussion of risks and benefits including but no limited to dyspigmentation/scar, blister, and pain, 1 lesino in above location was(were) treated with 1-2mm freeze border for 2 cycles with liquid nitrogen. Post cryotherapy instructions were provided.    Start salicylic acid plasters qHS followed by duct tape.    2. Seborrheic keratoses. Benign nature discussed. Given symptomatic nature of lesion on right abdomen, will treat with cryo today.    Cryotherapy procedure note: After verbal consent and  discussion of risks and benefits including but no limited to dyspigmentation/scar, blister, and pain, 1 lesion in above location was(were) treated with 1-2mm freeze border for 2 cycles with liquid nitrogen. Post cryotherapy instructions were provided.    3. Cherry angiomas. Discussed the natural history and benign nature of this lesion. Reassurance provided that no additional treatment is necessary.     CC Johnson Clements MD  [address] [facility] [address] [place] [address] on close of this encounter.    Follow-up in 1 months, earlier for new or changing lesions.       Staff Involved:  Staff Only    Gisella Steve MD    Department of Dermatology  [facility]: Phone: [phone], Fax: [phone]

## 2019-10-08 NOTE — NURSING NOTE
Dermatology Rooming Note    Arnaud Bird's goals for this visit include:   Chief Complaint   Patient presents with     Derm Problem     Arnaud is here today for wart on right thumb and a spot on the right side of his face.     Hyu Causey, [facility]

## 2019-10-08 NOTE — PATIENT INSTRUCTIONS
Spot on right cheek and left belly =  seborrheic keratosis. This is a harmless thickening of the skin.    Red spot on chest = cherry angioma.    For the wart, this is caused by a virus that is hiding. We need to irritate the skin to get your body to react to it.    Start salicyclic acid plasters every night. Cut to fit wart, secure with duct tape, then remove in the morning. Wait about 1 week until skin heals after freezing to start these.  If too irritating, take a night off.    Return in 1 month, sooner if concerns.      Cryotherapy    What is it?    Use of a very cold liquid, such as liquid nitrogen, to freeze and destroy abnormal skin cells that need to be removed    What should I expect?    Tenderness and redness    A small blister that might grow and fill with dark purple blood. There may be crusting.    More than one treatment may be needed if the lesions do not go away.    How do I care for the treated area?    Gently wash the area with your hands when bathing.    Use a thin layer of Vaseline to help with healing. You may use a Band-Aid.     The area should heal within 7-10 days and may leave behind a pink or lighter color.     Do not use an antibiotic or Neosporin ointment.     You may take acetaminophen (Tylenol) for pain.     Call your Doctor if you have:    Severe pain    Signs of infection (warmth, redness, cloudy yellow drainage, and or a bad smell)    Questions or concerns    Who should I call with questions?       [facility]: [phone]       [facility]: [phone]       For urgent needs outside of business hours call the [facility] at [phone]        and ask for the dermatology resident on call

## 2019-10-25 DIAGNOSIS — G89.4 CHRONIC PAIN SYNDROME: Chronic | ICD-10-CM

## 2019-10-25 RX ORDER — HYDROCODONE BITARTRATE AND ACETAMINOPHEN 7.5; 325 MG/1; MG/1
1 TABLET ORAL EVERY 6 HOURS PRN
Qty: 120 TABLET | Refills: 0 | Status: SHIPPED | OUTPATIENT
Start: 2019-10-25 | End: 2019-11-27

## 2019-10-25 NOTE — TELEPHONE ENCOUNTER
HYDROcodone-acetaminophen (NORCO)      Last Written Prescription Date:  10-1-19  Last Fill Quantity: 120tab,   # refills: 0  Last Office Visit: 9-9-19  Future Office visit:    Next 5 appointments (look out 90 days)    Dec 09, 2019 10:15 AM CST  Office Visit with Johnson Clements MD  [facility] [address] [place] [id number] [phone]           Routing refill request to provider for review/approval because:  Drug not on the [facility], [facility] or [facility] refill protocol or controlled substance

## 2019-10-25 NOTE — TELEPHONE ENCOUNTER
Controlled Substance Refill Request for   Requested Prescriptions   Pending Prescriptions Disp Refills     HYDROcodone-acetaminophen (NORCO) 7.5-325 MG per tablet  Last Written Prescription Date:  10/1/2019  Last Fill Quantity: 120,  # refills: 0   Last office visit: 9/9/2019 with prescribing provider:  DANYELLE Toledo   Future Office Visit:   Next 5 appointments (look out 90 days)    Dec 09, 2019 10:15 AM CST  [facility] [address] [address]  [phone]          120 tablet 0     Sig: Take 1 tablet by mouth every 6 hours as needed for pain (4 x daily as needed  maxium 4 per day)       There is no refill protocol information for this order          Problem List Complete:  Yes  Patient is followed by SCOTTY TOLEDO MD for ongoing prescription of pain medication.  All refills should only be approved by this provider, or covering partner.    Medication(s):  NORCO  7.5MG PO FOUR TIMES DAILY .   Maximum quantity per month:  120   Clinic visit frequency required: Q 3 months     Controlled substance agreement:  Encounter-Level CSA - 10/31/2016:                 Controlled Substance Agreement - Scan on 11/15/2016  7:53 AM : CONTROLLED SUBSTANCE AGREEMENT (below)            Pain Clinic evaluation in the past: No    DIRE Total Score(s):    7/2/2016   Total Score 18       Last [facility] website verification:  8/26/2019, no concerns.    [email or web address]       checked in past 3 months?  Yes 8/26/2019, no concerns  [place] reviewed:  not reviewed/not due - last done on 8/26/2019    [place]:  [email or web address]  Routing refill request to provider for review/approval because:  Drug not on the [facility] refill protocol

## 2019-10-25 NOTE — TELEPHONE ENCOUNTER
Reason for Call:  Medication or medication refill:    Do you use a [facility]?  Name of the pharmacy and phone number for the current request:  [id number] [place], [place] - [address] [place] [address]      Name of the medication requested:    HYDROcodone-acetaminophen (NORCO) 7.5-325 MG per tablet         Other request: Please send to pharmacy. If any questions please call    Can we leave a detailed message on this number? YES    Phone number patient can be reached at: Home number on file [phone] (home)    Best Time: any    Call taken on 10/25/2019 at 11:08 AM by KEANU SCHULTE

## 2019-10-30 ENCOUNTER — TELEPHONE (OUTPATIENT)
Dept: ORTHOPEDICS | Facility: CLINIC | Age: 64
End: 2019-10-30

## 2019-10-30 NOTE — TELEPHONE ENCOUNTER
Patient stated that he is going deer hunting and would like to be pain free. Counseled with  he has had 4 Bilateral shoulder injections and shoulder surgeons don't want more than 6 injections if doing shoulder surgery.  would like him to get bilateral shoulder MRI's. He will get bilateral shoulder injections, but know the limit is 6

## 2019-10-30 NOTE — TELEPHONE ENCOUNTER
[facility]    Phone Message    May a detailed message be left on voicemail: yes    Reason for Call: Other: Arnaud would like to schedule another Bilateral Glenohumeral US guided CSI.  Last completed on 7.24.19.  Please call to schedule.     Action Taken: Message routed to:  [facility] ([facility] ump sport

## 2019-11-07 ENCOUNTER — OFFICE VISIT (OUTPATIENT)
Dept: ORTHOPEDICS | Facility: CLINIC | Age: 64
End: 2019-11-07
Payer: COMMERCIAL

## 2019-11-07 VITALS — RESPIRATION RATE: 16 BRPM | HEIGHT: 70 IN | WEIGHT: 264 LBS | BODY MASS INDEX: 37.8 KG/M2

## 2019-11-07 DIAGNOSIS — S46.212A BICEPS TENDON RUPTURE, LEFT, INITIAL ENCOUNTER: ICD-10-CM

## 2019-11-07 DIAGNOSIS — M19.012 LOCALIZED OSTEOARTHRITIS OF SHOULDERS, BILATERAL: Primary | ICD-10-CM

## 2019-11-07 DIAGNOSIS — M19.011 LOCALIZED OSTEOARTHRITIS OF SHOULDERS, BILATERAL: Primary | ICD-10-CM

## 2019-11-07 RX ORDER — TRIAMCINOLONE ACETONIDE 40 MG/ML
40 INJECTION, SUSPENSION INTRA-ARTICULAR; INTRAMUSCULAR
Status: DISCONTINUED | OUTPATIENT
Start: 2019-11-07 | End: 2021-05-28

## 2019-11-07 RX ORDER — ROPIVACAINE HYDROCHLORIDE 5 MG/ML
1 INJECTION, SOLUTION EPIDURAL; INFILTRATION; PERINEURAL
Status: DISCONTINUED | OUTPATIENT
Start: 2019-11-07 | End: 2021-05-28

## 2019-11-07 RX ORDER — ROPIVACAINE HYDROCHLORIDE 5 MG/ML
3 INJECTION, SOLUTION EPIDURAL; INFILTRATION; PERINEURAL
Status: DISCONTINUED | OUTPATIENT
Start: 2019-11-07 | End: 2021-05-28

## 2019-11-07 RX ADMIN — ROPIVACAINE HYDROCHLORIDE 1 ML: 5 INJECTION, SOLUTION EPIDURAL; INFILTRATION; PERINEURAL at 08:21

## 2019-11-07 RX ADMIN — ROPIVACAINE HYDROCHLORIDE 3 ML: 5 INJECTION, SOLUTION EPIDURAL; INFILTRATION; PERINEURAL at 08:21

## 2019-11-07 RX ADMIN — TRIAMCINOLONE ACETONIDE 40 MG: 40 INJECTION, SUSPENSION INTRA-ARTICULAR; INTRAMUSCULAR at 08:21

## 2019-11-07 ASSESSMENT — MIFFLIN-ST. JEOR: SCORE: 1983.75

## 2019-11-07 NOTE — PROGRESS NOTES
PROBLEM: Bilateral glenohumeral osteoarthritis, AC arthritis    SUBJECTIVE: Pt returns for US-guided corticosteroid injections for glenohumeral OA.  Discussed injection one or both AC joints.  Less than 1 week ago, pt was working on one of his cars and reached across his body and felt sudden onset of pain left shoulder with subsequent development of pain and ecchymosis pf left upper arm to elbow.  Also, noted change in shape of biceps muscle.  Still has significant pain.  Pt states this is fourth injection.    OBJECTIVE: Ecchymosis of left upper arm circumferentially at the lower 2/3 of his humerus to his elbow.  When flexing, biceps is asymmetric to right, although ecchymosis makes it more difficult to appreciate the asymmetry.  Because of pain, further exam is not performed.  X-rays of shoulders and AC joint reviewed with patient.    ASSESSMENT: Bilateral glenohumeral osteoarthritis, AC arthritis  Acute long head biceps tendon rupture    PLAN: Discussed new injury and discussed non-operative approach and outcomes along with consideration for surgical consultation if he chose.  Bilateral US-guided corticosteroid injections for shoulders.  Discussed recommended lifetime number of glenohumeral injections before surgical considerations.      PROCEDURE: The indications, risks, expected benefits were discussed.  Formal consent was obtained. The humeral head, glenoid, hyperechoic triangle indicating the posterior labrum were identified by ultrasound.  Initially, 4 mL 0.5% ropivicaine  was injected with US guidance along the injection track for anesthesia.  Using sterile technique, a syringe with a 80 mm, 22 gauge needle containing 1 mL Kenalog 40 mg/mL and 4 mL 0.5% ropivicaine  were injected using an US guided posterior approach into the left glenohumeral joint.  US used to guide needle placement and verify proper needle position.  Images and video indicating proper needle placement were recorded.  Upon completion of  the injection, the wound was dressed with a bandaid. Follow up prn.     The humeral head, glenoid, hyperechoic triangle indicating the posterior labrum were identified by ultrasound.  Initially, 4 mL 0.5% ropivicaine  was injected with US guidance along the injection track for anesthesia.  Using sterile technique, a syringe with a 80 mm, 22 gauge needle containing 1 mL Kenalog 40 mg/mL and 4 mL 0.5% ropivicaine  were injected using an US guided posterior approach into the right glenohumeral joint.  US used to guide needle placement and verify proper needle position.  Images and video indicating proper needle placement were recorded.  Upon completion of the injection, the wound was dressed with a bandaid. Follow up prn.    Pt may return for AC joint injections.      Large Joint Injection/Arthocentesis: bilateral glenohumeral  Date/Time: 11/7/2019 8:21 AM  Performed by: Dominic Levin MD  Authorized by: Dominic Levin MD     Indications:  Osteoarthritis  Needle Size:  22 G  Guidance: ultrasound    Approach:  Posterolateral  Location:  Shoulder  Laterality:  Bilateral      Site:  Bilateral glenohumeral  Medications (Right):  40 mg triamcinolone 40 MG/ML; 1 mL ropivacaine 5 MG/ML; 3 mL ropivacaine 5 MG/ML  Medications (Left):  40 mg triamcinolone 40 MG/ML; 1 mL ropivacaine 5 MG/ML; 3 mL ropivacaine 5 MG/ML  Procedure discussed: discussed risks, benefits, and alternatives    Consent Given by:  Patient  Timeout: timeout called immediately prior to procedure    Prep: patient was prepped and draped in usual sterile fashion     Additional 8mL of ropivacaine was utilized    Scribed by eTrrance Olivares ATC, ATC for  on 11/7/19 at 8, based on providers statements to me.

## 2019-11-07 NOTE — LETTER
11/7/2019      RE: Arnaud Bird  [address]  [facility] [address]       PROBLEM: Bilateral glenohumeral osteoarthritis, AC arthritis    SUBJECTIVE: Pt returns for US-guided corticosteroid injections for glenohumeral OA.  Discussed injection one or both AC joints.  Less than 1 week ago, pt was working on one of his cars and reached across his body and felt sudden onset of pain left shoulder with subsequent development of pain and ecchymosis pf left upper arm to elbow.  Also, noted change in shape of biceps muscle.  Still has significant pain.  Pt states this is fourth injection.    OBJECTIVE: Ecchymosis of left upper arm circumferentially at the lower 2/3 of his humerus to his elbow.  When flexing, biceps is asymmetric to right, although ecchymosis makes it more difficult to appreciate the asymmetry.  Because of pain, further exam is not performed.  X-rays of shoulders and AC joint reviewed with patient.    ASSESSMENT: Bilateral glenohumeral osteoarthritis, AC arthritis  Acute long head biceps tendon rupture    PLAN: Discussed new injury and discussed non-operative approach and outcomes along with consideration for surgical consultation if he chose.  Bilateral US-guided corticosteroid injections for shoulders.  Discussed recommended lifetime number of glenohumeral injections before surgical considerations.      PROCEDURE: The indications, risks, expected benefits were discussed.  Formal consent was obtained. The humeral head, glenoid, hyperechoic triangle indicating the posterior labrum were identified by ultrasound.  Initially, 4 mL 0.5% ropivicaine  was injected with US guidance along the injection track for anesthesia.  Using sterile technique, a syringe with a 80 mm, 22 gauge needle containing 1 mL Kenalog 40 mg/mL and 4 mL 0.5% ropivicaine  were injected using an US guided posterior approach into the left glenohumeral joint.  US used to guide needle placement and verify proper needle  position.  Images and video indicating proper needle placement were recorded.  Upon completion of the injection, the wound was dressed with a bandaid. Follow up prn.     The humeral head, glenoid, hyperechoic triangle indicating the posterior labrum were identified by ultrasound.  Initially, 4 mL 0.5% ropivicaine  was injected with US guidance along the injection track for anesthesia.  Using sterile technique, a syringe with a 80 mm, 22 gauge needle containing 1 mL Kenalog 40 mg/mL and 4 mL 0.5% ropivicaine  were injected using an US guided posterior approach into the right glenohumeral joint.  US used to guide needle placement and verify proper needle position.  Images and video indicating proper needle placement were recorded.  Upon completion of the injection, the wound was dressed with a bandaid. Follow up prn.    Pt may return for AC joint injections.      Large Joint Injection/Arthocentesis: bilateral glenohumeral  Date/Time: 11/7/2019 8:21 AM  Performed by: Dominic Levin MD  Authorized by: Dominic Levin MD     Indications:  Osteoarthritis  Needle Size:  22 G  Guidance: ultrasound    Approach:  Posterolateral  Location:  Shoulder  Laterality:  Bilateral      Site:  Bilateral glenohumeral  Medications (Right):  40 mg triamcinolone 40 MG/ML; 1 mL ropivacaine 5 MG/ML; 3 mL ropivacaine 5 MG/ML  Medications (Left):  40 mg triamcinolone 40 MG/ML; 1 mL ropivacaine 5 MG/ML; 3 mL ropivacaine 5 MG/ML  Procedure discussed: discussed risks, benefits, and alternatives    Consent Given by:  Patient  Timeout: timeout called immediately prior to procedure    Prep: patient was prepped and draped in usual sterile fashion     Additional 8mL of ropivacaine was utilized    Scribed by Terrance Olivares ATC, ATC for  on 11/7/19 at 8, based on providers statements to me.            Dominic Levin MD

## 2019-11-07 NOTE — NURSING NOTE
[address] [address]  Dept: [phone]  ______________________________________________________________________________    Patient: Arnaud Bird   : 1955   MRN: [id number]   2019    INVASIVE PROCEDURE SAFETY CHECKLIST    Date: 19   Procedure:Bilateral ultrasound guided glenohumeral kenalog injections  Patient Name: Arnaud Bird  MRN: [id number]  YOB: 1955    Action: Complete sections as appropriate. Any discrepancy results in a HARD COPY until resolved.     PRE PROCEDURE:  Patient ID verified with 2 identifiers (name and  or MRN): Yes  Procedure and site verified with patient/designee (when able): Yes  Accurate consent documentation in medical record: Yes  H&P (or appropriate assessment) documented in medical record: Yes  H&P must be up to 20 days prior to procedure and updates within 24 hours of procedure as applicable: NA  Relevant diagnostic and radiology test results appropriately labeled and displayed as applicable: Yes  Procedure site(s) marked with provider initials: NA    TIMEOUT:  Time-Out performed immediately prior to starting procedure, including verbal and active participation of all team members addressing the following:Yes  * Correct patient identify  * Confirmed that the correct side and site are marked  * An accurate procedure consent form  * Agreement on the procedure to be done  * Correct patient position  * Relevant images and results are properly labeled and appropriately displayed  * The need to administer antibiotics or fluids for irrigation purposes during the procedure as applicable   * Safety precautions based on patient history or medication use    DURING PROCEDURE: Verification of correct person, site, and procedures any time the responsibility for care of the patient is transferred to another member of the care team.       Prior to injection, verified patient identity  using patient's name and date of birth.  Due to injection administration, patient instructed to remain in clinic for 15 minutes  afterwards, and to report any adverse reaction to me immediately.    Joint injection was performed.      Drug Amount Wasted:  Yes: 4 mg/ml ropivacaine   Vial/Syringe: Single dose vial  Expiration Date:  3/22      Terrance Olivares ATC  November 7, 2019

## 2019-11-26 DIAGNOSIS — G89.4 CHRONIC PAIN SYNDROME: Chronic | ICD-10-CM

## 2019-11-26 NOTE — TELEPHONE ENCOUNTER
Controlled Substance Refill Request for HYDROcodone-acetaminophen (NORCO) 7.5-325 MG per tablet  Problem List Complete:  Yes    Patient is followed by SCOTTY TOLEDO MD for ongoing prescription of pain medication.  All refills should only be approved by this provider, or covering partner.     Medication(s):  NORCO  7.5MG PO FOUR TIMES DAILY .   Maximum quantity per month:  120   Clinic visit frequency required: Q 3 months      Controlled substance agreement:  Encounter-Level CSA - 10/31/2016:                     Controlled Substance Agreement - Scan on 11/15/2016  7:53 AM : CONTROLLED SUBSTANCE AGREEMENT (below)                Pain Clinic evaluation in the past: No     DIRE Total Score(s):    7/2/2016   Total Score 18         Last [facility] website verification:  8/26/2019, no concerns.    [email or web address]      Last Written Prescription Date:  10/25/2019  Last Fill Quantity: 120 tablet,  # refills: 0   Last office visit: 9/9/2019 with prescribing provider:  DANYELLE Toledo   Future Office Visit:   Next 5 appointments (look out 90 days)    Dec 05, 2019 10:45 AM CST  [facility] [address] [place] [address]  [phone]   Dec 09, 2019 10:15 AM CST  [facility] [address] [place] [address]  [phone]             Controlled substance agreement:   Encounter-Level CSA - 10/31/2016:    Controlled Substance Agreement - Scan on 11/15/2016  7:53 AM: CONTROLLED SUBSTANCE AGREEMENT     Patient-Level CSA:    Controlled Substance Agreement - Opioid - Scan on 3/19/2019 11:01 AM         Last Urine Drug Screen:   Pain Drug SCR UR W RPTD Meds   Date Value Ref Range Status   09/09/2019 FINAL  Final     Comment:      (Note)  ====================================================================  [facility] COMP DRUG ANALYSIS,UR  ====================================================================  Test                             Result       Flag       Units        Drug Present and Declared for Prescription Verification   Hydrocodone                    665          EXPECTED   ng/mg creat   Hydromorphone                  93           EXPECTED   ng/mg creat   Dihydrocodeine                 149          EXPECTED   ng/mg creat   Norhydrocodone                 1732         EXPECTED   ng/mg creat    Sources of hydrocodone include scheduled prescription    medications. Hydromorphone, dihydrocodeine and norhydrocodone are    expected metabolites of hydrocodone. Hydromorphone and    dihydrocodeine are also available as scheduled prescription    medications.   Acetaminophen                  PRESENT      EXPECTED                Drug Absent but Declared for Prescription Verification   Diclofenac                     Not Detected UNEXPECTED                Diclofenac, as indicated in the declared medication list, is not    always detected even when used as directed.  ====================================================================  Test                      Result    Flag   Units      Ref Range        Creatinine              171              mg/dL      >=20            ====================================================================  Declared Medications:  The flagging and interpretation on this report are based on the  following declared medications.  Unexpected results may arise from  inaccuracies in the declared medications.  **Note: The testing scope of this panel includes these medications:  Hydrocodone (Norco)  **Note: The testing scope of this panel does not include small to  moderate amounts of these reported medications:  Acetaminophen (Norco)  Diclofenac (Voltaren)  **Note: The testing scope of this panel does not include  following  reported medications:  Acyclovir  Albuterol  Allopurinol  Amoxicillin (Augmentin)  Beclomethasone (QVAR)  Clavulinate (Augmentin)  Clindamycin (Cleocin)  Erythromycin  Fluticasone  Ipratropium (Atrovent)  Naloxone (Narcan)  Oxymetazoline (Afrin)  Sildenafil (Viagra)  Simvastatin (Zocor)  ====================================================================  For clinical consultation, please call [phone].  ====================================================================  Analysis performed by [employer] [place] [address]     , No results found for: COMDAT, No results found for: THC13, PCP13, COC13, MAMP13, OPI13, AMP13, BZO13, TCA13, MTD13, BAR13, OXY13, PPX13, BUP13         [email or web address]   checked in past 3 months?  Yes 8/26/2019

## 2019-11-26 NOTE — TELEPHONE ENCOUNTER
Reason for Call:  Medication or medication refill:  Do you use a [facility]?  Name of the pharmacy and phone number for the current request[facility]  [id number] [place], [place] - [address] [place] [address]  [phone]  Name of the medication requested: HYDROcodone-acetaminophen (NORCO) 7.5-325 MG per tablet  Other request: none  Can we leave a detailed message on this number? YES  Phone number patient can be reached at: Home number on file [phone] (home)  Best Time: any  Call taken on 11/26/2019 at 11:42 AM by MAKAYLA DURON

## 2019-11-27 RX ORDER — HYDROCODONE BITARTRATE AND ACETAMINOPHEN 7.5; 325 MG/1; MG/1
1 TABLET ORAL EVERY 6 HOURS PRN
Qty: 120 TABLET | Refills: 0 | Status: SHIPPED | OUTPATIENT
Start: 2019-11-27 | End: 2019-12-05

## 2019-11-29 NOTE — TELEPHONE ENCOUNTER
Patient called back to check on rx. Per chart review, rx was locally printed.     Per huddle with care team, rx was locally printed. Placed at  at [place].     Patient Contact    Called patient and informed that he can come  Rx at [facility]. No further action needed.

## 2019-12-05 ENCOUNTER — TELEPHONE (OUTPATIENT)
Dept: FAMILY MEDICINE | Facility: CLINIC | Age: 64
End: 2019-12-05

## 2019-12-05 ENCOUNTER — OFFICE VISIT (OUTPATIENT)
Dept: FAMILY MEDICINE | Facility: CLINIC | Age: 64
End: 2019-12-05
Payer: COMMERCIAL

## 2019-12-05 VITALS
SYSTOLIC BLOOD PRESSURE: 126 MMHG | OXYGEN SATURATION: 96 % | RESPIRATION RATE: 18 BRPM | DIASTOLIC BLOOD PRESSURE: 78 MMHG | BODY MASS INDEX: 36.59 KG/M2 | WEIGHT: 255 LBS | HEART RATE: 84 BPM | TEMPERATURE: 97.6 F

## 2019-12-05 DIAGNOSIS — G89.29 CHRONIC PAIN OF BOTH SHOULDERS: Primary | ICD-10-CM

## 2019-12-05 DIAGNOSIS — G89.4 CHRONIC PAIN SYNDROME: Chronic | ICD-10-CM

## 2019-12-05 DIAGNOSIS — M51.16 LUMBAR DISC DISEASE WITH RADICULOPATHY: ICD-10-CM

## 2019-12-05 DIAGNOSIS — M25.512 CHRONIC PAIN OF BOTH SHOULDERS: Primary | ICD-10-CM

## 2019-12-05 DIAGNOSIS — S46.112D RUPTURE OF LEFT LONG HEAD BICEPS TENDON, SUBSEQUENT ENCOUNTER: ICD-10-CM

## 2019-12-05 DIAGNOSIS — G89.4 CHRONIC PAIN SYNDROME: Primary | Chronic | ICD-10-CM

## 2019-12-05 DIAGNOSIS — M25.511 CHRONIC PAIN OF BOTH SHOULDERS: Primary | ICD-10-CM

## 2019-12-05 PROCEDURE — 99214 OFFICE O/P EST MOD 30 MIN: CPT | Performed by: FAMILY MEDICINE

## 2019-12-05 RX ORDER — LIDOCAINE 50 MG/G
1 PATCH TOPICAL EVERY 24 HOURS
Qty: 30 PATCH | Refills: 3 | Status: SHIPPED | OUTPATIENT
Start: 2019-12-05 | End: 2019-12-09

## 2019-12-05 RX ORDER — HYDROCODONE BITARTRATE AND ACETAMINOPHEN 7.5; 325 MG/1; MG/1
1 TABLET ORAL EVERY 6 HOURS PRN
Qty: 120 TABLET | Refills: 0 | Status: SHIPPED | OUTPATIENT
Start: 2019-12-26 | End: 2020-01-29

## 2019-12-05 RX ORDER — PHENTERMINE HYDROCHLORIDE 37.5 MG/1
TABLET ORAL
Refills: 2 | COMMUNITY
Start: 2019-04-22 | End: 2020-03-30

## 2019-12-05 NOTE — TELEPHONE ENCOUNTER
Prior Authorization Retail Medication Request    Medication/Dose: lidocaine (LIDODERM) 5 % patch   ICD code (if different than what is on RX):     Previously Tried and Failed:     Rationale:       Insurance Name:     Insurance ID:         Pharmacy Information (if different than what is on RX)  Name:     Phone:

## 2019-12-05 NOTE — PROGRESS NOTES
Subjective     Arnaud Bird is a 64 year old male who presents to clinic today for the following health issues:    HPI   Concern - left arm  Onset: 11/4    Description:   Tendon in arm,     Intensity: moderate    Progression of Symptoms:  improving    Accompanying Signs & Symptoms:  Bruising,     Previous history of similar problem:   na    Precipitating factors:   Worsened by: na    Alleviating factors:  Improved by: na  OBJECTIVE   /78   Pulse 84   Temp 97.6  F (36.4  C) (Tympanic)   Resp 18   Wt 115.7 kg (255 lb)   SpO2 96%   BMI 36.59 kg/m      OBJECTIVE     LEFT BICEPS RUPTURE  4 WEEKS AGO     TENDER BULGING     OSTEOARTHRITIS BOTH SHOULDERS     SIDE EFFECTS BENEFITS AND RISKS DISCUSSED      TREATMENT PROGNOSIS BENEFITS AND RISKS DISCUSSED     MEDICATION RISKS SIDE EFFECTS BENEFITS AND RISKS DISCUSSED     SURGERY DISCUSSION     ASSESSMENT RUPTURE BICEPS TENDONS    HAS ALREADY SEEN ORTHO WHO RECOMMENDED NO SURGERY     SIDE EFFECTS BENEFITS AND RISKS DISCUSSED      TREATMENT PROGNOSIS BENEFITS AND RISKS DISCUSSED     UP TO DATE INFORMATION GIVEN           There are no preventive care reminders to display for this patient.          .  Current Outpatient Medications   Medication Sig Dispense Refill     acyclovir (ZOVIRAX) 400 MG tablet TAKE TWO TABLETS BY MOUTH TWICE DAILY 28 tablet 3     albuterol (PROAIR HFA, PROVENTIL HFA, VENTOLIN HFA) 108 (90 BASE) MCG/ACT inhaler Inhale 2 puffs into the lungs every 6 hours as needed for shortness of breath / dyspnea 1 Inhaler 11     allopurinol (ZYLOPRIM) 300 MG tablet TAKE 1 TABLET (300 MG) BY MOUTH DAILY 90 tablet 3     beclomethasone (QVAR) 40 MCG/ACT Inhaler Inhale 2 puffs into the lungs 2 times daily 1 Inhaler 11     clindamycin (CLEOCIN T) 1 % external solution Apply topically 2 times daily Profile Rx: patient will contact pharmacy when needed 60 mL 11     diclofenac (VOLTAREN) 1 % topical gel APPLY 2 GRAMS TOPICALLY TO AFFECTED AREA FOUR TIMES DAILY AS  NEEDED 100 g 11     erythromycin (ROMYCIN) 5 MG/GM ophthalmic ointment Place 0.5 inches into the right eye 4 times daily 3.5 g 1     fluticasone (FLONASE) 50 MCG/ACT spray Spray 2 sprays into both nostrils daily 16 g 11     [START ON 12/26/2019] HYDROcodone-acetaminophen (NORCO) 7.5-325 MG per tablet Take 1 tablet by mouth every 6 hours as needed for pain (4 x daily as needed  maxium 4 per day) 120 tablet 0     ipratropium (ATROVENT) 0.06 % nasal spray Spray 2 sprays into both nostrils 4 times daily 15 mL 1     lidocaine (LIDODERM) 5 % patch Place 1 patch onto the skin every 24 hours To prevent lidocaine toxicity, patient should be patch free for 12 hrs daily. 30 patch 3     naloxone (NARCAN) 4 MG/0.1ML nasal spray Spray 1 spray (4 mg) into one nostril alternating nostrils once as needed for opioid reversal Every 2-3 minutes until patient responsive or EMS arrives 0.2 mL 0     oxymetazoline (AFRIN) 0.05 % nasal spray Spray 2 sprays into both nostrils 2 times daily 15 mL 0     phentermine (ADIPEX-P) 37.5 MG tablet TAKE 1 TABLET BY MOUTH EVERY DAY IN THE MORNING  2     salicylic acid (MEDIPLAST) 40 % miscellaneous Cut to fit wart, apply every night, put duct tape to secure in place, then remove in the morning. 25 each 3     sildenafil (VIAGRA) 100 MG tablet Take 0.5-1 tablets ( mg) by mouth daily as needed Take 30 min to 4 hours before intercourse.  Never use with nitroglycerin, terazosin or doxazosin. 100 tablet 11     simvastatin (ZOCOR) 40 MG tablet TAKE 1 TABLET (40 MG) BY MOUTH AT BEDTIME 90 tablet 3              Allergies   Allergen Reactions     Seasonal Allergies            Immunization History   Administered Date(s) Administered     Influenza (High Dose) 3 valent vaccine 09/09/2019     Influenza (IIV3) PF 10/18/2012     Pneumo Conj 13-V (2010&after) 09/09/2019     TDAP Vaccine (Adacel) 06/18/2019               reports current alcohol use.          reports no history of drug use.        family history  includes Cancer in his father and mother; Family History Negative in his father and mother; Stomach Problem in his mother.        He indicated that the status of his no family hx of is unknown. He indicated that his mother is . He indicated that his father is . He indicated that his sister is alive. He indicated that both of his brothers are alive.             has a past surgical history that includes tonsillectomy; TOOTH EXTRACTION W/FORCEP; and orthopedic surgery.         reports being sexually active and has had partner(s) who are Female. He reports using the following method of birth control/protection: Male Surgical.    .  Pediatric History   Patient Parents     Not on file     Other Topics Concern     Parent/sibling w/ CABG, MI or angioplasty before 65F 55M? No   Social History Narrative     Not on file               reports that he has never smoked. He has never used smokeless tobacco.        Medical, social, surgical, and family histories reviewed.        Labs reviewed in EPIC  Patient Active Problem List   Diagnosis     Stiffness of knee joint, right     Gait difficulty     S/P TKR (total knee replacement)     Bilateral carpal tunnel syndrome     Vitamin D deficiency     Plantar fascial fibromatosis     Asthma with exacerbation     Hypertrophy of prostate without urinary obstruction     Obesity     Hyperlipidemia     Spondylosis with myelopathy, lumbar region     LEFT WORSE THAN RIGHT      CARDIOVASCULAR SCREENING; LDL GOAL LESS THAN 100     BMI 40.0-44.9, adult (H)     Mild persistent asthma     Hyperlipidemia with target LDL less than 130     Lumbar disc disease with radiculopathy     Groin strain, initial encounter     Sexual dysfunction     ACP (advance care planning)     Slac (scapholunate advanced collapse) of wrist, right     Right wrist pain     Numbness and tingling in right hand     Chronic pain syndrome     Chronic gout involving toe 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 tophus, unspecified cause      Impingement syndrome, shoulder, right     Migraine without aura and without status migrainosus, not intractable       Past Surgical History:   Procedure Laterality Date     HC TOOTH EXTRACTION W/FORCEP       ORTHOPEDIC SURGERY      bilateral total knee replacements     TONSILLECTOMY      age 2 or 3         Social History     Tobacco Use     Smoking status: Never Smoker     Smokeless tobacco: Never Used   Substance Use Topics     Alcohol use: Yes     Comment: case beer a week       Family History   Problem Relation Age of Onset     Cancer Father      Family History Negative Father      Family History Negative Mother      Cancer Mother      Stomach Problem Mother      Diabetes No family hx of      Coronary Artery Disease No family hx of      Hypertension No family hx of      Hyperlipidemia No family hx of      Cerebrovascular Disease No family hx of      Breast Cancer No family hx of      Colon Cancer No family hx of      Prostate Cancer No family hx of      Other Cancer No family hx of      Depression No family hx of      Anxiety Disorder No family hx of      Mental Illness No family hx of      Substance Abuse No family hx of      Anesthesia Reaction No family hx of      Asthma No family hx of      Osteoporosis No family hx of      Genetic Disorder No family hx of      Thyroid Disease No family hx of      Obesity No family hx of      Unknown/Adopted No family hx of              Current Outpatient Medications   Medication Sig Dispense Refill     acyclovir (ZOVIRAX) 400 MG tablet TAKE TWO TABLETS BY MOUTH TWICE DAILY 28 tablet 3     albuterol (PROAIR HFA, PROVENTIL HFA, VENTOLIN HFA) 108 (90 BASE) MCG/ACT inhaler Inhale 2 puffs into the lungs every 6 hours as needed for shortness of breath / dyspnea 1 Inhaler 11     allopurinol (ZYLOPRIM) 300 MG tablet TAKE 1 TABLET (300 MG) BY MOUTH DAILY 90 tablet 3     beclomethasone (QVAR) 40 MCG/ACT Inhaler Inhale 2 puffs into the lungs 2 times daily 1 Inhaler 11      clindamycin (CLEOCIN T) 1 % external solution Apply topically 2 times daily Profile Rx: patient will contact pharmacy when needed 60 mL 11     diclofenac (VOLTAREN) 1 % topical gel APPLY 2 GRAMS TOPICALLY TO AFFECTED AREA FOUR TIMES DAILY AS NEEDED 100 g 11     erythromycin (ROMYCIN) 5 MG/GM ophthalmic ointment Place 0.5 inches into the right eye 4 times daily 3.5 g 1     fluticasone (FLONASE) 50 MCG/ACT spray Spray 2 sprays into both nostrils daily 16 g 11     [START ON 12/26/2019] HYDROcodone-acetaminophen (NORCO) 7.5-325 MG per tablet Take 1 tablet by mouth every 6 hours as needed for pain (4 x daily as needed  maxium 4 per day) 120 tablet 0     ipratropium (ATROVENT) 0.06 % nasal spray Spray 2 sprays into both nostrils 4 times daily 15 mL 1     lidocaine (LIDODERM) 5 % patch Place 1 patch onto the skin every 24 hours To prevent lidocaine toxicity, patient should be patch free for 12 hrs daily. 30 patch 3     naloxone (NARCAN) 4 MG/0.1ML nasal spray Spray 1 spray (4 mg) into one nostril alternating nostrils once as needed for opioid reversal Every 2-3 minutes until patient responsive or EMS arrives 0.2 mL 0     oxymetazoline (AFRIN) 0.05 % nasal spray Spray 2 sprays into both nostrils 2 times daily 15 mL 0     phentermine (ADIPEX-P) 37.5 MG tablet TAKE 1 TABLET BY MOUTH EVERY DAY IN THE MORNING  2     salicylic acid (MEDIPLAST) 40 % miscellaneous Cut to fit wart, apply every night, put duct tape to secure in place, then remove in the morning. 25 each 3     sildenafil (VIAGRA) 100 MG tablet Take 0.5-1 tablets ( mg) by mouth daily as needed Take 30 min to 4 hours before intercourse.  Never use with nitroglycerin, terazosin or doxazosin. 100 tablet 11     simvastatin (ZOCOR) 40 MG tablet TAKE 1 TABLET (40 MG) BY MOUTH AT BEDTIME 90 tablet 3           Recent Labs   Lab Test 09/09/19  0945 06/04/18  0842 03/26/18  0832 12/07/17  1023 12/30/16  0804 10/31/16  0822   A1C  --   --   --   --  5.4  --    *  --   88  --   --  85   HDL 61  --  58  --   --  55   TRIG 97  --  117  --   --  94   ALT 34 40  --  37  --  35   CR 0.74 0.70  --  0.85  --  0.88   GFRESTIMATED >90 >90  --  >90  --  87   GFRESTBLACK >90 >90  --  >90  --  >90   POTASSIUM 4.5 4.2  --   --   --   --    TSH  --  2.02  --   --   --   --             BP Readings from Last 6 Encounters:   12/05/19 126/78   09/09/19 (!) 152/100   07/24/19 139/86   06/18/19 130/88   06/05/19 133/89   03/19/19 120/76           Wt Readings from Last 3 Encounters:   12/05/19 115.7 kg (255 lb)   11/07/19 119.7 kg (264 lb)   09/09/19 117.5 kg (259 lb)                 Positive symptoms or findings indicated by bold designation:         ROS: 10 point ROS neg other than the symptoms noted above in the HPI.except  has Stiffness of knee joint, right; Gait difficulty; S/P TKR (total knee replacement); Bilateral carpal tunnel syndrome; Vitamin D deficiency; Plantar fascial fibromatosis; Asthma with exacerbation; Hypertrophy of prostate without urinary obstruction; Obesity; Hyperlipidemia; Spondylosis with myelopathy, lumbar region; LEFT WORSE THAN RIGHT ; CARDIOVASCULAR SCREENING; LDL GOAL LESS THAN 100; BMI 40.0-44.9, adult (H); Mild persistent asthma; Hyperlipidemia with target LDL less than 130; Lumbar disc disease with radiculopathy; Groin strain, initial encounter; Sexual dysfunction; ACP (advance care planning); Slac (scapholunate advanced collapse) of wrist, right; Right wrist pain; Numbness and tingling in right hand; Chronic pain syndrome; Chronic gout involving toe 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 tophus, unspecified cause; Impingement syndrome, shoulder, right; and Migraine without aura and without status migrainosus, not intractable on their problem list.  Review Of Systems    Skin: negative    Eyes: negative    Ears/Nose/Throat: negative    Respiratory: No shortness of breath, dyspnea on exertion, cough, or hemoptysis    Cardiovascular: negative    Gastrointestinal:  negative    Genitourinary: negative    Musculoskeletal:  SEE HISTORY OF PRESENT ILLNESS     Neurologic: negative    Psychiatric: negative    Hematologic/Lymphatic/Immunologic: negative    Endocrine: negative                PE:  /78   Pulse 84   Temp 97.6  F (36.4  C) (Tympanic)   Resp 18   Wt 115.7 kg (255 lb)   SpO2 96%   BMI 36.59 kg/m   Body mass index is 36.59 kg/m .        Constitutional: general appearance, well nourished, well developed, in no acute distress, well developed, appears stated age, normal body habitus,  SIGNIFICANT OBESITY          Eyes:; The patient has normal eyelids sclerae and conjunctivae :          Ears/Nose/Throat: external ear, overall: normal appearance; external nose, overall: benign appearance, normal moujth gums and lips           Neck: thyroid, overall: normal size, normal consistency, nontender,          Respiratory:  palpation of chest, overall: normal excursion,     Clear to percussion and auscultation     NO Tachypnea    NORMAL  Color          Cardiovascular:  Good color with no peripheral edema    Regular sinus rhythm without murmur.  Physiologic heart sounds   Heart is unelarged    .         Musculoskeletal:  Brief ortho exam normal except:   LEFT BICEPS TENDON ENLARGEMENT AND WEAKNESS          Integument: inspection of skin, no rash, lesions; and, palpation, no induration, no tenderness.          Neurologic mental status, overall: alert and oriented; gait, no ataxia, no unsteadiness; coordination, no tremors; cranial nerves, overall: normal motor, overall: normal bulk, tone.          Psychiatric: orientation/consciousness, overall: oriented to person, place and time; behavior/psychomotor activity, no tics, normal psychomotor activity; mood and affect, overall: normal mood and affect; appearance, overall: well-groomed, good eye contact; speech, overall: normal quality, no aphasia and normal quality, quantity, intact.        Diagnostic Test Results:  No results found  for any visits on 12/05/19.        ICD-10-CM    1. Chronic pain of both shoulders M25.511 lidocaine (LIDODERM) 5 % patch    G89.29     M25.512    2. Chronic pain syndrome G89.4 HYDROcodone-acetaminophen (NORCO) 7.5-325 MG per tablet     lidocaine (LIDODERM) 5 % patch   3. Rupture of left long head biceps tendon, subsequent encounter S46.112D               .    Side effects benefits and risks thoroughly discussed. .he may come in early if unimproved or getting worse          Please drink 2 glasses of water prior to meals and walk 15-30 minutes after meals        I spent 25 MINUTES SPENT  with patient discussing the following issues   The primary encounter diagnosis was Chronic pain of both shoulders. Diagnoses of Chronic pain syndrome and Rupture of left long head biceps tendon, subsequent encounter were also pertinent to this visit. over half of which involved counseling and coordination of care.      Patient Instructions   (M25.511,  G89.29,  M25.512) Chronic pain of both shoulders  (primary encounter diagnosis)  Comment:    Plan: lidocaine (LIDODERM) 5 % patch             (G89.4) Chronic pain syndrome  Comment:    Plan: HYDROcodone-acetaminophen (NORCO) 7.5-325 MG         per tablet, lidocaine (LIDODERM) 5 % patch    5 -10MINUTES WALKING WALK IN PLACE , DANCING OR AEROBIC EXERCISE THREE TIMES DAILY     REDUCES RISK OF DYING 50%  ,THAT IS 25 MINUTES PER DAY     BALANCE WITH KNEE RAISED TO PREVENT FALLS INITIALLY EYES OPEN ON ONE FOOT 6    PREVENTS FALLS 40%    STRENGTHENING UPPER EXTREMETIES WITH 80% MAXIMAL LIFT STRENGTHENS LUMBAR AND THORACIC SPINE    YOU CAN DO THE SAME THING WITH 1 POUND WEIGHTS  100 EACH POSTION X 5     SCOTTY TOLEDO JR., MD   POMEGRANATE JUICE INFLAMMATION  COLLAGEN TYPE 2  [facility]  CHICKEN BONE BROTH 1-2 OUNCES DAILY  FISH OIL ONE DAILY   PAPAYA  AND PINEAPPLE SCAR HEALING   BLUEBERRIES FOR INFLAMMATION  SPINACH FOR INFLAMMATION   BOSWELLIA RICARDO  CURCUMIN   PINE BARK  "EXTRACT    AVOID SUGARS   AVOID NIGHT SHADES   POTATOES   PEPPERS   TOMATOES  \"LEAKY GUT SYNDROME\"    SCOTTY TOLEDO JR., MD                                      ALL THE ABOVE PROBLEMS ARE STABLE AND MED CHANGES AS NOTED        Diet:  MEDITERRANEAN DIET         Exercise:  LEFT UPPER EXTREMETY   Exercises Range of motion, balance, isometric, and strengthening exercises 30 repetitions twice daily of involved joints            .SCOTTY TOLEDO MD 12/5/2019 12:40 PM  December 5, 2019    "

## 2019-12-05 NOTE — PATIENT INSTRUCTIONS
"(M25.511,  G89.29,  M25.512) Chronic pain of both shoulders  (primary encounter diagnosis)  Comment:    Plan: lidocaine (LIDODERM) 5 % patch             (G89.4) Chronic pain syndrome  Comment:    Plan: HYDROcodone-acetaminophen (NORCO) 7.5-325 MG         per tablet, lidocaine (LIDODERM) 5 % patch    5 -10MINUTES WALKING WALK IN PLACE , DANCING OR AEROBIC EXERCISE THREE TIMES DAILY     REDUCES RISK OF DYING 50%  ,THAT IS 25 MINUTES PER DAY     BALANCE WITH KNEE RAISED TO PREVENT FALLS INITIALLY EYES OPEN ON ONE FOOT 6    PREVENTS FALLS 40%    STRENGTHENING UPPER EXTREMETIES WITH 80% MAXIMAL LIFT STRENGTHENS LUMBAR AND THORACIC SPINE    YOU CAN DO THE SAME THING WITH 1 POUND WEIGHTS  100 EACH POSTION X 5     SCOTTY TOLEDO JR., MD   POMEGRANATE JUICE INFLAMMATION  COLLAGEN TYPE 2  [facility]  CHICKEN BONE BROTH 1-2 OUNCES DAILY  FISH OIL ONE DAILY   PAPAYA  AND PINEAPPLE SCAR HEALING   BLUEBERRIES FOR INFLAMMATION  SPINACH FOR INFLAMMATION   BOSWELLIA RICARDO  CURCUMIN   PINE BARK EXTRACT    AVOID SUGARS   AVOID NIGHT SHADES   POTATOES   PEPPERS   TOMATOES  \"LEAKY GUT SYNDROME\"    SCOTTY TOLEDO JR., MD                            "

## 2019-12-06 DIAGNOSIS — R09.81 NASAL CONGESTION: ICD-10-CM

## 2019-12-06 NOTE — TELEPHONE ENCOUNTER
ipratropium (ATROVENT) 0.06 % nasal spray     Last Written Prescription Date:  9/9/2019  Last Fill Quantity: 15 mL,  # refills: 1   Last office visit: 12/5/2019 with prescribing provider:  DANYELLE Clements   Future Office Visit:   Next 5 appointments (look out 90 days)    Dec 09, 2019 10:15 AM CST  [facility] [address] [place] [id number] [phone]           Routing refill request to provider for review/approval because:  Drug not on the [facility], [facility] or [facility] refill protocol or controlled substance

## 2019-12-06 NOTE — TELEPHONE ENCOUNTER
Central Prior Authorization Team   Phone: [phone]      PA Initiation    Medication: lidocaine (LIDODERM) 5 % patch  Insurance Company: [employer] - Phone [phone] Fax [phone]  Pharmacy Filling the Rx: [id number] - [place], [place] - [address]  Filling Pharmacy Phone: [phone]  Filling Pharmacy Fax:    Start Date: 12/6/2019

## 2019-12-06 NOTE — TELEPHONE ENCOUNTER
PRIOR AUTHORIZATION DENIED    Medication: lidocaine (LIDODERM) 5 % patch    Denial Date: 12/6/2019    Denial Rational:  Lidoderm patches are only covered with the diagnosis of post-herpetic neuralgia, diabetic neuropathy, or cancer related pain. It will not be covered for the associated diagnosis.       Appeal Information:    If you would like to appeal, please supply P/A team with a letter of medical necessity with clinical reason.

## 2019-12-08 RX ORDER — LIDOCAINE 40 MG/G
1 CREAM TOPICAL 4 TIMES DAILY PRN
Qty: 120 G | Refills: 3 | Status: SHIPPED | OUTPATIENT
Start: 2019-12-08 | End: 2019-12-09

## 2019-12-09 ENCOUNTER — TELEPHONE (OUTPATIENT)
Dept: FAMILY MEDICINE | Facility: CLINIC | Age: 64
End: 2019-12-09

## 2019-12-09 DIAGNOSIS — M43.06 SPONDYLOLYSIS, LUMBAR REGION: Primary | ICD-10-CM

## 2019-12-09 RX ORDER — IPRATROPIUM BROMIDE 42 UG/1
2 SPRAY, METERED NASAL 4 TIMES DAILY
Qty: 1 BOX | Refills: 11 | Status: SHIPPED | OUTPATIENT
Start: 2019-12-09 | End: 2021-04-14

## 2019-12-09 RX ORDER — LIDOCAINE 50 MG/G
OINTMENT TOPICAL 4 TIMES DAILY PRN
Qty: 150 G | Refills: 11 | Status: SHIPPED | OUTPATIENT
Start: 2019-12-09 | End: 2021-04-14

## 2019-12-09 NOTE — TELEPHONE ENCOUNTER
Routing refill request to provider for review/approval because:  Drug not on the [facility] refill protocol     Initially written for dx 'nasal congestion'.

## 2020-01-08 ENCOUNTER — OFFICE VISIT (OUTPATIENT)
Dept: ORTHOPEDICS | Facility: CLINIC | Age: 65
End: 2020-01-08
Payer: COMMERCIAL

## 2020-01-08 VITALS — BODY MASS INDEX: 36.51 KG/M2 | WEIGHT: 255 LBS | HEIGHT: 70 IN

## 2020-01-08 DIAGNOSIS — S46.212D TEAR OF LEFT BICEPS MUSCLE, SUBSEQUENT ENCOUNTER: Primary | ICD-10-CM

## 2020-01-08 DIAGNOSIS — M19.012 PRIMARY OSTEOARTHRITIS OF LEFT SHOULDER: ICD-10-CM

## 2020-01-08 ASSESSMENT — MIFFLIN-ST. JEOR: SCORE: 1942.92

## 2020-01-08 ASSESSMENT — PAIN SCALES - GENERAL: PAINLEVEL: SEVERE PAIN (6)
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
## 2020-01-08 NOTE — PROGRESS NOTES
Subjective:   Arnaud Bird is a 64 year old male who is f/u left biceps tear  Sick around Holidays.  URI into sinuses and took Zpak neighbor got from [place].  Early Nov, getting ready for deer hunting.  Tree blew over in front of deer stand.  [place] stand was frozen in and he snapped his bicep.  Bicep torn, 2-3 weeks, still hurting.  Can't sleep.  Sat around and put TENS unit over shoulder to forget about the pain for a while.  Didn't feel good if he turned it up.  Up at place for deer hunting for a week, hurt if did too much.  Loss of ROM, painful to move arm above 90.  Hard time raising his arm.  Girlfriend is also down with flu symptoms, down til 2 in afternoon  Using hydrocodone for shoulders, back, lasts about 4 hours of relief  Motrin help but groggy in a.m.    Date of injury: November 2019  Date last seen: 11/7/2019  Following Therapeutic Plan: Yes   Pain: Unchanged  Function: Unchanged  Interval History:  CSI 11/7/2019, TENS unit, hydrocodone    PAST MEDICAL, SOCIAL, SURGICAL AND FAMILY HISTORY: He  has a past medical history of Arthritis, Benign positional vertigo, Carpal tunnel syndrome, Hearing problem, LEFT WORSE THAN RIGHT  (10/18/2012), Migraines, Nasal polyps, Unspecified asthma(493.90) (10/17/2012), and Unspecified asthma, with exacerbation (10/17/2012). He also has no past medical history of Congestive heart failure, unspecified, COPD (chronic obstructive pulmonary disease) (H), Coronary artery disease, Diabetes mellitus (H), History of blood transfusion, Hypertension, Malignant neoplasm (H), Thyroid disease, or Unspecified cerebral artery occlusion with cerebral infarction.  He  has a past surgical history that includes tonsillectomy; TOOTH EXTRACTION W/FORCEP; and orthopedic surgery.  His family history includes Cancer in his father and mother; Family History Negative in his father and mother; Stomach Problem in his mother.  He reports that he has never smoked. He has never used smokeless  "tobacco. He reports current alcohol use. He reports that he does not use drugs.    ALLERGIES: He is allergic to seasonal allergies.    CURRENT MEDICATIONS: He has a current medication list which includes the following prescription(s): acyclovir, albuterol, allopurinol, beclomethasone, clindamycin, diclofenac, erythromycin, fluticasone, hydrocodone-acetaminophen, ipratropium, lidocaine, naloxone, oxymetazoline, phentermine, salicylic acid, sildenafil, and simvastatin, and the following Facility-Administered Medications: ropivacaine, ropivacaine, ropivacaine, ropivacaine, ropivacaine, ropivacaine, ropivacaine, ropivacaine, ropivacaine, ropivacaine, ropivacaine, ropivacaine, ropivacaine, triamcinolone, triamcinolone, triamcinolone, triamcinolone, triamcinolone, triamcinolone, triamcinolone, triamcinolone, and triamcinolone.     REVIEW OF SYSTEMS: 10 point review of systems is negative except as noted above.     Exam:   Ht 1.778 m (5' 10\")   Wt 115.7 kg (255 lb)   BMI 36.59 kg/m             CONSTITUTIONAL: moderate distress, cooperative and obese  HEAD: Normocephalic. No masses, lesions, tenderness or abnormalities  SKIN: no suspicious lesions or rashes  GAIT: normal  NEUROLOGIC: Non-focal  PSYCHIATRIC: affect normal/bright and mentation appears normal.    MUSCULOSKELETAL: shoulders    Palpation:  Non-tender SC joint, clavicle, AC joint, acromion, subacromial space and upper trapezius muscle  Tender: bicep tendon torn   Range of Motion        Active:abduction- pain at 90 degrees        Passive: all normal  Strength: rotator cuff strength full  Special tests: Negative Neer's test, Negative Ko, Negative Cross-Arm adduction,  Negative Angel's         Assessment/Plan:   Arnaud is a 65 yo white male with pMhx of obesity, GH joint OA presenting with left arm bicep tear, bilateral shoulder OA R>L    1. Left bicep tear- discussion whether to see surgery or PT, he opts for PT  2. Shoulder OA, R>L- last injection " 11/7/2019    RTC 2 months    X-RAY INTERPRETATION:   reviewed

## 2020-01-16 ENCOUNTER — THERAPY VISIT (OUTPATIENT)
Dept: PHYSICAL THERAPY | Facility: CLINIC | Age: 65
End: 2020-01-16
Attending: FAMILY MEDICINE
Payer: COMMERCIAL

## 2020-01-16 DIAGNOSIS — S46.212D TEAR OF LEFT BICEPS MUSCLE, SUBSEQUENT ENCOUNTER: ICD-10-CM

## 2020-01-16 DIAGNOSIS — M19.012 PRIMARY OSTEOARTHRITIS OF LEFT SHOULDER: ICD-10-CM

## 2020-01-16 PROCEDURE — 97110 THERAPEUTIC EXERCISES: CPT | Mod: GP | Performed by: PHYSICAL THERAPIST

## 2020-01-16 PROCEDURE — 97161 PT EVAL LOW COMPLEX 20 MIN: CPT | Mod: GP | Performed by: PHYSICAL THERAPIST

## 2020-01-16 NOTE — PROGRESS NOTES
Physical Therapy Initial Evaluation  January 16, 2020     MD Instructions/Precautions/Restrictions: PT eval and treat.     Therapist Impression:   Arnaud Bird presents with findings consistent with L long head biceps rupture and bilateral shoulder OA, with related impairments limiting his ability to sleep, reach arms in a variety of directions. Skilled PT services are necessary in order to reduce impairments and improve independent function.     Subjective:   C/C: L long head biceps rupture. Sleep is worsening, motion is worsening. Also has been followed by sports medicine for years for progressive shoulder OA bilaterally.   Date of Onset: Nov 3, 2019  History of symptoms: Pains began gradually as the result of jerking a log he was trying to move while hunting. Since onset, symptoms are worsening.  Quality of pain is dull, aching and sharp. Pains are described as constant in nature. Pain is worse: not dependent on time of day. Pain is rated 8/10.     General health as reported by patient: good  Pertinent medical/surgical history: Refer to health history in EMR.   Imaging: none recently.   Prior treatment? medication (hydrocodone, ibuprofen), TENS, heat, voltaren gel, ice  Patient's goals are: decrease pain, sleep more easily.   Return to MD:  PRN.       Objective:  SHOULDER EXAMINATION      SHOULDER RANGE OF MOTION & STRENGTH  (*Indicates patient s pain)   PROM L PROM R AROM L AROM R MMT L MMT R   Flex   130* 155     ABD   125* 150 5- 5   ER 65 75   4+* 5   IR 50 55   4+* 5     Elbow flexion/extension full and painfree on L  Biceps length full and painfree    Positive Sinclair's on L      Assessment/Plan:    The patient is a 64 year old male with chief complaint of L shoulder pain.    The patient has the following significant findings with corresponding treatment plan.  Diagnosis 1:  L long head biceps rupture, bilateral shoulder OA    Pain -  hot/cold therapy, manual therapy, splint/taping/bracing/orthotics  and self management  Decreased ROM/flexibility - manual therapy, therapeutic exercise and home program  Decreased joint mobility - manual therapy, therapeutic exercise and home program  Decreased strength - therapeutic exercise, therapeutic activities and home program  Impaired balance - neuro re-education, therapeutic activities and home program  Decreased proprioception - neuro re-education and therapeutic activities  Impaired gait - gait training and assistive devices  Impaired muscle performance - neuro re-education and home program  Decreased function - therapeutic activities and home program  Impaired posture - neuro re-education, therapeutic activities and home program  Instability -  Therapeutic Activity, Therapeutic Exercise, Neuromuscular Re-education, Splinting/Taping/Bracing/Orthotic, home program      Therapy Evaluation Codes:   1) History comprised of:   Personal factors that impact the plan of care:      Please refer to health history in EMR.    Comorbidity factors that impact the plan of care are:      Please refer to health history in EMR.     Medications impacting care: None.  2) Examination of Body Systems comprised of:   Body structures and functions that impact the plan of care:      Shoulder.   Activity limitations that impact the plan of care are:      Bathing, Cooking, Driving, Dressing, Lifting, Sleeping, Laying down and reaching.   Clinical presentation characteristics are:    Stable/Uncomplicated.  3) Presentation comprised of:   Presentation scored as Low complexity with uncomplicated characteristics..  4) Decision-Making    Low complexity using standardized patient assessment instrument and/or measureable assessment of functional outcome.  Cumulative Therapy Evaluation is: Low complexity.    Previous and current functional limitations:  (See Goal Flow Sheet for this information)    Short term and Long term goals: (See Goal Flow Sheet for this information)     Communication ability:   Patient appears to be able to clearly communicate and understand verbal and written communication and follow directions correctly.  Treatment Explanation - The following has been discussed with the patient: RX ordered/plan of care, anticipated outcomes, and possible risks and side effects.  This patient would benefit from PT intervention to resume normal activities.   Rehab potential is good.    Frequency:  1 X week, once daily  Duration:  for 4 weeks tapering to 2x/month for 2 months  Discharge Plan: Achieve all LTGs, be independent in home treatment program, and reach maximal therapeutic benefit.    Please refer to the daily flowsheet for treatment today, total treatment time and time spent performing 1:1 timed codes.

## 2020-01-21 ENCOUNTER — THERAPY VISIT (OUTPATIENT)
Dept: PHYSICAL THERAPY | Facility: CLINIC | Age: 65
End: 2020-01-21
Payer: COMMERCIAL

## 2020-01-21 DIAGNOSIS — M19.012 PRIMARY OSTEOARTHRITIS OF LEFT SHOULDER: ICD-10-CM

## 2020-01-21 DIAGNOSIS — S46.212D TEAR OF LEFT BICEPS MUSCLE, SUBSEQUENT ENCOUNTER: ICD-10-CM

## 2020-01-21 PROCEDURE — 97110 THERAPEUTIC EXERCISES: CPT | Mod: GP | Performed by: PHYSICAL THERAPY ASSISTANT

## 2020-01-27 DIAGNOSIS — G89.4 CHRONIC PAIN SYNDROME: Chronic | ICD-10-CM

## 2020-01-27 NOTE — TELEPHONE ENCOUNTER
Reason for Call:  Medication or medication refill:    Do you use a [facility]?  Name of the pharmacy and phone number for the current request:  patient would like to  prescription    Name of the medication requested:   HYDROcodone-acetaminophen (NORCO) 7.5-325 MG per tablet 120 tablet         Other request: Please give patient a call when ready for .    Can we leave a detailed message on this number? YES    Phone number patient can be reached at: Home number on file [phone] (home)    Best Time:     Call taken on 1/27/2020 at 2:20 PM by CATALINA KHAN

## 2020-01-27 NOTE — TELEPHONE ENCOUNTER
Controlled Substance Refill Request for HYDROcodone-acetaminophen (NORCO) 7.5-325 MG per tablet  Problem List Complete:  Yes      Patient is followed by SCOTTY TOLEDO MD for ongoing prescription of pain medication.  All refills should only be approved by this provider, or covering partner.     Medication(s):  NORCO  7.5MG PO FOUR TIMES DAILY .   Maximum quantity per month:  120   Clinic visit frequency required: Q 3 months      Controlled substance agreement:  Encounter-Level CSA - 10/31/2016:                     Controlled Substance Agreement - Scan on 11/15/2016  7:53 AM : CONTROLLED SUBSTANCE AGREEMENT (below)                Pain Clinic evaluation in the past: No     DIRE Total Score(s):    7/2/2016   Total Score 18         Last [facility] website verification:  8/26/2019, no concerns.    [email or web address]           Last Written Prescription Date:  12/26/2019  Last Fill Quantity: 120 tablet,  # refills: 0   Last office visit: 12/5/2019 with prescribing provider:  DANYELLE Toledo   Future Office Visit:        Controlled substance agreement:   Encounter-Level CSA - 10/31/2016:    Controlled Substance Agreement - Scan on 11/15/2016  7:53 AM: CONTROLLED SUBSTANCE AGREEMENT     Patient-Level CSA:    Controlled Substance Agreement - Opioid - Scan on 3/19/2019 11:01 AM         Last Urine Drug Screen:   Pain Drug SCR UR W RPTD Meds   Date Value Ref Range Status   09/09/2019 FINAL  Final     Comment:     (Note)  ====================================================================  [facility] COMP DRUG ANALYSIS,UR  ====================================================================  Test                             Result       Flag       Units        Drug Present and Declared for Prescription Verification   Hydrocodone                    665          EXPECTED   ng/mg creat   Hydromorphone                  93           EXPECTED   ng/mg creat   Dihydrocodeine                 149          EXPECTED   ng/mg creat    Norhydrocodone                 1732         EXPECTED   ng/mg creat    Sources of hydrocodone include scheduled prescription    medications. Hydromorphone, dihydrocodeine and norhydrocodone are    expected metabolites of hydrocodone. Hydromorphone and    dihydrocodeine are also available as scheduled prescription    medications.   Acetaminophen                  PRESENT      EXPECTED                Drug Absent but Declared for Prescription Verification   Diclofenac                     Not Detected UNEXPECTED                Diclofenac, as indicated in the declared medication list, is not    always detected even when used as directed.  ====================================================================  Test                      Result    Flag   Units      Ref Range        Creatinine              171              mg/dL      >=20            ====================================================================  Declared Medications:  The flagging and interpretation on this report are based on the  following declared medications.  Unexpected results may arise from  inaccuracies in the declared medications.  **Note: The testing scope of this panel includes these medications:  Hydrocodone (Norco)  **Note: The testing scope of this panel does not include small to  moderate amounts of these reported medications:  Acetaminophen (Norco)  Diclofenac (Voltaren)  **Note: The testing scope of this panel does not include following  reported medications:  Acyclovir  Albuterol  Allopurinol  Amoxicillin (Augmentin)  Beclomethasone (QVAR)  Clavulinate (Augmentin)  Clindamycin (Cleocin)  Erythromycin  Fluticasone  Ipratropium (Atrovent)  Naloxone (Narcan)  Oxymetazoline (Afrin)  Sildenafil (Viagra)  Simvastatin (Zocor)  ====================================================================  For clinical consultation, please call [phone].  ====================================================================  Analysis performed by  [employer] [place] [address]     , No results found for: COMDAT, No results found for: THC13, PCP13, COC13, MAMP13, OPI13, AMP13, BZO13, TCA13, MTD13, BAR13, OXY13, PPX13, BUP13       [email or web address]   checked in past 3 months?  No, route to RN

## 2020-01-28 ENCOUNTER — THERAPY VISIT (OUTPATIENT)
Dept: PHYSICAL THERAPY | Facility: CLINIC | Age: 65
End: 2020-01-28
Payer: COMMERCIAL

## 2020-01-28 DIAGNOSIS — S46.212D TEAR OF LEFT BICEPS MUSCLE, SUBSEQUENT ENCOUNTER: ICD-10-CM

## 2020-01-28 DIAGNOSIS — M19.012 PRIMARY OSTEOARTHRITIS OF LEFT SHOULDER: ICD-10-CM

## 2020-01-28 PROCEDURE — 97110 THERAPEUTIC EXERCISES: CPT | Mod: GP | Performed by: PHYSICAL THERAPIST

## 2020-01-28 PROCEDURE — 97112 NEUROMUSCULAR REEDUCATION: CPT | Mod: GP | Performed by: PHYSICAL THERAPIST

## 2020-01-28 NOTE — TELEPHONE ENCOUNTER
RX [place] ([place] reviewed:  reviewed- no concerns    [place]:  [email or web address]

## 2020-01-29 RX ORDER — HYDROCODONE BITARTRATE AND ACETAMINOPHEN 7.5; 325 MG/1; MG/1
1 TABLET ORAL EVERY 6 HOURS PRN
Qty: 120 TABLET | Refills: 0 | Status: SHIPPED | OUTPATIENT
Start: 2020-01-29 | End: 2020-02-27

## 2020-01-31 NOTE — TELEPHONE ENCOUNTER
Pt called requesting status of Rx. States he would had planned to  script at clinic since [facility] does not always [facility] medication. He agreed to call [facility] and verify Rx was received. Pt will call triage back if any problems filling Rx.

## 2020-02-03 ENCOUNTER — TELEPHONE (OUTPATIENT)
Dept: ORTHOPEDICS | Facility: CLINIC | Age: 65
End: 2020-02-03

## 2020-02-03 ENCOUNTER — ANCILLARY PROCEDURE (OUTPATIENT)
Dept: GENERAL RADIOLOGY | Facility: CLINIC | Age: 65
End: 2020-02-03
Attending: INTERNAL MEDICINE
Payer: COMMERCIAL

## 2020-02-03 ENCOUNTER — OFFICE VISIT (OUTPATIENT)
Dept: URGENT CARE | Facility: URGENT CARE | Age: 65
End: 2020-02-03
Payer: COMMERCIAL

## 2020-02-03 VITALS
WEIGHT: 250 LBS | HEIGHT: 70 IN | HEART RATE: 90 BPM | TEMPERATURE: 98.8 F | RESPIRATION RATE: 14 BRPM | SYSTOLIC BLOOD PRESSURE: 122 MMHG | BODY MASS INDEX: 35.79 KG/M2 | DIASTOLIC BLOOD PRESSURE: 80 MMHG

## 2020-02-03 DIAGNOSIS — S62.102A WRIST FRACTURE, LEFT, CLOSED, INITIAL ENCOUNTER: Primary | ICD-10-CM

## 2020-02-03 DIAGNOSIS — S69.92XA WRIST INJURY, LEFT, INITIAL ENCOUNTER: ICD-10-CM
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
## 2020-02-03 DIAGNOSIS — S49.92XA SHOULDER INJURY, LEFT, INITIAL ENCOUNTER: ICD-10-CM

## 2020-02-03 PROCEDURE — 73110 X-RAY EXAM OF WRIST: CPT | Mod: LT

## 2020-02-03 PROCEDURE — 99214 OFFICE O/P EST MOD 30 MIN: CPT | Performed by: INTERNAL MEDICINE

## 2020-02-03 PROCEDURE — 73030 X-RAY EXAM OF SHOULDER: CPT | Mod: LT

## 2020-02-03 ASSESSMENT — MIFFLIN-ST. JEOR: SCORE: 1920.24

## 2020-02-03 NOTE — TELEPHONE ENCOUNTER
[facility]    Phone Message    May a detailed message be left on voicemail: yes    Reason for Call: Other: Pt states that pain has increased since beginning PT. Now down into hand. Pt would like to discuss possible imaging before his next PT on Wednesday.      Action Taken: Message routed to:  [facility] Sports

## 2020-02-03 NOTE — PATIENT INSTRUCTIONS
voltaren  Wrist splint with sling  Ice  Rest    Hand referral  Has sports medicine appointment in 2 days.      Patient Education     Wrist Fracture, General  You have a broken bone (fracture) in your wrist. This may be a small crack or chip in the bone. Or it may be a major break, with the broken parts pushed out of position. Wrist fractures are often treated with a splint or cast. They take about 4 to 6 weeks to heal. Severe injuries may need surgery.    Home care  Follow these guidelines when caring for yourself at home:    Keep your arm elevated to reduce pain and swelling. When sitting or lying down keep your arm above the level of your heart. You can do this by placing your arm on a pillow that rests on your chest or on a pillow at your side. This is most important during the first 2 days (48 hours) after the injury.    Put an ice pack on the injured area. Do this for 20 minutes every 1 to 2 hours the first day for pain relief. You can make an ice pack by wrapping a plastic bag of ice cubes in a thin towel. As the ice melts, be careful that the cast or splint doesn t get wet. Continue using the ice pack 3 to 4 times a day for the next 2 days. Then use the ice pack as needed to ease pain and swelling.    Keep the cast or splint completely dry at all times. Bathe with your cast or splint out of the water. Protect it with a large plastic bag, rubber-banded or taped at the top end. If a fiberglass cast or splint gets wet, you can dry it with a hair dryer on a cool setting.    You may use acetaminophen or ibuprofen to control pain, unless another pain medicine was prescribed. If you have chronic liver or kidney disease, talk with your healthcare provider before using these medicines. Also talk with your provider if you ve had a stomach ulcer or gastrointestinal bleeding.    Don t put creams, lotions, or objects under the cast.  Follow-up care  Follow up with your healthcare provider as advised. This is to make sure  the bone is healing the way it should. If a splint was put on, it may be changed to a cast during your follow-up visit. A cast may need to be changed at 2 to 3 weeks, as the swelling goes down.  If X-rays were taken, a radiologist may look at them. You will be told of any new findings that may affect your care.  When to seek medical advice  Call your healthcare provider right away if any of these occur:    The plaster cast or splint becomes wet or soft    The cast or splint cracks    Bad odor from the cast or wound fluid stains the cast    The fiberglass cast or splint stays wet for more than 24 hours    Tightness or pain under the cast or splint gets worse    Fingers become swollen, cold, blue, numb, or tingly    You can t move your fingers    Skin around cast becomes red, swollen, or irritated  Date Last Reviewed: 5/1/2017 2000-2019 [employer]. [address] [place] [address]. All rights reserved. This information is not intended as a substitute for professional medical care. Always follow your healthcare professional's instructions.

## 2020-02-03 NOTE — PROGRESS NOTES
SUBJECTIVE:   Arnaud Bird is a 64 year old male presenting with a chief complaint of   Chief Complaint   Patient presents with     Urgent Care     Musculoskeletal Problem     fell 2 weeks ago on ice, injur to left hand/wrist. In a lot of pain.        He is an established patient of [facility].    MS Injury/Pain  11/2019 tore bicep  Onset of symptoms was 2 week(s) ago.  Location: left wrist and hand  reinjured shoulder  Context:       The injury happened while while walking      Mechanism: fall , slipped on ice while snow blowing      Patient experienced immediate pain, immediate swelling  Course of symptoms is waxing and waning.    Current and Associated symptoms: Pain, Swelling, Tenderness and Decreased range of motion  Aggravating Factors: movement  Therapies to improve symptoms include: ice, ibuprofen and vicodin  /Physical Therapy aggravating new shoulder injury  right handed    Review of Systems    Past Medical History:   Diagnosis Date     Arthritis      Benign positional vertigo      Carpal tunnel syndrome     mild     Hearing problem      LEFT WORSE THAN RIGHT  10/18/2012     Migraines      Nasal polyps      Unspecified asthma(493.90) 10/17/2012     Unspecified asthma, with exacerbation 10/17/2012     Family History   Problem Relation Age of Onset     Cancer Father      Family History Negative Father      Family History Negative Mother      Cancer Mother      Stomach Problem Mother      Diabetes No family hx of      Coronary Artery Disease No family hx of      Hypertension No family hx of      Hyperlipidemia No family hx of      Cerebrovascular Disease No family hx of      Breast Cancer No family hx of      Colon Cancer No family hx of      Prostate Cancer No family hx of      Other Cancer No family hx of      Depression No family hx of      Anxiety Disorder No family hx of      Mental Illness No family hx of      Substance Abuse No family hx of      Anesthesia Reaction No family hx of      Asthma No  family hx of      Osteoporosis No family hx of      Genetic Disorder No family hx of      Thyroid Disease No family hx of      Obesity No family hx of      Unknown/Adopted No family hx of      Current Outpatient Medications   Medication Sig Dispense Refill     diclofenac (VOLTAREN) 1 % topical gel Place 4 g onto the skin 4 times daily 100 g 0     order for DME Equipment being ordered: Thumb spica splint for left hand 1 Device 0     acyclovir (ZOVIRAX) 400 MG tablet TAKE TWO TABLETS BY MOUTH TWICE DAILY 28 tablet 3     albuterol (PROAIR HFA, PROVENTIL HFA, VENTOLIN HFA) 108 (90 BASE) MCG/ACT inhaler Inhale 2 puffs into the lungs every 6 hours as needed for shortness of breath / dyspnea 1 Inhaler 11     allopurinol (ZYLOPRIM) 300 MG tablet TAKE 1 TABLET (300 MG) BY MOUTH DAILY 90 tablet 3     beclomethasone (QVAR) 40 MCG/ACT Inhaler Inhale 2 puffs into the lungs 2 times daily 1 Inhaler 11     clindamycin (CLEOCIN T) 1 % external solution Apply topically 2 times daily Profile Rx: patient will contact pharmacy when needed 60 mL 11     diclofenac (VOLTAREN) 1 % topical gel APPLY 2 GRAMS TOPICALLY TO AFFECTED AREA FOUR TIMES DAILY AS NEEDED 100 g 11     erythromycin (ROMYCIN) 5 MG/GM ophthalmic ointment Place 0.5 inches into the right eye 4 times daily 3.5 g 1     fluticasone (FLONASE) 50 MCG/ACT spray Spray 2 sprays into both nostrils daily 16 g 11     HYDROcodone-acetaminophen (NORCO) 7.5-325 MG per tablet Take 1 tablet by mouth every 6 hours as needed for pain (4 x daily as needed  maxium 4 per day) 120 tablet 0     ipratropium (ATROVENT) 0.06 % nasal spray SPRAY 2 SPRAYS INTO BOTH NOSTRILS 4 TIMES DAILY 1 Box 11     lidocaine (XYLOCAINE) 5 % external ointment Apply topically 4 times daily as needed for moderate pain 150 g 11     naloxone (NARCAN) 4 MG/0.1ML nasal spray Spray 1 spray (4 mg) into one nostril alternating nostrils once as needed for opioid reversal Every 2-3 minutes until patient responsive or EMS arrives  "0.2 mL 0     oxymetazoline (AFRIN) 0.05 % nasal spray Spray 2 sprays into both nostrils 2 times daily 15 mL 0     phentermine (ADIPEX-P) 37.5 MG tablet TAKE 1 TABLET BY MOUTH EVERY DAY IN THE MORNING  2     salicylic acid (MEDIPLAST) 40 % miscellaneous Cut to fit wart, apply every night, put duct tape to secure in place, then remove in the morning. 25 each 3     sildenafil (VIAGRA) 100 MG tablet Take 0.5-1 tablets ( mg) by mouth daily as needed Take 30 min to 4 hours before intercourse.  Never use with nitroglycerin, terazosin or doxazosin. 100 tablet 11     simvastatin (ZOCOR) 40 MG tablet TAKE 1 TABLET (40 MG) BY MOUTH AT BEDTIME 90 tablet 3     Social History     Tobacco Use     Smoking status: Never Smoker     Smokeless tobacco: Never Used   Substance Use Topics     Alcohol use: Yes     Comment: case beer a week       OBJECTIVE  /80   Pulse 90   Temp 98.8  F (37.1  C) (Oral)   Resp 14   Ht 1.778 m (5' 10\")   Wt 113.4 kg (250 lb)   BMI 35.87 kg/m      Physical Exam  Vitals signs reviewed.   Constitutional:       Appearance: Normal appearance.   Musculoskeletal:      Comments: left extremity  left  Wrist - radial side swollen painful larger but full wrist swelling  decreased range of motion due to pain    Remainder hand exam normal     left shoulder   SHOULDER: Exam shows poor ROM and positive impingement signs are present with pain at high arc of abduction and forward flexion on left. There is generalized tenderness.     Neurological:      Mental Status: He is alert.         Labs:  No results found for this or any previous visit (from the past 24 hour(s)).    X-Ray was done, my findings are:   Wrist - suspicious for fracture at distal radius versus navicular fracture  with bone callus formation   Shoulder - arthritis noted.  No fracture     ASSESSMENT:      ICD-10-CM    1. Wrist fracture, left, closed, initial encounter S62.102A Orthopedic & Spine  Referral   2. Fall from slipping on " ice, initial encounter W00.9XXA XR Wrist Left G/E 3 Views     XR Shoulder Left G/E 3 Views     diclofenac (VOLTAREN) 1 % topical gel     order for DME   3. Wrist injury, left, initial encounter S69.92XA XR Wrist Left G/E 3 Views     diclofenac (VOLTAREN) 1 % topical gel     order for DME   4. Shoulder injury, left, initial encounter S49.92XA XR Shoulder Left G/E 3 Views     diclofenac (VOLTAREN) 1 % topical gel     order for DME        Medical Decision Making:    Differential Diagnosis:  MS Injury Pain: sprain and fracture    PLAN:  Thumb spica splint  Ortho referral      Followup:  2 days      Patient Instructions   voltaren  Wrist splint with sling  Ice  Rest    Hand referral  Has sports medicine appointment in 2 days.      Patient Education     Wrist Fracture, General  You have a broken bone (fracture) in your wrist. This may be a small crack or chip in the bone. Or it may be a major break, with the broken parts pushed out of position. Wrist fractures are often treated with a splint or cast. They take about 4 to 6 weeks to heal. Severe injuries may need surgery.    Home care  Follow these guidelines when caring for yourself at home:    Keep your arm elevated to reduce pain and swelling. When sitting or lying down keep your arm above the level of your heart. You can do this by placing your arm on a pillow that rests on your chest or on a pillow at your side. This is most important during the first 2 days (48 hours) after the injury.    Put an ice pack on the injured area. Do this for 20 minutes every 1 to 2 hours the first day for pain relief. You can make an ice pack by wrapping a plastic bag of ice cubes in a thin towel. As the ice melts, be careful that the cast or splint doesn t get wet. Continue using the ice pack 3 to 4 times a day for the next 2 days. Then use the ice pack as needed to ease pain and swelling.    Keep the cast or splint completely dry at all times. Bathe with your cast or splint out of the  water. Protect it with a large plastic bag, rubber-banded or taped at the top end. If a fiberglass cast or splint gets wet, you can dry it with a hair dryer on a cool setting.    You may use acetaminophen or ibuprofen to control pain, unless another pain medicine was prescribed. If you have chronic liver or kidney disease, talk with your healthcare provider before using these medicines. Also talk with your provider if you ve had a stomach ulcer or gastrointestinal bleeding.    Don t put creams, lotions, or objects under the cast.  Follow-up care  Follow up with your healthcare provider as advised. This is to make sure the bone is healing the way it should. If a splint was put on, it may be changed to a cast during your follow-up visit. A cast may need to be changed at 2 to 3 weeks, as the swelling goes down.  If X-rays were taken, a radiologist may look at them. You will be told of any new findings that may affect your care.  When to seek medical advice  Call your healthcare provider right away if any of these occur:    The plaster cast or splint becomes wet or soft    The cast or splint cracks    Bad odor from the cast or wound fluid stains the cast    The fiberglass cast or splint stays wet for more than 24 hours    Tightness or pain under the cast or splint gets worse    Fingers become swollen, cold, blue, numb, or tingly    You can t move your fingers    Skin around cast becomes red, swollen, or irritated  Date Last Reviewed: 5/1/2017 2000-2019 [employer]. [address] [place] [address], [place] [address]. All rights reserved. This information is not intended as a substitute for professional medical care. Always follow your healthcare professional's instructions.

## 2020-02-03 NOTE — TELEPHONE ENCOUNTER
The patient reports hurting his left hand after fall he had that injured the left shoulder. He states that using therabands in physical therapy has been increasing his pain. Currently wrapping and icing hand. I scheduled him an appointment to see Dr. Chan on Wednesday, 2/5 at 11:10 am to check left hand pain. I advised him to continue the compression, ice, and reduce left hand use as pain tolerates. He was instructed to go urgent care if symptoms are worsening. He has physical therapy appointment on Wednesday morning, and I advised patient to let Devin know what is going on. The patient verbalized understanding.

## 2020-02-05 ENCOUNTER — THERAPY VISIT (OUTPATIENT)
Dept: PHYSICAL THERAPY | Facility: CLINIC | Age: 65
End: 2020-02-05
Payer: COMMERCIAL

## 2020-02-05 ENCOUNTER — OFFICE VISIT (OUTPATIENT)
Dept: ORTHOPEDICS | Facility: CLINIC | Age: 65
End: 2020-02-05
Payer: COMMERCIAL

## 2020-02-05 ENCOUNTER — ANCILLARY PROCEDURE (OUTPATIENT)
Dept: GENERAL RADIOLOGY | Facility: CLINIC | Age: 65
End: 2020-02-05
Attending: FAMILY MEDICINE
Payer: COMMERCIAL

## 2020-02-05 VITALS — HEIGHT: 70 IN | WEIGHT: 250 LBS | RESPIRATION RATE: 16 BRPM | BODY MASS INDEX: 35.79 KG/M2

## 2020-02-05 DIAGNOSIS — M25.532 LEFT WRIST PAIN: Primary | ICD-10-CM

## 2020-02-05 DIAGNOSIS — M25.532 LEFT WRIST PAIN: ICD-10-CM

## 2020-02-05 DIAGNOSIS — M25.442 SWELLING OF FINGER JOINT OF LEFT HAND: ICD-10-CM

## 2020-02-05 DIAGNOSIS — M19.012 PRIMARY OSTEOARTHRITIS OF LEFT SHOULDER: ICD-10-CM

## 2020-02-05 DIAGNOSIS — S46.212D TEAR OF LEFT BICEPS MUSCLE, SUBSEQUENT ENCOUNTER: ICD-10-CM

## 2020-02-05 PROCEDURE — 97110 THERAPEUTIC EXERCISES: CPT | Mod: GP | Performed by: PHYSICAL THERAPIST

## 2020-02-05 ASSESSMENT — MIFFLIN-ST. JEOR: SCORE: 1920.24

## 2020-02-05 NOTE — PROGRESS NOTES
Subjective:   Arnaud Bird is a RHD 64 year old male who is here following up on left wrist fracture. He slipped and fell on the ice while snow blowing. Working on shoulder exercises, resistance bands for exercises and wrist swelling.  Went fishing.  Sunday severe pain and doesn't remember what he did that could've caused this.  Went to [facility].  Doesn't recall much other than the fall and his left thumb was painful and now more wrist pain.  Pull the  with right hand and threw the left hand down to break the fall. FOOSH injury, didn't stop much, hit shoulder, hip, left elbow was swollen  Took pain pills and NSAIDs  No wrist extension, flexion is difficult.  Left shoulder was locking up, went to PT today, can move it around.    Date of injury: 1/2020  Date last seen: 1/8/2020    PAST MEDICAL, SOCIAL, SURGICAL AND FAMILY HISTORY: He  has a past medical history of Arthritis, Benign positional vertigo, Carpal tunnel syndrome, Hearing problem, LEFT WORSE THAN RIGHT  (10/18/2012), Migraines, Nasal polyps, Unspecified asthma(493.90) (10/17/2012), and Unspecified asthma, with exacerbation (10/17/2012). He also has no past medical history of Congestive heart failure, unspecified, COPD (chronic obstructive pulmonary disease) (H), Coronary artery disease, Diabetes mellitus (H), History of blood transfusion, Hypertension, Malignant neoplasm (H), Thyroid disease, or Unspecified cerebral artery occlusion with cerebral infarction.  He  has a past surgical history that includes tonsillectomy; TOOTH EXTRACTION W/FORCEP; and orthopedic surgery.  His family history includes Cancer in his father and mother; Family History Negative in his father and mother; Stomach Problem in his mother.  He reports that he has never smoked. He has never used smokeless tobacco. He reports current alcohol use. He reports that he does not use drugs.    ALLERGIES: He is allergic to seasonal allergies.    CURRENT MEDICATIONS: He  "has a current medication list which includes the following prescription(s): acyclovir, albuterol, allopurinol, beclomethasone, clindamycin, diclofenac, diclofenac, erythromycin, fluticasone, hydrocodone-acetaminophen, ipratropium, lidocaine, naloxone, order for dme, oxymetazoline, phentermine, salicylic acid, sildenafil, and simvastatin, and the following Facility-Administered Medications: ropivacaine, ropivacaine, ropivacaine, ropivacaine, ropivacaine, ropivacaine, ropivacaine, ropivacaine, ropivacaine, ropivacaine, ropivacaine, ropivacaine, ropivacaine, triamcinolone, triamcinolone, triamcinolone, triamcinolone, triamcinolone, triamcinolone, triamcinolone, triamcinolone, and triamcinolone.     REVIEW OF SYSTEMS: 10 point review of systems is negative except as noted above.     Exam:   Resp 16   Ht 1.778 m (5' 10\")   Wt 113.4 kg (250 lb)   BMI 35.87 kg/m             CONSTITUTIONAL: alert, moderate distress and cooperative  HEAD: Normocephalic. No masses, lesions, tenderness or abnormalities  SKIN: no suspicious lesions or rashes  GAIT: normal  NEUROLOGIC: Non-focal  PSYCHIATRIC: affect normal/bright and mentation appears normal.    MUSCULOSKELETAL: left hand pain, thumb, MCPs  Inspection:thumb, MCPs very swollen  Tender: thumb, left wrist, MCPs across his hand  Non-tender: PIP, DIPS  Range of Motion limited due to significant swelling  Strength: reduced  Special tests: pulse intact, ulnar n, median n, radial n intact             Assessment/Plan:   White male with past medical history of obesity, bilateral shoulder osteoarthritis presenting with left wrist swelling after FOOSH injury  1. Left wrist pain after FOOSH injury-  Radial thumb spica splint  Ice  NSAIDs  MRI left wrist/hand    RTC after imaging    Cast/splint application  Date/Time: 2/5/2020 1:16 PM  Performed by: Terrance Olivares ATC  Authorized by: Carmel Chan MD     Consent:     Consent obtained:  Verbal    Consent given by:  Patient    " Risks discussed:  Pain, swelling and numbness  Pre-procedure details:     Sensation:  Normal  Procedure details:     Laterality:  Left    Location:  Wrist    Wrist:  L wrist    Splint type:  Radial gutter    Supplies:  Fiberglass  Post-procedure details:     Pain:  Unchanged    Sensation:  Normal    Patient tolerance of procedure:  Tolerated well, no immediate complications        X-RAY INTERPRETATION:   X-Ray of the Left Hand/Fingers: 3-view, left thumb  ordered and interpreted in the office today was positive for IMPRESSION:   1. Polyarticular osteoarthritis, possibly posttraumatic between radial styloid and scaphoid joint.  2. 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

## 2020-02-05 NOTE — LETTER
2/5/2020    RE: Arnaud Bird  [address]  [facility] [address]      Subjective:   Arnaud Bird is a RHD 64 year old male who is here following up on left wrist fracture. He slipped and fell on the ice while snow blowing. Working on shoulder exercises, resistance bands for exercises and wrist swelling.  Went fishing.  Sunday severe pain and doesn't remember what he did that could've caused this.  Went to [facility].  Doesn't recall much other than the fall and his left thumb was painful and now more wrist pain.  Pull the  with right hand and threw the left hand down to break the fall. FOOSH injury, didn't stop much, hit shoulder, hip, left elbow was swollen  Took pain pills and NSAIDs  No wrist extension, flexion is difficult.  Left shoulder was locking up, went to PT today, can move it around.    Date of injury: 1/2020  Date last seen: 1/8/2020    PAST MEDICAL, SOCIAL, SURGICAL AND FAMILY HISTORY: He  has a past medical history of Arthritis, Benign positional vertigo, Carpal tunnel syndrome, Hearing problem, LEFT WORSE THAN RIGHT  (10/18/2012), Migraines, Nasal polyps, Unspecified asthma(493.90) (10/17/2012), and Unspecified asthma, with exacerbation (10/17/2012). He also has no past medical history of Congestive heart failure, unspecified, COPD (chronic obstructive pulmonary disease) (H), Coronary artery disease, Diabetes mellitus (H), History of blood transfusion, Hypertension, Malignant neoplasm (H), Thyroid disease, or Unspecified cerebral artery occlusion with cerebral infarction.  He  has a past surgical history that includes tonsillectomy; TOOTH EXTRACTION W/FORCEP; and orthopedic surgery.  His family history includes Cancer in his father and mother; Family History Negative in his father and mother; Stomach Problem in his mother.  He reports that he has never smoked. He has never used smokeless tobacco. He reports current alcohol use. He reports that he does not use  "drugs.    ALLERGIES: He is allergic to seasonal allergies.    CURRENT MEDICATIONS: He has a current medication list which includes the following prescription(s): acyclovir, albuterol, allopurinol, beclomethasone, clindamycin, diclofenac, diclofenac, erythromycin, fluticasone, hydrocodone-acetaminophen, ipratropium, lidocaine, naloxone, order for dme, oxymetazoline, phentermine, salicylic acid, sildenafil, and simvastatin, and the following Facility-Administered Medications: ropivacaine, ropivacaine, ropivacaine, ropivacaine, ropivacaine, ropivacaine, ropivacaine, ropivacaine, ropivacaine, ropivacaine, ropivacaine, ropivacaine, ropivacaine, triamcinolone, triamcinolone, triamcinolone, triamcinolone, triamcinolone, triamcinolone, triamcinolone, triamcinolone, and triamcinolone.     REVIEW OF SYSTEMS: 10 point review of systems is negative except as noted above.     Exam:   Resp 16   Ht 1.778 m (5' 10\")   Wt 113.4 kg (250 lb)   BMI 35.87 kg/m              CONSTITUTIONAL: alert, moderate distress and cooperative  HEAD: Normocephalic. No masses, lesions, tenderness or abnormalities  SKIN: no suspicious lesions or rashes  GAIT: normal  NEUROLOGIC: Non-focal  PSYCHIATRIC: affect normal/bright and mentation appears normal.    MUSCULOSKELETAL: left hand pain, thumb, MCPs  Inspection:thumb, MCPs very swollen  Tender: thumb, left wrist, MCPs across his hand  Non-tender: PIP, DIPS  Range of Motion limited due to significant swelling  Strength: reduced  Special tests: pulse intact, ulnar n, median n, radial n intact             Assessment/Plan:   White male with past medical history of obesity, bilateral shoulder osteoarthritis presenting with left wrist swelling after FOOSH injury  1. Left wrist pain after FOOSH injury-  Radial thumb spica splint  Ice  NSAIDs  MRI left wrist/hand    RTC after imaging    Cast/splint application  Date/Time: 2/5/2020 1:16 PM  Performed by: Terrance Olivares ATC  Authorized by: Carmel Chan " MD Teresita     Consent:     Consent obtained:  Verbal    Consent given by:  Patient    Risks discussed:  Pain, swelling and numbness  Pre-procedure details:     Sensation:  Normal  Procedure details:     Laterality:  Left    Location:  Wrist    Wrist:  L wrist    Splint type:  Radial gutter    Supplies:  Fiberglass  Post-procedure details:     Pain:  Unchanged    Sensation:  Normal    Patient tolerance of procedure:  Tolerated well, no immediate complications    X-RAY INTERPRETATION:   X-Ray of the Left Hand/Fingers: 3-view, left thumb  ordered and interpreted in the office today was positive for IMPRESSION:   1. Polyarticular osteoarthritis, possibly posttraumatic between radial styloid and scaphoid joint.  2. 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      Carmel Chan MD

## 2020-02-08 ENCOUNTER — ANCILLARY PROCEDURE (OUTPATIENT)
Dept: GENERAL RADIOLOGY | Facility: CLINIC | Age: 65
End: 2020-02-08
Attending: RADIOLOGY
Payer: COMMERCIAL

## 2020-02-08 ENCOUNTER — ANCILLARY PROCEDURE (OUTPATIENT)
Dept: MRI IMAGING | Facility: CLINIC | Age: 65
End: 2020-02-08
Attending: FAMILY MEDICINE
Payer: COMMERCIAL

## 2020-02-08 DIAGNOSIS — M25.442 SWELLING OF FINGER JOINT OF LEFT HAND: ICD-10-CM

## 2020-02-08 DIAGNOSIS — M25.532 LEFT WRIST PAIN: ICD-10-CM

## 2020-02-08 DIAGNOSIS — T15.90XA FOREIGN BODY IN EYE: ICD-10-CM

## 2020-02-08 NOTE — DISCHARGE INSTRUCTIONS
MRI Contrast Discharge Instructions    The IV contrast you received today will pass out of your body in your  urine. This will happen in the next 24 hours. You will not feel this process.  Your urine will not change color.    Drink at least 4 extra glasses of water or juice today (unless your doctor  has restricted your fluids). This reduces the stress on your kidneys.  You may take your regular medicines.    If you are on dialysis: It is best to have dialysis today.    If you have a reaction: Most reactions happen right away. If you have  any new symptoms after leaving the hospital (such as hives or swelling),  call your hospital at the correct number below. Or call your family doctor.  If you have breathing distress or wheezing, call 911.    Special instructions: ***    I have read and understand the above information.    Signature:______________________________________ Date:___________    Staff:__________________________________________ Date:___________     Time:__________    [facility] Departments:    ___[place]: [phone]  ___[facility]: [phone]  ___[place]: [phone] ___[facility]: [phone]  ___[facility]: [phone]  ___[facility]: [phone]  ___[phone]  ___[facility]: [phone]  ___[phone]

## 2020-02-12 ENCOUNTER — OFFICE VISIT (OUTPATIENT)
Dept: ORTHOPEDICS | Facility: CLINIC | Age: 65
End: 2020-02-12
Payer: COMMERCIAL

## 2020-02-12 VITALS — WEIGHT: 250 LBS | BODY MASS INDEX: 35.79 KG/M2 | HEIGHT: 70 IN

## 2020-02-12 DIAGNOSIS — S63.8X2D LEFT SCAPHOLUNATE LIGAMENT TEAR, SUBSEQUENT ENCOUNTER: Primary | ICD-10-CM

## 2020-02-12 ASSESSMENT — MIFFLIN-ST. JEOR: SCORE: 1920.24

## 2020-02-12 ASSESSMENT — PAIN SCALES - GENERAL: PAINLEVEL: MODERATE PAIN (4)

## 2020-02-12 NOTE — LETTER
2/12/2020      RE: Arnaud Bird  [address]  [facility] [address]        Subjective:   Arnaud Bird is a 64 year old male who is f/u for left hand MRI.  Doing resistance bands for his shoulder and flared up the hand.  Went fishing and then to .  Unbelievable pain, Diclofenac gel.  Right wrist painful in the past, discussed surgery at one point.  Bicep torn, left bicep still spasms.    Date last seen: 2/5/2020  Following Therapeutic Plan: Yes   Pain: Improving  Function: NA  Interval History: MRI, splint, ice    PAST MEDICAL, SOCIAL, SURGICAL AND FAMILY HISTORY: He  has a past medical history of Arthritis, Benign positional vertigo, Carpal tunnel syndrome, Hearing problem, LEFT WORSE THAN RIGHT  (10/18/2012), Migraines, Nasal polyps, Unspecified asthma(493.90) (10/17/2012), and Unspecified asthma, with exacerbation (10/17/2012). He also has no past medical history of Congestive heart failure, unspecified, COPD (chronic obstructive pulmonary disease) (H), Coronary artery disease, Diabetes mellitus (H), History of blood transfusion, Hypertension, Malignant neoplasm (H), Thyroid disease, or Unspecified cerebral artery occlusion with cerebral infarction.  He  has a past surgical history that includes tonsillectomy; TOOTH EXTRACTION W/FORCEP; and orthopedic surgery.  His family history includes Cancer in his father and mother; Family History Negative in his father and mother; Stomach Problem in his mother.  He reports that he has never smoked. He has never used smokeless tobacco. He reports current alcohol use. He reports that he does not use drugs.    ALLERGIES: He is allergic to seasonal allergies.    CURRENT MEDICATIONS: He has a current medication list which includes the following prescription(s): acyclovir, albuterol, allopurinol, beclomethasone, clindamycin, diclofenac, diclofenac, erythromycin, fluticasone, hydrocodone-acetaminophen, ipratropium, lidocaine, naloxone, order for dme,  "oxymetazoline, phentermine, salicylic acid, sildenafil, and simvastatin, and the following Facility-Administered Medications: ropivacaine, ropivacaine, ropivacaine, ropivacaine, ropivacaine, ropivacaine, ropivacaine, ropivacaine, ropivacaine, ropivacaine, ropivacaine, ropivacaine, ropivacaine, triamcinolone, triamcinolone, triamcinolone, triamcinolone, triamcinolone, triamcinolone, triamcinolone, triamcinolone, and triamcinolone.     REVIEW OF SYSTEMS: 10 point review of systems is negative except as noted above.     Exam:   Ht 1.778 m (5' 10\")   Wt 113.4 kg (250 lb)   BMI 35.87 kg/m              CONSTITUTIONAL: alert, moderate distress and cooperative, obese  HEAD: Normocephalic. No masses, lesions, tenderness or abnormalities  SKIN: no suspicious lesions or rashes  GAIT: normal  NEUROLOGIC: Non-focal  PSYCHIATRIC: affect normal/bright and mentation appears normal.    MUSCULOSKELETAL: left wrist pain  WRIST:  Inspection: swelling improving  Palpation: Tender: distal radius, scapho-lunate interval, thenar area  Non-tender: distal ulna, TFCC, scaphoid  Range of Motion: painful  Strength:  strength decreased, painful across.    Special tests: radial, ulnar and median nerve function intact, median n irritation noted    ELBOW:  elbow exam not done         Assessment/Plan:   Pt is a 65 yo white male with PMhx of bilateral shoulder pain presenting with left wrist pain  1. 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  Consider repeat ultrasound-guided injection in March at the earliest.  Discussed with the patient the injections have been done fairly frequently, no more than 6 injections recommended for the life of the joint and he is already had a bicep rupture.  Will work on " the range of motion of the wrist and he will return to clinic in approximately 4 to 6 weeks.    X-RAY INTERPRETATION:   MRI wrist, left  IMPRESSION:  1. No acute fracture is suspected. Edematous ossicle volar to the  trapezium (series 6001 image 24, series 7001 image 31, series 8001  image 10) possibly related to contusion. CT can be considered for  further bony detail if concern persists.      2. Significant degenerative change of the triscaphe and first  carpometacarpal joints with associated subchondral cystic changes and  bone marrow edema. Similar but less pronounced degenerative changes of  the lunate articulation with the second carpal row.     3. Proximal row joint effusion with synovitis.     4. Complete disruption of scapholunate interosseous ligament. Tearing  of the central triangular fibrocartilage proper.     5. Tenosynovitis of the second extensor compartment.     6. Subcutaneous edema, especially dorsal to the metacarpals.    Carmel Chan MD

## 2020-02-12 NOTE — PROGRESS NOTES
Subjective:   Arnaud Bird is a 64 year old male who is f/u for left hand MRI.  Doing resistance bands for his shoulder and flared up the hand.  Went fishing and then to .  Unbelievable pain, Diclofenac gel.  Right wrist painful in the past, discussed surgery at one point.  Bicep torn, left bicep still spasms.    Date last seen: 2/5/2020  Following Therapeutic Plan: Yes   Pain: Improving  Function: NA  Interval History: MRI, splint, ice    PAST MEDICAL, SOCIAL, SURGICAL AND FAMILY HISTORY: He  has a past medical history of Arthritis, Benign positional vertigo, Carpal tunnel syndrome, Hearing problem, LEFT WORSE THAN RIGHT  (10/18/2012), Migraines, Nasal polyps, Unspecified asthma(493.90) (10/17/2012), and Unspecified asthma, with exacerbation (10/17/2012). He also has no past medical history of Congestive heart failure, unspecified, COPD (chronic obstructive pulmonary disease) (H), Coronary artery disease, Diabetes mellitus (H), History of blood transfusion, Hypertension, Malignant neoplasm (H), Thyroid disease, or Unspecified cerebral artery occlusion with cerebral infarction.  He  has a past surgical history that includes tonsillectomy; TOOTH EXTRACTION W/FORCEP; and orthopedic surgery.  His family history includes Cancer in his father and mother; Family History Negative in his father and mother; Stomach Problem in his mother.  He reports that he has never smoked. He has never used smokeless tobacco. He reports current alcohol use. He reports that he does not use drugs.    ALLERGIES: He is allergic to seasonal allergies.    CURRENT MEDICATIONS: He has a current medication list which includes the following prescription(s): acyclovir, albuterol, allopurinol, beclomethasone, clindamycin, diclofenac, diclofenac, erythromycin, fluticasone, hydrocodone-acetaminophen, ipratropium, lidocaine, naloxone, order for dme, oxymetazoline, phentermine, salicylic acid, sildenafil, and simvastatin, and the following  "Facility-Administered Medications: ropivacaine, ropivacaine, ropivacaine, ropivacaine, ropivacaine, ropivacaine, ropivacaine, ropivacaine, ropivacaine, ropivacaine, ropivacaine, ropivacaine, ropivacaine, triamcinolone, triamcinolone, triamcinolone, triamcinolone, triamcinolone, triamcinolone, triamcinolone, triamcinolone, and triamcinolone.     REVIEW OF SYSTEMS: 10 point review of systems is negative except as noted above.     Exam:   Ht 1.778 m (5' 10\")   Wt 113.4 kg (250 lb)   BMI 35.87 kg/m             CONSTITUTIONAL: alert, moderate distress and cooperative, obese  HEAD: Normocephalic. No masses, lesions, tenderness or abnormalities  SKIN: no suspicious lesions or rashes  GAIT: normal  NEUROLOGIC: Non-focal  PSYCHIATRIC: affect normal/bright and mentation appears normal.    MUSCULOSKELETAL: left wrist pain  WRIST:  Inspection: swelling improving  Palpation: Tender: distal radius, scapho-lunate interval, thenar area  Non-tender: distal ulna, TFCC, scaphoid  Range of Motion: painful  Strength:  strength decreased, painful across.    Special tests: radial, ulnar and median nerve function intact, median n irritation noted    ELBOW:  elbow exam not done         Assessment/Plan:   Pt is a 65 yo white male with PMhx of bilateral shoulder pain presenting with left wrist pain  1. 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  Consider repeat ultrasound-guided injection in March at the earliest.  Discussed with the patient the injections have been done fairly frequently, no more than 6 injections recommended for the life of the joint and he is already had a bicep rupture.  Will work on the range of motion of the wrist and he will return to clinic in approximately 4 to 6 " weeks.    X-RAY INTERPRETATION:   MRI wrist, left  IMPRESSION:  1. No acute fracture is suspected. Edematous ossicle volar to the  trapezium (series 6001 image 24, series 7001 image 31, series 8001  image 10) possibly related to contusion. CT can be considered for  further bony detail if concern persists.      2. Significant degenerative change of the triscaphe and first  carpometacarpal joints with associated subchondral cystic changes and  bone marrow edema. Similar but less pronounced degenerative changes of  the lunate articulation with the second carpal row.     3. Proximal row joint effusion with synovitis.     4. Complete disruption of scapholunate interosseous ligament. Tearing  of the central triangular fibrocartilage proper.     5. Tenosynovitis of the second extensor compartment.     6. Subcutaneous edema, especially dorsal to the metacarpals.

## 2020-02-15 ENCOUNTER — TELEPHONE (OUTPATIENT)
Dept: FAMILY MEDICINE | Facility: CLINIC | Age: 65
End: 2020-02-15

## 2020-02-15 DIAGNOSIS — M51.16 LUMBAR DISC DISEASE WITH RADICULOPATHY: Primary | ICD-10-CM

## 2020-02-15 NOTE — TELEPHONE ENCOUNTER
(Medication discontinued)    Prior Authorization Retail Medication Request    Medication/Dose: lidocaine (LIDODERM) 5 % patch    ICD code (if different than what is on RX):     Previously Tried and Failed:     Rationale:       Insurance Name:     Insurance ID:         Pharmacy Information (if different than what is on RX)  Name:     Phone:

## 2020-02-18 ENCOUNTER — THERAPY VISIT (OUTPATIENT)
Dept: OCCUPATIONAL THERAPY | Facility: CLINIC | Age: 65
End: 2020-02-18
Payer: COMMERCIAL

## 2020-02-18 DIAGNOSIS — M25.532 LEFT WRIST PAIN: ICD-10-CM

## 2020-02-18 DIAGNOSIS — M19.131 SLAC (SCAPHOLUNATE ADVANCED COLLAPSE) OF WRIST, RIGHT: ICD-10-CM

## 2020-02-18 DIAGNOSIS — R20.2 NUMBNESS AND TINGLING IN RIGHT HAND: ICD-10-CM

## 2020-02-18 DIAGNOSIS — R20.0 NUMBNESS AND TINGLING IN RIGHT HAND: ICD-10-CM

## 2020-02-18 DIAGNOSIS — M25.531 RIGHT WRIST PAIN: ICD-10-CM

## 2020-02-18 PROCEDURE — 97165 OT EVAL LOW COMPLEX 30 MIN: CPT | Mod: GO | Performed by: OCCUPATIONAL THERAPIST

## 2020-02-18 PROCEDURE — 97760 ORTHOTIC MGMT&TRAING 1ST ENC: CPT | Mod: GO | Performed by: OCCUPATIONAL THERAPIST

## 2020-02-18 PROCEDURE — 97110 THERAPEUTIC EXERCISES: CPT | Mod: GO | Performed by: OCCUPATIONAL THERAPIST

## 2020-02-18 NOTE — TELEPHONE ENCOUNTER
PA Initiation    Medication: lidocaine (LIDODERM) 5 % patch - INITIATED  Insurance Company: [employer] - Phone [phone] Fax [phone]  Pharmacy Filling the Rx: [id number] - [place], [place] - [address]  Filling Pharmacy Phone: [phone]  Filling Pharmacy Fax:    Start Date: 2/18/2020

## 2020-02-18 NOTE — PROGRESS NOTES
Hand Therapy Initial Evaluation    Current Date:  2/18/2020    Diagnosis:  Left  wrist and thumb pain:DOI:  12/15/19    Referring MD: Carmel Chan MD        Subjective:    Patient Entered Health History - See Therapist Evaluation below  Arnaud Bird being seen for fractured wrist.     Problem began: 12/15/2019.   Problem occurred: slipped on ice  and reported as 5/10 on pain scale.  General health as reported by patient is good.  Pertinent medical history includes: migraines/headaches.     Medical allergies: none.   Surgeries include:  Other. Other surgery history details: knee replacment.    Current medications:  Pain medication.    Current occupation is retired.                         Answers for HPI/ROS submitted by the patient on 2/18/2020   History Reported by Patient  Pain quality: other        Occupational Profile Information:  Right hand dominant  Prior functional level:  no limitations  Patient reports symptoms of pain, stiffness/loss of motion, weakness/loss of strength, edema, numbness and tingling   Special tests:  x-ray and MRI.  MRI notes:  Scapholunate ligament disruption, TFC tear, degenerative changes in radial wrist  Previous treatment: only half  Cast on the wrist, Also has a bicep tear: 11/1/2019  Pt has been in half cast with ace wrap for 2 weeks.  Barriers include:none  Mobility: No difficulty  Transportation: drives  Currently is retired   Leisure activities/hobbies: fishing, deer hunting,  work, restoration motorcycles      Functional Outcome Measure:   2/18/2020  Upper Extremity Functional Index Score:  SCORE:   Column Totals: /80: (P) 23   (A lower score indicates greater disability.)      Objective:  Pain Level (Scale 0-10)   2/18/2020   At Rest 5   With Use 9-10     Pain Description  Date 2/18/2020   Location wrist and thumb   Pain Quality Shooting   Frequency constant or daily     Pain is worst  early morning and constant   Exacerbated by  doing things    Relieved by cold, rest and pain meds   Progression Getting worse, not getting better     Edema  Moderate of the hand and wrist    Sensation   WNL throughout all nerve distributions; per patient report and pt  Notes the fingers when he awakes in the morning    ROM   Did not measure formally, due to pain and apprehension of instability  By patient. Minimal ROM noted in wrist, fingers are swollen, has WNL mechanics of motion, but has mild flexion lag in all fingers. Pt was wearing ace bandage snug and so fingers and hand were swollen. Noted this dissipating by end of session    Strength  contraindicated    Assessment:  Patient presents with symptoms consistent with diagnosis as above,  with conservative intervention.   Due to wrist pain and ligament injury, this therapist will request further assessment of his wrist by hand surgeon.     Patient's limitations or Problem List includes:  Pain, Decreased ROM/motion, Increased edema, Weakness, Sensory disturbance and Decreased stability of the left wrist which interferes with the patient's ability to perform Self Care Tasks (dressing, eating, bathing, hygiene/toileting), Sleep Patterns, Recreational Activities, Household Chores and Driving  as compared to previous level of function.    Rehab Potential:  Poor - Return to restricted activity    Patient will benefit from skilled Occupational Therapy to increase stability of wrist and decrease pain and edema to return to previous activity level and resume normal daily tasks and to reach their rehab potential.    Barriers to Learning:  No barrier    Communication Issues:  Patient appears to be able to clearly communicate and understand verbal and written communication and follow directions correctly.    Chart Review: Chart Review, Brief history including review of medical and/or therapy records relating to the presenting problem and Simple history review with patient    Identified Performance Deficits: bathing/showering,  dressing, feeding, personal device care, care of others, driving and community mobility, home establishment and management, meal preparation and cleanup, sleep, play and leisure activities    Assessment of Occupational Performance:  5 or more Performance Deficits    Clinical Decision Making (Complexity): Low complexity    Treatment Explanation:  The following has been discussed with the patient:  RX ordered/plan of care  Anticipated outcomes  Possible risks and side effects      Plan:  Frequency:  2 X a month, once daily  Duration:  for 2 months    Treatment Plan:   Therapeutic Exercise:  AROM, Tendon Gliding and Blocking  Neuromuscular re-education:  Kinesiotaping  Manual Techniques:  Myofascial release and Manual edema mobilization  Orthotic Fabrication: Static circumferential forearm based thumb spica orthosis      Discharge Plan:  Achieve all LTG.  Independent in home treatment program.  Reach maximal therapeutic benefit.    Home Exercise Program:  Wear  Orthosis full time  Tendon gliding. FDS  blocking      Next Visit:  Refit orthosis, due to edema, check finger ROM/edema

## 2020-02-19 ENCOUNTER — TELEPHONE (OUTPATIENT)
Dept: ORTHOPEDICS | Facility: CLINIC | Age: 65
End: 2020-02-19

## 2020-02-19 DIAGNOSIS — S69.92XA LEFT WRIST INJURY, INITIAL ENCOUNTER: Primary | ICD-10-CM

## 2020-02-19 NOTE — TELEPHONE ENCOUNTER
Relayed message to patient. Provided orthopedic appointment line. The patient reports brace fitting well and helping.

## 2020-02-19 NOTE — TELEPHONE ENCOUNTER
Painful wrists, worse on left after fall.  Splints made by hand therapy    Needs to see surgery, discussed with VO

## 2020-02-20 NOTE — TELEPHONE ENCOUNTER
Prior Authorization Approval    Authorization Effective Date: 1/19/2020  Authorization Expiration Date: 2/17/2021  Medication: lidocaine (LIDODERM) 5 % patch - APPROVED  Approved Dose/Quantity: 30 FOR 30 DAYS  Reference #: [id number]   Insurance Company: [employer] - Phone [phone] Fax [phone]  Which Pharmacy is filling the prescription (Not needed for infusion/clinic administered): [facility] [id number] [facility] - [place], [place] - [address] [place] [address]  Pharmacy Notified: NO - THEY DO NOT HAVE ACTIVE RX  Patient Notified: NO - NO MED IS READY TO FILL

## 2020-02-21 RX ORDER — LIDOCAINE 50 MG/G
1 PATCH TOPICAL EVERY 24 HOURS
Qty: 30 PATCH | Refills: 11 | Status: SHIPPED | OUTPATIENT
Start: 2020-02-21 | End: 2023-03-03

## 2020-02-21 NOTE — PATIENT INSTRUCTIONS
(M51.16) Lumbar disc disease with radiculopathy  (primary encounter diagnosis)  Comment:     Plan: lidocaine 5 % EX patch           SCOTTY TOLEDO JR., MD

## 2020-02-24 DIAGNOSIS — G89.4 CHRONIC PAIN SYNDROME: Chronic | ICD-10-CM

## 2020-02-24 RX ORDER — HYDROCODONE BITARTRATE AND ACETAMINOPHEN 7.5; 325 MG/1; MG/1
1 TABLET ORAL EVERY 6 HOURS PRN
Qty: 120 TABLET | Refills: 0 | Status: CANCELLED | OUTPATIENT
Start: 2020-02-24

## 2020-02-24 NOTE — TELEPHONE ENCOUNTER
Reason for Call:  Medication or medication refill:    Do you use a [facility]?  Name of the pharmacy and phone number for the current request:  written prescription please    Name of the medication requested:   HYDROcodone-acetaminophen (NORCO) 7.5-325 MG per tablet 120 tablet         Other request: Patient would like to  in [facility]    Can we leave a detailed message on this number? YES    Phone number patient can be reached at: Home number on file [phone] (home)    Best Time:     Call taken on 2/24/2020 at 10:52 AM by CATALINA KHAN

## 2020-02-24 NOTE — TELEPHONE ENCOUNTER
Controlled Substance Refill Request for HYDROcodone-acetaminophen (NORCO) 7.5-325 MG per tablet  Problem List Complete:  Yes      Patient is followed by SCOTTY TOLEDO MD for ongoing prescription of pain medication.  All refills should only be approved by this provider, or covering partner.     Medication(s):  NORCO  7.5MG PO FOUR TIMES DAILY .   Maximum quantity per month:  120   Clinic visit frequency required: Q 3 months      Controlled substance agreement:  Encounter-Level CSA - 10/31/2016:                     Controlled Substance Agreement - Scan on 11/15/2016  7:53 AM : CONTROLLED SUBSTANCE AGREEMENT (below)                Pain Clinic evaluation in the past: No     DIRE Total Score(s):    7/2/2016   Total Score 18         Last [facility] website verification: 1/28/20, no concerns.    [email or web address]         Last Written Prescription Date:  1/29/2020  Last Fill Quantity: 120 tablet,  # refills: 0   Last office visit: 12/5/2019 with prescribing provider:  DANYELLE Toledo   Future Office Visit:        Controlled substance agreement:   Encounter-Level CSA - 10/31/2016:    Controlled Substance Agreement - Scan on 11/15/2016  7:53 AM: CONTROLLED SUBSTANCE AGREEMENT     Patient-Level CSA:    Controlled Substance Agreement - Opioid - Scan on 3/19/2019 11:01 AM         Last Urine Drug Screen:   Pain Drug SCR UR W RPTD Meds   Date Value Ref Range Status   09/09/2019 FINAL  Final     Comment:     (Note)  ====================================================================  [facility] COMP DRUG ANALYSIS,UR  ====================================================================  Test                             Result       Flag       Units        Drug Present and Declared for Prescription Verification   Hydrocodone                    665          EXPECTED   ng/mg creat   Hydromorphone                  93           EXPECTED   ng/mg creat   Dihydrocodeine                 149          EXPECTED   ng/mg creat    Norhydrocodone                 1732         EXPECTED   ng/mg creat    Sources of hydrocodone include scheduled prescription    medications. Hydromorphone, dihydrocodeine and norhydrocodone are    expected metabolites of hydrocodone. Hydromorphone and    dihydrocodeine are also available as scheduled prescription    medications.   Acetaminophen                  PRESENT      EXPECTED                Drug Absent but Declared for Prescription Verification   Diclofenac                     Not Detected UNEXPECTED                Diclofenac, as indicated in the declared medication list, is not    always detected even when used as directed.  ====================================================================  Test                      Result    Flag   Units      Ref Range        Creatinine              171              mg/dL      >=20            ====================================================================  Declared Medications:  The flagging and interpretation on this report are based on the  following declared medications.  Unexpected results may arise from  inaccuracies in the declared medications.  **Note: The testing scope of this panel includes these medications:  Hydrocodone (Norco)  **Note: The testing scope of this panel does not include small to  moderate amounts of these reported medications:  Acetaminophen (Norco)  Diclofenac (Voltaren)  **Note: The testing scope of this panel does not include following  reported medications:  Acyclovir  Albuterol  Allopurinol  Amoxicillin (Augmentin)  Beclomethasone (QVAR)  Clavulinate (Augmentin)  Clindamycin (Cleocin)  Erythromycin  Fluticasone  Ipratropium (Atrovent)  Naloxone (Narcan)  Oxymetazoline (Afrin)  Sildenafil (Viagra)  Simvastatin (Zocor)  ====================================================================  For clinical consultation, please call [phone].  ====================================================================  Analysis performed by  [employer] [place] [address]     , No results found for: COMDAT, No results found for: THC13, PCP13, COC13, MAMP13, OPI13, AMP13, BZO13, TCA13, MTD13, BAR13, OXY13, PPX13, BUP13         [email or web address]   checked in past 3 months?  Yes 1/28/2020

## 2020-02-27 ENCOUNTER — TELEPHONE (OUTPATIENT)
Dept: ORTHOPEDICS | Facility: CLINIC | Age: 65
End: 2020-02-27

## 2020-02-27 RX ORDER — HYDROCODONE BITARTRATE AND ACETAMINOPHEN 7.5; 325 MG/1; MG/1
1 TABLET ORAL EVERY 6 HOURS PRN
Qty: 110 TABLET | Refills: 0 | Status: SHIPPED | OUTPATIENT
Start: 2020-02-27 | End: 2020-03-30

## 2020-02-27 NOTE — TELEPHONE ENCOUNTER
I called patient back this afternoon to let him know that he has the soonest appointment as Dr. Watkins is out of the office next week. He verbalized understanding and will continue to rest, ice and elevate the wrist.    Aida Clark ATC

## 2020-02-27 NOTE — TELEPHONE ENCOUNTER
Patient is calling to check the status of his medication request. He would like to  a written prescription.

## 2020-02-27 NOTE — TELEPHONE ENCOUNTER
Health Call Center    Phone Message    May a detailed message be left on voicemail: yes     Reason for Call: Symptoms or Concerns     If patient has red-flag symptoms, warm transfer to triage line    Current symptom or concern: Left wrist pain    Symptoms have been present for:  1 month    Has patient previously been seen for this? Yes    By : Dr. Chan    Date: 2/12/2020    Are there any new or worsening symptoms? Yes: Patient stating that he is unable to sleep due to left wrist pain. Writer added patient to cancellation list for Dr. Tejada due to next available appt not until 3/12/2020. Patient would prefer to be seen sooner. Please advise if patient is able to be seen sooner.   Thank you, Daylin Henry on 2/27/2020 at 1:24 PM       Action Taken: Message routed to:  [facility] ORTHO    Travel Screening: Not Applicable

## 2020-02-27 NOTE — TELEPHONE ENCOUNTER
RX [place] program ([place])     Checked 1/28/20, no concerns.     Routing refill request to provider for review/approval because:  Drug not on the [facility] refill protocol       Pt states would like to  in [facility]

## 2020-02-27 NOTE — TELEPHONE ENCOUNTER
[facility] Call Center    Phone Message    May a detailed message be left on voicemail: yes     Reason for Call: Requesting Results   Name/type of test: MRI  Date of test: 2/8/2020  Was test done at a location other than [facility] (Please fill in the location if not [facility])?: No    Patient calling in stating he is experiencing a lot of left wrist pain for over a month now. Caller stating he has not received results from MRI done on 2/8/2020 and does not see Dr. Watkins until 3/12/2020. Patient would like a call with results at your convenience. Please advise.  Thank you, Daylin Henry on 2/27/2020 at 1:22 PM       Action Taken: Message routed to:  [facility] SPORTS MED    Travel Screening: Not Applicable

## 2020-02-28 NOTE — TELEPHONE ENCOUNTER
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  Hand therapy thought they could help only minimally with his recovery.  We could do meds for pain control.  He needs to see Hand surgery for options.    IMPRESSION:  1. No acute fracture is suspected. Edematous ossicle volar to the  trapezium (series 6001 image 24, series 7001 image 31, series 8001  image 10) possibly related to contusion. CT can be considered for  further bony detail if concern persists.      2. Significant degenerative change of the triscaphe and first  carpometacarpal joints with associated subchondral cystic changes and  bone marrow edema. Similar but less pronounced degenerative changes of  the lunate articulation with the second carpal row.     3. Proximal row joint effusion with synovitis.     4. Complete disruption of scapholunate interosseous ligament. Tearing  of the central triangular fibrocartilage proper.     5. Tenosynovitis of the second extensor compartment.     6. Subcutaneous edema, especially dorsal to the metacarpals.

## 2020-02-28 NOTE — TELEPHONE ENCOUNTER
Relayed message from Dr. Chan to the patient. He is currently taking Norco and ibuprofen for pain medications. He states that it has been helping with some pain control. He was advised to manage his pain via medications, immobilization and ice until he sees Dr. Watkins on 3/12. He will see Dr. Chan next Wednesday for bilateral shoulder injections.

## 2020-03-04 ENCOUNTER — OFFICE VISIT (OUTPATIENT)
Dept: ORTHOPEDICS | Facility: CLINIC | Age: 65
End: 2020-03-04
Payer: COMMERCIAL

## 2020-03-04 VITALS — BODY MASS INDEX: 35.79 KG/M2 | HEIGHT: 70 IN | WEIGHT: 250 LBS

## 2020-03-04 DIAGNOSIS — M19.012 PRIMARY OSTEOARTHRITIS OF BOTH SHOULDERS: Primary | ICD-10-CM

## 2020-03-04 DIAGNOSIS — M19.011 PRIMARY OSTEOARTHRITIS OF BOTH SHOULDERS: Primary | ICD-10-CM

## 2020-03-04 RX ORDER — TRIAMCINOLONE ACETONIDE 40 MG/ML
40 INJECTION, SUSPENSION INTRA-ARTICULAR; INTRAMUSCULAR
Status: DISCONTINUED | OUTPATIENT
Start: 2020-03-04 | End: 2021-05-28

## 2020-03-04 RX ORDER — ROPIVACAINE HYDROCHLORIDE 5 MG/ML
3 INJECTION, SOLUTION EPIDURAL; INFILTRATION; PERINEURAL
Status: DISCONTINUED | OUTPATIENT
Start: 2020-03-04 | End: 2021-05-28

## 2020-03-04 RX ADMIN — TRIAMCINOLONE ACETONIDE 40 MG: 40 INJECTION, SUSPENSION INTRA-ARTICULAR; INTRAMUSCULAR at 10:13

## 2020-03-04 RX ADMIN — ROPIVACAINE HYDROCHLORIDE 3 ML: 5 INJECTION, SOLUTION EPIDURAL; INFILTRATION; PERINEURAL at 10:13

## 2020-03-04 ASSESSMENT — MIFFLIN-ST. JEOR: SCORE: 1920.24

## 2020-03-04 NOTE — NURSING NOTE
[address] [address]  Dept: [phone]  ______________________________________________________________________________    Patient: Arnaud Bird   : 1955   MRN: [id number]   2020    INVASIVE PROCEDURE SAFETY CHECKLIST    Date: 3/4/2020   Procedure: Bilateral glenohumeral joint US guided kenalog injections  Patient Name: Arnaud Bird  MRN: [id number]  YOB: 1955    Action: Complete sections as appropriate. Any discrepancy results in a HARD COPY until resolved.     PRE PROCEDURE:  Patient ID verified with 2 identifiers (name and  or MRN): Yes  Procedure and site verified with patient/designee (when able): Yes  Accurate consent documentation in medical record: Yes  H&P (or appropriate assessment) documented in medical record: Yes  H&P must be up to 20 days prior to procedure and updates within 24 hours of procedure as applicable: NA  Relevant diagnostic and radiology test results appropriately labeled and displayed as applicable: Yes  Procedure site(s) marked with provider initials: NA    TIMEOUT:  Time-Out performed immediately prior to starting procedure, including verbal and active participation of all team members addressing the following:Yes  * Correct patient identify  * Confirmed that the correct side and site are marked  * An accurate procedure consent form  * Agreement on the procedure to be done  * Correct patient position  * Relevant images and results are properly labeled and appropriately displayed  * The need to administer antibiotics or fluids for irrigation purposes during the procedure as applicable   * Safety precautions based on patient history or medication use    DURING PROCEDURE: Verification of correct person, site, and procedures any time the responsibility for care of the patient is transferred to another member of the care team.       Prior to injection, verified patient identity using  patient's name and date of birth.  Due to injection administration, patient instructed to remain in clinic for 15 minutes  afterwards, and to report any adverse reaction to me immediately.    Joint injection was performed.      Drug Amount Wasted:  Yes: 4 mg/ml ropivacaine  Vial/Syringe: Single dose vial  Expiration Date:  03/22      Frances Rogers, [facility]  March 4, 2020

## 2020-03-04 NOTE — PROGRESS NOTES
PROBLEM: Bilateral glenohumeral osteoarthritis, AC arthritis     SUBJECTIVE: Pt returns for US-guided corticosteroid injections for glenohumeral OA.    Pt states this is fifth injection.  He has had a right AC injection as well     OBJECTIVE: Painful shoulders, limited flexion and abduction.  X-rays of shoulders and AC joint reviewed with patient.     ASSESSMENT: Bilateral glenohumeral osteoarthritis, AC arthritis       PLAN: Discussed non-operative approach and outcomes along with consideration for surgical consultation which he is willing to consider today.  Referral placed  Bilateral US-guided corticosteroid injections for shoulders.  Discussed recommended lifetime number of glenohumeral injections (6) before surgical considerations.       PROCEDURE: The indications, risks, expected benefits were discussed.  Formal consent was obtained. The humeral head, glenoid, hyperechoic triangle indicating the posterior labrum were identified by ultrasound.  Initially, 4 mL 0.5% ropivicaine  was injected with US guidance along the injection track for anesthesia.  Using sterile technique, a syringe with a 80 mm, 22 gauge needle containing 1 mL Kenalog 40 mg/mL and 4 mL 0.5% ropivicaine  were injected using an US guided posterior approach into the left glenohumeral joint.  US used to guide needle placement and verify proper needle position.  Images and video indicating proper needle placement were recorded.  Upon completion of the injection, the wound was dressed with a bandaid. Follow up prn.      The humeral head, glenoid, hyperechoic triangle indicating the posterior labrum were identified by ultrasound.  Initially, 4 mL 0.5% ropivicaine  was injected with US guidance along the injection track for anesthesia.  Using sterile technique, a syringe with a 80 mm, 22 gauge needle containing 1 mL Kenalog 40 mg/mL and 4 mL 0.5% ropivicaine  were injected using an US guided posterior approach into the right glenohumeral joint.  US  used to guide needle placement and verify proper needle position.  Images and video indicating proper needle placement were recorded.  Upon completion of the injection, the wound was dressed with a bandaid.     Follow up prn, has appt with Dr. Tejada for his left wrist injury.    Large Joint Injection/Arthocentesis: bilateral glenohumeral  Date/Time: 3/4/2020 10:13 AM  Performed by: Carmel Chan MD  Authorized by: Carmel Chan MD     Indications:  Osteoarthritis  Needle Size:  22 G  Guidance: ultrasound    Approach:  Posterior  Location:  Shoulder  Laterality:  Bilateral      Site:  Bilateral glenohumeral  Medications (Right):  40 mg triamcinolone 40 MG/ML; 3 mL ropivacaine 5 MG/ML  Medications (Left):  40 mg triamcinolone 40 MG/ML; 3 mL ropivacaine 5 MG/ML  Outcome:  Tolerated well, no immediate complications  Procedure discussed: discussed risks, benefits, and alternatives    Consent Given by:  Patient  Timeout: timeout called immediately prior to procedure    Prep: patient was prepped and draped in usual sterile fashion     8 mL of ropivacaine used per shoulder.  Scribed by Frances Rogers, MS, LAT, ATC for Dr. Chan on 3/4/2020 at 10:14 AM, based on the provider s statements to me.      I agree with the following injection documentation.    ELROY Chan MD

## 2020-03-04 NOTE — LETTER
3/4/2020      RE: Arnaud Bird  [address]  [facility] [address]       PROBLEM: Bilateral glenohumeral osteoarthritis, AC arthritis     SUBJECTIVE: Pt returns for US-guided corticosteroid injections for glenohumeral OA.    Pt states this is fifth injection.  He has had a right AC injection as well     OBJECTIVE: Painful shoulders, limited flexion and abduction.  X-rays of shoulders and AC joint reviewed with patient.     ASSESSMENT: Bilateral glenohumeral osteoarthritis, AC arthritis       PLAN: Discussed non-operative approach and outcomes along with consideration for surgical consultation which he is willing to consider today.  Referral placed  Bilateral US-guided corticosteroid injections for shoulders.  Discussed recommended lifetime number of glenohumeral injections (6) before surgical considerations.       PROCEDURE: The indications, risks, expected benefits were discussed.  Formal consent was obtained. The humeral head, glenoid, hyperechoic triangle indicating the posterior labrum were identified by ultrasound.  Initially, 4 mL 0.5% ropivicaine  was injected with US guidance along the injection track for anesthesia.  Using sterile technique, a syringe with a 80 mm, 22 gauge needle containing 1 mL Kenalog 40 mg/mL and 4 mL 0.5% ropivicaine  were injected using an US guided posterior approach into the left glenohumeral joint.  US used to guide needle placement and verify proper needle position.  Images and video indicating proper needle placement were recorded.  Upon completion of the injection, the wound was dressed with a bandaid. Follow up prn.      The humeral head, glenoid, hyperechoic triangle indicating the posterior labrum were identified by ultrasound.  Initially, 4 mL 0.5% ropivicaine  was injected with US guidance along the injection track for anesthesia.  Using sterile technique, a syringe with a 80 mm, 22 gauge needle containing 1 mL Kenalog 40 mg/mL and 4 mL 0.5%  ropivicaine  were injected using an US guided posterior approach into the right glenohumeral joint.  US used to guide needle placement and verify proper needle position.  Images and video indicating proper needle placement were recorded.  Upon completion of the injection, the wound was dressed with a bandaid.     Follow up prn, has appt with Dr. Tejada for his left wrist injury.    Large Joint Injection/Arthocentesis: bilateral glenohumeral  Date/Time: 3/4/2020 10:13 AM  Performed by: Carmel Chan MD  Authorized by: Carmel Chan MD     Indications:  Osteoarthritis  Needle Size:  22 G  Guidance: ultrasound    Approach:  Posterior  Location:  Shoulder  Laterality:  Bilateral      Site:  Bilateral glenohumeral  Medications (Right):  40 mg triamcinolone 40 MG/ML; 3 mL ropivacaine 5 MG/ML  Medications (Left):  40 mg triamcinolone 40 MG/ML; 3 mL ropivacaine 5 MG/ML  Outcome:  Tolerated well, no immediate complications  Procedure discussed: discussed risks, benefits, and alternatives    Consent Given by:  Patient  Timeout: timeout called immediately prior to procedure    Prep: patient was prepped and draped in usual sterile fashion     8 mL of ropivacaine used per shoulder.  Scribed by Frances Rogers, MS, LAT, ATC for Dr. Chan on 3/4/2020 at 10:14 AM, based on the provider s statements to me.      I agree with the following injection documentation.    MD Carmel Maher MD     Neutropenia

## 2020-03-09 DIAGNOSIS — M1A.9XX0 CHRONIC GOUT INVOLVING TOE OF RIGHT FOOT WITHOUT TOPHUS, UNSPECIFIED CAUSE: ICD-10-CM

## 2020-03-09 DIAGNOSIS — M25.512 BILATERAL SHOULDER PAIN, UNSPECIFIED CHRONICITY: Primary | ICD-10-CM

## 2020-03-09 DIAGNOSIS — M25.511 BILATERAL SHOULDER PAIN, UNSPECIFIED CHRONICITY: Primary | ICD-10-CM

## 2020-03-09 DIAGNOSIS — S69.92XA LEFT WRIST INJURY, INITIAL ENCOUNTER: Primary | ICD-10-CM

## 2020-03-09 RX ORDER — ALLOPURINOL 300 MG/1
300 TABLET ORAL DAILY
Qty: 90 TABLET | Refills: 3 | Status: SHIPPED | OUTPATIENT
Start: 2020-03-09 | End: 2020-06-25

## 2020-03-09 NOTE — TELEPHONE ENCOUNTER
"Last office visit 12/05/2019.  Routing refill request to provider for review/approval because:  Labs not current:  CBC & uric acid         Requested Prescriptions   Pending Prescriptions Disp Refills     allopurinol (ZYLOPRIM) 300 MG tablet 90 tablet 3     Sig: Take 1 tablet (300 mg) by mouth daily       Gout Agents Protocol Failed - 3/9/2020 11:40 AM        Failed - CBC on file in past 12 months     Recent Labs   Lab Test 06/04/18  0842   WBC 5.5   RBC 5.02   HGB 15.8   HCT 46.9                    Failed - Has Uric Acid on file in past 12 months and value is less than 6     Recent Labs   Lab Test 06/12/18  0801   URIC 4.1     If level is 6mg/dL or greater, ok to refill one time and refer to provider.           Passed - ALT on file in past 12 months     Recent Labs   Lab Test 09/09/19  0945   ALT 34             Passed - Recent (12 mo) or future (30 days) visit within the authorizing provider's specialty     Patient has had an office visit with the authorizing provider or a provider within the authorizing providers department within the previous 12 mos or has a future within next 30 days. See \"Patient Info\" tab in inbasket, or \"Choose Columns\" in Meds & Orders section of the refill encounter.              Passed - Medication is active on med list        Passed - Patient is age 18 or older        Passed - Normal serum creatinine on file in the past 12 months     Recent Labs   Lab Test 09/09/19  0945   CR 0.74                  "

## 2020-03-11 ENCOUNTER — ANCILLARY PROCEDURE (OUTPATIENT)
Dept: GENERAL RADIOLOGY | Facility: CLINIC | Age: 65
End: 2020-03-11
Attending: ORTHOPAEDIC SURGERY
Payer: COMMERCIAL

## 2020-03-11 ENCOUNTER — OFFICE VISIT (OUTPATIENT)
Dept: ORTHOPEDICS | Facility: CLINIC | Age: 65
End: 2020-03-11
Attending: FAMILY MEDICINE
Payer: COMMERCIAL

## 2020-03-11 VITALS — WEIGHT: 250 LBS | HEIGHT: 70 IN | BODY MASS INDEX: 35.79 KG/M2

## 2020-03-11 DIAGNOSIS — M25.512 BILATERAL SHOULDER PAIN, UNSPECIFIED CHRONICITY: ICD-10-CM

## 2020-03-11 DIAGNOSIS — M19.012 ARTHRITIS OF SHOULDER REGION, LEFT: ICD-10-CM

## 2020-03-11 DIAGNOSIS — M25.511 BILATERAL SHOULDER PAIN, UNSPECIFIED CHRONICITY: ICD-10-CM

## 2020-03-11 DIAGNOSIS — M19.011 ARTHRITIS OF SHOULDER REGION, RIGHT: Primary | ICD-10-CM

## 2020-03-11 ASSESSMENT — ENCOUNTER SYMPTOMS
STIFFNESS: 1
MUSCLE WEAKNESS: 0
MYALGIAS: 1
BACK PAIN: 1
MUSCLE CRAMPS: 0
JOINT SWELLING: 1
ARTHRALGIAS: 1
NECK PAIN: 0

## 2020-03-11 ASSESSMENT — MIFFLIN-ST. JEOR: SCORE: 1920.25

## 2020-03-11 NOTE — PROGRESS NOTES
CHIEF CONCERN:  Bilateral Shoulder Pain; patient was sent as referral from Dr. Chan    HISTORY OF PRESENT ILLNESS:   64M with 20 year history of bilateral shoulder pain, managed with oral medications and steroid injections. He experienced a recent exacerbation in his symptoms following and episode while he was rolling a large log which recoiled on him. He felt a pop in his L arm followed by pain and bruising. Since this time the pain in his L shoulder has been significantly worse than his R. He also reports a fall onto the L side over the winter which exacerbated his wrist, elbow, and shoulder pain.     Currently, his L wrist is the most painful joint for him and he has an appointment with Dr. Watkins tomorrow to address this.     Previous shoulder treatments include: physical therapy, cortisone shots (bilateral injection on 3/4/20), he also take hydrocodone 7.5mg 4xdaily, occasional tylenol, and NSAIDs (tough on GI)    Past Medical History:   Diagnosis Date     Arthritis      Benign positional vertigo      Carpal tunnel syndrome     mild     Hearing problem      LEFT WORSE THAN RIGHT  10/18/2012     Migraines      Nasal polyps      Unspecified asthma(493.90) 10/17/2012     Unspecified asthma, with exacerbation 10/17/2012       Past Surgical History:   Procedure Laterality Date     HC TOOTH EXTRACTION W/FORCEP       ORTHOPEDIC SURGERY      bilateral total knee replacements     TONSILLECTOMY      age 2 or 3       Current Outpatient Medications   Medication Sig Dispense Refill     acyclovir (ZOVIRAX) 400 MG tablet TAKE TWO TABLETS BY MOUTH TWICE DAILY 28 tablet 3     albuterol (PROAIR HFA, PROVENTIL HFA, VENTOLIN HFA) 108 (90 BASE) MCG/ACT inhaler Inhale 2 puffs into the lungs every 6 hours as needed for shortness of breath / dyspnea 1 Inhaler 11     allopurinol (ZYLOPRIM) 300 MG tablet Take 1 tablet (300 mg) by mouth daily 90 tablet 3     beclomethasone (QVAR) 40 MCG/ACT Inhaler Inhale 2 puffs into the lungs 2  times daily 1 Inhaler 11     clindamycin (CLEOCIN T) 1 % external solution Apply topically 2 times daily Profile Rx: patient will contact pharmacy when needed 60 mL 11     diclofenac (VOLTAREN) 1 % topical gel Place 4 g onto the skin 4 times daily 100 g 0     diclofenac (VOLTAREN) 1 % topical gel APPLY 2 GRAMS TOPICALLY TO AFFECTED AREA FOUR TIMES DAILY AS NEEDED 100 g 11     erythromycin (ROMYCIN) 5 MG/GM ophthalmic ointment Place 0.5 inches into the right eye 4 times daily 3.5 g 1     fluticasone (FLONASE) 50 MCG/ACT spray Spray 2 sprays into both nostrils daily 16 g 11     HYDROcodone-acetaminophen (NORCO) 7.5-325 MG per tablet Take 1 tablet by mouth every 6 hours as needed for pain (4 x daily as needed  maxium 4 per day) 110 tablet 0     ipratropium (ATROVENT) 0.06 % nasal spray SPRAY 2 SPRAYS INTO BOTH NOSTRILS 4 TIMES DAILY 1 Box 11     lidocaine (XYLOCAINE) 5 % external ointment Apply topically 4 times daily as needed for moderate pain 150 g 11     lidocaine 5 % EX patch Place 1 patch onto the skin every 24 hours 30 patch 11     naloxone (NARCAN) 4 MG/0.1ML nasal spray Spray 1 spray (4 mg) into one nostril alternating nostrils once as needed for opioid reversal Every 2-3 minutes until patient responsive or EMS arrives 0.2 mL 0     order for DME Equipment being ordered: Thumb spica splint for left hand 1 Device 0     oxymetazoline (AFRIN) 0.05 % nasal spray Spray 2 sprays into both nostrils 2 times daily 15 mL 0     phentermine (ADIPEX-P) 37.5 MG tablet TAKE 1 TABLET BY MOUTH EVERY DAY IN THE MORNING  2     salicylic acid (MEDIPLAST) 40 % miscellaneous Cut to fit wart, apply every night, put duct tape to secure in place, then remove in the morning. 25 each 3     sildenafil (VIAGRA) 100 MG tablet Take 0.5-1 tablets ( mg) by mouth daily as needed Take 30 min to 4 hours before intercourse.  Never use with nitroglycerin, terazosin or doxazosin. 100 tablet 11     simvastatin (ZOCOR) 40 MG tablet TAKE 1 TABLET  (40 MG) BY MOUTH AT BEDTIME 90 tablet 3          Allergies   Allergen Reactions     Seasonal Allergies        SOCIAL HISTORY:    Social History     Socioeconomic History     Marital status: Single     Spouse name: Not on file     Number of children: Not on file     Years of education: Not on file     Highest education level: Not on file   Occupational History     Not on file   Social Needs     Financial resource strain: Not on file     Food insecurity     Worry: Not on file     Inability: Not on file     Transportation needs     Medical: Not on file     Non-medical: Not on file   Tobacco Use     Smoking status: Never Smoker     Smokeless tobacco: Never Used   Substance and Sexual Activity     Alcohol use: Yes     Comment: case beer a week     Drug use: No     Sexual activity: Yes     Partners: Female     Birth control/protection: Male Surgical   Lifestyle     Physical activity     Days per week: Not on file     Minutes per session: Not on file     Stress: Not on file   Relationships     Social connections     Talks on phone: Not on file     Gets together: Not on file     Attends Oriental orthodox service: Not on file     Active member of club or organization: Not on file     Attends meetings of clubs or organizations: Not on file     Relationship status: Not on file     Intimate partner violence     Fear of current or ex partner: Not on file     Emotionally abused: Not on file     Physically abused: Not on file     Forced sexual activity: Not on file   Other Topics Concern     Parent/sibling w/ CABG, MI or angioplasty before 65F 55M? No   Social History Narrative     Not on file     Retired  who enjoys taking care of grandchildren.   Hobbies: mechanical stuff     FAMILY HISTORY: Reviewed in EMR      REVIEW OF SYSTEMS: Positive for that noted in past medical history and history of present illness and otherwise reviewed in EMR     PHYSICAL EXAM:     Adult male in no acute distress, cooperative with exam.    Respirations even and unlabored   Focused Upper Extremity Exam:   No gross deformity. Skin intact. No obvious erythema or joint effusion with the exception of the L wrist. Fires bilateral deltoid, biceps, triceps, wrist extension/flexion, , EPL, intrinsics, FPL with 5/5 strength. SILT in axillary, musculocutaneous, radial, ulnar, and median nerve distribution. Radial pulse 2+ and fingers warm and well-perfused with <2 seconds capillary refill. R Shoulder , ER 35, IR to L2. L Shoulder , ER 55, IR to L2, + empty can, - O'Brians, - Speed's.   Swelling over the left wrist. No warmth or erythema.    IMAGING:  L shoulder radiograph 3views: Early osteoarthritis with inferior osteophyte     R shoulder 3 view: end stage glenohumeral osteoarthritis with complete loss of joint space and a humeral osteophyte, AC joint arthritis, and os acromion. Perhaps some narrowing of acromio-humeral interval.     ASSESSMENT:    1. Right end stage glenohumeral arthrosis  2. Left shoulder moderate glenohumeral OA, probable long head biceps rupture     PLAN:  Discussed imaging findings with patient; currently the shoulder pain is under control with recent injection. The wrist appears to be the most pressing issue for the patient at this time, which will be addressed during his clinic visit tomorrow.      If the patient has worsening L shoulder pain and would like to investigate surgical options we recommended that he call the clinic and we will set him up to receive an MRI. If the patient's Right shoulder becomes increasingly painful and he is unable to manage his symptoms nonoperatively it is recommended that he undergo an MRI of the R shoulder to determine whether he would be a candidate for a TSA vs reverse TSA.      Letty Boyd MD  Orthopedic Surgery PGY1      I have personally examined this patient and have reviewed the clinical presentation and progress note with the resident.  I agree with the treatment plan as  outlined.  The plan was formulated with the resident on the day of the resident's note.   Yesica Deleon MD    Answers for HPI/ROS submitted by the patient on 3/11/2020   General Symptoms: No  Skin Symptoms: No  HENT Symptoms: No  EYE SYMPTOMS: No  HEART SYMPTOMS: No  LUNG SYMPTOMS: No  INTESTINAL SYMPTOMS: No  URINARY SYMPTOMS: No  REPRODUCTIVE SYMPTOMS: No  SKELETAL SYMPTOMS: Yes  BLOOD SYMPTOMS: No  NERVOUS SYSTEM SYMPTOMS: No  MENTAL HEALTH SYMPTOMS: No  Back pain: Yes  Muscle aches: Yes  Neck pain: No  Swollen joints: Yes  Joint pain: Yes  Bone pain: Yes  Muscle cramps: No  Muscle weakness: No  Joint stiffness: Yes  Bone fracture: No

## 2020-03-11 NOTE — LETTER
3/11/2020       RE: Arnaud Bird  [address]  [facility] [address]     Dear Colleague,    Thank you for referring your patient, Arnaud Bird, to the [facility] ORTHOPAEDIC [facility]INIC at [facility]. Please see a copy of my visit note below.    CHIEF CONCERN:  Bilateral Shoulder Pain; patient was sent as referral from Dr. Chan    HISTORY OF PRESENT ILLNESS:   64M with 20 year history of bilateral shoulder pain, managed with oral medications and steroid injections. He experienced a recent exacerbation in his symptoms following and episode while he was rolling a large log which recoiled on him. He felt a pop in his L arm followed by pain and bruising. Since this time the pain in his L shoulder has been significantly worse than his R. He also reports a fall onto the L side over the winter which exacerbated his wrist, elbow, and shoulder pain.     Currently, his L wrist is the most painful joint for him and he has an appointment with Dr. Watkins tomorrow to address this.     Previous shoulder treatments include: physical therapy, cortisone shots (bilateral injection on 3/4/20), he also take hydrocodone 7.5mg 4xdaily, occasional tylenol, and NSAIDs (tough on GI)    Past Medical History:   Diagnosis Date     Arthritis      Benign positional vertigo      Carpal tunnel syndrome     mild     Hearing problem      LEFT WORSE THAN RIGHT  10/18/2012     Migraines      Nasal polyps      Unspecified asthma(493.90) 10/17/2012     Unspecified asthma, with exacerbation 10/17/2012       Past Surgical History:   Procedure Laterality Date     HC TOOTH EXTRACTION W/FORCEP       ORTHOPEDIC SURGERY      bilateral total knee replacements     TONSILLECTOMY      age 2 or 3       Current Outpatient Medications   Medication Sig Dispense Refill     acyclovir (ZOVIRAX) 400 MG tablet TAKE TWO TABLETS BY MOUTH TWICE DAILY 28 tablet 3     albuterol (PROAIR HFA, PROVENTIL HFA, VENTOLIN  HFA) 108 (90 BASE) MCG/ACT inhaler Inhale 2 puffs into the lungs every 6 hours as needed for shortness of breath / dyspnea 1 Inhaler 11     allopurinol (ZYLOPRIM) 300 MG tablet Take 1 tablet (300 mg) by mouth daily 90 tablet 3     beclomethasone (QVAR) 40 MCG/ACT Inhaler Inhale 2 puffs into the lungs 2 times daily 1 Inhaler 11     clindamycin (CLEOCIN T) 1 % external solution Apply topically 2 times daily Profile Rx: patient will contact pharmacy when needed 60 mL 11     diclofenac (VOLTAREN) 1 % topical gel Place 4 g onto the skin 4 times daily 100 g 0     diclofenac (VOLTAREN) 1 % topical gel APPLY 2 GRAMS TOPICALLY TO AFFECTED AREA FOUR TIMES DAILY AS NEEDED 100 g 11     erythromycin (ROMYCIN) 5 MG/GM ophthalmic ointment Place 0.5 inches into the right eye 4 times daily 3.5 g 1     fluticasone (FLONASE) 50 MCG/ACT spray Spray 2 sprays into both nostrils daily 16 g 11     HYDROcodone-acetaminophen (NORCO) 7.5-325 MG per tablet Take 1 tablet by mouth every 6 hours as needed for pain (4 x daily as needed  maxium 4 per day) 110 tablet 0     ipratropium (ATROVENT) 0.06 % nasal spray SPRAY 2 SPRAYS INTO BOTH NOSTRILS 4 TIMES DAILY 1 Box 11     lidocaine (XYLOCAINE) 5 % external ointment Apply topically 4 times daily as needed for moderate pain 150 g 11     lidocaine 5 % EX patch Place 1 patch onto the skin every 24 hours 30 patch 11     naloxone (NARCAN) 4 MG/0.1ML nasal spray Spray 1 spray (4 mg) into one nostril alternating nostrils once as needed for opioid reversal Every 2-3 minutes until patient responsive or EMS arrives 0.2 mL 0     order for DME Equipment being ordered: Thumb spica splint for left hand 1 Device 0     oxymetazoline (AFRIN) 0.05 % nasal spray Spray 2 sprays into both nostrils 2 times daily 15 mL 0     phentermine (ADIPEX-P) 37.5 MG tablet TAKE 1 TABLET BY MOUTH EVERY DAY IN THE MORNING  2     salicylic acid (MEDIPLAST) 40 % miscellaneous Cut to fit wart, apply every night, put duct tape to secure  in place, then remove in the morning. 25 each 3     sildenafil (VIAGRA) 100 MG tablet Take 0.5-1 tablets ( mg) by mouth daily as needed Take 30 min to 4 hours before intercourse.  Never use with nitroglycerin, terazosin or doxazosin. 100 tablet 11     simvastatin (ZOCOR) 40 MG tablet TAKE 1 TABLET (40 MG) BY MOUTH AT BEDTIME 90 tablet 3          Allergies   Allergen Reactions     Seasonal Allergies        SOCIAL HISTORY:    Social History     Socioeconomic History     Marital status: Single     Spouse name: Not on file     Number of children: Not on file     Years of education: Not on file     Highest education level: Not on file   Occupational History     Not on file   Social Needs     Financial resource strain: Not on file     Food insecurity     Worry: Not on file     Inability: Not on file     Transportation needs     Medical: Not on file     Non-medical: Not on file   Tobacco Use     Smoking status: Never Smoker     Smokeless tobacco: Never Used   Substance and Sexual Activity     Alcohol use: Yes     Comment: case beer a week     Drug use: No     Sexual activity: Yes     Partners: Female     Birth control/protection: Male Surgical   Lifestyle     Physical activity     Days per week: Not on file     Minutes per session: Not on file     Stress: Not on file   Relationships     Social connections     Talks on phone: Not on file     Gets together: Not on file     Attends Denominational service: Not on file     Active member of club or organization: Not on file     Attends meetings of clubs or organizations: Not on file     Relationship status: Not on file     Intimate partner violence     Fear of current or ex partner: Not on file     Emotionally abused: Not on file     Physically abused: Not on file     Forced sexual activity: Not on file   Other Topics Concern     Parent/sibling w/ CABG, MI or angioplasty before 65F 55M? No   Social History Narrative     Not on file     Retired  who enjoys taking  care of grandchildren.   Hobbies: mechanical stuff     FAMILY HISTORY: Reviewed in EMR      REVIEW OF SYSTEMS: Positive for that noted in past medical history and history of present illness and otherwise reviewed in EMR     PHYSICAL EXAM:     Adult male in no acute distress, cooperative with exam.   Respirations even and unlabored   Focused Upper Extremity Exam:   No gross deformity. Skin intact. No obvious erythema or joint effusion with the exception of the L wrist. Fires bilateral deltoid, biceps, triceps, wrist extension/flexion, , EPL, intrinsics, FPL with 5/5 strength. SILT in axillary, musculocutaneous, radial, ulnar, and median nerve distribution. Radial pulse 2+ and fingers warm and well-perfused with <2 seconds capillary refill. R Shoulder , ER 35, IR to L2. L Shoulder , ER 55, IR to L2, + empty can, - O'Brians, - Speed's.   Swelling over the left wrist. No warmth or erythema.    IMAGING:  L shoulder radiograph 3views: Early osteoarthritis with inferior osteophyte     R shoulder 3 view: end stage glenohumeral osteoarthritis with complete loss of joint space and a humeral osteophyte, AC joint arthritis, and os acromion. Perhaps some narrowing of acromio-humeral interval.     ASSESSMENT:    1. Right end stage glenohumeral arthrosis  2. Left shoulder moderate glenohumeral OA, probable long head biceps rupture     PLAN:  Discussed imaging findings with patient; currently the shoulder pain is under control with recent injection. The wrist appears to be the most pressing issue for the patient at this time, which will be addressed during his clinic visit tomorrow.      If the patient has worsening L shoulder pain and would like to investigate surgical options we recommended that he call the clinic and we will set him up to receive an MRI. If the patient's Right shoulder becomes increasingly painful and he is unable to manage his symptoms nonoperatively it is recommended that he undergo an MRI of  the R shoulder to determine whether he would be a candidate for a TSA vs reverse TSA.      Letty Boyd MD  Orthopedic Surgery PGY1      I have personally examined this patient and have reviewed the clinical presentation and progress note with the resident.  I agree with the treatment plan as outlined.  The plan was formulated with the resident on the day of the resident's note.   Yeisca Deleon MD    Answers for HPI/ROS submitted by the patient on 3/11/2020   General Symptoms: No  Skin Symptoms: No  HENT Symptoms: No  EYE SYMPTOMS: No  HEART SYMPTOMS: No  LUNG SYMPTOMS: No  INTESTINAL SYMPTOMS: No  URINARY SYMPTOMS: No  REPRODUCTIVE SYMPTOMS: No  SKELETAL SYMPTOMS: Yes  BLOOD SYMPTOMS: No  NERVOUS SYSTEM SYMPTOMS: No  MENTAL HEALTH SYMPTOMS: No  Back pain: Yes  Muscle aches: Yes  Neck pain: No  Swollen joints: Yes  Joint pain: Yes  Bone pain: Yes  Muscle cramps: No  Muscle weakness: No  Joint stiffness: Yes  Bone fracture: No

## 2020-03-11 NOTE — NURSING NOTE
"Reason For Visit:   Chief Complaint   Patient presents with     Consult     Bilateral shoulder pain.  Left is more painful Ref. Dr. Chan.         PCP: Johnson Clements  Ref: Dr. Chan    ?  No  Occupation Retired .  Currently working? No.  Work status?  Retired.  Date of injury: November 2019  Type of injury: Up in the woods and cutting trees and felt the biceps tear.  Fell in ice in January 2020.  Date of surgery: NA  Type of surgery: NA.  Smoker: No  Request smoking cessation information: No    Right hand dominant    SANE score  Affected shoulder: Bilateral   Right shoulder SANE: 85  Left shoulder SANE: 85    Ht 1.778 m (5' 10\")   Wt 113.4 kg (250 lb)   BMI 35.87 kg/m        Pain Assessment  Patient Currently in Pain: Yes  0-10 Pain Scale: 3  Primary Pain Location: Shoulder(Left)  Pain Descriptors: Other (comment)(pain in the socket)  Alleviating Factors: Pain medication, NSAIDS  Aggravating Factors: Movement, reggieling    Marie Linares ATC        "

## 2020-03-12 ENCOUNTER — ANCILLARY PROCEDURE (OUTPATIENT)
Dept: GENERAL RADIOLOGY | Facility: CLINIC | Age: 65
End: 2020-03-12
Attending: ORTHOPAEDIC SURGERY
Payer: COMMERCIAL

## 2020-03-12 ENCOUNTER — OFFICE VISIT (OUTPATIENT)
Dept: ORTHOPEDICS | Facility: CLINIC | Age: 65
End: 2020-03-12
Attending: FAMILY MEDICINE
Payer: COMMERCIAL

## 2020-03-12 DIAGNOSIS — S69.92XA LEFT WRIST INJURY, INITIAL ENCOUNTER: ICD-10-CM

## 2020-03-12 DIAGNOSIS — S69.92XA LEFT WRIST INJURY, INITIAL ENCOUNTER: Primary | ICD-10-CM

## 2020-03-12 RX ORDER — TRIAMCINOLONE ACETONIDE 40 MG/ML
40 INJECTION, SUSPENSION INTRA-ARTICULAR; INTRAMUSCULAR
Status: DISCONTINUED | OUTPATIENT
Start: 2020-03-12 | End: 2021-05-28

## 2020-03-12 RX ORDER — LIDOCAINE HYDROCHLORIDE 10 MG/ML
2 INJECTION, SOLUTION EPIDURAL; INFILTRATION; INTRACAUDAL; PERINEURAL
Status: DISCONTINUED | OUTPATIENT
Start: 2020-03-12 | End: 2021-05-28

## 2020-03-12 RX ADMIN — TRIAMCINOLONE ACETONIDE 40 MG: 40 INJECTION, SUSPENSION INTRA-ARTICULAR; INTRAMUSCULAR at 11:09

## 2020-03-12 RX ADMIN — LIDOCAINE HYDROCHLORIDE 2 ML: 10 INJECTION, SOLUTION EPIDURAL; INFILTRATION; INTRACAUDAL; PERINEURAL at 11:09

## 2020-03-12 NOTE — NURSING NOTE
Reason For Visit:   Chief Complaint   Patient presents with     Consult     Left wrist injury, DOI: 12-. Patient has seen Dr. Hall for carpal tunnel in the past.        Primary MD: Johnson Clements, Carmel Lo M: Ref Provider      Age: 64 year old      Pain Assessment  Patient Currently in Pain: Yes  Primary Pain Location: Wrist(Left)    Hand Dominance Evaluation  Hand Dominance: Right  Pinch force  R hand key force: 4.082 kg (9 lb)  L hand key force: 0 kg (0 lb)   force  R hand  level 2 force: 27.2 kg (60 lb)  L hand  level  2 force: 4.536 kg (10 lb)    QuickDASH Assessment  QuickDASH Main 9/8/2016   1.Open a tight or new jar. Mild difficulty   2. Do heavy household chores (e.g., wash walls, floors) Moderate difficulty   3. Carry a shopping bag or briefcase. Moderate difficulty   4. Wash your back. Mild difficulty   5. Use a knife to cut food. Mild difficulty   6. Recreational activities in which you take some force or impact through your arm, shoulder or hand (e.g., golf, hammering, tennis, etc.). Moderate difficulty   7. During the past week, to what extent has your arm, shoulder or hand problem interfered with your normal social activities with family, friends, neighbours or groups? Slightly   8. During the past week, were you limited in your work or other regular daily activities as a result of your arm, shoulder or hand problem? Slightly limited   9. Arm, shoulder or hand pain. Severe   10.Tingling (pins and needles) in your arm,shoulder or hand. Mild   11. During the past week, how much difficulty have you had sleeping because of the pain in your arm, shoulder or hand? ([place] number) Moderate difficulty   Quickdash Ability Score 38.63   1. Open a tight or new jar. -   2. Do heavy household chores (e.g., wash walls, floors). -   3. Carry a shopping bag or briefcase. -   4. Wash your back. -   5. Use a knife to cut food. -   6. Recreational activities in which you  take some force or impact through your arm, shoulder or hand (e.g., golf, hammering, tennis, etc.). -   7. During the past week, to what extent has your arm, shoulder or hand problem interfered with your normal social activities with family, friends, neighbours or groups? -   8. During the past week, were you limited in your work or other regular daily activities as a result of your arm, shoulder or hand problem? -   9. Arm, shoulder or hand pain. -   10. Tingling (pins and needles) in your arm,shoulder or hand. -   11. During the past week, how much difficulty have you had sleeping because of the pain in your arm, shoulder or hand? ([place] number) -   SUM: -          Current Outpatient Medications   Medication Sig Dispense Refill     acyclovir (ZOVIRAX) 400 MG tablet TAKE TWO TABLETS BY MOUTH TWICE DAILY 28 tablet 3     albuterol (PROAIR HFA, PROVENTIL HFA, VENTOLIN HFA) 108 (90 BASE) MCG/ACT inhaler Inhale 2 puffs into the lungs every 6 hours as needed for shortness of breath / dyspnea 1 Inhaler 11     allopurinol (ZYLOPRIM) 300 MG tablet Take 1 tablet (300 mg) by mouth daily 90 tablet 3     beclomethasone (QVAR) 40 MCG/ACT Inhaler Inhale 2 puffs into the lungs 2 times daily 1 Inhaler 11     clindamycin (CLEOCIN T) 1 % external solution Apply topically 2 times daily Profile Rx: patient will contact pharmacy when needed 60 mL 11     diclofenac (VOLTAREN) 1 % topical gel Place 4 g onto the skin 4 times daily 100 g 0     diclofenac (VOLTAREN) 1 % topical gel APPLY 2 GRAMS TOPICALLY TO AFFECTED AREA FOUR TIMES DAILY AS NEEDED 100 g 11     erythromycin (ROMYCIN) 5 MG/GM ophthalmic ointment Place 0.5 inches into the right eye 4 times daily 3.5 g 1     fluticasone (FLONASE) 50 MCG/ACT spray Spray 2 sprays into both nostrils daily 16 g 11     HYDROcodone-acetaminophen (NORCO) 7.5-325 MG per tablet Take 1 tablet by mouth every 6 hours as needed for pain (4 x daily as needed  maxium 4 per day) 110 tablet 0     ipratropium  (ATROVENT) 0.06 % nasal spray SPRAY 2 SPRAYS INTO BOTH NOSTRILS 4 TIMES DAILY 1 Box 11     lidocaine (XYLOCAINE) 5 % external ointment Apply topically 4 times daily as needed for moderate pain 150 g 11     lidocaine 5 % EX patch Place 1 patch onto the skin every 24 hours 30 patch 11     naloxone (NARCAN) 4 MG/0.1ML nasal spray Spray 1 spray (4 mg) into one nostril alternating nostrils once as needed for opioid reversal Every 2-3 minutes until patient responsive or EMS arrives 0.2 mL 0     order for DME Equipment being ordered: Thumb spica splint for left hand 1 Device 0     oxymetazoline (AFRIN) 0.05 % nasal spray Spray 2 sprays into both nostrils 2 times daily 15 mL 0     phentermine (ADIPEX-P) 37.5 MG tablet TAKE 1 TABLET BY MOUTH EVERY DAY IN THE MORNING  2     salicylic acid (MEDIPLAST) 40 % miscellaneous Cut to fit wart, apply every night, put duct tape to secure in place, then remove in the morning. 25 each 3     sildenafil (VIAGRA) 100 MG tablet Take 0.5-1 tablets ( mg) by mouth daily as needed Take 30 min to 4 hours before intercourse.  Never use with nitroglycerin, terazosin or doxazosin. 100 tablet 11     simvastatin (ZOCOR) 40 MG tablet TAKE 1 TABLET (40 MG) BY MOUTH AT BEDTIME 90 tablet 3       Allergies   Allergen Reactions     Seasonal Allergies        Shaina De Santiago LPN

## 2020-03-12 NOTE — LETTER
3/12/2020       RE: Arnaud Bird  [address]  [facility] [address]     Dear Colleague,    Thank you for referring your patient, Arnaud Bird, to the [facility] ORTHOPAEDIC CLINIC at [facility]. Please see a copy of my visit note below.    [facility]  Orthopedics  Anna Watkins MD  2020     Name: Arnaud Bird  MRN: [id number]  Age: 64 year old  : 1955  Referring provider: Carmel Enamorado*     Chief Complaint: Left wrist pain and chronic right wrist pain    Date of Injury: 2019    History of Present Illness:   Arnaud Bird is a 64 year old, right handed male who presents today for follow-up regarding left wrist pain. He slipped and fell on ice in early November landing on his hands and elbow on the left.  He reports he tore his biceps which is treated nonsurgically.  He went fishing in early January and when he got home the left wrist swelled up and was quite painful.  No N/T.  He went to therapy 3 weeks ago and a thermoplast wrist brace with thumb spica splint was made.  This has helped considerably with swelling and he reports he is now moving his fingers more.  Pain is better.  He takes hydrocodone 4x/day for multiple issues.  He places a lidocaine patch on the wrist at night.  He reports continued chronic right wrist pain as well with his known arthritis.    Review of Systems:   A 10-point review of systems was obtained and is negative except for as noted in the HPI.     Physical Examination:  There were no vitals taken for this visit.  R  60 lb, L  10 lb, R pinch 9 lb, L pinch 0 lb   General: Healthy appearing male. Affect appropriate. Normal gait. Alert and oriented to surroundings.   Left Upper Extremity: wrist swollen, skin intact  Wrist 30 flexion, 30 extension, 60 supination 90 pronation  TTP over SL ligament and midcarpal dorsally.  Minimal tenderness radial and ulnarly  Near full  fist  Sensate M/R/U    Right wrist limited motion and tender dorsal central wrist      Imaging:  Radiographs of the left wrist - 3 views (03/12/2020)  Widening of SL space with settling of capitate more advanced compared to 2/3/2020.  Significant osteopenia.  No appreciable fracture.  Advance degenerative changes periscaphoid with large calcification near radial styloid.  DISI of lunate.    I have independently reviewed the above imaging studies; the results were discussed with the patient.     Assessment:   64 year old, right handed male with bilateral wrist arthritis and S-L widening.    Plan:   Continue thermoplast splint for comfort  Bilateral wrist steroid injections given today under fluoroscopy  PRN RTC  Discussed fusions could be done in the future but would be at cost of motion.      Harvey Awan MD  Hand Fellow    Procedure:         PROCEDURE:  After written consent, verifying site and side, a sterile Chlora- prep was performed for the injection site.  C-arm guidance was used for difficult  placement of a 25-gauge needle into the radiocarpal  joint with 1% Lidocaine.  1 mL of Kenalog (40 mg) and 1 mL of lidocaine was injected under fluoroscopic guidance with good relief of pain. Identical procedure bilateral. Patient tolerated the procedure well.  No complications.  Follow up instructions were given.      I have personally examined this patient and have reviewed the clinical presentation and progress note with the resident. I agree with the treatment plan as outlined. The plan was formulated with the resident on the day of the resident's dictation.   Anna Watkins MD   Hand and Upper Extremity Specialist  [facility]      Scribe Disclosure:  I, Saul Ames, am serving as a scribe to document services personally performed by Anna Watkins MD at this visit, based upon the provider's statements to me. All documentation has been reviewed by the aforementioned provider prior to being  entered into the official medical record.     Saul READ, a scribe, prepared the chart for today's encounter.     Medium Joint Injection/Arthrocentesis: bilateral radiocarpal    Date/Time: 3/12/2020 11:09 AM  Performed by: Harvey Awan MD  Authorized by: Anna Watkins MD     Indications:  Pain  Needle Size:  25 G  Guidance: fluoroscopy    Approach:  Lateral  Location:  Wrist  Laterality:  Bilateral  Site:  Bilateral radiocarpal  Medications (Right):  40 mg triamcinolone 40 MG/ML; 2 mL lidocaine (PF) 1 %  Medications (Left):  40 mg triamcinolone 40 MG/ML; 2 mL lidocaine (PF) 1 %  Outcome:  Tolerated well, no immediate complications  Procedure discussed: discussed risks, benefits, and alternatives    Consent Given by:  Patient  Timeout: timeout called immediately prior to procedure    Prep: patient was prepped and draped in usual sterile fashion         [facility] ORTHOP[facility]EDIC [facility]  [address] [place] [id number]  [phone]  Dept: [phone]  ______________________________________________________________________________    Patient: Arnaud Bird   : 1955   MRN: [id number]   2020    INVASIVE PROCEDURE SAFETY CHECKLIST    Date: 3/12/2020   Procedure:Bilateral Wrist Cortisone Injections  Patient Name: Arnaud Bird  MRN: [id number]  YOB: 1955    Action: Complete sections as appropriate. Any discrepancy results in a HARD COPY until resolved.     PRE PROCEDURE:  Patient ID verified with 2 identifiers (name and  or MRN): Yes  Procedure and site verified with patient/designee (when able): Yes  Accurate consent documentation in medical record: Yes  H&P (or appropriate assessment) documented in medical record: Yes  H&P must be up to 20 days prior to procedure and updates within 24 hours of procedure as applicable: Yes  Relevant diagnostic and radiology test results appropriately labeled and displayed as applicable: Yes  Procedure  site(s) marked with provider initials: Yes    TIMEOUT:  Time-Out performed immediately prior to starting procedure, including verbal and active participation of all team members addressing the following:Yes  * Correct patient identify  * Confirmed that the correct side and site are marked  * An accurate procedure consent form  * Agreement on the procedure to be done  * Correct patient position  * Relevant images and results are properly labeled and appropriately displayed  * The need to administer antibiotics or fluids for irrigation purposes during the procedure as applicable   * Safety precautions based on patient history or medication use    DURING PROCEDURE: Verification of correct person, site, and procedures any time the responsibility for care of the patient is transferred to another member of the care team.       The following medications were given:         Prior to injection, verified patient identity using patient's name and date of birth.  Due to injection administration, patient instructed to remain in clinic for 15 minutes  afterwards, and to report any adverse reaction to me immediately.    Joint injection was performed.    Medication Name: Lidocaine 1% NDC 52764-797-68  Drug Amount Wasted:  Yes: 1 mg/ml   Vial/Syringe: Single dose vial  Expiration Date:  7/1/22    Joint injection was performed.    Medication Name: Kenalog 40  NDC [address]-1  Drug Amount Wasted:  None.  Vial/Syringe: Single dose vial  Expiration Date:  10/1/2021    Joint injection was performed.    Medication Name: Kenalog 40  NDC  [address]-8  Drug Amount Wasted:  None.  Vial/Syringe: Single dose vial  Expiration Date:  10/1/2021    Scribed by Frannie Tony ATC for Dr. Watkins on March 12, 2020 at 12:16pm based on the provider's   statements to me.     Frannie Tony ATC    Again, thank you for allowing me to participate in the care of your patient.      Sincerely,    Anna Watkins MD

## 2020-03-12 NOTE — PROGRESS NOTES
[facility]  Orthopedics  Anna Watkins MD  2020     Name: Arnaud Bird  MRN: [id number]  Age: 64 year old  : 1955  Referring provider: Carmel Enamorado*     Chief Complaint: Left wrist pain and chronic right wrist pain    Date of Injury: 2019    History of Present Illness:   Arnaud Bird is a 64 year old, right handed male who presents today for follow-up regarding left wrist pain. He slipped and fell on ice in early November landing on his hands and elbow on the left.  He reports he tore his biceps which is treated nonsurgically.  He went fishing in early January and when he got home the left wrist swelled up and was quite painful.  No N/T.  He went to therapy 3 weeks ago and a thermoplast wrist brace with thumb spica splint was made.  This has helped considerably with swelling and he reports he is now moving his fingers more.  Pain is better.  He takes hydrocodone 4x/day for multiple issues.  He places a lidocaine patch on the wrist at night.  He reports continued chronic right wrist pain as well with his known arthritis.    Review of Systems:   A 10-point review of systems was obtained and is negative except for as noted in the HPI.     Physical Examination:  There were no vitals taken for this visit.  R  60 lb, L  10 lb, R pinch 9 lb, L pinch 0 lb   General: Healthy appearing male. Affect appropriate. Normal gait. Alert and oriented to surroundings.   Left Upper Extremity: wrist swollen, skin intact  Wrist 30 flexion, 30 extension, 60 supination 90 pronation  TTP over SL ligament and midcarpal dorsally.  Minimal tenderness radial and ulnarly  Near full fist  Sensate M/R/U    Right wrist limited motion and tender dorsal central wrist      Imaging:  Radiographs of the left wrist - 3 views (2020)  Widening of SL space with settling of capitate more advanced compared to 2/3/2020.  Significant osteopenia.  No appreciable fracture.  Advance degenerative  changes periscaphoid with large calcification near radial styloid.  DISI of lunate.    I have independently reviewed the above imaging studies; the results were discussed with the patient.     Assessment:   64 year old, right handed male with bilateral wrist arthritis and S-L widening.    Plan:   Continue thermoplast splint for comfort  Bilateral wrist steroid injections given today under fluoroscopy  PRN RTC  Discussed fusions could be done in the future but would be at cost of motion.      Harvey Awan MD  Hand Fellow    Procedure:         PROCEDURE:  After written consent, verifying site and side, a sterile Chlora- prep was performed for the injection site.  C-arm guidance was used for difficult  placement of a 25-gauge needle into the radiocarpal  joint with 1% Lidocaine.  1 mL of Kenalog (40 mg) and 1 mL of lidocaine was injected under fluoroscopic guidance with good relief of pain. Identical procedure bilateral. Patient tolerated the procedure well.  No complications.  Follow up instructions were given.      I have personally examined this patient and have reviewed the clinical presentation and progress note with the resident. I agree with the treatment plan as outlined. The plan was formulated with the resident on the day of the resident's dictation.   Anna Watkins MD   Hand and Upper Extremity Specialist  [facility]      Scribe Disclosure:  Saul READ, am serving as a scribe to document services personally performed by Anna Watkins MD at this visit, based upon the provider's statements to me. All documentation has been reviewed by the aforementioned provider prior to being entered into the official medical record.     Saul READ, a scribe, prepared the chart for today's encounter.     Medium Joint Injection/Arthrocentesis: bilateral radiocarpal    Date/Time: 3/12/2020 11:09 AM  Performed by: Harvey Awan MD  Authorized by: Anna Watkins MD     Indications:   Pain  Needle Size:  25 G  Guidance: fluoroscopy    Approach:  Lateral  Location:  Wrist  Laterality:  Bilateral  Site:  Bilateral radiocarpal  Medications (Right):  40 mg triamcinolone 40 MG/ML; 2 mL lidocaine (PF) 1 %  Medications (Left):  40 mg triamcinolone 40 MG/ML; 2 mL lidocaine (PF) 1 %  Outcome:  Tolerated well, no immediate complications  Procedure discussed: discussed risks, benefits, and alternatives    Consent Given by:  Patient  Timeout: timeout called immediately prior to procedure    Prep: patient was prepped and draped in usual sterile fashion         [facility] ORTHOPAEDIC [address] [address] [phone]  Dept: [phone]  ______________________________________________________________________________    Patient: Arnaud Bird   : 1955   MRN: [id number]   2020    INVASIVE PROCEDURE SAFETY CHECKLIST    Date: 3/12/2020   Procedure:Bilateral Wrist Cortisone Injections  Patient Name: Arnaud Bird  MRN: [id number]  YOB: 1955    Action: Complete sections as appropriate. Any discrepancy results in a HARD COPY until resolved.     PRE PROCEDURE:  Patient ID verified with 2 identifiers (name and  or MRN): Yes  Procedure and site verified with patient/designee (when able): Yes  Accurate consent documentation in medical record: Yes  H&P (or appropriate assessment) documented in medical record: Yes  H&P must be up to 20 days prior to procedure and updates within 24 hours of procedure as applicable: Yes  Relevant diagnostic and radiology test results appropriately labeled and displayed as applicable: Yes  Procedure site(s) marked with provider initials: Yes    TIMEOUT:  Time-Out performed immediately prior to starting procedure, including verbal and active participation of all team members addressing the following:Yes  * Correct patient identify  * Confirmed that the correct side and site are marked  * An accurate  procedure consent form  * Agreement on the procedure to be done  * Correct patient position  * Relevant images and results are properly labeled and appropriately displayed  * The need to administer antibiotics or fluids for irrigation purposes during the procedure as applicable   * Safety precautions based on patient history or medication use    DURING PROCEDURE: Verification of correct person, site, and procedures any time the responsibility for care of the patient is transferred to another member of the care team.       The following medications were given:         Prior to injection, verified patient identity using patient's name and date of birth.  Due to injection administration, patient instructed to remain in clinic for 15 minutes  afterwards, and to report any adverse reaction to me immediately.    Joint injection was performed.    Medication Name: Lidocaine 1% NDC 30737-056-13  Drug Amount Wasted:  Yes: 1 mg/ml   Vial/Syringe: Single dose vial  Expiration Date:  7/1/22    Joint injection was performed.    Medication Name: Kenalog 40  NDC [address]-2  Drug Amount Wasted:  None.  Vial/Syringe: Single dose vial  Expiration Date:  10/1/2021    Joint injection was performed.    Medication Name: Kenalog 40  NDC  [address]-6  Drug Amount Wasted:  None.  Vial/Syringe: Single dose vial  Expiration Date:  10/1/2021    Scribed by Frannie Tony ATC for Dr. Watkins on March 12, 2020 at 12:16pm based on the provider's   statements to me.     Frannie Tony ATC

## 2020-03-21 DIAGNOSIS — S69.92XA WRIST INJURY, LEFT, INITIAL ENCOUNTER: ICD-10-CM

## 2020-03-21 DIAGNOSIS — S49.92XA SHOULDER INJURY, LEFT, INITIAL ENCOUNTER: ICD-10-CM
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
## 2020-03-21 NOTE — TELEPHONE ENCOUNTER
"Requested Prescriptions   Pending Prescriptions Disp Refills     diclofenac (VOLTAREN) 1 % topical gel    Last Written Prescription Date:  02/03/2020  Last Fill Quantity: 100 g,  # refills: 0   Last office visit: 12/5/2019 with prescribing provider:  DANYELLE Clements   Future Office Visit:     Next 5 appointments (look out 90 days)    Mar 30, 2020  9:15 AM CDT  SHORT with Johnson Clements MD  [facility] [address] [address] [phone]          100 g 0     Sig: Place 4 g onto the skin 4 times daily       Topical Steroids and Nonsteroidals Protocol Passed - 3/21/2020 10:38 AM        Passed - Patient is age 6 or older        Passed - Authorizing prescriber's most recent note related to this medication read.     If refill request is for ophthalmic use, please forward request to provider for approval.          Passed - High potency steroid not ordered        Passed - Recent (12 mo) or future (30 days) visit within the authorizing provider's specialty     Patient has had an office visit with the authorizing provider or a provider within the authorizing providers department within the previous 12 mos or has a future within next 30 days. See \"Patient Info\" tab in inbasket, or \"Choose Columns\" in Meds & Orders section of the refill encounter.              Passed - Medication is active on med list              "

## 2020-03-23 PROBLEM — S46.212D: Status: RESOLVED | Noted: 2020-01-16 | Resolved: 2020-03-23

## 2020-03-23 PROBLEM — M19.012 PRIMARY OSTEOARTHRITIS OF LEFT SHOULDER: Status: RESOLVED | Noted: 2020-01-16 | Resolved: 2020-03-23

## 2020-03-23 PROBLEM — M25.532 LEFT WRIST PAIN: Status: RESOLVED | Noted: 2020-02-18 | Resolved: 2020-03-23

## 2020-03-23 NOTE — PROGRESS NOTES
Discharge Note    Progress reporting period is from initial eval to Feb 5, 2020.     Arnaud failed to return for next follow up visit and current status is unknown.  Please see information below for last relevant information on current status.  Patient seen for Rxs Used: 4 visits.    SUBJECTIVE  Subjective changes noted by patient:  Subjective: Pain and ROM gradually improving in bicep. Wrist feeling worse and is more swollen. Seeing MD and starting hand therapy today. .  Current pain level is Current Pain level: 2/10.     Previous pain level was  Initial Pain level: 8/10(at worst).   Changes in function:  Yes (See Goal flowsheet attached for changes in current functional level)  Adverse reaction to treatment or activity: None    OBJECTIVE  Changes noted in objective findings: Objective: AROM=145. Elbow ROM WNL. Modified HEP to allow for no stress to L wrist as he begins hand therapy, will continue working on bicep HEP until wrist is significantly improved. .    ASSESSMENT/PLAN  Diagnosis: L long head bicep rupture, bilateral shoulder OA   DIAGP:  Diagnoses of Tear of left biceps muscle, subsequent encounter and Primary osteoarthritis of left shoulder were pertinent to this visit.  Updated problem list and treatment plan:     Pain - HEP  Decreased ROM/flexibility - HEP  Decreased function - HEP  Decreased strength - HEP    STG/LTGs have been met or progress has been made towards goals:  Yes, please see goal flowsheet for most current information.    Assessment of Progress: current status is unknown.  Last current status: Assessment of progress: Pt is progressing as expected.  Self Management Plans:  HEP    I have re-evaluated this patient and find that the nature, scope, duration and intensity of the therapy is appropriate for the medical condition of the patient.  Arnaud continues to require the following intervention to meet STG and LTG's:  HEP.    Recommendations:  Discharge with current home program.  Patient to  follow up with MD as needed. Episode to be closed at this time and patient formally discharged from therapy.    Devin Gandhi, PT, DPT, OCS      Please refer to the daily flowsheet for treatment today, total treatment time and time spent performing 1:1 timed codes.

## 2020-03-30 ENCOUNTER — VIRTUAL VISIT (OUTPATIENT)
Dept: FAMILY MEDICINE | Facility: CLINIC | Age: 65
End: 2020-03-30
Payer: COMMERCIAL

## 2020-03-30 DIAGNOSIS — M51.16 LUMBAR DISC DISEASE WITH RADICULOPATHY: ICD-10-CM

## 2020-03-30 DIAGNOSIS — E78.5 HYPERLIPIDEMIA WITH TARGET LDL LESS THAN 130: Primary | Chronic | ICD-10-CM

## 2020-03-30 DIAGNOSIS — J45.30 MILD PERSISTENT ASTHMA WITHOUT COMPLICATION: Chronic | ICD-10-CM

## 2020-03-30 DIAGNOSIS — M47.16 SPONDYLOSIS WITH MYELOPATHY, LUMBAR REGION: ICD-10-CM

## 2020-03-30 DIAGNOSIS — G89.4 CHRONIC PAIN SYNDROME: Chronic | ICD-10-CM

## 2020-03-30 DIAGNOSIS — M75.41 IMPINGEMENT SYNDROME, SHOULDER, RIGHT: ICD-10-CM

## 2020-03-30 DIAGNOSIS — M10.9 URIC ACID ARTHROPATHY: ICD-10-CM

## 2020-03-30 PROCEDURE — 99214 OFFICE O/P EST MOD 30 MIN: CPT | Mod: TEL | Performed by: FAMILY MEDICINE

## 2020-03-30 RX ORDER — HYDROCODONE BITARTRATE AND ACETAMINOPHEN 7.5; 325 MG/1; MG/1
1 TABLET ORAL EVERY 6 HOURS PRN
Qty: 110 TABLET | Refills: 0 | Status: SHIPPED | OUTPATIENT
Start: 2020-03-30 | End: 2020-04-29

## 2020-03-30 NOTE — PROGRESS NOTES
"Subjective     Arnaud Bird is a 64 year old male who is being evaluated via a billable telephone visit.      The patient has been notified of following:     \"This telephone visit will be conducted via a call between you and your physician/provider. We have found that certain health care needs can be provided without the need for a physical exam.  This service lets us provide the care you need with a short phone conversation.  If a prescription is necessary we can send it directly to your pharmacy.  If lab work is needed we can place an order for that and you can then stop by our lab to have the test done at a later time.    If during the course of the call the physician/provider feels a telephone visit is not appropriate, you will not be charged for this service.\"     Physician has received verbal consent for a Telephone Visit from the patient? Yes  Radha Tucker CMA    Arnaud Bird complains of   Chief Complaint   Patient presents with     Recheck Medication       ALLERGIES  Seasonal allergies    Medication Followup of hydrocodone    Taking Medication as prescribed: yes    Side Effects:  None    Medication Helping Symptoms:  yes     Patient Active Problem List   Diagnosis     Stiffness of knee joint, right     Gait difficulty     S/P TKR (total knee replacement)     Bilateral carpal tunnel syndrome     Vitamin D deficiency     Plantar fascial fibromatosis     Asthma with exacerbation     Hypertrophy of prostate without urinary obstruction     Obesity     Hyperlipidemia     Spondylosis with myelopathy, lumbar region     LEFT WORSE THAN RIGHT      CARDIOVASCULAR SCREENING; LDL GOAL LESS THAN 100     BMI 40.0-44.9, adult (H)     Mild persistent asthma     Hyperlipidemia with target LDL less than 130     Lumbar disc disease with radiculopathy     Groin strain, initial encounter     Sexual dysfunction     ACP (advance care planning)     Chronic pain syndrome     Chronic gout involving toe of right foot " without tophus, unspecified cause     Impingement syndrome, shoulder, right     Migraine without aura and without status migrainosus, not intractable     LEFT BICEP TENDON IS  TORN     STARTING TO WORK AGAIN     LEFT WRIST INJURY     HAND SWOLLEN AND PAINFUL     ABLE TO WIGGLE FINGERS AGAIN     SENT TO SURGEON     CORTISONE SHOT GIVEN     IN WRISTS BY HAND SPECIALIS    LEFT ARM WEAKNESS    ICE BEFORE AND AFTER     HEATING pad     Lost 25 pounds     KETO DIET AND A LITTLE FAT     GOING WELL     YOGURT with CASSIE SEEDS     EATING NUTS     CHOLESTEROL     .SCOTTY TOLEDO MD .3/30/2020 10:21 AM .March 30, 2020        Arnaud Bird is a 64 year old male who is who presents with FOLLOW UP  BICEPS TENDON RUPTURE LEFT SHOULDER     LEFT WRIST INJURY AFTER A FALL     Onset :  SEVERAL MONTHS      Severity: MODERATE       Home treatments  HOME PHYSICAL THERAPY AND PHYSICAL THERAPY      Additional Symptoms: LOWER BACK PAIN  AND KNEE PAIN      Course  SLOW IMPROVEMENT               There are no preventive care reminders to display for this patient.          .  Current Outpatient Medications   Medication Sig Dispense Refill     acyclovir (ZOVIRAX) 400 MG tablet TAKE TWO TABLETS BY MOUTH TWICE DAILY 28 tablet 3     albuterol (PROAIR HFA, PROVENTIL HFA, VENTOLIN HFA) 108 (90 BASE) MCG/ACT inhaler Inhale 2 puffs into the lungs every 6 hours as needed for shortness of breath / dyspnea 1 Inhaler 11     allopurinol (ZYLOPRIM) 300 MG tablet Take 1 tablet (300 mg) by mouth daily 90 tablet 3     beclomethasone (QVAR) 40 MCG/ACT Inhaler Inhale 2 puffs into the lungs 2 times daily 1 Inhaler 11     clindamycin (CLEOCIN T) 1 % external solution Apply topically 2 times daily Profile Rx: patient will contact pharmacy when needed 60 mL 11     diclofenac (VOLTAREN) 1 % topical gel Place 4 g onto the skin 4 times daily 100 g 0     diclofenac (VOLTAREN) 1 % topical gel APPLY 2 GRAMS TOPICALLY TO AFFECTED AREA FOUR TIMES DAILY AS NEEDED 100 g  11     erythromycin (ROMYCIN) 5 MG/GM ophthalmic ointment Place 0.5 inches into the right eye 4 times daily 3.5 g 1     fluticasone (FLONASE) 50 MCG/ACT spray Spray 2 sprays into both nostrils daily 16 g 11     HYDROcodone-acetaminophen (NORCO) 7.5-325 MG per tablet Take 1 tablet by mouth every 6 hours as needed for pain (4 x daily as needed  maxium 4 per day) 110 tablet 0     ipratropium (ATROVENT) 0.06 % nasal spray SPRAY 2 SPRAYS INTO BOTH NOSTRILS 4 TIMES DAILY 1 Box 11     lidocaine (XYLOCAINE) 5 % external ointment Apply topically 4 times daily as needed for moderate pain 150 g 11     lidocaine 5 % EX patch Place 1 patch onto the skin every 24 hours 30 patch 11     naloxone (NARCAN) 4 MG/0.1ML nasal spray Spray 1 spray (4 mg) into one nostril alternating nostrils once as needed for opioid reversal Every 2-3 minutes until patient responsive or EMS arrives 0.2 mL 0     order for DME Equipment being ordered: Thumb spica splint for left hand 1 Device 0     oxymetazoline (AFRIN) 0.05 % nasal spray Spray 2 sprays into both nostrils 2 times daily 15 mL 0     salicylic acid (MEDIPLAST) 40 % miscellaneous Cut to fit wart, apply every night, put duct tape to secure in place, then remove in the morning. 25 each 3     sildenafil (VIAGRA) 100 MG tablet Take 0.5-1 tablets ( mg) by mouth daily as needed Take 30 min to 4 hours before intercourse.  Never use with nitroglycerin, terazosin or doxazosin. 100 tablet 11     simvastatin (ZOCOR) 40 MG tablet TAKE 1 TABLET (40 MG) BY MOUTH AT BEDTIME 90 tablet 3            Allergies   Allergen Reactions     Seasonal Allergies          Immunization History   Administered Date(s) Administered     Influenza (High Dose) 3 valent vaccine 09/09/2019     Influenza (IIV3) PF 10/18/2012     Pneumo Conj 13-V (2010&after) 09/09/2019     TDAP Vaccine (Adacel) 06/18/2019               reports current alcohol use.        reports no history of drug use.      family history includes Cancer in  his father and mother; Family History Negative in his father and mother; Stomach Problem in his mother.      He indicated that the status of his no family hx of is unknown. He indicated that his mother is . He indicated that his father is . He indicated that his sister is alive. He indicated that both of his brothers are alive.         has a past surgical history that includes tonsillectomy; TOOTH EXTRACTION W/FORCEP; and orthopedic surgery.       reports being sexually active and has had partner(s) who are Female. He reports using the following method of birth control/protection: Male Surgical.    .  Pediatric History   Patient Parents     Not on file     Other Topics Concern     Parent/sibling w/ CABG, MI or angioplasty before 65F 55M? No   Social History Narrative     Not on file             reports that he has never smoked. He has never used smokeless tobacco.        Medical, social, surgical, and family histories reviewed.        Labs reviewed in EPIC  Patient Active Problem List   Diagnosis     Stiffness of knee joint, right     Gait difficulty     S/P TKR (total knee replacement)     Bilateral carpal tunnel syndrome     Vitamin D deficiency     Plantar fascial fibromatosis     Asthma with exacerbation     Hypertrophy of prostate without urinary obstruction     Obesity     Hyperlipidemia     Spondylosis with myelopathy, lumbar region     LEFT WORSE THAN RIGHT      CARDIOVASCULAR SCREENING; LDL GOAL LESS THAN 100     BMI 40.0-44.9, adult (H)     Mild persistent asthma     Hyperlipidemia with target LDL less than 130     Lumbar disc disease with radiculopathy     Groin strain, initial encounter     Sexual dysfunction     ACP (advance care planning)     Chronic pain syndrome     Chronic gout involving toe 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 tophus, unspecified cause     Impingement syndrome, shoulder, right     Migraine without aura and without status migrainosus, not intractable       Past Surgical History:    Procedure Laterality Date     HC TOOTH EXTRACTION W/FORCEP       ORTHOPEDIC SURGERY      bilateral total knee replacements     TONSILLECTOMY      age 2 or 3         Social History     Tobacco Use     Smoking status: Never Smoker     Smokeless tobacco: Never Used   Substance Use Topics     Alcohol use: Yes     Comment: case beer a week       Family History   Problem Relation Age of Onset     Cancer Father      Family History Negative Father      Family History Negative Mother      Cancer Mother      Stomach Problem Mother      Diabetes No family hx of      Coronary Artery Disease No family hx of      Hypertension No family hx of      Hyperlipidemia No family hx of      Cerebrovascular Disease No family hx of      Breast Cancer No family hx of      Colon Cancer No family hx of      Prostate Cancer No family hx of      Other Cancer No family hx of      Depression No family hx of      Anxiety Disorder No family hx of      Mental Illness No family hx of      Substance Abuse No family hx of      Anesthesia Reaction No family hx of      Asthma No family hx of      Osteoporosis No family hx of      Genetic Disorder No family hx of      Thyroid Disease No family hx of      Obesity No family hx of      Unknown/Adopted No family hx of              Current Outpatient Medications   Medication Sig Dispense Refill     acyclovir (ZOVIRAX) 400 MG tablet TAKE TWO TABLETS BY MOUTH TWICE DAILY 28 tablet 3     albuterol (PROAIR HFA, PROVENTIL HFA, VENTOLIN HFA) 108 (90 BASE) MCG/ACT inhaler Inhale 2 puffs into the lungs every 6 hours as needed for shortness of breath / dyspnea 1 Inhaler 11     allopurinol (ZYLOPRIM) 300 MG tablet Take 1 tablet (300 mg) by mouth daily 90 tablet 3     beclomethasone (QVAR) 40 MCG/ACT Inhaler Inhale 2 puffs into the lungs 2 times daily 1 Inhaler 11     clindamycin (CLEOCIN T) 1 % external solution Apply topically 2 times daily Profile Rx: patient will contact pharmacy when needed 60 mL 11     diclofenac  (VOLTAREN) 1 % topical gel Place 4 g onto the skin 4 times daily 100 g 0     diclofenac (VOLTAREN) 1 % topical gel APPLY 2 GRAMS TOPICALLY TO AFFECTED AREA FOUR TIMES DAILY AS NEEDED 100 g 11     erythromycin (ROMYCIN) 5 MG/GM ophthalmic ointment Place 0.5 inches into the right eye 4 times daily 3.5 g 1     fluticasone (FLONASE) 50 MCG/ACT spray Spray 2 sprays into both nostrils daily 16 g 11     HYDROcodone-acetaminophen (NORCO) 7.5-325 MG per tablet Take 1 tablet by mouth every 6 hours as needed for pain (4 x daily as needed  maxium 4 per day) 110 tablet 0     ipratropium (ATROVENT) 0.06 % nasal spray SPRAY 2 SPRAYS INTO BOTH NOSTRILS 4 TIMES DAILY 1 Box 11     lidocaine (XYLOCAINE) 5 % external ointment Apply topically 4 times daily as needed for moderate pain 150 g 11     lidocaine 5 % EX patch Place 1 patch onto the skin every 24 hours 30 patch 11     naloxone (NARCAN) 4 MG/0.1ML nasal spray Spray 1 spray (4 mg) into one nostril alternating nostrils once as needed for opioid reversal Every 2-3 minutes until patient responsive or EMS arrives 0.2 mL 0     order for DME Equipment being ordered: Thumb spica splint for left hand 1 Device 0     oxymetazoline (AFRIN) 0.05 % nasal spray Spray 2 sprays into both nostrils 2 times daily 15 mL 0     salicylic acid (MEDIPLAST) 40 % miscellaneous Cut to fit wart, apply every night, put duct tape to secure in place, then remove in the morning. 25 each 3     sildenafil (VIAGRA) 100 MG tablet Take 0.5-1 tablets ( mg) by mouth daily as needed Take 30 min to 4 hours before intercourse.  Never use with nitroglycerin, terazosin or doxazosin. 100 tablet 11     simvastatin (ZOCOR) 40 MG tablet TAKE 1 TABLET (40 MG) BY MOUTH AT BEDTIME 90 tablet 3         Recent Labs   Lab Test 09/09/19  0945 06/04/18  0842 03/26/18  0832 12/07/17  1023 12/30/16  0804 10/31/16  0822   A1C  --   --   --   --  5.4  --    *  --  88  --   --  85   HDL 61  --  58  --   --  55   TRIG 97  --   117  --   --  94   ALT 34 40  --  37  --  35   CR 0.74 0.70  --  0.85  --  0.88   GFRESTIMATED >90 >90  --  >90  --  87   GFRESTBLACK >90 >90  --  >90  --  >90   POTASSIUM 4.5 4.2  --   --   --   --    TSH  --  2.02  --   --   --   --             BP Readings from Last 6 Encounters:   02/03/20 122/80   12/05/19 126/78   09/09/19 (!) 152/100   07/24/19 139/86   06/18/19 130/88   06/05/19 133/89         Wt Readings from Last 3 Encounters:   03/11/20 113.4 kg (250 lb)   03/04/20 113.4 kg (250 lb)   02/12/20 113.4 kg (250 lb)               Positive symptoms or findings indicated by bold designation:         ROS: 10 point ROS neg other than the symptoms noted above in the HPI.except  has Stiffness of knee joint, right; Gait difficulty; S/P TKR (total knee replacement); Bilateral carpal tunnel syndrome; Vitamin D deficiency; Plantar fascial fibromatosis; Asthma with exacerbation; Hypertrophy of prostate without urinary obstruction; Obesity; Hyperlipidemia; Spondylosis with myelopathy, lumbar region; LEFT WORSE THAN RIGHT ; CARDIOVASCULAR SCREENING; LDL GOAL LESS THAN 100; BMI 40.0-44.9, adult (H); Mild persistent asthma; Hyperlipidemia with target LDL less than 130; Lumbar disc disease with radiculopathy; Groin strain, initial encounter; Sexual dysfunction; ACP (advance care planning); Chronic pain syndrome; Chronic gout involving toe 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 tophus, unspecified cause; Impingement syndrome, shoulder, right; and Migraine without aura and without status migrainosus, not intractable on their problem list.  Review Of Systems    Skin: negative    Eyes: negative    Ears/Nose/Throat: negative    Respiratory: No shortness of breath, dyspnea on exertion, cough, or hemoptysis    Cardiovascular: negative    Gastrointestinal: heartburn    HISTORY DIVERTICULOSIS     Genitourinary: nocturia    HISTORY OF BENIGN PROSTATIC HYPERTROPHY     ERECTILE DYSFUNCTION     Musculoskeletal: back pain, arthritis, joint pain, gout and      SEE HISTORY OF PRESENT ILLNESS     Neurologic: negative    Psychiatric: negative    Hematologic/Lymphatic/Immunologic: negative    Endocrine:  MORBID OBESITY             ICD-10-CM    1. Hyperlipidemia with target LDL less than 130  E78.5 Basic metabolic panel  (Ca, Cl, CO2, Creat, Gluc, K, Na, BUN)     Lipid panel reflex to direct LDL Fasting     ALT   2. Chronic pain syndrome  G89.4 HYDROcodone-acetaminophen (NORCO) 7.5-325 MG per tablet   3. Lumbar disc disease with radiculopathy  M51.16    4. Spondylosis with myelopathy, lumbar region  M47.16    5. Mild persistent asthma without complication  J45.30 Asthma Control Test - HIM Scan   6. Uric acid arthropathy  M10.9 Uric acid   7. Impingement syndrome, shoulder, right  M75.41               .    Side effects benefits and risks thoroughly discussed. .he may come in early if unimproved or getting worse          Please drink 2 glasses of water prior to meals and walk 15-30 minutes after meals        I spent  25 MINUTES SPENT  with patient discussing the following issues   The primary encounter diagnosis was Hyperlipidemia with target LDL less than 130. Diagnoses of Chronic pain syndrome, Lumbar disc disease with radiculopathy, Spondylosis with myelopathy, lumbar region, Mild persistent asthma without complication, Uric acid arthropathy, and Impingement syndrome, shoulder, right were also pertinent to this visit. over half of which involved counseling and coordination of care.      There are no Patient Instructions on file for this visit.        ALL THE ABOVE PROBLEMS ARE STABLE AND MED CHANGES AS NOTED        Diet: weight LOSS OR KETOGENIC        Exercise:  SHOULDER AND WRIST LEFT SIDED  CHRONIC LOWER BACK PAIN AND KNEE PAIN   Exercises Range of motion, balance, isometric, and strengthening exercises 30 repetitions twice daily of involved joints            .SCTOTY TOLEDO MD 3/30/2020 10:21 AM  March 30, 2020

## 2020-03-30 NOTE — LETTER
-Complete and return the attached ASTHMA CONTROL TEST.  If your total score is 19 or less or you have been to the ER or urgent care for your asthma, then please schedule an asthma followup appointment.  OVER 20  SCOTTY TOLEDO JR., MD

## 2020-03-30 NOTE — PATIENT INSTRUCTIONS
(E78.5) Hyperlipidemia with target LDL less than 130  (primary encounter diagnosis)  Comment:    Plan: Basic metabolic panel  (Ca, Cl, CO2, Creat,         Gluc, K, Na, BUN), Lipid panel reflex to direct        LDL Fasting, ALT             (G89.4) Chronic pain syndrome  Comment:    Plan: HYDROcodone-acetaminophen (NORCO) 7.5-325 MG         per tablet             (M51.16) Lumbar disc disease with radiculopathy  Comment:    Plan:      (M47.16) Spondylosis with myelopathy, lumbar region  Comment:    Plan:      (J45.30) Mild persistent asthma without complication  Comment:    Plan: Asthma Control Test - HIM Scan             (M10.9) Uric acid arthropathy  Comment:  ALLOPURINOL   Plan: Uric acid             (M75.41) Impingement syndrome, shoulder, right  Comment:    Plan:     RANGE OF MOTION EXERCISES

## 2020-04-01 DIAGNOSIS — E78.5 HYPERLIPIDEMIA WITH TARGET LDL LESS THAN 130: Chronic | ICD-10-CM

## 2020-04-01 DIAGNOSIS — M10.9 URIC ACID ARTHROPATHY: ICD-10-CM

## 2020-04-01 PROCEDURE — 84460 ALANINE AMINO (ALT) (SGPT): CPT | Performed by: FAMILY MEDICINE

## 2020-04-01 PROCEDURE — 80061 LIPID PANEL: CPT | Performed by: FAMILY MEDICINE

## 2020-04-01 PROCEDURE — 84550 ASSAY OF BLOOD/URIC ACID: CPT | Performed by: FAMILY MEDICINE

## 2020-04-01 PROCEDURE — 36415 COLL VENOUS BLD VENIPUNCTURE: CPT | Performed by: FAMILY MEDICINE

## 2020-04-01 PROCEDURE — 80048 BASIC METABOLIC PNL TOTAL CA: CPT | Performed by: FAMILY MEDICINE

## 2020-04-02 LAB
ALT SERPL W P-5'-P-CCNC: 30 U/L (ref 0–70)
ANION GAP SERPL CALCULATED.3IONS-SCNC: 3 MMOL/L (ref 3–14)
BUN SERPL-MCNC: 16 MG/DL (ref 7–30)
CALCIUM SERPL-MCNC: 8.9 MG/DL (ref 8.5–10.1)
CHLORIDE SERPL-SCNC: 108 MMOL/L (ref 94–109)
CHOLEST SERPL-MCNC: 149 MG/DL
CO2 SERPL-SCNC: 28 MMOL/L (ref 20–32)
CREAT SERPL-MCNC: 0.7 MG/DL (ref 0.66–1.25)
GFR SERPL CREATININE-BSD FRML MDRD: >90 ML/MIN/{1.73_M2}
GLUCOSE SERPL-MCNC: 89 MG/DL (ref 70–99)
HDLC SERPL-MCNC: 48 MG/DL
LDLC SERPL CALC-MCNC: 85 MG/DL
NONHDLC SERPL-MCNC: 101 MG/DL
POTASSIUM SERPL-SCNC: 4.6 MMOL/L (ref 3.4–5.3)
SODIUM SERPL-SCNC: 139 MMOL/L (ref 133–144)
TRIGL SERPL-MCNC: 78 MG/DL
URATE SERPL-MCNC: 4.4 MG/DL (ref 3.5–7.2)

## 2020-04-23 DIAGNOSIS — R37 SEXUAL DYSFUNCTION: ICD-10-CM

## 2020-04-23 RX ORDER — SILDENAFIL 100 MG/1
50-100 TABLET, FILM COATED ORAL DAILY PRN
Qty: 100 TABLET | Refills: 3 | Status: SHIPPED | OUTPATIENT
Start: 2020-04-23 | End: 2022-01-25

## 2020-04-23 NOTE — TELEPHONE ENCOUNTER
Reason for Call:  Medication or medication refill:    Do you use a [facility]?  Name of the pharmacy and phone number for the current request:  Paper prescription requested    Name of the medication requested: sildenafil (VIAGRA) 100 MG tablet       Other request: The patient called and stated that he needs this medication filled. He stated that he would like a paper prescription mailed to him.     Can we leave a detailed message on this number? YES    Phone number patient can be reached at: Cell number on file:    Telephone Information:   Mobile [phone]       Best Time: Any    Call taken on 4/23/2020 at 8:13 AM by Ambreen Navas

## 2020-04-27 DIAGNOSIS — G89.4 CHRONIC PAIN SYNDROME: Chronic | ICD-10-CM

## 2020-04-29 RX ORDER — HYDROCODONE BITARTRATE AND ACETAMINOPHEN 7.5; 325 MG/1; MG/1
1 TABLET ORAL EVERY 6 HOURS PRN
Qty: 100 TABLET | Refills: 0 | Status: SHIPPED | OUTPATIENT
Start: 2020-04-29 | End: 2020-05-19

## 2020-04-29 NOTE — TELEPHONE ENCOUNTER
RX [place] ([place]) reviewed:  reviewed April 29, 2020- no concerns  [place] profile:  [email or web address]    Routing refill request to provider for review/approval because:  Drug not on the [facility] refill protocol

## 2020-04-29 NOTE — TELEPHONE ENCOUNTER
Controlled Substance Refill Request for HYDROcodone-acetaminophen (NORCO) 7.5-325 MG per tablet   Problem List Complete:  Yes      Chronic pain syndrome   9/22/2016    Overview    Patient is followed by SCOTTY TOLEDO MD for ongoing prescription of pain medication.  All refills should only be approved by this provider, or covering partner.     Medication(s):  NORCO  7.5MG PO FOUR TIMES DAILY .   Maximum quantity per month:  120   Clinic visit frequency required: Q 3 months      Controlled substance agreement:  Encounter-Level CSA - 10/31/2016:                     Controlled Substance Agreement - Scan on 11/15/2016  7:53 AM : CONTROLLED SUBSTANCE AGREEMENT (below)                Pain Clinic evaluation in the past: No     DIRE Total Score(s):    7/2/2016   Total Score 18         Last [facility] website verification: 1/28/20, no concerns.    [email or web address]        checked in past 3 months?  No, route to RN

## 2020-05-19 ENCOUNTER — VIRTUAL VISIT (OUTPATIENT)
Dept: FAMILY MEDICINE | Facility: CLINIC | Age: 65
End: 2020-05-19
Payer: COMMERCIAL

## 2020-05-19 DIAGNOSIS — Z96.652 STATUS POST TOTAL LEFT KNEE REPLACEMENT: ICD-10-CM

## 2020-05-19 DIAGNOSIS — R26.9 GAIT DIFFICULTY: ICD-10-CM

## 2020-05-19 DIAGNOSIS — J45.30 MILD PERSISTENT ASTHMA WITHOUT COMPLICATION: Chronic | ICD-10-CM

## 2020-05-19 DIAGNOSIS — G89.4 CHRONIC PAIN SYNDROME: Chronic | ICD-10-CM

## 2020-05-19 DIAGNOSIS — M25.661 STIFFNESS OF KNEE JOINT, RIGHT: Chronic | ICD-10-CM

## 2020-05-19 DIAGNOSIS — E78.5 HYPERLIPIDEMIA WITH TARGET LDL LESS THAN 130: Chronic | ICD-10-CM

## 2020-05-19 DIAGNOSIS — J30.1 SEASONAL ALLERGIC RHINITIS DUE TO POLLEN: ICD-10-CM

## 2020-05-19 DIAGNOSIS — M47.16 SPONDYLOSIS WITH MYELOPATHY, LUMBAR REGION: Primary | ICD-10-CM

## 2020-05-19 PROCEDURE — 99214 OFFICE O/P EST MOD 30 MIN: CPT | Mod: 95 | Performed by: FAMILY MEDICINE

## 2020-05-19 RX ORDER — SIMVASTATIN 40 MG
40 TABLET ORAL DAILY
Qty: 90 TABLET | Refills: 3 | Status: SHIPPED | OUTPATIENT
Start: 2020-05-19 | End: 2021-04-14

## 2020-05-19 RX ORDER — HYDROCODONE BITARTRATE AND ACETAMINOPHEN 7.5; 325 MG/1; MG/1
1 TABLET ORAL EVERY 6 HOURS PRN
Qty: 120 TABLET | Refills: 0 | Status: SHIPPED | OUTPATIENT
Start: 2020-05-19 | End: 2020-06-24

## 2020-05-19 RX ORDER — FLUTICASONE PROPIONATE 50 MCG
2 SPRAY, SUSPENSION (ML) NASAL DAILY
Qty: 16 G | Refills: 11 | Status: SHIPPED | OUTPATIENT
Start: 2020-05-19 | End: 2021-04-28

## 2020-05-19 NOTE — PATIENT INSTRUCTIONS
Review of Systems  has Stiffness of knee joint, right; Gait difficulty; S/P TKR (total knee replacement); Bilateral carpal tunnel syndrome; Vitamin D deficiency; Plantar fascial fibromatosis; Asthma with exacerbation; Hypertrophy of prostate without urinary obstruction; Obesity; Hyperlipidemia; Spondylosis with myelopathy, lumbar region; LEFT WORSE THAN RIGHT ; CARDIOVASCULAR SCREENING; LDL GOAL LESS THAN 100; BMI 40.0-44.9, adult (H); Mild persistent asthma; Hyperlipidemia with target LDL less than 130; Lumbar disc disease with radiculopathy; Groin strain, initial encounter; Sexual dysfunction; ACP (advance care planning); Chronic pain syndrome; Chronic gout involving toe 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 tophus, unspecified cause; Impingement syndrome, shoulder, right; and Migraine without aura and without status migrainosus, not intractable on their problem list. ASTHMA WEIGHT LOD AND QVAR  LUMBAR DISC PHYSICAL THERAPY AND STRETCHES   PRN VIAGRA FOR SEXUAL DYSFUNCTION   ALLOPURINOL FOR GOT   SHOULDER RANGE OF MOTION OF SHOULDER IMPINGEMENT  WANTS FURTHER WORKUP ON MIGRAINE WHEN PANDEMIC IS OVER     ICD-10-CM    1. Spondylosis with myelopathy, lumbar region  M47.16 HYDROcodone-acetaminophen (NORCO) 7.5-325 MG per tablet   2. Chronic pain syndrome  G89.4 HYDROcodone-acetaminophen (NORCO) 7.5-325 MG per tablet   3. Stiffness of knee joint, right  M25.661 HYDROcodone-acetaminophen (NORCO) 7.5-325 MG per tablet   4. Status post total left knee replacement  Z96.652 HYDROcodone-acetaminophen (NORCO) 7.5-325 MG per tablet   5. Gait difficulty  R26.9 HYDROcodone-acetaminophen (NORCO) 7.5-325 MG per tablet   6. Mild persistent asthma without complication  J45.30 beclomethasone (QVAR) 40 MCG/ACT AERS IS A DISCONTINUED MEDICATION    CHANGING TO QVAR 2 PUFFS INHALED BID FROM FLOVENT DUE TO COSTS    7. Seasonal allergic rhinitis due to pollen  J30.1 fluticasone (FLONASE) 50 MCG/ACT nasal spray   8. Hyperlipidemia with target LDL less  than 130  E78.5 simvastatin (ZOCOR) 40 MG tablet     SCOTTY TOLEDO JR., MD

## 2020-05-19 NOTE — PROGRESS NOTES
"Arnaud Bird is a 65 year old male who is being evaluated via a billable telephone visit.      The patient has been notified of following:     \"This telephone visit will be conducted via a call between you and your physician/provider. We have found that certain health care needs can be provided without the need for a physical exam.  This service lets us provide the care you need with a short phone conversation.  If a prescription is necessary we can send it directly to your pharmacy.  If lab work is needed we can place an order for that and you can then stop by our lab to have the test done at a later time.    Telephone visits are billed at different rates depending on your insurance coverage. During this emergency period, for some insurers they may be billed the same as an in-person visit.  Please reach out to your insurance provider with any questions.    If during the course of the call the physician/provider feels a telephone visit is not appropriate, you will not be charged for this service.\"    Patient has given verbal consent for Telephone visit?  Yes    What phone number would you like to be contacted at? [phone]    How would you like to obtain your AVS? Mail a copy    Subjective     Arnaud Bird is a 65 year old male who presents via phone visit today for the following health issues:    HPI  Discuss multiple health issues today.        CHRONIC PAIN SYNDROME   KNEE PAIN BILATERAL   SHOULDER PAIN   CHRONIC WRIST PAIN   CHRONIC LOWER BACK PAIN     Chronic Pain Follow-Up SEE ABOVE     Where in your body do you have pain?  ABOVE  How has your pain affected your ability to work? Not applicable  Which of these pain treatments have you tried since your last clinic visit? Activity or exercise, Stretching and Surgery  How well are you sleeping? Good  How has your mood been since your last visit? About the same  Have you had a significant life event? No  Other aggravating factors: prolonged sitting and " prolonged standing  Taking medication as directed? Yes    PHQ-9 SCORE 10/31/2016 3/19/2019   PHQ-9 Total Score 2 0     SOFIYA-7 SCORE 10/31/2016 3/19/2019   Total Score 2 0     No flowsheet data found.  Encounter-Level CSA - 10/31/2016:    Controlled Substance Agreement - Scan on 11/15/2016  7:53 AM: CONTROLLED SUBSTANCE AGREEMENT     Patient-Level CSA:    Controlled Substance Agreement - Opioid - Scan on 3/19/2019 11:01 AM       KETO DIET  AND WEIGHT LOSS       B  Patient Active Problem List   Diagnosis     Stiffness of knee joint, right     Gait difficulty     S/P TKR (total knee replacement)     Bilateral carpal tunnel syndrome     Vitamin D deficiency     Plantar fascial fibromatosis     Asthma with exacerbation     Hypertrophy of prostate without urinary obstruction     Obesity     Hyperlipidemia     Spondylosis with myelopathy, lumbar region     LEFT WORSE THAN RIGHT      CARDIOVASCULAR SCREENING; LDL GOAL LESS THAN 100     BMI 40.0-44.9, adult (H)     Mild persistent asthma     Hyperlipidemia with target LDL less than 130     Lumbar disc disease with radiculopathy     Groin strain, initial encounter     Sexual dysfunction     ACP (advance care planning)     Chronic pain syndrome     Chronic gout involving toe 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 tophus, unspecified cause     Impingement syndrome, shoulder, right     Migraine without aura and without status migrainosus, not intractable     Past Surgical History:   Procedure Laterality Date     HC TOOTH EXTRACTION W/FORCEP       ORTHOPEDIC SURGERY      bilateral total knee replacements     TONSILLECTOMY      age 2 or 3       Social History     Tobacco Use     Smoking status: Never Smoker     Smokeless tobacco: Never Used   Substance Use Topics     Alcohol use: Yes     Comment: case beer a week     Family History   Problem Relation Age of Onset     Cancer Father      Family History Negative Father      Family History Negative Mother      Cancer Mother      Stomach Problem Mother       Diabetes No family hx of      Coronary Artery Disease No family hx of      Hypertension No family hx of      Hyperlipidemia No family hx of      Cerebrovascular Disease No family hx of      Breast Cancer No family hx of      Colon Cancer No family hx of      Prostate Cancer No family hx of      Other Cancer No family hx of      Depression No family hx of      Anxiety Disorder No family hx of      Mental Illness No family hx of      Substance Abuse No family hx of      Anesthesia Reaction No family hx of      Asthma No family hx of      Osteoporosis No family hx of      Genetic Disorder No family hx of      Thyroid Disease No family hx of      Obesity No family hx of      Unknown/Adopted No family hx of          Current Outpatient Medications   Medication Sig Dispense Refill     acyclovir (ZOVIRAX) 400 MG tablet TAKE TWO TABLETS BY MOUTH TWICE DAILY 28 tablet 3     albuterol (PROAIR HFA, PROVENTIL HFA, VENTOLIN HFA) 108 (90 BASE) MCG/ACT inhaler Inhale 2 puffs into the lungs every 6 hours as needed for shortness of breath / dyspnea 1 Inhaler 11     allopurinol (ZYLOPRIM) 300 MG tablet Take 1 tablet (300 mg) by mouth daily 90 tablet 3     beclomethasone (QVAR) 40 MCG/ACT AERS IS A DISCONTINUED MEDICATION Inhale 2 puffs into the lungs 2 times daily 1 Inhaler 11     clindamycin (CLEOCIN T) 1 % external solution Apply topically 2 times daily Profile Rx: patient will contact pharmacy when needed 60 mL 11     diclofenac (VOLTAREN) 1 % topical gel Place 4 g onto the skin 4 times daily 100 g 0     diclofenac (VOLTAREN) 1 % topical gel APPLY 2 GRAMS TOPICALLY TO AFFECTED AREA FOUR TIMES DAILY AS NEEDED 100 g 11     erythromycin (ROMYCIN) 5 MG/GM ophthalmic ointment Place 0.5 inches into the right eye 4 times daily 3.5 g 1     fluticasone (FLONASE) 50 MCG/ACT nasal spray Spray 2 sprays into both nostrils daily 16 g 11     HYDROcodone-acetaminophen (NORCO) 7.5-325 MG per tablet Take 1 tablet by mouth every 6 hours as needed  for pain (4 x daily as needed  maxium 4 per day) 120 tablet 0     ipratropium (ATROVENT) 0.06 % nasal spray SPRAY 2 SPRAYS INTO BOTH NOSTRILS 4 TIMES DAILY 1 Box 11     lidocaine (XYLOCAINE) 5 % external ointment Apply topically 4 times daily as needed for moderate pain 150 g 11     lidocaine 5 % EX patch Place 1 patch onto the skin every 24 hours 30 patch 11     naloxone (NARCAN) 4 MG/0.1ML nasal spray Spray 1 spray (4 mg) into one nostril alternating nostrils once as needed for opioid reversal Every 2-3 minutes until patient responsive or EMS arrives 0.2 mL 0     order for DME Equipment being ordered: Thumb spica splint for left hand 1 Device 0     salicylic acid (MEDIPLAST) 40 % miscellaneous Cut to fit wart, apply every night, put duct tape to secure in place, then remove in the morning. 25 each 3     sildenafil (VIAGRA) 100 MG tablet Take 0.5-1 tablets ( mg) by mouth daily as needed Take 30 min to 4 hours before intercourse.  Never use with nitroglycerin, terazosin or doxazosin. 100 tablet 3     simvastatin (ZOCOR) 40 MG tablet Take 1 tablet (40 mg) by mouth daily 90 tablet 3     Allergies   Allergen Reactions     Seasonal Allergies      Recent Labs   Lab Test 04/01/20  1137 09/09/19  0945 06/04/18  0842 03/26/18  0832  12/30/16  0804   A1C  --   --   --   --   --  5.4   LDL 85 104*  --  88  --   --    HDL 48 61  --  58  --   --    TRIG 78 97  --  117  --   --    ALT 30 34 40  --    < >  --    CR 0.70 0.74 0.70  --    < >  --    GFRESTIMATED >90 >90 >90  --    < >  --    GFRESTBLACK >90 >90 >90  --    < >  --    POTASSIUM 4.6 4.5 4.2  --   --   --    TSH  --   --  2.02  --   --   --     < > = values in this interval not displayed.      BP Readings from Last 3 Encounters:   02/03/20 122/80   12/05/19 126/78   09/09/19 (!) 152/100    Wt Readings from Last 3 Encounters:   03/11/20 113.4 kg (250 lb)   03/04/20 113.4 kg (250 lb)   02/12/20 113.4 kg (250 lb)                    Reviewed and updated as needed  this visit by Provider         Review of Systems  has Stiffness of knee joint, right; Gait difficulty; S/P TKR (total knee replacement); Bilateral carpal tunnel syndrome; Vitamin D deficiency; Plantar fascial fibromatosis; Asthma with exacerbation; Hypertrophy of prostate without urinary obstruction; Obesity; Hyperlipidemia; Spondylosis with myelopathy, lumbar region; LEFT WORSE THAN RIGHT ; CARDIOVASCULAR SCREENING; LDL GOAL LESS THAN 100; BMI 40.0-44.9, adult (H); Mild persistent asthma; Hyperlipidemia with target LDL less than 130; Lumbar disc disease with radiculopathy; Groin strain, initial encounter; Sexual dysfunction; ACP (advance care planning); Chronic pain syndrome; Chronic gout involving toe 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 tophus, unspecified cause; Impingement syndrome, shoulder, right; and Migraine without aura and without status migrainosus, not intractable on their problem list. ASTHMA WEIGHT LOD AND QVAR  LUMBAR DISC PHYSICAL THERAPY AND STRETCHES   PRN VIAGRA FOR SEXUAL DYSFUNCTION   ALLOPURINOL FOR GOT   SHOULDER RANGE OF MOTION OF SHOULDER IMPINGEMENT  WANTS FURTHER WORKUP ON MIGRAINE WHEN PANDEMIC IS OVER     Constitutional, HEENT, cardiovascular, pulmonary, gi and gu systems are negative, except as otherwise noted.       Objective   Reported vitals:  There were no vitals taken for this visit.   healthy, alert and no distress  PSYCH: Alert and oriented times 3; coherent speech, normal   rate and volume, able to articulate logical thoughts, able   to abstract reason, no tangential thoughts, no hallucinations   or delusions  His affect is normal  RESP: No cough, no audible wheezing, able to talk in full sentences  Remainder of exam unable to be completed due to telephone visits    Diagnostic Test Results:  Labs reviewed in Epic  No results found for any visits on 05/19/20.        Assessment/Plan:  (M47.16) Spondylosis with myelopathy, lumbar region  (primary encounter diagnosis)  Comment:    Plan:  HYDROcodone-acetaminophen (NORCO) 7.5-325 MG         per tablet             (G89.4) Chronic pain syndrome  Comment:    Plan: HYDROcodone-acetaminophen (NORCO) 7.5-325 MG         per tablet             (M25.661) Stiffness of knee joint, right  Comment:    Plan: HYDROcodone-acetaminophen (NORCO) 7.5-325 MG         per tablet             (Z96.652) Status post total left knee replacement  Comment:    Plan: HYDROcodone-acetaminophen (NORCO) 7.5-325 MG         per tablet             (R26.9) Gait difficulty  Comment:    Plan: HYDROcodone-acetaminophen (NORCO) 7.5-325 MG         per tablet             (J45.30) Mild persistent asthma without complication  Comment: CHANGING TO QVAR 2 PUFFS INHALED BID FROM FLOVENT DUE TO COSTS   Plan: beclomethasone (QVAR) 40 MCG/ACT AERS IS A         DISCONTINUED MEDICATION             (J30.1) Seasonal allergic rhinitis due to pollen  Comment:    Plan: fluticasone (FLONASE) 50 MCG/ACT nasal spray              (E78.5) Hyperlipidemia with target LDL less than 130  Comment:    Plan: simvastatin (ZOCOR) 40 MG tablet                 No follow-ups on file.      Phone call duration:   25 MINUTES SPENT  Minutes    SCOTTY TOLEDO JR., MD 05/19/20

## 2020-06-01 ENCOUNTER — TELEPHONE (OUTPATIENT)
Dept: OTOLARYNGOLOGY | Facility: CLINIC | Age: 65
End: 2020-06-01

## 2020-06-01 NOTE — TELEPHONE ENCOUNTER
[facility] Call Center    Phone Message    May a detailed message be left on voicemail: yes     Reason for Call: Appointment Intake    Referring Provider Name: unknown   Diagnosis and/or Symptoms: UMP RETURN NOSE BLEED     Action Taken: Message routed to:  [facility] ENT    Travel Screening: Not Applicable

## 2020-06-02 NOTE — TELEPHONE ENCOUNTER
Called patient to schedule:    Appointment Type - VIDEO VISIT RETURN/TELEPHONE VISIT RETURN  Provider - Dr. Barnes  Appointment Notes - Assess need for cautery    Left a VM with scheduling instructions and ENT call center number. Patient is welcome to schedule video or phone visit in Dr. Barnes's next available 'RETURN' slot.    If in clinic appointment is desired please schedule out 8 weeks.

## 2020-06-24 DIAGNOSIS — M47.16 SPONDYLOSIS WITH MYELOPATHY, LUMBAR REGION: ICD-10-CM

## 2020-06-24 DIAGNOSIS — Z96.652 STATUS POST TOTAL LEFT KNEE REPLACEMENT: ICD-10-CM

## 2020-06-24 DIAGNOSIS — M25.661 STIFFNESS OF KNEE JOINT, RIGHT: Chronic | ICD-10-CM

## 2020-06-24 DIAGNOSIS — G89.4 CHRONIC PAIN SYNDROME: Chronic | ICD-10-CM

## 2020-06-24 DIAGNOSIS — R26.9 GAIT DIFFICULTY: ICD-10-CM

## 2020-06-24 RX ORDER — HYDROCODONE BITARTRATE AND ACETAMINOPHEN 7.5; 325 MG/1; MG/1
1 TABLET ORAL EVERY 6 HOURS PRN
Qty: 120 TABLET | Refills: 0 | Status: SHIPPED | OUTPATIENT
Start: 2020-06-24 | End: 2020-07-20

## 2020-06-24 NOTE — TELEPHONE ENCOUNTER
Controlled Substance Refill Request for HYDROcodone-acetaminophen (NORCO) 7.5-325 MG per tablet    Problem List Complete:  Yes    Chronic pain syndrome   9/22/2016    Overview    Patient is followed by SCOTTY TOLEDO MD for ongoing prescription of pain medication.  All refills should only be approved by this provider, or covering partner.     Medication(s):  NORCO  7.5MG PO FOUR TIMES DAILY .   Maximum quantity per month:  120   Clinic visit frequency required: Q 3 months      Controlled substance agreement:  Encounter-Level CSA - 10/31/2016:                     Controlled Substance Agreement - Scan on 11/15/2016  7:53 AM : CONTROLLED SUBSTANCE AGREEMENT (below)                Pain Clinic evaluation in the past: No     DIRE Total Score(s):    7/2/2016   Total Score 18         [place] reviewed[place]  reviewed April 29, 2020- no concerns  [place]  [email or web address]        checked in past 3 months?  Yes 04/29/20

## 2020-06-24 NOTE — TELEPHONE ENCOUNTER
Reason for call:  Medication   If this is a refill request, has the caller requested the refill from the pharmacy already? Yes  Will the patient be using a [facility]? No  Name of the pharmacy and phone number for the current request: [facility] in [place] [phone]    Name of the medication requested: Hyrdrocodone 7.5-3.25 MG    Other request: Per patient request of last refill of hydrocodone medication. Also to patient requesting call once order is filled so he knows that it is complete.      Phone number to reach patient:  Cell number on file:    Telephone Information:   Mobile [phone]       Best Time:  Anytime    Can we leave a detailed message on this number?  YES    Travel screening: Not Applicable

## 2020-06-25 ENCOUNTER — TELEPHONE (OUTPATIENT)
Dept: OTOLARYNGOLOGY | Facility: CLINIC | Age: 65
End: 2020-06-25

## 2020-06-25 ENCOUNTER — OFFICE VISIT (OUTPATIENT)
Dept: URGENT CARE | Facility: URGENT CARE | Age: 65
End: 2020-06-25
Payer: COMMERCIAL

## 2020-06-25 VITALS
OXYGEN SATURATION: 98 % | BODY MASS INDEX: 33 KG/M2 | WEIGHT: 230 LBS | SYSTOLIC BLOOD PRESSURE: 113 MMHG | DIASTOLIC BLOOD PRESSURE: 82 MMHG | HEART RATE: 72 BPM | TEMPERATURE: 98.1 F

## 2020-06-25 DIAGNOSIS — M1A.9XX0 CHRONIC GOUT INVOLVING TOE OF RIGHT FOOT WITHOUT TOPHUS, UNSPECIFIED CAUSE: ICD-10-CM

## 2020-06-25 DIAGNOSIS — J01.00 ACUTE NON-RECURRENT MAXILLARY SINUSITIS: Primary | ICD-10-CM

## 2020-06-25 PROCEDURE — 99213 OFFICE O/P EST LOW 20 MIN: CPT | Performed by: FAMILY MEDICINE

## 2020-06-25 RX ORDER — PREDNISONE 20 MG/1
TABLET ORAL
Qty: 9 TABLET | Refills: 0 | Status: SHIPPED | OUTPATIENT
Start: 2020-06-25 | End: 2021-04-12

## 2020-06-25 RX ORDER — PSEUDOEPHEDRINE HCL 30 MG
TABLET ORAL EVERY 4 HOURS PRN
COMMUNITY

## 2020-06-25 RX ORDER — ALLOPURINOL 300 MG/1
300 TABLET ORAL DAILY
Qty: 90 TABLET | Refills: 3 | COMMUNITY
Start: 2020-06-25 | End: 2021-06-11

## 2020-06-25 NOTE — PROGRESS NOTES
CHIEF COMPLAINT    R facial and R ear pain.      HISTORY    As above. SX for 3 days. Lots of ear pain. Hard to sleep. No ear drainage. No ST.     Non smoker.      Patient Active Problem List   Diagnosis     Stiffness of knee joint, right     Gait difficulty     S/P TKR (total knee replacement)     Bilateral carpal tunnel syndrome     Vitamin D deficiency     Plantar fascial fibromatosis     Asthma with exacerbation     Hypertrophy of prostate without urinary obstruction     Obesity     Hyperlipidemia     Spondylosis with myelopathy, lumbar region     LEFT WORSE THAN RIGHT      CARDIOVASCULAR SCREENING; LDL GOAL LESS THAN 100     BMI 40.0-44.9, adult (H)     Mild persistent asthma     Hyperlipidemia with target LDL less than 130     Lumbar disc disease with radiculopathy     Groin strain, initial encounter     Sexual dysfunction     ACP (advance care planning)     Chronic pain syndrome     Chronic gout involving toe 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 tophus, unspecified cause     Impingement syndrome, shoulder, right     Migraine without aura and without status migrainosus, not intractable       REVIEW OF SYSTEMS    No fever  No cough or sob  No CP  No nausea  No facial rash      Past Medical History:   Diagnosis Date     Arthritis      Benign positional vertigo      Carpal tunnel syndrome     mild     Hearing problem      LEFT WORSE THAN RIGHT  10/18/2012     Migraines      Nasal polyps      Unspecified asthma(493.90) 10/17/2012     Unspecified asthma, with exacerbation 10/17/2012         EXAM  /82   Pulse 72   Temp 98.1  F (36.7  C) (Oral)   Wt 104.3 kg (230 lb)   SpO2 98%   BMI 33.00 kg/m      R TM sl dull, no redness.  L TM is WNL  Pharynx no redness  Neck no significant adenopathy  Non labored resp      (J01.00) Acute non-recurrent maxillary sinusitis  (primary encounter diagnosis)  Comment:   Plan: amoxicillin-clavulanate (AUGMENTIN) 875-125 MG         tablet, predniSONE (DELTASONE) 20 MG tablet             (M1A.9XX0) Chronic gout involving toe 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 tophus, unspecified cause  Comment: clarify - still taking.  Plan: allopurinol (ZYLOPRIM) 300 MG tablet

## 2020-06-25 NOTE — TELEPHONE ENCOUNTER
"[facility] Call Center    Phone Message    May a detailed message be left on voicemail: yes     Reason for Call: Symptoms or Concerns     If patient has red-flag symptoms, warm transfer to triage line    Current symptom or concern: Right ear pain, sinus drainage and congestion    Symptoms have been present for:  Approx 1 week(s)    Has patient previously been seen for this? Yes    By: Dr. Neumann    Date: Last seen 3/7/2018    Are there any new or worsening symptoms? Yes: Pt states he has had right ear pain, congestion, and a great amount of nasal drainage. He notes he is constantly blowing his nose. He states his usual medications, such as fluticasone, have not been helping.   Pt notes as well that he had been given a 3 pill package of \"craztronin azitromicina\", which he states is an off brand z-pack, by a friend who recently returned from a trip to [place]. Pt did not specify if this medication had been prescribed to his friend, but pt notes he took a dose last night, this morning, and this afternoon. He notes he feels slightly better, but is still concerned. Please advise on next steps.    Action Taken: Message routed to:  [facility] ENT    Travel Screening: Not Applicable  "

## 2020-07-01 ENCOUNTER — TELEPHONE (OUTPATIENT)
Dept: FAMILY MEDICINE | Facility: CLINIC | Age: 65
End: 2020-07-01

## 2020-07-01 NOTE — TELEPHONE ENCOUNTER
Reason for call:  Other   Patient called regarding (reason for call): call back    Additional comments: PT requesting to speak with Code[facility] & has some questions. He knows [facility] retiring.    Phone number to reach patient:  Cell number on file:    Telephone Information:   Mobile [phone]       Best Time:  Any      Can we leave a detailed message on this number?  YES    Travel screening: Not Applicable

## 2020-07-01 NOTE — TELEPHONE ENCOUNTER
Patient wanted to discuss what clinic and Dr bright should use since Dr Clements is retiring. Recommended Dr Galloway

## 2020-07-19 PROBLEM — Z79.891 CHRONIC USE OF OPIATE DRUG FOR THERAPEUTIC PURPOSE: Status: ACTIVE | Noted: 2020-07-19

## 2020-07-20 ENCOUNTER — OFFICE VISIT (OUTPATIENT)
Dept: FAMILY MEDICINE | Facility: CLINIC | Age: 65
End: 2020-07-20
Payer: COMMERCIAL

## 2020-07-20 ENCOUNTER — DOCUMENTATION ONLY (OUTPATIENT)
Dept: ORTHOPEDICS | Facility: CLINIC | Age: 65
End: 2020-07-20

## 2020-07-20 VITALS
OXYGEN SATURATION: 98 % | BODY MASS INDEX: 33.86 KG/M2 | DIASTOLIC BLOOD PRESSURE: 64 MMHG | HEART RATE: 65 BPM | SYSTOLIC BLOOD PRESSURE: 124 MMHG | WEIGHT: 236 LBS | TEMPERATURE: 97.9 F | RESPIRATION RATE: 16 BRPM

## 2020-07-20 DIAGNOSIS — Z96.652 STATUS POST TOTAL LEFT KNEE REPLACEMENT: ICD-10-CM

## 2020-07-20 DIAGNOSIS — Z79.891 CHRONIC USE OF OPIATE DRUG FOR THERAPEUTIC PURPOSE: ICD-10-CM

## 2020-07-20 DIAGNOSIS — M72.2 PLANTAR FASCIAL FIBROMATOSIS: ICD-10-CM

## 2020-07-20 DIAGNOSIS — M1A.9XX0 CHRONIC GOUT INVOLVING TOE OF RIGHT FOOT WITHOUT TOPHUS, UNSPECIFIED CAUSE: ICD-10-CM

## 2020-07-20 DIAGNOSIS — R26.9 GAIT DIFFICULTY: ICD-10-CM

## 2020-07-20 DIAGNOSIS — M47.16 SPONDYLOSIS WITH MYELOPATHY, LUMBAR REGION: Primary | ICD-10-CM

## 2020-07-20 DIAGNOSIS — M25.661 STIFFNESS OF KNEE JOINT, RIGHT: Chronic | ICD-10-CM

## 2020-07-20 DIAGNOSIS — G89.4 CHRONIC PAIN SYNDROME: Chronic | ICD-10-CM

## 2020-07-20 LAB

## 2020-07-20 PROCEDURE — 99214 OFFICE O/P EST MOD 30 MIN: CPT | Performed by: INTERNAL MEDICINE

## 2020-07-20 PROCEDURE — 80306 DRUG TEST PRSMV INSTRMNT: CPT | Performed by: INTERNAL MEDICINE

## 2020-07-20 RX ORDER — HYDROCODONE BITARTRATE AND ACETAMINOPHEN 7.5; 325 MG/1; MG/1
1 TABLET ORAL EVERY 6 HOURS PRN
Qty: 120 TABLET | Refills: 0 | Status: SHIPPED | OUTPATIENT
Start: 2020-07-24 | End: 2020-08-25

## 2020-07-20 NOTE — LETTER
July 21, 2020      Arnaud Bird  [address]  [place] [id number]        Dear ,    We are writing to inform you of your test results.    Your UDS is consistent with your medications. No concerns.     Resulted Orders   Drug Abuse Screen Panel 13, Urine (Pain Care Package)   Result Value Ref Range    Cannabinoids (82-zpa-9-carboxy-9-THC) Not Detected NDET^Not Detected ng/mL      Comment:      Cutoff for a negative cannabinoid is 50 ng/mL or less.    Phencyclidine (Phencyclidine) Not Detected NDET^Not Detected ng/mL      Comment:      Cutoff for a negative PCP is 25 ng/mL or less.    Cocaine (Benzoylecgonine) Not Detected NDET^Not Detected ng/mL      Comment:      Cutoff for a negative cocaine is 150 ng/ml or less.    Methamphetamine (d-Methamphetamine) Not Detected NDET^Not Detected ng/mL      Comment:      Cutoff for a negative methamphetamine is 500 ng/ml or less.    Opiates (Morphine) Detected, Abnormal Result (A) NDET^Not Detected ng/mL      Comment:      Cutoff for a positive opiate is greater than 100 ng/ml.  This is an unconfirmed screening result to be used for medical purposes only.   Order [id number] for confirmation or individual confirmation tests to [facility].      Amphetamine (d-Amphetamine) Not Detected NDET^Not Detected ng/mL      Comment:      Cutoff for a negative amphetamine is 500 ng/mL or less.    Benzodiazepines (Nordiazepam) Not Detected NDET^Not Detected ng/mL      Comment:      Cutoff for a negative benzodiazepine is 150 ng/ml or less.    Tricyclic Antidepressants (Desipramine) Not Detected NDET^Not Detected ng/mL      Comment:      Cutoff for a negative tricyclic antidepressant is 300 ng/ml or less.    Methadone (Methadone) Not Detected NDET^Not Detected ng/mL      Comment:      Cutoff for a negative methadone is 200 ng/ml or less.    Barbiturates (Butalbital) Not Detected NDET^Not Detected ng/mL      Comment:      Cutoff for a negative barbituate is 200 ng/ml or  less.    Oxycodone (Oxycodone) Not Detected NDET^Not Detected ng/mL      Comment:      Cutoff for a negative Oxycodone is 100 ng/mL or less.    Propoxyphene (Norpropoxyphene) Not Detected NDET^Not Detected ng/mL      Comment:      Cutoff for a negative propoxyphene is 300 ng/ml or less    Buprenorphine (Buprenorphine) Not Detected NDET^Not Detected ng/mL      Comment:      Cutoff for a negative buprenorphine is 10 ng/ml or less       If you have any questions or concerns, please call the clinic at the number listed above.       Sincerely,        Sushma Galloway MD

## 2020-07-20 NOTE — PROGRESS NOTES
Arnaud arrived with his significant other at clinic with questions regarding Dr. hCan's availability. He is interested in another L shoulder CSI, he understands he gets these done quite often and Dr. Chan mentioned that he shouldn't have too many more.  He was hoping to chat with her to see if it is still OK to have another CSI until his potential consult for surgery sometime in the winter.    He is hoping to receive a call to see what would be an appropriate treatment plan.     - Shar LAZO ATC

## 2020-07-20 NOTE — LETTER
[facility]ER[facility]  07/20/20    Patient: Arnaud Bird  YOB: 1955  Medical Record Number: [id number]                                                                  Opioid / Opioid Plus Controlled Substance Agreement    I understand that my care provider has prescribed an opioid (narcotic) controlled substance to help manage my condition(s). I am taking this medicine to help me function or work. I know this is strong medicine, and that it can cause serious side effects. Opioid medicine can be sedating, addicting and may cause a dependency on the drug. They can affect my ability to drive or think, and cause depression. They need to be taken exactly as prescribed. Combining opioids with certain medicines or chemicals (such as cocaine, sedatives and tranquilizers, sleeping pills, meth) can be dangerous or even fatal. Also, if I stop opioids suddenly, I may have severe withdrawal symptoms. Last, I understand that opioids do not work for all types of pain nor for all patients. If not helpful, I may be asked to stop them.      The risks, benefits, and side effects of these medicine(s) were explained to me. I agree that:    1. I will take part in other treatments as advised by my care team. This may be psychiatry or counseling, physical therapy, behavioral therapy, group treatment or a referral to a pain clinic. I will reduce or stop my medicine when my care team tells me to do so.  2. I will take my medicines as prescribed. I will not change the dose or schedule unless my care team tells me to. There will be no refills if I  run out early.   I may be contactedwithout warning and asked to complete a urine drug test or pill count at any time.   3. I will keep all my appointments, and understand this is part of the monitoring of opioids. My care team may require an office visit for EVERY opioid/controlled substance refill. If I miss appointments or don t follow instructions, my care  team may stop my medicine.  4. I will not ask other providers to prescribe controlled substances, and I will not accept controlled substances from other people. If I need another prescribed controlled substance for a new reason, I will tell my care team within 1 business day.  5. I will use one pharmacy to fill all of my controlled substance prescriptions, and it is up to me to make sure that I do not run out of my medicines on weekends or holidays. If my care team is willing to refill my opioid prescription without a visit, I must request refills only during office hours, refills may take up to 3 days to process, and it may take up to 5 to 7 days for my medicine to be mailed and ready at my pharmacy. Prescriptions will not be mailed anywhere except my pharmacy.        [id number]  Rev 12/18         Registration to scan to EHR                             Page 1 of 2               Controlled Substance Agreement Opioid        [facility]  [facility]  Patient: Arnaud Bird  YOB: 1955  Medical Record Number: [id number]                                                                  6. I am responsible for my prescriptions. If the medicine/prescription is lost or stolen, it will not be replaced. I also agree not to share controlled substance medicines with anyone.  7. I agree to not use ANY illegal or recreational drugs. This includes marijuana, cocaine, bath salts or other drugs. I agree not to use alcohol unless my care team says I may.          I agree to give urine samples whenever asked. If I don t give a urine sample, the care team may stop my medicine.    8. If I enroll in the [place] Medical Marijuana program, I will tell my care team. I will also sign an agreement to share my medical records with my care team.   9. I will bring in my list of medicines (or my medicine bottles) each time I come to the clinic.   10. I will tell my care team right away if I become pregnant  or have a new medical problem treated outside of my regular clinic.  11. I understand that this medicine can affect my thinking and judgment. It may be unsafe for me to drive, use machinery and do dangerous tasks. I will not do any of these things until I know how the medicine affects me. If my dose changes, I will wait to see how it affects me. I will contact my care team if I have concerns about medicine side effects.    I understand that if I do not follow any of the conditions above, my prescriptions or treatment may be stopped.      I agree that my provider, clinic care team, and pharmacy may work with any city, state or federal law enforcement agency that investigates the misuse, sale, or other diversion of my controlled medicine. I will allow my provider to discuss my care with or share a copy of this agreement with any other treating provider, pharmacy or emergency room where I receive care. I agree to give up (waive) any right of privacy or confidentiality with respect to these consents.     I have read this agreement and have asked questions about anything I did not understand.      ________________________________________________________________________  Patient signature - Date/Time -  Arnaud Bird                                      ________________________________________________________________________  Witness signature                                                            ________________________________________________________________________  Provider signature - Sushma Galloway MD      [id number]  Rev 12/18         Registration to scan to EHR                         Page 2 of 2                   Controlled Substance Agreement Opioid           Page 1 of 2  Opioid Pain Medicines (also known as Narcotics)  What You Need to Know    What are opioids?   Opioids are pain medicines that must be prescribed by a doctor.  They are also known as narcotics.    Examples are:     morphine (MS Contin,  Caryn)    oxycodone (Oxycontin)    oxycodone and acetaminophen (Percocet)    hydrocodone and acetaminophen (Vicodin, Norco)     fentanyl patch (Duragesic)     hydromorphone (Dilaudid)     methadone     What do opioids do well?   Opioids are best for short-term pain after a surgery or injury. They also work well for cancer pain. Unlike other pain medicines, they do not cause liver or kidney failure or ulcers. They may help some people with long-lasting (chronic) pain.     What do opioids NOT do well?   Opioids never get rid of pain entirely, and they do not work well for most patients with chronic pain. Opioids do not reduce swelling, one of the causes of pain. They also don t work well for nerve pain.                           For informational purposes only.  Not to replace the advice of your care provider.  Copyright 201 [facility]. All right reserved. [employer] 299325-Ixf 02/18.      Page 2 of 2    Risks and side effects   Talk to your doctor before you start or decide to keep taking one of these medicines. Side effects include:    Lowering your breathing rate enough to cause death    Overdose, including death, especially if taking higher than prescribed doses    Long-term opioid use    Worse depression symptoms; less pleasure in things you usually enjoy    Feeling tired or sluggish    Slower thoughts or cloudy thinking    Being more sensitive to pain over time; pain is harder to control    Trouble sleeping or restless sleep    Changes in hormone levels (for example, less testosterone)    Changes in sex drive or ability to have sex    Constipation    Unsafe driving    Itching and sweating    Feeling dizzy    Nausea, vomiting and dry mouth    What else should I know about opioids?  When someone takes opioids for too long or too often, they become dependent. This means that if you stop or reduce the medicine too quickly, you will have withdrawal symptoms.    Dependence is not the same as addiction.  Addiction is when people keep using a substance that harms their body, their mind or their relations with others. If you have a history of drug or alcohol abuse, taking opioids can cause a relapse.    Over time, opioids don t work as well. Most people will need higher and higher doses. The higher the dose, the more serious the side effects. We don t know the long-term effects of opioids.      Prescribed opioids aren't the best way to manage chronic pain    Other ways to manage pain include:      Ibuprofen or acetaminophen.  You should always try this first.      Treat health problems that may be causing pain.      acupuncture or massage, deep breathing, meditation, visual imagery, aromatherapy.      Use heat or ice at the pain site      Physical therapy and exercise      Stop smoking      See a counselor or therapist                                                  People who have used opioids for a long time may have a lower quality of life, worse depression, higher levels of pain and more visits to doctors.    Never share your opioids with others. Be sure to store opioids in a secure place, locked if possible.Young children can easily swallow them and overdose.     You can overdose on opioids.  Signs of overdose include decrease or loss of consciousness, slowed breathing, trouble waking and blue lips.  If someone is worried about overdose, they should call 911.    If you are at risk for overdose, you may get naloxone (Narcan, a medicine that reverses the effects of opioids.  If you overdose, a friend or family member can give you Narcan while waiting for the ambulance.  They need to know the signs of overdose and how to give Narcan.    While you're taking opioids:    Don't use alcohol or street drugs. Taking them together can cause death.    Don't take any of these medicines unless your doctor says its okay.  Taking these with opioids can cause death.    Benzodiazepines (such as lorazepam         or  diazepam)    Muscle relaxers (such as cyclobenzaprine)    sleeping pills    other opioids    Safe disposal of opioids  Find your area drug take-back program, your pharmacy mail-back program, buy a special disposal bag (such as Deterra) from your pharmacy or flush them down the toilet.  Use the guidelines at:  www.fda.gov/drugs/resourcesforyou

## 2020-07-20 NOTE — PROGRESS NOTES
Subjective     Arnaud Bird is a 65 year old male who presents to clinic today for the following health issues:    HPI       New Patient/Transfer of Care  Hyperlipidemia Follow-Up      Are you regularly taking any medication or supplement to lower your cholesterol?   No stopped taking 2 months ago because pt is doing keto diet    Are you having muscle aches or other side effects that you think could be caused by your cholesterol lowering medication?  No    Asthma Follow-Up    Was ACT completed today?    Yes    ACT Total Scores 3/19/2019   ACT TOTAL SCORE -   ASTHMA ER VISITS -   ASTHMA HOSPITALIZATIONS -   ACT TOTAL SCORE (Goal Greater than or Equal to 20) 20   In the past 12 months, how many times did you visit the emergency room for your asthma without being admitted to the hospital? 0   In the past 12 months, how many times were you hospitalized overnight because of your asthma? 0         How many days per week do you miss taking your asthma controller medication?  0    Please describe any recent triggers for your asthma: pollens and mold    Have you had any Emergency Room Visits, Urgent Care Visits, or Hospital Admissions since your last office visit?  No    Chronic Pain Follow-Up    Where in your body do you have pain? Back, shoulders  How has your pain affected your ability to work? Not applicable  Which of these pain treatments have you tried since your last clinic visit? No, was getting cortisone shots  How well are you sleeping? Fair  How has your mood been since your last visit? About the same  Have you had a significant life event? No  Other aggravating factors: none  Taking medication as directed? Yes    PHQ-9 SCORE 10/31/2016 3/19/2019   PHQ-9 Total Score 2 0     SOFIYA-7 SCORE 10/31/2016 3/19/2019   Total Score 2 0     No flowsheet data found.  Encounter-Level CSA - 10/31/2016:    Controlled Substance Agreement - Scan on 11/15/2016  7:53 AM: CONTROLLED SUBSTANCE AGREEMENT     Patient-Level CSA:     Controlled Substance Agreement - Opioid - Scan on 3/19/2019 11:01 AM         How many servings of fruits and vegetables do you eat daily?  2-3    On average, how many sweetened beverages do you drink each day (Examples: soda, juice, sweet tea, etc.  Do NOT count diet or artificially sweetened beverages)?   0    How many days per week do you exercise enough to make your heart beat faster? 3 or less    How many minutes a day do you exercise enough to make your heart beat faster? 10 - 19    How many days per week do you miss taking your medication? 0    .opi  Patient Active Problem List   Diagnosis     Stiffness of knee joint, right     Gait difficulty     S/P TKR (total knee replacement)     Bilateral carpal tunnel syndrome     Vitamin D deficiency     Plantar fascial fibromatosis     Asthma with exacerbation     Hypertrophy of prostate without urinary obstruction     Obesity     Hyperlipidemia     Spondylosis with myelopathy, lumbar region     LEFT WORSE THAN RIGHT      CARDIOVASCULAR SCREENING; LDL GOAL LESS THAN 100     BMI 40.0-44.9, adult (H)     Mild persistent asthma     Hyperlipidemia with target LDL less than 130     Lumbar disc disease with radiculopathy     Groin strain, initial encounter     Sexual dysfunction     ACP (advance care planning)     Chronic pain syndrome     Chronic gout involving toe 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 tophus, unspecified cause     Impingement syndrome, shoulder, right     Migraine without aura and without status migrainosus, not intractable     Chronic use of opiate drug for therapeutic purpose     Past Surgical History:   Procedure Laterality Date     HC TOOTH EXTRACTION W/FORCEP       ORTHOPEDIC SURGERY      bilateral total knee replacements     TONSILLECTOMY      age 2 or 3       Social History     Tobacco Use     Smoking status: Never Smoker     Smokeless tobacco: Never Used   Substance Use Topics     Alcohol use: Yes     Comment: case beer a week     Family History   Problem  Relation Age of Onset     Cancer Father      Family History Negative Father      Family History Negative Mother      Cancer Mother      Stomach Problem Mother      Diabetes No family hx of      Coronary Artery Disease No family hx of      Hypertension No family hx of      Hyperlipidemia No family hx of      Cerebrovascular Disease No family hx of      Breast Cancer No family hx of      Colon Cancer No family hx of      Prostate Cancer No family hx of      Other Cancer No family hx of      Depression No family hx of      Anxiety Disorder No family hx of      Mental Illness No family hx of      Substance Abuse No family hx of      Anesthesia Reaction No family hx of      Asthma No family hx of      Osteoporosis No family hx of      Genetic Disorder No family hx of      Thyroid Disease No family hx of      Obesity No family hx of      Unknown/Adopted No family hx of            Reviewed and updated as needed this visit by Provider         Review of Systems   Constitutional, HEENT, cardiovascular, pulmonary, GI, , musculoskeletal, neuro, skin, endocrine and psych systems are negative, except as otherwise noted.      Objective    There were no vitals taken for this visit.  There is no height or weight on file to calculate BMI.  Physical Exam   GENERAL: healthy, alert and no distress  NECK: no adenopathy, no asymmetry, masses, or scars and thyroid normal to palpation  RESP: lungs clear to auscultation - no rales, rhonchi or wheezes  CV: regular rate and rhythm, normal S1 S2, no S3 or S4, no murmur, click or rub, no peripheral edema and peripheral pulses strong  ABDOMEN: soft, nontender, no hepatosplenomegaly, no masses and bowel sounds normal  MS: no gross musculoskeletal defects noted, no edema  Shoulder exam : Positive for tenderness on the bilateral shoulders with Left shoulder worser than the right and positive for painful arc    Diagnostic Test Results:  Labs reviewed in Epic        Assessment and Plan  1.  Spondylosis with myelopathy, lumbar region  Stable, patient preferring to be only on opiates as he has tried all the other options and continues to be only on 4 tablets everyday.  check done which shows last refill of 120 tabs on 6/24/20. CSA due at this time.  - Patient following Orthopedics for intra-articular injections on the shoulders and was recommended Arthroplasty which he is not opting at this time. Requesting to continue the current medications.  - HYDROcodone-acetaminophen (NORCO) 7.5-325 MG per tablet; Take 1 tablet by mouth every 6 hours as needed for pain (4 x daily as needed  maxium 4 per day)  Dispense: 120 tablet; Refill: 0    2. 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 Chronic use of opiate drug for therapeutic purpose  4. Plantar fascial fibromatosis  5. Chronic pain syndrome  - HYDROcodone-acetaminophen (NORCO) 7.5-325 MG per tablet; Take 1 tablet by mouth every 6 hours as needed for pain (4 x daily as needed  maxium 4 per day)  Dispense: 120 tablet; Refill: 0  - Drug Abuse Screen Panel 13, Urine (Pain Care Package)    6. Gait difficulty  - HYDROcodone-acetaminophen (NORCO) 7.5-325 MG per tablet; Take 1 tablet by mouth every 6 hours as needed for pain (4 x daily as needed  maxium 4 per day)  Dispense: 120 tablet; Refill: 0    7. Stiffness of knee joint, right  8. Status post total left knee replacement  - HYDROcodone-acetaminophen (NORCO) 7.5-325 MG per tablet; Take 1 tablet by mouth every 6 hours as needed for pain (4 x daily as needed  maxium 4 per day)  Dispense: 120 tablet; Refill: 0     Patient Instructions   Medications sent to the pharmacy.     ==========================    Patient Education     Chronic Pain  Pain serves an important role. It lets you know something is wrong that needs your attention. When the body heals, pain normally goes away.  When pain lasts longer than 6 months, it is called  chronic  pain. This is pain that is present even  after the body has healed. Chronic pain can cause mood problems and get in the way of your relationships and your daily life.  A number of conditions can cause chronic pain. Some of the more common include:    Previous surgery    An old injury    Infection    Diseases such as diabetes    Nerve damage    Back injury    Arthritis    Migraine or other headaches    Fibromyalgia    Cancer  Depression and stress can make chronic pain symptoms worse. In some cases, a cause for the pain can't be found.   Treatment  Treatment can greatly reduce pain. In many cases, pain can become less severe, occur less often, and interfere less with your daily life. Chronic pain is often treated with a combination of medicines, therapies, and lifestyle changes. You will work closely with your healthcare provider to find a treatment plan that works best for you.    Ask your healthcare provider for a referral to a pain management specialty center. These can provide the most recent and proven pain management strategies, along with emotional support and comprehensive services.    Several different types of medicines may be prescribed for chronic pain. Work with your healthcare provider to develop a medicine plan that helps manage your pain.    Physical therapy can help reduce certain types of chronic pain.    Occupational therapy teaches you how to do routine tasks of daily living in ways that lessen your discomfort.    Counseling can help you cope better with stress and pain.    Other therapies such as meditation, yoga, biofeedback, massage, and acupuncture can also help manage chronic pain.    Changing certain habits can help reduce chronic pain. They include:  ? Eating healthy  ? Developing an exercise routine  ? Getting enough sleep   ? Stopping smoking and limiting alcohol use  ? Losing excess weight  Follow-up care  Follow up with your healthcare provider, or as advised. Let your healthcare provider know if your current treatment plan is  working or if changes are needed.  Resources  For more information, contact:    American Headache and Migraine Association, ahma.memberclTestPlant.net or 801-037-1737    American Chronic Pain Association, theacpa.org or 607-200-6672  Date Last Reviewed: 8/1/2017 2000-2019 [employer]. [address] [place] [address]. All rights reserved. This information is not intended as a substitute for professional medical care. Always follow your healthcare professional's instructions.             Return in about 3 months (around 10/20/2020) for Follow up of last visit.    Sushma Galloway MD  [facility]

## 2020-07-20 NOTE — LETTER
My Asthma Action Plan    Name: Arnaud Bird   YOB: 1955  Date: 7/20/2020   My doctor: Sushma Galloway MD   My clinic: [facility]        My Rescue Medicine:   Albuterol inhaler (Proair/Ventolin/Proventil HFA)  2-4 puffs EVERY 4 HOURS as needed. Use a spacer if recommended by your provider.   My Asthma Severity:   Intermittent / Exercise Induced  Know your asthma triggers: None  pollens  mold          GREEN ZONE   Good Control    I feel good    No cough or wheeze    Can work, sleep and play without asthma symptoms       Take your asthma control medicine every day.     1. If exercise triggers your asthma, take your rescue medication    15 minutes before exercise or sports, and    During exercise if you have asthma symptoms  2. Spacer to use with inhaler: If you have a spacer, make sure to use it with your inhaler             YELLOW ZONE Getting Worse  I have ANY of these:    I do not feel good    Cough or wheeze    Chest feels tight    Wake up at night   1. Keep taking your Green Zone medications  2. Start taking your rescue medicine:    every 20 minutes for up to 1 hour. Then every 4 hours for 24-48 hours.  3. If you stay in the Yellow Zone for more than 12-24 hours, contact your doctor.  4. If you do not return to the Green Zone in 12-24 hours or you get worse, start taking your oral steroid medicine if prescribed by your provider.           RED ZONE Medical Alert - Get Help  I have ANY of these:    I feel awful    Medicine is not helping    Breathing getting harder    Trouble walking or talking    Nose opens wide to breathe       1. Take your rescue medicine NOW  2. If your provider has prescribed an oral steroid medicine, start taking it NOW  3. Call your doctor NOW  4. If you are still in the Red Zone after 20 minutes and you have not reached your doctor:    Take your rescue medicine again and    Call 911 or go to the emergency room right away    See your  regular doctor within 2 weeks of an Emergency Room or Urgent Care visit for follow-up treatment.          Annual Reminders:  Meet with Asthma Educator,  Flu Shot in the Fall, consider Pneumonia Vaccination for patients with asthma (aged 19 and older).    Pharmacy[facility]    [id number] [place], [place] - [address] [facility] [address] [facility]  [id number] [place], [place] - [address] [facility] [address] [place] [facility] [address]    Electronically signed by Sushma Galloway MD   Date: 07/20/20                    Asthma Triggers  How To Control Things That Make Your Asthma Worse    Triggers are things that make your asthma worse.  Look at the list below to help you find your triggers and   what you can do about them. You can help prevent asthma flare-ups by staying away from your triggers.      Trigger                                                          What you can do   Cigarette Smoke  Tobacco smoke can make asthma worse. Do not allow smoking in your home, car or around you.  Be sure no one smokes at a child s day care or school.  If you smoke, ask your health care provider for ways to help you quit.  Ask family members to quit too.  Ask your health care provider for a referral to Quit Plan to help you quit smoking, or call 5-610-604-PLAN.     Colds, Flu, Bronchitis  These are common triggers of asthma. Wash your hands often.  Don t touch your eyes, nose or mouth.  Get a flu shot every year.     Dust Mites  These are tiny bugs that live in cloth or carpet. They are too small to see. Wash sheets and blankets in hot water every week.   Encase pillows and mattress in dust mite proof covers.  Avoid having carpet if you can. If you have carpet, vacuum weekly.   Use a dust mask and HEPA vacuum.   Pollen and Outdoor Mold  Some people are allergic to trees, grass, or weed pollen, or molds. Try to keep your windows closed.  Limit time out doors when pollen count is high.   Ask you health care  provider about taking medicine during allergy season.     Animal Dander  Some people are allergic to skin flakes, urine or saliva from pets with fur or feathers. Keep pets with fur or feathers out of your home.    If you can t keep the pet outdoors, then keep the pet out of your bedroom.  Keep the bedroom door closed.  Keep pets off cloth furniture and away from stuffed toys.     Mice, Rats, and Cockroaches  Some people are allergic to the waste from these pests.   Cover food and garbage.  Clean up spills and food crumbs.  Store grease in the refrigerator.   Keep food out of the bedroom.   Indoor Mold  This can be a trigger if your home has high moisture. Fix leaking faucets, pipes, or other sources of water.   Clean moldy surfaces.  Dehumidify basement if it is damp and smelly.   Smoke, Strong Odors, and Sprays  These can reduce air quality. Stay away from strong odors and sprays, such as perfume, powder, hair spray, paints, smoke incense, paint, cleaning products, candles and new carpet.   Exercise or Sports  Some people with asthma have this trigger. Be active!  Ask your doctor about taking medicine before sports or exercise to prevent symptoms.    Warm up for 5-10 minutes before and after sports or exercise.     Other Triggers of Asthma  Cold air:  Cover your nose and mouth with a scarf.  Sometimes laughing or crying can be a trigger.  Some medicines and food can trigger asthma.

## 2020-07-20 NOTE — LETTER
My Asthma Action Plan    Name: Arnaud Bird   YOB: 1955  Date: 7/20/2020   My doctor: Sushma Galloway MD   My clinic: [facility]        My Rescue Medicine:   Albuterol inhaler (Proair/Ventolin/Proventil HFA)  2-4 puffs EVERY 4 HOURS as needed. Use a spacer if recommended by your provider.   My Asthma Severity:   Intermittent / Exercise Induced  Know your asthma triggers: Seasonal allergies  pollens  mold          GREEN ZONE   Good Control    I feel good    No cough or wheeze    Can work, sleep and play without asthma symptoms       Take your asthma control medicine every day.     1. If exercise triggers your asthma, take your rescue medication    15 minutes before exercise or sports, and    During exercise if you have asthma symptoms  2. Spacer to use with inhaler: If you have a spacer, make sure to use it with your inhaler             YELLOW ZONE Getting Worse  I have ANY of these:    I do not feel good    Cough or wheeze    Chest feels tight    Wake up at night   1. Keep taking your Green Zone medications  2. Start taking your rescue medicine:    every 20 minutes for up to 1 hour. Then every 4 hours for 24-48 hours.  3. If you stay in the Yellow Zone for more than 12-24 hours, contact your doctor.  4. If you do not return to the Green Zone in 12-24 hours or you get worse, start taking your oral steroid medicine if prescribed by your provider.           RED ZONE Medical Alert - Get Help  I have ANY of these:    I feel awful    Medicine is not helping    Breathing getting harder    Trouble walking or talking    Nose opens wide to breathe       1. Take your rescue medicine NOW  2. If your provider has prescribed an oral steroid medicine, start taking it NOW  3. Call your doctor NOW  4. If you are still in the Red Zone after 20 minutes and you have not reached your doctor:    Take your rescue medicine again and    Call 911 or go to the emergency room right  away    See your regular doctor within 2 weeks of an Emergency Room or Urgent Care visit for follow-up treatment.          Annual Reminders:  Meet with Asthma Educator,  Flu Shot in the Fall, consider Pneumonia Vaccination for patients with asthma (aged 19 and older).    Ph[facility]acy:    [id number] - [facility] [place] - [place], [place] - [address] [facility]  [id number] - [place], [place] - [address] [facility] [address] [place] [facility] [address]    Electronically signed by Sushma Galloway MD   Date: 07/20/20                    Asthma Triggers  How To Control Things That Make Your Asthma Worse    Triggers are things that make your asthma worse.  Look at the list below to help you find your triggers and   what you can do about them. You can help prevent asthma flare-ups by staying away from your triggers.      Trigger                                                          What you can do   Cigarette Smoke  Tobacco smoke can make asthma worse. Do not allow smoking in your home, car or around you.  Be sure no one smokes at a child s day care or school.  If you smoke, ask your health care provider for ways to help you quit.  Ask family members to quit too.  Ask your health care provider for a referral to Quit Plan to help you quit smoking, or call 3-946-001-PLAN.     Colds, Flu, Bronchitis  These are common triggers of asthma. Wash your hands often.  Don t touch your eyes, nose or mouth.  Get a flu shot every year.     Dust Mites  These are tiny bugs that live in cloth or carpet. They are too small to see. Wash sheets and blankets in hot water every week.   Encase pillows and mattress in dust mite proof covers.  Avoid having carpet if you can. If you have carpet, vacuum weekly.   Use a dust mask and HEPA vacuum.   Pollen and Outdoor Mold  Some people are allergic to trees, grass, or weed pollen, or molds. Try to keep your windows closed.  Limit time out doors when pollen count is high.   Ask  you health care provider about taking medicine during allergy season.     Animal Dander  Some people are allergic to skin flakes, urine or saliva from pets with fur or feathers. Keep pets with fur or feathers out of your home.    If you can t keep the pet outdoors, then keep the pet out of your bedroom.  Keep the bedroom door closed.  Keep pets off cloth furniture and away from stuffed toys.     Mice, Rats, and Cockroaches  Some people are allergic to the waste from these pests.   Cover food and garbage.  Clean up spills and food crumbs.  Store grease in the refrigerator.   Keep food out of the bedroom.   Indoor Mold  This can be a trigger if your home has high moisture. Fix leaking faucets, pipes, or other sources of water.   Clean moldy surfaces.  Dehumidify basement if it is damp and smelly.   Smoke, Strong Odors, and Sprays  These can reduce air quality. Stay away from strong odors and sprays, such as perfume, powder, hair spray, paints, smoke incense, paint, cleaning products, candles and new carpet.   Exercise or Sports  Some people with asthma have this trigger. Be active!  Ask your doctor about taking medicine before sports or exercise to prevent symptoms.    Warm up for 5-10 minutes before and after sports or exercise.     Other Triggers of Asthma  Cold air:  Cover your nose and mouth with a scarf.  Sometimes laughing or crying can be a trigger.  Some medicines and food can trigger asthma.

## 2020-07-20 NOTE — PATIENT INSTRUCTIONS
Medications sent to the pharmacy.     ==========================    Patient Education     Chronic Pain  Pain serves an important role. It lets you know something is wrong that needs your attention. When the body heals, pain normally goes away.  When pain lasts longer than 6 months, it is called  chronic  pain. This is pain that is present even after the body has healed. Chronic pain can cause mood problems and get in the way of your relationships and your daily life.  A number of conditions can cause chronic pain. Some of the more common include:    Previous surgery    An old injury    Infection    Diseases such as diabetes    Nerve damage    Back injury    Arthritis    Migraine or other headaches    Fibromyalgia    Cancer  Depression and stress can make chronic pain symptoms worse. In some cases, a cause for the pain can't be found.   Treatment  Treatment can greatly reduce pain. In many cases, pain can become less severe, occur less often, and interfere less with your daily life. Chronic pain is often treated with a combination of medicines, therapies, and lifestyle changes. You will work closely with your healthcare provider to find a treatment plan that works best for you.    Ask your healthcare provider for a referral to a pain management specialty center. These can provide the most recent and proven pain management strategies, along with emotional support and comprehensive services.    Several different types of medicines may be prescribed for chronic pain. Work with your healthcare provider to develop a medicine plan that helps manage your pain.    Physical therapy can help reduce certain types of chronic pain.    Occupational therapy teaches you how to do routine tasks of daily living in ways that lessen your discomfort.    Counseling can help you cope better with stress and pain.    Other therapies such as meditation, yoga, biofeedback, massage, and acupuncture can also help manage chronic pain.    Changing  certain habits can help reduce chronic pain. They include:  ? Eating healthy  ? Developing an exercise routine  ? Getting enough sleep   ? Stopping smoking and limiting alcohol use  ? Losing excess weight  Follow-up care  Follow up with your healthcare provider, or as advised. Let your healthcare provider know if your current treatment plan is working or if changes are needed.  Resources  For more information, contact:    American Headache and Migraine Association, [facility].memberKitchfix.MOBi-LEARN or 079-837-3854    American Chronic Pain Association, theacpa.org or 879-024-2996  Date Last Reviewed: 8/1/2017 2000-2019 [employer]. [address] [place], [place] [address]. All rights reserved. This information is not intended as a substitute for professional medical care. Always follow your healthcare professional's instructions.

## 2020-07-21 ASSESSMENT — ASTHMA QUESTIONNAIRES: ACT_TOTALSCORE: 22

## 2020-08-07 ENCOUNTER — OFFICE VISIT (OUTPATIENT)
Dept: ORTHOPEDICS | Facility: CLINIC | Age: 65
End: 2020-08-07
Payer: COMMERCIAL

## 2020-08-07 VITALS — HEIGHT: 70 IN | WEIGHT: 236 LBS | BODY MASS INDEX: 33.79 KG/M2

## 2020-08-07 DIAGNOSIS — M25.511 CHRONIC RIGHT SHOULDER PAIN: ICD-10-CM

## 2020-08-07 DIAGNOSIS — M19.011 GLENOHUMERAL ARTHRITIS, RIGHT: ICD-10-CM

## 2020-08-07 DIAGNOSIS — G89.29 CHRONIC RIGHT SHOULDER PAIN: ICD-10-CM

## 2020-08-07 DIAGNOSIS — M19.012 LOCALIZED OSTEOARTHRITIS OF LEFT SHOULDER: Primary | ICD-10-CM

## 2020-08-07 ASSESSMENT — MIFFLIN-ST. JEOR: SCORE: 1851.74
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
## 2020-08-08 ENCOUNTER — ANCILLARY PROCEDURE (OUTPATIENT)
Dept: MRI IMAGING | Facility: CLINIC | Age: 65
End: 2020-08-08
Attending: FAMILY MEDICINE
Payer: COMMERCIAL

## 2020-08-08 DIAGNOSIS — G89.29 CHRONIC RIGHT SHOULDER PAIN: ICD-10-CM

## 2020-08-08 DIAGNOSIS — M19.011 GLENOHUMERAL ARTHRITIS, RIGHT: ICD-10-CM

## 2020-08-08 DIAGNOSIS — M25.511 CHRONIC RIGHT SHOULDER PAIN: ICD-10-CM

## 2020-08-08 DIAGNOSIS — M19.012 LOCALIZED OSTEOARTHRITIS OF LEFT SHOULDER: ICD-10-CM

## 2020-08-19 ENCOUNTER — TELEPHONE (OUTPATIENT)
Dept: ORTHOPEDICS | Facility: CLINIC | Age: 65
End: 2020-08-19

## 2020-08-19 ENCOUNTER — OFFICE VISIT (OUTPATIENT)
Dept: ORTHOPEDICS | Facility: CLINIC | Age: 65
End: 2020-08-19
Payer: COMMERCIAL

## 2020-08-19 DIAGNOSIS — M19.012 ARTHRITIS OF SHOULDER REGION, LEFT: Primary | ICD-10-CM

## 2020-08-19 NOTE — LETTER
8/19/2020         RE: Arnaud Bird  [address]  [facility] [address]        Dear Colleague,    Thank you for referring your patient, Arnaud Bird, to the [facility]. Please see a copy of my visit note below.    CHIEF CONCERN:  Bilateral Shoulder Pain    HISTORY OF PRESENT ILLNESS:   Mr. Bird is a 65 year old RHD male who returns today regarding bilateral (L>R) shoulder pain. He has had injections and between that and some medications he has been able to manage his arthritis symptoms OK. His last injection (bilateral GH injections) on 3/4/2020 was helpful. He is also dealing with low back pain as well as right knee and ankle pain.        PHYSICAL EXAM:     Adult male in no acute distress, cooperative with exam.   Respirations even and unlabored   Focused Upper Extremity Exam:   No gross deformity. Fires bilateral deltoid, biceps, triceps, wrist extension/flexion, , EPL, intrinsics, FPL with 5/5 strength. SILT in axillary, musculocutaneous, radial, ulnar, and median nerve distribution. R Shoulder , ER 45, IR to sacrum. L Shoulder , ER 65, IR to L2. Pain with empty can, - O'Brians, - Speed's. Pain at the Left AC joint    IMAGING:  Right shoulder MRI 8/8/20 was reviewed and I agree with the Impression below:  IMPRESSION:  1. Moderate osteoarthrosis at the right shoulder acromioclavicular joint.  2. Severe osteoarthrosis at the right shoulder glenohumeral joint with full-thickness cartilage loss; osteophytic spurring; glenoid remodeling with loss of bone stock; subchondral cystic changes; and labral tearing, greatest superiorly and posteriorly.  3. Moderate size joint effusion with synovitis in the region of the axillary recess. There is also fluid in the subscapularis recess with synovitis. Synovitis within the axillary recess.  4. Tearing of the biceps tendon within the bicipital groove with medial subluxation at the level of the bicipital pulley and  marked tendinosis of the intra-articular biceps tendon.  5. Tendinosis of the right shoulder supraspinatus tendon with moderate grade intrasubstance partial thickness tearing of the anterior to mid fibers, and low-grade bursal sided tearing of the more posterior fibers. No full thickness tear or tendon retraction of the supraspinatus tendon.   6. Tendinosis of the infraspinatus tendon.  7. Full-thickness tearing of the superior fibers of the right shoulder subscapularis tendon with marked tendinosis of the more inferior fibers.  8. Marked fatty infiltration within the teres minor muscle as well as the upper muscle bulk of the right shoulder subscapularis muscle.  9. Os acromiale.  10. Edema in the subcoracoid fat, with synovitis within the axillary recess, correlate for adhesive capsulitis.    Left shoulder MRI 8/8/20 was reviewed and I agree with the Impression below:  IMPRESSION:  1. Moderate degenerative changes at the left shoulder acromioclavicular joint.   2. Moderate to severe osteoarthrosis in the left shoulder glenohumeral joint, not quite as significant as in the right shoulder, although there is full-thickness cartilage loss along the glenoid with subchondral cystic changes and tearing of the labrum, greatest along the superior and posterior labrum.  3. Left glenohumeral joint effusion with synovitis within the axillary recess as well as within the subscapularis recess where there is a moderate amount of fluid.  4. Partial tearing of the biceps tendon within the bicipital groove with likely full-thickness tearing of the intra-articular biceps tendon. Multiple joint bodies within the biceps tendon sheath.  5. Severe tendinosis of the left shoulder supraspinatus tendon with low-grade intrasubstance partial thickness tearing of the junctional fibers of the supraspinatus and infraspinatus tendon which continues into the infraspinatus tendon. No full-thickness tear or tendon retraction involving the rotator  cuff tendons.  6. Low-grade articular sided partial thickness tearing of the left shoulder subscapularis tendon.    ASSESSMENT:    1. Right end stage glenohumeral arthrosis  2. Left shoulder moderate glenohumeral OA, probable long head biceps rupture     PLAN:  We discussed treatment options including both operative and nonoperative. The patient would prefer continued nonop treatment. He would like to repeat hi bilateral GH injections and could add L AC joint. He is aware that if/when these injections are not effective or he wishes to abandon nonop treatment, he should discuss surgical scheduling with me.       Yesica Deleon MD

## 2020-08-19 NOTE — NURSING NOTE
Reason For Visit:   Chief Complaint   Patient presents with     RECHECK     Follow up bilateral shoulder pain.  Review MRIs ordered by Dr. Chan.  Left shoulder is worse       PCP: Johnson Clements  Ref: Dr. Chan     ?  No  Occupation Retired .  Currently working? No.  Work status?  Retired.  Date of injury: November 2019  Type of injury: [place] in the woods and cutting trees and felt the biceps tear.  Fell in ice in January 2020.  Date of surgery: NA  Type of surgery: NA.  Smoker: No  Request smoking cessation information: No     Right hand dominant    SANE score  Affected shoulder: Bilateral  Right shoulder SANE: 70 with pain meds  Left shoulder SANE: 70 with pain meds    There were no vitals taken for this visit.      Pain Assessment  Patient Currently in Pain: Yes  0-10 Pain Scale: 7  Primary Pain Location: Shoulder(Bilateral)  Pain Descriptors: Sore  Aggravating Factors: Lying, Other (comment)(pain worse at night)  Pain meds help  Marie Linares ATC

## 2020-08-24 DIAGNOSIS — M25.661 STIFFNESS OF KNEE JOINT, RIGHT: Chronic | ICD-10-CM

## 2020-08-24 DIAGNOSIS — G89.4 CHRONIC PAIN SYNDROME: Chronic | ICD-10-CM

## 2020-08-24 DIAGNOSIS — Z96.652 STATUS POST TOTAL LEFT KNEE REPLACEMENT: ICD-10-CM

## 2020-08-24 DIAGNOSIS — M47.16 SPONDYLOSIS WITH MYELOPATHY, LUMBAR REGION: ICD-10-CM

## 2020-08-24 DIAGNOSIS — R26.9 GAIT DIFFICULTY: ICD-10-CM

## 2020-08-24 NOTE — TELEPHONE ENCOUNTER
Reason for Call:  Medication or medication refill:    Do you use a [facility]?  Name of the pharmacy and phone number for the current request:       [facility] [address] [place] [facility], [address]      Name of the medication requested: HYDROcodone-acetaminophen (NORCO) 7.5-325 MG per tablet    Other request: The patient called and stated the he needs a refill for this medication. He would like a call once this has been done so he knows when he can pick them up at the pharmacy.     Can we leave a detailed message on this number? YES    Phone number patient can be reached at: Home number on file [phone] (home)    Best Time: Any    Call taken on 8/24/2020 at 12:19 PM by Ambreen Smith

## 2020-08-25 RX ORDER — HYDROCODONE BITARTRATE AND ACETAMINOPHEN 7.5; 325 MG/1; MG/1
1 TABLET ORAL EVERY 6 HOURS PRN
Qty: 120 TABLET | Refills: 0 | Status: SHIPPED | OUTPATIENT
Start: 2020-08-27 | End: 2020-09-23

## 2020-08-25 NOTE — TELEPHONE ENCOUNTER
Controlled Substance Refill Request for: HYDROcodone-acetaminophen (NORCO) 7.5-325 MG per tablet   Problem List Complete:  Yes    Patient is followed by ARGENIS MELO MD for ongoing prescription of pain medication.  All refills should only be approved by this provider, or covering partner.    Medication(s):  NORCO  7.5MG PO FOUR TIMES DAILY .   Maximum quantity per month:  120   Clinic visit frequency required: Q 3 months     Controlled substance agreement:  Encounter-Level CSA - 10/31/2016:    Controlled Substance Agreement - Scan on 11/15/2016  7:53 AM: CONTROLLED SUBSTANCE AGREEMENT     Patient-Level CSA:    Controlled Substance Agreement - Opioid - Scan on 3/19/2019 11:01 AM       Pain Clinic evaluation in the past: No    DIRE Total Score(s):  DIRE SCORE 7/2/2016   DIRE Total Score 18       RX [place] reviewed:  reviewed 8/25/20- no concerns  [place]:  [email or web address]     checked in past 3 months?  Yes 8/25/20
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
## 2020-08-28 DIAGNOSIS — M25.661 STIFFNESS OF KNEE JOINT, RIGHT: Chronic | ICD-10-CM

## 2020-08-28 DIAGNOSIS — G56.00 CARPAL TUNNEL SYNDROME, UNSPECIFIED LATERALITY: ICD-10-CM

## 2020-08-28 DIAGNOSIS — S49.92XA SHOULDER INJURY, LEFT, INITIAL ENCOUNTER: ICD-10-CM

## 2020-08-28 DIAGNOSIS — S69.92XA WRIST INJURY, LEFT, INITIAL ENCOUNTER: ICD-10-CM

## 2020-08-28 DIAGNOSIS — M17.10 PRIMARY LOCALIZED OSTEOARTHROSIS, LOWER LEG, UNSPECIFIED LATERALITY: ICD-10-CM

## 2020-08-28 DIAGNOSIS — M51.16 LUMBAR DISC DISEASE WITH RADICULOPATHY: ICD-10-CM

## 2020-08-28 NOTE — PROGRESS NOTES
CHIEF CONCERN:  Bilateral Shoulder Pain    HISTORY OF PRESENT ILLNESS:   Mr. Bird is a 65 year old RHD male who returns today regarding bilateral (L>R) shoulder pain. He has had injections and between that and some medications he has been able to manage his arthritis symptoms OK. His last injection (bilateral GH injections) on 3/4/2020 was helpful. He is also dealing with low back pain as well as right knee and ankle pain.        PHYSICAL EXAM:     Adult male in no acute distress, cooperative with exam.   Respirations even and unlabored   Focused Upper Extremity Exam:   No gross deformity. Fires bilateral deltoid, biceps, triceps, wrist extension/flexion, , EPL, intrinsics, FPL with 5/5 strength. SILT in axillary, musculocutaneous, radial, ulnar, and median nerve distribution. R Shoulder , ER 45, IR to sacrum. L Shoulder , ER 65, IR to L2. Pain with empty can, - O'Brians, - Speed's. Pain at the Left AC joint    IMAGING:  Right shoulder MRI 8/8/20 was reviewed and I agree with the Impression below:  IMPRESSION:  1. Moderate osteoarthrosis at the right shoulder acromioclavicular joint.  2. Severe osteoarthrosis at the right shoulder glenohumeral joint with full-thickness cartilage loss; osteophytic spurring; glenoid remodeling with loss of bone stock; subchondral cystic changes; and labral tearing, greatest superiorly and posteriorly.  3. Moderate size joint effusion with synovitis in the region of the axillary recess. There is also fluid in the subscapularis recess with synovitis. Synovitis within the axillary recess.  4. Tearing of the biceps tendon within the bicipital groove with medial subluxation at the level of the bicipital pulley and marked tendinosis of the intra-articular biceps tendon.  5. Tendinosis of the right shoulder supraspinatus tendon with moderate grade intrasubstance partial thickness tearing of the anterior to mid fibers, and low-grade bursal sided tearing of the more  posterior fibers. No full thickness tear or tendon retraction of the supraspinatus tendon.   6. Tendinosis of the infraspinatus tendon.  7. Full-thickness tearing of the superior fibers of the right shoulder subscapularis tendon with marked tendinosis of the more inferior fibers.  8. Marked fatty infiltration within the teres minor muscle as well as the upper muscle bulk of the right shoulder subscapularis muscle.  9. Os acromiale.  10. Edema in the subcoracoid fat, with synovitis within the axillary recess, correlate for adhesive capsulitis.    Left shoulder MRI 8/8/20 was reviewed and I agree with the Impression below:  IMPRESSION:  1. Moderate degenerative changes at the left shoulder acromioclavicular joint.   2. Moderate to severe osteoarthrosis in the left shoulder glenohumeral joint, not quite as significant as in the right shoulder, although there is full-thickness cartilage loss along the glenoid with subchondral cystic changes and tearing of the labrum, greatest along the superior and posterior labrum.  3. Left glenohumeral joint effusion with synovitis within the axillary recess as well as within the subscapularis recess where there is a moderate amount of fluid.  4. Partial tearing of the biceps tendon within the bicipital groove with likely full-thickness tearing of the intra-articular biceps tendon. Multiple joint bodies within the biceps tendon sheath.  5. Severe tendinosis of the left shoulder supraspinatus tendon with low-grade intrasubstance partial thickness tearing of the junctional fibers of the supraspinatus and infraspinatus tendon which continues into the infraspinatus tendon. No full-thickness tear or tendon retraction involving the rotator cuff tendons.  6. Low-grade articular sided partial thickness tearing of the left shoulder subscapularis tendon.    ASSESSMENT:    1. Right end stage glenohumeral arthrosis  2. Left shoulder moderate glenohumeral OA, probable long head biceps rupture      PLAN:  We discussed treatment options including both operative and nonoperative. The patient would prefer continued nonop treatment. He would like to repeat hi bilateral GH injections and could add L AC joint. He is aware that if/when these injections are not effective or he wishes to abandon nonop treatment, he should discuss surgical scheduling with me.       Yesica Deleon MD

## 2020-09-09 ENCOUNTER — OFFICE VISIT (OUTPATIENT)
Dept: ORTHOPEDICS | Facility: CLINIC | Age: 65
End: 2020-09-09
Payer: COMMERCIAL

## 2020-09-09 VITALS — HEIGHT: 70 IN | BODY MASS INDEX: 33.79 KG/M2 | WEIGHT: 236 LBS

## 2020-09-09 DIAGNOSIS — M19.012 LOCALIZED OSTEOARTHRITIS OF LEFT SHOULDER: ICD-10-CM

## 2020-09-09 DIAGNOSIS — M19.011 GLENOHUMERAL ARTHRITIS, RIGHT: Primary | ICD-10-CM

## 2020-09-09 RX ORDER — ROPIVACAINE HYDROCHLORIDE 5 MG/ML
3 INJECTION, SOLUTION EPIDURAL; INFILTRATION; PERINEURAL
Status: DISCONTINUED | OUTPATIENT
Start: 2020-09-09 | End: 2021-05-28

## 2020-09-09 RX ORDER — TRIAMCINOLONE ACETONIDE 40 MG/ML
40 INJECTION, SUSPENSION INTRA-ARTICULAR; INTRAMUSCULAR
Status: DISCONTINUED | OUTPATIENT
Start: 2020-09-09 | End: 2021-05-28

## 2020-09-09 RX ADMIN — TRIAMCINOLONE ACETONIDE 40 MG: 40 INJECTION, SUSPENSION INTRA-ARTICULAR; INTRAMUSCULAR at 09:01

## 2020-09-09 RX ADMIN — ROPIVACAINE HYDROCHLORIDE 3 ML: 5 INJECTION, SOLUTION EPIDURAL; INFILTRATION; PERINEURAL at 09:01

## 2020-09-09 ASSESSMENT — MIFFLIN-ST. JEOR: SCORE: 1851.74

## 2020-09-09 NOTE — NURSING NOTE
[address] [address]  Dept: [phone]  ______________________________________________________________________________    Patient: Arnaud Bird   : 1955   MRN: [id number]   2020    INVASIVE PROCEDURE SAFETY CHECKLIST    Date: 2020   Procedure: Bilateral glenohumeral joint US guided kenalog injections  Patient Name: Arnaud Bird  MRN: [id number]  YOB: 1955    Action: Complete sections as appropriate. Any discrepancy results in a HARD COPY until resolved.     PRE PROCEDURE:  Patient ID verified with 2 identifiers (name and  or MRN): Yes  Procedure and site verified with patient/designee (when able): Yes  Accurate consent documentation in medical record: Yes  H&P (or appropriate assessment) documented in medical record: Yes  H&P must be up to 20 days prior to procedure and updates within 24 hours of procedure as applicable: NA  Relevant diagnostic and radiology test results appropriately labeled and displayed as applicable: Yes  Procedure site(s) marked with provider initials: NA    TIMEOUT:  Time-Out performed immediately prior to starting procedure, including verbal and active participation of all team members addressing the following:Yes  * Correct patient identify  * Confirmed that the correct side and site are marked  * An accurate procedure consent form  * Agreement on the procedure to be done  * Correct patient position  * Relevant images and results are properly labeled and appropriately displayed  * The need to administer antibiotics or fluids for irrigation purposes during the procedure as applicable   * Safety precautions based on patient history or medication use    DURING PROCEDURE: Verification of correct person, site, and procedures any time the responsibility for care of the patient is transferred to another member of the care team.       Prior to injection, verified patient identity  using patient's name and date of birth.  Due to injection administration, patient instructed to remain in clinic for 15 minutes  afterwards, and to report any adverse reaction to me immediately.    Joint injection was performed.      Drug Amount Wasted:  Yes: 4 mg/ml ropivacaine  Vial/Syringe: Single dose vial  Expiration Date:  06/23      Frances Rogers, ATC  September 9, 2020

## 2020-09-09 NOTE — LETTER
9/9/2020      RE: Arnaud Bird  [address]  [facility] [address]       SUBJECTIVE: Pt returns for US-guided corticosteroid injections for glenohumeral OA.    Pt states this is sixth injection.  He has seen Dr. Deleon for possible surgery     OBJECTIVE: Painful shoulders, limited flexion and abduction.  X-rays of shoulders and AC joint reviewed- severe OA     ASSESSMENT: Bilateral glenohumeral osteoarthritis, AC arthritis        PLAN: Pt is moving out of the  area to [place].  Has moving to do.  Wants to delay possible surgery for next 6 months or to [place].  Discussed recommended lifetime number of glenohumeral injections (6) before surgical considerations.       PROCEDURE: The indications, risks, expected benefits were discussed.  Formal consent was obtained. The humeral head, glenoid, hyperechoic triangle indicating the posterior labrum were identified by ultrasound.  Initially, 4 mL 0.5% ropivicaine  was injected with US guidance along the injection track for anesthesia.  Using sterile technique, a syringe with a 80 mm, 22 gauge needle containing 1 mL Kenalog 40 mg/mL and 4 mL 0.5% ropivicaine  were injected using an US guided posterior approach into the left glenohumeral joint.  US used to guide needle placement and verify proper needle position.  Images and video indicating proper needle placement were recorded.  Upon completion of the injection, the wound was dressed with a bandaid. Follow up prn.      The humeral head, glenoid, hyperechoic triangle indicating the posterior labrum were identified by ultrasound.  Initially, 4 mL 0.5% ropivicaine  was injected with US guidance along the injection track for anesthesia.  Using sterile technique, a syringe with a 80 mm, 22 gauge needle containing 1 mL Kenalog 40 mg/mL and 4 mL 0.5% ropivicaine  were injected using an US guided posterior approach into the right glenohumeral joint.  US used to guide needle placement and verify proper  needle position.  Images and video indicating proper needle placement were recorded.  Upon completion of the injection, the wound was dressed with a bandaid.     Large Joint Injection/Arthocentesis: bilateral glenohumeral    Date/Time: 9/9/2020 9:01 AM  Performed by: Carmel Chan MD  Authorized by: Carmel Chan MD     Indications:  Osteoarthritis  Needle Size:  22 G  Guidance: ultrasound    Approach:  Posterior  Location:  Shoulder  Laterality:  Bilateral      Site:  Bilateral glenohumeral  Medications (Right):  40 mg triamcinolone 40 MG/ML; 3 mL ropivacaine 5 MG/ML  Medications (Left):  40 mg triamcinolone 40 MG/ML; 3 mL ropivacaine 5 MG/ML  Outcome:  Tolerated well, no immediate complications  Procedure discussed: discussed risks, benefits, and alternatives    Consent Given by:  Patient  Timeout: timeout called immediately prior to procedure    Prep: patient was prepped and draped in usual sterile fashion     8 mL of ropivacaine used per shoulder.  Scribed by Frances Rogers MS, LAT, ATC for Dr. Chan on 9/9/2020 at 9:02 AM, based on the provider s statements to me.      I agree with the following injection documentation.    MD Carmel Maher MD

## 2020-09-09 NOTE — PROGRESS NOTES
SUBJECTIVE: Pt returns for US-guided corticosteroid injections for glenohumeral OA.    Pt states this is sixth injection.  He has seen Dr. Deleon for possible surgery     OBJECTIVE: Painful shoulders, limited flexion and abduction.  X-rays of shoulders and AC joint reviewed- severe OA     ASSESSMENT: Bilateral glenohumeral osteoarthritis, AC arthritis        PLAN: Pt is moving out of the  area to [place].  Has moving to do.  Wants to delay possible surgery for next 6 months or to [place].  Discussed recommended lifetime number of glenohumeral injections (6) before surgical considerations.       PROCEDURE: The indications, risks, expected benefits were discussed.  Formal consent was obtained. The humeral head, glenoid, hyperechoic triangle indicating the posterior labrum were identified by ultrasound.  Initially, 4 mL 0.5% ropivicaine  was injected with US guidance along the injection track for anesthesia.  Using sterile technique, a syringe with a 80 mm, 22 gauge needle containing 1 mL Kenalog 40 mg/mL and 4 mL 0.5% ropivicaine  were injected using an US guided posterior approach into the left glenohumeral joint.  US used to guide needle placement and verify proper needle position.  Images and video indicating proper needle placement were recorded.  Upon completion of the injection, the wound was dressed with a bandaid. Follow up prn.      The humeral head, glenoid, hyperechoic triangle indicating the posterior labrum were identified by ultrasound.  Initially, 4 mL 0.5% ropivicaine  was injected with US guidance along the injection track for anesthesia.  Using sterile technique, a syringe with a 80 mm, 22 gauge needle containing 1 mL Kenalog 40 mg/mL and 4 mL 0.5% ropivicaine  were injected using an US guided posterior approach into the right glenohumeral joint.  US used to guide needle placement and verify proper needle position.  Images and video indicating proper needle placement were recorded.  Upon  completion of the injection, the wound was dressed with a bandaid.     Large Joint Injection/Arthocentesis: bilateral glenohumeral    Date/Time: 9/9/2020 9:01 AM  Performed by: Carmel Chan MD  Authorized by: Carmel Chan MD     Indications:  Osteoarthritis  Needle Size:  22 G  Guidance: ultrasound    Approach:  Posterior  Location:  Shoulder  Laterality:  Bilateral      Site:  Bilateral glenohumeral  Medications (Right):  40 mg triamcinolone 40 MG/ML; 3 mL ropivacaine 5 MG/ML  Medications (Left):  40 mg triamcinolone 40 MG/ML; 3 mL ropivacaine 5 MG/ML  Outcome:  Tolerated well, no immediate complications  Procedure discussed: discussed risks, benefits, and alternatives    Consent Given by:  Patient  Timeout: timeout called immediately prior to procedure    Prep: patient was prepped and draped in usual sterile fashion     8 mL of ropivacaine used per shoulder.  Scribed by Frances Rogers, MS, LAT, ATC for Dr. Chan on 9/9/2020 at 9:02 AM, based on the provider s statements to me.      I agree with the following injection documentation.    ELROY Chan MD

## 2020-09-23 DIAGNOSIS — G89.4 CHRONIC PAIN SYNDROME: Chronic | ICD-10-CM

## 2020-09-23 DIAGNOSIS — Z96.652 STATUS POST TOTAL LEFT KNEE REPLACEMENT: ICD-10-CM

## 2020-09-23 DIAGNOSIS — R26.9 GAIT DIFFICULTY: ICD-10-CM

## 2020-09-23 DIAGNOSIS — M25.661 STIFFNESS OF KNEE JOINT, RIGHT: Chronic | ICD-10-CM

## 2020-09-23 DIAGNOSIS — M47.16 SPONDYLOSIS WITH MYELOPATHY, LUMBAR REGION: ICD-10-CM

## 2020-09-23 RX ORDER — HYDROCODONE BITARTRATE AND ACETAMINOPHEN 7.5; 325 MG/1; MG/1
1 TABLET ORAL EVERY 6 HOURS PRN
Qty: 120 TABLET | Refills: 0 | Status: SHIPPED | OUTPATIENT
Start: 2020-09-23 | End: 2020-10-23

## 2020-09-23 NOTE — TELEPHONE ENCOUNTER
Reason for Call:  Medication or medication refill:    Do you use a [facility]?  Name of the [facility]    Name of the medication requested:   HYDROcodone-acetaminophen (NORCO) 7.5-325 MG per tablet  120 tablet          Other request:          Can we leave a detailed message on this number? YES    Phone number patient can be reached at: Home number on file [phone] (home)    Best Time:     Call taken on 9/23/2020 at 9:54 AM by CATALINA KHAN

## 2020-09-23 NOTE — TELEPHONE ENCOUNTER
Controlled Substance Refill Request for HYDROcodone-acetaminophen (NORCO) 7.5-325 MG per tablet    Problem List Complete:  Yes    Chronic pain syndrome   9/22/2016    Overview    Patient is followed by ARGENIS MELO MD for ongoing prescription of pain medication.  All refills should only be approved by this provider, or covering partner.     Medication(s):  NORCO  7.5MG PO FOUR TIMES DAILY .   Maximum quantity per month:  120   Clinic visit frequency required: Q 3 months      Controlled substance agreement:  Encounter-Level CSA - 10/31/2016:    Controlled Substance Agreement - Scan on 11/15/2016  7:53 AM: CONTROLLED SUBSTANCE AGREEMENT      Patient-Level CSA:    Controlled Substance Agreement - Opioid - Scan on 3/19/2019 11:01 AM         Pain Clinic evaluation in the past: No     DIRE Total Score(s):  DIRE SCORE 7/2/2016   DIRE Total Score 18         RX [place] program ([place]) reviewed:  reviewed 8/25/20- no concerns  [place] profile:  [email or web address]        checked in past 3 months?  Yes 08/25/20

## 2020-10-14 ENCOUNTER — TELEPHONE (OUTPATIENT)
Dept: ORTHOPEDICS | Facility: CLINIC | Age: 65
End: 2020-10-14

## 2020-10-14 NOTE — TELEPHONE ENCOUNTER
[facility] Call Center    Phone Message    May a detailed message be left on voicemail: yes     Reason for Call: Other: Patient has a few questions about pain in (L) shoulder. Ok to leave msg if no answer      Action Taken: Message routed to:  [facility] ([facility] heri    Travel Screening: Not Applicable

## 2020-10-21 DIAGNOSIS — Z53.9 ERRONEOUS ENCOUNTER--DISREGARD: Primary | ICD-10-CM

## 2020-10-21 DIAGNOSIS — M25.511 BILATERAL SHOULDER PAIN, UNSPECIFIED CHRONICITY: Primary | ICD-10-CM

## 2020-10-21 DIAGNOSIS — M25.512 BILATERAL SHOULDER PAIN, UNSPECIFIED CHRONICITY: Primary | ICD-10-CM

## 2020-10-23 ENCOUNTER — THERAPY VISIT (OUTPATIENT)
Dept: PHYSICAL THERAPY | Facility: CLINIC | Age: 65
End: 2020-10-23
Attending: ORTHOPAEDIC SURGERY
Payer: COMMERCIAL

## 2020-10-23 DIAGNOSIS — M25.512 CHRONIC PAIN OF BOTH SHOULDERS: ICD-10-CM

## 2020-10-23 DIAGNOSIS — M25.511 BILATERAL SHOULDER PAIN, UNSPECIFIED CHRONICITY: ICD-10-CM

## 2020-10-23 DIAGNOSIS — M25.661 STIFFNESS OF KNEE JOINT, RIGHT: Chronic | ICD-10-CM

## 2020-10-23 DIAGNOSIS — M25.512 BILATERAL SHOULDER PAIN, UNSPECIFIED CHRONICITY: ICD-10-CM

## 2020-10-23 DIAGNOSIS — G89.4 CHRONIC PAIN SYNDROME: Chronic | ICD-10-CM

## 2020-10-23 DIAGNOSIS — G89.29 CHRONIC PAIN OF BOTH SHOULDERS: ICD-10-CM

## 2020-10-23 DIAGNOSIS — M47.16 SPONDYLOSIS WITH MYELOPATHY, LUMBAR REGION: ICD-10-CM

## 2020-10-23 DIAGNOSIS — Z96.652 STATUS POST TOTAL LEFT KNEE REPLACEMENT: ICD-10-CM

## 2020-10-23 DIAGNOSIS — M25.511 CHRONIC PAIN OF BOTH SHOULDERS: ICD-10-CM

## 2020-10-23 DIAGNOSIS — R26.9 GAIT DIFFICULTY: ICD-10-CM

## 2020-10-23 PROCEDURE — 97110 THERAPEUTIC EXERCISES: CPT | Mod: GP | Performed by: PHYSICAL THERAPIST

## 2020-10-23 PROCEDURE — 97161 PT EVAL LOW COMPLEX 20 MIN: CPT | Mod: GP | Performed by: PHYSICAL THERAPIST

## 2020-10-23 RX ORDER — HYDROCODONE BITARTRATE AND ACETAMINOPHEN 7.5; 325 MG/1; MG/1
1 TABLET ORAL EVERY 6 HOURS PRN
Qty: 120 TABLET | Refills: 0 | Status: SHIPPED | OUTPATIENT
Start: 2020-10-23 | End: 2020-11-24

## 2020-10-23 NOTE — TELEPHONE ENCOUNTER
Controlled Substance Refill Request for   Requested Prescriptions   Pending Prescriptions Disp Refills     HYDROcodone-acetaminophen (NORCO) 7.5-325 MG per tablet  Last Written Prescription Date:  9/23/2020  Last Fill Quantity: 120,  # refills: 0   Last office visit: 7/20/2020 with prescribing provider:  Argenis   Future Office Visit:     120 tablet 0     Sig: Take 1 tablet by mouth every 6 hours as needed for pain (4 x daily as needed  maxium 4 per day)       There is no refill protocol information for this order          Problem List Complete:  Yes  Patient is followed by ARGENIS MELO MD for ongoing prescription of pain medication.  All refills should only be approved by this provider, or covering partner.     Medication(s):  NORCO  7.5MG PO FOUR TIMES DAILY .   Maximum quantity per month:  120   Clinic visit frequency required: Q 3 months      Controlled substance agreement:  Encounter-Level CSA - 10/31/2016:    Controlled Substance Agreement - Scan on 11/15/2016  7:53 AM: CONTROLLED SUBSTANCE AGREEMENT      Patient-Level CSA:    Controlled Substance Agreement - Opioid - Scan on 3/19/2019 11:01 AM         Pain Clinic evaluation in the past: No     DIRE Total Score(s):  DIRE SCORE 7/2/2016   DIRE Total Score 18         RX monitoring program ([place]) reviewed:  reviewed 8/25/20- no concerns  [place]NPMP profile:  [email or web address]        checked in past 3 months?  Yes 8/25/2020, no concerns   RX monitoring program ([place]) reviewed:  reviewed- no concerns    [place] profile:  [email or web address]  Routing refill request to provider for review/approval because:  Drug not on the [facility] refill protocol

## 2020-10-23 NOTE — TELEPHONE ENCOUNTER
Reason for Call:  Medication or medication refill:    Do you use a [facility]?  Name of the pharmacy and phone number for the current request:       [facility] [id number] [place] [facility], [address]      Name of the medication requested: HYDROcodone-acetaminophen (NORCO) 7.5-325 MG per tablet    Other request: The patient called and stated that he needs a refill for this medication.     Can we leave a detailed message on this number? YES    Phone number patient can be reached at: Home number on file [phone] (home)    Best Time: Any    Call taken on 10/23/2020 at 9:25 AM by Ambreen Smith

## 2020-10-23 NOTE — TELEPHONE ENCOUNTER
Patient Contact    Called patient and left VM informing that refill has been processed. Instructed to call back with further questions. No further action needed at this time.

## 2020-10-23 NOTE — PROGRESS NOTES
[facility] Initial Evaluation    Subjective:  Arnaud Bird is a 65 year old male with a bilateral shoulder pain condition. Symptoms commenced as a result of: Chronic bilateral shoulder pain for the past few years. Torn right biceps tendon on left shoulder about a year ago after trimming trees. Most recent steroid injection this past September 2020. Condition occurred in the following environment: NA. Onset of symptoms: Date on order: October 2020. Location of symptoms: lateral. Pain level on number scale: 4-7/10. Quality of pain: soreness. Associated symptoms: stiffness, weakness. Pain frequency (constant/intermittent): constant. Symptoms are exacerbated by: reaching, lifting, pushing, carrying, dressing, washing, holding gun while hunting. Symptoms are relieved by: steroid injection, heat, ice. Progression of symptoms since onset (same/better/worse): worse. Special tests (x-ray, MRI, CT scan, EMG, bone scan): MRI. Previous treatment: corticosteroid injections 9/9/20. Improvement with previous treatment: yes. General health as reported by patient is good. Pertinent medical history includes: See Epic. Medical allergies: see Epic. Other pertinent surgeries: see Epic. Current medications: See Epic. Occupation: retired. Patient is (working in normal job without restrictions/working in normal job with restrictions/working in an alternate job/not working due to present treatment problem): NA. Primary job tasks: NA. Barriers at home/work: None reported by patient. Red flags: None reported by patient.    Objective  Posture    Forward Head +   Rounded Shoulders +   Scapular Winging      Shoulder AROM (* = pain) Right Left   * 110*   * 110*   ER (neutral) WNL* WNL*   IR Right buttock* WNL*     Shoulder Strength (* = pain) Right Left   FL 4/5 4-/5   ABD 4/5 4-/5   ER 4/5 4-/5   IR 4+/5 4/5   Biceps 5/5 5-/5     Special Tests Right Left   Kenn WHITING'Brien     Anterior  Aprehension     Posterior Apprehension     Sulcus Sign     AC Crossover     Drop-Arm     Lift-off Sign     Painful Arc + +     Palpation tenderness:  Bilateral supraspinatus    Assessment/Plan:    Patient is a 65 year old male with both sides shoulder complaints.    Patient has the following significant findings with corresponding treatment plan.                Diagnosis 1:  Bilateral shoulder pain  Pain -  manual therapy, self management, education, directional preference exercise and home program  Decreased ROM/flexibility - manual therapy and therapeutic exercise  Decreased joint mobility - manual therapy and therapeutic exercise  Decreased strength - therapeutic exercise and therapeutic activities  Impaired muscle performance - neuro re-education  Decreased function - therapeutic activities  Impaired posture - neuro re-education    Previous and current functional limitations:  (See Goal Flow Sheet for this information)    Short term and Long term goals: (See Goal Flow Sheet for this information)     Communication ability:  Patient appears to be able to clearly communicate and understand verbal and written communication and follow directions correctly.  Treatment Explanation - The following has been discussed with the patient:   RX ordered/plan of care  Anticipated outcomes  Possible risks and side effects  This patient would benefit from PT intervention to resume normal activities.   Rehab potential is good.    Frequency:  1 X week, once daily  Duration:  for 4 weeks tapering to 2 X a month over 1 month  Discharge Plan:  Achieve all LTG.  Independent in home treatment program.  Reach maximal therapeutic benefit.    Please refer to the daily flowsheet for treatment today, total treatment time and time spent performing 1:1 timed codes.

## 2020-10-26 DIAGNOSIS — B00.9 HSV (HERPES SIMPLEX VIRUS) INFECTION: ICD-10-CM

## 2020-10-27 RX ORDER — ACYCLOVIR 400 MG/1
TABLET ORAL
Qty: 28 TABLET | Refills: 3 | OUTPATIENT
Start: 2020-10-27

## 2020-10-27 NOTE — TELEPHONE ENCOUNTER
Please make a virtual visit for prescribing this as its not seen in active medication list.     Thank you,  Sushma Galloway MD on 10/27/2020 at 4:13 PM

## 2020-10-27 NOTE — TELEPHONE ENCOUNTER
RN called back to pt.    Scheduled for virtual visit 10/28 to discuss meds with Vinicio.  Needs for genital herpes breakout.  Has not had breakout in about 10 years.

## 2020-10-28 ENCOUNTER — VIRTUAL VISIT (OUTPATIENT)
Dept: FAMILY MEDICINE | Facility: CLINIC | Age: 65
End: 2020-10-28
Payer: COMMERCIAL

## 2020-10-28 DIAGNOSIS — B00.9 HSV (HERPES SIMPLEX VIRUS) INFECTION: ICD-10-CM

## 2020-10-28 PROCEDURE — 99213 OFFICE O/P EST LOW 20 MIN: CPT | Mod: 95 | Performed by: FAMILY MEDICINE

## 2020-10-28 RX ORDER — ACYCLOVIR 400 MG/1
TABLET ORAL
Qty: 28 TABLET | Refills: 4 | Status: SHIPPED | OUTPATIENT
Start: 2020-10-28 | End: 2021-12-22

## 2020-10-28 NOTE — TELEPHONE ENCOUNTER
I don't see him on my schedule.....  Something like this could definitely be a virtual appt, no F2F please

## 2020-10-28 NOTE — PROGRESS NOTES
"Arnaud Bird is a 65 year old male who is being evaluated via a billable telephone visit.      The patient has been notified of following:     \"This telephone visit will be conducted via a call between you and your physician/provider. We have found that certain health care needs can be provided without the need for a physical exam.  This service lets us provide the care you need with a short phone conversation.  If a prescription is necessary we can send it directly to your pharmacy.  If lab work is needed we can place an order for that and you can then stop by our lab to have the test done at a later time.    Telephone visits are billed at different rates depending on your insurance coverage. During this emergency period, for some insurers they may be billed the same as an in-person visit.  Please reach out to your insurance provider with any questions.    If during the course of the call the physician/provider feels a telephone visit is not appropriate, you will not be charged for this service.\"    Patient has given verbal consent for Telephone visit?  Yes    What phone number would you like to be contacted at? [phone]    How would you like to obtain your AVS? Yvonhart    Subjective     Arnaud Bird is a 65 year old male who presents via phone visit today for the following health issues:    HPI     Medication Followup of acyclovir 400 mg    Taking Medication as prescribed: yes, PRN for outbreak of HSV    Side Effects:  None    Medication Helping Symptoms:  yes     Needs Acyclovir refilled for HSV (genital herpes).  Prescription  and could not get a refill.  Acyclovir helps and works for him.  Has not had an outbreak for the past 12 yrs.      Review of Systems   CONSTITUTIONAL: NEGATIVE for fever, chills, change in weight  EYES: NEGATIVE for vision changes or irritation  ENT/MOUTH: NEGATIVE for ear, mouth and throat problems  RESP: NEGATIVE for significant cough or SOB  CV: NEGATIVE for chest " "pain, palpitations or peripheral edema  GI: NEGATIVE for nausea, abdominal pain, heartburn, or change in bowel habits  : NEGATIVE for frequency, dysuria, or hematuria       Objective      Vitals:  No vitals were obtained today due to virtual visit.  GEN: healthy, alert and no distress  PSYCH: Alert and oriented times 3; coherent speech, normal   rate and volume, able to articulate logical thoughts, able   to abstract reason, no tangential thoughts, no hallucinations   or delusions  His affect is normal  RESP: No cough, no audible wheezing, able to talk in full sentences  Remainder of exam unable to be completed due to telephone visits    No results found for this or any previous visit (from the past 24 hour(s)).        Assessment/Plan:    Assessment & Plan   Problem List Items Addressed This Visit     None      Visit Diagnoses     HSV (herpes simplex virus) infection        Relevant Medications    acyclovir (ZOVIRAX) 400 MG tablet           A physical exam with vitals was not possible today due to the COVID19 pandemic crisis for which we are trying to keep all patients safe and at home.  Clinical decision making was based on history as presented by the patient and answers to follow up questions as noted above.  Any lab orders that are needed will be put in as future orders so that the patient can come in for a lab only visit to minimize the amount of time spent in the clinic.      BMI:   Estimated body mass index is 33.86 kg/m  as calculated from the following:    Height as of 9/9/20: 1.778 m (5' 10\").    Weight as of 9/9/20: 107 kg (236 lb).   Weight management plan: Discussed healthy diet and exercise guidelines      See Patient Instructions  Return in about 6 months (around 4/28/2021) for re-check / follow-up, Routine Visit.    Leanne Mooney DO  [facility]    Phone call duration:  6 minutes                "

## 2020-10-30 ENCOUNTER — THERAPY VISIT (OUTPATIENT)
Dept: PHYSICAL THERAPY | Facility: CLINIC | Age: 65
End: 2020-10-30
Payer: COMMERCIAL

## 2020-10-30 DIAGNOSIS — M25.511 CHRONIC PAIN OF BOTH SHOULDERS: ICD-10-CM

## 2020-10-30 DIAGNOSIS — M25.512 CHRONIC PAIN OF BOTH SHOULDERS: ICD-10-CM

## 2020-10-30 DIAGNOSIS — G89.29 CHRONIC PAIN OF BOTH SHOULDERS: ICD-10-CM

## 2020-10-30 PROCEDURE — 97110 THERAPEUTIC EXERCISES: CPT | Mod: GP | Performed by: PHYSICAL THERAPIST

## 2020-11-04 ENCOUNTER — THERAPY VISIT (OUTPATIENT)
Dept: PHYSICAL THERAPY | Facility: CLINIC | Age: 65
End: 2020-11-04
Payer: COMMERCIAL

## 2020-11-04 DIAGNOSIS — M25.512 CHRONIC PAIN OF BOTH SHOULDERS: ICD-10-CM

## 2020-11-04 DIAGNOSIS — M25.511 CHRONIC PAIN OF BOTH SHOULDERS: ICD-10-CM

## 2020-11-04 DIAGNOSIS — G89.29 CHRONIC PAIN OF BOTH SHOULDERS: ICD-10-CM

## 2020-11-04 PROCEDURE — 97110 THERAPEUTIC EXERCISES: CPT | Mod: GP | Performed by: PHYSICAL THERAPIST

## 2020-11-04 PROCEDURE — 97112 NEUROMUSCULAR REEDUCATION: CPT | Mod: GP | Performed by: PHYSICAL THERAPIST

## 2020-11-04 NOTE — PROGRESS NOTES
DISCHARGE REPORT    Progress reporting period is from 10/23/20 to 11/4/20.       SUBJECTIVE  Subjective changes noted by patient:  Patient states he has been staying active. Cutting down trees and building tree stands the past few days. Patient states he has been inconsistent with HEP due to these other activities.     Current Pain level: (3-4/10).     Initial Pain level: 7/10.   Changes in function:  Yes (See Goal flowsheet attached for changes in current functional level)  Adverse reaction to treatment or activity: None    OBJECTIVE  Changes noted in objective findings:  Yes,   Objective: left shoulder AROM FL: 120 and pain, right shoulder AROM FL = 112 and pain     ASSESSMENT/PLAN  Updated problem list and treatment plan: Diagnosis 1:  Bilateral shoulder pain    STG/LTGs have been met or progress has been made towards goals:  Yes (See Goal flow sheet completed today.)  Assessment of Progress: The patient's condition is improving.  Self Management Plans:  Patient is independent in a home treatment program.  Patient is independent in self management of symptoms.    Arnaud continues to require the following intervention to meet STG and LTG's:  PT intervention is no longer required to meet STG/LTG.    Recommendations:  This patient is ready to be discharged from therapy and continue their home treatment program.    Please refer to the daily flowsheet for treatment today, total treatment time and time spent performing 1:1 timed codes.

## 2020-11-23 DIAGNOSIS — G89.4 CHRONIC PAIN SYNDROME: Chronic | ICD-10-CM

## 2020-11-23 DIAGNOSIS — Z96.652 STATUS POST TOTAL LEFT KNEE REPLACEMENT: ICD-10-CM

## 2020-11-23 DIAGNOSIS — M25.661 STIFFNESS OF KNEE JOINT, RIGHT: Chronic | ICD-10-CM

## 2020-11-23 DIAGNOSIS — R26.9 GAIT DIFFICULTY: ICD-10-CM

## 2020-11-23 DIAGNOSIS — M47.16 SPONDYLOSIS WITH MYELOPATHY, LUMBAR REGION: ICD-10-CM

## 2020-11-24 RX ORDER — HYDROCODONE BITARTRATE AND ACETAMINOPHEN 7.5; 325 MG/1; MG/1
1 TABLET ORAL EVERY 6 HOURS PRN
Qty: 120 TABLET | Refills: 0 | Status: SHIPPED | OUTPATIENT
Start: 2020-11-24 | End: 2020-12-22

## 2020-11-24 NOTE — TELEPHONE ENCOUNTER
Controlled Substance Refill Request for: HYDROcodone-acetaminophen (NORCO) 7.5-325 MG per tablet  Problem List Complete:  Yes    Patient is followed by ARGENIS MELO MD for ongoing prescription of pain medication.  All refills should only be approved by this provider, or covering partner.    Medication(s):  NORCO  7.5MG PO FOUR TIMES DAILY .   Maximum quantity per month:  120   Clinic visit frequency required: Q 3 months     Controlled substance agreement:  Encounter-Level CSA - 10/31/2016:    Controlled Substance Agreement - Scan on 11/15/2016  7:53 AM: CONTROLLED SUBSTANCE AGREEMENT     Patient-Level CSA:    Controlled Substance Agreement - Opioid - Scan on 3/19/2019 11:01 AM       Pain Clinic evaluation in the past: No    DIRE Total Score(s):  DIRE SCORE 7/2/2016   DIRE Total Score 18       [place] reviewed:  reviewed 8/25/20- no concerns  [place]  [email or web address]     checked in past 3 months?  Yes 8/25/20

## 2020-12-21 DIAGNOSIS — M25.661 STIFFNESS OF KNEE JOINT, RIGHT: Chronic | ICD-10-CM

## 2020-12-21 DIAGNOSIS — G89.4 CHRONIC PAIN SYNDROME: Chronic | ICD-10-CM

## 2020-12-21 DIAGNOSIS — R26.9 GAIT DIFFICULTY: ICD-10-CM

## 2020-12-21 DIAGNOSIS — Z96.652 STATUS POST TOTAL LEFT KNEE REPLACEMENT: ICD-10-CM

## 2020-12-21 DIAGNOSIS — M47.16 SPONDYLOSIS WITH MYELOPATHY, LUMBAR REGION: ICD-10-CM

## 2020-12-21 NOTE — TELEPHONE ENCOUNTER
Norco  Last Written Prescription Date:  11/24/2020  Last Fill Quantity: 120,  # refills: 0   Last office visit: Visit date not found with prescribing provider:  5 months ago Dr. Galloway   Future Office Visit:      RX [place] reviewed:  reviewed- no concerns 12/21/2020    [place]  [email or web address]    Routing refill request to provider for review/approval because:  Drug not on the [facility] refill protocol     MELISSA Napoles, RN  [employer] [facility] [facility]

## 2020-12-22 RX ORDER — HYDROCODONE BITARTRATE AND ACETAMINOPHEN 7.5; 325 MG/1; MG/1
1 TABLET ORAL EVERY 6 HOURS PRN
Qty: 120 TABLET | Refills: 0 | Status: SHIPPED | OUTPATIENT
Start: 2020-12-22 | End: 2021-01-20

## 2021-01-09 ENCOUNTER — HEALTH MAINTENANCE LETTER (OUTPATIENT)
Age: 66
End: 2021-01-09

## 2021-01-11 ENCOUNTER — TELEPHONE (OUTPATIENT)
Dept: ORTHOPEDICS | Facility: CLINIC | Age: 66
End: 2021-01-11

## 2021-01-11 NOTE — TELEPHONE ENCOUNTER
Health Call Center    Phone Message    May a detailed message be left on voicemail: yes     Reason for Call: Symptoms or Concerns     If patient has red-flag symptoms, warm transfer to triage line    Current symptom or concern: (L) elbow swollen    Symptoms have been present for:  2 day(s)    Has patient previously been seen for this? No    Are there any new or worsening symptoms? Yes: Patient says elbow is not painful but very swollen.    Patient did not want to make an appt until he hears back from Dr. Chan's care team.      Patient has tried icing it and other things he's read online, but he's starting to worry and maybe thinks it needs to be drained.    In addition, he reached out to Dr. Chan on [employer] in regards to a notice he received that he wasn't sure what it was for yet.    Please call patient to discuss.    Action Taken: Message routed to:  [facility] sports    Travel Screening: Not Applicable

## 2021-01-11 NOTE — TELEPHONE ENCOUNTER
"Patient reports \"bag of fluid\" on posterior aspect of elbow. He noticed it in the shower yesterday. He denies any trauma to elbow. He denies any tenderness, redness, warmth or fever. Has done ibuprofen and RICE without improvement. Offered a follow appointment with Dr. Chan to evaluate the swelling in the left elbow. He accepted a 9:00 am appointment time on Friday, 1/15/21. He knows to call clinic back if he sees signs and symptoms of infection.          "

## 2021-01-15 ENCOUNTER — OFFICE VISIT (OUTPATIENT)
Dept: ORTHOPEDICS | Facility: CLINIC | Age: 66
End: 2021-01-15
Payer: COMMERCIAL

## 2021-01-15 VITALS — HEIGHT: 70 IN | BODY MASS INDEX: 33.64 KG/M2 | WEIGHT: 235 LBS

## 2021-01-15 DIAGNOSIS — M19.011 LOCALIZED OSTEOARTHRITIS OF SHOULDERS, BILATERAL: Primary | ICD-10-CM

## 2021-01-15 DIAGNOSIS — M70.22 OLECRANON BURSITIS OF LEFT ELBOW: ICD-10-CM

## 2021-01-15 DIAGNOSIS — M19.012 LOCALIZED OSTEOARTHRITIS OF SHOULDERS, BILATERAL: Primary | ICD-10-CM

## 2021-01-15 PROCEDURE — 99213 OFFICE O/P EST LOW 20 MIN: CPT | Performed by: FAMILY MEDICINE

## 2021-01-15 ASSESSMENT — MIFFLIN-ST. JEOR: SCORE: 1847.2

## 2021-01-15 NOTE — LETTER
1/15/2021      RE: Arnaud Bird  [address]  [facility] [place] [address]        Subjective:   Arnaud Bird is a 65 year old male who L elbow olecranon bursitis.  Up last Sunday, showered and can't recall falling.  Ice, Advil.  Doesn't really hurt.    Mentions he recently noticed the swelling on L elbow. Has tried some ace wraps which do not stay on well.  Tried ice and ibuprofen as well. Unsure why he is not seeing much resolution.    Broken wrist in this weather.  Holding his head up, new cat so sitting with the animal on his lap.  Not sure why it happened.  Problems sleeping    Ice and wrapped right wrist due to swelling.  No reddness, has gout and is on Allopurinal.  Tried Mobic for a couple days.  Helped his shoulder pain.  Hard time getting out of a chair.    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  Background:   Date of injury: 1/10/21   Duration of symptoms: 1 weeks  Mechanism of Injury: Acute; Unknown   Intensity: 0/10 at rest, 1/10 with activity   Aggravating factors: bumping  Relieving Factors: rest, ice and NSAIDs  Prior Evaluation: None    PAST MEDICAL, SOCIAL, SURGICAL AND FAMILY HISTORY: He  has a past medical history of Arthritis, Benign positional vertigo, Carpal tunnel syndrome, Hearing problem, LEFT WORSE THAN RIGHT  (10/18/2012), Migraines, Nasal polyps, Unspecified asthma(493.90) (10/17/2012), and Unspecified asthma, with exacerbation (10/17/2012). He also has no past medical history of Congestive heart failure, unspecified, COPD (chronic obstructive pulmonary disease) (H), Coronary artery disease, Diabetes mellitus (H), History of blood transfusion, Hypertension, Malignant neoplasm (H), Thyroid disease, or Unspecified cerebral artery occlusion with cerebral infarction.  He  has a past surgical history that includes tonsillectomy; TOOTH EXTRACTION W/FORCEP; and orthopedic surgery.  His family history includes Cancer in his father and mother; Family History Negative in his father  "and mother; Stomach Problem in his mother.  He reports that he has never smoked. He has never used smokeless tobacco. He reports current alcohol use. He reports that he does not use drugs.    ALLERGIES: He is allergic to seasonal allergies.    CURRENT MEDICATIONS: He has a current medication list which includes the following prescription(s): acyclovir, albuterol, allopurinol, qvar, diclofenac, diclofenac, fluticasone, hydrocodone-acetaminophen, ipratropium, lidocaine, lidocaine, naloxone, order for dme, prednisone, pseudoephedrine, salicylic acid, sildenafil, and simvastatin, and the following Facility-Administered Medications: lidocaine (pf), lidocaine (pf), ropivacaine, ropivacaine, ropivacaine, ropivacaine, ropivacaine, ropivacaine, ropivacaine, ropivacaine, ropivacaine, ropivacaine, ropivacaine, ropivacaine, ropivacaine, ropivacaine, ropivacaine, ropivacaine, ropivacaine, triamcinolone, triamcinolone, triamcinolone, triamcinolone, triamcinolone, triamcinolone, triamcinolone, triamcinolone, triamcinolone, triamcinolone, triamcinolone, triamcinolone, triamcinolone, triamcinolone, and triamcinolone.     REVIEW OF SYSTEMS: 10 point review of systems is negative except as noted above.     Exam:   Ht 1.778 m (5' 10\")   Wt 106.6 kg (235 lb)   BMI 33.72 kg/m             CONSTITUTIONAL: alert, mild distress and cooperative  HEAD: Normocephalic. No masses, lesions, tenderness or abnormalities  SKIN: no suspicious lesions or rashes  GAIT: normal  NEUROLOGIC: Non-focal, Normal muscle tone and strength, reflexes normal, sensation grossly normal.  PSYCHIATRIC: affect normal/bright and mentation appears normal.    MUSCULOSKELETAL: right elbow pain  Inspection: swelling, no ecchymosis, olecranon bursa swelling  Tender: olecranon bursa  Non-tender: lateral epicondyle, common extensor tendon, medial epicondyle, common flexor tendon and radial head/neck  Range of Motion: all normal  Strength: elbow strength full  Special tests: " normal valgus stress, normal varus stress:          Assessment/Plan:   Pt is a 64 yo white male with PMHx of severe shoulder OA, gout presenting with left elbow olecranon bursitis  1. Left elbow olecranon bursitis-  Patient had a stockinette and Ace wrap placed  Not concerning for infection  Avoid direct pressure over the area  2. Bilateral glenohumeral osteoarthritis-  Patient reports that he is really having difficulty sleeping. Function of the left shoulder is worse than the right.  In the past the patient opted for cortisone injections and he has had several. He has been assessed by Dr. Yesica Deleon and requests follow-up for surgical consideration.    Return to clinic as needed  Chart review, charting and imaging- 10 min  X-RAY INTERPRETATION:   Not performed      Carmel Chan MD

## 2021-01-15 NOTE — PROGRESS NOTES
Subjective:   Arnaud Bird is a 65 year old male who L elbow olecranon bursitis.  Up last Sunday, showered and can't recall falling.  Ice, Advil.  Doesn't really hurt.    Mentions he recently noticed the swelling on L elbow. Has tried some ace wraps which do not stay on well.  Tried ice and ibuprofen as well. Unsure why he is not seeing much resolution.    Broken wrist in this weather.  Holding his head up, new cat so sitting with the animal on his lap.  Not sure why it happened.  Problems sleeping    Ice and wrapped right wrist due to swelling.  No reddness, has gout and is on Allopurinal.  Tried Mobic for a couple days.  Helped his shoulder pain.  Hard time getting out of a chair.    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  Background:   Date of injury: 1/10/21   Duration of symptoms: 1 weeks  Mechanism of Injury: Acute; Unknown   Intensity: 0/10 at rest, 1/10 with activity   Aggravating factors: bumping  Relieving Factors: rest, ice and NSAIDs  Prior Evaluation: None    PAST MEDICAL, SOCIAL, SURGICAL AND FAMILY HISTORY: He  has a past medical history of Arthritis, Benign positional vertigo, Carpal tunnel syndrome, Hearing problem, LEFT WORSE THAN RIGHT  (10/18/2012), Migraines, Nasal polyps, Unspecified asthma(493.90) (10/17/2012), and Unspecified asthma, with exacerbation (10/17/2012). He also has no past medical history of Congestive heart failure, unspecified, COPD (chronic obstructive pulmonary disease) (H), Coronary artery disease, Diabetes mellitus (H), History of blood transfusion, Hypertension, Malignant neoplasm (H), Thyroid disease, or Unspecified cerebral artery occlusion with cerebral infarction.  He  has a past surgical history that includes tonsillectomy; TOOTH EXTRACTION W/FORCEP; and orthopedic surgery.  His family history includes Cancer in his father and mother; Family History Negative in his father and mother; Stomach Problem in his mother.  He reports that he has never smoked. He has never  "used smokeless tobacco. He reports current alcohol use. He reports that he does not use drugs.    ALLERGIES: He is allergic to seasonal allergies.    CURRENT MEDICATIONS: He has a current medication list which includes the following prescription(s): acyclovir, albuterol, allopurinol, qvar, diclofenac, diclofenac, fluticasone, hydrocodone-acetaminophen, ipratropium, lidocaine, lidocaine, naloxone, order for dme, prednisone, pseudoephedrine, salicylic acid, sildenafil, and simvastatin, and the following Facility-Administered Medications: lidocaine (pf), lidocaine (pf), ropivacaine, ropivacaine, ropivacaine, ropivacaine, ropivacaine, ropivacaine, ropivacaine, ropivacaine, ropivacaine, ropivacaine, ropivacaine, ropivacaine, ropivacaine, ropivacaine, ropivacaine, ropivacaine, ropivacaine, triamcinolone, triamcinolone, triamcinolone, triamcinolone, triamcinolone, triamcinolone, triamcinolone, triamcinolone, triamcinolone, triamcinolone, triamcinolone, triamcinolone, triamcinolone, triamcinolone, and triamcinolone.     REVIEW OF SYSTEMS: 10 point review of systems is negative except as noted above.     Exam:   Ht 1.778 m (5' 10\")   Wt 106.6 kg (235 lb)   BMI 33.72 kg/m             CONSTITUTIONAL: alert, mild distress and cooperative  HEAD: Normocephalic. No masses, lesions, tenderness or abnormalities  SKIN: no suspicious lesions or rashes  GAIT: normal  NEUROLOGIC: Non-focal, Normal muscle tone and strength, reflexes normal, sensation grossly normal.  PSYCHIATRIC: affect normal/bright and mentation appears normal.    MUSCULOSKELETAL: right elbow pain  Inspection: swelling, no ecchymosis, olecranon bursa swelling  Tender: olecranon bursa  Non-tender: lateral epicondyle, common extensor tendon, medial epicondyle, common flexor tendon and radial head/neck  Range of Motion: all normal  Strength: elbow strength full  Special tests: normal valgus stress, normal varus stress:          Assessment/Plan:   Pt is a 64 yo white " male with PMHx of severe shoulder OA, gout presenting with left elbow olecranon bursitis  1. Left elbow olecranon bursitis-  Patient had a stockinette and Ace wrap placed  Not concerning for infection  Avoid direct pressure over the area  2. Bilateral glenohumeral osteoarthritis-  Patient reports that he is really having difficulty sleeping. Function of the left shoulder is worse than the right.  In the past the patient opted for cortisone injections and he has had several. He has been assessed by Dr. Yesica Deleon and requests follow-up for surgical consideration.    Return to clinic as needed  Chart review, charting and imaging- 10 min  X-RAY INTERPRETATION:   Not performed

## 2021-01-20 DIAGNOSIS — M47.16 SPONDYLOSIS WITH MYELOPATHY, LUMBAR REGION: ICD-10-CM

## 2021-01-20 DIAGNOSIS — Z96.652 STATUS POST TOTAL LEFT KNEE REPLACEMENT: ICD-10-CM

## 2021-01-20 DIAGNOSIS — M25.661 STIFFNESS OF KNEE JOINT, RIGHT: Chronic | ICD-10-CM

## 2021-01-20 DIAGNOSIS — R26.9 GAIT DIFFICULTY: ICD-10-CM

## 2021-01-20 DIAGNOSIS — G89.4 CHRONIC PAIN SYNDROME: Chronic | ICD-10-CM

## 2021-01-20 RX ORDER — HYDROCODONE BITARTRATE AND ACETAMINOPHEN 7.5; 325 MG/1; MG/1
1 TABLET ORAL EVERY 6 HOURS PRN
Qty: 120 TABLET | Refills: 0 | Status: SHIPPED | OUTPATIENT
Start: 2021-01-22 | End: 2021-02-23

## 2021-01-20 NOTE — TELEPHONE ENCOUNTER
Refill done for Opiates this month.     Future refills only after an OV with me at EP clinic. Please call the patient and schedule a F/U visit in 3 weeks , per opiate regulations need to evaluate atleast once in 6 months. Pt last seen me in 07/2020.    Thank you,  Sushma Galloway MD on 1/20/2021 at 4:45 PM

## 2021-01-20 NOTE — TELEPHONE ENCOUNTER
Controlled Substance Refill Request for hydrocodone-acetaminophen 7.5-325 mg  Problem List Complete:    Yes    Last Written Prescription Date:  12/22/20  Last Fill Quantity: 120,   # refills: 0    THE MOST RECENT OFFICE VISIT MUST BE WITHIN THE PAST 3 MONTHS. AT LEAST ONE FACE TO FACE VISIT MUST OCCUR EVERY 6 MONTHS. ADDITIONAL VISITS CAN BE VIRTUAL.  (THIS STATEMENT SHOULD BE DELETED.)    Last Office Visit with [facility] primary care provider: 10/28/20 wilber Mooney    Future Office visit:     Controlled substance agreement:   Encounter-Level CSA - 10/31/2016:    Controlled Substance Agreement - Scan on 11/15/2016  7:53 AM: CONTROLLED SUBSTANCE AGREEMENT     Patient-Level CSA:    Controlled Substance Agreement - Opioid - Scan on 7/20/2020 12:15 PM  Controlled Substance Agreement - Opioid - Scan on 3/19/2019 11:01 AM         Last Urine Drug Screen:   Pain Drug SCR UR W RPTD Meds   Date Value Ref Range Status   09/09/2019 FINAL  Final     Comment:     (Note)  ====================================================================  [facility] COMP DRUG ANALYSIS,UR  ====================================================================  Test                             Result       Flag       Units        Drug Present and Declared for Prescription Verification   Hydrocodone                    665          EXPECTED   ng/mg creat   Hydromorphone                  93           EXPECTED   ng/mg creat   Dihydrocodeine                 149          EXPECTED   ng/mg creat   Norhydrocodone                 1732         EXPECTED   ng/mg creat    Sources of hydrocodone include scheduled prescription    medications. Hydromorphone, dihydrocodeine and norhydrocodone are    expected metabolites of hydrocodone. Hydromorphone and    dihydrocodeine are also available as scheduled prescription    medications.   Acetaminophen                  PRESENT      EXPECTED                Drug Absent but Declared for Prescription Verification   Diclofenac                      Not Detected UNEXPECTED                Diclofenac, as indicated in the declared medication list, is not    always detected even when used as directed.  ====================================================================  Test                      Result    Flag   Units      Ref Range        Creatinine              171              mg/dL      >=20            ====================================================================  Declared Medications:  The flagging and interpretation on this report are based on the  following declared medications.  Unexpected results may arise from  inaccuracies in the declared medications.  **Note: The testing scope of this panel includes these medications:  Hydrocodone (Norco)  **Note: The testing scope of this panel does not include small to  moderate amounts of these reported medications:  Acetaminophen (Norco)  Diclofenac (Voltaren)  **Note: The testing scope of this panel does not include following  reported medications:  Acyclovir  Albuterol  Allopurinol  Amoxicillin (Augmentin)  Beclomethasone (QVAR)  Clavulinate (Augmentin)  Clindamycin (Cleocin)  Erythromycin  Fluticasone  Ipratropium (Atrovent)  Naloxone (Narcan)  Oxymetazoline (Afrin)  Sildenafil (Viagra)  Simvastatin (Zocor)  ====================================================================  For clinical consultation, please call [phone].  ====================================================================  Analysis performed by [facility] [place] [address]     , No results found for: COMDAT,   Cannabinoids (59-dtp-4-carboxy-9-THC)   Date Value Ref Range Status   07/20/2020 Not Detected NDET^Not Detected ng/mL Final     Comment:     Cutoff for a negative cannabinoid is 50 ng/mL or less.     Phencyclidine (Phencyclidine)   Date Value Ref Range Status   07/20/2020 Not Detected NDET^Not Detected ng/mL Final     Comment:     Cutoff for a negative PCP is 25 ng/mL or less.     Cocaine  (Benzoylecgonine)   Date Value Ref Range Status   07/20/2020 Not Detected NDET^Not Detected ng/mL Final     Comment:     Cutoff for a negative cocaine is 150 ng/ml or less.     Methamphetamine (d-Methamphetamine)   Date Value Ref Range Status   07/20/2020 Not Detected NDET^Not Detected ng/mL Final     Comment:     Cutoff for a negative methamphetamine is 500 ng/ml or less.     Opiates (Morphine)   Date Value Ref Range Status   07/20/2020 Detected, Abnormal Result (A) NDET^Not Detected ng/mL Final     Comment:     Cutoff for a positive opiate is greater than 100 ng/ml.  This is an unconfirmed screening result to be used for medical purposes only.   Order [id number] for confirmation or individual confirmation tests to [employer] [facility].       Amphetamine (d-Amphetamine)   Date Value Ref Range Status   07/20/2020 Not Detected NDET^Not Detected ng/mL Final     Comment:     Cutoff for a negative amphetamine is 500 ng/mL or less.     Benzodiazepines (Nordiazepam)   Date Value Ref Range Status   07/20/2020 Not Detected NDET^Not Detected ng/mL Final     Comment:     Cutoff for a negative benzodiazepine is 150 ng/ml or less.     Tricyclic Antidepressants (Desipramine)   Date Value Ref Range Status   07/20/2020 Not Detected NDET^Not Detected ng/mL Final     Comment:     Cutoff for a negative tricyclic antidepressant is 300 ng/ml or less.     Methadone (Methadone)   Date Value Ref Range Status   07/20/2020 Not Detected NDET^Not Detected ng/mL Final     Comment:     Cutoff for a negative methadone is 200 ng/ml or less.     Barbiturates (Butalbital)   Date Value Ref Range Status   07/20/2020 Not Detected NDET^Not Detected ng/mL Final     Comment:     Cutoff for a negative barbituate is 200 ng/ml or less.     Oxycodone (Oxycodone)   Date Value Ref Range Status   07/20/2020 Not Detected NDET^Not Detected ng/mL Final     Comment:     Cutoff for a negative Oxycodone is 100 ng/mL or less.     Propoxyphene (Norpropoxyphene)   Date Value Ref Range  Status   07/20/2020 Not Detected NDET^Not Detected ng/mL Final     Comment:     Cutoff for a negative propoxyphene is 300 ng/ml or less     Buprenorphine (Buprenorphine)   Date Value Ref Range Status   07/20/2020 Not Detected NDET^Not Detected ng/mL Final     Comment:     Cutoff for a negative buprenorphine is 10 ng/ml or less        Processing:  Rx to be electronically transmitted to pharmacy by provider     [email or web address]   checked in past 3 months?  Yes 12/21/20     Lis AGUILAR RN  EP Triage

## 2021-01-22 ENCOUNTER — OFFICE VISIT (OUTPATIENT)
Dept: ORTHOPEDICS | Facility: CLINIC | Age: 66
End: 2021-01-22
Payer: COMMERCIAL

## 2021-01-22 ENCOUNTER — PREP FOR PROCEDURE (OUTPATIENT)
Dept: ORTHOPEDICS | Facility: CLINIC | Age: 66
End: 2021-01-22

## 2021-01-22 DIAGNOSIS — M19.011 ARTHRITIS OF SHOULDER REGION, RIGHT: Primary | ICD-10-CM

## 2021-01-22 PROCEDURE — 99214 OFFICE O/P EST MOD 30 MIN: CPT | Performed by: ORTHOPAEDIC SURGERY

## 2021-01-22 NOTE — PROGRESS NOTES
CHIEF CONCERN:  Bilateral Shoulder Pain    HISTORY OF PRESENT ILLNESS:   Mr. Bird is a 65 year old RHD male who returns today to discuss options for his bilateral shoulder pain.  He has seen both Dr. Chan and myself and has never really been intent on surgical options but at this point he has decided he needs to pursue surgical treatment for his right shoulder which is now worse than his left.  He has had cortisone injections on both sides and those have helped over the years.  However his right shoulder has worsened.  He is familiar with joint arthroplasty from his previous experience having undergone bilateral total knee arthroplasties with Dr. Rolle.  His left side has done well but his right has been more challenging.       PHYSICAL EXAM:    Adult male in no acute distress, articulates and communicates with normal affect today.  Respirations even and unlabored   Focused Upper Extremity Exam:   No gross deformity. Fires bilateral deltoid, biceps, triceps, wrist extension/flexion, , EPL, intrinsics, FPL with 5/5 strength. SILT in axillary, musculocutaneous, radial, ulnar, and median nerve distribution. R Shoulder FE 95, ER 45, IR to sacrum only. L Shoulder , ER 75, IR to L2. Pain with empty can, - O'Brians, - Speed's. Today he does have focal pain at the Right AC joint.    IMAGING:  None new but I did review his previous imaging including  right shoulder MRI 8/8/20  (I agree with the Impression below):  IMPRESSION:  1. Moderate osteoarthrosis at the right shoulder acromioclavicular joint.  2. Severe osteoarthrosis at the right shoulder glenohumeral joint with full-thickness cartilage loss; osteophytic spurring; glenoid remodeling with loss of bone stock; subchondral cystic changes; and labral tearing, greatest superiorly and posteriorly.  3. Moderate size joint effusion with synovitis in the region of the axillary recess. There is also fluid in the subscapularis recess with synovitis. Synovitis  within the axillary recess.  4. Tearing of the biceps tendon within the bicipital groove with medial subluxation at the level of the bicipital pulley and marked tendinosis of the intra-articular biceps tendon.  5. Tendinosis of the right shoulder supraspinatus tendon with moderate grade intrasubstance partial thickness tearing of the anterior to mid fibers, and low-grade bursal sided tearing of the more posterior fibers. No full thickness tear or tendon retraction of the supraspinatus tendon.   6. Tendinosis of the infraspinatus tendon.  7. Full-thickness tearing of the superior fibers of the right shoulder subscapularis tendon with marked tendinosis of the more inferior fibers.  8. Marked fatty infiltration within the teres minor muscle as well as the upper muscle bulk of the right shoulder subscapularis muscle.  9. Os acromiale.  10. Edema in the subcoracoid fat, with synovitis within the axillary recess, correlate for adhesive capsulitis.    ASSESSMENT:    1. Right end stage glenohumeral arthrosis  2. Left shoulder moderate glenohumeral OA    PLAN:  We discussed the finding of advanced shoulder arthritis. We discussed the non-operative and operative treatment options including the risks and potential benefits of each. These include activity modifications, cortisone injections, and PT for non-operative management, and total shoulder arthroplasty as the operative treatment option. We discussed the expectations for pain relief and/or motion improvement with each option. He does not feel that non-operatively management is now managing his symptoms and pain is very limiting at this time. He would like to proceed with surgical treatment. We reviewed postoperative rehab protocols and expectations. He would like to proceed with surgical scheduling and we will work with him in this regard. We will obtain a preop CT for implant planning purposes and plan will be for right TSA and DCE.    Yesica Deleon MD

## 2021-01-22 NOTE — TELEPHONE ENCOUNTER
Called and left a message to have the patient call the clinic.    Jennifer Coelho on 1/22/2021 at 3:18 PM

## 2021-01-22 NOTE — LETTER
1/22/2021         RE: Arnaud Bird  [address]  [facility] [address]        Dear Colleague,    Thank you for referring your patient, Arnaud Bird, to the [facility] [place]. Please see a copy of my visit note below.    CHIEF CONCERN:  Bilateral Shoulder Pain    HISTORY OF PRESENT ILLNESS:   Mr. Bird is a 65 year old RHD male who returns today to discuss options for his bilateral shoulder pain.  He has seen both Dr. Chan and myself and has never really been intent on surgical options but at this point he has decided he needs to pursue surgical treatment for his right shoulder which is now worse than his left.  He has had cortisone injections on both sides and those have helped over the years.  However his right shoulder has worsened.  He is familiar with joint arthroplasty from his previous experience having undergone bilateral total knee arthroplasties with Dr. Rolle.  His left side has done well but his right has been more challenging.       PHYSICAL EXAM:    Adult male in no acute distress, articulates and communicates with normal affect today.  Respirations even and unlabored   Focused Upper Extremity Exam:   No gross deformity. Fires bilateral deltoid, biceps, triceps, wrist extension/flexion, , EPL, intrinsics, FPL with 5/5 strength. SILT in axillary, musculocutaneous, radial, ulnar, and median nerve distribution. R Shoulder FE 95, ER 45, IR to sacrum only. L Shoulder , ER 75, IR to L2. Pain with empty can, - O'Brians, - Speed's. Today he does have focal pain at the Right AC joint.    IMAGING:  None new but I did review his previous imaging including  right shoulder MRI 8/8/20  (I agree with the Impression below):  IMPRESSION:  1. Moderate osteoarthrosis at the right shoulder acromioclavicular joint.  2. Severe osteoarthrosis at the right shoulder glenohumeral joint with full-thickness cartilage loss; osteophytic spurring; glenoid  remodeling with loss of bone stock; subchondral cystic changes; and labral tearing, greatest superiorly and posteriorly.  3. Moderate size joint effusion with synovitis in the region of the axillary recess. There is also fluid in the subscapularis recess with synovitis. Synovitis within the axillary recess.  4. Tearing of the biceps tendon within the bicipital groove with medial subluxation at the level of the bicipital pulley and marked tendinosis of the intra-articular biceps tendon.  5. Tendinosis of the right shoulder supraspinatus tendon with moderate grade intrasubstance partial thickness tearing of the anterior to mid fibers, and low-grade bursal sided tearing of the more posterior fibers. No full thickness tear or tendon retraction of the supraspinatus tendon.   6. Tendinosis of the infraspinatus tendon.  7. Full-thickness tearing of the superior fibers of the right shoulder subscapularis tendon with marked tendinosis of the more inferior fibers.  8. Marked fatty infiltration within the teres minor muscle as well as the upper muscle bulk of the right shoulder subscapularis muscle.  9. Os acromiale.  10. Edema in the subcoracoid fat, with synovitis within the axillary recess, correlate for adhesive capsulitis.    ASSESSMENT:    1. Right end stage glenohumeral arthrosis  2. Left shoulder moderate glenohumeral OA    PLAN:  We discussed the finding of advanced shoulder arthritis. We discussed the non-operative and operative treatment options including the risks and potential benefits of each. These include activity modifications, cortisone injections, and PT for non-operative management, and total shoulder arthroplasty as the operative treatment option. We discussed the expectations for pain relief and/or motion improvement with each option. He does not feel that non-operatively management is now managing his symptoms and pain is very limiting at this time. He would like to proceed with surgical treatment. We  reviewed postoperative rehab protocols and expectations. He would like to proceed with surgical scheduling and we will work with him in this regard. We will obtain a preop CT for implant planning purposes and plan will be for right TSA and DCE.    Yesica Deleon MD

## 2021-01-22 NOTE — NURSING NOTE
Reason For Visit:   Chief Complaint   Patient presents with     RECHECK     Follow up bilateral shoulder pain.       PCP: Johnson Clements  Ref: Dr. Chan     ?  No  Occupation Retired .  Currently working? No.  Work status?  Retired.  Date of injury: November 2019  Type of injury: Up in the woods and cutting trees and felt the biceps tear.  Fell in ice in January 2020.  Date of surgery: NA  Type of surgery: NA.  Smoker: No  Request smoking cessation information: No     Right hand dominant    SANE score  Affected shoulder: Bilateral  Right shoulder SANE: 60  Left shoulder SANE: 60    There were no vitals taken for this visit.      Pain Assessment  Patient Currently in Pain: Yes  0-10 Pain Scale: 7  Primary Pain Location: Shoulder(Bilateral)  Pain Descriptors: Burning  Alleviating Factors: Pain medication  Aggravating Factors: Movement    Marie Linares, ATC

## 2021-01-22 NOTE — NURSING NOTE
Teaching Flowsheet   Relevant Diagnosis: Right shoulder  Teaching Topic: TSA preop     Person(s) involved in teaching:   Patient     Motivation Level:  Asks Questions: Yes  Eager to Learn: Yes  Cooperative: Yes  Receptive (willing/able to accept information): Yes  Any cultural factors/Anabaptist beliefs that may influence understanding or compliance? No       Patient demonstrates understanding of the following:  Reason for the appointment, diagnosis and treatment plan: Yes  Knowledge of proper use of medications and conditions for which they are ordered (with special attention to potential side effects or drug interactions): Yes  Which situations necessitate calling provider and whom to contact: Yes       Teaching Concerns Addressed:        Proper use and care of meds. (medical equip, care aids, etc.): Yes  Nutritional needs and diet plan: Yes  Pain management techniques: Yes  Wound Care: Yes  How and/when to access community resources: Yes     Instructional Materials Used/Given: preop pkt     Time spent with patient: 15 minutes.

## 2021-01-25 ENCOUNTER — ANCILLARY PROCEDURE (OUTPATIENT)
Dept: CT IMAGING | Facility: CLINIC | Age: 66
End: 2021-01-25
Attending: ORTHOPAEDIC SURGERY
Payer: COMMERCIAL

## 2021-01-25 ENCOUNTER — TELEPHONE (OUTPATIENT)
Dept: ORTHOPEDICS | Facility: CLINIC | Age: 66
End: 2021-01-25

## 2021-01-25 PROCEDURE — 73200 CT UPPER EXTREMITY W/O DYE: CPT | Mod: RT | Performed by: RADIOLOGY

## 2021-01-25 NOTE — TELEPHONE ENCOUNTER
Attempted to reach out to patient to discuss scheduling surgery with Dr. Deleon. Left detailed message that first available would be Tuesday 4/6/21. Left best call back number of [phone]

## 2021-01-29 ENCOUNTER — OFFICE VISIT (OUTPATIENT)
Dept: FAMILY MEDICINE | Facility: CLINIC | Age: 66
End: 2021-01-29
Payer: COMMERCIAL

## 2021-01-29 VITALS
DIASTOLIC BLOOD PRESSURE: 86 MMHG | TEMPERATURE: 97.6 F | WEIGHT: 244.8 LBS | HEIGHT: 68 IN | HEART RATE: 66 BPM | BODY MASS INDEX: 37.1 KG/M2 | OXYGEN SATURATION: 98 % | SYSTOLIC BLOOD PRESSURE: 130 MMHG

## 2021-01-29 DIAGNOSIS — Z79.891 CHRONIC USE OF OPIATE DRUG FOR THERAPEUTIC PURPOSE: ICD-10-CM

## 2021-01-29 DIAGNOSIS — G89.4 CHRONIC PAIN SYNDROME: Chronic | ICD-10-CM

## 2021-01-29 DIAGNOSIS — Z23 NEED FOR PROPHYLACTIC VACCINATION AND INOCULATION AGAINST INFLUENZA: ICD-10-CM

## 2021-01-29 DIAGNOSIS — Z23 NEED FOR VACCINATION: ICD-10-CM

## 2021-01-29 DIAGNOSIS — M75.41 IMPINGEMENT SYNDROME, SHOULDER, RIGHT: Primary | ICD-10-CM

## 2021-01-29 DIAGNOSIS — Z12.11 ENCOUNTER FOR SCREENING FOR MALIGNANT NEOPLASM OF COLON: ICD-10-CM

## 2021-01-29 DIAGNOSIS — M51.16 LUMBAR DISC DISEASE WITH RADICULOPATHY: ICD-10-CM

## 2021-01-29 DIAGNOSIS — Z13.6 ENCOUNTER FOR ABDOMINAL AORTIC ANEURYSM (AAA) SCREENING: ICD-10-CM

## 2021-01-29 DIAGNOSIS — M19.012 GLENOHUMERAL ARTHRITIS, LEFT: ICD-10-CM

## 2021-01-29 PROCEDURE — G0008 ADMIN INFLUENZA VIRUS VAC: HCPCS | Performed by: INTERNAL MEDICINE

## 2021-01-29 PROCEDURE — 90662 IIV NO PRSV INCREASED AG IM: CPT | Performed by: INTERNAL MEDICINE

## 2021-01-29 PROCEDURE — G0009 ADMIN PNEUMOCOCCAL VACCINE: HCPCS | Performed by: INTERNAL MEDICINE

## 2021-01-29 PROCEDURE — 90732 PPSV23 VACC 2 YRS+ SUBQ/IM: CPT | Performed by: INTERNAL MEDICINE

## 2021-01-29 PROCEDURE — 99214 OFFICE O/P EST MOD 30 MIN: CPT | Mod: 25 | Performed by: INTERNAL MEDICINE

## 2021-01-29 ASSESSMENT — ASTHMA QUESTIONNAIRES
QUESTION_3 LAST FOUR WEEKS HOW OFTEN DID YOUR ASTHMA SYMPTOMS (WHEEZING, COUGHING, SHORTNESS OF BREATH, CHEST TIGHTNESS OR PAIN) WAKE YOU UP AT NIGHT OR EARLIER THAN USUAL IN THE MORNING: NOT AT ALL
QUESTION_4 LAST FOUR WEEKS HOW OFTEN HAVE YOU USED YOUR RESCUE INHALER OR NEBULIZER MEDICATION (SUCH AS ALBUTEROL): NOT AT ALL
QUESTION_5 LAST FOUR WEEKS HOW WOULD YOU RATE YOUR ASTHMA CONTROL: WELL CONTROLLED
QUESTION_2 LAST FOUR WEEKS HOW OFTEN HAVE YOU HAD SHORTNESS OF BREATH: NOT AT ALL
QUESTION_1 LAST FOUR WEEKS HOW MUCH OF THE TIME DID YOUR ASTHMA KEEP YOU FROM GETTING AS MUCH DONE AT WORK, SCHOOL OR AT HOME: NONE OF THE TIME
ACT_TOTALSCORE: 24

## 2021-01-29 ASSESSMENT — MIFFLIN-ST. JEOR: SCORE: 1859.91

## 2021-01-29 NOTE — PATIENT INSTRUCTIONS
Medications will be taken care as discussed and Physical exam done.  Last labs in 4/2020 , can plan fasting labs for AWV.  Follow up with Orthopedics for procedures on your both shoulders.    AAA screen ordered.     [facility]  ( [facility] )   Call : [phone]  Toll Free : [phone]    ==============================    [facility]  ( [place], [place] )   [place] [facility]  Call : [phone]  Toll Free : [phone]    ==============================  [facility]  Phone : [phone]    ===============================    [facility]   ( All Locations )   Call : [phone]  Toll Free : [phone]      ======================    Patient Education     Understanding Glenohumeral Osteoarthritis    A joint is where 2 bones meet. The glenohumeral joint is the main joint in the shoulder. It is a ball-and-socket joint. It is formed where the head or ball of the upper arm bone fits into the shallow socket on the shoulder blade. The upper arm bone is called the humerus. The socket is called the glenoid. This is how the joint gets its name. When the glenohumeral joint is healthy, it helps you rotate and move your arm freely without any pain.  With glenohumeral osteoarthritis, the parts of the joint wear down. This can cause pain, stiffness, and limited movement. Glenohumeral osteoarthritis is a long-term condition. But treatment can help manage symptoms, increase movement, and improve function.  How glenohumeral osteoarthritis occurs  All joints contain a smooth tissue called cartilage. Cartilage cushions the ends of bones. This helps them glide smoothly against each other. Glenohumeral osteoarthritis occurs when cartilage in the glenohumeral joint starts to break down. The ball and socket bones may then become exposed and rub together. The bones may become rough and pitted and may start to wear away. This prevents smooth  movement of the joint.  Causes of glenohumeral osteoarthritis  In most cases, the condition develops because of damage from a shoulder injury, such as a dislocated or broken shoulder. Normal wear and tear of the joint from aging can also cause osteoarthritis.  Symptoms of glenohumeral osteoarthritis  Symptoms tend to develop slowly over months to years. Shoulder pain is common. The pain is often worse with activity, and may get better with rest. Over time, the pain may get worse, and may even occur at night.  Stiffness in the shoulder is also common. It may be hard to move or use the arm and shoulder as you normally would. This can make even simple tasks difficult, such as reaching for an item on a shelf or getting dressed.  Treating glenohumeral osteoarthritis  Treatment may include:    Resting the shoulder. This involves limiting certain movements, such as reaching, lifting, pushing, or pulling. These can cause more wear and tear of the joint and make symptoms worse.    Cold or heat packs. Cold packs can help ease pain and swelling. Heat packs do not help with swelling. But they may help ease pain and stiffness, especially before physical therapy or activity.    Pain medicines. These help relieve pain and swelling. Medicines may be prescribed or bought over the counter.    Injections of medicine into the joint.  These help ease symptoms for a time.    Physical therapy and exercises.  These help improve strength and range of motion in the joint. This may reduce shoulder stiffness and improve function.    Certain assistive devices.  These are tools that can be used to make activities of daily life easier. For instance, a  grasper  can help you reach and grab items.    Alternative treatments. These include treatments such as acupuncture or massage. They may help ease pain and stiffness in some cases.  If other treatments don t do enough to ease symptoms, you may need surgery. During surgery, the damaged joint is  removed. It is replaced with an artificial joint. This can help ease symptoms and improve function to near normal.  When to call your healthcare provider  Call your healthcare provider right away if you have any of these:    Fever of 100.4 F (38 C) or higher, or as directed by your healthcare provider    Symptoms that don t get better with treatment, or get worse    New symptoms  Cliff last reviewed this educational content on 7/1/2018 2000-2020 [employer]. [address] [place] [address], [place] [address]. All rights reserved. This information is not intended as a substitute for professional medical care. Always follow your healthcare professional's instructions.

## 2021-01-29 NOTE — PROGRESS NOTES
Assessment and Plan  1. Impingement syndrome, shoulder, right  2. Glenohumeral arthritis, left  Uncontrolled, Reviewed recent Ortho note on 1/22/21 by  with diagnosis of Rt end stage Glenohumeral arthrosis and Left shoulder moderate glenohumeral OA. To follow up with Orthopedics on upcoming surgery with them on 4//6/21.     3. Lumbar disc disease with radiculopathy  4. Chronic pain syndrome  5. Chronic use of opiate drug for therapeutic purpose  Stable, intermittent flareups. Last seen by me in 7/2020 when he established care , CSA and Norco 7.5 mg 120 tabs/month for chronic pain. Also following Orthopedics for managing his joint issues.  check done which shows no concerns, last refill done by me on 1/22/21.     6. Need for vaccination  - Pneumococcal vaccine 23 valent PPSV23  (Pneumovax) [12043]    7. Encounter for abdominal aortic aneurysm (AAA) screening  - [place] Medicare AAA Screening; Future    8. Encounter for screening for malignant neoplasm of colon  - Fecal colorectal cancer screen (FIT); Future    9. Need for prophylactic vaccination and inoculation against influenza  - FLUZONE HIGH DOSE 65+  [63861]     Patient Instructions   Medications will be taken care as discussed and Physical exam done.  Last labs in 4/2020 , can plan fasting labs for AWV.  Follow up with Orthopedics for procedures on your both shoulders.    AAA screen ordered.     [facility]  ( [facility] )   Call : [phone]  Toll Free : [phone]    ==============================    [facility]  ( [facility] )   [facility]  Call : [phone]  Toll Free : [phone]    ==============================  [facility] [place]  Phone : [phone]    ===============================    [facility]   ( All Locations )   Call : [phone]  Toll Free : [phone]      ======================    Patient Education      Understanding Glenohumeral Osteoarthritis    A joint is where 2 bones meet. The glenohumeral joint is the main joint in the shoulder. It is a ball-and-socket joint. It is formed where the head or ball of the upper arm bone fits into the shallow socket on the shoulder blade. The upper arm bone is called the humerus. The socket is called the glenoid. This is how the joint gets its name. When the glenohumeral joint is healthy, it helps you rotate and move your arm freely without any pain.  With glenohumeral osteoarthritis, the parts of the joint wear down. This can cause pain, stiffness, and limited movement. Glenohumeral osteoarthritis is a long-term condition. But treatment can help manage symptoms, increase movement, and improve function.  How glenohumeral osteoarthritis occurs  All joints contain a smooth tissue called cartilage. Cartilage cushions the ends of bones. This helps them glide smoothly against each other. Glenohumeral osteoarthritis occurs when cartilage in the glenohumeral joint starts to break down. The ball and socket bones may then become exposed and rub together. The bones may become rough and pitted and may start to wear away. This prevents smooth movement of the joint.  Causes of glenohumeral osteoarthritis  In most cases, the condition develops because of damage from a shoulder injury, such as a dislocated or broken shoulder. Normal wear and tear of the joint from aging can also cause osteoarthritis.  Symptoms of glenohumeral osteoarthritis  Symptoms tend to develop slowly over months to years. Shoulder pain is common. The pain is often worse with activity, and may get better with rest. Over time, the pain may get worse, and may even occur at night.  Stiffness in the shoulder is also common. It may be hard to move or use the arm and shoulder as you normally would. This can make even simple tasks difficult, such as reaching for an item on a shelf or getting dressed.  Treating glenohumeral  osteoarthritis  Treatment may include:    Resting the shoulder. This involves limiting certain movements, such as reaching, lifting, pushing, or pulling. These can cause more wear and tear of the joint and make symptoms worse.    Cold or heat packs. Cold packs can help ease pain and swelling. Heat packs do not help with swelling. But they may help ease pain and stiffness, especially before physical therapy or activity.    Pain medicines. These help relieve pain and swelling. Medicines may be prescribed or bought over the counter.    Injections of medicine into the joint.  These help ease symptoms for a time.    Physical therapy and exercises.  These help improve strength and range of motion in the joint. This may reduce shoulder stiffness and improve function.    Certain assistive devices.  These are tools that can be used to make activities of daily life easier. For instance, a  grasper  can help you reach and grab items.    Alternative treatments. These include treatments such as acupuncture or massage. They may help ease pain and stiffness in some cases.  If other treatments don t do enough to ease symptoms, you may need surgery. During surgery, the damaged joint is removed. It is replaced with an artificial joint. This can help ease symptoms and improve function to near normal.  When to call your healthcare provider  Call your healthcare provider right away if you have any of these:    Fever of 100.4 F (38 C) or higher, or as directed by your healthcare provider    Symptoms that don t get better with treatment, or get worse    New symptoms  Cliff last reviewed this educational content on 7/1/2018 2000-2020 [employer]. [address] [place] [address], [place] [address]. All rights reserved. This information is not intended as a substitute for professional medical care. Always follow your healthcare professional's instructions.             Return in about 4 weeks (around 2/26/2021), or if symptoms  worsen or fail to improve, for Annual Wellness Exam.    Sushma Galloway MD  [facility] KAI Lares is a 65 year old who presents to clinic today for the following health issues.    HPI       Patient is being seen for a medication check and is having issues with his shoulder. Has scheduled surgery for [facility].        Allergies   Allergen Reactions     Seasonal Allergies         Past Medical History:   Diagnosis Date     Arthritis      Benign positional vertigo      Carpal tunnel syndrome     mild     Hearing problem      LEFT WORSE THAN RIGHT  10/18/2012     Migraines      Nasal polyps      Unspecified asthma(493.90) 10/17/2012     Unspecified asthma, with exacerbation 10/17/2012       Past Surgical History:   Procedure Laterality Date     HC TOOTH EXTRACTION W/FORCEP       ORTHOPEDIC SURGERY      bilateral total knee replacements     TONSILLECTOMY      age 2 or 3       Family History   Problem Relation Age of Onset     Cancer Father      Family History Negative Father      Family History Negative Mother      Cancer Mother      Stomach Problem Mother      Diabetes No family hx of      Coronary Artery Disease No family hx of      Hypertension No family hx of      Hyperlipidemia No family hx of      Cerebrovascular Disease No family hx of      Breast Cancer No family hx of      Colon Cancer No family hx of      Prostate Cancer No family hx of      Other Cancer No family hx of      Depression No family hx of      Anxiety Disorder No family hx of      Mental Illness No family hx of      Substance Abuse No family hx of      Anesthesia Reaction No family hx of      Asthma No family hx of      Osteoporosis No family hx of      Genetic Disorder No family hx of      Thyroid Disease No family hx of      Obesity No family hx of      Unknown/Adopted No family hx of        Social History     Tobacco Use     Smoking status: Never Smoker     Smokeless tobacco: Never Used   Substance  Use Topics     Alcohol use: Yes     Comment: case beer a week        Current Outpatient Medications   Medication     acyclovir (ZOVIRAX) 400 MG tablet     albuterol (PROAIR HFA, PROVENTIL HFA, VENTOLIN HFA) 108 (90 BASE) MCG/ACT inhaler     allopurinol (ZYLOPRIM) 300 MG tablet     beclomethasone (QVAR) 40 MCG/ACT AERS IS A DISCONTINUED MEDICATION     diclofenac (VOLTAREN) 1 % topical gel     diclofenac (VOLTAREN) 1 % topical gel     fluticasone (FLONASE) 50 MCG/ACT nasal spray     HYDROcodone-acetaminophen (NORCO) 7.5-325 MG per tablet     ipratropium (ATROVENT) 0.06 % nasal spray     lidocaine (XYLOCAINE) 5 % external ointment     lidocaine 5 % EX patch     naloxone (NARCAN) 4 MG/0.1ML nasal spray     order for DME     predniSONE (DELTASONE) 20 MG tablet     pseudoePHEDrine (SUDAFED) 30 MG tablet     salicylic acid (MEDIPLAST) 40 % miscellaneous     sildenafil (VIAGRA) 100 MG tablet     simvastatin (ZOCOR) 40 MG tablet     Current Facility-Administered Medications   Medication     lidocaine (PF) (XYLOCAINE) 1 % injection 2 mL     lidocaine (PF) (XYLOCAINE) 1 % injection 2 mL     ropivacaine (NAROPIN) injection 1 mL     ropivacaine (NAROPIN) injection 1 mL     ropivacaine (NAROPIN) injection 1 mL     ropivacaine (NAROPIN) injection 2 mL     ropivacaine (NAROPIN) injection 2 mL     ropivacaine (NAROPIN) injection 3 mL     ropivacaine (NAROPIN) injection 3 mL     ropivacaine (NAROPIN) injection 3 mL     ropivacaine (NAROPIN) injection 3 mL     ropivacaine (NAROPIN) injection 3 mL     ropivacaine (NAROPIN) injection 3 mL     ropivacaine (NAROPIN) injection 3 mL     ropivacaine (NAROPIN) injection 3 mL     ropivacaine (NAROPIN) injection 3 mL     ropivacaine (NAROPIN) injection 3 mL     ropivacaine (NAROPIN) injection 3 mL     ropivacaine (NAROPIN) injection 3 mL     triamcinolone (KENALOG-40) injection 20 mg     triamcinolone (KENALOG-40) injection 40 mg     triamcinolone (KENALOG-40) injection 40 mg     triamcinolone  "(KENALOG-40) injection 40 mg     triamcinolone (KENALOG-40) injection 40 mg     triamcinolone (KENALOG-40) injection 40 mg     triamcinolone (KENALOG-40) injection 40 mg     triamcinolone (KENALOG-40) injection 40 mg     triamcinolone (KENALOG-40) injection 40 mg     triamcinolone (KENALOG-40) injection 40 mg     triamcinolone (KENALOG-40) injection 40 mg     triamcinolone (KENALOG-40) injection 40 mg     triamcinolone (KENALOG-40) injection 40 mg     triamcinolone (KENALOG-40) injection 40 mg     triamcinolone (KENALOG-40) injection 40 mg        Review of Systems   Constitutional, HEENT, cardiovascular, pulmonary, GI, , musculoskeletal, neuro, skin, endocrine and psych systems are negative, except as otherwise noted.      Objective    /86   Pulse 66   Temp 97.6  F (36.4  C) (Tympanic)   Ht 1.727 m (5' 8\")   Wt 111 kg (244 lb 12.8 oz)   SpO2 98%   BMI 37.22 kg/m    Body mass index is 37.22 kg/m .  Physical Exam   GENERAL: healthy, alert and no distress  NECK: no adenopathy, no asymmetry, masses, or scars and thyroid normal to palpation  RESP: lungs clear to auscultation - no rales, rhonchi or wheezes  CV: regular rate and rhythm, normal S1 S2, no S3 or S4, no murmur, click or rub, no peripheral edema and peripheral pulses strong  ABDOMEN: soft, nontender, no hepatosplenomegaly, no masses and bowel sounds normal  MS:   Back Exam : POSITIVE for tenderness on lumbosacral spine which is his baseline on opiate management.  Shoulder exam : Tenderness on both the bony prominences of the shoulder joints with restricted ROM bilaterally due to impingement more on Rt shoulder though has tenderness more on the left side.ALso has incidental Left Ulnar bursitis positive, no erythema or tenderness.    Labs and imaging reviewed and discussed with the patient.            "

## 2021-01-29 NOTE — PROGRESS NOTES
"  {PROVIDER CHARTING PREFERENCE:336842}    Antonio Lares is a 65 year old who presents to clinic today for the following health issues {ACCOMPANIED BY STATEMENT (Optional):192366}    HPI       Patient is present today for a medication check.  Annual Wellness Visit  {Split Bill scripting  The purpose of this visit is to discuss your medical history and prevent health problems before you are sick. You may be responsible for a co-pay, coinsurance, or deductible if your visit today includes services such as checking on a sore throat, having an x-ray or lab test, or treating and evaluating a new or existing condition :464160}  Patient has been advised of split billing requirements and indicates understanding: Yes     Are you in the first 12 months of your Medicare Part B coverage?  No    Physical Health:    In general, how would you rate your overall physical health? poor    Outside of work, how many days during the week do you exercise?none    Outside of work, approximately how many minutes a day do you exercise?not applicable    If you drink alcohol do you typically have >3 drinks per day or >7 drinks per week? No    Do you usually eat at least 4 servings of fruit and vegetables a day, include whole grains & fiber and avoid regularly eating high fat or \"junk\" foods? Yes    Do you have any problems taking medications regularly? No    Do you have any side effects from medications? none    Needs assistance for the following daily activities: no assistance needed    Which of the following safety concerns are present in your home?  none     Hearing impairment: No    In the past 6 months, have you been bothered by leaking of urine? no    Mental Health:    In general, how would you rate your overall mental or emotional health? excellent  PHQ-2 Score:      Do you feel safe in your environment? Yes    Have you ever done Advance Care Planning? (For example, a Health Directive, POLST, or a discussion with a medical " "provider or your loved ones about your wishes)? No    Fall risk:     Cognitive Screenin) Repeat 3 items (Leader, Season, Table)  {Get patient's attention, then say, \"I am going to say three words that I want you to remember now and later.  The words are Leader, Season, and Table.  Please say them for me now.\"  If pt. unable after 3 tries, go to next item}  2) Clock draw: {Say the following phrases in order: \"Please draw a clock.  Start by drawing a large Kaw.  Put all the numbers in the Kaw and set the hands to show 11:10 (10 past 11).\"  If pt cannot complete in 3 minutes, stop and ask for recall items.  \"NORMAL\" test = all twelve numbers in correct location, and clock hands correctly designating 11:10}NORMAL  3) 3 item recall: {Say: \"What were the three words I asked you to remember?\"}Recalls 3 objects  Results: NORMAL clock, 1-2 items recalled: COGNITIVE IMPAIRMENT LESS LIKELY    Mini-CogTM Copyright S [employer]. Licensed by the author for use in [employer] [employer]; reprinted with permission (soob@.[email or web address] [place]). All rights reserved.      Do you have sleep apnea, excessive snoring or daytime drowsiness?: no    Current providers sharing in care for this patient include: {Rooming staff:  Please update Care Team in Rooming Activity, refresh this note and then delete this statement}  Patient Care Team:  Sushma Galloway MD as PCP - General (Internal Medicine)  Carmel Chan MD as MD (Family Medicine - Sports Medicine)  Alex Singh MD as MD (Otolaryngology)  Nithya Fish RD as Diabetes Educator (Dietitian, Registered)  Gisella Steve MD as MD (Internal Medicine)  Anna Watkins MD as MD (Orthopedics)  Sushma Galloway MD as Assigned PCP  Yesica Deleon MD as Assigned Musculoskeletal Provider  Gisella Steve MD as Assigned Surgical Provider    Patient has been advised of split billing requirements and indicates understanding: {YES / " "NO:866717::\"Yes\"}    Review of Systems   {ROS COMP (Optional):741138}      Objective    There were no vitals taken for this visit.  There is no height or weight on file to calculate BMI.  Physical Exam   {Exam List (Optional):217931}    {Diagnostic Test Results (Optional):943815}    {AMBULATORY ATTESTATION (Optional):761618}        "

## 2021-01-30 ASSESSMENT — ASTHMA QUESTIONNAIRES: ACT_TOTALSCORE: 24

## 2021-02-01 ENCOUNTER — NURSE TRIAGE (OUTPATIENT)
Dept: FAMILY MEDICINE | Facility: CLINIC | Age: 66
End: 2021-02-01

## 2021-02-01 NOTE — TELEPHONE ENCOUNTER
Friday pt had his flu shot. Then 24 hours later, sat afternoon, is when he developed:  Left arm pain at the injection site, some swelling, and redness, he felt sick, tired & run down, slept all afternoon into the evening,  joints aching, had headaches, was feeling hot & cold,   Taking tyl & IB.   No vomiting,   Is Feeling better today.   Never had this reaction in the past  Will plan to continue to monitor symptoms, & f/u if symptoms worsen, fail to improve, or any new symptoms.    Additional Information    Negative: Difficulty with breathing or swallowing starts within 2 hours after injection    Negative: Difficult to awaken or acting confused (e.g., disoriented, slurred speech)    Negative: Unresponsive, passed out, or very weak    Negative: Sounds like a life-threatening emergency to the triager    Negative: Fever > 103 F (39.4 C)    Negative: Fever > 101 F (38.3 C) and over 60 years of age    Negative: Fever > 100.0 F (37.8 C) and has diabetes mellitus or a weak immune system (e.g., HIV positive, cancer chemotherapy, organ transplant, splenectomy, chronic steroids)    Negative: Fever > 100.0 F (37.8 C) and bedridden (e.g., nursing home patient, stroke, chronic illness, recovering from surgery)    Negative: Measles vaccine and purple/blood-colored rash (onset day 6-12)    Negative: Redness or red streak around the injection site begins > 48 hours after shot    Negative: Fever present > 3 days (72 hours)    Negative: Smallpox vaccine and eye pain, eye redness, or rash on eyelids    Negative: Deep lump follows (in 2 to 8 weeks) Td or TDaP shot, and becomes tender to the touch    Negative: Patient wants to be seen    Mild immunization reaction    Protocols used: IMMUNIZATION [id number]

## 2021-02-02 ENCOUNTER — TELEPHONE (OUTPATIENT)
Dept: ORTHOPEDICS | Facility: CLINIC | Age: 66
End: 2021-02-02

## 2021-02-02 NOTE — TELEPHONE ENCOUNTER
[facility] Call Center    Phone Message    May a detailed message be left on voicemail: no     Reason for Call: Other: bad reaction to a flu show, would like advice from Dr. Chan     Action Taken: Message routed to:  [facility]    Travel Screening: Not Applicable

## 2021-02-02 NOTE — TELEPHONE ENCOUNTER
Muscle got irritated.  Sounds like a good immune response to flu shot with fever and chills.  Alanna

## 2021-02-02 NOTE — TELEPHONE ENCOUNTER
Called patient back. He relates that he saw PCP on Friday who talked to him into flu shot. He reports injection was given into shoulder with torn bicep. He reports area being big and red like a potato that turned black and blue, now there is a lump in the area of the torn bicep. He states that swelling and stiffness has gotten better. He is curious on what happened. Was painful all weekend. Had chills and fever all weekend, and now he is better. He slept all day Monday. Currently using Advil PM and ice right now. I will forward concern on to Dr. Chan for her thoughts.

## 2021-02-03 NOTE — TELEPHONE ENCOUNTER
Relayed Dr. Chan's thoughts to patient. Patient reports that symptoms have continued to improve and he is thankful for the call back.

## 2021-02-10 ENCOUNTER — ANCILLARY PROCEDURE (OUTPATIENT)
Dept: ULTRASOUND IMAGING | Facility: CLINIC | Age: 66
End: 2021-02-10
Attending: INTERNAL MEDICINE
Payer: COMMERCIAL

## 2021-02-10 ENCOUNTER — TELEPHONE (OUTPATIENT)
Dept: FAMILY MEDICINE | Facility: CLINIC | Age: 66
End: 2021-02-10

## 2021-02-10 DIAGNOSIS — Z13.6 ENCOUNTER FOR ABDOMINAL AORTIC ANEURYSM (AAA) SCREENING: ICD-10-CM

## 2021-02-10 DIAGNOSIS — I72.3 ILIAC ARTERY ANEURYSM, LEFT (H): Primary | ICD-10-CM

## 2021-02-10 PROCEDURE — 76706 US ABDL AORTA SCREEN AAA: CPT | Performed by: RADIOLOGY

## 2021-02-10 NOTE — TELEPHONE ENCOUNTER
[facility]   [address] [place] [address]   Phone: [phone]     Patient notified of test results and providers message, patient has no further questions.    Valentina MONROYRN BSN  [facility]  [phone]

## 2021-02-10 NOTE — TELEPHONE ENCOUNTER
Triage: Call patient and notify.    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 Will need to follow up on this for further recommendations from Vascular surgery , referral placed today.     Please let me know if you have any questions.   Sushma Galloway MD on 2/10/2021 at 1:00 PM

## 2021-02-11 ENCOUNTER — TELEPHONE (OUTPATIENT)
Dept: OTHER | Facility: CLINIC | Age: 66
End: 2021-02-11

## 2021-02-11 NOTE — TELEPHONE ENCOUNTER
[facility]    Who is the name of the provider?:  NEw      What is the location you see this provider at?: [place]    Reason for call:  Being referred by PCP for positive AAA screen.    Can we leave a detailed message on this number?  YES

## 2021-02-11 NOTE — TELEPHONE ENCOUNTER
2nd attempt to reach out to patient to discuss scheduling surgery. Left best call back number of [phone]

## 2021-02-11 NOTE — TELEPHONE ENCOUNTER
Pt referred to [facility] by Sushma Galloway MD for Left iliac artery aneurysm.     Pt needs to be scheduled for new pt in person consult with vascular surgery.  Will route to scheduling to coordinate an appointment at next available.    Appt note: New pt ref by Dr. Galloway for left iliac artery aneurysm (US AAA in [facility]).     ADELINE BobbyN, RN  [facility]

## 2021-02-17 ENCOUNTER — OFFICE VISIT (OUTPATIENT)
Dept: OTHER | Facility: CLINIC | Age: 66
End: 2021-02-17
Attending: INTERNAL MEDICINE
Payer: COMMERCIAL

## 2021-02-17 VITALS
OXYGEN SATURATION: 98 % | HEIGHT: 68 IN | HEART RATE: 75 BPM | DIASTOLIC BLOOD PRESSURE: 81 MMHG | SYSTOLIC BLOOD PRESSURE: 133 MMHG | RESPIRATION RATE: 18 BRPM | BODY MASS INDEX: 36.98 KG/M2 | WEIGHT: 244 LBS

## 2021-02-17 DIAGNOSIS — I72.3 ILIAC ARTERY ANEURYSM, LEFT (H): ICD-10-CM

## 2021-02-17 PROCEDURE — G0463 HOSPITAL OUTPT CLINIC VISIT: HCPCS

## 2021-02-17 PROCEDURE — 99203 OFFICE O/P NEW LOW 30 MIN: CPT | Performed by: SURGERY

## 2021-02-17 ASSESSMENT — MIFFLIN-ST. JEOR: SCORE: 1856.28

## 2021-02-17 ASSESSMENT — ENCOUNTER SYMPTOMS
ALLERGIC/IMMUNOLOGIC NEGATIVE: 1
STIFFNESS: 1
ENDOCRINE NEGATIVE: 1
BACK PAIN: 1
NECK PAIN: 1
NEUROLOGICAL NEGATIVE: 1
CONSTITUTIONAL NEGATIVE: 1
EYES NEGATIVE: 1
HEMATOLOGIC/LYMPHATIC NEGATIVE: 1
GASTROINTESTINAL NEGATIVE: 1
CARDIOVASCULAR NEGATIVE: 1
PSYCHIATRIC NEGATIVE: 1

## 2021-02-17 NOTE — PROGRESS NOTES
[facility] VASCULAR [facility] CENTER INITIAL VASCULAR SURGERY CONSULT    Impression:   1.  2.3 cm left common iliac artery aneurysm not yet in need of repair.    2.  Obesity    Plan:   I had a lengthy discussion with Arnaud explaining the pathophysiology of aneurysmal disease.  We discussed indications for repair.  He understands that we would consider repair if his iliac aneurysm ever approached 3.5-4.0 cm in diameter.  He will continue his medical regimen.  Vascular surgical follow-up will be with me in 1 year for a surveillance aortoiliac ultrasound.      HPI:   Arnaud Bird is a 65-year-old retired  who recently underwent a screening aortoiliac ultrasound.  That study was notable for a 2.3 cm left common iliac artery aneurysm.  He is referred for vascular surgical consultation.  He has no known familial history of aneurysmal disease.  He has never smoked but he has significant exposure to secondhand smoke.  He is fairly debilitated by osteoarthritis and chronic pain.  He is status post bilateral knee replacements.  He is scheduled for right shoulder replacement in April.  He is on chronic narcotics for this condition.              CURRENT MEDICATIONS  acyclovir (ZOVIRAX) 400 MG tablet, TAKE TWO TABLETS BY MOUTH TWICE DAILY for 5 days as needed for genital herpes outbreak  albuterol (PROAIR HFA, PROVENTIL HFA, VENTOLIN HFA) 108 (90 BASE) MCG/ACT inhaler, Inhale 2 puffs into the lungs every 6 hours as needed for shortness of breath / dyspnea  allopurinol (ZYLOPRIM) 300 MG tablet, Take 1 tablet (300 mg) by mouth daily  beclomethasone (QVAR) 40 MCG/ACT AERS IS A DISCONTINUED MEDICATION, Inhale 2 puffs into the lungs 2 times daily  diclofenac (VOLTAREN) 1 % topical gel, Place 4 g onto the skin 4 times daily  diclofenac (VOLTAREN) 1 % topical gel, APPLY 2 GRAMS TOPICALLY TO AFFECTED AREA FOUR TIMES DAILY AS NEEDED  fluticasone (FLONASE) 50 MCG/ACT nasal spray, Spray 2 sprays into both nostrils  daily  HYDROcodone-acetaminophen (NORCO) 7.5-325 MG per tablet, Take 1 tablet by mouth every 6 hours as needed for pain (4 x daily as needed  maxium 4 per day)  ipratropium (ATROVENT) 0.06 % nasal spray, SPRAY 2 SPRAYS INTO BOTH NOSTRILS 4 TIMES DAILY  lidocaine (XYLOCAINE) 5 % external ointment, Apply topically 4 times daily as needed for moderate pain  lidocaine 5 % EX patch, Place 1 patch onto the skin every 24 hours  naloxone (NARCAN) 4 MG/0.1ML nasal spray, Spray 1 spray (4 mg) into one nostril alternating nostrils once as needed for opioid reversal Every 2-3 minutes until patient responsive or EMS arrives  order for DME, Equipment being ordered: Thumb spica splint for left hand  predniSONE (DELTASONE) 20 MG tablet, Take 2 daily for 3 days, then 1 daily for 3 days.  pseudoePHEDrine (SUDAFED) 30 MG tablet, Take by mouth every 4 hours as needed for congestion  salicylic acid (MEDIPLAST) 40 % miscellaneous, Cut to fit wart, apply every night, put duct tape to secure in place, then remove in the morning.  sildenafil (VIAGRA) 100 MG tablet, Take 0.5-1 tablets ( mg) by mouth daily as needed Take 30 min to 4 hours before intercourse.  Never use with nitroglycerin, terazosin or doxazosin.  simvastatin (ZOCOR) 40 MG tablet, Take 1 tablet (40 mg) by mouth daily    lidocaine (PF) (XYLOCAINE) 1 % injection 2 mL  lidocaine (PF) (XYLOCAINE) 1 % injection 2 mL  ropivacaine (NAROPIN) injection 1 mL  ropivacaine (NAROPIN) injection 1 mL  ropivacaine (NAROPIN) injection 1 mL  ropivacaine (NAROPIN) injection 2 mL  ropivacaine (NAROPIN) injection 2 mL  ropivacaine (NAROPIN) injection 3 mL  ropivacaine (NAROPIN) injection 3 mL  ropivacaine (NAROPIN) injection 3 mL  ropivacaine (NAROPIN) injection 3 mL  ropivacaine (NAROPIN) injection 3 mL  ropivacaine (NAROPIN) injection 3 mL  ropivacaine (NAROPIN) injection 3 mL  ropivacaine (NAROPIN) injection 3 mL  ropivacaine (NAROPIN) injection 3 mL  ropivacaine (NAROPIN) injection 3  mL  ropivacaine (NAROPIN) injection 3 mL  ropivacaine (NAROPIN) injection 3 mL  triamcinolone (KENALOG-40) injection 20 mg  triamcinolone (KENALOG-40) injection 40 mg  triamcinolone (KENALOG-40) injection 40 mg  triamcinolone (KENALOG-40) injection 40 mg  triamcinolone (KENALOG-40) injection 40 mg  triamcinolone (KENALOG-40) injection 40 mg  triamcinolone (KENALOG-40) injection 40 mg  triamcinolone (KENALOG-40) injection 40 mg  triamcinolone (KENALOG-40) injection 40 mg  triamcinolone (KENALOG-40) injection 40 mg  triamcinolone (KENALOG-40) injection 40 mg  triamcinolone (KENALOG-40) injection 40 mg  triamcinolone (KENALOG-40) injection 40 mg  triamcinolone (KENALOG-40) injection 40 mg  triamcinolone (KENALOG-40) injection 40 mg          PAST MEDICAL HISTORY  Past Medical History:   Diagnosis Date     Arthritis      Benign positional vertigo      Carpal tunnel syndrome     mild     Hearing problem      LEFT WORSE THAN RIGHT  10/18/2012     Migraines      Nasal polyps      Unspecified asthma(493.90) 10/17/2012     Unspecified asthma, with exacerbation 10/17/2012         PAST SURGICAL HISTORY:  Past Surgical History:   Procedure Laterality Date     HC TOOTH EXTRACTION W/FORCEP       ORTHOPEDIC SURGERY      bilateral total knee replacements     TONSILLECTOMY      age 2 or 3       ALLERGIES     Allergies   Allergen Reactions     Seasonal Allergies        FAMILY HISTORY  Family History   Problem Relation Age of Onset     Cancer Father      Family History Negative Father      Family History Negative Mother      Cancer Mother      Stomach Problem Mother      Diabetes No family hx of      Coronary Artery Disease No family hx of      Hypertension No family hx of      Hyperlipidemia No family hx of      Cerebrovascular Disease No family hx of      Breast Cancer No family hx of      Colon Cancer No family hx of      Prostate Cancer No family hx of      Other Cancer No family hx of      Depression No family hx of      Anxiety  "Disorder No family hx of      Mental Illness No family hx of      Substance Abuse No family hx of      Anesthesia Reaction No family hx of      Asthma No family hx of      Osteoporosis No family hx of      Genetic Disorder No family hx of      Thyroid Disease No family hx of      Obesity No family hx of      Unknown/Adopted No family hx of        SOCIAL HISTORY  Social History     Tobacco Use     Smoking status: Never Smoker     Smokeless tobacco: Never Used   Substance Use Topics     Alcohol use: Yes     Comment: case beer a week     Drug use: No       ROS:   Review of Systems   Constitution: Negative.   HENT: Negative.    Eyes: Negative.    Cardiovascular: Negative.    Endocrine: Negative.    Hematologic/Lymphatic: Negative.    Skin: Negative.    Musculoskeletal: Positive for arthritis, back pain, joint pain, neck pain and stiffness.   Gastrointestinal: Negative.    Genitourinary: Negative.    Neurological: Negative.    Psychiatric/Behavioral: Negative.    Allergic/Immunologic: Negative.          EXAM:  /81 (BP Location: Right arm, Patient Position: Chair, Cuff Size: Adult Large)   Pulse 75   Resp 18   Ht 5' 8\" (1.727 m)   Wt 244 lb (110.7 kg)   SpO2 98%   BMI 37.10 kg/m    Physical Exam  Vitals signs reviewed.   Constitutional:       Appearance: He is obese.   HENT:      Head: Normocephalic and atraumatic.   Eyes:      General: No scleral icterus.     Pupils: Pupils are equal, round, and reactive to light.   Cardiovascular:      Pulses:           Popliteal pulses are 1+ on the right side and 1+ on the left side.        Dorsalis pedis pulses are 2+ on the right side.        Posterior tibial pulses are 2+ on the left side.   Musculoskeletal:      Right lower leg: No edema.      Left lower leg: No edema.   Skin:     General: Skin is warm and dry.   Neurological:      General: No focal deficit present.      Mental Status: He is alert and oriented to person, place, and time. Mental status is at baseline. "   Psychiatric:         Mood and Affect: Mood normal.         Behavior: Behavior normal.         Thought Content: Thought content normal.         Judgment: Judgment normal.         Labs:  LIPID RESULTS:  Lab Results   Component Value Date    CHOL 149 04/01/2020    HDL 48 04/01/2020    LDL 85 04/01/2020    TRIG 78 04/01/2020    CHOLHDLRATIO 3.8 02/05/2014       CBC RESULTS:  Lab Results   Component Value Date    WBC 5.5 06/04/2018    RBC 5.02 06/04/2018    HGB 15.8 06/04/2018    HCT 46.9 06/04/2018    MCV 93 06/04/2018    MCH 31.5 06/04/2018    MCHC 33.7 06/04/2018    RDW 13.9 06/04/2018     06/04/2018       BMP RESULTS:  Lab Results   Component Value Date     04/01/2020    POTASSIUM 4.6 04/01/2020    CHLORIDE 108 04/01/2020    CO2 28 04/01/2020    ANIONGAP 3 04/01/2020    GLC 89 04/01/2020    BUN 16 04/01/2020    CR 0.70 04/01/2020    GFRESTIMATED >90 04/01/2020    GFRESTBLACK >90 04/01/2020    BELIA 8.9 04/01/2020        A1C RESULTS:  Lab Results   Component Value Date    A1C 5.4 12/30/2016         Imaging:    ABDOMINAL AORTIC SCREENING ULTRASOUND 2/10/2021 10:34 AM     CLINICAL HISTORY: Encounter for abdominal aortic aneurysm (AAA)  screening. Screening abdominal aortic ultrasound requested.     COMPARISONS: None available.     ORDERING PROVIDER: LORIE MORE     TECHNIQUE: Grayscale images were obtained through the aorta. Diameters  were measured in the  longitudinal and transverse planes. Infrarenal  aorta evaluated with color Doppler and Doppler waveform ultrasound.     FINDINGS: Artery diameters (longitudinal / transverse):     Aorta:       Supraceliac: 3.0 cm /  3.0 cm          Infrarenal, proximal: 2.4 cm /  2.3 cm       Infrarenal, mid: 2.3 cm /  2.3 cm       Infrarenal, distal: 2.2 cm /  2.2 cm, 79/11 cm/s, triphasic     Right common iliac artery: 1.6 cm /  1.6 cm     Left common iliac artery: 2.2 cm /  2.3 cm                                                                       IMPRESSION:  1. No abdominal aortic aneurysm demonstrated.     2. Left common iliac artery aneurysm measures 2.3 cm in diameter.     Aorta diameter measurement classification:  < 2.5 cm: Normal.  2.5 - 2.9 cm: Ectatic.  >3 cm: Aneurysmal.     TOYA HALL MD    Total time of this encounter was 30 minutes.    Arian Blanco MD

## 2021-02-17 NOTE — PROGRESS NOTES
"Arnaud Bird is a 65 year old male who presents for:  Chief Complaint   Patient presents with     RECHECK       New pt ref by Dr. Galloway for left iliac artery aneurysm (US AAA in [facility]). *vg        Vitals:    Vitals:    02/17/21 1053 02/17/21 1054   BP: (!) 141/90 133/81   BP Location: Left arm Right arm   Patient Position: Chair Chair   Cuff Size: Adult Large Adult Large   Pulse: 75    Resp: 18    SpO2: 98%    Weight: 244 lb (110.7 kg)    Height: 5' 8\" (1.727 m)        BMI:  Estimated body mass index is 37.1 kg/m  as calculated from the following:    Height as of this encounter: 5' 8\" (1.727 m).    Weight as of this encounter: 244 lb (110.7 kg).    Pain Score:  Data Unavailable        Karsten Zhou CMA    "

## 2021-02-23 DIAGNOSIS — Z96.652 STATUS POST TOTAL LEFT KNEE REPLACEMENT: ICD-10-CM

## 2021-02-23 DIAGNOSIS — M47.16 SPONDYLOSIS WITH MYELOPATHY, LUMBAR REGION: ICD-10-CM

## 2021-02-23 DIAGNOSIS — G89.4 CHRONIC PAIN SYNDROME: Chronic | ICD-10-CM

## 2021-02-23 DIAGNOSIS — R26.9 GAIT DIFFICULTY: ICD-10-CM

## 2021-02-23 DIAGNOSIS — M25.661 STIFFNESS OF KNEE JOINT, RIGHT: Chronic | ICD-10-CM

## 2021-02-23 RX ORDER — HYDROCODONE BITARTRATE AND ACETAMINOPHEN 7.5; 325 MG/1; MG/1
1 TABLET ORAL EVERY 6 HOURS PRN
Qty: 120 TABLET | Refills: 0 | Status: SHIPPED | OUTPATIENT
Start: 2021-02-23 | End: 2021-03-22

## 2021-02-23 NOTE — TELEPHONE ENCOUNTER
Controlled Substance Refill Request for   Norco 7.5-325 mg    Problem List Complete:    No     PROVIDER TO CONSIDER COMPLETION OF PROBLEM LIST AND OVERVIEW/CONTROLLED SUBSTANCE AGREEMENT    Last Written Prescription Date:  1-22-21   Last Fill Quantity: 120,   # refills: 0      Last Office Visit with [facility] primary care provider: 1-29-21    Future Office visit:   Next 5 appointments (look out 90 days)    Mar 30, 2021  8:40 AM  PHYSICAL with Sushma Galloway MD  [facility] ([facility] ) [address] [place] [id number]  [phone]          Controlled substance agreement:   Encounter-Level CSA - 10/31/2016:    Controlled Substance Agreement - Scan on 11/15/2016  7:53 AM: CONTROLLED SUBSTANCE AGREEMENT     Patient-Level CSA:    Controlled Substance Agreement - Opioid - Scan on 7/20/2020 12:15 PM  Controlled Substance Agreement - Opioid - Scan on 3/19/2019 11:01 AM         Last Urine Drug Screen:   Pain Drug SCR UR W RPTD Meds   Date Value Ref Range Status   09/09/2019 FINAL  Final     Comment:     (Note)  ====================================================================  TOXASSURE COMP DRUG ANALYSIS,UR  ====================================================================  Test                             Result       Flag       Units        Drug Present and Declared for Prescription Verification   Hydrocodone                    665          EXPECTED   ng/mg creat   Hydromorphone                  93           EXPECTED   ng/mg creat   Dihydrocodeine                 149          EXPECTED   ng/mg creat   Norhydrocodone                 1732         EXPECTED   ng/mg creat    Sources of hydrocodone include scheduled prescription    medications. Hydromorphone, dihydrocodeine and norhydrocodone are    expected metabolites of hydrocodone. Hydromorphone and    dihydrocodeine are also available as scheduled prescription    medications.   Acetaminophen                   PRESENT      EXPECTED                Drug Absent but Declared for Prescription Verification   Diclofenac                     Not Detected UNEXPECTED                Diclofenac, as indicated in the declared medication list, is not    always detected even when used as directed.  ====================================================================  Test                      Result    Flag   Units      Ref Range        Creatinine              171              mg/dL      >=20            ====================================================================  Declared Medications:  The flagging and interpretation on this report are based on the  following declared medications.  Unexpected results may arise from  inaccuracies in the declared medications.  **Note: The testing scope of this panel includes these medications:  Hydrocodone (Norco)  **Note: The testing scope of this panel does not include small to  moderate amounts of these reported medications:  Acetaminophen (Norco)  Diclofenac (Voltaren)  **Note: The testing scope of this panel does not include following  reported medications:  Acyclovir  Albuterol  Allopurinol  Amoxicillin (Augmentin)  Beclomethasone (QVAR)  Clavulinate (Augmentin)  Clindamycin (Cleocin)  Erythromycin  Fluticasone  Ipratropium (Atrovent)  Naloxone (Narcan)  Oxymetazoline (Afrin)  Sildenafil (Viagra)  Simvastatin (Zocor)  ====================================================================  For clinical consultation, please call [phone].  ====================================================================  Analysis performed by [employer] [place] [address]     , No results found for: COMDAT,   Cannabinoids (96-ktb-7-carboxy-9-THC)   Date Value Ref Range Status   07/20/2020 Not Detected NDET^Not Detected ng/mL Final     Comment:     Cutoff for a negative cannabinoid is 50 ng/mL or less.     Phencyclidine (Phencyclidine)   Date Value Ref Range Status    07/20/2020 Not Detected NDET^Not Detected ng/mL Final     Comment:     Cutoff for a negative PCP is 25 ng/mL or less.     Cocaine (Benzoylecgonine)   Date Value Ref Range Status   07/20/2020 Not Detected NDET^Not Detected ng/mL Final     Comment:     Cutoff for a negative cocaine is 150 ng/ml or less.     Methamphetamine (d-Methamphetamine)   Date Value Ref Range Status   07/20/2020 Not Detected NDET^Not Detected ng/mL Final     Comment:     Cutoff for a negative methamphetamine is 500 ng/ml or less.     Opiates (Morphine)   Date Value Ref Range Status   07/20/2020 Detected, Abnormal Result (A) NDET^Not Detected ng/mL Final     Comment:     Cutoff for a positive opiate is greater than 100 ng/ml.  This is an unconfirmed screening result to be used for medical purposes only.   Order [id number] for confirmation or individual confirmation tests to [facility].       Amphetamine (d-Amphetamine)   Date Value Ref Range Status   07/20/2020 Not Detected NDET^Not Detected ng/mL Final     Comment:     Cutoff for a negative amphetamine is 500 ng/mL or less.     Benzodiazepines (Nordiazepam)   Date Value Ref Range Status   07/20/2020 Not Detected NDET^Not Detected ng/mL Final     Comment:     Cutoff for a negative benzodiazepine is 150 ng/ml or less.     Tricyclic Antidepressants (Desipramine)   Date Value Ref Range Status   07/20/2020 Not Detected NDET^Not Detected ng/mL Final     Comment:     Cutoff for a negative tricyclic antidepressant is 300 ng/ml or less.     Methadone (Methadone)   Date Value Ref Range Status   07/20/2020 Not Detected NDET^Not Detected ng/mL Final     Comment:     Cutoff for a negative methadone is 200 ng/ml or less.     Barbiturates (Butalbital)   Date Value Ref Range Status   07/20/2020 Not Detected NDET^Not Detected ng/mL Final     Comment:     Cutoff for a negative barbituate is 200 ng/ml or less.     Oxycodone (Oxycodone)   Date Value Ref Range Status   07/20/2020 Not Detected NDET^Not Detected ng/mL  Final     Comment:     Cutoff for a negative Oxycodone is 100 ng/mL or less.     Propoxyphene (Norpropoxyphene)   Date Value Ref Range Status   07/20/2020 Not Detected NDET^Not Detected ng/mL Final     Comment:     Cutoff for a negative propoxyphene is 300 ng/ml or less     Buprenorphine (Buprenorphine)   Date Value Ref Range Status   07/20/2020 Not Detected NDET^Not Detected ng/mL Final     Comment:     Cutoff for a negative buprenorphine is 10 ng/ml or less        Processing:  Rx to be electronically transmitted to pharmacy by provider      [email or web address]       checked in past 3 months?  No, route to RN      Danisha Alfonso RN

## 2021-02-26 DIAGNOSIS — I72.3 ILIAC ARTERY ANEURYSM, LEFT (H): Primary | ICD-10-CM

## 2021-03-05 PROBLEM — M19.011 ARTHRITIS OF SHOULDER REGION, RIGHT: Status: ACTIVE | Noted: 2021-03-05

## 2021-03-05 NOTE — TELEPHONE ENCOUNTER
Patient is scheduled for surgery with Dr. Deleon      Spoke or left message with: Micheline with Arnaud    Date of Surgery: 4/6/21    Location: [place]    Informed patient they will need an adult  Yes    Pre-op with surgeon (if applicable): Complete    H&P: Scheduled with PCP    Additional imaging/appointments: Patient will await call from [facility] scheduling team    Surgery packet: Given in clinic     Additional comments: Patient will await call from pre op nurses 1-2 days prior to surgery for arrival time

## 2021-03-09 ENCOUNTER — TELEPHONE (OUTPATIENT)
Dept: NEUROSURGERY | Facility: CLINIC | Age: 66
End: 2021-03-09

## 2021-03-09 ENCOUNTER — TELEPHONE (OUTPATIENT)
Dept: FAMILY MEDICINE | Facility: CLINIC | Age: 66
End: 2021-03-09

## 2021-03-09 DIAGNOSIS — M51.16 LUMBAR DISC DISEASE WITH RADICULOPATHY: Primary | ICD-10-CM

## 2021-03-09 NOTE — TELEPHONE ENCOUNTER
Dr. Galloway- Patient is calling to get an order for a spinal epidural. This [facility] be done at [facility]. Please advise. Thanks    Geovanna Anders  Referral Coordinator

## 2021-03-09 NOTE — TELEPHONE ENCOUNTER
Patient notified of test results and providers message, patient has no further questions.    Valentina MONROYRN BSN  [facility]  [phone]

## 2021-03-09 NOTE — TELEPHONE ENCOUNTER
[facility] Call Center    Phone Message    May a detailed message be left on voicemail: yes     Reason for Call: Appointment Intake    Referring Provider Name: Sushma Galloway MD   Diagnosis and/or Symptoms: Lumbar disc disease with radiculopathy     Action Taken: Message routed to:  [facility]surgery    Travel Screening: Not Applicable

## 2021-03-09 NOTE — TELEPHONE ENCOUNTER
I have placed referral to Spine specialist for further recommendations . Please let the patient know.    Thank you,  Sushma Galloway MD on 3/9/2021 at 9:54 AM    You have been referred to: Spine Lumbar: Spine Surgeon: [facility]: Neurosurgery Clinic [facility] [phone]

## 2021-03-16 ENCOUNTER — OFFICE VISIT (OUTPATIENT)
Dept: FAMILY MEDICINE | Facility: CLINIC | Age: 66
End: 2021-03-16
Payer: COMMERCIAL

## 2021-03-16 VITALS
TEMPERATURE: 98.1 F | WEIGHT: 251.4 LBS | OXYGEN SATURATION: 96 % | HEART RATE: 101 BPM | SYSTOLIC BLOOD PRESSURE: 134 MMHG | DIASTOLIC BLOOD PRESSURE: 80 MMHG | BODY MASS INDEX: 38.23 KG/M2

## 2021-03-16 DIAGNOSIS — M51.16 LUMBAR DISC DISEASE WITH RADICULOPATHY: Primary | ICD-10-CM

## 2021-03-16 DIAGNOSIS — G89.4 CHRONIC PAIN SYNDROME: ICD-10-CM

## 2021-03-16 PROCEDURE — 99214 OFFICE O/P EST MOD 30 MIN: CPT | Performed by: INTERNAL MEDICINE

## 2021-03-16 PROCEDURE — 36415 COLL VENOUS BLD VENIPUNCTURE: CPT | Performed by: INTERNAL MEDICINE

## 2021-03-16 PROCEDURE — 80053 COMPREHEN METABOLIC PANEL: CPT | Performed by: INTERNAL MEDICINE

## 2021-03-16 NOTE — PATIENT INSTRUCTIONS
As discussed, the Neuro specialist wants an order of MRI for getting the Epidural which is placed by me today. I dont see any previous MRI of back.     Also please inform your Orthopedics  regarding your upcoming Epidural ASAP before the injection.     [facility]  ( [facility] )   Call : [phone]  Toll Free : [phone]    ==============================    [facility]  ( [place] )   [facility]  Call : [phone]  Toll Free : [phone]    ==============================  [facility]  Phone : [phone]    ===============================    [facility]   ( All Locations )   Call : [phone]  Toll Free : [phone]        ========================    Patient Education     Relieving Back Pain  Back pain is a common problem. You can strain back muscles by lifting too much weight or just by moving the wrong way. Back strain can be uncomfortable, even painful. And it can take weeks or months to improve. To help yourself feel better and prevent future back strains, try these tips.  Important: Don't give aspirin to children or teens without first discussing it with your child's healthcare provider.  Ice    Ice reduces muscle pain and swelling. It helps most during the first 24 to 48 hours after an injury.    Wrap an ice pack or a bag of frozen peas in a thin towel. Never put ice directly on your skin.    Place the ice where your back hurts the most.    Don t ice for more than 20 minutes at a time.    You can use ice several times a day.  Medicines  Over-the-counter pain relievers include acetaminophen and anti-inflammatory medicines, which includes aspirin, naproxen, or ibuprofen. They can help ease discomfort. Some also reduce swelling.    Tell your healthcare provider about any medicines you are already taking.    Take medicines only as directed.  Manipulation and massage  Having  manipulation by an osteopathic doctor or chiropractor may be helpful. Getting a massage also may help.   Heat  After the first 48 hours, heat can relax sore muscles and improve blood flow.    Try a warm bath or shower. Or use a heating pad set on low. To prevent a burn, keep a cloth between you and the heating pad.    Don t use a heating pad for more than 15 minutes at a time. Never sleep on a heating pad.  [employer] last reviewed this educational content on 6/1/2018 2000-2020 [employer]. All rights reserved. This information is not intended as a substitute for professional medical care. Always follow your healthcare professional's instructions.

## 2021-03-16 NOTE — PROGRESS NOTES
Assessment and Plan  1. Lumbar disc disease with radiculopathy  2. Chronic pain syndrome  Uncontrolled, patient has upcoming Shoulder Arthroplasty in 4/2021 and wanting to get better on the Back pain with plans of Epidural as per the Spine surgery referral placed last time - - for more targeted therapy for back pain for possible need of epidural to come down on his Opiates - , Spine Lumbar: Spine Surgeon: P: Neurosurgery [facility] [phone]  [email or web address] -- They are requesting for an MRI order before plans of epidural. I do not see it was ever done in the past . WIll evaluate in clinic before placing the requested Imaging. But patient states he has been getting all previous epidurals in the past without an MRI done anytime. I see a telephone note from Neurosurgery to get an MRI for this procedure.   - Emphasized on the risks and complications of infection post procedure and need to check with Orthopedics before he plans epidural which patient understood and agreed with the plan.   -  check done, last refill of Norco 7.5 mg 120 tabs/month on 2/23/21. Not due for refill at this time.   - MR Lumbar Spine w/o & w Contrast; Future  - Comprehensive metabolic panel     Patient Instructions   As discussed, the Neuro specialist wants an order of MRI for getting the Epidural which is placed by me today. I dont see any previous MRI of back.     Also please inform your Orthopedics  regarding your upcoming Epidural ASAP before the injection.     [facility]  ( [facility] )   Call : [phone]  Toll Free : [phone]    ==============================    [facility]  ( [facility] )   [facility]  Call : [phone]  Toll Free : [phone]    ==============================  [facility]  Phone : [phone]    ===============================    [facility]  [place] [facility] [employer]   ( All Locations )   Call : [phone]  Toll Free : [phone]        ========================    Patient Education     Relieving Back Pain  Back pain is a common problem. You can strain back muscles by lifting too much weight or just by moving the wrong way. Back strain can be uncomfortable, even painful. And it can take weeks or months to improve. To help yourself feel better and prevent future back strains, try these tips.  Important: Don't give aspirin to children or teens without first discussing it with your child's healthcare provider.  Ice    Ice reduces muscle pain and swelling. It helps most during the first 24 to 48 hours after an injury.    Wrap an ice pack or a bag of frozen peas in a thin towel. Never put ice directly on your skin.    Place the ice where your back hurts the most.    Don t ice for more than 20 minutes at a time.    You can use ice several times a day.  Medicines  Over-the-counter pain relievers include acetaminophen and anti-inflammatory medicines, which includes aspirin, naproxen, or ibuprofen. They can help ease discomfort. Some also reduce swelling.    Tell your healthcare provider about any medicines you are already taking.    Take medicines only as directed.  Manipulation and massage  Having manipulation by an osteopathic doctor or chiropractor may be helpful. Getting a massage also may help.   Heat  After the first 48 hours, heat can relax sore muscles and improve blood flow.    Try a warm bath or shower. Or use a heating pad set on low. To prevent a burn, keep a cloth between you and the heating pad.    Don t use a heating pad for more than 15 minutes at a time. Never sleep on a heating pad.  [employer] last reviewed this educational content on 6/1/2018 2000-2020 [employer]. All rights reserved. This information is not intended as a substitute for professional medical care. Always follow your healthcare professional's instructions.              Return in about 2 weeks (around 3/30/2021), or if symptoms worsen or fail to improve, for Preoperative clearance'.    Sushma Galloway MD  [facility] KAI Lares is a 65 year old who presents for the following health issues       HPI     Patient is here requesting a referral for an MRI  For prior to back injestion  Patient going to spine clinic for treatment    -  Last seen pt in 1/2021 for follow up visit of Opiate refills for back pain and shoulder pain. Pt does follow Orthopedics which he states that he wants to plan epidural and requesting for MRI orders from PCP. He wants to plan this before his upcoming RT shoulder Arthroplasty in 4/6/21.        Allergies   Allergen Reactions     Seasonal Allergies         Past Medical History:   Diagnosis Date     Arthritis      Benign positional vertigo      Carpal tunnel syndrome     mild     Hearing problem      LEFT WORSE THAN RIGHT  10/18/2012     Migraines      Nasal polyps      Unspecified asthma(493.90) 10/17/2012     Unspecified asthma, with exacerbation 10/17/2012       Past Surgical History:   Procedure Laterality Date     HC TOOTH EXTRACTION W/FORCEP       ORTHOPEDIC SURGERY      bilateral total knee replacements     TONSILLECTOMY      age 2 or 3       Family History   Problem Relation Age of Onset     Cancer Father      Family History Negative Father      Family History Negative Mother      Cancer Mother      Stomach Problem Mother      Diabetes No family hx of      Coronary Artery Disease No family hx of      Hypertension No family hx of      Hyperlipidemia No family hx of      Cerebrovascular Disease No family hx of      Breast Cancer No family hx of      Colon Cancer No family hx of      Prostate Cancer No family hx of      Other Cancer No family hx of      Depression No family hx of      Anxiety Disorder No family hx of      Mental Illness No family hx of      Substance Abuse No family hx of      Anesthesia  Reaction No family hx of      Asthma No family hx of      Osteoporosis No family hx of      Genetic Disorder No family hx of      Thyroid Disease No family hx of      Obesity No family hx of      Unknown/Adopted No family hx of        Social History     Tobacco Use     Smoking status: Never Smoker     Smokeless tobacco: Never Used   Substance Use Topics     Alcohol use: Yes     Comment: case beer a week        Current Outpatient Medications   Medication     acyclovir (ZOVIRAX) 400 MG tablet     albuterol (PROAIR HFA, PROVENTIL HFA, VENTOLIN HFA) 108 (90 BASE) MCG/ACT inhaler     allopurinol (ZYLOPRIM) 300 MG tablet     beclomethasone (QVAR) 40 MCG/ACT AERS IS A DISCONTINUED MEDICATION     diclofenac (VOLTAREN) 1 % topical gel     diclofenac (VOLTAREN) 1 % topical gel     fluticasone (FLONASE) 50 MCG/ACT nasal spray     HYDROcodone-acetaminophen (NORCO) 7.5-325 MG per tablet     ipratropium (ATROVENT) 0.06 % nasal spray     lidocaine (XYLOCAINE) 5 % external ointment     lidocaine 5 % EX patch     naloxone (NARCAN) 4 MG/0.1ML nasal spray     order for DME     predniSONE (DELTASONE) 20 MG tablet     pseudoePHEDrine (SUDAFED) 30 MG tablet     salicylic acid (MEDIPLAST) 40 % miscellaneous     sildenafil (VIAGRA) 100 MG tablet     simvastatin (ZOCOR) 40 MG tablet     Current Facility-Administered Medications   Medication     lidocaine (PF) (XYLOCAINE) 1 % injection 2 mL     lidocaine (PF) (XYLOCAINE) 1 % injection 2 mL     ropivacaine (NAROPIN) injection 1 mL     ropivacaine (NAROPIN) injection 1 mL     ropivacaine (NAROPIN) injection 1 mL     ropivacaine (NAROPIN) injection 2 mL     ropivacaine (NAROPIN) injection 2 mL     ropivacaine (NAROPIN) injection 3 mL     ropivacaine (NAROPIN) injection 3 mL     ropivacaine (NAROPIN) injection 3 mL     ropivacaine (NAROPIN) injection 3 mL     ropivacaine (NAROPIN) injection 3 mL     ropivacaine (NAROPIN) injection 3 mL     ropivacaine (NAROPIN) injection 3 mL     ropivacaine  (NAROPIN) injection 3 mL     ropivacaine (NAROPIN) injection 3 mL     ropivacaine (NAROPIN) injection 3 mL     ropivacaine (NAROPIN) injection 3 mL     ropivacaine (NAROPIN) injection 3 mL     triamcinolone (KENALOG-40) injection 20 mg     triamcinolone (KENALOG-40) injection 40 mg     triamcinolone (KENALOG-40) injection 40 mg     triamcinolone (KENALOG-40) injection 40 mg     triamcinolone (KENALOG-40) injection 40 mg     triamcinolone (KENALOG-40) injection 40 mg     triamcinolone (KENALOG-40) injection 40 mg     triamcinolone (KENALOG-40) injection 40 mg     triamcinolone (KENALOG-40) injection 40 mg     triamcinolone (KENALOG-40) injection 40 mg     triamcinolone (KENALOG-40) injection 40 mg     triamcinolone (KENALOG-40) injection 40 mg     triamcinolone (KENALOG-40) injection 40 mg     triamcinolone (KENALOG-40) injection 40 mg     triamcinolone (KENALOG-40) injection 40 mg        Review of Systems   Constitutional, HEENT, cardiovascular, pulmonary, GI, , musculoskeletal, neuro, skin, endocrine and psych systems are negative, except as otherwise noted.      Objective    /80 (Cuff Size: Adult Large)   Pulse 101   Temp 98.1  F (36.7  C) (Tympanic)   Wt 114 kg (251 lb 6.4 oz)   SpO2 96%   BMI 38.23 kg/m    Body mass index is 38.23 kg/m .  Physical Exam   GENERAL: healthy, alert and no distress  NECK: no adenopathy, no asymmetry, masses, or scars and thyroid normal to palpation  RESP: lungs clear to auscultation - no rales, rhonchi or wheezes  CV: regular rate and rhythm, normal S1 S2, no S3 or S4, no murmur, click or rub, no peripheral edema and peripheral pulses strong  ABDOMEN: soft, nontender, no hepatosplenomegaly, no masses and bowel sounds normal  MS: no gross musculoskeletal defects noted, no edema    Labs and imaging reviewed and discussed with the patient.

## 2021-03-17 ENCOUNTER — TELEPHONE (OUTPATIENT)
Dept: ORTHOPEDICS | Facility: CLINIC | Age: 66
End: 2021-03-17

## 2021-03-17 LAB
ALBUMIN SERPL-MCNC: 3.8 G/DL (ref 3.4–5)
ALP SERPL-CCNC: 120 U/L (ref 40–150)
ALT SERPL W P-5'-P-CCNC: 29 U/L (ref 0–70)
ANION GAP SERPL CALCULATED.3IONS-SCNC: 1 MMOL/L (ref 3–14)
AST SERPL W P-5'-P-CCNC: 14 U/L (ref 0–45)
BILIRUB SERPL-MCNC: 0.5 MG/DL (ref 0.2–1.3)
BUN SERPL-MCNC: 23 MG/DL (ref 7–30)
CALCIUM SERPL-MCNC: 8.7 MG/DL (ref 8.5–10.1)
CHLORIDE SERPL-SCNC: 108 MMOL/L (ref 94–109)
CO2 SERPL-SCNC: 28 MMOL/L (ref 20–32)
CREAT SERPL-MCNC: 0.95 MG/DL (ref 0.66–1.25)
GFR SERPL CREATININE-BSD FRML MDRD: 82 ML/MIN/{1.73_M2}
GLUCOSE SERPL-MCNC: 93 MG/DL (ref 70–99)
POTASSIUM SERPL-SCNC: 4.5 MMOL/L (ref 3.4–5.3)
PROT SERPL-MCNC: 7.6 G/DL (ref 6.8–8.8)
SODIUM SERPL-SCNC: 137 MMOL/L (ref 133–144)

## 2021-03-17 NOTE — TELEPHONE ENCOUNTER
[facility] Call Center    Phone Message    May a detailed message be left on voicemail: yes     Reason for Call: Other: clarify      Action Taken: Message routed to:  [facility] orhto    Travel Screening: Not Applicable     Patient wants to know if it's okay to get a spinal injection before surgery date     Please kenneth lto clarify

## 2021-03-22 ENCOUNTER — TELEPHONE (OUTPATIENT)
Dept: FAMILY MEDICINE | Facility: CLINIC | Age: 66
End: 2021-03-22

## 2021-03-22 DIAGNOSIS — M47.16 SPONDYLOSIS WITH MYELOPATHY, LUMBAR REGION: ICD-10-CM

## 2021-03-22 DIAGNOSIS — Z96.652 STATUS POST TOTAL LEFT KNEE REPLACEMENT: ICD-10-CM

## 2021-03-22 DIAGNOSIS — G89.4 CHRONIC PAIN SYNDROME: Chronic | ICD-10-CM

## 2021-03-22 DIAGNOSIS — R26.9 GAIT DIFFICULTY: ICD-10-CM

## 2021-03-22 DIAGNOSIS — M25.661 STIFFNESS OF KNEE JOINT, RIGHT: Chronic | ICD-10-CM

## 2021-03-22 RX ORDER — HYDROCODONE BITARTRATE AND ACETAMINOPHEN 7.5; 325 MG/1; MG/1
1 TABLET ORAL EVERY 6 HOURS PRN
Qty: 120 TABLET | Refills: 0 | Status: SHIPPED | OUTPATIENT
Start: 2021-03-23 | End: 2021-04-29

## 2021-03-22 NOTE — TELEPHONE ENCOUNTER
Pt calling requesting a refill on the hydrocodone-acetaminophen 7.5-325 mg.  See below      Controlled Substance Refill Request for hydrocodone-acetaminophen 7.5-325 mg  Problem List Complete:    Yes    Last Written Prescription Date:  2/23/21  Last Fill Quantity: 120,   # refills: 0    THE MOST RECENT OFFICE VISIT MUST BE WITHIN THE PAST 3 MONTHS. AT LEAST ONE FACE TO FACE VISIT MUST OCCUR EVERY 6 MONTHS. ADDITIONAL VISITS CAN BE VIRTUAL.  (THIS STATEMENT SHOULD BE DELETED.)    Last Office Visit with [facility] primary care provider: 3/16/21 Laverne    Future Office visit:   Next 5 appointments (look out 90 days)    Mar 30, 2021  8:40 AM  PHYSICAL with Sushma Galloway MD  [facility] ([facility] [address] [place] [id number]  [phone]          Controlled substance agreement:   Encounter-Level CSA - 10/31/2016:    Controlled Substance Agreement - Scan on 11/15/2016  7:53 AM: CONTROLLED SUBSTANCE AGREEMENT     Patient-Level CSA:    Controlled Substance Agreement - Opioid - Scan on 7/20/2020 12:15 PM  Controlled Substance Agreement - Opioid - Scan on 3/19/2019 11:01 AM         Last Urine Drug Screen:   Pain Drug SCR UR W RPTD Meds   Date Value Ref Range Status   09/09/2019 FINAL  Final     Comment:     (Note)  ====================================================================  [facility]RE COMP DRUG ANALYSIS,UR  ====================================================================  Test                             Result       Flag       Units        Drug Present and Declared for Prescription Verification   Hydrocodone                    665          EXPECTED   ng/mg creat   Hydromorphone                  93           EXPECTED   ng/mg creat   Dihydrocodeine                 149          EXPECTED   ng/mg creat   Norhydrocodone                 1732         EXPECTED   ng/mg creat    Sources of hydrocodone include scheduled prescription     medications. Hydromorphone, dihydrocodeine and norhydrocodone are    expected metabolites of hydrocodone. Hydromorphone and    dihydrocodeine are also available as scheduled prescription    medications.   Acetaminophen                  PRESENT      EXPECTED                Drug Absent but Declared for Prescription Verification   Diclofenac                     Not Detected UNEXPECTED                Diclofenac, as indicated in the declared medication list, is not    always detected even when used as directed.  ====================================================================  Test                      Result    Flag   Units      Ref Range        Creatinine              171              mg/dL      >=20            ====================================================================  Declared Medications:  The flagging and interpretation on this report are based on the  following declared medications.  Unexpected results may arise from  inaccuracies in the declared medications.  **Note: The testing scope of this panel includes these medications:  Hydrocodone (Norco)  **Note: The testing scope of this panel does not include small to  moderate amounts of these reported medications:  Acetaminophen (Norco)  Diclofenac (Voltaren)  **Note: The testing scope of this panel does not include following  reported medications:  Acyclovir  Albuterol  Allopurinol  Amoxicillin (Augmentin)  Beclomethasone (QVAR)  Clavulinate (Augmentin)  Clindamycin (Cleocin)  Erythromycin  Fluticasone  Ipratropium (Atrovent)  Naloxone (Narcan)  Oxymetazoline (Afrin)  Sildenafil (Viagra)  Simvastatin (Zocor)  ====================================================================  For clinical consultation, please call [phone].  ====================================================================  Analysis performed by [employer] [place] [address]     , No results found for: COMDAT,   Cannabinoids (97-ruv-9-carboxy-9-THC)   Date  Value Ref Range Status   07/20/2020 Not Detected NDET^Not Detected ng/mL Final     Comment:     Cutoff for a negative cannabinoid is 50 ng/mL or less.     Phencyclidine (Phencyclidine)   Date Value Ref Range Status   07/20/2020 Not Detected NDET^Not Detected ng/mL Final     Comment:     Cutoff for a negative PCP is 25 ng/mL or less.     Cocaine (Benzoylecgonine)   Date Value Ref Range Status   07/20/2020 Not Detected NDET^Not Detected ng/mL Final     Comment:     Cutoff for a negative cocaine is 150 ng/ml or less.     Methamphetamine (d-Methamphetamine)   Date Value Ref Range Status   07/20/2020 Not Detected NDET^Not Detected ng/mL Final     Comment:     Cutoff for a negative methamphetamine is 500 ng/ml or less.     Opiates (Morphine)   Date Value Ref Range Status   07/20/2020 Detected, Abnormal Result (A) NDET^Not Detected ng/mL Final     Comment:     Cutoff for a positive opiate is greater than 100 ng/ml.  This is an unconfirmed screening result to be used for medical purposes only.   Order [id number] for confirmation or individual confirmation tests to [employer].       Amphetamine (d-Amphetamine)   Date Value Ref Range Status   07/20/2020 Not Detected NDET^Not Detected ng/mL Final     Comment:     Cutoff for a negative amphetamine is 500 ng/mL or less.     Benzodiazepines (Nordiazepam)   Date Value Ref Range Status   07/20/2020 Not Detected NDET^Not Detected ng/mL Final     Comment:     Cutoff for a negative benzodiazepine is 150 ng/ml or less.     Tricyclic Antidepressants (Desipramine)   Date Value Ref Range Status   07/20/2020 Not Detected NDET^Not Detected ng/mL Final     Comment:     Cutoff for a negative tricyclic antidepressant is 300 ng/ml or less.     Methadone (Methadone)   Date Value Ref Range Status   07/20/2020 Not Detected NDET^Not Detected ng/mL Final     Comment:     Cutoff for a negative methadone is 200 ng/ml or less.     Barbiturates (Butalbital)   Date Value Ref Range Status   07/20/2020 Not  Detected NDET^Not Detected ng/mL Final     Comment:     Cutoff for a negative barbituate is 200 ng/ml or less.     Oxycodone (Oxycodone)   Date Value Ref Range Status   07/20/2020 Not Detected NDET^Not Detected ng/mL Final     Comment:     Cutoff for a negative Oxycodone is 100 ng/mL or less.     Propoxyphene (Norpropoxyphene)   Date Value Ref Range Status   07/20/2020 Not Detected NDET^Not Detected ng/mL Final     Comment:     Cutoff for a negative propoxyphene is 300 ng/ml or less     Buprenorphine (Buprenorphine)   Date Value Ref Range Status   07/20/2020 Not Detected NDET^Not Detected ng/mL Final     Comment:     Cutoff for a negative buprenorphine is 10 ng/ml or less        Processing:  Rx to be electronically transmitted to pharmacy by provider     [email or web address]   checked in past 3 months?  No, route to RN     [place] reviewed:  reviewed- no concerns on 3/22/21    [place]:  [email or web address]    Lis AGUILAR RN  EP Triage

## 2021-03-23 DIAGNOSIS — Z11.59 ENCOUNTER FOR SCREENING FOR OTHER VIRAL DISEASES: ICD-10-CM

## 2021-03-25 ENCOUNTER — DOCUMENTATION ONLY (OUTPATIENT)
Dept: CARE COORDINATION | Facility: CLINIC | Age: 66
End: 2021-03-25

## 2021-03-26 ENCOUNTER — TELEPHONE (OUTPATIENT)
Dept: ORTHOPEDICS | Facility: CLINIC | Age: 66
End: 2021-03-26

## 2021-03-26 NOTE — TELEPHONE ENCOUNTER
Called Arnaud to see if he had any questions before his upcoming surgery. (Scheduled 4/6 with Dr. Deleon for a Right Total Shoulder Arthroplasty) States he has his physical set up for next Tuesday 3/30. He will await a call from the covid scheduling team, but he was provided with their phone number as well. Analisa Patel will be his support person after surgery. Arnaud states he will re-review the information in his surgery packet that provided in clinic, and he will call if there are further questions,    Telma LAZO RN

## 2021-03-29 ENCOUNTER — HOSPITAL ENCOUNTER (OUTPATIENT)
Dept: MRI IMAGING | Facility: CLINIC | Age: 66
End: 2021-03-29
Attending: INTERNAL MEDICINE
Payer: COMMERCIAL

## 2021-03-29 DIAGNOSIS — G89.4 CHRONIC PAIN SYNDROME: ICD-10-CM

## 2021-03-29 DIAGNOSIS — M51.16 LUMBAR DISC DISEASE WITH RADICULOPATHY: ICD-10-CM

## 2021-03-29 PROCEDURE — 72148 MRI LUMBAR SPINE W/O DYE: CPT

## 2021-03-29 PROCEDURE — 72148 MRI LUMBAR SPINE W/O DYE: CPT | Mod: 26 | Performed by: RADIOLOGY

## 2021-03-30 ENCOUNTER — OFFICE VISIT (OUTPATIENT)
Dept: FAMILY MEDICINE | Facility: CLINIC | Age: 66
End: 2021-03-30
Payer: COMMERCIAL

## 2021-03-30 ENCOUNTER — ANCILLARY PROCEDURE (OUTPATIENT)
Dept: GENERAL RADIOLOGY | Facility: CLINIC | Age: 66
End: 2021-03-30
Attending: INTERNAL MEDICINE
Payer: COMMERCIAL

## 2021-03-30 VITALS
DIASTOLIC BLOOD PRESSURE: 74 MMHG | OXYGEN SATURATION: 96 % | WEIGHT: 243 LBS | HEART RATE: 89 BPM | BODY MASS INDEX: 36.83 KG/M2 | SYSTOLIC BLOOD PRESSURE: 130 MMHG | HEIGHT: 68 IN | TEMPERATURE: 99.1 F

## 2021-03-30 DIAGNOSIS — Z01.818 PREOP GENERAL PHYSICAL EXAM: Primary | ICD-10-CM

## 2021-03-30 DIAGNOSIS — Z79.891 CHRONIC USE OF OPIATE DRUG FOR THERAPEUTIC PURPOSE: ICD-10-CM

## 2021-03-30 DIAGNOSIS — M19.011 ARTHRITIS OF SHOULDER REGION, RIGHT: ICD-10-CM

## 2021-03-30 DIAGNOSIS — M51.16 LUMBAR DISC DISEASE WITH RADICULOPATHY: ICD-10-CM

## 2021-03-30 DIAGNOSIS — E78.5 DYSLIPIDEMIA: ICD-10-CM

## 2021-03-30 DIAGNOSIS — Z01.818 PREOP GENERAL PHYSICAL EXAM: ICD-10-CM

## 2021-03-30 DIAGNOSIS — J45.20 MILD INTERMITTENT ASTHMA WITHOUT COMPLICATION: ICD-10-CM

## 2021-03-30 DIAGNOSIS — I72.3 ILIAC ARTERY ANEURYSM, LEFT (H): ICD-10-CM

## 2021-03-30 LAB
CHOLEST SERPL-MCNC: 174 MG/DL
HDLC SERPL-MCNC: 54 MG/DL
LDLC SERPL CALC-MCNC: 104 MG/DL
NONHDLC SERPL-MCNC: 120 MG/DL
TRIGL SERPL-MCNC: 81 MG/DL

## 2021-03-30 PROCEDURE — 36415 COLL VENOUS BLD VENIPUNCTURE: CPT | Performed by: INTERNAL MEDICINE

## 2021-03-30 PROCEDURE — 80061 LIPID PANEL: CPT | Performed by: INTERNAL MEDICINE

## 2021-03-30 PROCEDURE — 99214 OFFICE O/P EST MOD 30 MIN: CPT | Performed by: INTERNAL MEDICINE

## 2021-03-30 PROCEDURE — 71046 X-RAY EXAM CHEST 2 VIEWS: CPT | Performed by: RADIOLOGY

## 2021-03-30 PROCEDURE — 93000 ELECTROCARDIOGRAM COMPLETE: CPT | Performed by: INTERNAL MEDICINE

## 2021-03-30 ASSESSMENT — MIFFLIN-ST. JEOR: SCORE: 1851.74

## 2021-03-30 NOTE — LETTER
March 31, 2021      Arnaud Bird  [address]  [facility] [id number]        Dear ,    We are writing to inform you of your test results.    Your X ray is normal, no concerns per radiology review.     Resulted Orders   XR Chest 2 Views    Narrative    CHEST TWO VIEWS March 30, 2021 9:40 AM     HISTORY: Preoperative general physical exam.    COMPARISON: Chest x-ray on 5/13/2009.      Impression    IMPRESSION: PA and lateral views of the chest were obtained.  Cardiomediastinal silhouette is within normal limits. No suspicious  focal pulmonary opacities. No significant pleural effusion or  pneumothorax. The dextroconvex rotoscoliosis of the thoracic spine  with associated degenerative changes of the spine.    JERILYN JIANG MD       If you have any questions or concerns, please call the clinic at the number listed above.       Sincerely,      Sushma Galloway MD           Verbalized Understanding/Simple: Patient demonstrates quick and easy understanding

## 2021-03-30 NOTE — PROGRESS NOTES
[address] [address]  Phone: [phone]  Primary Provider: Lorie More  Pre-op Performing Provider: LORIE MORE    PREOPERATIVE EVALUATION:  Today's date: 3/30/2021    Arnaud Bird is a 65 year old male who presents for a preoperative evaluation.    Surgical Information:  Surgery/Procedure: Right total shoulder arthroplasty   Surgery Location: [facility]  Surgeon: Dr. Deleon  Surgery Date: 4/6/21  Time of Surgery:   Where patient plans to recover: At home with family  Fax number for surgical facility: Note does not need to be faxed, will be available electronically in Epic.    Type of Anesthesia Anticipated: General Vs to be decided    Assessment & Plan     The proposed surgical procedure is considered INTERMEDIATE risk.    1. Preop general physical exam  2. Arthritis of shoulder region, right  Uncontrolled, with upcoming Rt shoulder Arthroplasty with distal clavicle excision for his severe degenerative arthritis on Rt side. Will do CXR and EKG not on file given pt comorbidities.   - XR Chest 2 Views; Future  - EKG 12-lead complete w/read - Clinics  - CBC with platelets differential    3. Mild intermittent asthma without complication  Well controlled, does have on and off allergy flare ups needing Atrovent ,nasal sprays.      4. Chronic use of opiate drug for therapeutic purpose  7. Lumbar disc disease with radiculopathy  Uncontrolled, Last seen patient on 3/16/21 for ongoing back pain, MRI of the back ordered as requested by Neurosurgery . But given his , om 4/6/21 - the idea of getting an Epdural in his back will need to be postponed till after the surgery. ( Dr. Deleon was consulted and she stated that yes he can get the back epidural but he has to ave it done at least 2 weeks before surgery as per recent telephone note reviewed )     5. Iliac artery aneurysm, left (H)  Recent  diagnosis on AAA screen , seen by Vascular and surveillance needed with repeat in 1 year , no interventions. Will get Lipid panel and start on ASA after the surgery.     6. Dyslipidemia  - Lipid panel reflex to direct LDL Fasting    Possible Sleep Apnea: No sleep studies seen in [facility], pt states he does have snoring on and off but no sleep studies done anytime..      Risks and Recommendations:  The patient has the following additional risks and recommendations for perioperative complications:   - Consult Hospitalist / IM to assist with post-op medical management  - RT to look at possible Sleep apnea though not seen in pt problem list.     Medication Instructions:  Patient is to take all scheduled medications on the day of surgery    RECOMMENDATION:  APPROVAL GIVEN to proceed with proposed procedure, without further diagnostic evaluation.    Review of external notes as documented above     35 minutes spent on the date of the encounter doing chart review, review of outside records, review of test results, interpretation of tests, patient visit and documentation       Subjective     HPI related to upcoming procedure:     Preop clearance today - Will need EKG , not done in the past . Recent CMP on 3/16/21 normal , Will check CBC and include recent labs ordered for patient symptoms of ongoing fatigue.     Preop Questions 3/30/2021   1. Have you ever had a heart attack or stroke? No   2. Have you ever had surgery on your heart or blood vessels, such as a stent placement, a coronary artery bypass, or surgery on an artery in your head, neck, heart, or legs? No   3. Do you have chest pain with activity? No   4. Do you have a history of  heart failure? No   5. Do you currently have a cold, bronchitis or symptoms of other infection? No   6. Do you have a cough, shortness of breath, or wheezing? No   7. Do you or anyone in your family have previous history of blood clots? No   8. Do you or does anyone in your family have a  serious bleeding problem such as prolonged bleeding following surgeries or cuts? No   9. Have you ever had problems with anemia or been told to take iron pills? No   10. Have you had any abnormal blood loss such as black, tarry or bloody stools? No   11. Have you ever had a blood transfusion? No   12. Are you willing to have a blood transfusion if it is medically needed before, during, or after your surgery? Yes   13. Have you or any of your relatives ever had problems with anesthesia? YES    14. Do you have sleep apnea, excessive snoring or daytime drowsiness? YES   14a. Do you have a CPAP machine? No   15. Do you have any artifical heart valves or other implanted medical devices like a pacemaker, defibrillator, or continuous glucose monitor? No   16. Do you have artificial joints? YES    17. Are you allergic to latex? No       Health Care Directive:  Patient does not have a Health Care Directive or Living Will: N/A    Preoperative Review of :   reviewed - controlled substances reflected in medication list.    Status of Chronic Conditions:  See problem list for active medical problems.  Problems all longstanding and stable, except as noted/documented.  See ROS for pertinent symptoms related to these conditions.      Review of Systems  CONSTITUTIONAL: NEGATIVE for fever, chills, change in weight  INTEGUMENTARY/SKIN: NEGATIVE for worrisome rashes, moles or lesions  EYES: NEGATIVE for vision changes or irritation  ENT/MOUTH: NEGATIVE for ear, mouth and throat problems  RESP: NEGATIVE for significant cough or SOB  BREAST: NEGATIVE for masses, tenderness or discharge  CV: NEGATIVE for chest pain, palpitations or peripheral edema  GI: NEGATIVE for nausea, abdominal pain, heartburn, or change in bowel habits  : NEGATIVE for frequency, dysuria, or hematuria  MUSCULOSKELETAL: NEGATIVE for significant arthralgias or myalgia  NEURO: NEGATIVE for weakness, dizziness or paresthesias  ENDOCRINE: NEGATIVE for temperature  intolerance, skin/hair changes  HEME: NEGATIVE for bleeding problems  PSYCHIATRIC: NEGATIVE for changes in mood or affect    Patient Active Problem List    Diagnosis Date Noted     Hyperlipidemia with target LDL less than 130 02/05/2014     Priority: High     Diagnosis updated by automated process. Provider to review and confirm.       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  All refills should only be approved by this provider, or covering partner.    Medication(s):  NORCO  7.5MG PO FOUR TIMES DAILY .   Maximum quantity per month:  120   Clinic visit frequency required: Q 3 months     Controlled substance agreement:  Encounter-Level CSA - 10/31/2016:    Controlled Substance Agreement - Scan on 11/15/2016  7:53 AM: CONTROLLED SUBSTANCE AGREEMENT     Patient-Level CSA:    Controlled Substance Agreement - Opioid - Scan on 3/19/2019 11:01 AM       Pain Clinic evaluation in the past: No    DIRE Total Score(s):  DIRE SCORE 7/2/2016   DIRE Total Score 18       RX [place] reviewed:  reviewed- no concerns 12/21/2020    [place]  [email or web address]             ACP (advance care planning) 03/08/2016     Priority: Medium     Advance Care Planning 3/8/2016: ACP Review of Chart / Resources Provided:  Reviewed chart for advance care plan.  Arnaud Bird has no plan or code status on  file. Discussed available resources and provided with information.   Added by Mary Jo Hurt             Sexual dysfunction 11/19/2015     Priority: Medium     Lumbar disc disease with radiculopathy 10/02/2015     Priority: Medium     Groin strain, initial encounter 10/02/2015     Priority: Medium     IMO Regulatory Load OCT 2020       Mild persistent asthma 11/29/2013     Priority: Medium     CARDIOVASCULAR SCREENING; LDL GOAL LESS THAN 100 04/23/2013     Priority: Medium     LEFT WORSE THAN RIGHT  10/18/2012     Priority: Medium     Bilateral carpal tunnel syndrome 10/17/2012     Priority: Medium     Vitamin D deficiency 10/17/2012     Priority: Medium     Problem list name updated by automated process. Provider to review       Plantar fascial fibromatosis 10/17/2012     Priority: Medium     Mild intermittent asthma without complication 10/17/2012     Priority: Medium     Problem list name updated by automated process. Provider to review       Hypertrophy of prostate without urinary obstruction 10/17/2012     Priority: Medium     Problem list name updated by automated process. Provider to review       Obesity 10/17/2012     Priority: Medium     Problem list name updated by automated process. Provider to review       Hyperlipidemia 10/17/2012     Priority: Medium     Problem list name updated by automated process. Provider to review       Spondylosis with myelopathy, lumbar region 10/17/2012     Priority: Medium     Stiffness of knee joint, right 05/21/2009     Priority: Medium     Gait difficulty 05/21/2009     Priority: Medium     S/P TKR (total knee replacement) 05/21/2009     Priority: Medium     (Problem list name updated by automated process. Provider to review and confirm.)        Past Medical History:   Diagnosis Date     Arthritis      Benign positional vertigo      Carpal tunnel syndrome     mild     Hearing problem      LEFT WORSE THAN RIGHT  10/18/2012     Migraines      Nasal polyps      Unspecified  asthma(493.90) 10/17/2012     Unspecified asthma, with exacerbation 10/17/2012     Past Surgical History:   Procedure Laterality Date     HC TOOTH EXTRACTION W/FORCEP       ORTHOPEDIC SURGERY      bilateral total knee replacements     TONSILLECTOMY      age 2 or 3     Current Outpatient Medications   Medication Sig Dispense Refill     acyclovir (ZOVIRAX) 400 MG tablet TAKE TWO TABLETS BY MOUTH TWICE DAILY for 5 days as needed for genital herpes outbreak 28 tablet 4     albuterol (PROAIR HFA, PROVENTIL HFA, VENTOLIN HFA) 108 (90 BASE) MCG/ACT inhaler Inhale 2 puffs into the lungs every 6 hours as needed for shortness of breath / dyspnea 1 Inhaler 11     allopurinol (ZYLOPRIM) 300 MG tablet Take 1 tablet (300 mg) by mouth daily 90 tablet 3     beclomethasone (QVAR) 40 MCG/ACT AERS IS A DISCONTINUED MEDICATION Inhale 2 puffs into the lungs 2 times daily 1 Inhaler 11     diclofenac (VOLTAREN) 1 % topical gel Place 4 g onto the skin 4 times daily 100 g 1     diclofenac (VOLTAREN) 1 % topical gel APPLY 2 GRAMS TOPICALLY TO AFFECTED AREA FOUR TIMES DAILY AS NEEDED 100 g 11     fluticasone (FLONASE) 50 MCG/ACT nasal spray Spray 2 sprays into both nostrils daily 16 g 11     HYDROcodone-acetaminophen (NORCO) 7.5-325 MG per tablet Take 1 tablet by mouth every 6 hours as needed for pain (4 x daily as needed  maxium 4 per day) 120 tablet 0     ipratropium (ATROVENT) 0.06 % nasal spray SPRAY 2 SPRAYS INTO BOTH NOSTRILS 4 TIMES DAILY 1 Box 11     lidocaine (XYLOCAINE) 5 % external ointment Apply topically 4 times daily as needed for moderate pain 150 g 11     lidocaine 5 % EX patch Place 1 patch onto the skin every 24 hours 30 patch 11     naloxone (NARCAN) 4 MG/0.1ML nasal spray Spray 1 spray (4 mg) into one nostril alternating nostrils once as needed for opioid reversal Every 2-3 minutes until patient responsive or EMS arrives 0.2 mL 0     order for DME Equipment being ordered: Thumb spica splint for left hand 1 Device 0      "predniSONE (DELTASONE) 20 MG tablet Take 2 daily for 3 days, then 1 daily for 3 days. 9 tablet 0     pseudoePHEDrine (SUDAFED) 30 MG tablet Take by mouth every 4 hours as needed for congestion       salicylic acid (MEDIPLAST) 40 % miscellaneous Cut to fit wart, apply every night, put duct tape to secure in place, then remove in the morning. 25 each 3     sildenafil (VIAGRA) 100 MG tablet Take 0.5-1 tablets ( mg) by mouth daily as needed Take 30 min to 4 hours before intercourse.  Never use with nitroglycerin, terazosin or doxazosin. 100 tablet 3     simvastatin (ZOCOR) 40 MG tablet Take 1 tablet (40 mg) by mouth daily 90 tablet 3       Allergies   Allergen Reactions     Seasonal Allergies         Social History     Tobacco Use     Smoking status: Never Smoker     Smokeless tobacco: Never Used   Substance Use Topics     Alcohol use: Yes     Comment: case beer a week     Family History   Problem Relation Age of Onset     Cancer Father      Family History Negative Father      Family History Negative Mother      Cancer Mother      Stomach Problem Mother      Diabetes No family hx of      Coronary Artery Disease No family hx of      Hypertension No family hx of      Hyperlipidemia No family hx of      Cerebrovascular Disease No family hx of      Breast Cancer No family hx of      Colon Cancer No family hx of      Prostate Cancer No family hx of      Other Cancer No family hx of      Depression No family hx of      Anxiety Disorder No family hx of      Mental Illness No family hx of      Substance Abuse No family hx of      Anesthesia Reaction No family hx of      Asthma No family hx of      Osteoporosis No family hx of      Genetic Disorder No family hx of      Thyroid Disease No family hx of      Obesity No family hx of      Unknown/Adopted No family hx of      History   Drug Use No         Objective     /74   Pulse 89   Temp 99.1  F (37.3  C) (Tympanic)   Ht 1.727 m (5' 8\")   Wt 110.2 kg (243 lb)   " SpO2 96%   BMI 36.95 kg/m      Physical Exam    GENERAL APPEARANCE: healthy, alert and no distress     EYES: EOMI,  PERRL     HENT: ear canals and TM's normal and nose and mouth without ulcers or lesions     NECK: no adenopathy, no asymmetry, masses, or scars and thyroid normal to palpation     RESP: lungs clear to auscultation - no rales, rhonchi or wheezes     CV: regular rates and rhythm, normal S1 S2, no S3 or S4 and no murmur, click or rub     ABDOMEN:  soft, nontender, no HSM or masses and bowel sounds normal     MS: extremities normal- no gross deformities noted, no evidence of inflammation in joints, FROM in all extremities. Pt had bumped on to Rt knee accidentally with mild bruise ( Rt TKA in the past ) , no concerns of inflammation or infection.      SKIN: no suspicious lesions or rashes     NEURO: Normal strength and tone, sensory exam grossly normal, mentation intact and speech normal     PSYCH: mentation appears normal. and affect normal/bright     LYMPHATICS: No cervical adenopathy    Recent Labs   Lab Test 03/16/21  1442 04/01/20  1137    139   POTASSIUM 4.5 4.6   CR 0.95 0.70        Diagnostics:  Labs pending at this time.  Results will be reviewed when available.  Recent Results (from the past 720 hour(s))   Comprehensive metabolic panel    Collection Time: 03/16/21  2:42 PM   Result Value Ref Range    Sodium 137 133 - 144 mmol/L    Potassium 4.5 3.4 - 5.3 mmol/L    Chloride 108 94 - 109 mmol/L    Carbon Dioxide 28 20 - 32 mmol/L    Anion Gap 1 (L) 3 - 14 mmol/L    Glucose 93 70 - 99 mg/dL    Urea Nitrogen 23 7 - 30 mg/dL    Creatinine 0.95 0.66 - 1.25 mg/dL    GFR Estimate 82 >60 mL/min/[1.73_m2]    GFR Estimate If Black >90 >60 mL/min/[1.73_m2]    Calcium 8.7 8.5 - 10.1 mg/dL    Bilirubin Total 0.5 0.2 - 1.3 mg/dL    Albumin 3.8 3.4 - 5.0 g/dL    Protein Total 7.6 6.8 - 8.8 g/dL    Alkaline Phosphatase 120 40 - 150 U/L    ALT 29 0 - 70 U/L    AST 14 0 - 45 U/L      EKG required for Risks of  anaesthesia and patient Obese and not completed in the last 90 days.     Revised Cardiac Risk Index (RCRI):  The patient has the following serious cardiovascular risks for perioperative complications:   - No serious cardiac risks = 0 points     RCRI Interpretation: 0 points: Class I (very low risk - 0.4% complication rate)           Signed Electronically by: Sushma Galloway MD  Copy of this evaluation report is provided to requesting physician.

## 2021-03-30 NOTE — PATIENT INSTRUCTIONS
Please do the labs as discussed for your Preop clearance and also fatigue which I ordered already 3/22/2021.    ===========================================   Preparing for Your Surgery  Getting started  A nurse will call you to review your health history and instructions. They will give you an arrival time based on your scheduled surgery time.  Please be ready to share the following:    Your doctor's clinic name and phone number    Your medical, surgical and anesthesia history    A list of allergies and sensitivities    A list of medicines, including herbal treatments and over-the-counter drugs    Whether the patient has a legal guardian (ask how to send us the papers in advance)  If you have a child who's having surgery, please ask for a copy of Preparing for Your Child's Surgery.    Preparing for surgery    Within 30 days of surgery: Have a pre-op exam (sometimes called an H&P, or History and Physical). This can be done at a clinic or pre-operative center.  ? If you're having a , you may not need this exam. Talk to your care team    At your pre-op exam, talk to your care team about all medicines you take. If you need to stop any medicines before surgery, ask when to start taking them again.  ? We do this for your safety. Many medicines can make you bleed too much during surgery. Some change how well surgery (anesthesia) drugs work.    Call your insurance company to let them know you're having surgery. (If you don't have insurance, call [phone].)    Call your clinic if there's any change in your health. This includes signs of a cold or flu (sore throat, runny nose, cough, rash, fever). It also includes a scrape or scratch near the surgery site.    If you have questions on the day of surgery, call your hospital or surgery center.  Eating and drinking guidelines  For your safety: Unless your surgeon tells you otherwise, follow the guidelines below.    Eat and drink as usual until 8 hours before surgery.  After that, no food or milk.    Drink clear liquids until 2 hours before surgery. These are liquids you can see through, like water, Gatorade and Propel Water. You may also have black coffee and tea (no cream or milk).    Nothing by mouth within 2 hours of surgery. This includes gum, candy and breath mints.    If you drink, stop drinking alcohol the night before surgery.    If your care team tells you to take medicine on the morning of surgery, it's okay to take it with a sip of water.  Preventing infection    Shower or bathe the night before and morning of your surgery. Follow the instructions your clinic gave you. (If no instructions, use regular soap.)    Don't shave or clip hair near your surgery site. We'll remove the hair if needed.    Don't smoke or vape the morning of surgery. You may chew nicotine gum up to 2 hours before surgery. A nicotine patch is okay.  ? Note: Some surgeries require you to completely quit smoking and nicotine. Check with your surgeon.    Your care team will make every effort to keep you safe from infection. We will:  ? Clean our hands often with soap and water (or an alcohol-based hand rub).  ? Clean the skin at your surgery site with a special soap that kills germs.  ? Give you a special gown to keep you warm. (Cold raises the risk of infection.)  ? Wear special hair covers, masks, gowns and gloves during surgery.  ? Give antibiotic medicine, if prescribed. Not all surgeries need antibiotics.  What to bring on the day of surgery    Photo ID and insurance card    Copy of your health care directive, if you have one    Glasses and hearing aides (bring cases)  ? You can't wear contacts during surgery    Inhaler and eye drops, if you use them (tell us about these when you arrive)    CPAP machine or breathing device, if you use them    A few personal items, if spending the night    If you have . . .  ? A pacemaker or ICD (cardiac defibrillator): Bring the ID card.  ? An implanted  stimulator: Bring the remote control.  ? A legal guardian: Bring a copy of the certified (court-stamped) guardianship papers.  Please remove any jewelry, including body piercings. Leave jewelry and other valuables at home.  If you're going home the day of surgery  Important: If you don't follow the rules below, we must cancel your surgery.     Arrange for someone to drive you home after surgery. You may not drive, take a taxi or take public transportation by yourself (unless you'll have local anesthesia only).    Arrange for a responsible adult to stay with you overnight. If you don't, we may keep you in the hospital overnight, and you may need to pay the costs yourself.  Questions?   If you have any questions for your care team, list them here: _________________________________________________________________________________________________________________________________________________________________________________________________________________________________________________________________________________________________________________________  For informational purposes only. Not to replace the advice of your health care provider. Copyright   2003, 2019 [facility]. All rights reserved. Clinically reviewed by Lali Mckeon MD. CSDN [id number] - REV 4/20.

## 2021-03-31 ENCOUNTER — TELEPHONE (OUTPATIENT)
Dept: FAMILY MEDICINE | Facility: CLINIC | Age: 66
End: 2021-03-31

## 2021-03-31 NOTE — TELEPHONE ENCOUNTER
----- Message from Sushma Galloway MD sent at 3/31/2021 12:44 AM CDT -----  Your MRI spine does show POSITIVE for multilevel degenerative changes and spinal canal stenosis. Recommend to follow up with your Spine specialist for need of Epidural injection after your Shoulder surgery as discussed.   Sushma Galloway MD on 3/31/2021 at 12:44 AM

## 2021-03-31 NOTE — TELEPHONE ENCOUNTER
Non detailed message left for pt to return call to clinic and ask to speak with a triage nurse.    Advised results and Dr. Galloway's message is available in my chart.    Lis AGUILAR RN  EP Triage

## 2021-03-31 NOTE — RESULT ENCOUNTER NOTE
Your X ray is normal, no concerns per radiology review.    Please let me know if you have any questions.  Sushma Galloway MD on 3/31/2021 at 1:02 AM

## 2021-04-02 ENCOUNTER — ANESTHESIA EVENT (OUTPATIENT)
Dept: SURGERY | Facility: CLINIC | Age: 66
End: 2021-04-02
Payer: COMMERCIAL

## 2021-04-02 DIAGNOSIS — Z11.59 ENCOUNTER FOR SCREENING FOR OTHER VIRAL DISEASES: ICD-10-CM

## 2021-04-02 LAB
SARS-COV-2 RNA RESP QL NAA+PROBE: NORMAL
SPECIMEN SOURCE: NORMAL

## 2021-04-02 PROCEDURE — U0003 INFECTIOUS AGENT DETECTION BY NUCLEIC ACID (DNA OR RNA); SEVERE ACUTE RESPIRATORY SYNDROME CORONAVIRUS 2 (SARS-COV-2) (CORONAVIRUS DISEASE [COVID-19]), AMPLIFIED PROBE TECHNIQUE, MAKING USE OF HIGH THROUGHPUT TECHNOLOGIES AS DESCRIBED BY CMS-2020-01-R: HCPCS | Mod: 90 | Performed by: PATHOLOGY

## 2021-04-02 PROCEDURE — U0005 INFEC AGEN DETEC AMPLI PROBE: HCPCS | Mod: 90 | Performed by: PATHOLOGY

## 2021-04-03 ENCOUNTER — TELEPHONE (OUTPATIENT)
Dept: LAB | Facility: CLINIC | Age: 66
End: 2021-04-03

## 2021-04-03 LAB
LABORATORY COMMENT REPORT: ABNORMAL
SARS-COV-2 RNA RESP QL NAA+PROBE: POSITIVE
SPECIMEN SOURCE: ABNORMAL

## 2021-04-03 NOTE — TELEPHONE ENCOUNTER
"Coronavirus (COVID-19) Notification    Caller Name (Patient, parent, daughter/son, grandparent, etc)   patient    Reason for call  Notify of Positive Coronavirus (COVID-19) lab results, assess symptoms,  review  [facility] recommendations    Lab Result    Lab test:  2019-nCoV rRt-PCR or SARS-CoV-2 PCR    Oropharyngeal AND/OR nasopharyngeal swabs is POSITIVE for 2019-nCoV RNA/SARS-COV-2 PCR (COVID-19 virus)    RN Recommendations/Instructions per [facility] Coronavirus COVID-19 recommendations    Brief introduction script  Introduce self then review script:  \"I am calling on behalf of [employer].  We were notified that your Coronavirus test (COVID-19) for was POSITIVE for the virus.  I have some information to relay to you but first I wanted to mention that the [place] Dept of Health will be contacting you shortly [it's possible MD already called Patient] to talk to you more about how you are feeling and other people you have had contact with who might now also have the virus.  Also,  [facility] is Partnering with the [facility] for Covid-19 research, you may be contacted directly by research staff.\"    Assessment (Inquire about Patient's current symptoms)   Assessment   Current Symptoms at time of phone call: (if no symptoms, document No symptoms] Fatigue, body aches, headaches, loss appetite   Symptoms onset (if applicable) n/a     If at time of call, Patients symptoms hare worsened, the Patient should contact 911 or have someone drive them to Emergency Dept promptly:      If Patient calling 911, inform 911 personal that you have tested positive for the Coronavirus (COVID-19).  Place mask on and await 911 to arrive.    If Emergency Dept, If possible, please have another adult drive you to the Emergency Dept but you need to wear mask when in contact with other people.      Monoclonal Antibody Administration    You may be eligible to receive a new treatment with a monoclonal antibody for " "preventing hospitalization in patients at high risk for complications from COVID-19.   This medication is still experimental and available on a limited basis; it is given through an IV and must be given at an infusion center. Please note that not all people who are eligible will receive the medication since it is in limited supply.     Are you interested in being considered for this medication?  Yes.   Is the patient symptomatic?  Yes. Is the patient 18 years of age or older? Yes.  Is the patient newly (within the last week) on supplemental oxygen or requiring more oxygen than usual?  No. Patient criteria for selection: Is the patients weight equal to or greater than 40 kg (88 lbs)? Yes.  Is the patient's age 65 years or older?  Yes.  Patient qualifies, refer patient to [facility].   Does the patient fit the criteria: Yes: Patient referred to [place] website, patient or family/friend will complete application.    If patient qualifies based on above criteria:  \"You will be contacted if you are selected to receive this treatment in the next 1-2 business days.   This is time sensitive and if you are not selected in the next 1-2 business days, you will not receive the medication.  If you do not receive a call to schedule, you have not been selected.\"      Review information with Patient    Your result was positive. This means you have COVID-19 (coronavirus).  We have sent you a letter that reviews the information that I'll be reviewing with you now.    How can I protect others?    If you have symptoms: stay home and away from others (self-isolate) until:    You've had no fever--and no medicine that reduces fever--for 1 full day (24 hours). And       Your other symptoms have gotten better. For example, your cough or breathing has improved. And     At least 10 days have passed since your symptoms started. (If you've been told by a doctor that you have a weak immune system, wait 20 days.)     If you don't have symptoms: Stay home " and away from others (self-isolate) until at least 10 days have passed since your first positive COVID-19 test. (Date test collected)    During this time:    Stay in your own room, including for meals. Use your own bathroom if you can.    Stay away from others in your home. No hugging, kissing or shaking hands. No visitors.     Don't go to work, school or anywhere else.     Clean  high touch  surfaces often (doorknobs, counters, handles, etc.). Use a household cleaning spray or wipes. You'll find a full list on the EPA website at www.epa.gov/pesticide-registration/list-n-disinfectants-use-against-sars-cov-2.     Cover your mouth and nose with a mask, tissue or other face covering to avoid spreading germs.    Wash your hands and face often with soap and water.    Make a list of people you have been in close contact with recently, even if either of you wore a face covering.   ; Start your list from 2 days before you became ill or had a positive test.  ; Include anyone that was within 6 feet of you for a cumulative total of 15 minutes or more in 24 hours. (Example: if you sat next to Bruce for 5 minutes in the morning and 10 minutes in the afternoon, then you were in close contact for 15 minutes total that day. Bruce would be added to your list.)    A public health worker will call or text you. It is important that you answer. They will ask you questions about possible exposures to COVID-19, such as people you have been in direct contact with and places you have visited.    Tell the people on your list that you have COVID-19; they should stay away from others for 14 days starting from the last time they were in contact with you (unless you are told something different from a public health worker).     Caregivers in these groups are at risk for severe illness due to COVID-19:  o People 65 years and older  o People who live in a nursing home or long-term care facility  o People with chronic disease (lung, heart, cancer,  diabetes, kidney, liver, immunologic)  o People who have a weakened immune system, including those who:  - Are in cancer treatment  - Take medicine that weakens the immune system, such as corticosteroids  - Had a bone marrow or organ transplant  - Have an immune deficiency  - Have poorly controlled HIV or AIDS  - Are obese (body mass index of 40 or higher)  - Smoke regularly    Caregivers should wear gloves while washing dishes, handling laundry and cleaning bedrooms and bathrooms.    Wash and dry laundry with special caution. Don't shake dirty laundry, and use the warmest water setting you can.    If you have a weakened immune system, ask your doctor about other actions you should take.    For more tips, go to www.cdc.gov/coronavirus/2019-ncov/downloads/10Things.pdf.    You should not go back to work until you meet the guidelines above for ending your home isolation. You don't need to be retested for COVID-19 before going back to work--studies show that you won't spread the virus if it's been at least 10 days since your symptoms started (or 20 days, if you have a weak immune system).    Employers: This document serves as formal notice of your employee's medical guidelines for going back to work. They must meet the above guidelines before going back to work in person.    How can I take care of myself?    1. Get lots of rest. Drink extra fluids (unless a doctor has told you not to).    2. Take Tylenol (acetaminophen) for fever or pain. If you have liver or kidney problems, ask your family doctor if it's okay to take Tylenol.     Take either:     650 mg (two 325 mg pills) every 4 to 6 hours, or     1,000 mg (two 500 mg pills) every 8 hours as needed.     Note: Don't take more than 3,000 mg in one day. Acetaminophen is found in many medicines (both prescribed and over-the-counter medicines). Read all labels to be sure you don't take too much.    For children, check the Tylenol bottle for the right dose (based on their  age or weight).    3. If you have other health problems (like cancer, heart failure, an organ transplant or severe kidney disease): Call your specialty clinic if you don't feel better in the next 2 days.    4. Know when to call 911: Emergency warning signs include:    Trouble breathing or shortness of breath    Pain or pressure in the chest that doesn't go away    Feeling confused like you haven't felt before, or not being able to wake up    Bluish-colored lips or face    5. Sign up for [employer]. We know it's scary to hear that you have COVID-19. We want to track your symptoms to make sure you're okay over the next 2 weeks. Please look for an email from [employer]--this is a free, online program that we'll use to keep in touch. To sign up, follow the link in the email. Learn more at [email or web address].    Where can I get more information?    [facility]: [email or web address]    Coronavirus Basics: www.health.[email or web address].[place]./diseases/coronavirus/basics.html    What to Do If You're Sick: www.cdc.gov/coronavirus/2019-ncov/about/steps-when-sick.html    Ending Home Isolation: www.cdc.gov/coronavirus/2019-ncov/hcp/disposition-in-home-patients.html     Caring for Someone with COVID-19: www.cdc.gov/coronavirus/2019-ncov/if-you-are-sick/care-for-someone.html     [facility] clinical trials (COVID-19 research studies): clinicalaffairs.[facility]/n-clinical-trials     A Positive COVID-19 letter will be sent via [employer] or the mail. (Exception, no letters sent to Presurgerical/Preprocedure Patients)    Deloris Angulo LPN

## 2021-04-05 ENCOUNTER — TELEPHONE (OUTPATIENT)
Dept: ORTHOPEDICS | Facility: CLINIC | Age: 66
End: 2021-04-05

## 2021-04-05 NOTE — TELEPHONE ENCOUNTER
Called patient to discuss is positive covid result and plan for surgery. He stated he was walking into an appointment and will call back when he is done.     Telma LAZO RN

## 2021-04-06 ENCOUNTER — ANESTHESIA (OUTPATIENT)
Dept: SURGERY | Facility: CLINIC | Age: 66
End: 2021-04-06
Payer: COMMERCIAL

## 2021-04-06 ENCOUNTER — TELEPHONE (OUTPATIENT)
Dept: ORTHOPEDICS | Facility: CLINIC | Age: 66
End: 2021-04-06

## 2021-04-06 NOTE — TELEPHONE ENCOUNTER
RN did not receive a call back from Arnaud yesterday. Called this morning and left the RN direct call back number to discuss how Arnaud can get rescheduled for surgery.     Telma LAZO RN

## 2021-04-12 ENCOUNTER — TELEPHONE (OUTPATIENT)
Dept: FAMILY MEDICINE | Facility: CLINIC | Age: 66
End: 2021-04-12

## 2021-04-12 ENCOUNTER — NURSE TRIAGE (OUTPATIENT)
Dept: FAMILY MEDICINE | Facility: CLINIC | Age: 66
End: 2021-04-12

## 2021-04-12 ENCOUNTER — OFFICE VISIT (OUTPATIENT)
Dept: FAMILY MEDICINE | Facility: CLINIC | Age: 66
End: 2021-04-12
Payer: COMMERCIAL

## 2021-04-12 ENCOUNTER — TELEPHONE (OUTPATIENT)
Dept: ORTHOPEDICS | Facility: CLINIC | Age: 66
End: 2021-04-12

## 2021-04-12 ENCOUNTER — ANCILLARY PROCEDURE (OUTPATIENT)
Dept: GENERAL RADIOLOGY | Facility: CLINIC | Age: 66
End: 2021-04-12
Attending: PHYSICIAN ASSISTANT
Payer: COMMERCIAL

## 2021-04-12 VITALS
TEMPERATURE: 98.5 F | RESPIRATION RATE: 14 BRPM | SYSTOLIC BLOOD PRESSURE: 120 MMHG | DIASTOLIC BLOOD PRESSURE: 70 MMHG | HEART RATE: 114 BPM | OXYGEN SATURATION: 95 %

## 2021-04-12 DIAGNOSIS — R07.81 PLEURITIC CHEST PAIN: Primary | ICD-10-CM

## 2021-04-12 DIAGNOSIS — R07.81 PLEURITIC CHEST PAIN: ICD-10-CM

## 2021-04-12 DIAGNOSIS — U07.1 2019 NOVEL CORONAVIRUS DISEASE (COVID-19): ICD-10-CM

## 2021-04-12 DIAGNOSIS — U07.1 2019 NOVEL CORONAVIRUS DISEASE (COVID-19): Primary | ICD-10-CM

## 2021-04-12 LAB
ALBUMIN SERPL-MCNC: 3.4 G/DL (ref 3.4–5)
ALP SERPL-CCNC: 101 U/L (ref 40–150)
ALT SERPL W P-5'-P-CCNC: 46 U/L (ref 0–70)
ANION GAP SERPL CALCULATED.3IONS-SCNC: 2 MMOL/L (ref 3–14)
AST SERPL W P-5'-P-CCNC: 20 U/L (ref 0–45)
BASOPHILS # BLD AUTO: 0 10E9/L (ref 0–0.2)
BASOPHILS NFR BLD AUTO: 0.3 %
BILIRUB DIRECT SERPL-MCNC: 0.2 MG/DL (ref 0–0.2)
BILIRUB SERPL-MCNC: 0.6 MG/DL (ref 0.2–1.3)
BUN SERPL-MCNC: 13 MG/DL (ref 7–30)
CALCIUM SERPL-MCNC: 8.8 MG/DL (ref 8.5–10.1)
CHLORIDE SERPL-SCNC: 107 MMOL/L (ref 94–109)
CO2 SERPL-SCNC: 29 MMOL/L (ref 20–32)
CREAT SERPL-MCNC: 0.81 MG/DL (ref 0.66–1.25)
D DIMER PPP FEU-MCNC: 1.1 UG/ML FEU (ref 0–0.5)
DIFFERENTIAL METHOD BLD: NORMAL
EOSINOPHIL # BLD AUTO: 0 10E9/L (ref 0–0.7)
EOSINOPHIL NFR BLD AUTO: 0.3 %
ERYTHROCYTE [DISTWIDTH] IN BLOOD BY AUTOMATED COUNT: 14.5 % (ref 10–15)
GFR SERPL CREATININE-BSD FRML MDRD: >90 ML/MIN/{1.73_M2}
GLUCOSE SERPL-MCNC: 103 MG/DL (ref 70–99)
HCT VFR BLD AUTO: 44.2 % (ref 40–53)
HGB BLD-MCNC: 15.1 G/DL (ref 13.3–17.7)
LYMPHOCYTES # BLD AUTO: 1.5 10E9/L (ref 0.8–5.3)
LYMPHOCYTES NFR BLD AUTO: 20 %
MCH RBC QN AUTO: 30.5 PG (ref 26.5–33)
MCHC RBC AUTO-ENTMCNC: 34.2 G/DL (ref 31.5–36.5)
MCV RBC AUTO: 89 FL (ref 78–100)
MONOCYTES # BLD AUTO: 0.7 10E9/L (ref 0–1.3)
MONOCYTES NFR BLD AUTO: 10.1 %
NEUTROPHILS # BLD AUTO: 5 10E9/L (ref 1.6–8.3)
NEUTROPHILS NFR BLD AUTO: 69.3 %
PLATELET # BLD AUTO: 375 10E9/L (ref 150–450)
POTASSIUM SERPL-SCNC: 4.5 MMOL/L (ref 3.4–5.3)
PROT SERPL-MCNC: 7.7 G/DL (ref 6.8–8.8)
RBC # BLD AUTO: 4.95 10E12/L (ref 4.4–5.9)
SODIUM SERPL-SCNC: 138 MMOL/L (ref 133–144)
WBC # BLD AUTO: 7.2 10E9/L (ref 4–11)

## 2021-04-12 PROCEDURE — 85025 COMPLETE CBC W/AUTO DIFF WBC: CPT | Performed by: PHYSICIAN ASSISTANT

## 2021-04-12 PROCEDURE — 71046 X-RAY EXAM CHEST 2 VIEWS: CPT | Performed by: RADIOLOGY

## 2021-04-12 PROCEDURE — 93000 ELECTROCARDIOGRAM COMPLETE: CPT | Performed by: PHYSICIAN ASSISTANT

## 2021-04-12 PROCEDURE — 85379 FIBRIN DEGRADATION QUANT: CPT | Performed by: PHYSICIAN ASSISTANT

## 2021-04-12 PROCEDURE — 99214 OFFICE O/P EST MOD 30 MIN: CPT | Performed by: PHYSICIAN ASSISTANT

## 2021-04-12 PROCEDURE — 36415 COLL VENOUS BLD VENIPUNCTURE: CPT | Performed by: PHYSICIAN ASSISTANT

## 2021-04-12 PROCEDURE — 80076 HEPATIC FUNCTION PANEL: CPT | Performed by: PHYSICIAN ASSISTANT

## 2021-04-12 PROCEDURE — 80048 BASIC METABOLIC PNL TOTAL CA: CPT | Performed by: PHYSICIAN ASSISTANT

## 2021-04-12 NOTE — TELEPHONE ENCOUNTER
Left message for patient to let them know to schedule a new physical before upcoming procedure. Left clinic number for patient to call back with any questions.    Telma LAZO RN

## 2021-04-12 NOTE — TELEPHONE ENCOUNTER
"Patient reporting mid back pain cramping in the \"kidney area\"  that wraps around to his lower ribs. Denies shortness of breath, but feels tightness in his chest when he takes a deep breath. States that he is done with quarantine tomorrow.   Was diagnosed with COVID 19 on 4/2. States that he got the shot bamlanivimab-etesevimab on Monday 4/5.   States that he feels pain when he takes a deep breath. When he yawns it tightens up. Denies fever. States that his appetite has been poor, but he has been eating.   Scheduled for telephone visit. Huddling with provider.    Additional Information    Negative: Severe difficulty breathing (e.g., struggling for each breath, speaks in single words)    Negative: Passed out (i.e., fainted, collapsed and was not responding)    Answer Assessment - Initial Assessment Questions  1. LOCATION: \"Where does it hurt?\"        Mid back wraps around to lower chest. Mild pain to take a deep breath.   2. RADIATION: \"Does the pain go anywhere else?\" (e.g., into neck, jaw, arms, back)     Reports \"kidney area of the back. Above belt, but below shoulder blades\"  3. ONSET: \"When did the chest pain begin?\" (Minutes, hours or days)       yesterday  4. PATTERN \"Does the pain come and go, or has it been constant since it started?\"  \"Does it get worse with exertion?\"       If move around it hurts. Not constant.   5. DURATION: \"How long does it last\" (e.g., seconds, minutes, hours)      Lasts briefly like a muscle spasm, cramping thing.   6. SEVERITY: \"How bad is the pain?\"  (e.g., Scale 1-10; mild, moderate, or severe)     - MILD (1-3): doesn't interfere with normal activities      - MODERATE (4-7): interferes with normal activities or awakens from sleep     - SEVERE (8-10): excruciating pain, unable to do any normal activities        Moderate. Had to get up in the chair at about 3 am  7. CARDIAC RISK FACTORS: \"Do you have any history of heart problems or risk factors for heart disease?\" (e.g., prior heart " "attack, angina; high blood pressure, diabetes, being overweight, high cholesterol, smoking, or strong family history of heart disease)      Just had a pre surgery physical and everything was ok. Negative to all above noted risk factors.   8. PULMONARY RISK FACTORS: \"Do you have any history of lung disease?\"  (e.g., blood clots in lung, asthma, emphysema, birth control pills)      Denies lung problems   9. CAUSE: \"What do you think is causing the chest pain?\"      No idea what is causing the back/chest pain.   10. OTHER SYMPTOMS: \"Do you have any other symptoms?\" (e.g., dizziness, nausea, vomiting, sweating, fever, difficulty breathing, cough)        Denies dizziness, nausea, sweating, no fever. No difficulty breathing except tightness when takes a deep breath.  11. PREGNANCY: \"Is there any chance you are pregnant?\" \"When was your last menstrual period?\"        NA    Protocols used: CHEST PAIN-A-[place]  Darlyn Gamez RN    "

## 2021-04-12 NOTE — PROGRESS NOTES
Assessment & Plan     Pleuritic chest pain  EKG shows NSR, 95 BMP. Will contact patient with result of Chest x ray and labs when available.  Currently, differential includes, COVID 19 pneumonia, PE, vs abdominal etiology such as cholecystitis.    I will contact the patient when I have results available  - EKG 12-lead complete w/read - Clinics  - XR Chest 2 Views; Future  - Basic metabolic panel  (Ca, Cl, CO2, Creat, Gluc, K, Na, BUN)  - Hepatic panel (Albumin, ALT, AST, Bili, Alk Phos, TP)  - D dimer, quantitative  - CBC with platelets and differential    2019 novel coronavirus disease (COVID-19          No follow-ups on file.    NANCI [facility]    Antonio   Arnaud is a 65 year old who presents for the following health issues     HPI     Concern - pain around chest and back  Onset: since Yesterday  Description: pain around chest and back, Pain in ribs area is getting worse short of breath.  Tested Positive for COVID.  No cough  Intensity: severe  Progression of Symptoms:  worsening  Accompanying Signs & Symptoms: see denisse  Previous history of similar problem:   Precipitating factors:        Worsened by:   Alleviating factors:        Improved by:   Therapies tried and outcome: Vicodin from shoulder pain and then did take a flexeril    Arnaud is a 66 y/o male patient with hx of chronic pain syndrome and recent diagnosis of COVID 19 on 4/2/2021 and is s/p monoclonal antibody infusion presents to the clinic with 1 day hx of moderate abdominal pain located in his RUQ and LUQ just underneath his ribs and some chest pain that is worse with deep breathing.  He has not been experiencing any dyspnea, cough, fevers, n/v/d.  He notes a history of cholelithiasis, but has not had a cholecystectomy. Pain is constant, worse with breathing and with movement, food does not seem to trigger his pain.         Review of Systems   Constitutional, HEENT, cardiovascular, pulmonary,  GI, , musculoskeletal, neuro, skin, endocrine and psych systems are negative, except as otherwise noted.      Objective    /70   Pulse 114   Temp 98.5  F (36.9  C)   Resp 14   SpO2 95%   There is no height or weight on file to calculate BMI.  Physical Exam   GENERAL: healthy, alert and no distress  EYES: Eyes grossly normal to inspection, PERRL and conjunctivae and sclerae normal  HENT: ear canals and TM's normal, nose and mouth without ulcers or lesions  NECK: no adenopathy, no asymmetry, masses, or scars and thyroid normal to palpation  RESP: lungs clear to auscultation - no rales, rhonchi or wheezes  CV: rate tachy, 115 BPM regular rhythm, normal S1 S2, no S3 or S4, no murmur, click or rub, no peripheral edema and peripheral pulses strong  ABDOMEN: soft, moderate TTP of RUQ and LLQ, no rebound or guarding noted, no hepatosplenomegaly, no masses and bowel sounds normal  PSYCH: mentation appears normal, affect normal/bright    EKG - Reviewed and interpreted by me appears normal, NSR, normal axis, normal intervals, no acute ST/T changes c/w ischemia, no LVH by voltage criteria, unchanged from previous tracings

## 2021-04-12 NOTE — TELEPHONE ENCOUNTER
S/w pt and wife and gave Ranjit's reply below about pulse oximeter.  Wife states she will go now to the drug store since curfew starts at 7 pm.    Lis AGUILAR RN  EP Triage

## 2021-04-13 ENCOUNTER — TELEPHONE (OUTPATIENT)
Dept: FAMILY MEDICINE | Facility: CLINIC | Age: 66
End: 2021-04-13

## 2021-04-13 ENCOUNTER — HOSPITAL ENCOUNTER (OUTPATIENT)
Dept: CT IMAGING | Facility: CLINIC | Age: 66
Discharge: HOME | End: 2021-04-13
Attending: PHYSICIAN ASSISTANT | Admitting: PHYSICIAN ASSISTANT
Payer: COMMERCIAL

## 2021-04-13 DIAGNOSIS — R79.89 POSITIVE D DIMER: Primary | ICD-10-CM

## 2021-04-13 DIAGNOSIS — R79.89 POSITIVE D DIMER: ICD-10-CM

## 2021-04-13 PROCEDURE — 71275 CT ANGIOGRAPHY CHEST: CPT

## 2021-04-13 PROCEDURE — 250N000009 HC RX 250: Performed by: PHYSICIAN ASSISTANT

## 2021-04-13 PROCEDURE — 250N000011 HC RX IP 250 OP 636: Performed by: PHYSICIAN ASSISTANT

## 2021-04-13 RX ORDER — IOPAMIDOL 755 MG/ML
79 INJECTION, SOLUTION INTRAVASCULAR ONCE
Status: COMPLETED | OUTPATIENT
Start: 2021-04-13 | End: 2021-04-13

## 2021-04-13 RX ADMIN — IOPAMIDOL 79 ML: 755 INJECTION, SOLUTION INTRAVENOUS at 18:53

## 2021-04-13 RX ADMIN — SODIUM CHLORIDE 100 ML: 9 INJECTION, SOLUTION INTRAVENOUS at 18:54

## 2021-04-13 NOTE — TELEPHONE ENCOUNTER
Please change order to CT PE chest. Appt scheduled at [facility] today at 6:30 pm. Pt to check in at 6:15 at the first floor welcome desk. No prep.   Donna Alicea,

## 2021-04-13 NOTE — TELEPHONE ENCOUNTER
Disregard last note. Please call Arnaud to help him schedule a CTA chest to r/p PE.  This should be done stat, read and call.   Patient is aware of results and is aware of need for CTA

## 2021-04-13 NOTE — TELEPHONE ENCOUNTER
Please call patient with the results of his labs.  His d dimer is positive.  When this is elevated, it could indicate that he has a blood clot. I a ordering a stat CTA.   Once he has been called, please sent to TC to schedule CTA    TC: please help schedule this patient at [facility] for stat CTA chest to r/o PE.   Community acquired pneumonia

## 2021-04-13 NOTE — TELEPHONE ENCOUNTER
Called Arnaud with scheduling information.  He verbally agrees to go have the CT scan tonight at 630.

## 2021-04-14 ENCOUNTER — APPOINTMENT (OUTPATIENT)
Dept: GENERAL RADIOLOGY | Facility: CLINIC | Age: 66
End: 2021-04-14
Attending: EMERGENCY MEDICINE
Payer: COMMERCIAL

## 2021-04-14 ENCOUNTER — TELEPHONE (OUTPATIENT)
Dept: FAMILY MEDICINE | Facility: CLINIC | Age: 66
End: 2021-04-14

## 2021-04-14 ENCOUNTER — TRANSFERRED RECORDS (OUTPATIENT)
Dept: HEALTH INFORMATION MANAGEMENT | Facility: CLINIC | Age: 66
End: 2021-04-14

## 2021-04-14 ENCOUNTER — HOSPITAL ENCOUNTER (EMERGENCY)
Facility: CLINIC | Age: 66
Discharge: ANOTHER HOSPITAL | End: 2021-04-14
Attending: EMERGENCY MEDICINE | Admitting: EMERGENCY MEDICINE
Payer: COMMERCIAL

## 2021-04-14 VITALS
RESPIRATION RATE: 13 BRPM | OXYGEN SATURATION: 92 % | WEIGHT: 240 LBS | BODY MASS INDEX: 36.49 KG/M2 | HEART RATE: 98 BPM | DIASTOLIC BLOOD PRESSURE: 87 MMHG | TEMPERATURE: 96 F | SYSTOLIC BLOOD PRESSURE: 132 MMHG

## 2021-04-14 DIAGNOSIS — U07.1 2019 NOVEL CORONAVIRUS DISEASE (COVID-19): ICD-10-CM

## 2021-04-14 DIAGNOSIS — R09.02 HYPOXIA: ICD-10-CM

## 2021-04-14 DIAGNOSIS — J12.82 PNEUMONIA DUE TO 2019-NCOV: ICD-10-CM

## 2021-04-14 DIAGNOSIS — U07.1 PNEUMONIA DUE TO 2019-NCOV: ICD-10-CM

## 2021-04-14 DIAGNOSIS — R06.02 SOB (SHORTNESS OF BREATH): ICD-10-CM

## 2021-04-14 LAB
ALBUMIN SERPL-MCNC: 3 G/DL (ref 3.4–5)
ALP SERPL-CCNC: 112 U/L (ref 40–150)
ALT SERPL W P-5'-P-CCNC: 37 U/L (ref 0–70)
ANION GAP SERPL CALCULATED.3IONS-SCNC: 8 MMOL/L (ref 3–14)
APTT PPP: 44 SEC (ref 22–37)
AST SERPL W P-5'-P-CCNC: 17 U/L (ref 0–45)
BASOPHILS # BLD AUTO: 0 10E9/L (ref 0–0.2)
BASOPHILS NFR BLD AUTO: 0.3 %
BILIRUB SERPL-MCNC: 1.1 MG/DL (ref 0.2–1.3)
BUN SERPL-MCNC: 14 MG/DL (ref 7–30)
CALCIUM SERPL-MCNC: 8.8 MG/DL (ref 8.5–10.1)
CHLORIDE SERPL-SCNC: 105 MMOL/L (ref 94–109)
CO2 SERPL-SCNC: 25 MMOL/L (ref 20–32)
CREAT SERPL-MCNC: 0.69 MG/DL (ref 0.66–1.25)
DIFFERENTIAL METHOD BLD: NORMAL
EOSINOPHIL # BLD AUTO: 0 10E9/L (ref 0–0.7)
EOSINOPHIL NFR BLD AUTO: 0.2 %
ERYTHROCYTE [DISTWIDTH] IN BLOOD BY AUTOMATED COUNT: 13.7 % (ref 10–15)
GFR SERPL CREATININE-BSD FRML MDRD: >90 ML/MIN/{1.73_M2}
GLUCOSE SERPL-MCNC: 104 MG/DL (ref 70–99)
HCT VFR BLD AUTO: 43.2 % (ref 40–53)
HGB BLD-MCNC: 14.4 G/DL (ref 13.3–17.7)
IMM GRANULOCYTES # BLD: 0.1 10E9/L (ref 0–0.4)
IMM GRANULOCYTES NFR BLD: 0.8 %
INR PPP: 1.32 (ref 0.86–1.14)
LIPASE SERPL-CCNC: 108 U/L (ref 73–393)
LYMPHOCYTES # BLD AUTO: 1 10E9/L (ref 0.8–5.3)
LYMPHOCYTES NFR BLD AUTO: 10.3 %
MAGNESIUM SERPL-MCNC: 2.2 MG/DL (ref 1.6–2.3)
MCH RBC QN AUTO: 29.3 PG (ref 26.5–33)
MCHC RBC AUTO-ENTMCNC: 33.3 G/DL (ref 31.5–36.5)
MCV RBC AUTO: 88 FL (ref 78–100)
MONOCYTES # BLD AUTO: 1 10E9/L (ref 0–1.3)
MONOCYTES NFR BLD AUTO: 10.4 %
NEUTROPHILS # BLD AUTO: 7.8 10E9/L (ref 1.6–8.3)
NEUTROPHILS NFR BLD AUTO: 78 %
NRBC # BLD AUTO: 0 10*3/UL
NRBC BLD AUTO-RTO: 0 /100
PHOSPHATE SERPL-MCNC: 3.1 MG/DL (ref 2.5–4.5)
PLATELET # BLD AUTO: 338 10E9/L (ref 150–450)
POTASSIUM SERPL-SCNC: 3.8 MMOL/L (ref 3.4–5.3)
PROT SERPL-MCNC: 7.8 G/DL (ref 6.8–8.8)
RBC # BLD AUTO: 4.92 10E12/L (ref 4.4–5.9)
SODIUM SERPL-SCNC: 138 MMOL/L (ref 133–144)
TROPONIN I SERPL-MCNC: <0.015 UG/L (ref 0–0.04)
WBC # BLD AUTO: 9.9 10E9/L (ref 4–11)

## 2021-04-14 PROCEDURE — 83690 ASSAY OF LIPASE: CPT | Performed by: EMERGENCY MEDICINE

## 2021-04-14 PROCEDURE — 99285 EMERGENCY DEPT VISIT HI MDM: CPT | Mod: 25 | Performed by: EMERGENCY MEDICINE

## 2021-04-14 PROCEDURE — 85730 THROMBOPLASTIN TIME PARTIAL: CPT | Performed by: EMERGENCY MEDICINE

## 2021-04-14 PROCEDURE — 84484 ASSAY OF TROPONIN QUANT: CPT | Performed by: EMERGENCY MEDICINE

## 2021-04-14 PROCEDURE — 85610 PROTHROMBIN TIME: CPT | Performed by: EMERGENCY MEDICINE

## 2021-04-14 PROCEDURE — 83735 ASSAY OF MAGNESIUM: CPT | Performed by: EMERGENCY MEDICINE

## 2021-04-14 PROCEDURE — 250N000011 HC RX IP 250 OP 636: Performed by: EMERGENCY MEDICINE

## 2021-04-14 PROCEDURE — 80053 COMPREHEN METABOLIC PANEL: CPT | Performed by: EMERGENCY MEDICINE

## 2021-04-14 PROCEDURE — 85025 COMPLETE CBC W/AUTO DIFF WBC: CPT | Performed by: EMERGENCY MEDICINE

## 2021-04-14 PROCEDURE — 93005 ELECTROCARDIOGRAM TRACING: CPT | Performed by: EMERGENCY MEDICINE

## 2021-04-14 PROCEDURE — 93010 ELECTROCARDIOGRAM REPORT: CPT | Performed by: EMERGENCY MEDICINE

## 2021-04-14 PROCEDURE — 250N000013 HC RX MED GY IP 250 OP 250 PS 637: Performed by: EMERGENCY MEDICINE

## 2021-04-14 PROCEDURE — 71045 X-RAY EXAM CHEST 1 VIEW: CPT

## 2021-04-14 PROCEDURE — 96374 THER/PROPH/DIAG INJ IV PUSH: CPT | Performed by: EMERGENCY MEDICINE

## 2021-04-14 PROCEDURE — 84100 ASSAY OF PHOSPHORUS: CPT | Performed by: EMERGENCY MEDICINE

## 2021-04-14 RX ORDER — DEXAMETHASONE SODIUM PHOSPHATE 10 MG/ML
10 INJECTION, SOLUTION INTRAMUSCULAR; INTRAVENOUS ONCE
Status: COMPLETED | OUTPATIENT
Start: 2021-04-14 | End: 2021-04-14

## 2021-04-14 RX ORDER — CYCLOBENZAPRINE HCL 10 MG
10 TABLET ORAL ONCE
Status: COMPLETED | OUTPATIENT
Start: 2021-04-14 | End: 2021-04-14

## 2021-04-14 RX ADMIN — CYCLOBENZAPRINE 10 MG: 10 TABLET, FILM COATED ORAL at 12:36

## 2021-04-14 RX ADMIN — DEXAMETHASONE SODIUM PHOSPHATE 10 MG: 10 INJECTION, SOLUTION INTRAMUSCULAR; INTRAVENOUS at 12:36

## 2021-04-14 ASSESSMENT — ENCOUNTER SYMPTOMS
FATIGUE: 1
ABDOMINAL PAIN: 0
WEAKNESS: 1
SHORTNESS OF BREATH: 1
FEVER: 0
COUGH: 1

## 2021-04-14 NOTE — ED PROVIDER NOTES
[facility] EMERGENCY DEPARTMENT ([facility])  4/14/21  History     Chief Complaint   Patient presents with     Shortness of Breath     pos test for covid 4/2      Chest Pain     The history is provided by the patient and medical records.   Shortness of Breath  Associated symptoms: chest pain and cough    Associated symptoms: no abdominal pain and no fever      Arnaud Bird is a 65 year old male with a history of asthma and COVID-19 (4/2/2021) s/p monoclonal antibody infusion who presents for evaluation of chest pain and shortness of breath.  The patient had an asymptomatic COVID-19 positive result for an elective surgery on his left shoulder on 4/2/2021, 12 days ago. On Sunday, 3 days ago the patient developed shortness of breath and chest tightness, which which he mostly feels in his bilateral ribs which radiate into his back.  Patient was evaluated by his PCP on 4/12/2021, 2 days ago in which his EKG showed normal sinus rhythm.  CXR was ordered and he was referred to get a CT a chest yesterday.  Complete impression pasted below.  Patient was given a pulse oximeter to bring home.  He states overnight his O2 sats ranged between 88 and 93. The patient called his PCP, notifying them of the results and was recommended to visit the ED for evaluation. He endorses he does feel short of breath with movement and activity and had difficulty getting in and out of the car to come to the ER.    The patient denies a change in his symptoms.  He notes his rib pain/chest tightness worsens with breathing and movements.  He is unable to differentiate if his shortness of breath is due to the pain or due to COVID-19.  He does endorse an infrequent cough.  He also complains of weakness and fatigue.  Fevers or he denies abdominal pain patient denies a history of tobacco use.  He notes occasionally drinking alcohol.  Patient denies a personal history of diabetes mellitus, HTN, cardiac disease or pulmonary disease.    CXR - M  [facility] (4/12/2021)  IMPRESSION: Slight worsening of patchy and nodular airspace opacities particularly in the left upper lobe and right lung base likely worsening pneumonia. No pleural effusion or pneumothorax. Stable mild prominence of the cardiac silhouette.    CT CHEST PE - M [facility] (4/13/2021)  IMPRESSION:  1.  Diffuse irregular nodular and groundglass opacities in both lungs are likely related to an infectious cause such as COVID 19, however, other etiologies including metastatic disease are not excluded. Short interval follow-up CT recommended.  2.  Prominent mediastinal and hilar lymph nodes likely reactive.    Past Medical History:   Diagnosis Date     Arthritis      Benign positional vertigo      Carpal tunnel syndrome     mild     Hearing problem      LEFT WORSE THAN RIGHT  10/18/2012     Migraines      Nasal polyps      Unspecified asthma(493.90) 10/17/2012     Unspecified asthma, with exacerbation 10/17/2012       Past Surgical History:   Procedure Laterality Date     HC TOOTH EXTRACTION W/FORCEP       ORTHOPEDIC SURGERY      bilateral total knee replacements     TONSILLECTOMY      age 2 or 3       Family History   Problem Relation Age of Onset     Cancer Father      Family History Negative Father      Family History Negative Mother      Cancer Mother      Stomach Problem Mother      Diabetes No family hx of      Coronary Artery Disease No family hx of      Hypertension No family hx of      Hyperlipidemia No family hx of      Cerebrovascular Disease No family hx of      Breast Cancer No family hx of      Colon Cancer No family hx of      Prostate Cancer No family hx of      Other Cancer No family hx of      Depression No family hx of      Anxiety Disorder No family hx of      Mental Illness No family hx of      Substance Abuse No family hx of      Anesthesia Reaction No family hx of      Asthma No family hx of      Osteoporosis No family hx of      Genetic Disorder No family  hx of      Thyroid Disease No family hx of      Obesity No family hx of      Unknown/Adopted No family hx of        Social History     Tobacco Use     Smoking status: Never Smoker     Smokeless tobacco: Never Used   Substance Use Topics     Alcohol use: Yes     Comment: case beer a week     Current Facility-Administered Medications   Medication     lidocaine (PF) (XYLOCAINE) 1 % injection 2 mL     lidocaine (PF) (XYLOCAINE) 1 % injection 2 mL     ropivacaine (NAROPIN) injection 1 mL     ropivacaine (NAROPIN) injection 1 mL     ropivacaine (NAROPIN) injection 1 mL     ropivacaine (NAROPIN) injection 2 mL     ropivacaine (NAROPIN) injection 2 mL     ropivacaine (NAROPIN) injection 3 mL     ropivacaine (NAROPIN) injection 3 mL     ropivacaine (NAROPIN) injection 3 mL     ropivacaine (NAROPIN) injection 3 mL     ropivacaine (NAROPIN) injection 3 mL     ropivacaine (NAROPIN) injection 3 mL     ropivacaine (NAROPIN) injection 3 mL     ropivacaine (NAROPIN) injection 3 mL     ropivacaine (NAROPIN) injection 3 mL     ropivacaine (NAROPIN) injection 3 mL     ropivacaine (NAROPIN) injection 3 mL     ropivacaine (NAROPIN) injection 3 mL     triamcinolone (KENALOG-40) injection 20 mg     triamcinolone (KENALOG-40) injection 40 mg     triamcinolone (KENALOG-40) injection 40 mg     triamcinolone (KENALOG-40) injection 40 mg     triamcinolone (KENALOG-40) injection 40 mg     triamcinolone (KENALOG-40) injection 40 mg     triamcinolone (KENALOG-40) injection 40 mg     triamcinolone (KENALOG-40) injection 40 mg     triamcinolone (KENALOG-40) injection 40 mg     triamcinolone (KENALOG-40) injection 40 mg     triamcinolone (KENALOG-40) injection 40 mg     triamcinolone (KENALOG-40) injection 40 mg     triamcinolone (KENALOG-40) injection 40 mg     triamcinolone (KENALOG-40) injection 40 mg     triamcinolone (KENALOG-40) injection 40 mg     Current Outpatient Medications   Medication     acyclovir (ZOVIRAX) 400 MG tablet     albuterol  (PROAIR HFA, PROVENTIL HFA, VENTOLIN HFA) 108 (90 BASE) MCG/ACT inhaler     allopurinol (ZYLOPRIM) 300 MG tablet     beclomethasone (QVAR) 40 MCG/ACT AERS IS A DISCONTINUED MEDICATION     diclofenac (VOLTAREN) 1 % topical gel     diclofenac (VOLTAREN) 1 % topical gel     fluticasone (FLONASE) 50 MCG/ACT nasal spray     HYDROcodone-acetaminophen (NORCO) 7.5-325 MG per tablet     ipratropium (ATROVENT) 0.06 % nasal spray     lidocaine (XYLOCAINE) 5 % external ointment     lidocaine 5 % EX patch     naloxone (NARCAN) 4 MG/0.1ML nasal spray     order for DME     pseudoePHEDrine (SUDAFED) 30 MG tablet     salicylic acid (MEDIPLAST) 40 % miscellaneous     sildenafil (VIAGRA) 100 MG tablet     simvastatin (ZOCOR) 40 MG tablet        Allergies   Allergen Reactions     Seasonal Allergies          Review of Systems   Constitutional: Positive for fatigue. Negative for fever.   Respiratory: Positive for cough and shortness of breath.    Cardiovascular: Positive for chest pain.   Gastrointestinal: Negative for abdominal pain.   Neurological: Positive for weakness.     A complete review of systems was performed with pertinent positives and negatives noted in the HPI, and all other systems negative.    Physical Exam   BP: 128/87  Pulse: 82  Temp: 96  F (35.6  C)  Resp: 18  Weight: 108.9 kg (240 lb)  SpO2: 100 %  Physical Exam  General: awake, alert, he appears moderately unwell and becomes acutely dyspneic with any movement/position changes within the bed  Head: normal cephalic  HEENT: pupils equal, conjugate gaze intact  Neck: Supple  CV: regular rate and rhythm without murmur  Lungs: clear to auscultation  Abd: soft, non-tender, no guarding, no peritoneal signs  EXT: lower extremities without swelling or edema  Neuro: awake, answers questions appropriately. No focal deficits noted     ED Course      Procedures             EKG Interpretation:      Interpreted by Saul Blackman MD  Time reviewed: 11:35  Symptoms at time of EKG: Chest  pain and shortness of breath   Rhythm: normal sinus   Rate: 90  Axis: normal  Ectopy: none  Conduction: normal  ST Segments/ T Waves: No ST-T wave changes  Q Waves: none  Comparison to prior: Unchanged    Clinical Impression: no sign of acute ischemia    This part of the document was transcribed by Cody Pisano [employer].    The medical record was reviewed and interpreted.  Current labs reviewed and interpreted.  Previous labs reviewed and interpreted.  Previous images reviewed and interpreted: No pe, bilateral infilatrates.  EKG reviewed and interpreted: NSR, no sign of ischemia .       Results for orders placed or performed during the hospital encounter of 04/14/21   XR Chest Port 1 View     Status: None    Narrative    CHEST PORTABLE ONE VIEW April 14, 2021 12:39 PM     HISTORY: Shortness of breath.    COMPARISON: Chest x-ray 4/12/2021.      Impression    IMPRESSION: Bilateral patchy airspace opacities again noted. This is  stable on the left and may be mildly improved at the right chest base.  This is worrisome for ongoing multifocal pneumonia or other lung  inflammatory disease. Mild cardiomegaly is stable.    ANDERS VALDEZ MD   CBC with platelets differential     Status: None   Result Value Ref Range    WBC 9.9 4.0 - 11.0 10e9/L    RBC Count 4.92 4.4 - 5.9 10e12/L    Hemoglobin 14.4 13.3 - 17.7 g/dL    Hematocrit 43.2 40.0 - 53.0 %    MCV 88 78 - 100 fl    MCH 29.3 26.5 - 33.0 pg    MCHC 33.3 31.5 - 36.5 g/dL    RDW 13.7 10.0 - 15.0 %    Platelet Count 338 150 - 450 10e9/L    Diff Method Automated Method     % Neutrophils 78.0 %    % Lymphocytes 10.3 %    % Monocytes 10.4 %    % Eosinophils 0.2 %    % Basophils 0.3 %    % Immature Granulocytes 0.8 %    Nucleated RBCs 0 0 /100    Absolute Neutrophil 7.8 1.6 - 8.3 10e9/L    Absolute Lymphocytes 1.0 0.8 - 5.3 10e9/L    Absolute Monocytes 1.0 0.0 - 1.3 10e9/L    Absolute Eosinophils 0.0 0.0 - 0.7 10e9/L    Absolute Basophils 0.0 0.0 - 0.2 10e9/L    Abs  Immature Granulocytes 0.1 0 - 0.4 10e9/L    Absolute Nucleated RBC 0.0    Comprehensive metabolic panel     Status: Abnormal   Result Value Ref Range    Sodium 138 133 - 144 mmol/L    Potassium 3.8 3.4 - 5.3 mmol/L    Chloride 105 94 - 109 mmol/L    Carbon Dioxide 25 20 - 32 mmol/L    Anion Gap 8 3 - 14 mmol/L    Glucose 104 (H) 70 - 99 mg/dL    Urea Nitrogen 14 7 - 30 mg/dL    Creatinine 0.69 0.66 - 1.25 mg/dL    GFR Estimate >90 >60 mL/min/[1.73_m2]    GFR Estimate If Black >90 >60 mL/min/[1.73_m2]    Calcium 8.8 8.5 - 10.1 mg/dL    Bilirubin Total 1.1 0.2 - 1.3 mg/dL    Albumin 3.0 (L) 3.4 - 5.0 g/dL    Protein Total 7.8 6.8 - 8.8 g/dL    Alkaline Phosphatase 112 40 - 150 U/L    ALT 37 0 - 70 U/L    AST 17 0 - 45 U/L   INR     Status: Abnormal   Result Value Ref Range    INR 1.32 (H) 0.86 - 1.14   Partial thromboplastin time     Status: Abnormal   Result Value Ref Range    PTT 44 (H) 22 - 37 sec   Lipase     Status: None   Result Value Ref Range    Lipase 108 73 - 393 U/L   Magnesium     Status: None   Result Value Ref Range    Magnesium 2.2 1.6 - 2.3 mg/dL   Troponin I     Status: None   Result Value Ref Range    Troponin I ES <0.015 0.000 - 0.045 ug/L   Phosphorus     Status: None   Result Value Ref Range    Phosphorus 3.1 2.5 - 4.5 mg/dL   EKG 12-lead, tracing only     Status: None (Preliminary result)   Result Value Ref Range    Interpretation ECG Click View Image link to view waveform and result      Medications   dexamethasone PF (DECADRON) injection 10 mg (10 mg Intravenous Given 4/14/21 1236)   cyclobenzaprine (FLEXERIL) tablet 10 mg (10 mg Oral Given 4/14/21 1236)        Assessments & Plan (with Medical Decision Making)   Arnaud is a 65-year-old male who presents with chest pain and shortness of breath in the setting of known coronavirus infection.    On initial evaluation patient had stable vital signs though appeared dyspneic with any movement was placed on low-flow O2.    I reviewed patient's chart.   "Patient had negative CT PE study yesterday.    Differential include things like PE, Covid pneumonia, ACS, myocarditis, given lower rib pain would also consider some type of referred pathology from the abdomen related to the coronavirus such as hepatitis.    Reviewed patient's CT PE study.  Negative study yesterday, so low suspicion and will not repeat today.    EKG obtained patient shows no signs of acute ischemia, troponin is negative.  Additionally patient is not endorsing pain across his anterior chest, he describes it more as a \"cramp\", I have lower suspicion for ACS.    Repeated CXR continued to show bilateral infiltrates consistent with coronavirus.    Given patient's hypoxia, dyspnea and increased work of breathing here in the ER, plan is to admit.  Will give dexamethasone with the hypoxia.  Patient was placed on low-flow oxygen here with a respiratory stats in the low 90s; we will continue to monitor and escalate oxygen therapies as needed.    Remainder patient's labs are unremarkable; normal white count, normal hemoglobin, normal electrolytes, normal AST, normal LFTs and normal lipase.    Patient will be admitted to [facility] for ongoing oxygen therapy and treatment for Covid pneumonia.    This part of the document was transcribed by Mariano Farah, Medical Scribe.    I have reviewed the nursing notes. I have reviewed the findings, diagnosis, plan and need for follow up with the patient.    New Prescriptions    No medications on file       Final diagnoses:   SOB (shortness of breath)   2019 novel coronavirus disease (COVID-19)   Hypoxia     I, Mariano Farah, am serving as a trained medical scribe to document services personally performed by Saul Blackman MD, based on the provider's statements to me.      I, Saul Blackman MD, was physically present and have reviewed and verified the accuracy of this note documented by Mariano Farah.   --  Saul Blackman MD  [facility] EMERGENCY " DEPARTMENT  4/14/2021     Saul Blackman MD  04/14/21 0850

## 2021-04-14 NOTE — PROGRESS NOTES
[facility]  Transfer Triage Note    Date of call: 04/14/21  Time of call: 3:20 PM    Is pandemic COVID-19 a concern? YES:   1.  Travel history/exposure history (select all that apply): likely community spread   2.  Vitals: Fill in 96F  85  128/87  14  94% 2L  3.  On Oxygen? (select one option):  NC L/minute 2  4.  History of lung disease or active smoking (or other risk factors for death/respiratory      failure?: Yes: asthma  5.  Type of bed requested (select one option): med/surg  6.  Other Isolation needed (select all that apply): Airborne  7.  Has the patient been added to the shared patient list 'COVID'?  Yes      Reason for transfer: covid   Diagnosis: covid pneumonia    Outside Records: Available  Additional records requested to be faxed to [phone].    Stability of Patient: Patient is vitally stable, with no critical labs, and will likely remain stable throughout the transfer process  ICU: No    Expected Time of Arrival for Transfer: 0-8 hours    Arrival Location:  [facility] [address] [place] [address] Phone: [phone]    Recommendations for Management and Stabilization: Given    Additional Comments   Received call from [facility] ED    65M h/o asthma  pw dyspnea to [facility] ED today  Dyspneic with ambulation  +COVID 10 days ago,   Hypoxic to 80s in ED  94% on 2L NC  Fully alert and no increased work of breathing at rest  CXR b/l infiltrates  Dexamethasone 6mg IV in ED    Bed currently available at [facility]. Accepted for transfer from ED to med/surg bed at [facility].       Uriel Montes MD

## 2021-04-14 NOTE — ED TRIAGE NOTES
Pt answered yes to COVID questions and was taken straight back to a room with a mask on that he arrived with.

## 2021-04-14 NOTE — TELEPHONE ENCOUNTER
Spoke with Arnaud last night regarding his CT scan results.  He shared that his 02 sats had been in the low 90s all day.  I advised that he go to the ER for evaluation of hypoxia secondary to COVID 19.  I do not see that he went to the ER.  Please call patient to check in and see if he went to outside ER.

## 2021-04-14 NOTE — TELEPHONE ENCOUNTER
----- Message from Ranjit Alex PA-C sent at 4/12/2021  3:13 PM CDT -----  Call patient to see how he is feeling

## 2021-04-14 NOTE — ED NOTES
[facility]   ED Nurse to Floor Handoff     Arnaud Bird is a 65 year old male who speaks English and lives with family members,  in a home  They arrived in the ED by car from home    ED Chief Complaint: Shortness of Breath (pos test for covid 4/2 ) and Chest Pain    ED Dx;   Final diagnoses:   SOB (shortness of breath)   2019 novel coronavirus disease (COVID-19)   Hypoxia         Needed?: No    Allergies:   Allergies   Allergen Reactions     Seasonal Allergies    .  Past Medical Hx:   Past Medical History:   Diagnosis Date     Arthritis      Benign positional vertigo      Carpal tunnel syndrome     mild     Hearing problem      LEFT WORSE THAN RIGHT  10/18/2012     Migraines      Nasal polyps      Unspecified asthma(493.90) 10/17/2012     Unspecified asthma, with exacerbation 10/17/2012      Baseline Mental status: WDL  Current Mental Status changes: at basesline    Infection present or suspected this encounter: yes COVID r/o and special precautions  Sepsis suspected: No  Isolation type: Special Precautions-COVID-19  Patient tested for COVID 19 prior to admission: YES     Activity level - Baseline/Home:  Independent  Activity Level - Current:   Independent    Bariatric equipment needed?: No    In the ED these meds were given:   Medications   dexamethasone PF (DECADRON) injection 10 mg (10 mg Intravenous Given 4/14/21 1236)   cyclobenzaprine (FLEXERIL) tablet 10 mg (10 mg Oral Given 4/14/21 1236)       Drips running?  No    Home pump  No    Current LDAs  Peripheral IV 04/14/21 Right Upper forearm (Active)   Number of days: 0       Labs results:   Labs Ordered and Resulted from Time of ED Arrival Up to the Time of Departure from the ED   COMPREHENSIVE METABOLIC PANEL - Abnormal; Notable for the following components:       Result Value    Glucose 104 (*)     Albumin 3.0 (*)     All other components within normal limits   INR - Abnormal; Notable for the  following components:    INR 1.32 (*)     All other components within normal limits   PARTIAL THROMBOPLASTIN TIME - Abnormal; Notable for the following components:    PTT 44 (*)     All other components within normal limits   CBC WITH PLATELETS DIFFERENTIAL   LIPASE   MAGNESIUM   TROPONIN I   PHOSPHORUS   PERIPHERAL IV CATHETER       Imaging Studies:   Recent Results (from the past 24 hour(s))   CT Chest Pulmonary Embolism w Contrast    Narrative    CT CHEST PULMONARY EMBOLISM WITH CONTRAST 4/13/2021 6:48 PM    CLINICAL HISTORY: PE suspected, low/intermediate prob, positive  D-dimer; COVID pneumonia, + D-dimer.    TECHNIQUE: CT angiogram chest during arterial phase injection IV  contrast. 2D and 3D MIP reconstructions were performed by the CT  technologist. Dose reduction techniques were used.     CONTRAST: 79mL Isovue-370    COMPARISON: None.    FINDINGS:  ANGIOGRAM CHEST: Pulmonary arteries are normal caliber and negative  for pulmonary emboli. Thoracic aorta is normal in caliber and negative  for dissection. No CT evidence of right heart strain. No coronary  calcifications.    LUNGS AND PLEURA: Diffuse irregular nodular and groundglass opacities.  Representative nodular opacity in the left lower lobe measures 1.1 cm  (series 9, image 181). No effusions.    MEDIASTINUM/AXILLAE: Prominent bilateral mediastinal and hilar lymph  nodes. No axillary adenopathy.    UPPER ABDOMEN: Low-attenuation of the liver compatible with fatty  infiltration. Gallstones are present.    MUSCULOSKELETAL: No destructive bone lesions.      Impression    IMPRESSION:  1.  Diffuse irregular nodular and groundglass opacities in both lungs  are likely related to an infectious cause such as COVID 19, however,  other etiologies including metastatic disease are not excluded. Short  interval follow-up CT recommended.  2.  Prominent mediastinal and hilar lymph nodes likely reactive.    Findings discussed with the ordering clinician at 7:05 PM  on  4/13/2021.    MARGARET DIAMOND MD   XR Chest Port 1 View    Narrative    CHEST PORTABLE ONE VIEW April 14, 2021 12:39 PM     HISTORY: Shortness of breath.    COMPARISON: Chest x-ray 4/12/2021.      Impression    IMPRESSION: Bilateral patchy airspace opacities again noted. This is  stable on the left and may be mildly improved at the right chest base.  This is worrisome for ongoing multifocal pneumonia or other lung  inflammatory disease. Mild cardiomegaly is stable.    ANDERS VALDEZ MD       Recent vital signs:   /87   Pulse 82   Temp 96  F (35.6  C) (Oral)   Resp 18   SpO2 100%             Cardiac Rhythm: Normal Sinus  Pt needs tele? No  Skin/wound Issues: None    Code Status: Full Code    Pain control: good    Nausea control: pt had none    Abnormal labs/tests/findings requiring intervention:     Family present during ED course? No   Family Comments/Social Situation comments:     Tasks needing completion: None    Pato Soriano, RN  [phone] [facility]  [phone] [facility]

## 2021-04-14 NOTE — PHARMACY-ADMISSION MEDICATION HISTORY
Admission Medication History Completed by Pharmacy    See [facility] Admission Navigator for allergy information, preferred outpatient pharmacy, prior to admission medications and immunization status.     Medication History Sources:     Patient    Surescripts    Changes made to PTA medication list (reason):    Added: N/A    Deleted:   o Beclomethasone (QVAR) 40 mcg/act AERS is a discontinued medication: inhale 2 puffs into the lungs 2 times daily.  o Diclofenac 1% topical gel: apply 2 grams topically to affected area four times daily as needed.  o Lidocaine 5% external ointment: apply topically 4 times daily as needed for moderate pain.   o Simvastatin 40 mg tablet: take 1 tablet by mouth daily.    Changed: N/A    Additional Information:    Patient is a great historian; he knew his medications by name, indication, and directions.    Patient states he takes aspirin OTC for headaches but he is unsure of what dose. Patient also takes unknown strengths of magnesium, potassium, and zinc supplements.     Patient confirmed he has no known drug allergies.    Prior to Admission medications    Medication Sig Last Dose Taking? Auth Provider   acyclovir (ZOVIRAX) 400 MG tablet TAKE TWO TABLETS BY MOUTH TWICE DAILY for 5 days as needed for genital herpes outbreak 4/13/2021 Yes Leanne Mooney DO   albuterol (PROAIR HFA, PROVENTIL HFA, VENTOLIN HFA) 108 (90 BASE) MCG/ACT inhaler Inhale 2 puffs into the lungs every 6 hours as needed for shortness of breath / dyspnea Past Month Yes Johnson Clements MD   allopurinol (ZYLOPRIM) 300 MG tablet Take 1 tablet (300 mg) by mouth daily 4/13/2021 Yes Fan Salinas MD   diclofenac (VOLTAREN) 1 % topical gel Place 4 g onto the skin 4 times daily Past Week Yes Sushma Galloway MD   fluticasone (FLONASE) 50 MCG/ACT nasal spray Spray 2 sprays into both nostrils daily Past Week Yes Johnson Clements MD   HYDROcodone-acetaminophen (NORCO) 7.5-325 MG per tablet Take 1 tablet  by mouth every 6 hours as needed for pain (4 x daily as needed  maxium 4 per day) 4/14/2021 Yes Sushma Galloway MD   lidocaine 5 % EX patch Place 1 patch onto the skin every 24 hours 4/13/2021 Yes Johnson Clements MD   pseudoePHEDrine (SUDAFED) 30 MG tablet Take by mouth every 4 hours as needed for congestion Past Week Yes Reported, Patient   salicylic acid (MEDIPLAST) 40 % miscellaneous Cut to fit wart, apply every night, put duct tape to secure in place, then remove in the morning. Past Month Yes Gisella Steve MD   sildenafil (VIAGRA) 100 MG tablet Take 0.5-1 tablets ( mg) by mouth daily as needed Take 30 min to 4 hours before intercourse.  Never use with nitroglycerin, terazosin or doxazosin. Past Month Yes Shar Hi MD   naloxone (NARCAN) 4 MG/0.1ML nasal spray Spray 1 spray (4 mg) into one nostril alternating nostrils once as needed for opioid reversal Every 2-3 minutes until patient responsive or EMS arrives   Johnson Clements MD   order for DME Equipment being ordered: Thumb spica splint for left hand   Andra English MD       Date completed: 04/14/21    Medication history completed by: PATSY Donovan3 Pharmacy Intern

## 2021-04-14 NOTE — TELEPHONE ENCOUNTER
Spoke with the patient. He states that he did not go to the ER. States that his O2 sats are in the mid 90's when he is sitting up, but go down when he is lying down. Still has chest tightness, but states that it is not as bad.  Gave patient the message from Ranjit Alex PA-C. Patient reports  Feeling a little better, but sounded SOB over the phone. Advised ED and the patient agreed with plan. Patient states that his wife will take him to [place].  Darlyn Gamez RN

## 2021-04-14 NOTE — TELEPHONE ENCOUNTER
Called Arnaud with the results of his CT scan.  This was negative for PE but shows a diffuse nodular viral appearing pneumonia consistent with COVID pneumonia.  Radiology advised 1 month follow up to ensure resolution and to r/o underlying malignancy.  This was discussed with Arnaud who is agreeable to follow up.   In the course of my discussion with Arnaud he is persistently complaining of chest tightness and some new dyspnea.  His wife has been checking his 02 at home and he tells me that his 02 sat has been b/w 88-90% throughout the day.  Advised that he be seen in the ER for hypoxia related to COVID 19.  He is agreeable.  Plans to go to [place].

## 2021-04-15 LAB — INTERPRETATION ECG - MUSE: NORMAL

## 2021-04-20 ENCOUNTER — TELEPHONE (OUTPATIENT)
Dept: ORTHOPEDICS | Facility: CLINIC | Age: 66
End: 2021-04-20

## 2021-04-20 ENCOUNTER — OFFICE VISIT (OUTPATIENT)
Dept: FAMILY MEDICINE | Facility: CLINIC | Age: 66
End: 2021-04-20
Payer: COMMERCIAL

## 2021-04-20 VITALS — SYSTOLIC BLOOD PRESSURE: 134 MMHG | HEART RATE: 109 BPM | DIASTOLIC BLOOD PRESSURE: 86 MMHG | OXYGEN SATURATION: 95 %

## 2021-04-20 DIAGNOSIS — M19.011 ARTHRITIS OF SHOULDER REGION, RIGHT: ICD-10-CM

## 2021-04-20 DIAGNOSIS — U07.1 PNEUMONIA DUE TO 2019 NOVEL CORONAVIRUS: Primary | ICD-10-CM

## 2021-04-20 DIAGNOSIS — J12.82 PNEUMONIA DUE TO 2019 NOVEL CORONAVIRUS: Primary | ICD-10-CM

## 2021-04-20 DIAGNOSIS — J96.01 ACUTE RESPIRATORY FAILURE WITH HYPOXIA (H): ICD-10-CM

## 2021-04-20 DIAGNOSIS — M51.16 LUMBAR DISC DISEASE WITH RADICULOPATHY: ICD-10-CM

## 2021-04-20 DIAGNOSIS — R91.8 PULMONARY NODULES: ICD-10-CM

## 2021-04-20 PROBLEM — M15.9 OSTEOARTHRITIS OF MULTIPLE JOINTS: Status: ACTIVE | Noted: 2021-04-14

## 2021-04-20 PROCEDURE — 99214 OFFICE O/P EST MOD 30 MIN: CPT | Performed by: INTERNAL MEDICINE

## 2021-04-20 RX ORDER — PREDNISONE 20 MG/1
TABLET ORAL
COMMUNITY
Start: 2020-06-26 | End: 2021-05-28

## 2021-04-20 RX ORDER — IPRATROPIUM BROMIDE 42 UG/1
SPRAY, METERED NASAL
COMMUNITY
Start: 2020-05-26 | End: 2022-02-09

## 2021-04-20 NOTE — PROGRESS NOTES
Assessment and Plan  1. Pneumonia due to 2019 novel coronavirus  New problem, Recent hospitalization and discharged due to Pneumonia 2/2 COVID with patchy CT scan of lungs, started on remdesivir and dexamethasone. Patient required 2 to 3 L of oxygen by nasal cannula on admit, which continue to improve. Patient was unable to be weaned off of oxygen prior to discharge, hence will be discharged on 2 L of oxygen  ( as per the discharge summary but pt states that he has not gotten any to home ) and also 30 days Elquis. . CT scan was POSITIVE for diffuse irregular nodular ground glass of both lungs, COVID picture. Will need to follow up CT in 2 weeks to make sure there is no malignancy. Pt never smoked as he endorses and no Hx of passive smoke exposure.   - CT Chest w/o Contrast; Future  - General PFT Lab (Please always keep checked); Future  - Pulmonary Function Test; Future  - PULMONARY MEDICINE REFERRAL    2. Acute respiratory failure with hypoxia (H)  - General PFT Lab (Please always keep checked); Future  - Pulmonary Function Test; Future  - PULMONARY MEDICINE REFERRAL    3. Pulmonary nodules  - CT Chest w/o Contrast; Future  - General PFT Lab (Please always keep checked); Future  - Pulmonary Function Test; Future  - PULMONARY MEDICINE REFERRAL    4. Arthritis of shoulder region, right  Ongoing problem, pt has been scheduled replacement surgery on 5/4/21 but given the new CT chest findings after COVID pneumonia and recent hospitalization , I have given the timeline of next steps on patient AVS to take care of his preoperative clearance. Pt will contact the Orthopedics to update regarding the same.     5. Lumbar disc disease with radiculopathy  Chronic problem Uncontrolled, pt discussing on planning for Epidural injection which needs to be atleast 2-3 weeks away from the shoulder replacement surgery by Orthopedics. Will need increased dose of 5 tabs of Norco per day instead of the current 4 tabs/day.          Patient  Instructions   As discussed, given your recent hospitalization and abnormal CT chest with Oxygen needs --- Please follow up with pulmonology and Lung function tests ordered ( Around 5/7/2021 ) .     CT chest needs to be done on 5/11/2021     Will need to contact  to get the Surgery postponed to until after 5/17/21 .     Will need preop clearance with me on 5/13/21 - 5/14/21.    ========================================  Pulmonary Function Test Address :     [facility]  [phone]  / [phone]  [address] [place] [address]    [facility] will distribute Patient Information Packet;   [facility]) will contact patient by telephone  Please call the following departments if there are questions or need assistance:  [facility]: [phone]  [facility]: [phone]  [facility]: [phone]  [facility] Registered: [phone]  [facility]: [phone]  [facility]: [phone]        ======================        As you recover from COVID-19     Patients who have symptoms (cough, fever, or shortness of breath), need to isolate for 10 days from when symptoms started AND 72 hours after fever resolves (without fever reducing medications) AND improvement of respiratory symptoms (whichever is longer).     During this time:    Isolate yourself at home (in own room/own bathroom if possible)    Do not allow any visitors    Do not go to work or school    Do not go to [employer],  centers, shopping, or other public places    Do not shake hands    Avoid close and intimate contact with others (hugging, kissing)    Follow CDC recommendations for household cleaning of frequently touched services     After the initial 10 days, continue to isolate yourself from household members as much as possible. To continue decrease the  risk of community spread and exposure, you and any members of your household should limit activities in public for 14 days after starting home isolation.      You can reference the following CDC link for helpful home isolation/care tips:  https://www.cdc.gov/coronavirus/2019-ncov/downloads/10Things.pdf     Protect Others:    Cover your mouth and nose with a mask, disposable tissue or wash cloth to avoid spreading germs.    Wash your hands and face often. Use soap and water     Call Back If: Breathing difficulty develops or you become worse.        For more information about COVID19 and options for caring for yourself at home, please visit the CDC website at https://www.cdc.gov/coronavirus/2019-ncov/about/steps-when-sick.html  For more options for care at [facility], please visit our website at [email or web address]    For information about [facility] COVID-19 Clinical Trials, please visit [email or web address]     For more information, please use the [place] Department of Health COVID-19 Website: https://www.health.[facility].[place]/diseases/coronavirus/index.html  [place] Department of Health ([facility]) COVID-19 Hotlines (Interpreters   available):                Health questions: Phone Number: [phone] or 1-163.401.9625 and Hours: 7 a.m. to 7 p.m.    Schools and  questions: Phone Number: [phone] or [phone] and Hours 7 a.m. to 7 p.m.     Coping with Life After COVID-19  Being in the hospital because of COVID-19 is scary. Going home can be scary, too. You may face changes to your life, the way you work or what you can eat. It s hard to adjust to change, and it s normal to feel afraid, frustrated or even angry. These feelings usually go away over time. If your feelings don t start to get better, it s called  adjustment disorder.       What signs should I look out for?  Adjustment disorder can happen to anyone in a time of  stress. It makes it hard to cope with daily life. You may feel lonely or fight with loved ones, even if you re glad to be home. Watch for these signs:    Fear or worry    Hard time focusing    Sadness or anger    Trouble talking to family or friends    Feeling like you don t fit in or isolating yourself    Problems with sleep     Drinking alcohol or taking drugs to cope     What can I do?  You can help yourself get better. Feeling you have control helps you move forward. You may wonder if you ll be able to do things you did before. Be patient. Do your best to make the most of every day. Try to build relationships, be as active as you can, eat right and keep a sense of humor. Avoid smoking and drinking too much alcohol. Call your family doctor or clinic if you re not sure what to do. They can guide you to care or other services.     When should I get help?  Think about getting counseling if your sadness or frustration gets worse. Together with a trained counselor, you can talk about your problems adjusting to life after your hospital stay. You can come up with new ways to handle changes that give you more control. Your family doctor or care team can help you find a counselor.      Get help if you re thinking about hurting yourself. If you need help right away, call 911 or go to the nearest emergency room. You can also try the Crisis Text Line.     Crisis Text Line: 695-537 (http://www.crisistextline.org)  The Crisis Text Line serves anyone, in any crisis. It gives free, 24/7 support. Here's how it works:  1. Text HOME to 308366 from anywhere in the [place], anytime, about any type of crisis.  2. A live, trained Crisis Counselor will text you back quickly.  3. The volunteer Crisis Counselor can help you move from a  hot moment  to a  cool moment.  They can also help you work out a safety plan.      Return in about 23 days (around 5/13/2021), or if symptoms worsen or fail to improve, for Preoperative clearance.    Sushma  MD Laverne  [facility] KAI Lares is a 65 year old who presents for the following health issues       HPI       Hospital Follow-up Visit:    Hospital/Nursing Home/IP Rehab Facility: [facility]  Date of Admission: 4-14-21  Date of Discharge: 4-18-21  Reason(s) for Admission: pneumonia complications, covid      Was your hospitalization related to COVID-19? YES   How are you feeling today? No change Better  In the past 24 hours have you had shortness of breath when speaking, walking, or climbing stairs? My breathing issues have stayed the same  Do you have a cough? Yes, I have a cough but it's not worse  When is the last time you had a fever greater than 100? none  Are you having any other symptoms? weak   Do you have any other stressors you would like to discuss with your provider? No         Not done, but patient is very pleasant and conversing well.     Was the patient in the ICU or did the patient experience delirium during hospitalization?  No          Problems taking medications regularly:  None  Medication changes since discharge:  Added pn Elquis for 30 days which he is taking regularly.   Problems adhering to non-medication therapy:  None    Summary of hospitalization:  CareEverywhere information obtained and reviewed  Diagnostic Tests/Treatments reviewed.  Follow up needed: Pulmonology, PFTs and repeat CT scan ordered.   Other Healthcare Providers Involved in Patient s Care:         None  Update since discharge: improved.       Post Discharge Medication Reconciliation: discharge medications reconciled, continue medications without change.  Plan of care communicated with patient                   Allergies   Allergen Reactions     No Clinical Screening - See Comments      Seasonal Allergies         Past Medical History:   Diagnosis Date     Arthritis      Benign positional vertigo      Carpal tunnel syndrome     mild     Hearing problem       LEFT WORSE THAN RIGHT  10/18/2012     Migraines      Nasal polyps      Unspecified asthma(493.90) 10/17/2012     Unspecified asthma, with exacerbation 10/17/2012       Past Surgical History:   Procedure Laterality Date     HC TOOTH EXTRACTION W/FORCEP       ORTHOPEDIC SURGERY      bilateral total knee replacements     TONSILLECTOMY      age 2 or 3       Family History   Problem Relation Age of Onset     Cancer Father      Family History Negative Father      Family History Negative Mother      Cancer Mother      Stomach Problem Mother      Diabetes No family hx of      Coronary Artery Disease No family hx of      Hypertension No family hx of      Hyperlipidemia No family hx of      Cerebrovascular Disease No family hx of      Breast Cancer No family hx of      Colon Cancer No family hx of      Prostate Cancer No family hx of      Other Cancer No family hx of      Depression No family hx of      Anxiety Disorder No family hx of      Mental Illness No family hx of      Substance Abuse No family hx of      Anesthesia Reaction No family hx of      Asthma No family hx of      Osteoporosis No family hx of      Genetic Disorder No family hx of      Thyroid Disease No family hx of      Obesity No family hx of      Unknown/Adopted No family hx of        Social History     Tobacco Use     Smoking status: Never Smoker     Smokeless tobacco: Never Used   Substance Use Topics     Alcohol use: Yes     Comment: case beer a week        Current Outpatient Medications   Medication     acyclovir (ZOVIRAX) 400 MG tablet     albuterol (PROAIR HFA, PROVENTIL HFA, VENTOLIN HFA) 108 (90 BASE) MCG/ACT inhaler     allopurinol (ZYLOPRIM) 300 MG tablet     apixaban ANTICOAGULANT (ELIQUIS) 2.5 MG tablet     diclofenac (VOLTAREN) 1 % topical gel     fluticasone (FLONASE) 50 MCG/ACT nasal spray     HYDROcodone-acetaminophen (NORCO) 7.5-325 MG per tablet     lidocaine 5 % EX patch     naloxone (NARCAN) 4 MG/0.1ML nasal spray     order for DME      pseudoePHEDrine (SUDAFED) 30 MG tablet     salicylic acid (MEDIPLAST) 40 % miscellaneous     sildenafil (VIAGRA) 100 MG tablet     ipratropium (ATROVENT) 0.06 % nasal spray     predniSONE (DELTASONE) 20 MG tablet     Current Facility-Administered Medications   Medication     lidocaine (PF) (XYLOCAINE) 1 % injection 2 mL     lidocaine (PF) (XYLOCAINE) 1 % injection 2 mL     ropivacaine (NAROPIN) injection 1 mL     ropivacaine (NAROPIN) injection 1 mL     ropivacaine (NAROPIN) injection 1 mL     ropivacaine (NAROPIN) injection 2 mL     ropivacaine (NAROPIN) injection 2 mL     ropivacaine (NAROPIN) injection 3 mL     ropivacaine (NAROPIN) injection 3 mL     ropivacaine (NAROPIN) injection 3 mL     ropivacaine (NAROPIN) injection 3 mL     ropivacaine (NAROPIN) injection 3 mL     ropivacaine (NAROPIN) injection 3 mL     ropivacaine (NAROPIN) injection 3 mL     ropivacaine (NAROPIN) injection 3 mL     ropivacaine (NAROPIN) injection 3 mL     ropivacaine (NAROPIN) injection 3 mL     ropivacaine (NAROPIN) injection 3 mL     ropivacaine (NAROPIN) injection 3 mL     triamcinolone (KENALOG-40) injection 20 mg     triamcinolone (KENALOG-40) injection 40 mg     triamcinolone (KENALOG-40) injection 40 mg     triamcinolone (KENALOG-40) injection 40 mg     triamcinolone (KENALOG-40) injection 40 mg     triamcinolone (KENALOG-40) injection 40 mg     triamcinolone (KENALOG-40) injection 40 mg     triamcinolone (KENALOG-40) injection 40 mg     triamcinolone (KENALOG-40) injection 40 mg     triamcinolone (KENALOG-40) injection 40 mg     triamcinolone (KENALOG-40) injection 40 mg     triamcinolone (KENALOG-40) injection 40 mg     triamcinolone (KENALOG-40) injection 40 mg     triamcinolone (KENALOG-40) injection 40 mg     triamcinolone (KENALOG-40) injection 40 mg        Review of Systems   Constitutional, HEENT, cardiovascular, pulmonary, GI, , musculoskeletal, neuro, skin, endocrine and psych systems are negative, except as  otherwise noted.      Objective    /86 (Cuff Size: Adult Large)   Pulse 109   SpO2 95%   There is no height or weight on file to calculate BMI.  Physical Exam   GENERAL: healthy, alert and no distress  NECK: no adenopathy, no asymmetry, masses, or scars and thyroid normal to palpation  RESP: lungs clear to auscultation - no rales, rhonchi or wheezes  CV: regular rate and rhythm, normal S1 S2, no S3 or S4, no murmur, click or rub, no peripheral edema and peripheral pulses strong  ABDOMEN: soft, nontender, no hepatosplenomegaly, no masses and bowel sounds normal  MS: Ongoing Shoulder arthritis, low back pain chronic problems. No acute changes seen.     Labs and imaging reviewed and discussed with the patient.

## 2021-04-20 NOTE — TELEPHONE ENCOUNTER
Spoke to Arnaud today to go over his surgery questions. Patient states he was in the hospital for covid symptoms for 5 days and discharged on Sunday 4/18. He has a hospital follow-up with his PCP today. He will ask them about getting a preop physical for his surgery which is currently scheduled for 5/4. He states his covid symptoms are much improved. He will update the clinic on the status of his physical. RN will update Dr. Deleon on his recent hospitalization.    Telma LAZO RN

## 2021-04-20 NOTE — PATIENT INSTRUCTIONS
As discussed, given your recent hospitalization and abnormal CT chest with Oxygen needs --- Please follow up with pulmonology and Lung function tests ordered ( Around 5/7/2021 ) .     CT chest needs to be done on 5/11/2021     Will need to contact  to get the Surgery postponed to until after 5/17/21 .     Will need preop clearance with me on 5/13/21 - 5/14/21.    ========================================  Pulmonary Function Test Address :     [facility]  [phone]  / [phone]  [address] [place] [address] [id number] [place] [address] PFT Lab's will distribute Patient Information Packet;   [facility] ([facility]) will contact patient by telephone  Please call the following departments if there are questions or need assistance:  [facility]: [phone]  [facility]: [phone]  [facility]: [phone]  [facility] Registered: [phone]  [facility]: [phone]  [facility]: [phone]        ======================        As you recover from COVID-19     Patients who have symptoms (cough, fever, or shortness of breath), need to isolate for 10 days from when symptoms started AND 72 hours after fever resolves (without fever reducing medications) AND improvement of respiratory symptoms (whichever is longer).     During this time:    Isolate yourself at home (in own room/own bathroom if possible)    Do not allow any visitors    Do not go to work or school    Do not go to [employer],  centers, shopping, or other public places    Do not shake hands    Avoid close and intimate contact with others (hugging, kissing)    Follow CDC recommendations for household cleaning of frequently touched services     After the initial 10 days, continue to isolate yourself from household members as much as possible. To continue decrease the risk of community  spread and exposure, you and any members of your household should limit activities in public for 14 days after starting home isolation.      You can reference the following CDC link for helpful home isolation/care tips:  https://www.cdc.gov/coronavirus/2019-ncov/downloads/10Things.pdf     Protect Others:    Cover your mouth and nose with a mask, disposable tissue or wash cloth to avoid spreading germs.    Wash your hands and face often. Use soap and water     Call Back If: Breathing difficulty develops or you become worse.        For more information about COVID19 and options for caring for yourself at home, please visit the CDC website at https://www.cdc.gov/coronavirus/2019-ncov/about/steps-when-sick.html  For more options for care at [facility], please visit our website at [email or web address]    For information about [facility] COVID-19 Clinical Trials, please visit [email or web address]     For more information, please use the [place] Department of Health COVID-19 Website: https://www.health.[facility].[place]/diseases/coronavirus/index.html  [place] Department of Health ([facility]) COVID-19 Hotlines (Interpreters   available):                Health questions: Phone Number: [phone] or [phone] and Hours: 7 a.m. to 7 p.m.    Schools and  questions: Phone Number: [phone] or [phone] and Hours 7 a.m. to 7 p.m.     Coping with Life After COVID-19  Being in the hospital because of COVID-19 is scary. Going home can be scary, too. You may face changes to your life, the way you work or what you can eat. It s hard to adjust to change, and it s normal to feel afraid, frustrated or even angry. These feelings usually go away over time. If your feelings don t start to get better, it s called  adjustment disorder.       What signs should I look out for?  Adjustment disorder can happen to anyone in a time of stress. It makes  it hard to cope with daily life. You may feel lonely or fight with loved ones, even if you re glad to be home. Watch for these signs:    Fear or worry    Hard time focusing    Sadness or anger    Trouble talking to family or friends    Feeling like you don t fit in or isolating yourself    Problems with sleep     Drinking alcohol or taking drugs to cope     What can I do?  You can help yourself get better. Feeling you have control helps you move forward. You may wonder if you ll be able to do things you did before. Be patient. Do your best to make the most of every day. Try to build relationships, be as active as you can, eat right and keep a sense of humor. Avoid smoking and drinking too much alcohol. Call your family doctor or clinic if you re not sure what to do. They can guide you to care or other services.     When should I get help?  Think about getting counseling if your sadness or frustration gets worse. Together with a trained counselor, you can talk about your problems adjusting to life after your hospital stay. You can come up with new ways to handle changes that give you more control. Your family doctor or care team can help you find a counselor.      Get help if you re thinking about hurting yourself. If you need help right away, call 911 or go to the nearest emergency room. You can also try the Crisis Text Line.     Crisis Text Line: 064-029 (http://www.crisistextline.org)  The Crisis Text Line serves anyone, in any crisis. It gives free, 24/7 support. Here's how it works:  1. Text HOME to 029497 from anywhere in the [place], anytime, about any type of crisis.  2. A live, trained Crisis Counselor will text you back quickly.  3. The volunteer Crisis Counselor can help you move from a  hot moment  to a  cool moment.  They can also help you work out a safety plan.

## 2021-04-21 ENCOUNTER — TELEPHONE (OUTPATIENT)
Dept: ORTHOPEDICS | Facility: CLINIC | Age: 66
End: 2021-04-21

## 2021-04-21 NOTE — TELEPHONE ENCOUNTER
RN spoke to Arnaud regarding his pre-op physical. Dr. Deleon was notified of the patient's recent hospitalization due to covid and Per Dr. Deleon, the patient should have his pre-op physical done at [facility]. Patient was provided with the phone number to call and schedule his physical within 30 days of his surgery, and he is in agreement with the plan.    Telma LAZO RN

## 2021-04-26 NOTE — TELEPHONE ENCOUNTER
DIAGNOSIS: Right hand pain/ no strength/ swelling /self / No imaging   APPOINTMENT DATE: 5.5.21   NOTES STATUS DETAILS   OFFICE NOTE from referring provider N/A    OFFICE NOTE from other specialist Internal 7.20.16 Dr Chan, [facility]   DISCHARGE SUMMARY from hospital N/A    DISCHARGE REPORT from the ER N/A    OPERATIVE REPORT N/A    EMG report N/A    MEDICATION LIST Internal    MRI N/A    DEXA (osteoporosis/bone health) N/A    CT SCAN N/A    XRAYS (IMAGES & REPORTS) Internal 7.20.16 R hand

## 2021-04-28 ENCOUNTER — ANCILLARY PROCEDURE (OUTPATIENT)
Dept: CT IMAGING | Facility: CLINIC | Age: 66
End: 2021-04-28
Attending: INTERNAL MEDICINE
Payer: COMMERCIAL

## 2021-04-28 ENCOUNTER — TELEPHONE (OUTPATIENT)
Dept: FAMILY MEDICINE | Facility: CLINIC | Age: 66
End: 2021-04-28

## 2021-04-28 DIAGNOSIS — M25.661 STIFFNESS OF KNEE JOINT, RIGHT: Chronic | ICD-10-CM

## 2021-04-28 DIAGNOSIS — G89.4 CHRONIC PAIN SYNDROME: Chronic | ICD-10-CM

## 2021-04-28 DIAGNOSIS — J12.82 PNEUMONIA DUE TO 2019 NOVEL CORONAVIRUS: ICD-10-CM

## 2021-04-28 DIAGNOSIS — R91.8 PULMONARY NODULES: ICD-10-CM

## 2021-04-28 DIAGNOSIS — M47.16 SPONDYLOSIS WITH MYELOPATHY, LUMBAR REGION: ICD-10-CM

## 2021-04-28 DIAGNOSIS — U07.1 PNEUMONIA DUE TO 2019 NOVEL CORONAVIRUS: ICD-10-CM

## 2021-04-28 DIAGNOSIS — Z96.652 STATUS POST TOTAL LEFT KNEE REPLACEMENT: ICD-10-CM

## 2021-04-28 DIAGNOSIS — R26.9 GAIT DIFFICULTY: ICD-10-CM

## 2021-04-28 DIAGNOSIS — J30.1 SEASONAL ALLERGIC RHINITIS DUE TO POLLEN: ICD-10-CM

## 2021-04-28 PROCEDURE — 71250 CT THORAX DX C-: CPT | Mod: GC | Performed by: RADIOLOGY

## 2021-04-28 RX ORDER — FLUTICASONE PROPIONATE 50 MCG
2 SPRAY, SUSPENSION (ML) NASAL DAILY
Qty: 16 G | Refills: 11 | Status: SHIPPED | OUTPATIENT
Start: 2021-04-28 | End: 2022-06-03

## 2021-04-28 NOTE — TELEPHONE ENCOUNTER
----- Message from Sushma Galloway MD sent at 4/28/2021  5:24 PM CDT -----  Your Ct chest is showing mildly decreased Opacities on the lungs. Will need follow up after 2 months as per the radiologist.     Please let me know if you have any questions.  Sushma Galloway MD on 4/28/2021 at 5:23 PM

## 2021-04-29 RX ORDER — HYDROCODONE BITARTRATE AND ACETAMINOPHEN 7.5; 325 MG/1; MG/1
1 TABLET ORAL EVERY 6 HOURS PRN
Qty: 120 TABLET | Refills: 0 | Status: SHIPPED | OUTPATIENT
Start: 2021-04-29 | End: 2021-06-02

## 2021-04-29 NOTE — TELEPHONE ENCOUNTER
[place] reviewed:  reviewed- no concerns    [place]:  [email or web address]    Refill done.  Sushma Galloway MD on 4/29/2021 at 9:27 AM

## 2021-04-29 NOTE — TELEPHONE ENCOUNTER
S/w pt and gave Dr. Galloway's reply below about Ct scan.    Pt states understanding about results.    Pt states shoulder surgery is scheduled for 6/15 and Ortho wants to do their own pre-op so pt will not need a pre-op with Dr. Galloway.    Pt has been getting messages about a sleep study and pt does not feel he needs it.  States he sleeps fine. Wondering if he needs to have this done?    Pt is requesting a refill on hydrocodone-acetaminophen 7.5-325 mg.    Controlled Substance Refill Request for hydrocodone-acetaminophen 7.5-325 mg  Problem List Complete:    Yes    Last Written Prescription Date:  3/23/21  Last Fill Quantity: 120,   # refills: 0    THE MOST RECENT OFFICE VISIT MUST BE WITHIN THE PAST 3 MONTHS. AT LEAST ONE FACE TO FACE VISIT MUST OCCUR EVERY 6 MONTHS. ADDITIONAL VISITS CAN BE VIRTUAL.  (THIS STATEMENT SHOULD BE DELETED.)    Last Office Visit with [facility] primary care provider: 4/20/21 Laverne    Future Office visit:     Controlled substance agreement:   Encounter-Level CSA - 10/31/2016:    Controlled Substance Agreement - Scan on 11/15/2016  7:53 AM: CONTROLLED SUBSTANCE AGREEMENT     Patient-Level CSA:    Controlled Substance Agreement - Opioid - Scan on 7/20/2020 12:15 PM  Controlled Substance Agreement - Opioid - Scan on 3/19/2019 11:01 AM         Last Urine Drug Screen:   Pain Drug SCR UR W RPTD Meds   Date Value Ref Range Status   09/09/2019 FINAL  Final     Comment:     (Note)  ====================================================================  [facility] COMP DRUG ANALYSIS,UR  ====================================================================  Test                             Result       Flag       Units        Drug Present and Declared for Prescription Verification   Hydrocodone                    665          EXPECTED   ng/mg creat   Hydromorphone                  93           EXPECTED   ng/mg creat   Dihydrocodeine                 149          EXPECTED   ng/mg creat   Norhydrocodone                  1732         EXPECTED   ng/mg creat    Sources of hydrocodone include scheduled prescription    medications. Hydromorphone, dihydrocodeine and norhydrocodone are    expected metabolites of hydrocodone. Hydromorphone and    dihydrocodeine are also available as scheduled prescription    medications.   Acetaminophen                  PRESENT      EXPECTED                Drug Absent but Declared for Prescription Verification   Diclofenac                     Not Detected UNEXPECTED                Diclofenac, as indicated in the declared medication list, is not    always detected even when used as directed.  ====================================================================  Test                      Result    Flag   Units      Ref Range        Creatinine              171              mg/dL      >=20            ====================================================================  Declared Medications:  The flagging and interpretation on this report are based on the  following declared medications.  Unexpected results may arise from  inaccuracies in the declared medications.  **Note: The testing scope of this panel includes these medications:  Hydrocodone (Norco)  **Note: The testing scope of this panel does not include small to  moderate amounts of these reported medications:  Acetaminophen (Norco)  Diclofenac (Voltaren)  **Note: The testing scope of this panel does not include following  reported medications:  Acyclovir  Albuterol  Allopurinol  Amoxicillin (Augmentin)  Beclomethasone (QVAR)  Clavulinate (Augmentin)  Clindamycin (Cleocin)  Erythromycin  Fluticasone  Ipratropium (Atrovent)  Naloxone (Narcan)  Oxymetazoline (Afrin)  Sildenafil (Viagra)  Simvastatin (Zocor)  ====================================================================  For clinical consultation, please call [phone].  ====================================================================  Analysis performed by [facility],  [facility] [place] [address]     , No results found for: COMDAT,   Cannabinoids (98-mfk-1-carboxy-9-THC)   Date Value Ref Range Status   07/20/2020 Not Detected NDET^Not Detected ng/mL Final     Comment:     Cutoff for a negative cannabinoid is 50 ng/mL or less.     Phencyclidine (Phencyclidine)   Date Value Ref Range Status   07/20/2020 Not Detected NDET^Not Detected ng/mL Final     Comment:     Cutoff for a negative PCP is 25 ng/mL or less.     Cocaine (Benzoylecgonine)   Date Value Ref Range Status   07/20/2020 Not Detected NDET^Not Detected ng/mL Final     Comment:     Cutoff for a negative cocaine is 150 ng/ml or less.     Methamphetamine (d-Methamphetamine)   Date Value Ref Range Status   07/20/2020 Not Detected NDET^Not Detected ng/mL Final     Comment:     Cutoff for a negative methamphetamine is 500 ng/ml or less.     Opiates (Morphine)   Date Value Ref Range Status   07/20/2020 Detected, Abnormal Result (A) NDET^Not Detected ng/mL Final     Comment:     Cutoff for a positive opiate is greater than 100 ng/ml.  This is an unconfirmed screening result to be used for medical purposes only.   Order [id number] for confirmation or individual confirmation tests to [facility] [facility].       Amphetamine (d-Amphetamine)   Date Value Ref Range Status   07/20/2020 Not Detected NDET^Not Detected ng/mL Final     Comment:     Cutoff for a negative amphetamine is 500 ng/mL or less.     Benzodiazepines (Nordiazepam)   Date Value Ref Range Status   07/20/2020 Not Detected NDET^Not Detected ng/mL Final     Comment:     Cutoff for a negative benzodiazepine is 150 ng/ml or less.     Tricyclic Antidepressants (Desipramine)   Date Value Ref Range Status   07/20/2020 Not Detected NDET^Not Detected ng/mL Final     Comment:     Cutoff for a negative tricyclic antidepressant is 300 ng/ml or less.     Methadone (Methadone)   Date Value Ref Range Status   07/20/2020 Not Detected NDET^Not Detected ng/mL Final     Comment:     Cutoff for a negative  methadone is 200 ng/ml or less.     Barbiturates (Butalbital)   Date Value Ref Range Status   07/20/2020 Not Detected NDET^Not Detected ng/mL Final     Comment:     Cutoff for a negative barbituate is 200 ng/ml or less.     Oxycodone (Oxycodone)   Date Value Ref Range Status   07/20/2020 Not Detected NDET^Not Detected ng/mL Final     Comment:     Cutoff for a negative Oxycodone is 100 ng/mL or less.     Propoxyphene (Norpropoxyphene)   Date Value Ref Range Status   07/20/2020 Not Detected NDET^Not Detected ng/mL Final     Comment:     Cutoff for a negative propoxyphene is 300 ng/ml or less     Buprenorphine (Buprenorphine)   Date Value Ref Range Status   07/20/2020 Not Detected NDET^Not Detected ng/mL Final     Comment:     Cutoff for a negative buprenorphine is 10 ng/ml or less        Processing:  Rx to be electronically transmitted to pharmacy by provider     [email or web address]   checked in past 3 months?  No, route to RN    [place] reviewed:  reviewed- no concerns on 4/29/21    [place]:  [email or web address]    Lis AGUILAR RN  EP Triage

## 2021-04-30 NOTE — TELEPHONE ENCOUNTER
Please advise on the remaining question. Thanks!    Pt has been getting messages about a sleep study and pt does not feel he needs it.  States he sleeps fine. Wondering if he needs to have this done?

## 2021-05-01 NOTE — TELEPHONE ENCOUNTER
I am unsure regarding sleep study referral who has placed .     He will definitely need Pulmonology Consult and Lung function test for his preoperative clearance. Please let the patient know to keep up his appointments for these both.     Thank you,  Sushma Galloway MD on 4/30/2021 at 10:11 PM

## 2021-05-04 NOTE — TELEPHONE ENCOUNTER
S/w pt and gave Dr. Galloway's reply below.  Pt states he does not have trouble sleeping.  Tried to call the number back yesterday for sleep but did not get through to anyone.    Pt states understanding.    Lis AGUILAR RN  EP Triage

## 2021-05-05 ENCOUNTER — PRE VISIT (OUTPATIENT)
Dept: ORTHOPEDICS | Facility: CLINIC | Age: 66
End: 2021-05-05

## 2021-05-10 ENCOUNTER — TELEPHONE (OUTPATIENT)
Dept: FAMILY MEDICINE | Facility: CLINIC | Age: 66
End: 2021-05-10

## 2021-05-10 ENCOUNTER — MYC MEDICAL ADVICE (OUTPATIENT)
Dept: FAMILY MEDICINE | Facility: CLINIC | Age: 66
End: 2021-05-10

## 2021-05-10 NOTE — TELEPHONE ENCOUNTER
April 6 was scheduled for shoulder surgery. Cancelled due to COVID 19 diagnosis.     Now having weird sides effects. Dizzy spells. Have gotten better. Gingko is helping.     Right shoulder hurts and both wrists. Hurt so bad can't sleep.    Scheduled for appointment tomorrow for evaluation of pain.    Darlyn Gamez RN

## 2021-05-11 ENCOUNTER — ANCILLARY PROCEDURE (OUTPATIENT)
Dept: GENERAL RADIOLOGY | Facility: CLINIC | Age: 66
End: 2021-05-11
Attending: INTERNAL MEDICINE
Payer: COMMERCIAL

## 2021-05-11 ENCOUNTER — OFFICE VISIT (OUTPATIENT)
Dept: FAMILY MEDICINE | Facility: CLINIC | Age: 66
End: 2021-05-11
Payer: COMMERCIAL

## 2021-05-11 VITALS
WEIGHT: 241 LBS | DIASTOLIC BLOOD PRESSURE: 70 MMHG | SYSTOLIC BLOOD PRESSURE: 124 MMHG | OXYGEN SATURATION: 97 % | TEMPERATURE: 97.9 F | HEART RATE: 98 BPM | HEIGHT: 68 IN | BODY MASS INDEX: 36.53 KG/M2

## 2021-05-11 DIAGNOSIS — M19.011 ARTHRITIS OF SHOULDER REGION, RIGHT: ICD-10-CM

## 2021-05-11 DIAGNOSIS — M15.0 PRIMARY OSTEOARTHRITIS INVOLVING MULTIPLE JOINTS: ICD-10-CM

## 2021-05-11 DIAGNOSIS — Z01.818 PREOPERATIVE CLEARANCE: ICD-10-CM

## 2021-05-11 DIAGNOSIS — E66.01 MORBID OBESITY (H): ICD-10-CM

## 2021-05-11 DIAGNOSIS — G56.03 BILATERAL CARPAL TUNNEL SYNDROME: Chronic | ICD-10-CM

## 2021-05-11 DIAGNOSIS — U07.1 PNEUMONIA DUE TO 2019 NOVEL CORONAVIRUS: ICD-10-CM

## 2021-05-11 DIAGNOSIS — J12.82 PNEUMONIA DUE TO 2019 NOVEL CORONAVIRUS: ICD-10-CM

## 2021-05-11 DIAGNOSIS — M25.531 RIGHT WRIST PAIN: ICD-10-CM

## 2021-05-11 DIAGNOSIS — E55.9 VITAMIN D DEFICIENCY: ICD-10-CM

## 2021-05-11 DIAGNOSIS — R79.89 LOW VITAMIN B12 LEVEL: ICD-10-CM

## 2021-05-11 DIAGNOSIS — M25.531 RIGHT WRIST PAIN: Primary | ICD-10-CM

## 2021-05-11 LAB
URATE SERPL-MCNC: 3.4 MG/DL (ref 3.5–7.2)
VIT B12 SERPL-MCNC: 628 PG/ML (ref 193–986)

## 2021-05-11 PROCEDURE — 84550 ASSAY OF BLOOD/URIC ACID: CPT | Performed by: INTERNAL MEDICINE

## 2021-05-11 PROCEDURE — 36415 COLL VENOUS BLD VENIPUNCTURE: CPT | Performed by: INTERNAL MEDICINE

## 2021-05-11 PROCEDURE — 73110 X-RAY EXAM OF WRIST: CPT | Mod: RT | Performed by: RADIOLOGY

## 2021-05-11 PROCEDURE — 82607 VITAMIN B-12: CPT | Performed by: INTERNAL MEDICINE

## 2021-05-11 PROCEDURE — 99214 OFFICE O/P EST MOD 30 MIN: CPT | Performed by: INTERNAL MEDICINE

## 2021-05-11 PROCEDURE — 82306 VITAMIN D 25 HYDROXY: CPT | Performed by: INTERNAL MEDICINE

## 2021-05-11 RX ORDER — METHYLPREDNISOLONE 4 MG
TABLET, DOSE PACK ORAL
Qty: 21 TABLET | Refills: 0 | Status: ON HOLD | OUTPATIENT
Start: 2021-05-11 | End: 2021-06-16

## 2021-05-11 ASSESSMENT — MIFFLIN-ST. JEOR: SCORE: 1837.67

## 2021-05-11 NOTE — PROGRESS NOTES
Assessment and Plan  1. Right wrist pain  2. Bilateral carpal tunnel syndrome  Acute flare up on the Rt wrist with my physical exam showing Synovitis on the lateral aspect of the wrist with severe restriction of ROM. Will give medrol dosepak and Rest Ice and wrist brace. Continue Allopurinol and avoid gout flare up diets.   - XR Wrist Right G/E 3 Views; Future  - methylPREDNISolone (MEDROL DOSEPAK) 4 MG tablet therapy pack; Follow Package Directions  Dispense: 21 tablet; Refill: 0  - Uric acid    3. Arthritis of shoulder region, right  7. Primary osteoarthritis involving multiple joints  Ongoing problem of OA which pt supposed to get his Arthroplasty surgery which was postponed due to pt being COVID positive recently.   - methylPREDNISolone (MEDROL DOSEPAK) 4 MG tablet therapy pack; Follow Package Directions  Dispense: 21 tablet; Refill: 0    4. Pneumonia due to 2019 novel coronavirus  5. Preoperative clearance  Improving. Pt has been given instructions to get a repeat CT for confirming the resolution of his Lung findings on CT in the past . Have placed future orders to get this done prior to his upcoming Surgery .  - CT Chest w/o Contrast; Future    6. Morbid obesity (H)  Pt following meticulous diet management.     8. Low vitamin B12 level  - Vitamin B12    9. Vitamin D deficiency  - Vitamin D Deficiency     Patient Instructions   As discussed, please avoid Alcohol which is causing your flare up of joint pains.     Medications sent in to your pharmacy. Please give Rest , Ice, Wrist brace . Will check X ray of right wrist.     ===============    Patient Education     Eating to Prevent Gout  Gout is a painful form of arthritis caused by an excess of uric acid. This is a waste product made by the body. It builds up in the body and forms crystals that collect in the joints, causing a gout attack. Alcohol and certain foods can trigger a gout attack. Below are some guidelines for changing your diet to help you manage  gout. Your healthcare provider can work with you to determine the best eating plan for you. You can learn which foods affect your gout more than others. Reactions to different foods can vary from person to person. Know that diet is only one part of managing gout. Take your medicines as prescribed and follow the other guidelines your healthcare provider has given you.   Foods to limit  Eating too many foods containing purines may increase the levels of uric acid in your body and increase your risk for a gout attack. It may be best to limit these high-purine foods:     Alcohol (beer, hard liquor and red wine). You may be told to give up alcohol completely.    Certain fish (anchovies, sardines, fish roes, herring, tuna, mussels, codfish, scallops, trout, and funmi)    Certain meats (red meat, processed meat, farah, turkey, wild game, and goose)    Sauces and gravies made with meat    Organ meats (such as liver, kidneys, sweetbreads, and tripe)    Legumes (such as dried beans and peas)    Mushrooms, spinach, asparagus, and cauliflower    Yeast and yeast extract supplements  Foods to try  Some foods may be helpful for people with gout. You may want to try adding some of the following foods to your diet:     Dark berries. These include blueberries, blackberries, and cherries. These berries contain chemicals that may lower uric acid.    Tofu. Tofu, which is made from soy, is a good source of protein. Studies have shown that it may be a better choice than meat for people with gout.    Omega fatty acids. These acids are found in fatty fish (such as salmon), certain oils (such as flax, olive, or nut oils), or nuts. They may help prevent inflammation due to gout.  Gout guidelines  The following guidelines are recommended by the Academy of Nutrition and Dietetics for people with gout. Your diet should be:     High in fiber, whole grains, fruits, and vegetables    Low in protein. About 15% of calories should come from protein.  Choose lean sources, such as soy, lean meats, and poultry without the skin.    Low in fat. No more than 30% of calories should come from fat, with only 10% coming from animal (saturated) fat.  [employer] last reviewed this educational content on 8/1/2020 2000-2021 [employer]. All rights reserved. This information is not intended as a substitute for professional medical care. Always follow your healthcare professional's instructions.             Return in about 3 months (around 8/11/2021), or if symptoms worsen or fail to improve, for Follow up of last visit.    Sushma Galloway MD  [facility] KAI Lares is a 66 year old who presents for the following health issues       HPI     Musculoskeletal problem/pain  Onset/Duration: ongoing Was supposed to have surgery but was cancelled due to having COVID  Description  Location: wrist and shoulder - right  Joint Swelling: no  Redness: no  Pain: YES  Warmth: no  Intensity:  severe  Progression of Symptoms:  worsening  Accompanying signs and symptoms:   Fevers: no  Numbness/tingling/weakness: no  History  Trauma to the area: no  Recent illness:  YES  Previous similar problem: no  Previous evaluation:  no  Precipitating or alleviating factors:  Aggravating factors include: none  Therapies tried and outcome: nothing    Pt does have upcoming Rt shouder Arthroplasty planned on 6/2021 . He is here for acute flareup of Rt shoulder pain and Wrist pain.   Review of records does show Lt wrist X ray in 3/2020 showing - Polyarticular degenerative change most pronounced in the triscaphe and first carpometacarpal joints. Widening of the scapholunate interval.-  Rt wrist X ray Scapholunate advanced collapse (SLAC) of the wrist is the most common pattern of degenerative arthritis in the wrist. The hallmark of SLAC is scaphoid or scapholunate ligament injury with collapse on the radial side of the wrist.      Allergies   Allergen  Reactions     No Clinical Screening - See Comments      Seasonal Allergies         Past Medical History:   Diagnosis Date     Arthritis      Benign positional vertigo      Carpal tunnel syndrome     mild     Hearing problem      LEFT WORSE THAN RIGHT  10/18/2012     Migraines      Nasal polyps      Unspecified asthma(493.90) 10/17/2012     Unspecified asthma, with exacerbation 10/17/2012       Past Surgical History:   Procedure Laterality Date     HC TOOTH EXTRACTION W/FORCEP       ORTHOPEDIC SURGERY      bilateral total knee replacements     TONSILLECTOMY      age 2 or 3       Family History   Problem Relation Age of Onset     Cancer Father      Family History Negative Father      Family History Negative Mother      Cancer Mother      Stomach Problem Mother      Diabetes No family hx of      Coronary Artery Disease No family hx of      Hypertension No family hx of      Hyperlipidemia No family hx of      Cerebrovascular Disease No family hx of      Breast Cancer No family hx of      Colon Cancer No family hx of      Prostate Cancer No family hx of      Other Cancer No family hx of      Depression No family hx of      Anxiety Disorder No family hx of      Mental Illness No family hx of      Substance Abuse No family hx of      Anesthesia Reaction No family hx of      Asthma No family hx of      Osteoporosis No family hx of      Genetic Disorder No family hx of      Thyroid Disease No family hx of      Obesity No family hx of      Unknown/Adopted No family hx of        Social History     Tobacco Use     Smoking status: Never Smoker     Smokeless tobacco: Never Used   Substance Use Topics     Alcohol use: Yes     Comment: case beer a week        Current Outpatient Medications   Medication     acyclovir (ZOVIRAX) 400 MG tablet     albuterol (PROAIR HFA, PROVENTIL HFA, VENTOLIN HFA) 108 (90 BASE) MCG/ACT inhaler     allopurinol (ZYLOPRIM) 300 MG tablet     apixaban ANTICOAGULANT (ELIQUIS) 2.5 MG tablet     diclofenac  (VOLTAREN) 1 % topical gel     fluticasone (FLONASE) 50 MCG/ACT nasal spray     HYDROcodone-acetaminophen (NORCO) 7.5-325 MG per tablet     ipratropium (ATROVENT) 0.06 % nasal spray     lidocaine 5 % EX patch     methylPREDNISolone (MEDROL DOSEPAK) 4 MG tablet therapy pack     naloxone (NARCAN) 4 MG/0.1ML nasal spray     order for DME     pseudoePHEDrine (SUDAFED) 30 MG tablet     salicylic acid (MEDIPLAST) 40 % miscellaneous     sildenafil (VIAGRA) 100 MG tablet     diclofenac (VOLTAREN) 1 % topical gel     predniSONE (DELTASONE) 20 MG tablet     Current Facility-Administered Medications   Medication     lidocaine (PF) (XYLOCAINE) 1 % injection 2 mL     lidocaine (PF) (XYLOCAINE) 1 % injection 2 mL     ropivacaine (NAROPIN) injection 1 mL     ropivacaine (NAROPIN) injection 1 mL     ropivacaine (NAROPIN) injection 1 mL     ropivacaine (NAROPIN) injection 2 mL     ropivacaine (NAROPIN) injection 2 mL     ropivacaine (NAROPIN) injection 3 mL     ropivacaine (NAROPIN) injection 3 mL     ropivacaine (NAROPIN) injection 3 mL     ropivacaine (NAROPIN) injection 3 mL     ropivacaine (NAROPIN) injection 3 mL     ropivacaine (NAROPIN) injection 3 mL     ropivacaine (NAROPIN) injection 3 mL     ropivacaine (NAROPIN) injection 3 mL     ropivacaine (NAROPIN) injection 3 mL     ropivacaine (NAROPIN) injection 3 mL     ropivacaine (NAROPIN) injection 3 mL     ropivacaine (NAROPIN) injection 3 mL     triamcinolone (KENALOG-40) injection 20 mg     triamcinolone (KENALOG-40) injection 40 mg     triamcinolone (KENALOG-40) injection 40 mg     triamcinolone (KENALOG-40) injection 40 mg     triamcinolone (KENALOG-40) injection 40 mg     triamcinolone (KENALOG-40) injection 40 mg     triamcinolone (KENALOG-40) injection 40 mg     triamcinolone (KENALOG-40) injection 40 mg     triamcinolone (KENALOG-40) injection 40 mg     triamcinolone (KENALOG-40) injection 40 mg     triamcinolone (KENALOG-40) injection 40 mg     triamcinolone  "(KENALOG-40) injection 40 mg     triamcinolone (KENALOG-40) injection 40 mg     triamcinolone (KENALOG-40) injection 40 mg     triamcinolone (KENALOG-40) injection 40 mg        Review of Systems   Constitutional, HEENT, cardiovascular, pulmonary, GI, , musculoskeletal, neuro, skin, endocrine and psych systems are negative, except as otherwise noted.      Objective    /70   Pulse 98   Temp 97.9  F (36.6  C) (Tympanic)   Ht 1.727 m (5' 8\")   Wt 109.3 kg (241 lb)   SpO2 97%   BMI 36.64 kg/m    Body mass index is 36.64 kg/m .  Physical Exam   GENERAL: healthy, alert and no distress  NECK: no adenopathy, no asymmetry, masses, or scars and thyroid normal to palpation  RESP: lungs clear to auscultation - no rales, rhonchi or wheezes  CV: regular rate and rhythm, normal S1 S2, no S3 or S4, no murmur, click or rub, no peripheral edema and peripheral pulses strong  ABDOMEN: soft, nontender, no hepatosplenomegaly, no masses and bowel sounds normal  MS: no gross musculoskeletal defects noted, no edema  Right Wrist : Synovitis on the lateral aspect of the wrist with severe restriction of ROM.   Rt Shoulder : Severe restriction of ROM which pt did have at baseline in the past.     Labs and imaging reviewed and discussed with the patient.        "

## 2021-05-11 NOTE — PATIENT INSTRUCTIONS
As discussed, please avoid Alcohol which is causing your flare up of joint pains.     Medications sent in to your pharmacy. Please give Rest , Ice, Wrist brace . Will check X ray of right wrist.     ===============    Patient Education     Eating to Prevent Gout  Gout is a painful form of arthritis caused by an excess of uric acid. This is a waste product made by the body. It builds up in the body and forms crystals that collect in the joints, causing a gout attack. Alcohol and certain foods can trigger a gout attack. Below are some guidelines for changing your diet to help you manage gout. Your healthcare provider can work with you to determine the best eating plan for you. You can learn which foods affect your gout more than others. Reactions to different foods can vary from person to person. Know that diet is only one part of managing gout. Take your medicines as prescribed and follow the other guidelines your healthcare provider has given you.   Foods to limit  Eating too many foods containing purines may increase the levels of uric acid in your body and increase your risk for a gout attack. It may be best to limit these high-purine foods:     Alcohol (beer, hard liquor and red wine). You may be told to give up alcohol completely.    Certain fish (anchovies, sardines, fish roes, herring, tuna, mussels, codfish, scallops, trout, and funmi)    Certain meats (red meat, processed meat, farah, turkey, wild game, and goose)    Sauces and gravies made with meat    Organ meats (such as liver, kidneys, sweetbreads, and tripe)    Legumes (such as dried beans and peas)    Mushrooms, spinach, asparagus, and cauliflower    Yeast and yeast extract supplements  Foods to try  Some foods may be helpful for people with gout. You may want to try adding some of the following foods to your diet:     Dark berries. These include blueberries, blackberries, and cherries. These berries contain chemicals that may lower uric  acid.    Tofu. Tofu, which is made from soy, is a good source of protein. Studies have shown that it may be a better choice than meat for people with gout.    Omega fatty acids. These acids are found in fatty fish (such as salmon), certain oils (such as flax, olive, or nut oils), or nuts. They may help prevent inflammation due to gout.  Gout guidelines  The following guidelines are recommended by the Academy of Nutrition and Dietetics for people with gout. Your diet should be:     High in fiber, whole grains, fruits, and vegetables    Low in protein. About 15% of calories should come from protein. Choose lean sources, such as soy, lean meats, and poultry without the skin.    Low in fat. No more than 30% of calories should come from fat, with only 10% coming from animal (saturated) fat.  [employer] last reviewed this educational content on 8/1/2020 2000-2021 [employer]. All rights reserved. This information is not intended as a substitute for professional medical care. Always follow your healthcare professional's instructions.

## 2021-05-12 ENCOUNTER — TELEPHONE (OUTPATIENT)
Dept: FAMILY MEDICINE | Facility: CLINIC | Age: 66
End: 2021-05-12

## 2021-05-12 LAB — DEPRECATED CALCIDIOL+CALCIFEROL SERPL-MC: 25 UG/L (ref 20–75)

## 2021-05-12 NOTE — TELEPHONE ENCOUNTER
----- Message from Sushma Galloway MD sent at 5/11/2021 11:18 PM CDT -----  Your Rt wrist X ray is POSITIVE for degenerative changes with possible ligament injury with collapse of the joint spaces in the past. Please follow up with your Orthopedics for possible need of Steroid shot into the wrist joint at later time. Let me know if you have any questions.     Sushma Galloway MD on 5/11/2021 at 11:18 PM

## 2021-05-12 NOTE — TELEPHONE ENCOUNTER
S/w pt and gave Dr. Galloway's reply about wrist xray results    Pt states he has a hand specialist in [place] and she wants to do surgery.    Pt states understanding.    Lis AGUILAR RN  EP Triage

## 2021-05-13 ENCOUNTER — PATIENT OUTREACH (OUTPATIENT)
Dept: FAMILY MEDICINE | Facility: CLINIC | Age: 66
End: 2021-05-13

## 2021-05-13 NOTE — TELEPHONE ENCOUNTER
Patient Quality Outreach      Summary:    Patient has the following on his problem list/HM: None    Patient is due/failing the following:   Annual wellness, date due: 09/09/2019    Type of outreach:    Sent [employer] message.    Questions for provider review:    None                                                                                                                                     Jennifer Coelho on 5/13/2021 at 8:26 AM

## 2021-05-15 ENCOUNTER — ANCILLARY PROCEDURE (OUTPATIENT)
Dept: CT IMAGING | Facility: CLINIC | Age: 66
End: 2021-05-15
Attending: INTERNAL MEDICINE
Payer: COMMERCIAL

## 2021-05-15 DIAGNOSIS — J12.82 PNEUMONIA DUE TO 2019 NOVEL CORONAVIRUS: ICD-10-CM

## 2021-05-15 DIAGNOSIS — U07.1 PNEUMONIA DUE TO 2019 NOVEL CORONAVIRUS: ICD-10-CM

## 2021-05-15 DIAGNOSIS — Z01.818 PREOPERATIVE CLEARANCE: ICD-10-CM

## 2021-05-15 PROCEDURE — 71250 CT THORAX DX C-: CPT | Mod: GC | Performed by: RADIOLOGY

## 2021-05-17 ENCOUNTER — TELEPHONE (OUTPATIENT)
Dept: FAMILY MEDICINE | Facility: CLINIC | Age: 66
End: 2021-05-17

## 2021-05-17 NOTE — TELEPHONE ENCOUNTER
----- Message from Sushma Galloway MD sent at 5/17/2021 12:30 PM CDT -----  Good news! Your CT chest shows that the COVID pneumonia infiltrates on your lungs are improving. Please let me know if you have any questions.    Sushma Galloway MD on 5/17/2021 at 12:30 PM

## 2021-05-17 NOTE — TELEPHONE ENCOUNTER
S/w pt and gave Dr. Galloway's reply about CT scan results.    Pt states understanding.    Lis AGUILAR RN  EP Triage

## 2021-05-18 ENCOUNTER — HOSPITAL ENCOUNTER (EMERGENCY)
Facility: CLINIC | Age: 66
Discharge: HOME | End: 2021-05-18
Attending: EMERGENCY MEDICINE | Admitting: EMERGENCY MEDICINE
Payer: COMMERCIAL

## 2021-05-18 VITALS
RESPIRATION RATE: 18 BRPM | WEIGHT: 245.6 LBS | HEART RATE: 110 BPM | BODY MASS INDEX: 37.34 KG/M2 | DIASTOLIC BLOOD PRESSURE: 99 MMHG | SYSTOLIC BLOOD PRESSURE: 144 MMHG | OXYGEN SATURATION: 95 % | TEMPERATURE: 97.7 F

## 2021-05-18 DIAGNOSIS — S01.81XA FACIAL LACERATION, INITIAL ENCOUNTER: ICD-10-CM

## 2021-05-18 PROCEDURE — 250N000009 HC RX 250

## 2021-05-18 PROCEDURE — 99283 EMERGENCY DEPT VISIT LOW MDM: CPT | Mod: 25 | Performed by: EMERGENCY MEDICINE

## 2021-05-18 PROCEDURE — 12051 INTMD RPR FACE/MM 2.5 CM/<: CPT | Performed by: EMERGENCY MEDICINE

## 2021-05-18 RX ORDER — LIDOCAINE HYDROCHLORIDE AND EPINEPHRINE 10; 10 MG/ML; UG/ML
1 INJECTION, SOLUTION INFILTRATION; PERINEURAL ONCE
Status: DISCONTINUED | OUTPATIENT
Start: 2021-05-18 | End: 2021-05-18 | Stop reason: HOSPADM

## 2021-05-18 NOTE — ED TRIAGE NOTES
Pt has facial laceration; pt working on garage and the pulley hit pt in the face; Unsure of tetanus status; injury occurred around 1400; currently not bleeding excessively however, pt states he notices dripping occasional

## 2021-05-19 ENCOUNTER — TELEPHONE (OUTPATIENT)
Dept: ORTHOPEDICS | Facility: CLINIC | Age: 66
End: 2021-05-19

## 2021-05-19 NOTE — TELEPHONE ENCOUNTER
"RN spoke to Arnaud regarding the shoulder surgery he has scheduled with Dr. Deleon. He states he has not scheduled an appointment with [facility], he thought someone was going to call him. RN provided the phone number for the [facility] clinic and encouraged him to call today and get scheduled for his pre-op physical. He states he is currently taking Eliquis BID. He was told to take it \"until the bottle is gone\" which will be in about 1 week.     Arnaud is taking HYDROcodone-acetaminophen 7.5mg-325mg four times per day. This is prescribed by his PCP. RN advised he reach out to his PCP to establish a post-op pain plan. He states he will contact his provider regarding this.    Telma LAZO RN  "

## 2021-05-19 NOTE — ED PROVIDER NOTES
"  History     Chief Complaint   Patient presents with     Facial Laceration     left upper lip; 1-1.5\" long     HPI  Arnaud Bird is a 66 year old male with a past medical history of hyperlipidemia, carpal tunnel, asthma, arthritis/chronic pain, migraine, obesity who presents to the emergency department with a chief complaint of laceration.  Located on his face/upper lip.  Patient reports that today he was working on his sisters garage and a pulley hit him in the face.  This occurred around 1400 today.  Minimal bleeding currently.  No loss of consciousness.  No other injuries.no dental injury.  Last tetanus vaccination was in 2019 per [facility].  The patient is anticoagulated on Eliquis due to recent hospitalization for COVID-19 infection.    I have reviewed the Medications, Allergies, Past Medical and Surgical History, and Social History in the [employer] system.    Past Medical History:   Diagnosis Date     Arthritis      Benign positional vertigo      Carpal tunnel syndrome     mild     Hearing problem      LEFT WORSE THAN RIGHT  10/18/2012     Migraines      Nasal polyps      Unspecified asthma(493.90) 10/17/2012     Unspecified asthma, with exacerbation 10/17/2012     Past Surgical History:   Procedure Laterality Date     HC TOOTH EXTRACTION W/FORCEP       ORTHOPEDIC SURGERY      bilateral total knee replacements     TONSILLECTOMY      age 2 or 3     Current Facility-Administered Medications   Medication     lidocaine (PF) (XYLOCAINE) 1 % injection 2 mL     lidocaine (PF) (XYLOCAINE) 1 % injection 2 mL     ropivacaine (NAROPIN) injection 1 mL     ropivacaine (NAROPIN) injection 1 mL     ropivacaine (NAROPIN) injection 1 mL     ropivacaine (NAROPIN) injection 2 mL     ropivacaine (NAROPIN) injection 2 mL     ropivacaine (NAROPIN) injection 3 mL     ropivacaine (NAROPIN) injection 3 mL     ropivacaine (NAROPIN) injection 3 mL     ropivacaine (NAROPIN) injection 3 mL     ropivacaine (NAROPIN) injection 3 mL     " ropivacaine (NAROPIN) injection 3 mL     ropivacaine (NAROPIN) injection 3 mL     ropivacaine (NAROPIN) injection 3 mL     ropivacaine (NAROPIN) injection 3 mL     ropivacaine (NAROPIN) injection 3 mL     ropivacaine (NAROPIN) injection 3 mL     ropivacaine (NAROPIN) injection 3 mL     triamcinolone (KENALOG-40) injection 20 mg     triamcinolone (KENALOG-40) injection 40 mg     triamcinolone (KENALOG-40) injection 40 mg     triamcinolone (KENALOG-40) injection 40 mg     triamcinolone (KENALOG-40) injection 40 mg     triamcinolone (KENALOG-40) injection 40 mg     triamcinolone (KENALOG-40) injection 40 mg     triamcinolone (KENALOG-40) injection 40 mg     triamcinolone (KENALOG-40) injection 40 mg     triamcinolone (KENALOG-40) injection 40 mg     triamcinolone (KENALOG-40) injection 40 mg     triamcinolone (KENALOG-40) injection 40 mg     triamcinolone (KENALOG-40) injection 40 mg     triamcinolone (KENALOG-40) injection 40 mg     triamcinolone (KENALOG-40) injection 40 mg     Current Outpatient Medications   Medication     albuterol (PROAIR HFA, PROVENTIL HFA, VENTOLIN HFA) 108 (90 BASE) MCG/ACT inhaler     allopurinol (ZYLOPRIM) 300 MG tablet     apixaban ANTICOAGULANT (ELIQUIS) 2.5 MG tablet     HYDROcodone-acetaminophen (NORCO) 7.5-325 MG per tablet     methylPREDNISolone (MEDROL DOSEPAK) 4 MG tablet therapy pack     acyclovir (ZOVIRAX) 400 MG tablet     diclofenac (VOLTAREN) 1 % topical gel     diclofenac (VOLTAREN) 1 % topical gel     fluticasone (FLONASE) 50 MCG/ACT nasal spray     ipratropium (ATROVENT) 0.06 % nasal spray     lidocaine 5 % EX patch     naloxone (NARCAN) 4 MG/0.1ML nasal spray     order for DME     predniSONE (DELTASONE) 20 MG tablet     pseudoePHEDrine (SUDAFED) 30 MG tablet     salicylic acid (MEDIPLAST) 40 % miscellaneous     sildenafil (VIAGRA) 100 MG tablet     Allergies   Allergen Reactions     No Clinical Screening - See Comments      Seasonal Allergies      Past medical history, past  surgical history, medications, and allergies were reviewed with the patient. Additional pertinent items: None    Social History     Socioeconomic History     Marital status: Single     Spouse name: Not on file     Number of children: Not on file     Years of education: Not on file     Highest education level: Not on file   Occupational History     Not on file   Social Needs     Financial resource strain: Not on file     Food insecurity     Worry: Not on file     Inability: Not on file     Transportation needs     Medical: Not on file     Non-medical: Not on file   Tobacco Use     Smoking status: Never Smoker     Smokeless tobacco: Never Used   Substance and Sexual Activity     Alcohol use: Yes     Comment: case beer a week     Drug use: No     Sexual activity: Yes     Partners: Female     Birth control/protection: Male Surgical   Lifestyle     Physical activity     Days per week: Not on file     Minutes per session: Not on file     Stress: Not on file   Relationships     Social connections     Talks on phone: Not on file     Gets together: Not on file     Attends Christianity service: Not on file     Active member of club or organization: Not on file     Attends meetings of clubs or organizations: Not on file     Relationship status: Not on file     Intimate partner violence     Fear of current or ex partner: Not on file     Emotionally abused: Not on file     Physically abused: Not on file     Forced sexual activity: Not on file   Other Topics Concern     Parent/sibling w/ CABG, MI or angioplasty before 65F 55M? No   Social History Narrative     Not on file     Social history was reviewed with the patient. Additional pertinent items: None    Review of Systems  General: No fevers or chills  Skin: No rash or diaphoresis, positive for laceration  Eyes: No eye redness or discharge  Ears/Nose/Throat: No rhinorrhea or nasal congestion  Respiratory: No cough or SOB  Cardiovascular: No chest pain or  palpitations  Gastrointestinal: No nausea, vomiting, or diarrhea  Genitourinary: No urinary frequency, hematuria, or dysuria  Musculoskeletal: No arthralgias or myalgias  Neurologic: No numbness or weakness  Psychiatric: No depression or SI  Hematologic/Lymphatic/Immunologic: No leg swelling, no easy bruising/bleeding  Endocrine: No polyuria/polydypsia    A complete review of systems was performed with pertinent positives and negatives noted in the HPI, and all other systems negative.    Physical Exam   BP: (!) 144/99  Pulse: 110  Temp: 97.7  F (36.5  C)  Resp: 18  Weight: 111.4 kg (245 lb 9.6 oz)  SpO2: 95 %      General: Well nourished, well developed, NAD  HEENT: EOMI, anicteric. NCAT, MMM, normal facial movements  Neck: no jugular venous distension, supple, nl ROM  Cardiac: Tachycardic rate, normal capillary refill  Pulm: Airway patent, nonlabored breathing  Skin: Warm and dry to the touch.  2 cm upper lip laceration extending through to inner lip mucosa  Extremities: No LE edema, no cyanosis, w/w/p  Neuro: Normal facial sensation surrounding laceration, Alert and oriented x 4, no facial droop, CN II-XII intact, moving all 4 extremities, steady gait, no nystagmus, coordination normal as tested. PERRL, EOMI. Speech is clear without aphasia or dysarthria.      ED Course        [facility]    -Laceration Repair    Date/Time: 5/18/2021 8:43 PM  Performed by: Jodie Vargas MD  Authorized by: Jodie Vargas MD       ANESTHESIA (see MAR for exact dosages):     Anesthesia method:  Local infiltration    Local anesthetic:  Lidocaine 1% WITH epi  LACERATION DETAILS     Location:  Lip    Lip location:  Upper lip, full thickness    Vermilion border involved: no      Length (cm):  2    REPAIR TYPE:     Repair type:  Intermediate      EXPLORATION:     Hemostasis achieved with:  Direct pressure and epinephrine    Wound exploration: wound explored through full range of  motion and entire depth of wound probed and visualized      Wound extent: no foreign body and no nerve damage      TREATMENT:     Wound cleansed with: chloraprep.    Amount of cleaning:  Standard    Irrigation solution:  Sterile saline    Irrigation volume:  500 cc    Irrigation method:  Pressure wash    SUBCUTANEOUS REPAIR     Suture size:  5-0    Suture material:  Vicryl    Suture technique:  Simple interrupted    Number of sutures:  2    MUCOUS MEMBRANE REPAIR     Suture size:  5-0    Suture material:  Vicryl    Suture technique:  Simple interrupted    Number of sutures:  3    SKIN REPAIR     Repair method:  Sutures    Suture size:  5-0    Suture material:  Prolene    Suture technique:  Simple interrupted    Number of sutures:  5    APPROXIMATION     Approximation:  Close    POST-PROCEDURE DETAILS     Dressing:  Open (no dressing)      PROCEDURE   Patient Tolerance:  Patient tolerated the procedure well with no immediate complications                                 Labs Ordered and Resulted from Time of ED Arrival Up to the Time of Departure from the ED - No data to display         No results found for this or any previous visit (from the past 24 hour(s)).    Labs, vital signs, and imaging studies were reviewed by me.    Medications - No data to display    Assessments & Plan (with Medical Decision Making)   Arnaud Bird is a 66 year old male who presents with facial laceration.  Laceration repaired as described above.  Tetanus vaccine is up-to-date.  No LOC.  Neurologic exam is normal.    I have reviewed the nursing notes.    I have reviewed the findings, diagnosis, plan and need for follow up with the patient.    Patient to be discharged home. Advised to follow up with PCP for suture removal in 4 to 5 days. To return to ER immediately with any new/worsening symptoms. Plan of care discussed with patient who expresses understanding and agrees with plan of care.      New Prescriptions    No medications on  file       Final diagnoses:   Facial laceration, initial encounter     Jodie Vargas MD  5/18/2021   [facility] EMERGENCY DEPARTMENT     Jodie Vargas MD  05/18/21 2047

## 2021-05-24 ENCOUNTER — TELEPHONE (OUTPATIENT)
Dept: ORTHOPEDICS | Facility: CLINIC | Age: 66
End: 2021-05-24

## 2021-05-24 NOTE — TELEPHONE ENCOUNTER
RN called and spoke with patient. RN reached out to [facility] to have PAC appt made. 6/1 at 7.15am with Kristin Potter  RN relayed that message to him and patient expressed understanding.  Also reminded patient to stop taking the Eliquis once it is completed.   Patient has no questions at this point and will reach out to RN if he has any issues.    Kami Pearson RN      ----- Message from Telma Rock RN sent at 5/19/2021 10:29 AM CDT -----  Regarding: Preop  Arnaud Bedoya had to reschedule his surgery due to covid. He was hospitalized in the middle of April and placed on Eliquis. Per Dr. Deleon he is to schedule a preop with PAC. I told him this previously but saw he was not scheduled. I have him the number to call and schedule so please follow up on this.    In regards to the Eliquis, per his report he will be finished taking it in about a week so please make sure they do not decide to continue it.  Finally, he takes prescription pain medication prescribed by his PCP. I told him to contact them to establish a post-op pain plan.    Thanks,  Telma

## 2021-05-25 ENCOUNTER — OFFICE VISIT (OUTPATIENT)
Dept: FAMILY MEDICINE | Facility: CLINIC | Age: 66
End: 2021-05-25
Payer: COMMERCIAL

## 2021-05-25 VITALS
OXYGEN SATURATION: 97 % | DIASTOLIC BLOOD PRESSURE: 86 MMHG | TEMPERATURE: 98.2 F | HEART RATE: 70 BPM | SYSTOLIC BLOOD PRESSURE: 134 MMHG | BODY MASS INDEX: 37.22 KG/M2 | WEIGHT: 244.8 LBS

## 2021-05-25 DIAGNOSIS — Z11.59 ENCOUNTER FOR SCREENING FOR OTHER VIRAL DISEASES: ICD-10-CM

## 2021-05-25 DIAGNOSIS — M19.011 ARTHRITIS OF SHOULDER REGION, RIGHT: ICD-10-CM

## 2021-05-25 DIAGNOSIS — S01.511D: Primary | ICD-10-CM

## 2021-05-25 DIAGNOSIS — M75.41 IMPINGEMENT SYNDROME, SHOULDER, RIGHT: ICD-10-CM

## 2021-05-25 DIAGNOSIS — Z79.891 CHRONIC USE OF OPIATE DRUG FOR THERAPEUTIC PURPOSE: ICD-10-CM

## 2021-05-25 PROCEDURE — 99207 PR BUNDLED PROCEDURE IN GLOBAL PKG: CPT | Performed by: INTERNAL MEDICINE

## 2021-05-25 NOTE — PROGRESS NOTES
Assessment and Plan  1. Laceration of vermilion border of upper lip, subsequent encounter  Pt had recent accidental injury on his lip in his garage work with a pulley on 5/18/21 and was in ER , 2 cm upper lip laceration extending to inner lip mucosa S/P sutures placed on upper lip - 5 prolene on the skin and 3 vicryl on the musocal membrane.   - Prolene suture removal done on the external part of the upper lip and reassured that his inner mucosal Vicryl sutures are absorbable and will leave it alone. After care instructions given. Given pt Hx of GIB with NSAIDS, to take only tylenol in addition to current Opiates for his pain. Please find the procedure note separately.   - REMOVAL OF SUTURES    2. Chronic use of opiate drug for therapeutic purpose  3. Impingement syndrome, shoulder, right  4. Arthritis of shoulder region, right   Pt has upcoming Rt total shoulder Arthroplasty , distal clavicle excision on 6/15/21 by . Will request Orthopedics to take care patient post surgical pain management after the surgery till his back to his baseline Opiate intake before resuming back to PCP taking care of his Opiate refills.   Discussed the same with the patient which he understood and agreed.      Patient Instructions   As discussed , your Opiate management post surgery is by the surgeon herself who is much aware of the pain intensity in the emily-procedural period till patient is back to baseline where you are currently on. ( Around 6-8 weeks )     Suture removal done for the external sutures. Inner sutures are absorbable and can leave it alone.     ==================    Wound Healing  - Epidermal healing occurs rapidly by bridging across the small blood clot which lies between the wound edges. Granulation tissue develops in the fresh scar and laying down of collagen in this tissue can take many months to reach a  stable state.  - Unfortunately, some scars remain red and raised for many months or even years.  Keloid scars (a small percent) are those which grow and develop beyond the confines of the original wound.     How to look after your scar after removal of sutures.   - Always wash your hands before touching your scar. Wash your scar once a day using water and pat dry. Use a familiar moisturising cream to moisturize the skin surrounding your scar twice a day. Avoid picking or scratching you scar  - Protect your scar from direct sunlight (use a sun cream for at least two years). Keep clothes loose around your scar to  avoid tension or friction which might irritate it. You can use cosmetic make up or camouflage cream over you scar    Scars will: itch or tingle from time to time go though a phase of becoming pinker or slightly red (week 4 - 6) be numb, and in some situations this numbness may be permanent lose the skin pigment color have swelling surrounding the scar initially, this will start to subside after the first 3 - 5 days and thereafter any remaining swelling may take several weeks to go completely.     Seek medical assessment and advice if: Your scar is excessively swollen, red, painful or there is discharge or odour    ================================    Patient Education     After Shoulder Replacement Surgery: Back in the Swing   After shoulder replacement, you can look forward to less pain and stiffness. You may also have more strength and movement in your shoulder and arm and be able to return to many of the activities you enjoy--like golf, swimming, bowling, gardening, or playing with your children or grandchildren. If you follow your exercise program and protect your shoulder, you ll probably be back in the swing of things within a few months. Your healthcare team will discuss how to wear your arm sling and for how long. You will likely have follow-up visits with physical therapy to make a faster and more complete recovery.     Returning to work  When you can return to work depends on your surgery and  the type of work you do. You may be able to go back to a desk job within a few weeks. Your healthcare provider may tell you to resume some tasks gradually or to avoid certain tasks until your shoulder heals. Your return will take longer if your work is more physical. In some cases, your healthcare provider may advise you to change the kind of work you do to avoid overusing or reinjuring your shoulder.   What to remember  Your new shoulder is not designed for heavy impact. Think of it like the tread on a tire: It will wear out faster with hard use. So, you may want to make some choices about how you use your new shoulder.   [employer] last reviewed this educational content on 8/1/2020 2000-2021 [employer]. All rights reserved. This information is not intended as a substitute for professional medical care. Always follow your healthcare professional's instructions.                 Return in about 3 months (around 8/25/2021), or if symptoms worsen or fail to improve, for Annual Wellness Exam.    Sushma Galloway MD  [facility] KAI Lares is a 66 year old who presents for the following health issues     HPI     Medication Followup of pain medication    Taking Medication as prescribed: yes    Side Effects:  None    Medication Helping Symptoms:  Yes, patient needs to discuss what to do after he has shoulder surgery, needs a plan for treatment or get a new rx after     Patient also here for suture removal on upper lip left side  Having upper jaw pain       Allergies   Allergen Reactions     No Clinical Screening - See Comments      Seasonal Allergies         Past Medical History:   Diagnosis Date     Arthritis      Benign positional vertigo      Carpal tunnel syndrome     mild     Hearing problem      LEFT WORSE THAN RIGHT  10/18/2012     Migraines      Nasal polyps      Unspecified asthma(493.90) 10/17/2012     Unspecified asthma, with exacerbation 10/17/2012        Past Surgical History:   Procedure Laterality Date     HC TOOTH EXTRACTION W/FORCEP       ORTHOPEDIC SURGERY      bilateral total knee replacements     TONSILLECTOMY      age 2 or 3       Family History   Problem Relation Age of Onset     Cancer Father      Family History Negative Father      Family History Negative Mother      Cancer Mother      Stomach Problem Mother      Diabetes No family hx of      Coronary Artery Disease No family hx of      Hypertension No family hx of      Hyperlipidemia No family hx of      Cerebrovascular Disease No family hx of      Breast Cancer No family hx of      Colon Cancer No family hx of      Prostate Cancer No family hx of      Other Cancer No family hx of      Depression No family hx of      Anxiety Disorder No family hx of      Mental Illness No family hx of      Substance Abuse No family hx of      Anesthesia Reaction No family hx of      Asthma No family hx of      Osteoporosis No family hx of      Genetic Disorder No family hx of      Thyroid Disease No family hx of      Obesity No family hx of      Unknown/Adopted No family hx of        Social History     Tobacco Use     Smoking status: Never Smoker     Smokeless tobacco: Never Used   Substance Use Topics     Alcohol use: Yes     Comment: case beer a week        Current Outpatient Medications   Medication     acyclovir (ZOVIRAX) 400 MG tablet     albuterol (PROAIR HFA, PROVENTIL HFA, VENTOLIN HFA) 108 (90 BASE) MCG/ACT inhaler     allopurinol (ZYLOPRIM) 300 MG tablet     apixaban ANTICOAGULANT (ELIQUIS) 2.5 MG tablet     diclofenac (VOLTAREN) 1 % topical gel     diclofenac (VOLTAREN) 1 % topical gel     fluticasone (FLONASE) 50 MCG/ACT nasal spray     HYDROcodone-acetaminophen (NORCO) 7.5-325 MG per tablet     ipratropium (ATROVENT) 0.06 % nasal spray     lidocaine 5 % EX patch     methylPREDNISolone (MEDROL DOSEPAK) 4 MG tablet therapy pack     naloxone (NARCAN) 4 MG/0.1ML nasal spray     order for DME     predniSONE  (DELTASONE) 20 MG tablet     pseudoePHEDrine (SUDAFED) 30 MG tablet     salicylic acid (MEDIPLAST) 40 % miscellaneous     sildenafil (VIAGRA) 100 MG tablet     Current Facility-Administered Medications   Medication     lidocaine (PF) (XYLOCAINE) 1 % injection 2 mL     lidocaine (PF) (XYLOCAINE) 1 % injection 2 mL     ropivacaine (NAROPIN) injection 1 mL     ropivacaine (NAROPIN) injection 1 mL     ropivacaine (NAROPIN) injection 1 mL     ropivacaine (NAROPIN) injection 2 mL     ropivacaine (NAROPIN) injection 2 mL     ropivacaine (NAROPIN) injection 3 mL     ropivacaine (NAROPIN) injection 3 mL     ropivacaine (NAROPIN) injection 3 mL     ropivacaine (NAROPIN) injection 3 mL     ropivacaine (NAROPIN) injection 3 mL     ropivacaine (NAROPIN) injection 3 mL     ropivacaine (NAROPIN) injection 3 mL     ropivacaine (NAROPIN) injection 3 mL     ropivacaine (NAROPIN) injection 3 mL     ropivacaine (NAROPIN) injection 3 mL     ropivacaine (NAROPIN) injection 3 mL     ropivacaine (NAROPIN) injection 3 mL     triamcinolone (KENALOG-40) injection 20 mg     triamcinolone (KENALOG-40) injection 40 mg     triamcinolone (KENALOG-40) injection 40 mg     triamcinolone (KENALOG-40) injection 40 mg     triamcinolone (KENALOG-40) injection 40 mg     triamcinolone (KENALOG-40) injection 40 mg     triamcinolone (KENALOG-40) injection 40 mg     triamcinolone (KENALOG-40) injection 40 mg     triamcinolone (KENALOG-40) injection 40 mg     triamcinolone (KENALOG-40) injection 40 mg     triamcinolone (KENALOG-40) injection 40 mg     triamcinolone (KENALOG-40) injection 40 mg     triamcinolone (KENALOG-40) injection 40 mg     triamcinolone (KENALOG-40) injection 40 mg     triamcinolone (KENALOG-40) injection 40 mg        Review of Systems   Constitutional, HEENT, cardiovascular, pulmonary, GI, , musculoskeletal, neuro, skin, endocrine and psych systems are negative, except as otherwise noted.      Objective    /86 (Cuff Size: Adult  Large)   Pulse 70   Temp 98.2  F (36.8  C) (Tympanic)   Wt 111 kg (244 lb 12.8 oz)   SpO2 97%   BMI 37.22 kg/m    Body mass index is 37.22 kg/m .  Physical Exam   GENERAL: healthy, alert and no distress  FACE : POSITIVE for Upper lip swelling S/P sutures for laceration. Wound dry and healthy, no erythema or discharge seen.   NECK: no adenopathy, no asymmetry, masses, or scars and thyroid normal to palpation  RESP: lungs clear to auscultation - no rales, rhonchi or wheezes  CV: regular rate and rhythm, normal S1 S2, no S3 or S4, no murmur, click or rub, no peripheral edema and peripheral pulses strong  ABDOMEN: soft, nontender, no hepatosplenomegaly, no masses and bowel sounds normal  MS: no gross musculoskeletal defects noted, no edema    Labs and imaging reviewed and discussed with the patient.

## 2021-05-25 NOTE — PATIENT INSTRUCTIONS
As discussed , your Opiate management post surgery is by the surgeon herself who is much aware of the pain intensity in the emily-procedural period till patient is back to baseline where you are currently on. ( Around 6-8 weeks )     Suture removal done for the external sutures. Inner sutures are absorbable and can leave it alone.     ==================    Wound Healing  - Epidermal healing occurs rapidly by bridging across the small blood clot which lies between the wound edges. Granulation tissue develops in the fresh scar and laying down of collagen in this tissue can take many months to reach a  stable state.  - Unfortunately, some scars remain red and raised for many months or even years. Keloid scars (a small percent) are those which grow and develop beyond the confines of the original wound.     How to look after your scar after removal of sutures.   - Always wash your hands before touching your scar. Wash your scar once a day using water and pat dry. Use a familiar moisturising cream to moisturize the skin surrounding your scar twice a day. Avoid picking or scratching you scar  - Protect your scar from direct sunlight (use a sun cream for at least two years). Keep clothes loose around your scar to  avoid tension or friction which might irritate it. You can use cosmetic make up or camouflage cream over you scar    Scars will: itch or tingle from time to time go though a phase of becoming pinker or slightly red (week 4 - 6) be numb, and in some situations this numbness may be permanent lose the skin pigment color have swelling surrounding the scar initially, this will start to subside after the first 3 - 5 days and thereafter any remaining swelling may take several weeks to go completely.     Seek medical assessment and advice if: Your scar is excessively swollen, red, painful or there is discharge or odour    ================================    Patient Education     After Shoulder Replacement Surgery: Back in  the Swing   After shoulder replacement, you can look forward to less pain and stiffness. You may also have more strength and movement in your shoulder and arm and be able to return to many of the activities you enjoy--like golf, swimming, bowling, gardening, or playing with your children or grandchildren. If you follow your exercise program and protect your shoulder, you ll probably be back in the swing of things within a few months. Your healthcare team will discuss how to wear your arm sling and for how long. You will likely have follow-up visits with physical therapy to make a faster and more complete recovery.     Returning to work  When you can return to work depends on your surgery and the type of work you do. You may be able to go back to a desk job within a few weeks. Your healthcare provider may tell you to resume some tasks gradually or to avoid certain tasks until your shoulder heals. Your return will take longer if your work is more physical. In some cases, your healthcare provider may advise you to change the kind of work you do to avoid overusing or reinjuring your shoulder.   What to remember  Your new shoulder is not designed for heavy impact. Think of it like the tread on a tire: It will wear out faster with hard use. So, you may want to make some choices about how you use your new shoulder.   [employer] last reviewed this educational content on 8/1/2020 2000-2021 [employer]. All rights reserved. This information is not intended as a substitute for professional medical care. Always follow your healthcare professional's instructions.

## 2021-05-25 NOTE — TELEPHONE ENCOUNTER
FUTURE VISIT INFORMATION      SURGERY INFORMATION:    Date: 6/15/21    Location: ur or    Surgeon:  Yesica Deleon MD    Anesthesia Type:  choice    Procedure: Right Total Shoulder Arthroplasty, Distal Clavicle Excision    RECORDS REQUESTED FROM:       Primary Care Provider: Sushma Galloway MD- [facility]    Pertinent Medical History: acute respiratory failure with hypoxia    Most recent EKG+ Tracin21

## 2021-05-25 NOTE — PROCEDURES
Suture removal:     Date sutures applied: 5/18/21         Where (setting) in which they applied:ER visit    Description:  Type: sutures  Location: Upper lip    History:    Cause of laceration: Accidental trauma at garage work    Accompanying Signs & Symptoms: (staff: if yes-describe)  Redness: no  Warmth: no  Drainage: no  Still bleeding: no  Fevers: no    Last tetanus shot: last tetanus booster within 10 years    Sushma Galloway MD on 5/25/2021 at 11:43 AM

## 2021-05-26 ENCOUNTER — HOSPITAL ENCOUNTER (OUTPATIENT)
Dept: CARDIAC REHAB | Facility: CLINIC | Age: 66
End: 2021-05-26
Attending: INTERNAL MEDICINE
Payer: COMMERCIAL

## 2021-05-26 DIAGNOSIS — R91.8 PULMONARY NODULES: ICD-10-CM

## 2021-05-26 DIAGNOSIS — U07.1 PNEUMONIA DUE TO 2019 NOVEL CORONAVIRUS: ICD-10-CM

## 2021-05-26 DIAGNOSIS — J12.82 PNEUMONIA DUE TO 2019 NOVEL CORONAVIRUS: ICD-10-CM

## 2021-05-26 DIAGNOSIS — J96.01 ACUTE RESPIRATORY FAILURE WITH HYPOXIA (H): ICD-10-CM

## 2021-05-26 PROCEDURE — 94375 RESPIRATORY FLOW VOLUME LOOP: CPT

## 2021-05-26 PROCEDURE — 94729 DIFFUSING CAPACITY: CPT

## 2021-05-26 PROCEDURE — 94726 PLETHYSMOGRAPHY LUNG VOLUMES: CPT

## 2021-05-27 NOTE — TELEPHONE ENCOUNTER
Patient Quality Outreach 2nd Attempt      Summary:    Type of outreach:    Phone, spoke to patient/parent. Patient is having surgery in June and will call after to schedule appointment    Next Steps:  Reach out within 90 days via Phone and MyChart.    Max number of attempts reached: Yes. Will try again in 90 days if patient still on fail list.    Questions for provider review:    None                                                                                                                    Jennifer Coelho on 5/27/2021 at 10:22 AM

## 2021-05-28 DIAGNOSIS — M47.16 SPONDYLOSIS WITH MYELOPATHY, LUMBAR REGION: ICD-10-CM

## 2021-05-28 DIAGNOSIS — R26.9 GAIT DIFFICULTY: ICD-10-CM

## 2021-05-28 DIAGNOSIS — G89.4 CHRONIC PAIN SYNDROME: Chronic | ICD-10-CM

## 2021-05-28 DIAGNOSIS — Z96.652 STATUS POST TOTAL LEFT KNEE REPLACEMENT: ICD-10-CM

## 2021-05-28 DIAGNOSIS — M25.661 STIFFNESS OF KNEE JOINT, RIGHT: Chronic | ICD-10-CM

## 2021-05-28 NOTE — PHARMACY - PREOPERATIVE ASSESSMENT CENTER
Preoperative Assessment Center Medication History Note    Medication history completed on May 28, 2021 by this writer. See Epic admission navigator for prior to admission medications. Operating room staff will still need to confirm medications and last dose information on day of surgery.     Medication history interview sources:  Patient Interview     Changes made to PTA medication list (reason)  Added: None    Deleted:   -- Prednisone  -- apixaban = finished course     Changed: None    Additional medication history information (including reliability of information, actions taken by pharmacist):    -- No recent (within 30 days) course of antibiotics  -- No recent (within 30 days) course of systemic steroids  -- Patient declines being on any other prescription or over-the-counter medications  -- patient averaging 4 tablets of Norco per day   -- patient did use his medrol pack about a week ago due to some inflammation   -- patient takes acyclovir as prn when he does have an outbreak     Prior to Admission medications    Medication Sig Last Dose Taking? Auth Provider   allopurinol (ZYLOPRIM) 300 MG tablet Take 1 tablet (300 mg) by mouth daily Taking Yes Fan Salinas MD   diclofenac (VOLTAREN) 1 % topical gel Place 4 g onto the skin 4 times daily Taking Yes Sushma Galloway MD   fluticasone (FLONASE) 50 MCG/ACT nasal spray Spray 2 sprays into both nostrils daily Taking Yes Sushma Galloway MD   HYDROcodone-acetaminophen (NORCO) 7.5-325 MG per tablet Take 1 tablet by mouth every 6 hours as needed for pain (4 x daily as needed  maxium 4 per day) Taking Yes Sushma Galloway MD   ipratropium (ATROVENT) 0.06 % nasal spray SPRAY 2 SPRAYS INTO BOTH NOSTRILS 4 TIMES DAILY Taking Yes Reported, Patient   lidocaine 5 % EX patch Place 1 patch onto the skin every 24 hours Taking Yes Johnson Clements MD   acyclovir (ZOVIRAX) 400 MG tablet TAKE TWO TABLETS BY MOUTH TWICE DAILY for 5 days as needed for genital  herpes outbreak  Patient not taking: Reported on 5/28/2021 Not Taking  Leanne Mooney DO   albuterol (PROAIR HFA, PROVENTIL HFA, VENTOLIN HFA) 108 (90 BASE) MCG/ACT inhaler Inhale 2 puffs into the lungs every 6 hours as needed for shortness of breath / dyspnea  Patient not taking: Reported on 5/28/2021 Not Taking  Johnson Clements MD   methylPREDNISolone (MEDROL DOSEPAK) 4 MG tablet therapy pack Follow Package Directions  Patient not taking: Reported on 5/28/2021 Not Taking  Sushma Galloway MD   naloxone (NARCAN) 4 MG/0.1ML nasal spray Spray 1 spray (4 mg) into one nostril alternating nostrils once as needed for opioid reversal Every 2-3 minutes until patient responsive or EMS arrives   Johnson Clements MD   order for DME Equipment being ordered: Thumb spica splint for left hand   Truckdenisbrod, Andra Lambert MD   pseudoePHEDrine (SUDAFED) 30 MG tablet Take by mouth every 4 hours as needed for congestion Not Taking  Reported, Patient   sildenafil (VIAGRA) 100 MG tablet Take 0.5-1 tablets ( mg) by mouth daily as needed Take 30 min to 4 hours before intercourse.  Never use with nitroglycerin, terazosin or doxazosin.  Patient not taking: Reported on 5/28/2021 Not Taking  Shar Hi MD       Medication history completed by: Manuel Ulloa [facility]

## 2021-06-01 ENCOUNTER — ANESTHESIA EVENT (OUTPATIENT)
Dept: SURGERY | Facility: CLINIC | Age: 66
End: 2021-06-01

## 2021-06-01 ENCOUNTER — TELEPHONE (OUTPATIENT)
Dept: NURSING | Facility: CLINIC | Age: 66
End: 2021-06-01

## 2021-06-01 ENCOUNTER — PRE VISIT (OUTPATIENT)
Dept: SURGERY | Facility: CLINIC | Age: 66
End: 2021-06-01

## 2021-06-01 ENCOUNTER — OFFICE VISIT (OUTPATIENT)
Dept: SURGERY | Facility: CLINIC | Age: 66
End: 2021-06-01
Payer: COMMERCIAL

## 2021-06-01 VITALS
HEART RATE: 64 BPM | RESPIRATION RATE: 16 BRPM | SYSTOLIC BLOOD PRESSURE: 132 MMHG | TEMPERATURE: 97.9 F | DIASTOLIC BLOOD PRESSURE: 90 MMHG | HEIGHT: 68 IN | BODY MASS INDEX: 37.56 KG/M2 | OXYGEN SATURATION: 96 % | WEIGHT: 247.8 LBS

## 2021-06-01 DIAGNOSIS — Z01.818 PREOP EXAMINATION: Primary | ICD-10-CM

## 2021-06-01 DIAGNOSIS — Z01.818 PREOP EXAMINATION: ICD-10-CM

## 2021-06-01 LAB
CREAT SERPL-MCNC: 0.85 MG/DL (ref 0.66–1.25)
GFR SERPL CREATININE-BSD FRML MDRD: 89 ML/MIN/{1.73_M2}
HGB BLD-MCNC: 14.5 G/DL (ref 13.3–17.7)
POTASSIUM SERPL-SCNC: 4.5 MMOL/L (ref 3.4–5.3)

## 2021-06-01 PROCEDURE — 85018 HEMOGLOBIN: CPT | Performed by: PATHOLOGY

## 2021-06-01 PROCEDURE — 86900 BLOOD TYPING SEROLOGIC ABO: CPT | Performed by: PATHOLOGY

## 2021-06-01 PROCEDURE — 99204 OFFICE O/P NEW MOD 45 MIN: CPT | Performed by: PHYSICIAN ASSISTANT

## 2021-06-01 PROCEDURE — 36415 COLL VENOUS BLD VENIPUNCTURE: CPT | Performed by: PATHOLOGY

## 2021-06-01 PROCEDURE — 82565 ASSAY OF CREATININE: CPT | Performed by: PATHOLOGY

## 2021-06-01 PROCEDURE — 84132 ASSAY OF SERUM POTASSIUM: CPT | Performed by: PATHOLOGY

## 2021-06-01 PROCEDURE — 86850 RBC ANTIBODY SCREEN: CPT | Performed by: PATHOLOGY

## 2021-06-01 PROCEDURE — 86901 BLOOD TYPING SEROLOGIC RH(D): CPT | Performed by: PATHOLOGY

## 2021-06-01 RX ORDER — MULTIPLE VITAMINS W/ MINERALS TAB 9MG-400MCG
1 TAB ORAL DAILY
COMMUNITY

## 2021-06-01 ASSESSMENT — PAIN SCALES - GENERAL: PAINLEVEL: SEVERE PAIN (6)

## 2021-06-01 ASSESSMENT — MIFFLIN-ST. JEOR: SCORE: 1868.51

## 2021-06-01 ASSESSMENT — LIFESTYLE VARIABLES: TOBACCO_USE: 0

## 2021-06-01 ASSESSMENT — ENCOUNTER SYMPTOMS: SEIZURES: 0

## 2021-06-01 NOTE — TELEPHONE ENCOUNTER
Opened in error, see charting in refill request on 5/28/21.    Melissa Cifuentes RN  [facility]

## 2021-06-01 NOTE — TELEPHONE ENCOUNTER
Patient called looking for an update on refill request. Patient reports that he only has enough medication left for today.    Melissa Cifuentes RN  [facility]

## 2021-06-01 NOTE — PATIENT INSTRUCTIONS
Preparing for Your Surgery      Name:  Arnaud Bird   MRN:  [id number]   :  1955   Today's Date:  2021       Arriving for surgery:  Surgery date:  6/15/21  Arrival time:  6 am    Restrictions due to COVID 19:  One consistent visitor per patient is allowed.  The visitor will be allowed in the pre-op area.  Visitors are asked to leave the building during the surgery.  No ill visitors.  All visitors must wear face mask.    [employer] parking is available for anyone with mobility limitations or disabilities.  ([employer]  24 hours/ 7 days a week; [employer]  7 am- 3:30 pm, Mon- Fri)    Please come to:     [facility] [address]  [address] [place], [place] [address]. [place]  [address]    -Proceed to the 3rd floor, check in at the Adult Surgery Waiting Lounge. [phone]    If an escort is needed stop at the Information Desk in the lobby. Inform the information person that you are here for surgery. An escort to the Adult Surgery Waiting Lounge will be provided.    What can I eat or drink?  -  You may eat and drink normally for up to 8 hours before your surgery. (Until 12 Midnight)  -  You may have clear liquids until 2 hours before surgery. (Until 6 am)    Examples of clear liquids:  Water  Clear broth  Juices (apple, white grape, white cranberry  and cider) without pulp  Noncarbonated, powder based beverages  (lemonade and Estuardo-Aid)  Sodas (Sprite, 7-Up, ginger ale and seltzer)  Coffee or tea (without milk or cream)  Gatorade    -  No Alcohol for at least 24 hours before surgery     Which medicines can I take?  Hold Aspirin for 7 days before surgery.   Hold Multivitamins for 7 days before surgery.  Hold Supplements for 7 days before surgery.  Hold Ibuprofen (Advil, Motrin) for 1 day before surgery--unless otherwise directed by surgeon.  Hold Naproxen (Aleve) for 4 days before surgery.    Hold Sildenafil (Viagra) and Diclofenac (Voltaren) gel for 24 hours prior to  surgery.    -  PLEASE TAKE these medications the day of surgery:  Allopurinol, Flonase nasal spray, Atrovent nasal spray, Lidocaine patch  Hydrocodone-Acetaminophen (Norco) if needed  Albuterol inhaler if needed    How do I prepare myself?  -  Bring Albuterol inhaler and nasal sprays to hospital.  - Please take 2 showers before surgery using Scrubcare or Hibiclens soap.    Use this soap only from the neck to your toes.     Leave the soap on your skin for one minute--then rinse thoroughly.      You may use your own shampoo and conditioner; no other hair products.   - Please remove all jewelry and body piercings.  - No lotions, deodorants or fragrance.  - Bring your ID and insurance card.    - All patients are required to have a Covid-19 test within 4 days of surgery/procedure.      -Patients will be contacted by the [facility] scheduling team within 1 week of surgery to make an appointment.      - Patients may call the Scheduling team at [phone] if they have not been scheduled within 4 days of  surgery.      ALL PATIENTS GOING HOME THE SAME DAY OF SURGERY ARE REQUIRED TO HAVE A RESPONSIBLE ADULT TO DRIVE AND BE IN ATTENDANCE WITH THEM FOR 24 HOURS FOLLOWING SURGERY.    IF THE RESPONSIBLE ADULT IS REQUIRED FOR POST OP TEACHING THE POST OP RN WILL ASK THEM TO COME BACK TO THE RECOVERY AREA.    Questions or Concerns:    - For any questions regarding the day of surgery or your hospital stay, please contact the Pre Admission Nursing Office at [phone].       - If you have health changes between today and your surgery please call your surgeon.       For questions after surgery please call your surgeons office.     AFTER YOUR SURGERY  Breathing exercises   Breathing exercises help you recover faster. Take deep breaths and let the air out slowly. This will:     Help you wake up after surgery.    Help prevent complications like pneumonia.  Preventing complications will help you go home sooner.   We may give  you a breathing device (incentive spirometer) to encourage you to breathe deeply.   Nausea and vomiting   You may feel sick to your stomach after surgery; if so, let your nurse know.    Pain control:  After surgery, you may have pain. Our goal is to help you manage your pain. Pain medicine will help you feel comfortable enough to do activities that will help you heal.  These activities may include breathing exercises, walking and physical therapy.   To help your health care team treat your pain we will ask: 1) If you have pain  2) where it is located 3) describe your pain in your words  Methods of pain control include medications given by mouth, vein or by nerve block for some surgeries.  Sequential Compression Device (SCD):  You may need to wear SCD S (also called pneumo boots)on your legs or feet. These are wraps connected to a machine that pumps in air and releases it. The repeated pumping helps prevent blood clots from forming.

## 2021-06-01 NOTE — H&P
Pre-Operative H & P     CC:  Preoperative exam to assess for increased cardiopulmonary risk while undergoing surgery and anesthesia.    Date of Encounter: 6/1/2021  Primary Care Physician:  Sushma Gallowaystephen Bird is a 66 year old male who presents for pre-operative H & P in preparation for Right total shoulder arthroplasty with Dr. Deleon on 6/15/21 at [facility].     This is a 66-year-old male with past medical history significant for Covid pneumonia with admission 4/14/2020 1421, allergic rhinitis, asthma, obesity, and gout.  Patient has a long history of bilateral shoulder pain.  He has pursued nonsurgical options for his pain to include cortisone injections which did help off and on over the years.  His right shoulder has worsened recently.  He states that his right shoulder pain makes activities of daily living and completing tasks difficult.  The above procedure is now planned.    In terms of his Covid pneumonia, today he denies any ongoing cough or symptoms.  He does endorse some mild dyspnea if he exerts himself too much.  However he is able to do yard work, mow grass, climb stairs, [place].  His most recent chest CT from May 15 showed decrease in groundglass and consolidative opacities.    History is obtained from the patient and the medical record.    Past Medical History  Past Medical History:   Diagnosis Date     Arthritis      Benign positional vertigo      Carpal tunnel syndrome     mild     Hearing problem      LEFT WORSE THAN RIGHT  10/18/2012     Migraines      Nasal polyps      Unspecified asthma(493.90) 10/17/2012     Unspecified asthma, with exacerbation 10/17/2012       Past Surgical History  Past Surgical History:   Procedure Laterality Date     HC TOOTH EXTRACTION W/FORCEP       ORTHOPEDIC SURGERY      bilateral total knee replacements     TONSILLECTOMY      age 2 or 3       Hx of Blood transfusions/reactions:  denies     Hx of abnormal bleeding or anti-platelet use: denies    Menstrual history: No LMP for male patient.:     Steroid use in the last year: yes    Personal or FH with difficulty with Anesthesia:  See below    Prior to Admission Medications  Current Outpatient Medications   Medication Sig Dispense Refill     allopurinol (ZYLOPRIM) 300 MG tablet Take 1 tablet (300 mg) by mouth daily 90 tablet 3     diclofenac (VOLTAREN) 1 % topical gel Place 4 g onto the skin 4 times daily 100 g 1     fluticasone (FLONASE) 50 MCG/ACT nasal spray Spray 2 sprays into both nostrils daily 16 g 11     HYDROcodone-acetaminophen (NORCO) 7.5-325 MG per tablet Take 1 tablet by mouth every 6 hours as needed for pain (4 x daily as needed  maxium 4 per day) 120 tablet 0     ipratropium (ATROVENT) 0.06 % nasal spray SPRAY 2 SPRAYS INTO BOTH NOSTRILS 4 TIMES DAILY       lidocaine 5 % EX patch Place 1 patch onto the skin every 24 hours 30 patch 11     acyclovir (ZOVIRAX) 400 MG tablet TAKE TWO TABLETS BY MOUTH TWICE DAILY for 5 days as needed for genital herpes outbreak (Patient not taking: Reported on 5/28/2021) 28 tablet 4     albuterol (PROAIR HFA, PROVENTIL HFA, VENTOLIN HFA) 108 (90 BASE) MCG/ACT inhaler Inhale 2 puffs into the lungs every 6 hours as needed for shortness of breath / dyspnea (Patient not taking: Reported on 5/28/2021) 1 Inhaler 11     methylPREDNISolone (MEDROL DOSEPAK) 4 MG tablet therapy pack Follow Package Directions (Patient not taking: Reported on 5/28/2021) 21 tablet 0     naloxone (NARCAN) 4 MG/0.1ML nasal spray Spray 1 spray (4 mg) into one nostril alternating nostrils once as needed for opioid reversal Every 2-3 minutes until patient responsive or EMS arrives 0.2 mL 0     order for DME Equipment being ordered: Thumb spica splint for left hand 1 Device 0     pseudoePHEDrine (SUDAFED) 30 MG tablet Take by mouth every 4 hours as needed for congestion       sildenafil (VIAGRA) 100 MG tablet Take 0.5-1 tablets (  mg) by mouth daily as needed Take 30 min to 4 hours before intercourse.  Never use with nitroglycerin, terazosin or doxazosin. (Patient not taking: Reported on 5/28/2021) 100 tablet 3       Allergies  Allergies   Allergen Reactions     No Clinical Screening - See Comments      Seasonal Allergies        Social History  Social History     Socioeconomic History     Marital status: Single     Spouse name: Not on file     Number of children: Not on file     Years of education: Not on file     Highest education level: Not on file   Occupational History     Not on file   Social Needs     Financial resource strain: Not on file     Food insecurity     Worry: Not on file     Inability: Not on file     Transportation needs     Medical: Not on file     Non-medical: Not on file   Tobacco Use     Smoking status: Never Smoker     Smokeless tobacco: Never Used   Substance and Sexual Activity     Alcohol use: Yes     Comment: case beer a week     Drug use: No     Sexual activity: Yes     Partners: Female     Birth control/protection: Male Surgical   Lifestyle     Physical activity     Days per week: Not on file     Minutes per session: Not on file     Stress: Not on file   Relationships     Social connections     Talks on phone: Not on file     Gets together: Not on file     Attends [employer] service: Not on file     Active member of club or organization: Not on file     Attends meetings of clubs or organizations: Not on file     Relationship status: Not on file     Intimate partner violence     Fear of current or ex partner: Not on file     Emotionally abused: Not on file     Physically abused: Not on file     Forced sexual activity: Not on file   Other Topics Concern     Parent/sibling w/ CABG, MI or angioplasty before 65F 55M? No   Social History Narrative     Not on file       Family History  Family History   Problem Relation Age of Onset     Cancer Father      Family History Negative Father      Family History Negative Mother   "    Cancer Mother      Stomach Problem Mother      Diabetes No family hx of      Coronary Artery Disease No family hx of      Hypertension No family hx of      Hyperlipidemia No family hx of      Cerebrovascular Disease No family hx of      Breast Cancer No family hx of      Colon Cancer No family hx of      Prostate Cancer No family hx of      Other Cancer No family hx of      Depression No family hx of      Anxiety Disorder No family hx of      Mental Illness No family hx of      Substance Abuse No family hx of      Anesthesia Reaction No family hx of      Asthma No family hx of      Osteoporosis No family hx of      Genetic Disorder No family hx of      Thyroid Disease No family hx of      Obesity No family hx of      Unknown/Adopted No family hx of            Anesthesia Evaluation   Pt has had prior anesthetic.     History of anesthetic complications   pt woke during knee surgery 2009.    ROS/MED HX  ENT/Pulmonary:     (+) TAWNYA risk factors, obese, allergic rhinitis, asthma (seasonal) Last exacerbation: never,   (-) tobacco use and sleep apnea   Neurologic:     (+) migraines,  (-) no seizures and no CVA   Cardiovascular:     (+) Dyslipidemia -----    METS/Exercise Tolerance: >4 METS    Hematologic:  - neg hematologic  ROS  (-) history of blood clots   Musculoskeletal: Comment: gout      GI/Hepatic:  - neg GI/hepatic ROS     Renal/Genitourinary:  - neg Renal ROS     Endo:     (+) Obesity,  (-) Type I DM and Type II DM   Psychiatric/Substance Use:  - neg psychiatric ROS  (-) chronic opioid use history   Infectious Disease:  - neg infectious disease ROS     Malignancy:  - neg malignancy ROS     Other:  - neg other ROS          The complete review of systems is negative other than noted in the HPI or here.   Temp: 97.9  F (36.6  C)   BP: (!) 132/90 Pulse: 64   Resp: 16 SpO2: 96 %         247 lbs 12.8 oz  5' 8\"[pt reported[   Body mass index is 37.68 kg/m .       Physical Exam  Constitutional: Awake, alert, " cooperative, no apparent distress, and appears stated age.  Eyes: Pupils equal, round and reactive to light, extra ocular muscles intact, sclera clear, conjunctiva normal.  HENT: Normocephalic, oral pharynx with moist mucus membranes, good dentition. No goiter appreciated.   Respiratory: Clear to auscultation bilaterally, no crackles or wheezing.  Cardiovascular: Regular rate and rhythm, normal S1 and S2, and no murmur noted.  Carotids +2, no bruits. No edema. Palpable pulses to radial  DP and PT arteries.   GI: Normal bowel sounds, obese abdomen  Lymph/Hematologic: No cervical lymphadenopathy and no supraclavicular lymphadenopathy.  Genitourinary:  deferred  Skin: Warm and dry.    Musculoskeletal: Full ROM of neck. There is no redness, warmth, or swelling of the joints. Gross motor strength is normal.    Neurologic: Awake, alert, oriented to name, place and time. Cranial nerves II-XII are grossly intact.   Neuropsychiatric: Calm, cooperative. Normal affect.     PRIOR LABS/DIAGNOSTIC STUDIES:  All labs and imaging personally reviewed      Component      Latest Ref Rng & Units 6/1/2021   Creatinine      0.66 - 1.25 mg/dL 0.85   GFR Estimate      >60 mL/min/1.73:m2 89   GFR Estimate If Black      >60 mL/min/1.73:m2 >90   Potassium      3.4 - 5.3 mmol/L 4.5   Hemoglobin      13.3 - 17.7 g/dL 14.5       Exam: CT SHOULDER RIGHT W/O CONTRAST, 1/25/2021 7:23 AM  Impression:   1. Severe osteoarthritis of the right shoulder glenohumeral joint.   a. 5-10% glenoid area loss posteriorly using Nofsinger's technique.   2. Mild osteoarthritis of the right shoulder acromioclavicular joint  with os acromiale, mesoacromion type.  3. Moderate joint effusion.    Exam: MRI of the right shoulder dated 8/8/2020  IMPRESSION:  1. Moderate osteoarthrosis at the right shoulder acromioclavicular  joint.  2. Severe osteoarthrosis at the right shoulder glenohumeral joint with  full-thickness cartilage loss; osteophytic spurring;  glenoid  remodeling with loss of bone stock; subchondral cystic changes; and  labral tearing, greatest superiorly and posteriorly.  3. Moderate size joint effusion with synovitis in the region of the  axillary recess. There is also fluid in the subscapularis recess with  synovitis. Synovitis within the axillary recess.  4. Tearing of the biceps tendon within the bicipital groove with  medial subluxation at the level of the bicipital pulley and marked  tendinosis of the intra-articular biceps tendon.  5. Tendinosis of the right shoulder supraspinatus tendon with moderate  grade intrasubstance partial thickness tearing of the anterior to mid  fibers, and low-grade bursal sided tearing of the more posterior  fibers. No full thickness tear or tendon retraction of the  supraspinatus tendon.   6. Tendinosis of the infraspinatus tendon.  7. Full-thickness tearing of the superior fibers of the right shoulder  subscapularis tendon with marked tendinosis of the more inferior  fibers.  8. Marked fatty infiltration within the teres minor muscle as well as  the upper muscle bulk of the right shoulder subscapularis muscle.  9. Os acromiale.  10. Edema in the subcoracoid fat, with synovitis within the axillary  recess, correlate for adhesive capsulitis.      EK21   SR    PFT's: 21:  FVC-Pred 3.89    L  2021 10:00    FVC-Pre 3.41    L  2021 10:00    FVC-%Pred-Pre 87    %  2021 10:00    FEV1-Pre 2.90    L  2021 10:00    FEV1-%Pred-Pre 97    %  2021 10:00    FEV1FVC-Pred 77    %  2021 10:00    FEV1FVC-Pre 85    %  2021 10:00    FEFMax-Pred 7.89    L/sec  2021 10:00    FEFMax-Pre 8.23    L/sec  2021 10:00    FEFMax-%Pred-Pre 104    %  2021 10:00    FEF2575-Pred 2.37    L/sec  2021 10:00    FEF2575-Pre 3.70    L/sec  2021 10:00    OYC0954-%Pred-Pre 156    %  2021 10:00     ExpTime-Pre 6.26    sec  05/26/2021 10:00    FIFMax-Pre 5.56    L/sec  05/26/2021 10:00    VC-Pred 4.28    L  05/26/2021 10:00    VC-Pre 3.58    L  05/26/2021 10:00    VC-%Pred-Pre 83    %  05/26/2021 10:00    IC-Pred 3.93    L  05/26/2021 10:00    IC-Pre 3.35    L  05/26/2021 10:00    IC-%Pred-Pre 85    %  05/26/2021 10:00    ERV-Pred 0.35    L  05/26/2021 10:00    ERV-Pre 0.21    L  05/26/2021 10:00    ERV-%Pred-Pre 58    %  05/26/2021 10:00    FEV1FEV6-Pred 78    %  05/26/2021 10:00    FEV1FEV6-Pre 85    %  05/26/2021 10:00    FRCPleth-Pred 3.50    L  05/26/2021 10:00    FRCPleth-Pre 2.00    L  05/26/2021 10:00    FRCPleth-%Pred-Pre 56    %  05/26/2021 10:00    RVPleth-Pred 2.50    L  05/26/2021 10:00    RVPleth-Pre 1.77    L  05/26/2021 10:00    RVPleth-%Pred-Pre 70    %  05/26/2021 10:00    TLCPleth-Pred 6.52    L  05/26/2021 10:00    TLCPleth-Pre 5.35    L  05/26/2021 10:00    TLCPleth-%Pred-Pre 82    %  05/26/2021 10:00    DLCOunc-Pred 23.90    ml/min/mmHg  05/26/2021 10:00    DLCOunc-Pre 24.56    ml/min/mmHg  05/26/2021 10:00    DLCOunc-%Pred-Pre 102    %  05/26/2021 10:00    VA-Pre 5.68    L  05/26/2021 10:00    VA-%Pred-Pre 98    %  05/26/2021 10:00    FEV1SVC-Pred 70    %  05/26/2021 10:00    FEV1SVC-Pre 81    %  05/26/2021 10:00          Outside records reviewed from: care everywhere      ASSESSMENT and PLAN  Arnaud Bird is a 66 year old male scheduled for Right Total Shoulder Arthroplasty, Distal Clavicle Excision on 6/15/21 by Dr. Deleon in treatment of right shoulder arthritis.  PAC referral for risk assessment and optimization for anesthesia with comorbid conditions of asthma, obesity, gout:    Pre-operative considerations:  1.  Cardiac:  Functional status- METS >4.  Pt walks, does yard work, mows grass, fixes garage  doors.  Intermediate risk surgery with 0.4% (RCRI #) risk of major adverse cardiac event.  Denies cardiac history  --EKG 4/14/21 shows NSR.  --denies chest pain, palpitations, orthopnea, edema    2.  Pulm:  Airway feasible.  TAWNYA risk: intermediate  --h/o asthma, no longer needing inhaler therapy.  Allergic rhinitis related symptoms.  No h/o exacerbation requiring admission/ED  --recent Covid positive pneumonia, admission 4/14/21-4/18/21.  Recent Chest CT 5/15/21 shows continued decrease in bilateral peribronchovascular groundglass and consolidative opacities compared to CT from 4/28/2021, suggestive of improving COVID-19 pneumonia  --denies cough, endorses some mild SOB if he exerts himself too much.      3.  GI:  Risk of PONV score = 2.  If > 2, anti-emetic intervention recommended.    4.  MSK:  H/o gout, continue allopurinol.  --recent laceration to left upper lip requiring stiches.  Stitches removed a couple of days ago.  Healing well.    VTE risk: 0.5%    Patient is optimized and is acceptable candidate for the proposed procedure.  No further diagnostic evaluation is needed.       Kristin Potter PA-C  Preoperative Assessment Center  [facility]  Phone: [phone]  Fax: [phone]

## 2021-06-01 NOTE — H&P (VIEW-ONLY)
Pre-Operative H & P     CC:  Preoperative exam to assess for increased cardiopulmonary risk while undergoing surgery and anesthesia.    Date of Encounter: 6/1/2021  Primary Care Physician:  Sushma Gallowaystephen Bird is a 66 year old male who presents for pre-operative H & P in preparation for Right total shoulder arthroplasty with Dr. Deleon on 6/15/21 at [facility].     This is a 66-year-old male with past medical history significant for Covid pneumonia with admission 4/14/2020 1421, allergic rhinitis, asthma, obesity, and gout.  Patient has a long history of bilateral shoulder pain.  He has pursued nonsurgical options for his pain to include cortisone injections which did help off and on over the years.  His right shoulder has worsened recently.  He states that his right shoulder pain makes activities of daily living and completing tasks difficult.  The above procedure is now planned.    In terms of his Covid pneumonia, today he denies any ongoing cough or symptoms.  He does endorse some mild dyspnea if he exerts himself too much.  However he is able to do yard work, mow grass, climb stairs, [place]  His most recent chest CT from May 15 showed decrease in groundglass and consolidative opacities.    History is obtained from the patient and the medical record.    Past Medical History  Past Medical History:   Diagnosis Date     Arthritis      Benign positional vertigo      Carpal tunnel syndrome     mild     Hearing problem      LEFT WORSE THAN RIGHT  10/18/2012     Migraines      Nasal polyps      Unspecified asthma(493.90) 10/17/2012     Unspecified asthma, with exacerbation 10/17/2012       Past Surgical History  Past Surgical History:   Procedure Laterality Date     HC TOOTH EXTRACTION W/FORCEP       ORTHOPEDIC SURGERY      bilateral total knee replacements     TONSILLECTOMY      age 2 or 3       Hx of Blood transfusions/reactions:  denies     Hx of abnormal bleeding or anti-platelet use: denies    Menstrual history: No LMP for male patient.:     Steroid use in the last year: yes    Personal or FH with difficulty with Anesthesia:  See below    Prior to Admission Medications  Current Outpatient Medications   Medication Sig Dispense Refill     allopurinol (ZYLOPRIM) 300 MG tablet Take 1 tablet (300 mg) by mouth daily 90 tablet 3     diclofenac (VOLTAREN) 1 % topical gel Place 4 g onto the skin 4 times daily 100 g 1     fluticasone (FLONASE) 50 MCG/ACT nasal spray Spray 2 sprays into both nostrils daily 16 g 11     HYDROcodone-acetaminophen (NORCO) 7.5-325 MG per tablet Take 1 tablet by mouth every 6 hours as needed for pain (4 x daily as needed  maxium 4 per day) 120 tablet 0     ipratropium (ATROVENT) 0.06 % nasal spray SPRAY 2 SPRAYS INTO BOTH NOSTRILS 4 TIMES DAILY       lidocaine 5 % EX patch Place 1 patch onto the skin every 24 hours 30 patch 11     acyclovir (ZOVIRAX) 400 MG tablet TAKE TWO TABLETS BY MOUTH TWICE DAILY for 5 days as needed for genital herpes outbreak (Patient not taking: Reported on 5/28/2021) 28 tablet 4     albuterol (PROAIR HFA, PROVENTIL HFA, VENTOLIN HFA) 108 (90 BASE) MCG/ACT inhaler Inhale 2 puffs into the lungs every 6 hours as needed for shortness of breath / dyspnea (Patient not taking: Reported on 5/28/2021) 1 Inhaler 11     methylPREDNISolone (MEDROL DOSEPAK) 4 MG tablet therapy pack Follow Package Directions (Patient not taking: Reported on 5/28/2021) 21 tablet 0     naloxone (NARCAN) 4 MG/0.1ML nasal spray Spray 1 spray (4 mg) into one nostril alternating nostrils once as needed for opioid reversal Every 2-3 minutes until patient responsive or EMS arrives 0.2 mL 0     order for DME Equipment being ordered: Thumb spica splint for left hand 1 Device 0     pseudoePHEDrine (SUDAFED) 30 MG tablet Take by mouth every 4 hours as needed for congestion       sildenafil (VIAGRA) 100 MG tablet Take 0.5-1 tablets (  mg) by mouth daily as needed Take 30 min to 4 hours before intercourse.  Never use with nitroglycerin, terazosin or doxazosin. (Patient not taking: Reported on 5/28/2021) 100 tablet 3       Allergies  Allergies   Allergen Reactions     No Clinical Screening - See Comments      Seasonal Allergies        Social History  Social History     Socioeconomic History     Marital status: Single     Spouse name: Not on file     Number of children: Not on file     Years of education: Not on file     Highest education level: Not on file   Occupational History     Not on file   Social Needs     Financial resource strain: Not on file     Food insecurity     Worry: Not on file     Inability: Not on file     Transportation needs     Medical: Not on file     Non-medical: Not on file   Tobacco Use     Smoking status: Never Smoker     Smokeless tobacco: Never Used   Substance and Sexual Activity     Alcohol use: Yes     Comment: case beer a week     Drug use: No     Sexual activity: Yes     Partners: Female     Birth control/protection: Male Surgical   Lifestyle     Physical activity     Days per week: Not on file     Minutes per session: Not on file     Stress: Not on file   Relationships     Social connections     Talks on phone: Not on file     Gets together: Not on file     Attends Taoism service: Not on file     Active member of club or organization: Not on file     Attends meetings of clubs or organizations: Not on file     Relationship status: Not on file     Intimate partner violence     Fear of current or ex partner: Not on file     Emotionally abused: Not on file     Physically abused: Not on file     Forced sexual activity: Not on file   Other Topics Concern     Parent/sibling w/ CABG, MI or angioplasty before 65F 55M? No   Social History Narrative     Not on file       Family History  Family History   Problem Relation Age of Onset     Cancer Father      Family History Negative Father      Family History Negative Mother   "    Cancer Mother      Stomach Problem Mother      Diabetes No family hx of      Coronary Artery Disease No family hx of      Hypertension No family hx of      Hyperlipidemia No family hx of      Cerebrovascular Disease No family hx of      Breast Cancer No family hx of      Colon Cancer No family hx of      Prostate Cancer No family hx of      Other Cancer No family hx of      Depression No family hx of      Anxiety Disorder No family hx of      Mental Illness No family hx of      Substance Abuse No family hx of      Anesthesia Reaction No family hx of      Asthma No family hx of      Osteoporosis No family hx of      Genetic Disorder No family hx of      Thyroid Disease No family hx of      Obesity No family hx of      Unknown/Adopted No family hx of            Anesthesia Evaluation   Pt has had prior anesthetic.     History of anesthetic complications   pt woke during knee surgery 2009.    ROS/MED HX  ENT/Pulmonary:     (+) TAWNYA risk factors, obese, allergic rhinitis, asthma (seasonal) Last exacerbation: never,   (-) tobacco use and sleep apnea   Neurologic:     (+) migraines,  (-) no seizures and no CVA   Cardiovascular:     (+) Dyslipidemia -----    METS/Exercise Tolerance: >4 METS    Hematologic:  - neg hematologic  ROS  (-) history of blood clots   Musculoskeletal: Comment: gout      GI/Hepatic:  - neg GI/hepatic ROS     Renal/Genitourinary:  - neg Renal ROS     Endo:     (+) Obesity,  (-) Type I DM and Type II DM   Psychiatric/Substance Use:  - neg psychiatric ROS  (-) chronic opioid use history   Infectious Disease:  - neg infectious disease ROS     Malignancy:  - neg malignancy ROS     Other:  - neg other ROS          The complete review of systems is negative other than noted in the HPI or here.   Temp: 97.9  F (36.6  C)   BP: (!) 132/90 Pulse: 64   Resp: 16 SpO2: 96 %         247 lbs 12.8 oz  5' 8\"[pt reported[   Body mass index is 37.68 kg/m .       Physical Exam  Constitutional: Awake, alert, " cooperative, no apparent distress, and appears stated age.  Eyes: Pupils equal, round and reactive to light, extra ocular muscles intact, sclera clear, conjunctiva normal.  HENT: Normocephalic, oral pharynx with moist mucus membranes, good dentition. No goiter appreciated.   Respiratory: Clear to auscultation bilaterally, no crackles or wheezing.  Cardiovascular: Regular rate and rhythm, normal S1 and S2, and no murmur noted.  Carotids +2, no bruits. No edema. Palpable pulses to radial  DP and PT arteries.   GI: Normal bowel sounds, obese abdomen  Lymph/Hematologic: No cervical lymphadenopathy and no supraclavicular lymphadenopathy.  Genitourinary:  deferred  Skin: Warm and dry.    Musculoskeletal: Full ROM of neck. There is no redness, warmth, or swelling of the joints. Gross motor strength is normal.    Neurologic: Awake, alert, oriented to name, place and time. Cranial nerves II-XII are grossly intact.   Neuropsychiatric: Calm, cooperative. Normal affect.     PRIOR LABS/DIAGNOSTIC STUDIES:  All labs and imaging personally reviewed      Component      Latest Ref Rng & Units 6/1/2021   Creatinine      0.66 - 1.25 mg/dL 0.85   GFR Estimate      >60 mL/min/1.73:m2 89   GFR Estimate If Black      >60 mL/min/1.73:m2 >90   Potassium      3.4 - 5.3 mmol/L 4.5   Hemoglobin      13.3 - 17.7 g/dL 14.5       Exam: CT SHOULDER RIGHT W/O CONTRAST, 1/25/2021 7:23 AM  Impression:   1. Severe osteoarthritis of the right shoulder glenohumeral joint.   a. 5-10% glenoid area loss posteriorly using Nofsinger's technique.   2. Mild osteoarthritis of the right shoulder acromioclavicular joint  with os acromiale, mesoacromion type.  3. Moderate joint effusion.    Exam: MRI of the right shoulder dated 8/8/2020  IMPRESSION:  1. Moderate osteoarthrosis at the right shoulder acromioclavicular  joint.  2. Severe osteoarthrosis at the right shoulder glenohumeral joint with  full-thickness cartilage loss; osteophytic spurring;  glenoid  remodeling with loss of bone stock; subchondral cystic changes; and  labral tearing, greatest superiorly and posteriorly.  3. Moderate size joint effusion with synovitis in the region of the  axillary recess. There is also fluid in the subscapularis recess with  synovitis. Synovitis within the axillary recess.  4. Tearing of the biceps tendon within the bicipital groove with  medial subluxation at the level of the bicipital pulley and marked  tendinosis of the intra-articular biceps tendon.  5. Tendinosis of the right shoulder supraspinatus tendon with moderate  grade intrasubstance partial thickness tearing of the anterior to mid  fibers, and low-grade bursal sided tearing of the more posterior  fibers. No full thickness tear or tendon retraction of the  supraspinatus tendon.   6. Tendinosis of the infraspinatus tendon.  7. Full-thickness tearing of the superior fibers of the right shoulder  subscapularis tendon with marked tendinosis of the more inferior  fibers.  8. Marked fatty infiltration within the teres minor muscle as well as  the upper muscle bulk of the right shoulder subscapularis muscle.  9. Os acromiale.  10. Edema in the subcoracoid fat, with synovitis within the axillary  recess, correlate for adhesive capsulitis.      EK21   SR    PFT's: 21:  FVC-Pred 3.89    L  2021 10:00    FVC-Pre 3.41    L  2021 10:00    FVC-%Pred-Pre 87    %  2021 10:00    FEV1-Pre 2.90    L  2021 10:00    FEV1-%Pred-Pre 97    %  2021 10:00    FEV1FVC-Pred 77    %  2021 10:00    FEV1FVC-Pre 85    %  2021 10:00    FEFMax-Pred 7.89    L/sec  2021 10:00    FEFMax-Pre 8.23    L/sec  2021 10:00    FEFMax-%Pred-Pre 104    %  2021 10:00    FEF2575-Pred 2.37    L/sec  2021 10:00    FEF2575-Pre 3.70    L/sec  2021 10:00    DCM4808-%Pred-Pre 156    %  2021 10:00     ExpTime-Pre 6.26    sec  05/26/2021 10:00    FIFMax-Pre 5.56    L/sec  05/26/2021 10:00    VC-Pred 4.28    L  05/26/2021 10:00    VC-Pre 3.58    L  05/26/2021 10:00    VC-%Pred-Pre 83    %  05/26/2021 10:00    IC-Pred 3.93    L  05/26/2021 10:00    IC-Pre 3.35    L  05/26/2021 10:00    IC-%Pred-Pre 85    %  05/26/2021 10:00    ERV-Pred 0.35    L  05/26/2021 10:00    ERV-Pre 0.21    L  05/26/2021 10:00    ERV-%Pred-Pre 58    %  05/26/2021 10:00    FEV1FEV6-Pred 78    %  05/26/2021 10:00    FEV1FEV6-Pre 85    %  05/26/2021 10:00    FRCPleth-Pred 3.50    L  05/26/2021 10:00    FRCPleth-Pre 2.00    L  05/26/2021 10:00    FRCPleth-%Pred-Pre 56    %  05/26/2021 10:00    RVPleth-Pred 2.50    L  05/26/2021 10:00    RVPleth-Pre 1.77    L  05/26/2021 10:00    RVPleth-%Pred-Pre 70    %  05/26/2021 10:00    TLCPleth-Pred 6.52    L  05/26/2021 10:00    TLCPleth-Pre 5.35    L  05/26/2021 10:00    TLCPleth-%Pred-Pre 82    %  05/26/2021 10:00    DLCOunc-Pred 23.90    ml/min/mmHg  05/26/2021 10:00    DLCOunc-Pre 24.56    ml/min/mmHg  05/26/2021 10:00    DLCOunc-%Pred-Pre 102    %  05/26/2021 10:00    VA-Pre 5.68    L  05/26/2021 10:00    VA-%Pred-Pre 98    %  05/26/2021 10:00    FEV1SVC-Pred 70    %  05/26/2021 10:00    FEV1SVC-Pre 81    %  05/26/2021 10:00          Outside records reviewed from: care everywhere      ASSESSMENT and PLAN  Arnaud Bird is a 66 year old male scheduled for Right Total Shoulder Arthroplasty, Distal Clavicle Excision on 6/15/21 by Dr. Deleon in treatment of right shoulder arthritis.  PAC referral for risk assessment and optimization for anesthesia with comorbid conditions of asthma, obesity, gout:    Pre-operative considerations:  1.  Cardiac:  Functional status- METS >4.  Pt walks, does yard work, mows grass, fixes garage  doors.  Intermediate risk surgery with 0.4% (RCRI #) risk of major adverse cardiac event.  Denies cardiac history  --EKG 4/14/21 shows NSR.  --denies chest pain, palpitations, orthopnea, edema    2.  Pulm:  Airway feasible.  TAWNYA risk: intermediate  --h/o asthma, no longer needing inhaler therapy.  Allergic rhinitis related symptoms.  No h/o exacerbation requiring admission/ED  --recent Covid positive pneumonia, admission 4/14/21-4/18/21.  Recent Chest CT 5/15/21 shows continued decrease in bilateral peribronchovascular groundglass and consolidative opacities compared to CT from 4/28/2021, suggestive of improving COVID-19 pneumonia  --denies cough, endorses some mild SOB if he exerts himself too much.      3.  GI:  Risk of PONV score = 2.  If > 2, anti-emetic intervention recommended.    4.  MSK:  H/o gout, continue allopurinol.  --recent laceration to left upper lip requiring stiches.  Stitches removed a couple of days ago.  Healing well.    VTE risk: 0.5%    Patient is optimized and is acceptable candidate for the proposed procedure.  No further diagnostic evaluation is needed.       Kristin Potter PA-C  Preoperative Assessment Center  [facility]  Phone: [phone]  Fax: [phone]

## 2021-06-01 NOTE — ANESTHESIA PREPROCEDURE EVALUATION
Anesthesia Pre-Procedure Evaluation    Patient: Arnaud Bird   MRN: [id number] : 1955        Preoperative Diagnosis: * No surgery found *   Procedure :      Past Medical History:   Diagnosis Date     Arthritis      Benign positional vertigo      Carpal tunnel syndrome     mild     Hearing problem      LEFT WORSE THAN RIGHT  10/18/2012     Migraines      Nasal polyps      Unspecified asthma(493.90) 10/17/2012     Unspecified asthma, with exacerbation 10/17/2012      Past Surgical History:   Procedure Laterality Date     HC TOOTH EXTRACTION W/FORCEP       ORTHOPEDIC SURGERY      bilateral total knee replacements     TONSILLECTOMY      age 2 or 3      Allergies   Allergen Reactions     No Clinical Screening - See Comments      Seasonal Allergies       Social History     Tobacco Use     Smoking status: Never Smoker     Smokeless tobacco: Never Used   Substance Use Topics     Alcohol use: Yes     Comment: case beer a week      Wt Readings from Last 1 Encounters:   21 111 kg (244 lb 12.8 oz)        Anesthesia Evaluation   Pt has had prior anesthetic.     History of anesthetic complications   pt woke during knee surgery .    ROS/MED HX  ENT/Pulmonary:     (+) TAWNYA risk factors, obese, allergic rhinitis, asthma (seasonal) Last exacerbation: never,   (-) tobacco use and sleep apnea   Neurologic:     (+) migraines,  (-) no seizures and no CVA   Cardiovascular:     (+) Dyslipidemia -----    METS/Exercise Tolerance: >4 METS    Hematologic:  - neg hematologic  ROS  (-) history of blood clots   Musculoskeletal: Comment: gout      GI/Hepatic:  - neg GI/hepatic ROS     Renal/Genitourinary:  - neg Renal ROS     Endo:     (+) Obesity,  (-) Type I DM and Type II DM   Psychiatric/Substance Use:  - neg psychiatric ROS  (-) chronic opioid use history   Infectious Disease:  - neg infectious disease ROS     Malignancy:  - neg malignancy ROS     Other:  - neg other ROS          Physical Exam    Airway         Mallampati: III   TM distance: > 3 FB   Neck ROM: full   Mouth opening: > 3 cm    Respiratory Devices and Support         Dental  no notable dental history         Cardiovascular   cardiovascular exam normal       Rhythm and rate: regular and normal     Pulmonary   pulmonary exam normal        breath sounds clear to auscultation           OUTSIDE LABS:  CBC:   Lab Results   Component Value Date    WBC 9.9 04/14/2021    WBC 7.2 04/12/2021    HGB 14.4 04/14/2021    HGB 15.1 04/12/2021    HCT 43.2 04/14/2021    HCT 44.2 04/12/2021     04/14/2021     04/12/2021     BMP:   Lab Results   Component Value Date     04/14/2021     04/12/2021    POTASSIUM 3.8 04/14/2021    POTASSIUM 4.5 04/12/2021    CHLORIDE 105 04/14/2021    CHLORIDE 107 04/12/2021    CO2 25 04/14/2021    CO2 29 04/12/2021    BUN 14 04/14/2021    BUN 13 04/12/2021    CR 0.69 04/14/2021    CR 0.81 04/12/2021     (H) 04/14/2021     (H) 04/12/2021     COAGS:   Lab Results   Component Value Date    PTT 44 (H) 04/14/2021    INR 1.32 (H) 04/14/2021     POC: No results found for: BGM, HCG, HCGS  HEPATIC:   Lab Results   Component Value Date    ALBUMIN 3.0 (L) 04/14/2021    PROTTOTAL 7.8 04/14/2021    ALT 37 04/14/2021    AST 17 04/14/2021    ALKPHOS 112 04/14/2021    BILITOTAL 1.1 04/14/2021     OTHER:   Lab Results   Component Value Date    A1C 5.4 12/30/2016    BELIA 8.8 04/14/2021    PHOS 3.1 04/14/2021    MAG 2.2 04/14/2021    LIPASE 108 04/14/2021    TSH 2.02 06/04/2018    SED 9 06/04/2018             PAC Discussion and Assessment    ASA Classification: 2  Case is suitable for: [facility] [place]  PAC Recommendations anesthetic techniques: choice.  Invasive monitoring and risk discussed: No    Possibility and Risk of blood transfusion discussed: Yes            PAC Resident/NP Anesthesia Assessment: Arnaud Bird is a 66 year old male scheduled for Right Total Shoulder Arthroplasty, Distal Clavicle Excision on 6/15/21 by   Pancho in treatment of right shoulder arthritis.  PAC referral for risk assessment and optimization for anesthesia with comorbid conditions of asthma, obesity, gout:    Pre-operative considerations:  1.  Cardiac:  Functional status- METS >4.  Pt walks, does yard work, mows grass, fixes garage doors.  Intermediate risk surgery with 0.4% (RCRI #) risk of major adverse cardiac event.  Denies cardiac history  --EKG 4/14/21 shows NSR.  --denies chest pain, palpitations, orthopnea, edema    2.  Pulm:  Airway feasible.  TAWNYA risk: intermediate  --h/o asthma, no longer needing inhaler therapy.  Allergic rhinitis related symptoms.  No h/o exacerbation requiring admission/ED  --recent Covid positive pneumonia, admission 4/14/21-4/18/21.  Recent Chest CT 5/15/21 shows continued decrease in bilateral peribronchovascular groundglass and consolidative opacities compared to CT from 4/28/2021, suggestive of improving COVID-19 pneumonia  --denies cough, endorses some mild SOB if he exerts himself too much.      3.  GI:  Risk of PONV score = 2.  If > 2, anti-emetic intervention recommended.    4.  MSK:  H/o gout, continue allopurinol.  --recent laceration to left upper lip requiring stiches.  Stitches removed a couple of days ago.  Healing well.    VTE risk: 0.5%    Patient is optimized and is acceptable candidate for the proposed procedure.  No further diagnostic evaluation is needed.         **For further details of assessment, testing, and physical exam please see H and P completed on same date.          Kristin Potter PA-C, [facility]    Reviewed and Signed by PAC Mid-Level Provider/Resident  Mid-Level Provider/Resident: Kristin Potter  Date: 6/1/21      Attending Anesthesiologist Anesthesia Assessment: 66 year old for total shoulder arthroplasty. Patient did have symptomatic COVID (CT findings) on 4/14 and was hospitalized for 4 days (no ICU, no HiFlo). He is now at 9 weeks, which is past the ASA recommendation of 7 weeks, and his CT  has shown improvement. It might still be worthwhile to consider if regional only is possible and avoid intubation.     No significant CAD.     Patient/case discussed with SAIGE/resident; agree with above assessment. No need to see patient. Patient is appropriate for the planned procedure without further work-up or medical management.    MD Kristin Velasco PA-C

## 2021-06-02 RX ORDER — HYDROCODONE BITARTRATE AND ACETAMINOPHEN 7.5; 325 MG/1; MG/1
1 TABLET ORAL EVERY 6 HOURS PRN
Qty: 120 TABLET | Refills: 0 | Status: SHIPPED | OUTPATIENT
Start: 2021-06-02 | End: 2021-07-14

## 2021-06-02 NOTE — TELEPHONE ENCOUNTER
Controlled Substance Refill Request for hydrocodone-acetaminophen 7.5-325 mg  Problem List Complete:    Yes    Last Written Prescription Date:  4/29/21  Last Fill Quantity: 120,   # refills: 0    THE MOST RECENT OFFICE VISIT MUST BE WITHIN THE PAST 3 MONTHS. AT LEAST ONE FACE TO FACE VISIT MUST OCCUR EVERY 6 MONTHS. ADDITIONAL VISITS CAN BE VIRTUAL.  (THIS STATEMENT SHOULD BE DELETED.)    Last Office Visit with [facility] primary care provider: 5/25/21 Laverne    Future Office visit:     Controlled substance agreement:   Encounter-Level CSA - 10/31/2016:    Controlled Substance Agreement - Scan on 11/15/2016  7:53 AM: CONTROLLED SUBSTANCE AGREEMENT     Patient-Level CSA:    Controlled Substance Agreement - Opioid - Scan on 7/20/2020 12:15 PM  Controlled Substance Agreement - Opioid - Scan on 3/19/2019 11:01 AM         Last Urine Drug Screen:   Pain Drug SCR UR W RPTD Meds   Date Value Ref Range Status   09/09/2019 FINAL  Final     Comment:     (Note)  ====================================================================  [facility] COMP DRUG ANALYSIS,UR  ====================================================================  Test                             Result       Flag       Units        Drug Present and Declared for Prescription Verification   Hydrocodone                    665          EXPECTED   ng/mg creat   Hydromorphone                  93           EXPECTED   ng/mg creat   Dihydrocodeine                 149          EXPECTED   ng/mg creat   Norhydrocodone                 1732         EXPECTED   ng/mg creat    Sources of hydrocodone include scheduled prescription    medications. Hydromorphone, dihydrocodeine and norhydrocodone are    expected metabolites of hydrocodone. Hydromorphone and    dihydrocodeine are also available as scheduled prescription    medications.   Acetaminophen                  PRESENT      EXPECTED                Drug Absent but Declared for Prescription Verification   Diclofenac                      Not Detected UNEXPECTED                Diclofenac, as indicated in the declared medication list, is not    always detected even when used as directed.  ====================================================================  Test                      Result    Flag   Units      Ref Range        Creatinine              171              mg/dL      >=20            ====================================================================  Declared Medications:  The flagging and interpretation on this report are based on the  following declared medications.  Unexpected results may arise from  inaccuracies in the declared medications.  **Note: The testing scope of this panel includes these medications:  Hydrocodone (Norco)  **Note: The testing scope of this panel does not include small to  moderate amounts of these reported medications:  Acetaminophen (Norco)  Diclofenac (Voltaren)  **Note: The testing scope of this panel does not include following  reported medications:  Acyclovir  Albuterol  Allopurinol  Amoxicillin (Augmentin)  Beclomethasone (QVAR)  Clavulinate (Augmentin)  Clindamycin (Cleocin)  Erythromycin  Fluticasone  Ipratropium (Atrovent)  Naloxone (Narcan)  Oxymetazoline (Afrin)  Sildenafil (Viagra)  Simvastatin (Zocor)  ====================================================================  For clinical consultation, please call [phone].  ====================================================================  Analysis performed by [facility] [place] [address]     , No results found for: COMDAT,   Cannabinoids (25-dns-2-carboxy-9-THC)   Date Value Ref Range Status   07/20/2020 Not Detected NDET^Not Detected ng/mL Final     Comment:     Cutoff for a negative cannabinoid is 50 ng/mL or less.     Phencyclidine (Phencyclidine)   Date Value Ref Range Status   07/20/2020 Not Detected NDET^Not Detected ng/mL Final     Comment:     Cutoff for a negative PCP is 25 ng/mL or less.     Cocaine  (Benzoylecgonine)   Date Value Ref Range Status   07/20/2020 Not Detected NDET^Not Detected ng/mL Final     Comment:     Cutoff for a negative cocaine is 150 ng/ml or less.     Methamphetamine (d-Methamphetamine)   Date Value Ref Range Status   07/20/2020 Not Detected NDET^Not Detected ng/mL Final     Comment:     Cutoff for a negative methamphetamine is 500 ng/ml or less.     Opiates (Morphine)   Date Value Ref Range Status   07/20/2020 Detected, Abnormal Result (A) NDET^Not Detected ng/mL Final     Comment:     Cutoff for a positive opiate is greater than 100 ng/ml.  This is an unconfirmed screening result to be used for medical purposes only.   Order [id number] for confirmation or individual confirmation tests to [employer] [facility].       Amphetamine (d-Amphetamine)   Date Value Ref Range Status   07/20/2020 Not Detected NDET^Not Detected ng/mL Final     Comment:     Cutoff for a negative amphetamine is 500 ng/mL or less.     Benzodiazepines (Nordiazepam)   Date Value Ref Range Status   07/20/2020 Not Detected NDET^Not Detected ng/mL Final     Comment:     Cutoff for a negative benzodiazepine is 150 ng/ml or less.     Tricyclic Antidepressants (Desipramine)   Date Value Ref Range Status   07/20/2020 Not Detected NDET^Not Detected ng/mL Final     Comment:     Cutoff for a negative tricyclic antidepressant is 300 ng/ml or less.     Methadone (Methadone)   Date Value Ref Range Status   07/20/2020 Not Detected NDET^Not Detected ng/mL Final     Comment:     Cutoff for a negative methadone is 200 ng/ml or less.     Barbiturates (Butalbital)   Date Value Ref Range Status   07/20/2020 Not Detected NDET^Not Detected ng/mL Final     Comment:     Cutoff for a negative barbituate is 200 ng/ml or less.     Oxycodone (Oxycodone)   Date Value Ref Range Status   07/20/2020 Not Detected NDET^Not Detected ng/mL Final     Comment:     Cutoff for a negative Oxycodone is 100 ng/mL or less.     Propoxyphene (Norpropoxyphene)   Date Value Ref Range  Status   07/20/2020 Not Detected NDET^Not Detected ng/mL Final     Comment:     Cutoff for a negative propoxyphene is 300 ng/ml or less     Buprenorphine (Buprenorphine)   Date Value Ref Range Status   07/20/2020 Not Detected NDET^Not Detected ng/mL Final     Comment:     Cutoff for a negative buprenorphine is 10 ng/ml or less        Processing:  Rx to be electronically transmitted to pharmacy by provider     [email or web address]   checked in past 3 months?  No, route to RN     [place] reviewed:  reviewed- no concerns on 6/2/21    [place]:  [email or web address]    Lis AGUILAR RN  EP Triage

## 2021-06-10 ENCOUNTER — MYC MEDICAL ADVICE (OUTPATIENT)
Dept: FAMILY MEDICINE | Facility: CLINIC | Age: 66
End: 2021-06-10

## 2021-06-10 ENCOUNTER — OFFICE VISIT (OUTPATIENT)
Dept: URGENT CARE | Facility: URGENT CARE | Age: 66
End: 2021-06-10
Payer: COMMERCIAL

## 2021-06-10 VITALS
SYSTOLIC BLOOD PRESSURE: 126 MMHG | HEART RATE: 92 BPM | WEIGHT: 243 LBS | OXYGEN SATURATION: 96 % | DIASTOLIC BLOOD PRESSURE: 88 MMHG | BODY MASS INDEX: 36.83 KG/M2 | HEIGHT: 68 IN | TEMPERATURE: 99.3 F

## 2021-06-10 DIAGNOSIS — M1A.9XX0 CHRONIC GOUT INVOLVING TOE OF RIGHT FOOT WITHOUT TOPHUS, UNSPECIFIED CAUSE: ICD-10-CM

## 2021-06-10 DIAGNOSIS — K12.2 ORAL INFECTION: Primary | ICD-10-CM

## 2021-06-10 PROCEDURE — 99213 OFFICE O/P EST LOW 20 MIN: CPT | Performed by: PHYSICIAN ASSISTANT

## 2021-06-10 ASSESSMENT — MIFFLIN-ST. JEOR: SCORE: 1846.74

## 2021-06-10 NOTE — PROGRESS NOTES
"Assessment & Plan     Oral infection (upper lip)  **  - amoxicillin-clavulanate (AUGMENTIN) 875-125 MG tablet  Dispense: 20 tablet; Refill: 0  - if symptoms do not start improving in 1-2 days please return to the urgent care for evaluation    Return in about 1 week (around 6/17/2021) for If not better, sooner if worsening.    Diagnosis and treatment plan were discussed with patient and/or parent. If symptoms worsen or do not improve in the next few days, follow-up with your primary care provider or visit an [facility] urgent care clinic location.  Patient verbalizes understanding of all things discussed. All questions were addressed and answered.     Melani Hess PA-C  [facility] [place]    Antonio Lares is a 66 year old male who presents to clinic today for the following health issues:  Chief Complaint   Patient presents with     Urgent Care     Wound Check     Had 5 sutures placed inside left upper lip and 5 outside left upper lip 5/18/21 at  [facility].  Had external sutures removed 5/25/21.  Reports internal sutures have not absorbed as expected and suture line continuing to be painful.  Has shoulder surgery scheduled this coming Tuesday; concerned if there's a lip infection might interfere with surgery.     HPI  Fixing garage door in the dark and cable hit him in the lip 3 weeks ago (5/18/2021)- laceration was stiched. 5 prolene sutures were removed on 5/25/2021. His PCP told him the remaining sutures would dissolve on their own but they are still there.He also feels a large lump in his lip. Lip is still painful and he is concerned for infection.   Denies feeling feverish.     Hx of COVID in April.  Currently unvaccinated      Review of Systems  Constitutional, HEENT, cardiovascular, pulmonary, gi and gu systems are negative, except as otherwise noted.      Objective    /88   Pulse 92   Temp 99.3  F (37.4  C) (Oral)   Ht 1.727 m (5' 8\")   Wt 110.2 kg (243 lb)   " SpO2 96%   BMI 36.95 kg/m    Physical Exam   GENERAL: healthy, alert and no distress  HENT: normal cephalic/atraumatic, ear canals and TM's normal, nose without ulcers or lesions, oropharynx clear, oral mucous membranes moist. Sutures visualized near vermillion border of upper lip, 2cm mass palpable where laceration was and beneath the sutures with mild surrounding edema. No facial cellulitis.   NECK: no adenopathy, no asymmetry, masses, or scars and thyroid normal to palpation  RESP: lungs clear to auscultation - no rales, rhonchi or wheezes  CV: regular rate and rhythm, normal S1 S2, no S3 or S4, no murmur, click or rub, no peripheral edema and peripheral pulses strong  ABDOMEN: soft, nontender, no hepatosplenomegaly, no masses and bowel sounds normal  MS: no gross musculoskeletal defects noted, no edema

## 2021-06-10 NOTE — PATIENT INSTRUCTIONS
Patient Education     Abscess (Antibiotic Treatment Only)  An abscess happens when bacteria get trapped under the skin and start to grow. Pus forms inside the abscess as the body responds to the bacteria. An abscess can happen with an insect bite, ingrown hair, blocked oil gland, pimple, cyst, or puncture wound. It is sometimes call a boil.   In the early stages, your wound may be red and tender. For this stage, you may get antibiotics. If the abscess does not get better with antibiotics, it will need to be drained with a small cut.   Home care  These tips will help you care for your abscess at home:    Don't cut, squeeze, or pop the boil yourself.    Put antibiotic cream or ointment on the skin 3 to 4 times a day, unless something else was prescribed. Some ointments include an antibiotic plus a pain reliever.    If your healthcare provider prescribed antibiotics, don't stop taking them until you have finished the medicine or you are told to stop.    You may use an over-the-counter pain medicine to control pain, unless another pain medicine was prescribed. Talk with your provider before taking these medicines if you have chronic liver or kidney disease or ever had a stomach ulcer or digestive bleeding.  Follow-up care  Follow up with your healthcare provider, or as advised. Check your wound each day for the signs that the infection may be getting worse (see below).   When to seek medical advice  Get prompt medical attention if any of these occur:    An increase in redness or swelling    Red streaks in the skin leading away from the abscess    An increase in local pain or swelling    Fever of 100.4 F (38 C) or higher, or as directed by your healthcare provider    Pus or fluid coming from the abscess    Boil returns after getting better  Cliff last reviewed this educational content on 8/1/2019 2000-2021 [employer]. All rights reserved. This information is not intended as a substitute for  professional medical care. Always follow your healthcare professional's instructions.

## 2021-06-10 NOTE — TELEPHONE ENCOUNTER
Called pt; states he had an accident about 3 weeks ago. Pt had to get 10 stitches to his face, 5 to the outside of his lip and 5 to the inside of his lip. Pt had the 5 stitches outside his lip removed last week. States that stitches on inside of his lip are dissolvable; pt states that his lip is still very swollen and the stiches hurt. Denies any fever, drainage or puss from stitches. Pt is scheduled to have right shoulder surgery on 6-15-21. Advised pt that stitches should be looked at before the weekend. No appointments in clinic available for today. Advised to to be seen in urgent care to be evaluated. Pt agreeable to plan and will go to Urgent care in [place] today.     Danisha Alfonso RN

## 2021-06-11 RX ORDER — ALLOPURINOL 300 MG/1
300 TABLET ORAL DAILY
Qty: 90 TABLET | Refills: 3 | Status: SHIPPED | OUTPATIENT
Start: 2021-06-11 | End: 2023-08-22

## 2021-06-11 NOTE — TELEPHONE ENCOUNTER
Routing to PCP for review and refill as appropriate.    Allopurinol   Prescription approved per [facility] Refill Protocol.

## 2021-06-14 LAB
DLCOUNC-%PRED-PRE: 102 %
DLCOUNC-PRE: 24.56 ML/MIN/MMHG
DLCOUNC-PRED: 23.9 ML/MIN/MMHG
ERV-%PRED-PRE: 58 %
ERV-PRE: 0.21 L
ERV-PRED: 0.35 L
EXPTIME-PRE: 6.26 SEC
FEF2575-%PRED-PRE: 156 %
FEF2575-PRE: 3.7 L/SEC
FEF2575-PRED: 2.37 L/SEC
FEFMAX-%PRED-PRE: 104 %
FEFMAX-PRE: 8.23 L/SEC
FEFMAX-PRED: 7.89 L/SEC
FEV1-%PRED-PRE: 97 %
FEV1-PRE: 2.9 L
FEV1FEV6-PRE: 85 %
FEV1FEV6-PRED: 78 %
FEV1FVC-PRE: 85 %
FEV1FVC-PRED: 77 %
FEV1SVC-PRE: 81 %
FEV1SVC-PRED: 70 %
FIFMAX-PRE: 5.56 L/SEC
FRCPLETH-%PRED-PRE: 56 %
FRCPLETH-PRE: 2 L
FRCPLETH-PRED: 3.5 L
FVC-%PRED-PRE: 87 %
FVC-PRE: 3.41 L
FVC-PRED: 3.89 L
IC-%PRED-PRE: 85 %
IC-PRE: 3.35 L
IC-PRED: 3.93 L
RVPLETH-%PRED-PRE: 70 %
RVPLETH-PRE: 1.77 L
RVPLETH-PRED: 2.5 L
TLCPLETH-%PRED-PRE: 82 %
TLCPLETH-PRE: 5.35 L
TLCPLETH-PRED: 6.52 L
VA-%PRED-PRE: 98 %
VA-PRE: 5.68 L
VC-%PRED-PRE: 83 %
VC-PRE: 3.58 L
VC-PRED: 4.28 L

## 2021-06-15 ENCOUNTER — APPOINTMENT (OUTPATIENT)
Dept: OCCUPATIONAL THERAPY | Facility: CLINIC | Age: 66
End: 2021-06-15
Attending: ORTHOPAEDIC SURGERY
Payer: COMMERCIAL

## 2021-06-15 ENCOUNTER — APPOINTMENT (OUTPATIENT)
Dept: GENERAL RADIOLOGY | Facility: CLINIC | Age: 66
End: 2021-06-15
Attending: ORTHOPAEDIC SURGERY
Payer: COMMERCIAL

## 2021-06-15 ENCOUNTER — HOSPITAL ENCOUNTER (OUTPATIENT)
Facility: CLINIC | Age: 66
LOS: 1 days | Discharge: HOME | End: 2021-06-16
Attending: ORTHOPAEDIC SURGERY | Admitting: ORTHOPAEDIC SURGERY
Payer: COMMERCIAL

## 2021-06-15 DIAGNOSIS — K12.2 ORAL INFECTION: ICD-10-CM

## 2021-06-15 DIAGNOSIS — M19.011 ARTHRITIS OF SHOULDER REGION, RIGHT: ICD-10-CM

## 2021-06-15 LAB
ABO + RH BLD: NORMAL
ABO + RH BLD: NORMAL
BLD GP AB SCN SERPL QL: NORMAL
BLOOD BANK CMNT PATIENT-IMP: NORMAL
BLOOD BANK CMNT PATIENT-IMP: NORMAL
GLUCOSE BLDC GLUCOMTR-MCNC: 114 MG/DL (ref 70–99)
SPECIMEN EXP DATE BLD: NORMAL

## 2021-06-15 PROCEDURE — 99207 PR CDG-CODE CATEGORY CHANGED: CPT | Performed by: INTERNAL MEDICINE

## 2021-06-15 PROCEDURE — 250N000011 HC RX IP 250 OP 636: Performed by: NURSE ANESTHETIST, CERTIFIED REGISTERED

## 2021-06-15 PROCEDURE — 97110 THERAPEUTIC EXERCISES: CPT | Mod: GO | Performed by: OCCUPATIONAL THERAPIST

## 2021-06-15 PROCEDURE — 82962 GLUCOSE BLOOD TEST: CPT

## 2021-06-15 PROCEDURE — 73030 X-RAY EXAM OF SHOULDER: CPT | Mod: 26 | Performed by: RADIOLOGY

## 2021-06-15 PROCEDURE — 272N000002 HC OR SUPPLY OTHER OPNP: Performed by: ORTHOPAEDIC SURGERY

## 2021-06-15 PROCEDURE — 250N000011 HC RX IP 250 OP 636: Performed by: ANESTHESIOLOGY

## 2021-06-15 PROCEDURE — 258N000003 HC RX IP 258 OP 636: Performed by: ORTHOPAEDIC SURGERY

## 2021-06-15 PROCEDURE — C9290 INJ, BUPIVACAINE LIPOSOME: HCPCS | Performed by: ANESTHESIOLOGY

## 2021-06-15 PROCEDURE — 250N000013 HC RX MED GY IP 250 OP 250 PS 637: Performed by: ORTHOPAEDIC SURGERY

## 2021-06-15 PROCEDURE — 250N000011 HC RX IP 250 OP 636: Performed by: ORTHOPAEDIC SURGERY

## 2021-06-15 PROCEDURE — 278N000051 HC OR IMPLANT GENERAL: Performed by: ORTHOPAEDIC SURGERY

## 2021-06-15 PROCEDURE — 370N000017 HC ANESTHESIA TECHNICAL FEE, PER MIN: Performed by: ORTHOPAEDIC SURGERY

## 2021-06-15 PROCEDURE — 250N000009 HC RX 250: Performed by: NURSE ANESTHETIST, CERTIFIED REGISTERED

## 2021-06-15 PROCEDURE — 360N000077 HC SURGERY LEVEL 4, PER MIN: Performed by: ORTHOPAEDIC SURGERY

## 2021-06-15 PROCEDURE — 999N000141 HC STATISTIC PRE-PROCEDURE NURSING ASSESSMENT: Performed by: ORTHOPAEDIC SURGERY

## 2021-06-15 PROCEDURE — C1713 ANCHOR/SCREW BN/BN,TIS/BN: HCPCS | Performed by: ORTHOPAEDIC SURGERY

## 2021-06-15 PROCEDURE — 97165 OT EVAL LOW COMPLEX 30 MIN: CPT | Mod: GO | Performed by: OCCUPATIONAL THERAPIST

## 2021-06-15 PROCEDURE — 250N000009 HC RX 250: Performed by: ORTHOPAEDIC SURGERY

## 2021-06-15 PROCEDURE — 999N000065 XR SHOULDER RIGHT PORT G/E 2 VIEWS: Mod: RT

## 2021-06-15 PROCEDURE — 258N000001 HC RX 258: Performed by: ORTHOPAEDIC SURGERY

## 2021-06-15 PROCEDURE — 250N000013 HC RX MED GY IP 250 OP 250 PS 637: Performed by: ANESTHESIOLOGY

## 2021-06-15 PROCEDURE — 250N000025 HC SEVOFLURANE, PER MIN: Performed by: ORTHOPAEDIC SURGERY

## 2021-06-15 PROCEDURE — 271N000001 HC OR GENERAL SUPPLY NON-STERILE: Performed by: ORTHOPAEDIC SURGERY

## 2021-06-15 PROCEDURE — 99202 OFFICE O/P NEW SF 15 MIN: CPT | Performed by: INTERNAL MEDICINE

## 2021-06-15 PROCEDURE — 250N000009 HC RX 250

## 2021-06-15 PROCEDURE — 272N000001 HC OR GENERAL SUPPLY STERILE: Performed by: ORTHOPAEDIC SURGERY

## 2021-06-15 PROCEDURE — 710N000010 HC RECOVERY PHASE 1, LEVEL 2, PER MIN: Performed by: ORTHOPAEDIC SURGERY

## 2021-06-15 PROCEDURE — 258N000003 HC RX IP 258 OP 636: Performed by: NURSE ANESTHETIST, CERTIFIED REGISTERED

## 2021-06-15 DEVICE — BONE CEMENT SIMPLEX W/TOBRAMYCIN 6197-9-001: Type: IMPLANTABLE DEVICE | Site: SHOULDER | Status: FUNCTIONAL

## 2021-06-15 DEVICE — UNIVERS VAULTLOCK GLENOID, LARGE
Type: IMPLANTABLE DEVICE | Site: SHOULDER | Status: FUNCTIONAL
Brand: ARTHREX®

## 2021-06-15 RX ORDER — ONDANSETRON 2 MG/ML
INJECTION INTRAMUSCULAR; INTRAVENOUS PRN
Status: DISCONTINUED | OUTPATIENT
Start: 2021-06-15 | End: 2021-06-15

## 2021-06-15 RX ORDER — NALOXONE HYDROCHLORIDE 0.4 MG/ML
0.4 INJECTION, SOLUTION INTRAMUSCULAR; INTRAVENOUS; SUBCUTANEOUS
Status: DISCONTINUED | OUTPATIENT
Start: 2021-06-15 | End: 2021-06-16 | Stop reason: HOSPADM

## 2021-06-15 RX ORDER — OXYCODONE HYDROCHLORIDE 5 MG/1
5 TABLET ORAL EVERY 4 HOURS PRN
Status: DISCONTINUED | OUTPATIENT
Start: 2021-06-15 | End: 2021-06-16 | Stop reason: HOSPADM

## 2021-06-15 RX ORDER — ACETAMINOPHEN 325 MG/1
975 TABLET ORAL ONCE
Status: COMPLETED | OUTPATIENT
Start: 2021-06-15 | End: 2021-06-15

## 2021-06-15 RX ORDER — SODIUM CHLORIDE, SODIUM LACTATE, POTASSIUM CHLORIDE, CALCIUM CHLORIDE 600; 310; 30; 20 MG/100ML; MG/100ML; MG/100ML; MG/100ML
INJECTION, SOLUTION INTRAVENOUS CONTINUOUS
Status: DISCONTINUED | OUTPATIENT
Start: 2021-06-15 | End: 2021-06-15

## 2021-06-15 RX ORDER — NALOXONE HYDROCHLORIDE 0.4 MG/ML
0.4 INJECTION, SOLUTION INTRAMUSCULAR; INTRAVENOUS; SUBCUTANEOUS
Status: DISCONTINUED | OUTPATIENT
Start: 2021-06-15 | End: 2021-06-15 | Stop reason: HOSPADM

## 2021-06-15 RX ORDER — ONDANSETRON 4 MG/1
4 TABLET, ORALLY DISINTEGRATING ORAL EVERY 30 MIN PRN
Status: DISCONTINUED | OUTPATIENT
Start: 2021-06-15 | End: 2021-06-15

## 2021-06-15 RX ORDER — LIDOCAINE 40 MG/G
CREAM TOPICAL
Status: DISCONTINUED | OUTPATIENT
Start: 2021-06-15 | End: 2021-06-16 | Stop reason: HOSPADM

## 2021-06-15 RX ORDER — ACETAMINOPHEN 325 MG/1
975 TABLET ORAL ONCE
Status: DISCONTINUED | OUTPATIENT
Start: 2021-06-15 | End: 2021-06-15 | Stop reason: HOSPADM

## 2021-06-15 RX ORDER — ACETAMINOPHEN 160 MG
TABLET,DISINTEGRATING ORAL PRN
Status: DISCONTINUED | OUTPATIENT
Start: 2021-06-15 | End: 2021-06-15 | Stop reason: HOSPADM

## 2021-06-15 RX ORDER — DEXAMETHASONE SODIUM PHOSPHATE 4 MG/ML
INJECTION, SOLUTION INTRA-ARTICULAR; INTRALESIONAL; INTRAMUSCULAR; INTRAVENOUS; SOFT TISSUE PRN
Status: DISCONTINUED | OUTPATIENT
Start: 2021-06-15 | End: 2021-06-15

## 2021-06-15 RX ORDER — NALOXONE HYDROCHLORIDE 0.4 MG/ML
0.2 INJECTION, SOLUTION INTRAMUSCULAR; INTRAVENOUS; SUBCUTANEOUS
Status: DISCONTINUED | OUTPATIENT
Start: 2021-06-15 | End: 2021-06-16 | Stop reason: HOSPADM

## 2021-06-15 RX ORDER — DIMENHYDRINATE 50 MG/ML
25 INJECTION, SOLUTION INTRAMUSCULAR; INTRAVENOUS
Status: DISCONTINUED | OUTPATIENT
Start: 2021-06-15 | End: 2021-06-16 | Stop reason: HOSPADM

## 2021-06-15 RX ORDER — TRANEXAMIC ACID 650 MG/1
1950 TABLET ORAL ONCE
Status: COMPLETED | OUTPATIENT
Start: 2021-06-15 | End: 2021-06-15

## 2021-06-15 RX ORDER — FLUMAZENIL 0.1 MG/ML
0.2 INJECTION, SOLUTION INTRAVENOUS
Status: DISCONTINUED | OUTPATIENT
Start: 2021-06-15 | End: 2021-06-15 | Stop reason: HOSPADM

## 2021-06-15 RX ORDER — NALOXONE HYDROCHLORIDE 0.4 MG/ML
0.2 INJECTION, SOLUTION INTRAMUSCULAR; INTRAVENOUS; SUBCUTANEOUS
Status: DISCONTINUED | OUTPATIENT
Start: 2021-06-15 | End: 2021-06-15 | Stop reason: HOSPADM

## 2021-06-15 RX ORDER — DOCUSATE SODIUM 100 MG/1
100 CAPSULE, LIQUID FILLED ORAL 2 TIMES DAILY
Status: DISCONTINUED | OUTPATIENT
Start: 2021-06-15 | End: 2021-06-16 | Stop reason: HOSPADM

## 2021-06-15 RX ORDER — ALLOPURINOL 300 MG/1
300 TABLET ORAL DAILY
Status: DISCONTINUED | OUTPATIENT
Start: 2021-06-15 | End: 2021-06-16 | Stop reason: HOSPADM

## 2021-06-15 RX ORDER — ONDANSETRON 4 MG/1
4 TABLET, ORALLY DISINTEGRATING ORAL EVERY 6 HOURS PRN
Status: DISCONTINUED | OUTPATIENT
Start: 2021-06-15 | End: 2021-06-16 | Stop reason: HOSPADM

## 2021-06-15 RX ORDER — AMOXICILLIN 250 MG
1 CAPSULE ORAL 2 TIMES DAILY
Status: DISCONTINUED | OUTPATIENT
Start: 2021-06-15 | End: 2021-06-16 | Stop reason: HOSPADM

## 2021-06-15 RX ORDER — ONDANSETRON 2 MG/ML
4 INJECTION INTRAMUSCULAR; INTRAVENOUS EVERY 30 MIN PRN
Status: DISCONTINUED | OUTPATIENT
Start: 2021-06-15 | End: 2021-06-15

## 2021-06-15 RX ORDER — CEFAZOLIN SODIUM 2 G/100ML
2 INJECTION, SOLUTION INTRAVENOUS EVERY 8 HOURS
Status: COMPLETED | OUTPATIENT
Start: 2021-06-15 | End: 2021-06-16

## 2021-06-15 RX ORDER — METOPROLOL TARTRATE 1 MG/ML
1-2 INJECTION, SOLUTION INTRAVENOUS EVERY 5 MIN PRN
Status: DISCONTINUED | OUTPATIENT
Start: 2021-06-15 | End: 2021-06-15

## 2021-06-15 RX ORDER — PROCHLORPERAZINE MALEATE 5 MG
5 TABLET ORAL EVERY 6 HOURS PRN
Status: DISCONTINUED | OUTPATIENT
Start: 2021-06-15 | End: 2021-06-16 | Stop reason: HOSPADM

## 2021-06-15 RX ORDER — ALBUTEROL SULFATE 90 UG/1
2 AEROSOL, METERED RESPIRATORY (INHALATION) EVERY 6 HOURS PRN
Status: DISCONTINUED | OUTPATIENT
Start: 2021-06-15 | End: 2021-06-16 | Stop reason: HOSPADM

## 2021-06-15 RX ORDER — IBUPROFEN 600 MG/1
600 TABLET, FILM COATED ORAL EVERY 6 HOURS PRN
Status: DISCONTINUED | OUTPATIENT
Start: 2021-06-15 | End: 2021-06-16 | Stop reason: HOSPADM

## 2021-06-15 RX ORDER — BISACODYL 10 MG
10 SUPPOSITORY, RECTAL RECTAL DAILY PRN
Status: DISCONTINUED | OUTPATIENT
Start: 2021-06-15 | End: 2021-06-16 | Stop reason: HOSPADM

## 2021-06-15 RX ORDER — NALOXONE HYDROCHLORIDE 0.4 MG/ML
0.4 INJECTION, SOLUTION INTRAMUSCULAR; INTRAVENOUS; SUBCUTANEOUS
Status: DISCONTINUED | OUTPATIENT
Start: 2021-06-15 | End: 2021-06-15

## 2021-06-15 RX ORDER — FENTANYL CITRATE 50 UG/ML
25-50 INJECTION, SOLUTION INTRAMUSCULAR; INTRAVENOUS
Status: DISCONTINUED | OUTPATIENT
Start: 2021-06-15 | End: 2021-06-15

## 2021-06-15 RX ORDER — PROPOFOL 10 MG/ML
INJECTION, EMULSION INTRAVENOUS PRN
Status: DISCONTINUED | OUTPATIENT
Start: 2021-06-15 | End: 2021-06-15

## 2021-06-15 RX ORDER — ONDANSETRON 2 MG/ML
4 INJECTION INTRAMUSCULAR; INTRAVENOUS EVERY 6 HOURS PRN
Status: DISCONTINUED | OUTPATIENT
Start: 2021-06-15 | End: 2021-06-16 | Stop reason: HOSPADM

## 2021-06-15 RX ORDER — ACETAMINOPHEN 325 MG/1
975 TABLET ORAL EVERY 8 HOURS
Status: DISCONTINUED | OUTPATIENT
Start: 2021-06-15 | End: 2021-06-16 | Stop reason: HOSPADM

## 2021-06-15 RX ORDER — KETOROLAC TROMETHAMINE 30 MG/ML
INJECTION, SOLUTION INTRAMUSCULAR; INTRAVENOUS PRN
Status: DISCONTINUED | OUTPATIENT
Start: 2021-06-15 | End: 2021-06-15

## 2021-06-15 RX ORDER — HYDROMORPHONE HYDROCHLORIDE 1 MG/ML
0.2 INJECTION, SOLUTION INTRAMUSCULAR; INTRAVENOUS; SUBCUTANEOUS
Status: DISCONTINUED | OUTPATIENT
Start: 2021-06-15 | End: 2021-06-16 | Stop reason: HOSPADM

## 2021-06-15 RX ORDER — LIDOCAINE HYDROCHLORIDE 20 MG/ML
INJECTION, SOLUTION INFILTRATION; PERINEURAL PRN
Status: DISCONTINUED | OUTPATIENT
Start: 2021-06-15 | End: 2021-06-15

## 2021-06-15 RX ORDER — FENTANYL CITRATE 50 UG/ML
25-50 INJECTION, SOLUTION INTRAMUSCULAR; INTRAVENOUS
Status: DISCONTINUED | OUTPATIENT
Start: 2021-06-15 | End: 2021-06-15 | Stop reason: HOSPADM

## 2021-06-15 RX ORDER — FENTANYL CITRATE-0.9 % NACL/PF 10 MCG/ML
PLASTIC BAG, INJECTION (ML) INTRAVENOUS PRN
Status: DISCONTINUED | OUTPATIENT
Start: 2021-06-15 | End: 2021-06-15

## 2021-06-15 RX ORDER — HYDROMORPHONE HYDROCHLORIDE 1 MG/ML
0.4 INJECTION, SOLUTION INTRAMUSCULAR; INTRAVENOUS; SUBCUTANEOUS
Status: DISCONTINUED | OUTPATIENT
Start: 2021-06-15 | End: 2021-06-16 | Stop reason: HOSPADM

## 2021-06-15 RX ORDER — SODIUM CHLORIDE, SODIUM LACTATE, POTASSIUM CHLORIDE, CALCIUM CHLORIDE 600; 310; 30; 20 MG/100ML; MG/100ML; MG/100ML; MG/100ML
INJECTION, SOLUTION INTRAVENOUS CONTINUOUS
Status: DISCONTINUED | OUTPATIENT
Start: 2021-06-15 | End: 2021-06-16 | Stop reason: HOSPADM

## 2021-06-15 RX ORDER — HYDROMORPHONE HYDROCHLORIDE 1 MG/ML
.3-.5 INJECTION, SOLUTION INTRAMUSCULAR; INTRAVENOUS; SUBCUTANEOUS EVERY 5 MIN PRN
Status: DISCONTINUED | OUTPATIENT
Start: 2021-06-15 | End: 2021-06-15

## 2021-06-15 RX ORDER — OXYCODONE HYDROCHLORIDE 10 MG/1
10 TABLET ORAL EVERY 4 HOURS PRN
Status: DISCONTINUED | OUTPATIENT
Start: 2021-06-15 | End: 2021-06-16 | Stop reason: HOSPADM

## 2021-06-15 RX ORDER — ASPIRIN 81 MG/1
162 TABLET ORAL DAILY
Status: DISCONTINUED | OUTPATIENT
Start: 2021-06-16 | End: 2021-06-16 | Stop reason: HOSPADM

## 2021-06-15 RX ORDER — CEFAZOLIN SODIUM 2 G/100ML
2 INJECTION, SOLUTION INTRAVENOUS
Status: DISCONTINUED | OUTPATIENT
Start: 2021-06-15 | End: 2021-06-15 | Stop reason: HOSPADM

## 2021-06-15 RX ORDER — FLUTICASONE PROPIONATE 50 MCG
2 SPRAY, SUSPENSION (ML) NASAL DAILY
Status: DISCONTINUED | OUTPATIENT
Start: 2021-06-15 | End: 2021-06-16 | Stop reason: HOSPADM

## 2021-06-15 RX ORDER — CEFAZOLIN SODIUM 2 G/100ML
2 INJECTION, SOLUTION INTRAVENOUS SEE ADMIN INSTRUCTIONS
Status: DISCONTINUED | OUTPATIENT
Start: 2021-06-15 | End: 2021-06-15 | Stop reason: HOSPADM

## 2021-06-15 RX ORDER — BUPIVACAINE HYDROCHLORIDE 2.5 MG/ML
INJECTION, SOLUTION EPIDURAL; INFILTRATION; INTRACAUDAL PRN
Status: DISCONTINUED | OUTPATIENT
Start: 2021-06-15 | End: 2021-06-15

## 2021-06-15 RX ORDER — ACETAMINOPHEN 325 MG/1
650 TABLET ORAL EVERY 4 HOURS PRN
Status: DISCONTINUED | OUTPATIENT
Start: 2021-06-18 | End: 2021-06-16 | Stop reason: HOSPADM

## 2021-06-15 RX ORDER — FENTANYL CITRATE 50 UG/ML
INJECTION, SOLUTION INTRAMUSCULAR; INTRAVENOUS PRN
Status: DISCONTINUED | OUTPATIENT
Start: 2021-06-15 | End: 2021-06-15

## 2021-06-15 RX ORDER — SODIUM CHLORIDE, SODIUM LACTATE, POTASSIUM CHLORIDE, CALCIUM CHLORIDE 600; 310; 30; 20 MG/100ML; MG/100ML; MG/100ML; MG/100ML
INJECTION, SOLUTION INTRAVENOUS CONTINUOUS PRN
Status: DISCONTINUED | OUTPATIENT
Start: 2021-06-15 | End: 2021-06-15

## 2021-06-15 RX ORDER — NALOXONE HYDROCHLORIDE 0.4 MG/ML
0.2 INJECTION, SOLUTION INTRAMUSCULAR; INTRAVENOUS; SUBCUTANEOUS
Status: DISCONTINUED | OUTPATIENT
Start: 2021-06-15 | End: 2021-06-15

## 2021-06-15 RX ORDER — EPHEDRINE SULFATE 50 MG/ML
INJECTION, SOLUTION INTRAMUSCULAR; INTRAVENOUS; SUBCUTANEOUS PRN
Status: DISCONTINUED | OUTPATIENT
Start: 2021-06-15 | End: 2021-06-15

## 2021-06-15 RX ORDER — POLYETHYLENE GLYCOL 3350 17 G/17G
17 POWDER, FOR SOLUTION ORAL DAILY
Status: DISCONTINUED | OUTPATIENT
Start: 2021-06-16 | End: 2021-06-16 | Stop reason: HOSPADM

## 2021-06-15 RX ORDER — VANCOMYCIN HYDROCHLORIDE 1 G/20ML
INJECTION, POWDER, LYOPHILIZED, FOR SOLUTION INTRAVENOUS PRN
Status: DISCONTINUED | OUTPATIENT
Start: 2021-06-15 | End: 2021-06-15 | Stop reason: HOSPADM

## 2021-06-15 RX ORDER — HYDRALAZINE HYDROCHLORIDE 20 MG/ML
2.5-5 INJECTION INTRAMUSCULAR; INTRAVENOUS EVERY 10 MIN PRN
Status: DISCONTINUED | OUTPATIENT
Start: 2021-06-15 | End: 2021-06-16 | Stop reason: HOSPADM

## 2021-06-15 RX ADMIN — OXYCODONE HYDROCHLORIDE 10 MG: 5 TABLET ORAL at 20:15

## 2021-06-15 RX ADMIN — IBUPROFEN 600 MG: 600 TABLET, FILM COATED ORAL at 20:24

## 2021-06-15 RX ADMIN — FENTANYL CITRATE 50 MCG: 50 INJECTION, SOLUTION INTRAMUSCULAR; INTRAVENOUS at 09:40

## 2021-06-15 RX ADMIN — ACETAMINOPHEN 975 MG: 325 TABLET, FILM COATED ORAL at 22:16

## 2021-06-15 RX ADMIN — PHENYLEPHRINE HYDROCHLORIDE 0.3 MCG/KG/MIN: 10 INJECTION INTRAVENOUS at 09:02

## 2021-06-15 RX ADMIN — ONDANSETRON 4 MG: 2 INJECTION INTRAMUSCULAR; INTRAVENOUS at 10:26

## 2021-06-15 RX ADMIN — Medication 10 MG: at 09:00

## 2021-06-15 RX ADMIN — Medication 10 MG: at 09:02

## 2021-06-15 RX ADMIN — ACETAMINOPHEN 975 MG: 325 TABLET, FILM COATED ORAL at 07:04

## 2021-06-15 RX ADMIN — FENTANYL CITRATE 50 MCG: 50 INJECTION, SOLUTION INTRAMUSCULAR; INTRAVENOUS at 08:30

## 2021-06-15 RX ADMIN — PROPOFOL 220 MG: 10 INJECTION, EMULSION INTRAVENOUS at 08:01

## 2021-06-15 RX ADMIN — PROPOFOL 40 MG: 10 INJECTION, EMULSION INTRAVENOUS at 09:08

## 2021-06-15 RX ADMIN — CEFAZOLIN SODIUM 2 G: 2 INJECTION, SOLUTION INTRAVENOUS at 15:41

## 2021-06-15 RX ADMIN — DEXAMETHASONE SODIUM PHOSPHATE 4 MG: 4 INJECTION, SOLUTION INTRAMUSCULAR; INTRAVENOUS at 08:18

## 2021-06-15 RX ADMIN — PROPOFOL 40 MG: 10 INJECTION, EMULSION INTRAVENOUS at 10:27

## 2021-06-15 RX ADMIN — BUPIVACAINE HYDROCHLORIDE 10 ML: 2.5 INJECTION, SOLUTION EPIDURAL; INFILTRATION; INTRACAUDAL at 07:50

## 2021-06-15 RX ADMIN — HYDROMORPHONE HYDROCHLORIDE 0.5 MG: 1 INJECTION, SOLUTION INTRAMUSCULAR; INTRAVENOUS; SUBCUTANEOUS at 11:24

## 2021-06-15 RX ADMIN — Medication 5 MG: at 10:08

## 2021-06-15 RX ADMIN — OXYCODONE HYDROCHLORIDE 10 MG: 5 TABLET ORAL at 11:38

## 2021-06-15 RX ADMIN — OXYCODONE HYDROCHLORIDE 5 MG: 5 TABLET ORAL at 15:41

## 2021-06-15 RX ADMIN — LIDOCAINE HYDROCHLORIDE 80 MG: 20 INJECTION, SOLUTION INFILTRATION; PERINEURAL at 08:01

## 2021-06-15 RX ADMIN — FENTANYL CITRATE 50 MCG: 50 INJECTION, SOLUTION INTRAMUSCULAR; INTRAVENOUS at 09:07

## 2021-06-15 RX ADMIN — KETOROLAC TROMETHAMINE 30 MG: 30 INJECTION, SOLUTION INTRAMUSCULAR at 10:26

## 2021-06-15 RX ADMIN — HYDROMORPHONE HYDROCHLORIDE 0.3 MG: 1 INJECTION, SOLUTION INTRAMUSCULAR; INTRAVENOUS; SUBCUTANEOUS at 10:59

## 2021-06-15 RX ADMIN — BUPIVACAINE 10 ML: 13.3 INJECTION, SUSPENSION, LIPOSOMAL INFILTRATION at 07:50

## 2021-06-15 RX ADMIN — FENTANYL CITRATE 50 MCG: 50 INJECTION, SOLUTION INTRAMUSCULAR; INTRAVENOUS at 08:33

## 2021-06-15 RX ADMIN — FENTANYL CITRATE 50 MCG: 50 INJECTION INTRAMUSCULAR; INTRAVENOUS at 07:46

## 2021-06-15 RX ADMIN — MIDAZOLAM 2 MG: 1 INJECTION INTRAMUSCULAR; INTRAVENOUS at 09:09

## 2021-06-15 RX ADMIN — MIDAZOLAM 1 MG: 1 INJECTION INTRAMUSCULAR; INTRAVENOUS at 07:46

## 2021-06-15 RX ADMIN — Medication 100 MCG: at 10:08

## 2021-06-15 RX ADMIN — SODIUM CHLORIDE, POTASSIUM CHLORIDE, SODIUM LACTATE AND CALCIUM CHLORIDE: 600; 310; 30; 20 INJECTION, SOLUTION INTRAVENOUS at 08:04

## 2021-06-15 RX ADMIN — SODIUM CHLORIDE, POTASSIUM CHLORIDE, SODIUM LACTATE AND CALCIUM CHLORIDE: 600; 310; 30; 20 INJECTION, SOLUTION INTRAVENOUS at 20:14

## 2021-06-15 RX ADMIN — TRANEXAMIC ACID 1950 MG: 650 TABLET ORAL at 07:04

## 2021-06-15 RX ADMIN — HYDROMORPHONE HYDROCHLORIDE 0.2 MG: 1 INJECTION, SOLUTION INTRAMUSCULAR; INTRAVENOUS; SUBCUTANEOUS at 11:11

## 2021-06-15 RX ADMIN — HYDROMORPHONE HYDROCHLORIDE 0.5 MG: 1 INJECTION, SOLUTION INTRAMUSCULAR; INTRAVENOUS; SUBCUTANEOUS at 10:29

## 2021-06-15 RX ADMIN — ACETAMINOPHEN 975 MG: 325 TABLET, FILM COATED ORAL at 14:28

## 2021-06-15 RX ADMIN — Medication 2 G: at 08:23

## 2021-06-15 ASSESSMENT — COPD QUESTIONNAIRES: COPD: 0

## 2021-06-15 ASSESSMENT — ENCOUNTER SYMPTOMS: SEIZURES: 0

## 2021-06-15 ASSESSMENT — LIFESTYLE VARIABLES: TOBACCO_USE: 0

## 2021-06-15 ASSESSMENT — MIFFLIN-ST. JEOR: SCORE: 1868.37

## 2021-06-15 NOTE — ANESTHESIA PROCEDURE NOTES
Interscalene Procedure Note  Pre-Procedure   Staff -        Anesthesiologist:  Luke Porter MD       Performed By: anesthesiologist       Location: pre-op       Procedure Start/Stop Times: 6/15/2021 7:40 AM       Pre-Anesthestic Checklist: patient identified, IV checked, site marked, risks and benefits discussed, informed consent, monitors and equipment checked, pre-op evaluation, at physician/surgeon's request and post-op pain management  Timeout:       Correct Patient: Yes        Correct Procedure: Yes        Correct Site: Yes        Correct Position: Yes        Correct Laterality: Yes        Site Marked: Yes  Procedure Documentation  Procedure: Interscalene       Diagnosis: R SHOULDER OA       Laterality: right       Patient Position: supine       Skin prep: Chloraprep       Local skin infiltrated with 3 mL of 1% lidocaine.        Needle Type: short bevel       Needle Gauge: 21.        Needle Length (Inches): 4        Ultrasound guided       1. Ultrasound was used to identify targeted nerve, plexus, vascular marker, or fascial plane and place a needle adjacent to it in real-time.       2. Ultrasound was used to visualize the spread of anesthetic in close proximity to the above referenced structure.       3. A permanent image is entered into the patient's record.       4. The visualized anatomic structures appeared normal.    Assessment/Narrative         The placement was negative for: blood aspirated, painful injection and site bleeding       Paresthesias: No.     Bolus given via needle..        Secured via.        Insertion/Infusion Method: Single Shot       Complications: none

## 2021-06-15 NOTE — CONSULTS
Consult Date: 06/15/2021    HISTORY OF PRESENT ILLNESS:  The patient is a pleasant 66-year-old gentleman with history of chronic right shoulder pain, admitted to station 5 Ortho status post right total shoulder arthroplasty with distal clavicle excision by Dr. Deleon's team.  Intra and acute postoperative periods uneventful with pain currently controlled, status post routine interscalene block prior to surgery.  An Internal Medicine consultation was ordered by Dr. Deleon's team to follow this patient medically postoperatively and to assess medical problems including asthma.  At this time, Arnaud admitted to tolerable right shoulder pain.  Denies other physical issues at this time including fever, chills, chest pain, shortness of breath, abdominal pain, nausea, vomiting or bladder concerns.    PAST MEDICAL HISTORY:     1.  History of right shoulder arthritis.  2.  Chronic back pain.  3.  Asthma.  4.  Allergic rhinitis.  5.  History of gout, chronically managed on daily allopurinol.  6.  Obesity.  7.  History of migraine headaches.  8.  Mild carpal tunnel syndrome.  9.  History of COVID pneumonia, recovered in 04/2021.  10.  Denies history of other chronic medical problems including cardiac disease, hypertension, and diabetes.    PAST SURGICAL HISTORY:    1.  Tonsillectomy.  2.  Bilateral total knee replacements.    ADMISSION MEDICATIONS:    1.  Albuterol inhaler 2 puffs q.6 hours p.r.n. shortness of breath.  2.  Allopurinol 300 mg p.o. q. day.  3.  Diclofenac topical gel 4 grams q.i.d.  4.  Flonase 2 sprays each naris q. day.  5.  Norco 1 tab p.o. q.6 hours p.r.n. (4 tabs max daily).  6.  Atrovent nasal spray 2 sprays each naris q.i.d.  7.  Lidocaine patch 1 patch to skin every 24 hours.  8.  Multivitamin 1 tab p.o. q. day.    ALLERGIES/ADVERSE EFFECTS:  NO KNOWN DRUG ALLERGIES.    SOCIAL HISTORY:  In a long-term relationship.  Retired .  Denies smoking cigarettes.    FAMILY HISTORY:  Reviewed and  noncontributory.    REVIEW OF SYSTEMS:  A 10-point review of systems is negative except as stated above in history of present illness.    PHYSICAL EXAMINATION:    GENERAL:  Pleasant gentleman in no acute distress.  VITAL SIGNS:  Stable, temperature afebrile, pulse in the 60s, blood pressure 120s/85.  LUNGS:  Clear.  CARDIOVASCULAR:  Regular rate and rhythm.  ABDOMEN:  Obese, soft, nontender.  EXTREMITIES:  No bilateral lower extremity edema.  Right shoulder in complex brace, of which he did not remove.  NEUROLOGIC:  He is awake, alert and oriented x3.  Cranial nerves grossly intact.  He is not tremulous.  Gait deferred.    LABORATORY DATA:  Preoperative hemoglobin 14.5, creatinine 0.85, potassium 4.5.    ESTIMATED BLOOD LOSS:  200 mL    ASSESSMENT:    1.  Status post right total shoulder arthroplasty.  The patient is doing well in the acute postoperative period.  2.  Asthma, appears well compensated at this time.  3.  Anticipated acute blood loss anemia, with estimated blood loss of 200 mL.  4.  History of gout, clinically quiescent on prior to admission allopurinol.  5.  Allergic rhinitis, stable on prior to admission steroid nasal spray.  6.  No known coronary artery disease.    PLAN:  Activity level, DVT prophylaxis and pain control per primary team.  Routine labs tomorrow including hemoglobin and basic metabolic panel.  Continue with prior to admission as-needed albuterol inhaler and encourage incentive spirometry every 2 hours.  Continue with prior to admission other medications for his other chronic medical problems, which are all currently stable.  Medicine will continue to follow along with you for all intercurrent medical issues.  Staffed with Dr. Villanueva.    Alex Choudhary PA-C        D: 06/15/2021   T: 06/15/2021   MT: [id number]    Name:     DON WHALEN  MRN:      [id number]        Account:      [id number]   :      1955           Consult Date: 06/15/2021     Document:  [id number]

## 2021-06-15 NOTE — ANESTHESIA CARE TRANSFER NOTE
Patient: Arnaud Bird    Procedure(s):  Right Total Shoulder Arthroplasty, Distal Clavicle Excision    Diagnosis: Arthritis of shoulder region, right [M19.011]  Diagnosis Additional Information: No value filed.    Anesthesia Type:   General     Note:    Oropharynx: oropharynx clear of all foreign objects  Level of Consciousness: drowsy  Oxygen Supplementation: face mask  Level of Supplemental Oxygen (L/min / FiO2): 8  Independent Airway: airway patency satisfactory and stable  Dentition: dentition unchanged  Vital Signs Stable: post-procedure vital signs reviewed and stable  Report to RN Given: handoff report given  Patient transferred to: PACU    Handoff Report: Identifed the Patient, Identified the Reponsible Provider, Reviewed the pertinent medical history, Discussed the surgical course, Reviewed Intra-OP anesthesia mangement and issues during anesthesia, Set expectations for post-procedure period and Allowed opportunity for questions and acknowledgement of understanding      Vitals: (Last set prior to Anesthesia Care Transfer)  CRNA VITALS  6/15/2021 1018 - 6/15/2021 1055      6/15/2021             NIBP:  110/75    Pulse:  86    NIBP Mean:  83    Temp:  36.6  C (97.9  F)    SpO2:  95 %    Resp Rate (observed):  12        Electronically Signed By: DAVIS Kline CRNA  Greta 15, 2021  10:55 AM

## 2021-06-15 NOTE — PROGRESS NOTES
06/15/21 1459   Quick Adds   Type of Visit Initial Occupational Therapy Evaluation   Living Environment   People in home significant other   Current Living Arrangements house  (duplex)   Transportation Anticipated car, drives self;family or friend will provide   Living Environment Comments Can stay on main level; walk-in shower with grab bar, shower chair, RTS   Self-Care   Usual Activity Tolerance good   Current Activity Tolerance moderate   Equipment Currently Used at Home none   Activity/Exercise/Self-Care Comment Patient independent in ADLs/mobility without AE, very active with home management/house projects.   Disability/Function   Hearing Difficulty or Deaf no   Wear Glasses or Blind yes   Difficulty Communicating no   Difficulty Eating/Swallowing no   Walking or Climbing Stairs Difficulty no   Dressing/Bathing Difficulty no   Toileting issues no   Doing Errands Independently Difficulty (such as shopping) no   Fall history within last six months no   General Information   Onset of Illness/Injury or Date of Surgery 06/15/21   Referring Physician Dr. Deleon   Patient/Family Therapy Goal Statement (OT) return home to baseline   Additional Occupational Profile Info/Pertinent History of Current Problem POD #0 s/p Right Total Shoulder Arthroplasty, Distal Clavicle Excision   Existing Precautions/Restrictions shoulder   Right Upper Extremity (Weight-bearing Status) non weight-bearing (NWB)   Cognitive Status Examination   Orientation Status orientation to person, place and time   Sensory   Sensory Comments N/T in R UE due block   Pain Assessment   Patient Currently in Pain Yes, see Vital Sign flowsheet   Range of Motion Comprehensive   Comment, General Range of Motion No AROM of R shoulder due to surgery; limited ROM of L UE   Bed Mobility   Bed Mobility supine-sit;sit-supine   Supine-Sit [place] (Bed Mobility) modified independence   Sit-Supine [place] (Bed Mobility) modified independence   Transfers    Transfers bed-chair transfer;sit-stand transfer;toilet transfer   Transfer Skill: Bed to Chair/Chair to Bed   Bed-Chair [place] (Transfers) supervision   Sit-Stand Transfer   Sit-Stand [place] (Transfers) supervision   Toilet Transfer   Type (Toilet Transfer) sit-stand;stand-sit   [place] Level (Toilet Transfer) supervision   Activities of Daily Living   BADL Assessment/Intervention upper body dressing;lower body dressing;grooming;toileting   Upper Body Dressing Assessment/Training   [place] Level (Upper Body Dressing) moderate assist (50% patient effort)   Lower Body Dressing Assessment/Training   [place] Level (Lower Body Dressing) minimum assist (75% patient effort)   Clinical Impression   Criteria for Skilled Therapeutic Interventions Met (OT) yes   OT Diagnosis impaired self-cares/mobility   OT Problem List-Impairments impacting ADL pain;post-surgical precautions   Assessment of Occupational Performance 1-3 Performance Deficits   Identified Performance Deficits dressing, bathing, toileting   Planned Therapy Interventions (OT) ADL retraining   Clinical Decision Making Complexity (OT) low complexity   Therapy Frequency (OT) Daily   Predicted Duration of Therapy 2-3 days   Risk & Benefits of therapy have been explained evaluation/treatment results reviewed;care plan/treatment goals reviewed;patient   OT Discharge Planning    OT Discharge Recommendation (DC Rec) Home with assist   OT Rationale for DC Rec Patient is below baseline and would benefit from continued therapy for maximum independence in ADLs/mobility   OT Brief overview of current status  Patient is SBA for ambulation 150' without AE, overall Mod A for sling management and basic ADLs due to sling and immobility of R UE   Total Evaluation Time (Minutes)   Total Evaluation Time (Minutes) 8

## 2021-06-15 NOTE — OR NURSING
PACU to Inpatient Nursing Handoff    Patient Arnaud Bird is a 66 year old male who speaks English.   Procedure Procedure(s):  Right Total Shoulder Arthroplasty, Distal Clavicle Excision   Surgeon(s) Primary: Yesica Deleon MD     Allergies   Allergen Reactions     No Clinical Screening - See Comments      Seasonal Allergies        Isolation  None    Past Medical History   has a past medical history of Arthritis, Benign positional vertigo, Carpal tunnel syndrome, Hearing problem, LEFT WORSE THAN RIGHT  (10/18/2012), Migraines, Nasal polyps, Unspecified asthma(493.90) (10/17/2012), and Unspecified asthma, with exacerbation (10/17/2012).    Anesthesia Combined General with Interscalene Block   Dermatome Level     Preop Meds acetaminophen (Tylenol) - time given: 0704  TXA - time given: 0704   Nerve block Interscalene.  Location:right. Med:Exparel (liposomal bupivacaine). Time given: 0740   Intraop Meds dexamethasone (Decadron)  fentanyl (Sublimaze): 200 mcg total  ketorolac (Toradol): last given at 1026  ondansetron (Zofran): last given at 1026   Local Meds No   Antibiotics cefazolin (Ancef) - last given at 0823     Pain Patient Currently in Pain: yes   PACU meds  hydromorphone (Dilaudid): 1.0 mg (total dose) last given at 1120   oxycodone (Roxicodone): 10 mg (total dose) last given at 1135    PCA / epidural No   Capnography 36   Telemetry ECG Rhythm: Sinus rhythm   Inpatient Telemetry Monitor Ordered? No        Labs Glucose Lab Results   Component Value Date     04/14/2021       Hgb Lab Results   Component Value Date    HGB 14.5 06/01/2021       INR Lab Results   Component Value Date    INR 1.32 04/14/2021      PACU Imaging Completed     Wound/Incision Incision/Surgical Site 06/15/21 Right Shoulder (Active)   Incision Assessment UTV 06/15/21 1050   Closure DEXTER 06/15/21 1050   Dressing Intervention Clean, dry, intact 06/15/21 1050   Number of days: 0      CMS  Intact      Equipment ice pack and  shoulder sling   Other LDA       IV Access Peripheral IV 06/15/21 Left Hand (Active)   Site Assessment WDL 06/15/21 1050   Line Status Infusing 06/15/21 1050   Dressing Intervention New dressing  06/15/21 0749   Phlebitis Scale 0-->no symptoms 06/15/21 1050   Infiltration Scale 0 06/15/21 1050   Infiltration Site Treatment Method  None 06/15/21 0749   Number of days: 0      Blood Products Not applicable  mL   Intake/Output Date 06/15/21 0700 - 06/16/21 0659   Shift 7161-8522 4293-0621 1159-1276 24 Hour Total   INTAKE   I.V. 1300   1300   Shift Total(mL/kg) 1300(11.57)   1300(11.57)   OUTPUT   Blood 200   200   Shift Total(mL/kg) 200(1.78)   200(1.78)   Weight (kg) 112.4 112.4 112.4 112.4      Drains / [place]     Time of void PreOp Void Prior to Procedure: 0716 (06/15/21 0715)    PostOp      Diapered? No   Bladder Scan      crackers and water     Vitals    B/P: 126/85  T: 97.9  F (36.6  C)    Temp src: Axillary  P:  Pulse: 84 (06/15/21 1100)          R: 21  O2:  SpO2: 96 %    O2 Device: Nasal cannula (06/15/21 1100)    Oxygen Delivery: 2.5 LPM (06/15/21 1100)         Family/support present None - phone update given to significant other   Patient belongings Returned to patient    Patient transported on cart   DC meds/scripts (obs/outpt) Not applicable   Inpatient Pain Meds Released? Yes       Special needs/considerations None   Tasks needing completion None       Shannon Alfonso, ISABEL  ASCOM [id number]

## 2021-06-15 NOTE — ANESTHESIA POSTPROCEDURE EVALUATION
"Patient: Arnaud Bird    Procedure(s):  Right Total Shoulder Arthroplasty, Distal Clavicle Excision    Diagnosis:Arthritis of shoulder region, right [M19.011]  Diagnosis Additional Information: No value filed.    Anesthesia Type:  General    Note:  Disposition: Inpatient   Postop Pain Control: Uneventful            Sign Out: Well controlled pain   PONV: No   Neuro/Psych: Uneventful            Sign Out: Acceptable/Baseline neuro status   Airway/Respiratory: Uneventful            Sign Out: Acceptable/Baseline resp. status   CV/Hemodynamics: Uneventful            Sign Out: Acceptable CV status; No obvious hypovolemia; No obvious fluid overload   Other NRE: NONE   DID A NON-ROUTINE EVENT OCCUR? No         Patient Vitals for the past 24 hrs:   BP Temp Temp src Pulse Resp SpO2 Height Weight   06/15/21 1130 133/87 36.7  C (98  F) Oral 83 18 93 % -- --   06/15/21 1115 127/78 -- -- 84 18 94 % -- --   06/15/21 1100 126/85 -- -- 84 21 96 % -- --   06/15/21 1050 110/75 36.6  C (97.9  F) Axillary 90 20 91 % -- --   06/15/21 0745 (!) 143/96 -- -- 60 18 97 % -- --   06/15/21 0743 (!) 135/100 -- -- 67 23 96 % -- --   06/15/21 0633 (!) 133/92 36.7  C (98.1  F) Oral 67 16 99 % 1.727 m (5' 7.99\") 112.4 kg (247 lb 12.8 oz)         Last vitals:  Vitals:    06/15/21 1100 06/15/21 1115 06/15/21 1130   BP: 126/85 127/78 133/87   Pulse: 84 84 83   Resp: 21 18 18   Temp:   36.7  C (98  F)   SpO2: 96% 94% 93%       Last vitals prior to Anesthesia Care Transfer:  CRNA VITALS  6/15/2021 1018 - 6/15/2021 1118      6/15/2021             NIBP:  110/75    Pulse:  86    NIBP Mean:  83    Temp:  36.6  C (97.9  F)    SpO2:  95 %    Resp Rate (observed):  12          Electronically Signed By: Courtney Portillo MD  Greta 15, 2021  11:42 AM  "

## 2021-06-15 NOTE — PLAN OF CARE
Patient vital signs are at baseline: Yes  Patient able to ambulate as they were prior to admission or with assist devices provided by therapies during their stay:  Yes  Patient MUST void prior to discharge:  No,  Reason:  Scanned fpr 226 at 1620  Patient able to tolerate oral intake:  Yes  Pain has adequate pain control using Oral analgesics:  Yes    Pt arrived to unit at 1200 and was oriented to room and call light  VS: VSS   O2: >90% on RA   Output: Scanned for 226 at 1620   Last BM: 6/15   Activity: NWB RUE. 1A with gait belt. Ambulated in dunn with OT   Up for meals? Yes   Skin: Incision   Pain: 5-10mg oxycodone q4h   CMS: Numbness R thumb d/t block   Dressing: Aquacel CDI   Diet: Regular   LDA: PIV infusing   Equipment: PCDs, sling, IV pole, capnography, gait belt   Plan: TBD   Additional Info: Pt received general anesthesia + Exparel. .

## 2021-06-15 NOTE — ANESTHESIA PREPROCEDURE EVALUATION
Anesthesia Pre-Procedure Evaluation    Patient: Arnaud Bird   MRN: [id number] : 1955        Preoperative Diagnosis: * No surgery found *   Procedure :      Past Medical History:   Diagnosis Date     Arthritis      Benign positional vertigo      Carpal tunnel syndrome     mild     Hearing problem      LEFT WORSE THAN RIGHT  10/18/2012     Migraines      Nasal polyps      Unspecified asthma(493.90) 10/17/2012     Unspecified asthma, with exacerbation 10/17/2012      Past Surgical History:   Procedure Laterality Date     HC TOOTH EXTRACTION W/FORCEP       ORTHOPEDIC SURGERY      bilateral total knee replacements     TONSILLECTOMY      age 2 or 3      Allergies   Allergen Reactions     No Clinical Screening - See Comments      Seasonal Allergies       Social History     Tobacco Use     Smoking status: Never Smoker     Smokeless tobacco: Never Used   Substance Use Topics     Alcohol use: Yes     Comment: case beer a week      Wt Readings from Last 1 Encounters:   06/15/21 112.4 kg (247 lb 12.8 oz)        Medications Prior to Admission   Medication Sig Dispense Refill Last Dose     acyclovir (ZOVIRAX) 400 MG tablet TAKE TWO TABLETS BY MOUTH TWICE DAILY for 5 days as needed for genital herpes outbreak 28 tablet 4 2021 at 0900     albuterol (PROAIR HFA, PROVENTIL HFA, VENTOLIN HFA) 108 (90 BASE) MCG/ACT inhaler Inhale 2 puffs into the lungs every 6 hours as needed for shortness of breath / dyspnea 1 Inhaler 11 Past Week at Unknown time     allopurinol (ZYLOPRIM) 300 MG tablet Take 1 tablet (300 mg) by mouth daily 90 tablet 3 Past Week at Unknown time     amoxicillin-clavulanate (AUGMENTIN) 875-125 MG tablet Take 1 tablet by mouth 2 times daily for 10 days 20 tablet 0 6/15/2021 at 0530     diclofenac (VOLTAREN) 1 % topical gel Place 4 g onto the skin 4 times daily 100 g 1 6/15/2021 at 0530     fluticasone (FLONASE) 50 MCG/ACT nasal spray Spray 2 sprays into both nostrils daily 16 g 11 6/15/2021 at 0530      HYDROcodone-acetaminophen (NORCO) 7.5-325 MG per tablet Take 1 tablet by mouth every 6 hours as needed for pain (4 x daily as needed  maxium 4 per day) 120 tablet 0 6/15/2021 at 0530     ipratropium (ATROVENT) 0.06 % nasal spray SPRAY 2 SPRAYS INTO BOTH NOSTRILS 4 TIMES DAILY   Past Month at Unknown time     lidocaine 5 % EX patch Place 1 patch onto the skin every 24 hours 30 patch 11 Past Week at Unknown time     multivitamin w/minerals (MULTI-VITAMIN) tablet Take 1 tablet by mouth daily   Past Week at Unknown time     pseudoePHEDrine (SUDAFED) 30 MG tablet Take by mouth every 4 hours as needed for congestion   Past Week at Unknown time     methylPREDNISolone (MEDROL DOSEPAK) 4 MG tablet therapy pack Follow Package Directions (Patient not taking: Reported on 5/28/2021) 21 tablet 0      naloxone (NARCAN) 4 MG/0.1ML nasal spray Spray 1 spray (4 mg) into one nostril alternating nostrils once as needed for opioid reversal Every 2-3 minutes until patient responsive or EMS arrives (Patient not taking: Reported on 6/10/2021) 0.2 mL 0 Unknown at Unknown time     order for DME Equipment being ordered: Thumb spica splint for left hand (Patient not taking: Reported on 6/10/2021) 1 Device 0      sildenafil (VIAGRA) 100 MG tablet Take 0.5-1 tablets ( mg) by mouth daily as needed Take 30 min to 4 hours before intercourse.  Never use with nitroglycerin, terazosin or doxazosin. (Patient not taking: Reported on 5/28/2021) 100 tablet 3          Anesthesia Evaluation   Pt has had prior anesthetic.     History of anesthetic complications   pt woke during knee surgery 2009.    ROS/MED HX  ENT/Pulmonary:     (+) TAWNYA risk factors, obese, allergic rhinitis, asthma (seasonal) Last exacerbation: never,  Treatment: Inhaler prn,   (-) tobacco use, COPD and sleep apnea   Neurologic:     (+) migraines,  (-) no seizures and no CVA   Cardiovascular:     (+) Dyslipidemia -----    METS/Exercise Tolerance: >4 METS    Hematologic:  - neg  hematologic  ROS  (-) history of blood clots   Musculoskeletal: Comment: gout      GI/Hepatic:  - neg GI/hepatic ROS     Renal/Genitourinary:  - neg Renal ROS     Endo:     (+) Obesity,  (-) Type I DM and Type II DM   Psychiatric/Substance Use:  - neg psychiatric ROS  (-) chronic opioid use history   Infectious Disease:  - neg infectious disease ROS     Malignancy:  - neg malignancy ROS     Other:  - neg other ROS          Physical Exam    Airway        Mallampati: III   TM distance: > 3 FB   Neck ROM: full   Mouth opening: > 3 cm    Respiratory Devices and Support         Dental  no notable dental history         Cardiovascular   cardiovascular exam normal       Rhythm and rate: regular and normal     Pulmonary   pulmonary exam normal        breath sounds clear to auscultation           OUTSIDE LABS:  CBC:   CBC RESULTS:   Recent Labs   Lab Test 06/01/21  0809 04/14/21  1139 04/12/21  1304 06/04/18  0842 12/07/17  1023   WBC  --  9.9 7.2 5.5 6.2   HGB 14.5 14.4 15.1 15.8 15.5   HCT  --  43.2 44.2 46.9 47.2   PLT  --  338 375 209 226     Last Comprehensive Metabolic Panel:  Recent Labs   Lab Test 06/01/21  0809 04/14/21  1139 04/12/21  1304 03/16/21  1442 04/01/20  1137   NA  --  138 138 137 139   POTASSIUM 4.5 3.8 4.5 4.5 4.6   CHLORIDE  --  105 107 108 108   CO2  --  25 29 28 28   ANIONGAP  --  8 2* 1* 3   BUN  --  14 13 23 16   CR 0.85 0.69 0.81 0.95 0.70   GFRESTIMATED 89 >90 >90 82 >90   GLC  --  104* 103* 93 89   BELIA  --  8.8 8.8 8.7 8.9        Lab Results   Component Value Date    WBC 9.9 04/14/2021    WBC 7.2 04/12/2021    HGB 14.5 06/01/2021    HGB 14.4 04/14/2021    HCT 43.2 04/14/2021    HCT 44.2 04/12/2021     04/14/2021     04/12/2021     BMP:   Lab Results   Component Value Date     04/14/2021     04/12/2021    POTASSIUM 4.5 06/01/2021    POTASSIUM 3.8 04/14/2021    CHLORIDE 105 04/14/2021    CHLORIDE 107 04/12/2021    CO2 25 04/14/2021    CO2 29 04/12/2021    BUN 14  04/14/2021    BUN 13 04/12/2021    CR 0.85 06/01/2021    CR 0.69 04/14/2021     (H) 04/14/2021     (H) 04/12/2021     COAGS:   Lab Results   Component Value Date    PTT 44 (H) 04/14/2021    INR 1.32 (H) 04/14/2021     POC:   Lab Results   Component Value Date     (H) 06/15/2021     HEPATIC:   Lab Results   Component Value Date    ALBUMIN 3.0 (L) 04/14/2021    PROTTOTAL 7.8 04/14/2021    ALT 37 04/14/2021    AST 17 04/14/2021    ALKPHOS 112 04/14/2021    BILITOTAL 1.1 04/14/2021     OTHER:   Lab Results   Component Value Date    A1C 5.4 12/30/2016    BELIA 8.8 04/14/2021    PHOS 3.1 04/14/2021    MAG 2.2 04/14/2021    LIPASE 108 04/14/2021    TSH 2.02 06/04/2018    SED 9 06/04/2018       Anesthesia Plan    ASA Status:  3      Anesthesia Type: General.     - Airway: ETT   Induction: Intravenous, Propofol.           Consents    Anesthesia Plan(s) and associated risks, benefits, and realistic alternatives discussed. Questions answered and patient/representative(s) expressed understanding.     - Discussed with:  Patient      - Extended Intubation/Ventilatory Support Discussed: No.      - Patient is DNR/DNI Status: No    Use of blood products discussed: Yes.     - Discussed with: Patient.     Postoperative Care    Pain management: IV analgesics, Oral pain medications, Peripheral nerve block (Single Shot), Multi-modal analgesia.   PONV prophylaxis: Ondansetron (or other 5HT-3), Dexamethasone or Solumedrol     Comments:                PAC Discussion and Assessment    ASA Classification: 2  Case is suitable for: [facility] [place]  PAC Recommendations anesthetic techniques: choice.  Invasive monitoring and risk discussed: No    Possibility and Risk of blood transfusion discussed: Yes            PAC Resident/NP Anesthesia Assessment: Arnaud Bird is a 66 year old male scheduled for Right Total Shoulder Arthroplasty, Distal Clavicle Excision on 6/15/21 by Dr. Deleon in treatment of right shoulder arthritis.  PAC  referral for risk assessment and optimization for anesthesia with comorbid conditions of asthma, obesity, gout:    Pre-operative considerations:  1.  Cardiac:  Functional status- METS >4.  Pt walks, does yard work, mows grass, fixes garage doors.  Intermediate risk surgery with 0.4% (RCRI #) risk of major adverse cardiac event.  Denies cardiac history  --EKG 4/14/21 shows NSR.  --denies chest pain, palpitations, orthopnea, edema    2.  Pulm:  Airway feasible.  TAWNYA risk: intermediate  --h/o asthma, no longer needing inhaler therapy.  Allergic rhinitis related symptoms.  No h/o exacerbation requiring admission/ED  --recent Covid positive pneumonia, admission 4/14/21-4/18/21.  Recent Chest CT 5/15/21 shows continued decrease in bilateral peribronchovascular groundglass and consolidative opacities compared to CT from 4/28/2021, suggestive of improving COVID-19 pneumonia  --denies cough, endorses some mild SOB if he exerts himself too much.      3.  GI:  Risk of PONV score = 2.  If > 2, anti-emetic intervention recommended.    4.  MSK:  H/o gout, continue allopurinol.  --recent laceration to left upper lip requiring stiches.  Stitches removed a couple of days ago.  Healing well.    VTE risk: 0.5%    Patient is optimized and is acceptable candidate for the proposed procedure.  No further diagnostic evaluation is needed.         **For further details of assessment, testing, and physical exam please see H and P completed on same date.          Kristin Potter PA-C, [facility]    Reviewed and Signed by PAC Mid-Level Provider/Resident  Mid-Level Provider/Resident: Kristin Potter  Date: 6/1/21      Attending Anesthesiologist Anesthesia Assessment: 66 year old for total shoulder arthroplasty. Patient did have symptomatic COVID (CT findings) on 4/14 and was hospitalized for 4 days (no ICU, no HiFlo). He is now at 9 weeks, which is past the ASA recommendation of 7 weeks, and his CT has shown improvement. It might still be worthwhile to  consider if regional only is possible and avoid intubation.     No significant CAD.     Patient/case discussed with SAIGE/resident; agree with above assessment. No need to see patient. Patient is appropriate for the planned procedure without further work-up or medical management.    MD Courtney Velasco MD

## 2021-06-16 ENCOUNTER — APPOINTMENT (OUTPATIENT)
Dept: OCCUPATIONAL THERAPY | Facility: CLINIC | Age: 66
End: 2021-06-16
Attending: ORTHOPAEDIC SURGERY
Payer: COMMERCIAL

## 2021-06-16 VITALS
HEIGHT: 68 IN | TEMPERATURE: 97 F | RESPIRATION RATE: 19 BRPM | HEART RATE: 72 BPM | BODY MASS INDEX: 37.56 KG/M2 | WEIGHT: 247.8 LBS | OXYGEN SATURATION: 94 % | SYSTOLIC BLOOD PRESSURE: 139 MMHG | DIASTOLIC BLOOD PRESSURE: 84 MMHG

## 2021-06-16 LAB
GLUCOSE SERPL-MCNC: 102 MG/DL (ref 70–99)
HGB BLD-MCNC: 13.1 G/DL (ref 13.3–17.7)

## 2021-06-16 PROCEDURE — 97535 SELF CARE MNGMENT TRAINING: CPT | Mod: GO

## 2021-06-16 PROCEDURE — 99207 PR CDG-CODE CATEGORY CHANGED: CPT | Performed by: INTERNAL MEDICINE

## 2021-06-16 PROCEDURE — 97530 THERAPEUTIC ACTIVITIES: CPT | Mod: GO

## 2021-06-16 PROCEDURE — 99213 OFFICE O/P EST LOW 20 MIN: CPT | Performed by: INTERNAL MEDICINE

## 2021-06-16 PROCEDURE — 250N000013 HC RX MED GY IP 250 OP 250 PS 637: Performed by: ORTHOPAEDIC SURGERY

## 2021-06-16 PROCEDURE — 250N000013 HC RX MED GY IP 250 OP 250 PS 637: Performed by: INTERNAL MEDICINE

## 2021-06-16 PROCEDURE — 82947 ASSAY GLUCOSE BLOOD QUANT: CPT | Performed by: ORTHOPAEDIC SURGERY

## 2021-06-16 PROCEDURE — 99207 PR APP CREDIT; MD BILLING SHARED VISIT: CPT | Performed by: PHYSICIAN ASSISTANT

## 2021-06-16 PROCEDURE — 36415 COLL VENOUS BLD VENIPUNCTURE: CPT | Performed by: ORTHOPAEDIC SURGERY

## 2021-06-16 PROCEDURE — 250N000011 HC RX IP 250 OP 636: Performed by: ORTHOPAEDIC SURGERY

## 2021-06-16 PROCEDURE — 97110 THERAPEUTIC EXERCISES: CPT | Mod: GO

## 2021-06-16 PROCEDURE — 85018 HEMOGLOBIN: CPT | Performed by: ORTHOPAEDIC SURGERY

## 2021-06-16 RX ORDER — POLYETHYLENE GLYCOL 3350 17 G/17G
17 POWDER, FOR SOLUTION ORAL DAILY
Qty: 7 PACKET | Refills: 0 | Status: SHIPPED | OUTPATIENT
Start: 2021-06-16 | End: 2024-06-03

## 2021-06-16 RX ORDER — AMOXICILLIN 250 MG
1 CAPSULE ORAL 2 TIMES DAILY
Qty: 30 TABLET | Refills: 0 | Status: SHIPPED | OUTPATIENT
Start: 2021-06-16

## 2021-06-16 RX ORDER — OXYCODONE HYDROCHLORIDE 5 MG/1
5 TABLET ORAL EVERY 4 HOURS PRN
Qty: 40 TABLET | Refills: 0 | Status: SHIPPED | OUTPATIENT
Start: 2021-06-16 | End: 2021-06-23

## 2021-06-16 RX ORDER — IBUPROFEN 600 MG/1
600 TABLET, FILM COATED ORAL EVERY 6 HOURS PRN
Qty: 40 TABLET | Refills: 0 | Status: ON HOLD | OUTPATIENT
Start: 2021-06-16 | End: 2022-08-15

## 2021-06-16 RX ORDER — ACETAMINOPHEN 325 MG/1
650 TABLET ORAL EVERY 4 HOURS PRN
Qty: 40 TABLET | Refills: 0 | Status: SHIPPED | OUTPATIENT
Start: 2021-06-18

## 2021-06-16 RX ADMIN — ALLOPURINOL 300 MG: 300 TABLET ORAL at 08:02

## 2021-06-16 RX ADMIN — OXYCODONE HYDROCHLORIDE 10 MG: 5 TABLET ORAL at 00:04

## 2021-06-16 RX ADMIN — CEFAZOLIN SODIUM 2 G: 2 INJECTION, SOLUTION INTRAVENOUS at 00:01

## 2021-06-16 RX ADMIN — DOCUSATE SODIUM AND SENNOSIDES 1 TABLET: 8.6; 5 TABLET ORAL at 08:03

## 2021-06-16 RX ADMIN — OXYCODONE HYDROCHLORIDE 10 MG: 5 TABLET ORAL at 07:55

## 2021-06-16 RX ADMIN — ASPIRIN 162 MG: 81 TABLET ORAL at 08:02

## 2021-06-16 RX ADMIN — IBUPROFEN 600 MG: 600 TABLET, FILM COATED ORAL at 09:19

## 2021-06-16 RX ADMIN — OXYCODONE HYDROCHLORIDE 10 MG: 5 TABLET ORAL at 04:13

## 2021-06-16 RX ADMIN — OXYCODONE HYDROCHLORIDE 10 MG: 5 TABLET ORAL at 12:13

## 2021-06-16 RX ADMIN — ACETAMINOPHEN 975 MG: 325 TABLET, FILM COATED ORAL at 05:48

## 2021-06-16 NOTE — PROGRESS NOTES
"Orthopaedic Surgery Progress Note   06/16/2021    Subjective: No acute events overnight. Pain well controlled with oxycodone and ice. Notes block wore off. Voiding spontaneously. Denies fever or chills, CP, SOB, numbness or tingling, motor dysfunction or weakness.     Objective: BP (!) 142/81 (BP Location: Left arm)   Pulse 74   Temp 97.7  F (36.5  C) (Oral)   Resp 15   Ht 1.727 m (5' 7.99\")   Wt 112.4 kg (247 lb 12.8 oz)   SpO2 96%   BMI 37.69 kg/m      General: NAD, alert and oriented, cooperative with exam.   Cardio: RRR, extremities wwp.   Respiratory: Non-labored breathing.  MSK: Focused examination of   RUE reveals fingers wwp, radial pulse 2+, bcr in all digits. fires +EPL/FPL/IO. SILT M/R/U/Ax. Dressing intact with small areas of shadowing.     Labs: Hgb pending     Imaging: New postsurgical changes of placement of a right total  shoulder arthroplasty, without complication.    Assessment and Plan: Arnaud Bird is a 66 year old male s/p right TSA and DCE with Dr. Deleon on 6/15/21. Doing well post-operatively.     Plan:  Ortho Primary  Activity: Up with assist. Passive shoulder FE to 140 and ER to 40.   Weight bearing status: NWB RUE, Active and Passive ROM of elbow and wrist OK.  Fist pumps every hour while patient awake.  Antibiotics: Ancef x24 hours perioperatively.  Diet: Regular.  DVT prophylaxis: Aspirin and mechanical while in the hospital, discharge on Aspirin 162 mg x 4 weeks.  Bracing/Splinting: Ultrasling at all times.  Wound Care: Keep aquacel in place.  Drains: None.  Pain management: Transition from IV to orals as tolerated.   X-rays: Completed postop.  Occupational Therapy: Eval and treat with ROM restrictions per order.  Labs: HGB pending    Cultures: None.  Consults: OT, medicine, appreciate assistance in caring for this patient.  Follow-up: Clinic with Dr. Deleon in 2 weeks on 6/30/21 (scheduled)    Staff is Dr. Deleon.     Mariana Cuevas MD   Orthopedic Surgery, " PGY-1  P: [phone]     For questions about this patient during weekday business hours, please attempt to contact me at my pager prior to contacting the Orthopaedic Surgery resident on call. After weekday business hours, or on the weekends and overnight, please page the Orthopaedic Surgery resident on call. Thank you!

## 2021-06-16 NOTE — PLAN OF CARE
"  VS: BP (!) 142/81 (BP Location: Left arm)   Pulse 74   Temp 97.7  F (36.5  C) (Oral)   Resp 15   Ht 1.727 m (5' 7.99\")   Wt 112.4 kg (247 lb 12.8 oz)   SpO2 96%   BMI 37.69 kg/m    Pt denies chest pain.    O2: >90% on 2L via nasal cannula   Output: Voids spontaneously without difficulty.   Last BM: 6/15/21 per pt report   Activity: SBA   Up for meals? N/A   Skin: R shoulder surgical incision  Abrasion on face   Pain: Managed with PRN Oxycodone and ice packs   CMS: R hand numbness and tingling from block given during surgery. Pt able to move fingers on RUE now and sensation is returning. In AM pt denied numbness or tingling to RUE, able to move all fingers, feel sensation, and radial pulse is 2+.    Dressing: Marked drainage from previous shift, no change in drainage amount.    Diet: Regular   LDA: PIV L hand SL   Equipment: IV pole/pump, capno, gait belt, and pt personal belongings.    Plan: TBD    Additional Info:      Patient vital signs are at baseline: Yes  Patient able to ambulate as they were prior to admission or with assist devices provided by therapies during their stay:  Yes  Patient MUST void prior to discharge:  Yes  Patient able to tolerate oral intake:  Yes  Pain has adequate pain control using Oral analgesics:  Yes      "

## 2021-06-16 NOTE — DISCHARGE SUMMARY
ORTHOPAEDIC SURGERY DISCHARGE SUMMARY     Date of Admission: 6/15/2021  Date of Discharge: 6/16/2021 12:31 PM  Disposition: Home  Staff Physician: No att. providers found  Primary Care Provider: Sushma Galloway    DISCHARGE DIAGNOSIS:  Arthritis of shoulder region, right [M19.011]    PROCEDURES: Procedure(s):  Right Total Shoulder Arthroplasty, Distal Clavicle Excision on 6/15/2021    BRIEF HISTORY:  Mr. Bird is a 65-year-old right-hand-dominant male with right shoulder pain secondary to glenohumeral arthritis and AC joint arthritis.  He has attempted conservative management with cortisone injections and activity modification without significant relief.  His pain worsened and he elected to pursue total shoulder arthroplasty and distal clavicle excision after risks benefits and alternatives were discussed..    HOSPITAL COURSE:    The patient was admitted following the above listed procedures for pain control and rehabilitation. Arnaud Bird did well post-operatively. Medicine was consulted post operatively to aid in management of medical co-morbidities. The patient received routine nursing cares and at the time of discharge was medically stable. Vital signs were stable throughout admission. The patient is tolerating a regular diet and is voiding spontaneously. All PT/OT goals have been met for safe mobility. Pain is now controlled on oral medications which will be available on discharge. Stool softeners have been used while taking pain medications to help prevent constipation. Arnaud Bird is deemed medically safe to discharge.     Antibiotics:  Ancef given periop and 24 hours postop.   DVT prophylaxis:   mg initiated after surgery and will be continued for 4 weeks.   PT Progress:  Has met PT/OT goals for safe mobility.    Pain Meds:  Weaned off all IV pain meds by discharge.  Inpatient Events: No significant events or complications.     PHYSICAL EXAM:    General: NAD, alert and oriented,  cooperative with exam.   Cardio: RRR, extremities wwp.   Respiratory: Non-labored breathing.  MSK: Focused examination of   RUE reveals fingers wwp, radial pulse 2+, bcr in all digits. fires +EPL/FPL/IO. SILT M/R/U/Ax. Dressing intact with small areas of shadowing.     FOLLOWUP:    Follow up with Dr. Deleon at 2 weeks postoperatively.    Future Appointments   Date Time Provider Department Center   6/30/2021  8:15 AM Yesica Deleon MD [facility]   7/28/2021  8:15 AM Yesica Deleon MD [facility]       Orthopaedic Surgery appointments are at the [facility] ([address] [place] [address]). Call [phone] to schedule a follow-up appointment at this location with your provider.     PLANNED DISCHARGE ORDERS:      Discharge Medication List as of 6/16/2021 12:05 PM      START taking these medications    Details   acetaminophen (TYLENOL) 325 MG tablet Take 2 tablets (650 mg) by mouth every 4 hours as needed for other, Disp-40 tablet, R-0, E-PrescribeDischarge today      aspirin (ASA) 81 MG EC tablet Take 2 tablets (162 mg) by mouth daily, Disp-60 tablet, R-0, E-Prescribe      ibuprofen (ADVIL/MOTRIN) 600 MG tablet Take 1 tablet (600 mg) by mouth every 6 hours as needed, Disp-40 tablet, R-0, E-Prescribe      oxyCODONE (ROXICODONE) 5 MG tablet Take 1 tablet (5 mg) by mouth every 4 hours as needed, Disp-40 tablet, R-0, E-Prescribe      polyethylene glycol (MIRALAX) 17 g packet Take 17 g by mouth daily, Disp-7 packet, R-0, E-Prescribe      senna-docusate (SENOKOT-S/PERICOLACE) 8.6-50 MG tablet Take 1 tablet by mouth 2 times daily, Disp-30 tablet, R-0, E-Prescribe         CONTINUE these medications which have NOT CHANGED    Details   acyclovir (ZOVIRAX) 400 MG tablet TAKE TWO TABLETS BY MOUTH TWICE DAILY for 5 days as needed for genital herpes outbreak, Disp-28 tablet, R-4, E-Prescribe      albuterol (PROAIR HFA, PROVENTIL HFA, VENTOLIN HFA) 108 (90 BASE) MCG/ACT  inhaler Inhale 2 puffs into the lungs every 6 hours as needed for shortness of breath / dyspnea, Disp-1 Inhaler, R-11, E-PrescribeProfile Rx: patient will contact pharmacy when needed      allopurinol (ZYLOPRIM) 300 MG tablet Take 1 tablet (300 mg) by mouth daily, Disp-90 tablet, R-3, E-Prescribe      diclofenac (VOLTAREN) 1 % topical gel Place 4 g onto the skin 4 times daily, Disp-100 g,R-1, E-Prescribe      fluticasone (FLONASE) 50 MCG/ACT nasal spray Spray 2 sprays into both nostrils daily, Disp-16 g, R-11, E-Prescribe      HYDROcodone-acetaminophen (NORCO) 7.5-325 MG per tablet Take 1 tablet by mouth every 6 hours as needed for pain (4 x daily as needed  maxium 4 per day), Disp-120 tablet, R-0, E-Prescribe      ipratropium (ATROVENT) 0.06 % nasal spray SPRAY 2 SPRAYS INTO BOTH NOSTRILS 4 TIMES DAILY, Historical      lidocaine 5 % EX patch Place 1 patch onto the skin every 24 hoursDisp-30 patch, [id number] AM AND OFF HOUR OF SLEEP TO LOWER BACK      multivitamin w/minerals (MULTI-VITAMIN) tablet Take 1 tablet by mouth daily, Historical      pseudoePHEDrine (SUDAFED) 30 MG tablet Take by mouth every 4 hours as needed for congestion, Historical      naloxone (NARCAN) 4 MG/0.1ML nasal spray Spray 1 spray (4 mg) into one nostril alternating nostrils once as needed for opioid reversal Every 2-3 minutes until patient responsive or EMS arrives, Disp-0.2 mL, R-0, Local Print      order for DME Equipment being ordered: Thumb spica splint for left handDisp-1 Device, R-0, Local Print      sildenafil (VIAGRA) 100 MG tablet Take 0.5-1 tablets ( mg) by mouth daily as needed Take 30 min to 4 hours before intercourse.  Never use with nitroglycerin, terazosin or doxazosin., Disp-100 tablet,R-3, Local Print         STOP taking these medications       amoxicillin-clavulanate (AUGMENTIN) 875-125 MG tablet Comments:   Reason for Stopping:         methylPREDNISolone (MEDROL DOSEPAK) 4 MG tablet therapy pack Comments:    Reason for Stopping:                 Discharge Procedure Orders   Care Coordination Referral   Referral Priority: Routine Referral Type: Care Coordination   Number of Visits Requested: 1     Reason for your hospital stay   Order Comments: s/p right TSA and DCE with Dr. Deleon on 6/15/21     Activity   Order Comments: Your activity upon discharge: activity as tolerated    Activity: Up with assist. Passive shoulder FE to 140 and ER to 40.   Weight bearing status: NWB RUE, Active and Passive ROM of elbow and wrist OK.  Fist pumps every hour while patient awake.    Bracing/Splinting: Ultrasling at all times     Order Specific Question Answer Comments   Is discharge order? Yes      Adult [facility] Follow-up and recommended labs and tests   Order Comments: Follow-up: Clinic with Dr. Deleon in 2 weeks on 6/30/21 (scheduled)  Appointments on [place] and/or [facility] (with [facility] or [facility] provider or service). Call [phone] if you haven't heard regarding these appointments within 7 days of discharge.     When to contact your care team   Order Comments: Call Dr Deleon  if you have any of the following: temperature greater than 101.5  or less than 96.3,  increased shortness of breath, increased drainage, increased swelling or increased pain.     Miscellaneous DME Order   Order Comments: DME Documentation:   Describe the reason for need to support medical necessity: Shoulder precautions and limited ROM impacting independence in ADLs/mobility    I, the undersigned, certify that the above prescribed supplies are medically necessary for this patient and is both reasonable and necessary in reference to accepted standards of medical and necessary in reference to accepted standards of medical practice in the treatment of this patient's condition and is not prescribed as a convenience.     Order Specific Question Answer Comments   DME Provider: [facility]    DME Item Needed: pulleys    Length of Need: 6 weeks       Diet   Order Comments: Follow this diet upon discharge: Orders Placed This Encounter      Advance Diet as Tolerated: Regular Diet Adult     Order Specific Question Answer Comments   Is discharge order? Yes      Staff is Dr. Deleon.     Mariana Cuevas MD   Orthopedic Surgery, PGY-1  P: [phone]

## 2021-06-16 NOTE — PLAN OF CARE
VS:     Pt A/O X 4. Afebrile. VSS. Lungs-clear bilaterally with both anterior and posterior. IS encouraged. Denies nausea, shortness of breath, and chest pain.     Output:     Bowels- active in all four quadrants. Last BM 6/15 per pt report. Voids spontaneously without difficulty in the bathroom.      Activity:     Pt up in room, in hallways, and to bathroom with assist of standby. Pt has steady gait.      Skin: Incision to right shoulder, abrasion to face (under mustache), otherwise intact.     Pain:     Has pain in the right shoulder and given prn Oxycodone, scheduled Tylenol, ICE applied, and is tolerating well.      CMS:     CMS and Neuro's are intact. Denies numbness and tingling in all extremities.      Dressing:     Right shoulder incisional dressing is clean, dry, and intact with shoulder immobilizer in place.      Diet:     Pt is on a regular diet and appetite was good this shift.       LDA:     PIV is patent in the left arm and SL. PIV removed before discharge.      Equipment:     Shoulder immobilizer to right shoulder. IV pole in room.     Plan:     Pt is able to make needs known and the call light is within the pt's reach. Continue to monitor.       Additional Info:     Plan to discharge to home today.

## 2021-06-16 NOTE — PLAN OF CARE
Patient vital signs are at baseline: Yes  Patient able to ambulate as they were prior to admission or with assist devices provided by therapies during their stay:  Yes  Patient MUST void prior to discharge: YES    Patient able to tolerate oral intake:  Yes  Pain has adequate pain control using Oral analgesics:  Yes     VS: VSS   O2: >90% on RA   Output: Voided adequate amount. PVR: 33   Last BM: 6/15   Activity: NWB RUE. 1A with gait belt.   Skin: Incision   Pain: Blocks wear off. Pt has had Oxycodone 10mg  and tylenol for incisional pain. Given Ibuprofen 1x for headache. Ice pack provided   CMS: No new numbness and tinging   Dressing: Aquacel: spot drainage observed-Marked within boarder   Diet: Regular   LDA: PIV infusing   Equipment: PCDs, sling, IV pole, capnography, gait belt   Plan: TBD   Additional Info: Swollen upper lip due to stitches weeks ago.Improving per pt report.

## 2021-06-16 NOTE — PROGRESS NOTES
[facility]    Medicine Progress Note - Hospitalist Service       Date of Admission:  6/15/2021  Assessment & Plan       # Status post right total shoulder arthroplasty.  The patient is doing well in the acute postoperative period.   - DVT ppx, activity level and pain control per ortho team    # Asthma: appears well compensated at this time.  - Continue PTA prn albuterol inhaler    # Acute blood loss anemia: Pre-op Hgb 14.5. EBL from surgery, 200 mL. POD#! Hgb 13. 1. No e/o bleeding.  - CTM via labs drawn at next PCP appt after discharge  .  # History of gout: No concerns.  - Continue PTA allopurinol    # Allergic rhinitis: No concerns.   - Continue PTA Flonase      Alex Choudhary PA-C  Hospitalist Service  [facility]  Contact information available via [facility] Paging/Directory  ______________________________________________________________________    Interval History   No acute events overnight. Pain in shoulder controlled. Denies other acute physical concerns at this time.     Data reviewed today: I reviewed all medications, new labs and imaging results over the last 24 hours.     Physical Exam   Vital Signs: Temp: 97  F (36.1  C) Temp src: Oral BP: 139/84 Pulse: 72   Resp: 19 SpO2: 94 % O2 Device: None (Room air) Oxygen Delivery: 2 LPM  Weight: 247 lbs 12.75 oz  GEN: In NAD  HEENT: NCAT; PERRL; sclerae non-icteric  LUNGS: CTAB  CV: RRR  ABD: +BSs; SNTND  EXT: No BLE edema; RUE in sophisticated brace of which I did not remove.   SKIN: No acute rashes noted on exposed areas.  NEURO: AAOx3; CNs grossly intact; No acute focal deficits noted.      Data   CMP  Recent Labs   Lab 06/16/21  0702   *     CBC  Recent Labs   Lab 06/16/21 0702   HGB 13.1*     ?

## 2021-06-16 NOTE — PHARMACY-ADMISSION MEDICATION HISTORY
Please see Admission Medication History note completed by pharmacist on 6/1/2021 under previous encounter at [place] preoperative assessment [facility] for information regarding prior to admission medications.    Lesia Alaniz, PharmD, BCPS

## 2021-06-16 NOTE — PLAN OF CARE
Occupational Therapy Discharge Summary    Reason for therapy discharge:    Discharged to home.    Progress towards therapy goal(s). See goals on Care Plan in [facility] electronic health record for goal details.  Most goals met, achieved 120* FE using pulleys (limited due to pain), 40* ER using cane    Therapy recommendation(s):    Continue home exercise program. Home with assist for ADLs/IADLs.

## 2021-06-16 NOTE — PLAN OF CARE
Pt. discharged at 1230 to home, was accompanied by transport and spouse, and left with personal belongings. Pt. received complete discharge paperwork and all medications as filled by discharge pharmacy. Pt. was given times of last dose for all discharge medications in writing on discharge medication sheets. Discharge teaching included all medication, pain management, activity restrictions, dressing changes, and signs and symptoms of infection. Pt. to follow up with Dr. Deleon in 2 weeks. Pt. had no further questions at the time of discharge and no unmet needs were identified.

## 2021-06-17 ENCOUNTER — PATIENT OUTREACH (OUTPATIENT)
Dept: NURSING | Facility: CLINIC | Age: 66
End: 2021-06-17
Payer: COMMERCIAL

## 2021-06-17 NOTE — PROGRESS NOTES
Clinic Care Coordination Contact  Community Health Worker Initial Outreach    CHW Initial Information Gathering:  Referral Source: PCP    Reason for Referral: Patient/Caregiver Support: Home Safety  Additional pertinent details: Discharge after surgery for TSA  Clinical Staff have discussed the Care Coordination Referral with the patient and/or caregiver: no        Preferred Hospital: [facility]  [phone]  Current living arrangement:: I live in a private home(with girlfriend)  Type of residence:: Apartment  Community Resources: None  Supplies used at home:: None  Equipment Currently Used at Home: none(Lift Chair)  Informal Support system:: (Girlfriend)  No PCP office visit in Past Year: No  Transportation means:: Regular car  CHW Additional Questions  If ED/Hospital discharge, follow-up appointment scheduled as recommended?: N/A  Medication changes made following ED/Hospital discharge?: N/A  MyChart active?: Yes  Patient sent Social Determinants of Health questionnaire?: Yes    CHW introduced self and the CC program.    Patient stated that he had an order from his previous doctor from [facility], and is now retired.  Patient stated that his new PCP did not give him an order for a lift chair.  New PCP told patient that he will not need an order and to go buy a lift chair.  Patient purchased a lift chair, paid $1,000.00, and now is told from his insurance that he needs an order and it has to be pre-approved.  Patient is trying to get reimbursed for the $1,000.00 that he paid.  Patient also has the original order from his previous PCP.  Patient needs assistance with reimbursement forms.      Patient accepts CC: Yes. Patient scheduled for assessment with ANGEL Cason on 6/22/21 at 11:00am. Patient noted desire to discuss reimbursement forms and CC support..   RAMBO Arciniega  Clinic Care Coordination  [facility] Clinics: [place], [place],  [place] [facility]  Phone: [phone]

## 2021-06-23 DIAGNOSIS — M19.011 ARTHRITIS OF SHOULDER REGION, RIGHT: ICD-10-CM

## 2021-06-23 RX ORDER — OXYCODONE HYDROCHLORIDE 5 MG/1
TABLET ORAL
Qty: 20 TABLET | Refills: 0 | Status: SHIPPED | OUTPATIENT
Start: 2021-06-23 | End: 2023-01-03

## 2021-06-23 NOTE — TELEPHONE ENCOUNTER
DOS: 6/15/2021 Right total shoulder arthroplasty, distal clavicle excision    Patient states he is still having pain postop after exercises and when he wakes up. This will be the last refill. Will taper as directed in standing protocol. Pended to Dr. Deleon for signature.

## 2021-06-24 ENCOUNTER — PATIENT OUTREACH (OUTPATIENT)
Dept: CARE COORDINATION | Facility: CLINIC | Age: 66
End: 2021-06-24

## 2021-06-24 NOTE — PROGRESS NOTES
Clinic Care Coordination Contact  [facility]/Voicemail    Referral Source: PCP  Clinical Data: Care Coordinator Outreach    Patient was a no show.  Patient was scheduled for a phone assessment with ANGEL FERRER on 6/22/21 at 11:00am.    Outreach attempted x 1.  Left message on patient's voicemail with call back information and requested return call.    Plan: Care Coordinator will try to reach patient again in 1-2 business days.    RAMBO Arciniega  Clinic Care Coordination  [facility] Clin[facility]: [place], [place], [place], and [place] [facility]  Phone: [phone]

## 2021-06-25 ENCOUNTER — PATIENT OUTREACH (OUTPATIENT)
Dept: NURSING | Facility: CLINIC | Age: 66
End: 2021-06-25
Payer: COMMERCIAL

## 2021-06-25 ENCOUNTER — OFFICE VISIT (OUTPATIENT)
Dept: FAMILY MEDICINE | Facility: CLINIC | Age: 66
End: 2021-06-25
Payer: COMMERCIAL

## 2021-06-25 VITALS
OXYGEN SATURATION: 96 % | TEMPERATURE: 97.6 F | HEART RATE: 90 BPM | DIASTOLIC BLOOD PRESSURE: 74 MMHG | RESPIRATION RATE: 18 BRPM | WEIGHT: 245 LBS | SYSTOLIC BLOOD PRESSURE: 108 MMHG | BODY MASS INDEX: 37.13 KG/M2 | HEIGHT: 68 IN

## 2021-06-25 DIAGNOSIS — T81.41XA INFECTION INVOLVING STITCH WITH ABSCESS: Primary | ICD-10-CM

## 2021-06-25 DIAGNOSIS — Z96.611 STATUS POST TOTAL SHOULDER ARTHROPLASTY, RIGHT: ICD-10-CM

## 2021-06-25 PROCEDURE — 99213 OFFICE O/P EST LOW 20 MIN: CPT | Mod: 25 | Performed by: INTERNAL MEDICINE

## 2021-06-25 PROCEDURE — 10060 I&D ABSCESS SIMPLE/SINGLE: CPT | Performed by: INTERNAL MEDICINE

## 2021-06-25 RX ORDER — DOXYCYCLINE HYCLATE 100 MG
100 TABLET ORAL 2 TIMES DAILY
Qty: 14 TABLET | Refills: 0 | Status: SHIPPED | OUTPATIENT
Start: 2021-06-25 | End: 2023-01-03

## 2021-06-25 ASSESSMENT — PAIN SCALES - GENERAL: PAINLEVEL: NO PAIN (0)

## 2021-06-25 ASSESSMENT — MIFFLIN-ST. JEOR: SCORE: 1855.65

## 2021-06-25 NOTE — PATIENT INSTRUCTIONS
As discussed, I have relieved some secretions of the upper lip stitch abscess with Fine needle. Antibiotic coverage for 7 days given your recent shoulder surgery .    Make sure keep the site clean and drink more cold and icing for improvement.     =============================      Patient Education     Abscess (Antibiotic Treatment Only)  An abscess happens when bacteria get trapped under the skin and start to grow. Pus forms inside the abscess as the body responds to the bacteria. An abscess can happen with an insect bite, ingrown hair, blocked oil gland, pimple, cyst, or puncture wound. It is sometimes call a boil.   In the early stages, your wound may be red and tender. For this stage, you may get antibiotics. If the abscess does not get better with antibiotics, it will need to be drained with a small cut.   Home care  These tips will help you care for your abscess at home:    Soak the wound in hot water or apply hot packs (small towel soaked in hot water) to the area for 20 minutes at a time. Do this 3 to 4 times a day, or as instructed. Use a new towel each time. Wash the towels afterward because they may be contaminated with bacteria after use.    Don't cut, squeeze, or pop the boil yourself.    Put antibiotic cream or ointment on the skin 3 to 4 times a day, unless something else was prescribed. Some ointments include an antibiotic plus a pain reliever.    If your healthcare provider prescribed antibiotics, don't stop taking them until you have finished the medicine or you are told to stop.    You may use an over-the-counter pain medicine to control pain, unless another pain medicine was prescribed. Talk with your provider before taking these medicines if you have chronic liver or kidney disease or ever had a stomach ulcer or digestive bleeding.  Follow-up care  Follow up with your healthcare provider, or as advised. Check your wound each day for the signs that the infection may be getting worse (see below).    When to seek medical advice  Get prompt medical attention if any of these occur:    An increase in redness or swelling    Red streaks in the skin leading away from the abscess    An increase in local pain or swelling    Fever of 100.4 F (38 C) or higher, or as directed by your healthcare provider    Pus or fluid coming from the abscess    Boil returns after getting better  Cliff last reviewed this educational content on 8/1/2019 2000-2021 [employer]. All rights reserved. This information is not intended as a substitute for professional medical care. Always follow your healthcare professional's instructions.

## 2021-06-25 NOTE — PROGRESS NOTES
Assessment and Plan  1. Infection involving stitch with abscess  Pt was seen by me on 5/25/21 for lip laceration suture removal on the exterior of the skin which were nonabsorbable.Pt did have 5 more absorbable sutures done by ER on the mucosal surface.    - Recent visit to  for stitch abscess on the mucosal side of the lip with absorbable sutures  and used 10 days of Augmentin already.   - As per my physical exam today he still has the Left side of Upper lip with the stitch abscess on the mucosal side filled with fluid and tense , S/P drainage done in the office today.  - Will give short course of antibiotic given the risk factor of recent Shoulder arthroplasty and avoid infection.  - doxycycline hyclate (VIBRA-TABS) 100 MG tablet; Take 1 tablet (100 mg) by mouth 2 times daily Do not take within 2 hours of antacids or calcium.  Dispense: 14 tablet; Refill: 0  - Incision and drainage    2. Status post total shoulder arthroplasty, right  Rt Total Shoulder arthroplasty with distal clavicle excision on 6/15/21 and following Orthopedics for pain management. Dressing intact,no signs of infection on the Rt shoulder skin in emily surgical site. Continue current PT which patient is managing at home.     Patient Instructions   As discussed, I have relieved some secretions of the upper lip stitch abscess with Fine needle. Antibiotic coverage for 7 days given your recent shoulder surgery .    Make sure keep the site clean and drink more cold and icing for improvement.     =============================      Patient Education     Abscess (Antibiotic Treatment Only)  An abscess happens when bacteria get trapped under the skin and start to grow. Pus forms inside the abscess as the body responds to the bacteria. An abscess can happen with an insect bite, ingrown hair, blocked oil gland, pimple, cyst, or puncture wound. It is sometimes call a boil.   In the early stages, your wound may be red and tender. For this stage, you may get  antibiotics. If the abscess does not get better with antibiotics, it will need to be drained with a small cut.   Home care  These tips will help you care for your abscess at home:    Soak the wound in hot water or apply hot packs (small towel soaked in hot water) to the area for 20 minutes at a time. Do this 3 to 4 times a day, or as instructed. Use a new towel each time. Wash the towels afterward because they may be contaminated with bacteria after use.    Don't cut, squeeze, or pop the boil yourself.    Put antibiotic cream or ointment on the skin 3 to 4 times a day, unless something else was prescribed. Some ointments include an antibiotic plus a pain reliever.    If your healthcare provider prescribed antibiotics, don't stop taking them until you have finished the medicine or you are told to stop.    You may use an over-the-counter pain medicine to control pain, unless another pain medicine was prescribed. Talk with your provider before taking these medicines if you have chronic liver or kidney disease or ever had a stomach ulcer or digestive bleeding.  Follow-up care  Follow up with your healthcare provider, or as advised. Check your wound each day for the signs that the infection may be getting worse (see below).   When to seek medical advice  Get prompt medical attention if any of these occur:    An increase in redness or swelling    Red streaks in the skin leading away from the abscess    An increase in local pain or swelling    Fever of 100.4 F (38 C) or higher, or as directed by your healthcare provider    Pus or fluid coming from the abscess    Boil returns after getting better  [employer] last reviewed this educational content on 8/1/2019 2000-2021 [employer]. All rights reserved. This information is not intended as a substitute for professional medical care. Always follow your healthcare professional's instructions.                 Return in about 3 months (around 9/25/2021), or if  symptoms worsen or fail to improve, for Follow up of last visit.    Sushma Galloway MD  [facility] KAI Lares is a 66 year old who presents for the following health issues       HPI     Concern - Stitches, 10 outside, 10 inside from mouth injury.  Stitches inside feels still are inside, swelling.  Went to urgent care around the 10th and they said that if it does not get better they will need to jerson the incision in the mouth.  Onset: 06/18/21         Allergies   Allergen Reactions     No Clinical Screening - See Comments      Seasonal Allergies         Past Medical History:   Diagnosis Date     Arthritis      Benign positional vertigo      Carpal tunnel syndrome     mild     Hearing problem      LEFT WORSE THAN RIGHT  10/18/2012     Migraines      Nasal polyps      Unspecified asthma(493.90) 10/17/2012     Unspecified asthma, with exacerbation 10/17/2012       Past Surgical History:   Procedure Laterality Date     ARTHROPLASTY SHOULDER Right 6/15/2021    Procedure: Right Total Shoulder Arthroplasty, Distal Clavicle Excision;  Surgeon: Yesica Deleon MD;  Location: [facility] OR     COLONOSCOPY  2019     HC TOOTH EXTRACTION W/FORCEP       ORTHOPEDIC SURGERY      bilateral total knee replacements     TONSILLECTOMY      age 2 or 3       Family History   Problem Relation Age of Onset     Cancer Father      Family History Negative Father      Family History Negative Mother      Cancer Mother      Stomach Problem Mother      Breast Cancer Mother      Diabetes No family hx of      Coronary Artery Disease No family hx of      Hypertension No family hx of      Hyperlipidemia No family hx of      Cerebrovascular Disease No family hx of      Breast Cancer No family hx of      Colon Cancer No family hx of      Prostate Cancer No family hx of      Other Cancer No family hx of      Depression No family hx of      Anxiety Disorder No family hx of      Mental Illness No family hx of   "    Substance Abuse No family hx of      Anesthesia Reaction No family hx of      Asthma No family hx of      Osteoporosis No family hx of      Genetic Disorder No family hx of      Thyroid Disease No family hx of      Obesity No family hx of      Unknown/Adopted No family hx of        Social History     Tobacco Use     Smoking status: Never Smoker     Smokeless tobacco: Never Used   Substance Use Topics     Alcohol use: Yes     Comment: case beer a week        Current Outpatient Medications   Medication     acetaminophen (TYLENOL) 325 MG tablet     acyclovir (ZOVIRAX) 400 MG tablet     albuterol (PROAIR HFA, PROVENTIL HFA, VENTOLIN HFA) 108 (90 BASE) MCG/ACT inhaler     allopurinol (ZYLOPRIM) 300 MG tablet     aspirin (ASA) 81 MG EC tablet     diclofenac (VOLTAREN) 1 % topical gel     doxycycline hyclate (VIBRA-TABS) 100 MG tablet     fluticasone (FLONASE) 50 MCG/ACT nasal spray     ibuprofen (ADVIL/MOTRIN) 600 MG tablet     ipratropium (ATROVENT) 0.06 % nasal spray     lidocaine 5 % EX patch     oxyCODONE (ROXICODONE) 5 MG tablet     pseudoePHEDrine (SUDAFED) 30 MG tablet     sildenafil (VIAGRA) 100 MG tablet     HYDROcodone-acetaminophen (NORCO) 7.5-325 MG per tablet     multivitamin w/minerals (MULTI-VITAMIN) tablet     naloxone (NARCAN) 4 MG/0.1ML nasal spray     order for DME     polyethylene glycol (MIRALAX) 17 g packet     senna-docusate (SENOKOT-S/PERICOLACE) 8.6-50 MG tablet     No current facility-administered medications for this visit.         Review of Systems   Constitutional, HEENT, cardiovascular, pulmonary, GI, , musculoskeletal, neuro, skin, endocrine and psych systems are negative, except as otherwise noted.      Objective    /74   Pulse 90   Temp 97.6  F (36.4  C) (Tympanic)   Resp 18   Ht 1.727 m (5' 7.99\")   Wt 111.1 kg (245 lb)   SpO2 96%   BMI 37.26 kg/m    Body mass index is 37.26 kg/m .  Physical Exam   GENERAL: healthy, alert and no distress  HEENT : POSITIVE for Left side " of Upper lip stitch abscess which is tense positive for tenderness , erythematous. Drainage of the clear fluid done today and relieved the pressure.   NECK: no adenopathy, no asymmetry, masses, or scars and thyroid normal to palpation  RESP: lungs clear to auscultation - no rales, rhonchi or wheezes  CV: regular rate and rhythm, normal S1 S2, no S3 or S4, no murmur, click or rub, no peripheral edema and peripheral pulses strong  ABDOMEN: soft, nontender, no hepatosplenomegaly, no masses and bowel sounds normal  MS: POSITIVE for Rt shoulder Arthroplasty site dry and healing with surgical dressing intact and dry. Arm rested  In sling.

## 2021-06-25 NOTE — Clinical Note
Levon Godwin,   I did Needle puncture of the stitch abscess and drained the fluid out but didn't do an actual incision. Is it still the correct billing for this procedure ?     Thank you for all the support.   Sushma Galloway MD on 6/25/2021 at 12:51 PM

## 2021-06-25 NOTE — PROCEDURES
[facility] PROCEDURE NOTE:   I & D of Stitch abscess    INDICATION: Abscess formation on Left upper lip around the Stitch     CONSENT:   Procedure in its entirety, indications, complications, and potential risk including bleeding, infection, scar, repeat procedure needed, were explained to the patient by myself. All questions were answered prior to onset of procedure. Verbal and written consent was obtained, Please see chart for procedure note.     DESCRIPTION:   Patient was placed in sitting position on table after verbal and written consent was obtained. Confirmed with the patient at the Left upper lip was area requiring I & D (obvious also by exam). Confirmed allergies. Pt opted not to get Lidocaine as its managed with sterile Needle aspiration and drainage.    Patient tolerated the procedure well without complications. The patient was given instructions for post I & D care, signs and symptoms that indicated the need to be seen in clinic and/or ER. The patient will return sooner with new, change, or worsening of symptoms.

## 2021-06-25 NOTE — PROGRESS NOTES
Clinic Care Coordination Contact  Community Health Worker Initial Outreach    CHW called to reschedule  phone assessment.  Patient was a no show on 6/22/21.    CHW Initial Information Gathering:  Referral Source: PCP    Reason for Referral: Patient/Caregiver Support: Home Safety  Additional pertinent details: Discharge after surgery for TSA  Clinical Staff have discussed the Care Coordination Referral with the patient and/or caregiver: no        Preferred Hospital: [facility]  [phone]  Current living arrangement:: I live in a private home(with girlfriend)  Type of residence:: Apartment  Community Resources: None  Supplies used at home:: None  Equipment Currently Used at Home: none(Lift Chair)  Informal Support system:: (Girlfriend)  No PCP office visit in Past Year: No  Transportation means:: Regular car  CHW Additional Questions  If ED/Hospital discharge, follow-up appointment scheduled as recommended?: N/A  Medication changes made following ED/Hospital discharge?: N/A  MyChart active?: Yes  Patient sent Social Determinants of Health questionnaire?: Yes    CHW introduced self and the CC program.    Patient stated that he had an order from his previous doctor from [facility], and is now retired.  Patient stated that his new PCP did not give him an order for a lift chair.  New PCP told patient that he will not need an order and to go buy a lift chair.  Patient purchased a lift chair, paid $1,000.00, and now is told from his insurance that he needs an order and it has to be pre-approved.  Patient is trying to get reimbursed for the $1,000.00 that he paid.  Patient also has the original order from his previous PCP.  Patient needs assistance with reimbursement forms.      Patient accepts CC: Yes. Patient scheduled for assessment with ANGEL Cason on 6/30/21 at 10:00am. Patient noted desire to discuss reimbursement forms and CC support..   Cherise Kaufman  CHW  Clinic Care Coordination  [facility] Clinics: [facility] [place], and [place] [facility]  Phone: [phone]

## 2021-06-30 ENCOUNTER — PATIENT OUTREACH (OUTPATIENT)
Dept: NURSING | Facility: CLINIC | Age: 66
End: 2021-06-30
Payer: COMMERCIAL

## 2021-06-30 ENCOUNTER — OFFICE VISIT (OUTPATIENT)
Dept: ORTHOPEDICS | Facility: CLINIC | Age: 66
End: 2021-06-30
Payer: COMMERCIAL

## 2021-06-30 DIAGNOSIS — Z96.611 H/O TOTAL SHOULDER REPLACEMENT, RIGHT: Primary | ICD-10-CM

## 2021-06-30 PROCEDURE — 99024 POSTOP FOLLOW-UP VISIT: CPT | Performed by: ORTHOPAEDIC SURGERY

## 2021-06-30 ASSESSMENT — ACTIVITIES OF DAILY LIVING (ADL): DEPENDENT_IADLS:: INDEPENDENT

## 2021-06-30 NOTE — PROGRESS NOTES
CHIEF CONCERN: Status post right total shoulder arthroplasty and distal clavicle excision  DATE OF SURGERY: 6/15/21    HISTORY OF PRESENT ILLNESS: Arnaud is an extremely pleasant 66 year-old male who is 2 weeks status post the above procedure. He has been feeling well. Pain well controlled with occasional oxycodone at night to sleep. He has been in the ultrasling, out of it to shower, eager to get his surgical dressing off. He is still nervous about driving. He does inquire about whether he could get a Covid vaccine and/or cortisone injection in his back now given proximity to surgery.    EXAM:  Pleasant adult male in NAD  Respirations even and unlabored.  Right upper extremity: dressing removed and incision is clean, dry, and intact. Distally neurovascularly intact without deficits. Shoulder range of motion is assisted FE to 160 and ER at side to 60.     IMAGING: none new.    ASSESSMENT:  1. Two weeks status post above procedure, doing well    PLAN:    Range of Motion: Continue passive and active assisted ROM of the shoulder per operative note. Reviewed no active ROM at this time.     Sling: Continue sling except for bathing/dressing/rehab. May remove the cushion for comfort.    Pain medication: Reviewed plan to be off all narcotic pain medications. NSAIDs and Tylenol PRN.    Follow up: in 4 weeks. Will discontinue sling at that time and initiate AROM. Plan to advance to strengthening at 3 months. Discussed that ideally he would wait 2 weeks before getting the Covid vaccine, however, he may get a cortisone injection for his back issues at his convenience.    Patient discussed and seen with Dr. Pancho MD, staff.   --  Demetris Major MD  Orthopedic Surgery PGY-1    I have personally examined this patient and have reviewed the clinical presentation and progress note with the resident.  I agree with the treatment plan as outlined.  The plan was formulated with the resident on the day of the resident's note.

## 2021-06-30 NOTE — LETTER
6/30/2021         RE: Arnaud Bird  [address]  [place] [address]        Dear Colleague,    Thank you for referring your patient, Arnaud Bird, to the [facility] [place]. Please see a copy of my visit note below.    CHIEF CONCERN: Status post right total shoulder arthroplasty and distal clavicle excision  DATE OF SURGERY: 6/15/21    HISTORY OF PRESENT ILLNESS: Arnaud is an extremely pleasant 66 year-old male who is 2 weeks status post the above procedure. He has been feeling well. Pain well controlled with occasional oxycodone at night to sleep. He has been in the ultrasling, out of it to shower, eager to get his surgical dressing off. He is still nervous about driving. He does inquire about whether he could get a Covid vaccine and/or cortisone injection in his back now given proximity to surgery.    EXAM:  Pleasant adult male in NAD  Respirations even and unlabored.  Right upper extremity: dressing removed and incision is clean, dry, and intact. Distally neurovascularly intact without deficits. Shoulder range of motion is assisted FE to 160 and ER at side to 60.     IMAGING: none new.    ASSESSMENT:  1. Two weeks status post above procedure, doing well    PLAN:    Range of Motion: Continue passive and active assisted ROM of the shoulder per operative note. Reviewed no active ROM at this time.     Sling: Continue sling except for bathing/dressing/rehab. May remove the cushion for comfort.    Pain medication: Reviewed plan to be off all narcotic pain medications. NSAIDs and Tylenol PRN.    Follow up: in 4 weeks. Will discontinue sling at that time and initiate AROM. Plan to advance to strengthening at 3 months. Discussed that ideally he would wait 2 weeks before getting the Covid vaccine, however, he may get a cortisone injection for his back issues at his convenience.    Patient discussed and seen with Dr. Pancho MD, staff.   --  Demetris Major,  MD  Orthopedic Surgery PGY-1    I have personally examined this patient and have reviewed the clinical presentation and progress note with the resident.  I agree with the treatment plan as outlined.  The plan was formulated with the resident on the day of the resident's note.

## 2021-06-30 NOTE — PROGRESS NOTES
Clinic Care Coordination Contact    Clinic Care Coordination Contact  OUTREACH    Referral Information:  Referral Source: PCP    Assessment:   called and spoke with patient today who shares that he bought a lift chair before his surgery because he didn't end up having enough time to wait for a script and also got Covid during this time, so just needed to purchase this before he had his surgery. Pt paid $1,000 for the chair and pt is just hoping to get some back.    NABIL called [employer] and spoke with rep who asks where pt bought the chair. NABIL then called and got patient on the line who shares that he bought the chair at [employer]. [facility] rep states that since pt did not buy ant DME store it would not be covered. She also explained that only the lift part is covered, not the chair.   Pt was a bit disappointed to hear this. Rep did state that he could submit receipt anyway's just to see if would possibly be covered. SW also let pt know they would message PCP to see if she can provide letter of necessity for chair.    Pt took down the address to send receipt and thanks NABIL for the help with calling.  No other needs identified.          Chief Complaint   Patient presents with     Clinic Care Coordination - Initial        Universal Utilization:   Clinic Utilization  Difficulty keeping appointments:: No  Compliance Concerns: No  No-Show Concerns: No  No PCP office visit in Past Year: No  Utilization    Last refreshed: 6/30/2021 11:06 AM: Hospital Admissions 2           Last refreshed: 6/30/2021 11:06 AM: ED Visits 2           Last refreshed: 6/30/2021 11:06 AM: No Show Count (past year) 1              Current as of: 6/30/2021 11:06 AM              Clinical Concerns:  Current Medical Concerns:   Bilateral carpal tunnel syndrome  Spondylosis with myelopathy   Chronic pain disorder  SOB  Obestiy  Hyperlipidemia  Plantar Fascial fibromatosis  Groin strain  Hypertrophy of prostate with urinary obstruction      Current  Behavioral Concerns: NA    Education Provided to patient: Care Coordination  Pain  Pain (GOAL):: No  Health Maintenance Reviewed: Up to date  Clinical Pathway: None   Not completed during this assessment    Medication Management  No  Functional Status:  Dependent ADLs:: Independent  Dependent IADLs:: Independent  Bed or wheelchair confined:: No  Mobility Status: Independent  Fallen 2 or more times in the past year?: No  Any fall with injury in the past year?: No    Living Situation:  Current living arrangement:: I live in a private home with spouse    Lifestyle & Psychosocial Needs:        Inadequate nutrition (GOAL):: No  Tube Feeding: No  Inadequate activity/exercise (GOAL):: No  Significant changes in sleep pattern (GOAL): No  Transportation means:: Accessible car     Shinto or spiritual beliefs that impact treatment:: No  Mental health DX:: No  Mental health management concern (GOAL):: No  Chemical Dependency Status: No Current Concerns  Informal Support system:: Family   Socioeconomic History     Marital status: Single     Spouse name: Not on file     Number of children: Not on file     Years of education: Not on file     Highest education level: Not on file     Tobacco Use     Smoking status: Never Smoker     Smokeless tobacco: Never Used   Substance and Sexual Activity     Alcohol use: Yes     Comment: case beer a week     Drug use: No     Sexual activity: Yes     Partners: Female     Birth control/protection: Male Surgical        Resources and Interventions:  Current Resources:           Supplies used at home:: None  Equipment Currently Used at Home: cane, quad   Advance Care Plan/Directive  Advanced Care Plans/Directives on file:: Yes            Patient/Caregiver understanding: Yes       Future Appointments              In 4 weeks Yesica Deleon MD [facility]          Plan: SW will make no further outreaches at this time. CC will be here for future  needs.

## 2021-07-08 ENCOUNTER — OFFICE VISIT (OUTPATIENT)
Dept: FAMILY MEDICINE | Facility: CLINIC | Age: 66
End: 2021-07-08
Payer: COMMERCIAL

## 2021-07-08 VITALS
TEMPERATURE: 98.1 F | WEIGHT: 251 LBS | HEIGHT: 68 IN | BODY MASS INDEX: 38.04 KG/M2 | OXYGEN SATURATION: 97 % | DIASTOLIC BLOOD PRESSURE: 76 MMHG | SYSTOLIC BLOOD PRESSURE: 118 MMHG | RESPIRATION RATE: 10 BRPM | HEART RATE: 67 BPM

## 2021-07-08 DIAGNOSIS — G89.4 CHRONIC PAIN DISORDER: ICD-10-CM

## 2021-07-08 DIAGNOSIS — T63.301A SPIDER BITE WOUND, ACCIDENTAL OR UNINTENTIONAL, INITIAL ENCOUNTER: Primary | ICD-10-CM

## 2021-07-08 DIAGNOSIS — S01.511S LIP LACERATION, SEQUELA: ICD-10-CM

## 2021-07-08 LAB

## 2021-07-08 PROCEDURE — 80306 DRUG TEST PRSMV INSTRMNT: CPT | Performed by: INTERNAL MEDICINE

## 2021-07-08 PROCEDURE — 99213 OFFICE O/P EST LOW 20 MIN: CPT | Performed by: INTERNAL MEDICINE

## 2021-07-08 RX ORDER — MUPIROCIN 20 MG/G
OINTMENT TOPICAL 3 TIMES DAILY
Qty: 30 G | Refills: 1 | Status: SHIPPED | OUTPATIENT
Start: 2021-07-08 | End: 2024-05-31

## 2021-07-08 ASSESSMENT — PAIN SCALES - GENERAL: PAINLEVEL: MILD PAIN (2)

## 2021-07-08 ASSESSMENT — MIFFLIN-ST. JEOR: SCORE: 1882.87

## 2021-07-08 NOTE — PROGRESS NOTES
Assessment and Plan  1. Spider bite wound, accidental or unintentional, initial encounter  New problem, per my physical exam with milder form of spider bite on the left calf region, will give topical cream for improvement.  - mupirocin (BACTROBAN) 2 % external ointment; Apply topically 3 times daily  Dispense: 30 g; Refill: 1    2. Chronic pain disorder  Pt does have ongoing Opiate prescriptions from us. Will check UDS due at this time.   - Drug Abuse Screen Panel 13, Urine ([facility])    3. Lip laceration, sequela  Well healed, which patient is concerned why it didn't become normal again. Reassured the natural course of healing which will take longer time for complete healing . No infection as per my physical exam.      Patient Instructions   As discussed your Lip laceration is well healed and it will remain with that minimal swelling as this is disruption of the normal tissue. Just leave it alone for few months which should slowly heal. Good news is no infection per my exam.     Apply the Ointment on the spider bite.     Please keep up your Orthopedics for your joint issues.     =================    Patient Education     Nonpoisonous Spider Bite     The venom from a spider bite can cause a local skin reaction. This often causes local redness, itching, and swelling. This reaction will fade over a few hours to a few days. A spider bite can become infected, so watch for the signs listed below. Sometimes it's hard to tell the difference between a local reaction to the insect bite or sting and an early infection. So your healthcare provider may start you on antibiotics.  Home care  The following guidelines will help you care for your wound at home:    Stay away from anything that heats up your skin if itching is a problem. This includes hot showers or baths or direct sunlight. This will make the itching worse.    During the first 24 hours, you may put an ice pack on the injury. Use it for no more than 20 minutes at a  time every 1 to 2 hours. This will reduce pain and swelling. It will also help with itching. You can make an ice pack by putting ice cubes in a plastic bag that seals at the top. Wrap the bag in a thin, clean towel. Don't put ice or an ice pack directly on the skin. You may also use an over-the-counter spray or cream containing benzocaine to help ease pain. Over-the-counter skin creams containing diphenhydramine or hydrocortisone may help with itching. Remember to review the medicine instructions for any allergies.    If the wound becomes red, wash the area with soap and water every day.  Put an antibiotic cream or ointment on the injury 3 times a day as directed.    If your healthcare provider has prescribed oral antibiotics, take them as directed until they are all finished.  Follow-up care  Follow up with your healthcare provider, or as advised.  When to get medical advice  Call your healthcare provider right away if any of these occur:    Spreading areas of itching, redness, or swelling    Pain or swelling that gets worse    Fever of 100.4 F (38 C) or higher, or as directed by your healthcare provider    Drainage from the wound    You get a skin sore (skin ulcer)    A red streak in the skin leading away from the wound    You still have symptoms after 3 days    Generalized rash, fever, or joint pain starting 1 to 2 weeks after treatment  Call 911  Call 911 if any of these occur:    New or worse swelling in the face, eyelids, lips, mouth, throat, or tongue    Hard time swallowing or breathing    Dizziness, weakness, or fainting  [employer] last reviewed this educational content on 11/1/2019 2000-2021 [employer]. All rights reserved. This information is not intended as a substitute for professional medical care. Always follow your healthcare professional's instructions.                       Return in about 3 months (around 10/8/2021), or if symptoms worsen or fail to improve, for Follow up of last  visit, chronic pain.    Sushma Galloway MD  [facility] KAI Lares is a 66 year old who presents for the following health issues     HPI     Concern - lump  Onset: a couple weeks ago due to spring from garage door hit him in the face.  Description: lump on lip from injury  Intensity: moderate  Progression of Symptoms:  worsening  Accompanying Signs & Symptoms: NA  Previous history of similar problem: No  Precipitating factors:        Worsened by: none  Alleviating factors:        Improved by: none  Therapies tried and outcome:  none        Allergies   Allergen Reactions     No Clinical Screening - See Comments      Seasonal Allergies         Past Medical History:   Diagnosis Date     Arthritis      Benign positional vertigo      Carpal tunnel syndrome     mild     Hearing problem      LEFT WORSE THAN RIGHT  10/18/2012     Migraines      Nasal polyps      Unspecified asthma(493.90) 10/17/2012     Unspecified asthma, with exacerbation 10/17/2012       Past Surgical History:   Procedure Laterality Date     ARTHROPLASTY SHOULDER Right 6/15/2021    Procedure: Right Total Shoulder Arthroplasty, Distal Clavicle Excision;  Surgeon: Yesica Deleon MD;  Location: [facility] OR     COLONOSCOPY  2019     HC TOOTH EXTRACTION W/FORCEP       ORTHOPEDIC SURGERY      bilateral total knee replacements     TONSILLECTOMY      age 2 or 3       Family History   Problem Relation Age of Onset     Cancer Father      Family History Negative Father      Family History Negative Mother      Cancer Mother      Stomach Problem Mother      Breast Cancer Mother      Diabetes No family hx of      Coronary Artery Disease No family hx of      Hypertension No family hx of      Hyperlipidemia No family hx of      Cerebrovascular Disease No family hx of      Breast Cancer No family hx of      Colon Cancer No family hx of      Prostate Cancer No family hx of      Other Cancer No family hx of      Depression No  "family hx of      Anxiety Disorder No family hx of      Mental Illness No family hx of      Substance Abuse No family hx of      Anesthesia Reaction No family hx of      Asthma No family hx of      Osteoporosis No family hx of      Genetic Disorder No family hx of      Thyroid Disease No family hx of      Obesity No family hx of      Unknown/Adopted No family hx of        Social History     Tobacco Use     Smoking status: Never Smoker     Smokeless tobacco: Never Used   Substance Use Topics     Alcohol use: Yes     Comment: case beer a week        Current Outpatient Medications   Medication     acetaminophen (TYLENOL) 325 MG tablet     acyclovir (ZOVIRAX) 400 MG tablet     albuterol (PROAIR HFA, PROVENTIL HFA, VENTOLIN HFA) 108 (90 BASE) MCG/ACT inhaler     allopurinol (ZYLOPRIM) 300 MG tablet     aspirin (ASA) 81 MG EC tablet     diclofenac (VOLTAREN) 1 % topical gel     doxycycline hyclate (VIBRA-TABS) 100 MG tablet     fluticasone (FLONASE) 50 MCG/ACT nasal spray     HYDROcodone-acetaminophen (NORCO) 7.5-325 MG per tablet     ibuprofen (ADVIL/MOTRIN) 600 MG tablet     ipratropium (ATROVENT) 0.06 % nasal spray     lidocaine 5 % EX patch     multivitamin w/minerals (MULTI-VITAMIN) tablet     mupirocin (BACTROBAN) 2 % external ointment     naloxone (NARCAN) 4 MG/0.1ML nasal spray     order for DME     oxyCODONE (ROXICODONE) 5 MG tablet     polyethylene glycol (MIRALAX) 17 g packet     pseudoePHEDrine (SUDAFED) 30 MG tablet     senna-docusate (SENOKOT-S/PERICOLACE) 8.6-50 MG tablet     sildenafil (VIAGRA) 100 MG tablet     No current facility-administered medications for this visit.         Review of Systems   Constitutional, HEENT, cardiovascular, pulmonary, GI, , musculoskeletal, neuro, skin, endocrine and psych systems are negative, except as otherwise noted.      Objective    /76   Pulse 67   Temp 98.1  F (36.7  C) (Tympanic)   Resp 10   Ht 1.727 m (5' 7.99\")   Wt 113.9 kg (251 lb)   SpO2 97%   BMI " 38.18 kg/m    Body mass index is 38.18 kg/m .  Physical Exam   GENERAL: healthy, alert and no distress  FACE : POSITIVE for upper lip laceration well healed, no erythema or discharge.  NECK: no adenopathy, no asymmetry, masses, or scars and thyroid normal to palpation  RESP: lungs clear to auscultation - no rales, rhonchi or wheezes  CV: regular rate and rhythm, normal S1 S2, no S3 or S4, no murmur, click or rub, no peripheral edema and peripheral pulses strong  ABDOMEN: soft, nontender, no hepatosplenomegaly, no masses and bowel sounds normal  MS: no gross musculoskeletal defects noted, no edema  SKIN : POSITIVE for spider bite on the left calf measuring 2 cm in diameter , no open sinus or necrosis seen. Mild erythema.

## 2021-07-08 NOTE — PATIENT INSTRUCTIONS
As discussed your Lip laceration is well healed and it will remain with that minimal swelling as this is disruption of the normal tissue. Just leave it alone for few months which should slowly heal. Good news is no infection per my exam.     Apply the Ointment on the spider bite.     Please keep up your Orthopedics for your joint issues.     =================    Patient Education     Nonpoisonous Spider Bite     The venom from a spider bite can cause a local skin reaction. This often causes local redness, itching, and swelling. This reaction will fade over a few hours to a few days. A spider bite can become infected, so watch for the signs listed below. Sometimes it's hard to tell the difference between a local reaction to the insect bite or sting and an early infection. So your healthcare provider may start you on antibiotics.  Home care  The following guidelines will help you care for your wound at home:    Stay away from anything that heats up your skin if itching is a problem. This includes hot showers or baths or direct sunlight. This will make the itching worse.    During the first 24 hours, you may put an ice pack on the injury. Use it for no more than 20 minutes at a time every 1 to 2 hours. This will reduce pain and swelling. It will also help with itching. You can make an ice pack by putting ice cubes in a plastic bag that seals at the top. Wrap the bag in a thin, clean towel. Don't put ice or an ice pack directly on the skin. You may also use an over-the-counter spray or cream containing benzocaine to help ease pain. Over-the-counter skin creams containing diphenhydramine or hydrocortisone may help with itching. Remember to review the medicine instructions for any allergies.    If the wound becomes red, wash the area with soap and water every day.  Put an antibiotic cream or ointment on the injury 3 times a day as directed.    If your healthcare provider has prescribed oral antibiotics, take them as  directed until they are all finished.  Follow-up care  Follow up with your healthcare provider, or as advised.  When to get medical advice  Call your healthcare provider right away if any of these occur:    Spreading areas of itching, redness, or swelling    Pain or swelling that gets worse    Fever of 100.4 F (38 C) or higher, or as directed by your healthcare provider    Drainage from the wound    You get a skin sore (skin ulcer)    A red streak in the skin leading away from the wound    You still have symptoms after 3 days    Generalized rash, fever, or joint pain starting 1 to 2 weeks after treatment  Call 911  Call 911 if any of these occur:    New or worse swelling in the face, eyelids, lips, mouth, throat, or tongue    Hard time swallowing or breathing    Dizziness, weakness, or fainting  [employer] last reviewed this educational content on 11/1/2019 2000-2021 [employer]. All rights reserved. This information is not intended as a substitute for professional medical care. Always follow your healthcare professional's instructions.

## 2021-07-08 NOTE — LETTER
My Asthma Action Plan    Name: Arnaud Bird   YOB: 1955  Date: 7/8/2021   My doctor: Sushma Galloway MD   My clinic: [facility]        My Rescue Medicine:   Albuterol inhaler (Proair/Ventolin/Proventil HFA)  2-4 puffs EVERY 4 HOURS as needed. Use a spacer if recommended by your provider.   My Asthma Severity:   Intermittent / Exercise Induced  Know your asthma triggers: Patient is unaware of triggers  pollens  mold          GREEN ZONE   Good Control    I feel good    No cough or wheeze    Can work, sleep and play without asthma symptoms       Take your asthma control medicine every day.     1. If exercise triggers your asthma, take your rescue medication    15 minutes before exercise or sports, and    During exercise if you have asthma symptoms  2. Spacer to use with inhaler: If you have a spacer, make sure to use it with your inhaler             YELLOW ZONE Getting Worse  I have ANY of these:    I do not feel good    Cough or wheeze    Chest feels tight    Wake up at night   1. Keep taking your Green Zone medications  2. Start taking your rescue medicine:    every 20 minutes for up to 1 hour. Then every 4 hours for 24-48 hours.  3. If you stay in the Yellow Zone for more than 12-24 hours, contact your doctor.  4. If you do not return to the Green Zone in 12-24 hours or you get worse, start taking your oral steroid medicine if prescribed by your provider.           RED ZONE Medical Alert - Get Help  I have ANY of these:    I feel awful    Medicine is not helping    Breathing getting harder    Trouble walking or talking    Nose opens wide to breathe       1. Take your rescue medicine NOW  2. If your provider has prescribed an oral steroid medicine, start taking it NOW  3. Call your doctor NOW  4. If you are still in the Red Zone after 20 minutes and you have not reached your doctor:    Take your rescue medicine again and    Call 911 or go to the emergency room right  away    See your regular doctor within 2 weeks of an Emergency Room or Urgent Care visit for follow-up treatment.          Annual Reminders:  Meet with Asthma Educator,  Flu Shot in the Fall, consider Pneumonia Vaccination for patients with asthma (aged 19 and older).    Pharmacy:    [id number] [place], [place] - [address]  [id number] [place], [place] - [address] [facility] [address] [place] [facility] [address]    Electronically signed by Sushma Galloway MD   Date: 07/08/21                    Asthma Triggers  How To Control Things That Make Your Asthma Worse    Triggers are things that make your asthma worse.  Look at the list below to help you find your triggers and   what you can do about them. You can help prevent asthma flare-ups by staying away from your triggers.      Trigger                                                          What you can do   Cigarette Smoke  Tobacco smoke can make asthma worse. Do not allow smoking in your home, car or around you.  Be sure no one smokes at a child s day care or school.  If you smoke, ask your health care provider for ways to help you quit.  Ask family members to quit too.  Ask your health care provider for a referral to Quit Plan to help you quit smoking, or call 5-577-401-PLAN.     Colds, Flu, Bronchitis  These are common triggers of asthma. Wash your hands often.  Don t touch your eyes, nose or mouth.  Get a flu shot every year.     Dust Mites  These are tiny bugs that live in cloth or carpet. They are too small to see. Wash sheets and blankets in hot water every week.   Encase pillows and mattress in dust mite proof covers.  Avoid having carpet if you can. If you have carpet, vacuum weekly.   Use a dust mask and HEPA vacuum.   Pollen and Outdoor Mold  Some people are allergic to trees, grass, or weed pollen, or molds. Try to keep your windows closed.  Limit time out doors when pollen count is high.   Ask you health  care provider about taking medicine during allergy season.     Animal Dander  Some people are allergic to skin flakes, urine or saliva from pets with fur or feathers. Keep pets with fur or feathers out of your home.    If you can t keep the pet outdoors, then keep the pet out of your bedroom.  Keep the bedroom door closed.  Keep pets off cloth furniture and away from stuffed toys.     Mice, Rats, and Cockroaches  Some people are allergic to the waste from these pests.   Cover food and garbage.  Clean up spills and food crumbs.  Store grease in the refrigerator.   Keep food out of the bedroom.   Indoor Mold  This can be a trigger if your home has high moisture. Fix leaking faucets, pipes, or other sources of water.   Clean moldy surfaces.  Dehumidify basement if it is damp and smelly.   Smoke, Strong Odors, and Sprays  These can reduce air quality. Stay away from strong odors and sprays, such as perfume, powder, hair spray, paints, smoke incense, paint, cleaning products, candles and new carpet.   Exercise or Sports  Some people with asthma have this trigger. Be active!  Ask your doctor about taking medicine before sports or exercise to prevent symptoms.    Warm up for 5-10 minutes before and after sports or exercise.     Other Triggers of Asthma  Cold air:  Cover your nose and mouth with a scarf.  Sometimes laughing or crying can be a trigger.  Some medicines and food can trigger asthma.

## 2021-07-09 ASSESSMENT — ASTHMA QUESTIONNAIRES: ACT_TOTALSCORE: 23

## 2021-07-22 ENCOUNTER — TELEPHONE (OUTPATIENT)
Dept: ORTHOPEDICS | Facility: CLINIC | Age: 66
End: 2021-07-22

## 2021-07-22 NOTE — TELEPHONE ENCOUNTER
[facility]    Phone Message    May a detailed message be left on voicemail: yes     Reason for Call Patient called asking for Injection for Right Hand. He wants someone from Dr Watkins Team to contact Him  Asap.   Action Taken: Message routed to:  [facility]): stephanie    Travel Screening: Not Applicable

## 2021-07-23 NOTE — TELEPHONE ENCOUNTER
Left voice mail for patient, requested he call RN to discuss scheduling, Dr Watkins first available appointment in October 2021. Susy Rey RN

## 2021-07-28 ENCOUNTER — OFFICE VISIT (OUTPATIENT)
Dept: ORTHOPEDICS | Facility: CLINIC | Age: 66
End: 2021-07-28
Payer: COMMERCIAL

## 2021-07-28 DIAGNOSIS — Z96.611 H/O TOTAL SHOULDER REPLACEMENT, RIGHT: Primary | ICD-10-CM

## 2021-07-28 PROCEDURE — 99024 POSTOP FOLLOW-UP VISIT: CPT | Performed by: ORTHOPAEDIC SURGERY

## 2021-07-28 NOTE — NURSING NOTE
Reason For Visit:   Chief Complaint   Patient presents with     Surgical Followup     6 week pop DOS 6/15/21 Right Total Shoulder Arthroplasty (Right) Distal Clavicle Excision        PCP: Johnson Clements  Ref: Dr. Chan     ?  No  Occupation Retired .  Currently working? No.  Work status?  Retired.  Date of injury: November 2019  Type of injury: Up in the woods and cutting trees and felt the biceps tear.  Fell in ice in January 2020.  Date of surgery: 6/15/21  Type of surgery:  1. Right total shoulder arthroplasty.   2. Right distal clavicle excision  Smoker: No  Request smoking cessation information: No     Right hand dominant    SANE score  Affected shoulder: Right  Right shoulder SANE: 75  Left shoulder SANE: 50    There were no vitals taken for this visit.      Pain Assessment  Patient Currently in Pain: Cindy Linares, ATC

## 2021-07-28 NOTE — LETTER
7/28/2021         RE: Arnaud Bird  [address]  [place] [address]        Dear Colleague,    Thank you for referring your patient, Arnaud Bird, to the [facility] [place]. Please see a copy of my visit note below.    CHIEF CONCERN: Status post right total shoulder arthroplasty and distal clavicle excision  DATE OF SURGERY: 6/15/21    HISTORY OF PRESENT ILLNESS: Arnaud is an extremely pleasant 66 year-old male who is 6 weeks status post the above procedure. His shoulder is getting progressively more functional. He would like to be out of the sling and has done so already.     EXAM:  Pleasant adult male in NAD  Respirations even and unlabored.  Right upper extremity: dressing removed and incision is clean, dry, and intact. Distally neurovascularly intact without deficits. Shoulder range of motion is actve FE to 165 and ER at side to 60, IR to back pocket.    IMAGING: none new    ASSESSMENT:  1. Six weeks status post above procedure    PLAN:    Range of Motion: Progress to active ROM of the shoulder and light strengthening per operative note. No IR stretching up the back until 3 months    Sling: Discontinue sling    Pain medication:  NSAIDs and Tylenol PRN    Follow up: in 6 weeks with new shoulder xrays      Again, thank you for allowing me to participate in the care of your patient.        Sincerely,      Yesica Deleon MD

## 2021-07-28 NOTE — PROGRESS NOTES
CHIEF CONCERN: Status post right total shoulder arthroplasty and distal clavicle excision  DATE OF SURGERY: 6/15/21    HISTORY OF PRESENT ILLNESS: Arnaud is an extremely pleasant 66 year-old male who is 6 weeks status post the above procedure. His shoulder is getting progressively more functional. He would like to be out of the sling and has done so already.     EXAM:  Pleasant adult male in NAD  Respirations even and unlabored.  Right upper extremity: dressing removed and incision is clean, dry, and intact. Distally neurovascularly intact without deficits. Shoulder range of motion is actve FE to 165 and ER at side to 60, IR to back pocket.    IMAGING: none new    ASSESSMENT:  1. Six weeks status post above procedure    PLAN:    Range of Motion: Progress to active ROM of the shoulder and light strengthening per operative note. No IR stretching up the back until 3 months    Sling: Discontinue sling    Pain medication:  NSAIDs and Tylenol PRN    Follow up: in 6 weeks with new shoulder xrays

## 2021-07-29 ENCOUNTER — TELEPHONE (OUTPATIENT)
Dept: FAMILY MEDICINE | Facility: CLINIC | Age: 66
End: 2021-07-29

## 2021-07-29 NOTE — TELEPHONE ENCOUNTER
Patient calling to request order for epidural injection be faxed to [facility]. Writer does not see epidural order in chart.     Please route to TC to fax epidural order.   Darlyn Gamez RN

## 2021-07-30 NOTE — TELEPHONE ENCOUNTER
"Please see my IR Order in \" Referral section \" dated 7/27/21 . Please fax this to the requested facility.     Thank you,  Sushma Galloway MD on 7/29/2021 at 8:26 PM  "

## 2021-08-02 ENCOUNTER — TRANSFERRED RECORDS (OUTPATIENT)
Dept: HEALTH INFORMATION MANAGEMENT | Facility: CLINIC | Age: 66
End: 2021-08-02

## 2021-08-02 NOTE — TELEPHONE ENCOUNTER
Patient did not return RN call but did make appointment with Dr Chan for a wrist injection 8-6-21. Susy Rey RN

## 2021-08-06 ENCOUNTER — OFFICE VISIT (OUTPATIENT)
Dept: ORTHOPEDICS | Facility: CLINIC | Age: 66
End: 2021-08-06
Payer: COMMERCIAL

## 2021-08-06 VITALS — WEIGHT: 240 LBS | BODY MASS INDEX: 36.37 KG/M2 | HEIGHT: 68 IN

## 2021-08-06 DIAGNOSIS — M25.531 RIGHT WRIST PAIN: Primary | ICD-10-CM

## 2021-08-06 DIAGNOSIS — G56.01 CARPAL TUNNEL SYNDROME ON RIGHT: ICD-10-CM

## 2021-08-06 PROCEDURE — 20526 THER INJECTION CARP TUNNEL: CPT | Mod: RT | Performed by: FAMILY MEDICINE

## 2021-08-06 PROCEDURE — 76942 ECHO GUIDE FOR BIOPSY: CPT | Performed by: FAMILY MEDICINE

## 2021-08-06 PROCEDURE — 99207 PR DROP WITH A PROCEDURE: CPT | Performed by: FAMILY MEDICINE

## 2021-08-06 RX ORDER — BETAMETHASONE SODIUM PHOSPHATE AND BETAMETHASONE ACETATE 3; 3 MG/ML; MG/ML
6 INJECTION, SUSPENSION INTRA-ARTICULAR; INTRALESIONAL; INTRAMUSCULAR; SOFT TISSUE
Status: DISCONTINUED | OUTPATIENT
Start: 2021-08-06 | End: 2024-06-12

## 2021-08-06 RX ORDER — LIDOCAINE HYDROCHLORIDE 10 MG/ML
1 INJECTION, SOLUTION EPIDURAL; INFILTRATION; INTRACAUDAL; PERINEURAL
Status: DISCONTINUED | OUTPATIENT
Start: 2021-08-06 | End: 2024-06-12

## 2021-08-06 RX ADMIN — LIDOCAINE HYDROCHLORIDE 1 ML: 10 INJECTION, SOLUTION EPIDURAL; INFILTRATION; INTRACAUDAL; PERINEURAL at 09:48

## 2021-08-06 RX ADMIN — BETAMETHASONE SODIUM PHOSPHATE AND BETAMETHASONE ACETATE 6 MG: 3; 3 INJECTION, SUSPENSION INTRA-ARTICULAR; INTRALESIONAL; INTRAMUSCULAR; SOFT TISSUE at 09:48

## 2021-08-06 ASSESSMENT — MIFFLIN-ST. JEOR: SCORE: 1833.13

## 2021-08-06 NOTE — LETTER
8/6/2021      RE: Arnaud Bird  [address]  [place] [address]       Subjective: Patient 66-year-old male who is very active during hunting season and likes to do home repair.  Also have a .  Had a recent shoulder surgery and range of motion is doing well.  Patient also SLAC wrist.  Known to have carpal tunnel and received an injection in the past by Dr. Watkins.      objective: Patient with limited range of motion of the right wrist and pain with ulnar and radial deviation  Patient with some swelling across the MCPs tenderness ulnar-sided and numbness into the fingers    Procedure: Risks and benefits explained and patient was consented.  Flexor carpi radialis found as well as the proximal and distal palmar creases, skin marker  Using the hockey-stick the median nerve was identified  Pictures taken and labeled.  Injection ulnar sided.  25g 1in  0.5 ml 1% lidocaine, 0.5 ml 3mg celestone.  Needle placement confirmed with Dr. Perez present.  Video of injection placement  Band aid  Some relief post-injection    Hand / Upper Extremity Injection/Arthrocentesis    Date/Time: 8/6/2021 9:48 AM  Performed by: Carmel Chan MD  Authorized by: Carmel Chan MD     Indications:  Pain  Needle Size:  25 G  Guidance: ultrasound    Approach:  Ulnar  Condition: carpal tunnel      Medications:  6 mg betamethasone acet & sod phos 6 (3-3) MG/ML; 1 mL lidocaine (PF) 1 %  Outcome:  Tolerated well, no immediate complications  Procedure discussed: discussed risks, benefits, and alternatives    Consent Given by:  Patient  Timeout: timeout called immediately prior to procedure    Prep: patient was prepped and draped in usual sterile fashion     Francis Hernandez ATC on 8/6/2021 at 9:52 AM        I agree with the following injection documentation.    MD Carmel Maher MD

## 2021-08-06 NOTE — PROGRESS NOTES
Subjective: Patient 66-year-old male who is very active during hunting season and likes to do home repair.  Also have a .  Had a recent shoulder surgery and range of motion is doing well.  Patient also SLAC wrist.  Known to have carpal tunnel and received an injection in the past by Dr. Watkins.      objective: Patient with limited range of motion of the right wrist and pain with ulnar and radial deviation  Patient with some swelling across the MCPs tenderness ulnar-sided and numbness into the fingers    Procedure: Risks and benefits explained and patient was consented.  Flexor carpi radialis found as well as the proximal and distal palmar creases, skin marker  Using the hockey-stick the median nerve was identified  Pictures taken and labeled.  Injection ulnar sided.  25g 1in  0.5 ml 1% lidocaine, 0.5 ml 3mg celestone.  Needle placement confirmed with Dr. Perez present.  Video of injection placement  Band aid  Some relief post-injection    Hand / Upper Extremity Injection/Arthrocentesis    Date/Time: 8/6/2021 9:48 AM  Performed by: Carmel Chan MD  Authorized by: Carmel Chan MD     Indications:  Pain  Needle Size:  25 G  Guidance: ultrasound    Approach:  Ulnar  Condition: carpal tunnel      Medications:  6 mg betamethasone acet & sod phos 6 (3-3) MG/ML; 1 mL lidocaine (PF) 1 %  Outcome:  Tolerated well, no immediate complications  Procedure discussed: discussed risks, benefits, and alternatives    Consent Given by:  Patient  Timeout: timeout called immediately prior to procedure    Prep: patient was prepped and draped in usual sterile fashion     Francis Hernandez ATC on 8/6/2021 at 9:52 AM        I agree with the following injection documentation.    ELROY Chan MD

## 2021-08-06 NOTE — NURSING NOTE
[address] [address]  Dept: [phone]  ______________________________________________________________________________    Patient: Arnaud Bird   : 1955   MRN: [id number]   2021    INVASIVE PROCEDURE SAFETY CHECKLIST    Date: 21    Procedure: right wrist carpal tunnel celestone injection  Patient Name: Arnaud Bird  MRN: [id number]  YOB: 1955    Action: Complete sections as appropriate. Any discrepancy results in a HARD COPY until resolved.     PRE PROCEDURE:  Patient ID verified with 2 identifiers (name and  or MRN): Yes  Procedure and site verified with patient/designee (when able): Yes  Accurate consent documentation in medical record: Yes  H&P (or appropriate assessment) documented in medical record: Yes  H&P must be up to 20 days prior to procedure and updates within 24 hours of procedure as applicable: NA  Relevant diagnostic and radiology test results appropriately labeled and displayed as applicable: Yes  Procedure site(s) marked with provider initials: NA    TIMEOUT:  Time-Out performed immediately prior to starting procedure, including verbal and active participation of all team members addressing the following:Yes  * Correct patient identify  * Confirmed that the correct side and site are marked  * An accurate procedure consent form  * Agreement on the procedure to be done  * Correct patient position  * Relevant images and results are properly labeled and appropriately displayed  * The need to administer antibiotics or fluids for irrigation purposes during the procedure as applicable   * Safety precautions based on patient history or medication use    DURING PROCEDURE: Verification of correct person, site, and procedures any time the responsibility for care of the patient is transferred to another member of the care team.       Prior to injection, verified patient identity using patient's  name and date of birth.  Due to injection administration, patient instructed to remain in clinic for 15 minutes  afterwards, and to report any adverse reaction to me immediately.    Tendon sheath injection was performed.     Drug Amount Wasted:  Yes: 4.5 mg/ml   Vial/Syringe: Multi dose vial  Expiration Date:  08/2022      Francis Hernandez [facility]  August 6, 2021

## 2021-08-19 ENCOUNTER — TELEPHONE (OUTPATIENT)
Dept: FAMILY MEDICINE | Facility: CLINIC | Age: 66
End: 2021-08-19

## 2021-08-19 NOTE — TELEPHONE ENCOUNTER
Patient asking if he should get COVID 19 vaccination.    Patient states that he had COVID 19 in April. He was told at that time to wait 90 days.     It has been 90 days and he is asking if it is OK for him to get vaccinated. Informed that patient that is the CDC recommendation at this time.  Darlyn Gamez RN

## 2021-08-23 ENCOUNTER — TELEPHONE (OUTPATIENT)
Dept: FAMILY MEDICINE | Facility: CLINIC | Age: 66
End: 2021-08-23

## 2021-08-23 ENCOUNTER — MYC MEDICAL ADVICE (OUTPATIENT)
Dept: FAMILY MEDICINE | Facility: CLINIC | Age: 66
End: 2021-08-23

## 2021-08-23 NOTE — TELEPHONE ENCOUNTER
Patient on RN/Triage schedule on 8/25/21 for CSA agreement.    Per ALFRED Navas, Patient Care Supervisor, patient will need to complete CSA with whichever clinic/provider is managing controlled substance medication.  If patient would like CSA at the [place] location, patient will need an appointment with a provider at [place] to discuss controlled medication management and complete a CSA.    Left message to call back and ask to speak with an available triage nurse or check [email or web address] messages.    [email or web address] message sent to patient.    MELISSA Ball, RN  [facility]

## 2021-08-23 NOTE — TELEPHONE ENCOUNTER
Patient called back and writer reviewed need to follow up with PCP clinic to complete/sign CSA.    Patient verbalized understanding and in agreement with plan.      ADELINE BallN, RN  [facility]

## 2021-09-03 DIAGNOSIS — Z96.611 H/O TOTAL SHOULDER REPLACEMENT, RIGHT: Primary | ICD-10-CM

## 2021-09-08 ENCOUNTER — ANCILLARY PROCEDURE (OUTPATIENT)
Dept: GENERAL RADIOLOGY | Facility: CLINIC | Age: 66
End: 2021-09-08
Attending: ORTHOPAEDIC SURGERY
Payer: COMMERCIAL

## 2021-09-08 ENCOUNTER — OFFICE VISIT (OUTPATIENT)
Dept: ORTHOPEDICS | Facility: CLINIC | Age: 66
End: 2021-09-08
Payer: COMMERCIAL

## 2021-09-08 DIAGNOSIS — Z96.611 H/O TOTAL SHOULDER REPLACEMENT, RIGHT: Primary | ICD-10-CM

## 2021-09-08 DIAGNOSIS — Z96.611 H/O TOTAL SHOULDER REPLACEMENT, RIGHT: ICD-10-CM

## 2021-09-08 PROCEDURE — 73030 X-RAY EXAM OF SHOULDER: CPT | Mod: RT | Performed by: RADIOLOGY

## 2021-09-08 PROCEDURE — 99024 POSTOP FOLLOW-UP VISIT: CPT | Performed by: ORTHOPAEDIC SURGERY

## 2021-09-08 NOTE — LETTER
9/8/2021         RE: Arnaud Bird  [address]  [place] [address]        Dear Colleague,    Thank you for referring your patient, Arnaud Bird, to the [facility] [place]. Please see a copy of my visit note below.    CHIEF COMPLAINT: Status post right total shoulder arthroplasty   DATE OF SURGERY: 6/15/21    HISTORY OF PRESENT ILLNESS: Arnaud Bird is an extremely pleasant 66 year-old male who is 3 months status post the above procedure. He is continuing to progress his activities and has started light exercises at home, has been lifting 2 lb dumbbells. Self-directed exercises, not doing formal physical therapy.  He is also seeking injection of his left shoulder. He has previously gotten ultrasound guided injections of the left shoulder to good effect, relief lasting several months to a year.    EXAM:  Pleasant adult male in no distress.  Respirations even and unlabored.  Right upper extremity:   Incisions clean, dry, and intact. No erythema. No drainage. Sens intact Ax/Musc/Med/Rad/Uln nerves. Motor intact EPL, FPL, and Intrinsics. Shoulder AROM is FE to 160, ER to 60, IR to buttock  Left upper extremity:  Skin intact. No tenderness to palpation of clavicle, AC joint, biceps groove. FE to 160, ER to 60, IR to L5. Job's test positive for pain. Pain with resisted internal rotation. External lag sign negative.  ASSESSMENT:  1. 3 months status post above procedure. Doing well.  2.  Left severe glenohumeral arthrosis and rotator cuff disease  PLAN:  We discussed the plan for rehabilitation.    We will see the patient back in 3-9 months depending on patient availability.    Activity:     Range of motion as tolerated    Strengthening as tolerated    Activities as tolerated with expectation of some limitations due to weakness. Plan to progress as symptoms allow.     Work: Activities reviewed and note provided if needed    Follow up: 3 months with no new  radiographs needed. OK to follow up at 1 year post-op if patient is unable to come to clinic      For his left shoulder, we will work to schedule the patient for a visit with Dr. Chan for a left ultrasound guided glenohumeral joint steroid injection.      Vidal Stanley MD  Orthopedic Surgery PGY1        Yesica Deleon MD

## 2021-09-08 NOTE — NURSING NOTE
Reason For Visit:   Chief Complaint   Patient presents with     Surgical Followup     3 month pop DOS 6/15/21 Right Total Shoulder Arthroplasty (Right) Distal Clavicle Excision      RECHECK     Left shoulder pain.  would like injection       PCP: Johnson Clements  Ref: Dr. Chan     ?  No  Occupation Retired .  Currently working? No.  Work status?  Retired.  Date of injury: November 2019  Type of injury: Up in the woods and cutting trees and felt the biceps tear.  Fell in ice in January 2020.  Date of surgery: 6/15/21  Type of surgery:  1. Right total shoulder arthroplasty.   2. Right distal clavicle excision  Smoker: No  Request smoking cessation information: No     Right hand dominant    SANE score  Affected shoulder: Right  Right shoulder SANE: 75  Left shoulder SANE: 50    There were no vitals taken for this visit.      Pain Assessment  Patient Currently in Pain: Yes (Right shoulder is no pain)  Primary Pain Location: Shoulder (Left)  Alleviating Factors: Other (comment) (Pain is better in the AM)  Aggravating Factors: Movement, Other (comment) (gets worse as the day goes on)    Marie Linares, ATC

## 2021-09-08 NOTE — PROGRESS NOTES
CHIEF COMPLAINT: Status post right total shoulder arthroplasty   DATE OF SURGERY: 6/15/21    HISTORY OF PRESENT ILLNESS: Arnaud Bird is an extremely pleasant 66 year-old male who is 3 months status post the above procedure. He is continuing to progress his activities and has started light exercises at home, has been lifting 2 lb dumbbells. Self-directed exercises, not doing formal physical therapy.  He is also seeking injection of his left shoulder. He has previously gotten ultrasound guided injections of the left shoulder to good effect, relief lasting several months to a year.    EXAM:  Pleasant adult male in no distress.  Respirations even and unlabored.  Right upper extremity:   Incisions clean, dry, and intact. No erythema. No drainage. Sens intact Ax/Musc/Med/Rad/Uln nerves. Motor intact EPL, FPL, and Intrinsics. Shoulder AROM is FE to 160, ER to 60, IR to buttock  Left upper extremity:  Skin intact. No tenderness to palpation of clavicle, AC joint, biceps groove. FE to 160, ER to 60, IR to L5. Job's test positive for pain. Pain with resisted internal rotation. External lag sign negative.    ASSESSMENT:  1. 3 months status post above procedure. Doing well.  2.  Left severe glenohumeral arthrosis and rotator cuff disease    PLAN:  We discussed the plan for rehabilitation.    We will see the patient back in 3-9 months depending on patient availability.    Activity:     Range of motion as tolerated    Strengthening as tolerated    Activities as tolerated with expectation of some limitations due to weakness. Plan to progress as symptoms allow.     Work: Activities reviewed and note provided if needed    Follow up: 3 months with no new radiographs needed. OK to follow up at 1 year post-op if patient is unable to come to clinic      For his left shoulder, we will work to schedule the patient for a visit with Dr. Chan for a left ultrasound guided glenohumeral joint steroid injection.      Vidal Stanley,  MD  Orthopedic Surgery PGY1

## 2021-09-09 ENCOUNTER — OFFICE VISIT (OUTPATIENT)
Dept: FAMILY MEDICINE | Facility: CLINIC | Age: 66
End: 2021-09-09
Payer: COMMERCIAL

## 2021-09-09 VITALS
RESPIRATION RATE: 10 BRPM | HEIGHT: 68 IN | OXYGEN SATURATION: 97 % | WEIGHT: 251 LBS | BODY MASS INDEX: 38.04 KG/M2 | DIASTOLIC BLOOD PRESSURE: 82 MMHG | TEMPERATURE: 97.9 F | SYSTOLIC BLOOD PRESSURE: 120 MMHG | HEART RATE: 69 BPM

## 2021-09-09 DIAGNOSIS — Z98.818 OTHER DENTAL PROCEDURE STATUS: ICD-10-CM

## 2021-09-09 DIAGNOSIS — M19.012 PRIMARY OSTEOARTHRITIS OF LEFT SHOULDER: ICD-10-CM

## 2021-09-09 DIAGNOSIS — Z23 HIGH PRIORITY FOR 2019-NCOV VACCINE: ICD-10-CM

## 2021-09-09 DIAGNOSIS — Z96.611 STATUS POST TOTAL SHOULDER ARTHROPLASTY, RIGHT: ICD-10-CM

## 2021-09-09 DIAGNOSIS — Z79.891 CHRONIC USE OF OPIATE DRUG FOR THERAPEUTIC PURPOSE: Primary | ICD-10-CM

## 2021-09-09 DIAGNOSIS — M51.16 LUMBAR DISC DISEASE WITH RADICULOPATHY: ICD-10-CM

## 2021-09-09 PROCEDURE — 99214 OFFICE O/P EST MOD 30 MIN: CPT | Performed by: INTERNAL MEDICINE

## 2021-09-09 RX ORDER — AMOXICILLIN 500 MG/1
CAPSULE ORAL
Qty: 4 CAPSULE | Refills: 0 | Status: SHIPPED | OUTPATIENT
Start: 2021-09-09 | End: 2021-10-18

## 2021-09-09 ASSESSMENT — PAIN SCALES - GENERAL: PAINLEVEL: NO PAIN (0)

## 2021-09-09 ASSESSMENT — MIFFLIN-ST. JEOR: SCORE: 1883.03

## 2021-09-09 NOTE — PATIENT INSTRUCTIONS
As discussed, we have done the Contract for your opiates today.     Sent in antibiotic for your dental procedure coming up.     Please follow up with Orthopedics for the left shoulder intra articular injection.     =================    Patient Education     Understanding Opioid Medicines for Pain Management  Opioids are medicines that can help ease pain. They are stronger than most over-the-counter pain relievers and must be prescribed by a healthcare provider. They can be used to treat both acute and chronic pain that ranges from moderate to severe. Opioids can be safe and effective when used correctly. But they do come with serious risks and side effects. For this reason, they should only be used if other medicines or treatments have not done enough to ease or manage pain.    What is pain?  Pain is your body s way of telling you something is wrong. It makes you pull your hand away from a flame or avoid walking on an injured leg. Pain starts in receptor cells found beneath the skin and in organs throughout the body. When you are sick or injured, these receptor cells send signals along nerve pathways to the spinal cord, which then sends the signals to the brain. The brain interprets the signals as pain. In response, it sends back signals to protect the body. The brain also releases its own natural painkillers called endorphins to help reduce pain. Once the source of the pain heals, the pain often goes away.    Types of pain  Pain can one be of 2 types: acute or chronic. Both types respond to treatment.     Acute pain typically lasts fewer than 3 months. It goes away when the cause is treated. Common causes of acute pain include injury or illness. Surgery can lead to short-term pain during healing. And women have acute pain during and after childbirth. In some cases, acute pain can lead to chronic pain over time.    Chronic pain often lasts longer than 3 months. This includes pain that comes and goes or that is  continuous. Chronic pain may be due to an ongoing health problem, such as arthritis. Or it may linger after an injury that has healed, such as a broken bone. Problems with the body s pain-control system may also lead to chronic pain. Sometimes, chronic pain can occur with no clear cause.   The pain cycle  Pain can affect all aspects of your life. For example, sleep, mood, activity, and energy level are all affected by pain. Being tired, depressed, or inactive makes the pain worse and harder to cope with. This leads to a cycle of pain.   How opioids work  Opioids work by attaching to special receptors found in the brain, spinal cord, and other organs. When opioids attach to these receptors, they can block or suppress how you feel pain. Opioids can also make you feel good or relaxed. They affect areas of the brain that produce feelings of pleasure.    Types of opioids  There are 2 types of opioids: short-acting/immediate-release (SA/IR) and long-acting/extended-release (LA/ER). Short-acting opioids work faster than long-acting opioids. But they give pain relief for only short periods. Long-acting opioids work slower than short-acting opioids. But they ease pain for longer periods. Many opioids come in both short- and long-acting formulas. They include:     Codeine with acetaminophen    Fentanyl    Hydrocodone (with or without acetaminophen)    Hydromorphone    Meperidine    Methadone    Morphine    Oxycodone (with or without acetaminophen)    Tramadol  If you are prescribed opioids, you will likely be started on a short-acting type at the lowest dose. The dose may then be adjusted as needed based on your response to the medicine and follow-up with your healthcare provider. If appropriate, you may start using a long-acting type opioid. In some cases, you may be prescribed both types of opioids to help manage different types of pain. Any changes will also depend on how you handle pain and side effects from the medicine.    Don't take opioids with benzodiazepines, such as alprazolam or lorazepam. Combining these medicines can have serious risks. These include extreme sleepiness, slowed breathing, and death. Tell your healthcare provider if you are taking benzodiazepines.   Studies show that opioids provide short-term help for moderate to severe pain. But the benefits of long-term use of opioids for treating pain remain unclear. You should only stay on opioids if they continue to improve pain and function without raising the risks to your health.   How opioids are given  Most opioids are taken by mouth. They often come in pill form. But some may come in the form of liquids and even sweetened lozenges. Certain opioids also may be injected under the skin, into a muscle, or into a vein. Or they may be absorbed through the skin via a patch.   Know your options  Keep in mind that opioids are not the only option for treating pain. Non-opioid options may work just as well. They may have fewer risks and side effects. Non-opioid options can include:      Other pain relievers, such as acetaminophen or nonsteroidal anti-inflammatory drugs (NSAIDs) such as ibuprofen or naproxen    Other classes of medicines such as anticonvulsants, antidepressants, and muscle relaxers     Exercise and physical therapy     Cognitive behavioral therapy, which can help you learn different ways to respond and cope with pain     Mind/body therapies such as deep breathing, distraction, visualization, meditation, or biofeedback     Complementary therapies such as massage, acupuncture and acupressure, or chiropractic care     Various procedures, such as transcutaneous electrical nerve stimulation (TENS), implantation of a spinal pump, and nerve ablation   Cliff last reviewed this educational content on 6/1/2020 2000-2021 [employer]. All rights reserved. This information is not intended as a substitute for professional medical care. Always follow your  healthcare professional's instructions.             Patient Education     Osteoarthritis Medicine    Pain from osteoarthritis can interfere with your life in many ways. It can make it hard to be active and take good care of yourself. Untreated pain may make sleep difficult. It may also add to depression and anxiety.  Controlling pain involves lifestyle changes like weight management and exercise. Natural and alternative treatments for pain relief include the use of heat and cold, massage, acupuncture, relaxation, and counseling. Other medicines are available to help relieve pain.  Over-the-counter medicines  Some arthritis medicines can be bought without a prescription:    Acetaminophen is effective for moderate pain and does not cause stomach upset. It doesn t relieve swelling, though. You must talk with your healthcare provider before taking it if you have liver or kidney problems.     Nonsteroidal anti-inflammatory drugs (NSAIDs), such as ibuprofen or naproxen, help relieve pain and swelling. Use of NSAIDs can cause stomach and kidney problems and raise blood pressure. But don't take NSAIDs if you take medicines that thin your blood, such as warfarin. Talk with your healthcare provider first if you have kidney or liver disease.   Prescription medicines  Some arthritis medicines need a prescription:    Prescription NSAIDs are stronger than over-the-counter NSAIDs. They reduce pain and swelling. Use of NSAIDs may cause serious stomach problems and easy bruising. In rare cases they may lead to kidney or liver problems. These include nonselective NSAIDs, such as naproxen, diclofenac, and indomethacin. Also, selective NSAIDs, such as meloxicam and celecoxib. Selective NSAIDs are thought to have less of a risk of gastrointestinal side effects than nonselective NSAIDs.     Other medicines, such as duloxetine, tramadol, and opioid pain relievers, may be prescribed for certain people based on specific clinical  factors.   Topical medicines  Topical medicines are those applied directly to the skin over the affected joint. They may be lotions, cream, sprays, ointments, or gels. They can be used along with some medicines:    NSAID creams may reduce swelling and relieve pain.    Capsaicin cream is made from an ingredient found in chili peppers. It works by stopping production of a substance that helps send pain signals to the brain. It may cause a burning or stinging feeling when you first use it.    Other topical medicines provide pain relief by numbing the area to which they are applied.  Intra-articular medicines  Some people benefit from joint injections with:    Corticosteroids used for most joints    Hyaluronic acid preparation used for knees   If one medicine doesn't work for you, another may help. If you have any questions or concerns about your current medicines or other medicines choices, talk with your healthcare provider.  Cliff last reviewed this educational content on 6/1/2018 2000-2021 [employer]. All rights reserved. This information is not intended as a substitute for professional medical care. Always follow your healthcare professional's instructions.

## 2021-09-09 NOTE — PROGRESS NOTES
Assessment and Plan  1. Chronic use of opiate drug for therapeutic purpose  2. Lumbar disc disease with radiculopathy  3. Primary osteoarthritis of left shoulder  4. Status post total shoulder arthroplasty, right  Stable, Continue current dose of Opiates as he feels the recent post surgical pains are much improved with the PT he has underwent. CSA done in the office today. Already had UDS done for this year in 7/2021.    5. Other dental procedure status  Ongoing problem, with recurrent dental procedures. Pt does have recent replacement surgeries in his joints. Will give prophylactic antibiotic.  - amoxicillin (AMOXIL) 500 MG capsule; Take 2 g orally one hour before the dental procedure  Dispense: 4 capsule; Refill: 0    6. High priority for 2019-nCoV vaccine  - COVID-19,PF,MODERNA  - COVID-19,PF,MODERNA; Future     Patient Instructions   As discussed, we have done the Contract for your opiates today.     Sent in antibiotic for your dental procedure coming up.     Please follow up with Orthopedics for the left shoulder intra articular injection.     =================    Patient Education     Understanding Opioid Medicines for Pain Management  Opioids are medicines that can help ease pain. They are stronger than most over-the-counter pain relievers and must be prescribed by a healthcare provider. They can be used to treat both acute and chronic pain that ranges from moderate to severe. Opioids can be safe and effective when used correctly. But they do come with serious risks and side effects. For this reason, they should only be used if other medicines or treatments have not done enough to ease or manage pain.    What is pain?  Pain is your body s way of telling you something is wrong. It makes you pull your hand away from a flame or avoid walking on an injured leg. Pain starts in receptor cells found beneath the skin and in organs throughout the body. When you are sick or injured, these receptor cells send signals  along nerve pathways to the spinal cord, which then sends the signals to the brain. The brain interprets the signals as pain. In response, it sends back signals to protect the body. The brain also releases its own natural painkillers called endorphins to help reduce pain. Once the source of the pain heals, the pain often goes away.    Types of pain  Pain can one be of 2 types: acute or chronic. Both types respond to treatment.     Acute pain typically lasts fewer than 3 months. It goes away when the cause is treated. Common causes of acute pain include injury or illness. Surgery can lead to short-term pain during healing. And women have acute pain during and after childbirth. In some cases, acute pain can lead to chronic pain over time.    Chronic pain often lasts longer than 3 months. This includes pain that comes and goes or that is continuous. Chronic pain may be due to an ongoing health problem, such as arthritis. Or it may linger after an injury that has healed, such as a broken bone. Problems with the body s pain-control system may also lead to chronic pain. Sometimes, chronic pain can occur with no clear cause.   The pain cycle  Pain can affect all aspects of your life. For example, sleep, mood, activity, and energy level are all affected by pain. Being tired, depressed, or inactive makes the pain worse and harder to cope with. This leads to a cycle of pain.   How opioids work  Opioids work by attaching to special receptors found in the brain, spinal cord, and other organs. When opioids attach to these receptors, they can block or suppress how you feel pain. Opioids can also make you feel good or relaxed. They affect areas of the brain that produce feelings of pleasure.    Types of opioids  There are 2 types of opioids: short-acting/immediate-release (SA/IR) and long-acting/extended-release (LA/ER). Short-acting opioids work faster than long-acting opioids. But they give pain relief for only short periods.  Long-acting opioids work slower than short-acting opioids. But they ease pain for longer periods. Many opioids come in both short- and long-acting formulas. They include:     Codeine with acetaminophen    Fentanyl    Hydrocodone (with or without acetaminophen)    Hydromorphone    Meperidine    Methadone    Morphine    Oxycodone (with or without acetaminophen)    Tramadol  If you are prescribed opioids, you will likely be started on a short-acting type at the lowest dose. The dose may then be adjusted as needed based on your response to the medicine and follow-up with your healthcare provider. If appropriate, you may start using a long-acting type opioid. In some cases, you may be prescribed both types of opioids to help manage different types of pain. Any changes will also depend on how you handle pain and side effects from the medicine.   Don't take opioids with benzodiazepines, such as alprazolam or lorazepam. Combining these medicines can have serious risks. These include extreme sleepiness, slowed breathing, and death. Tell your healthcare provider if you are taking benzodiazepines.   Studies show that opioids provide short-term help for moderate to severe pain. But the benefits of long-term use of opioids for treating pain remain unclear. You should only stay on opioids if they continue to improve pain and function without raising the risks to your health.   How opioids are given  Most opioids are taken by mouth. They often come in pill form. But some may come in the form of liquids and even sweetened lozenges. Certain opioids also may be injected under the skin, into a muscle, or into a vein. Or they may be absorbed through the skin via a patch.   Know your options  Keep in mind that opioids are not the only option for treating pain. Non-opioid options may work just as well. They may have fewer risks and side effects. Non-opioid options can include:      Other pain relievers, such as acetaminophen or  nonsteroidal anti-inflammatory drugs (NSAIDs) such as ibuprofen or naproxen    Other classes of medicines such as anticonvulsants, antidepressants, and muscle relaxers     Exercise and physical therapy     Cognitive behavioral therapy, which can help you learn different ways to respond and cope with pain     Mind/body therapies such as deep breathing, distraction, visualization, meditation, or biofeedback     Complementary therapies such as massage, acupuncture and acupressure, or chiropractic care     Various procedures, such as transcutaneous electrical nerve stimulation (TENS), implantation of a spinal pump, and nerve ablation   [employer] last reviewed this educational content on 6/1/2020 2000-2021 [employer]. All rights reserved. This information is not intended as a substitute for professional medical care. Always follow your healthcare professional's instructions.             Patient Education     Osteoarthritis Medicine    Pain from osteoarthritis can interfere with your life in many ways. It can make it hard to be active and take good care of yourself. Untreated pain may make sleep difficult. It may also add to depression and anxiety.  Controlling pain involves lifestyle changes like weight management and exercise. Natural and alternative treatments for pain relief include the use of heat and cold, massage, acupuncture, relaxation, and counseling. Other medicines are available to help relieve pain.  Over-the-counter medicines  Some arthritis medicines can be bought without a prescription:    Acetaminophen is effective for moderate pain and does not cause stomach upset. It doesn t relieve swelling, though. You must talk with your healthcare provider before taking it if you have liver or kidney problems.     Nonsteroidal anti-inflammatory drugs (NSAIDs), such as ibuprofen or naproxen, help relieve pain and swelling. Use of NSAIDs can cause stomach and kidney problems and raise blood pressure.  But don't take NSAIDs if you take medicines that thin your blood, such as warfarin. Talk with your healthcare provider first if you have kidney or liver disease.   Prescription medicines  Some arthritis medicines need a prescription:    Prescription NSAIDs are stronger than over-the-counter NSAIDs. They reduce pain and swelling. Use of NSAIDs may cause serious stomach problems and easy bruising. In rare cases they may lead to kidney or liver problems. These include nonselective NSAIDs, such as naproxen, diclofenac, and indomethacin. Also, selective NSAIDs, such as meloxicam and celecoxib. Selective NSAIDs are thought to have less of a risk of gastrointestinal side effects than nonselective NSAIDs.     Other medicines, such as duloxetine, tramadol, and opioid pain relievers, may be prescribed for certain people based on specific clinical factors.   Topical medicines  Topical medicines are those applied directly to the skin over the affected joint. They may be lotions, cream, sprays, ointments, or gels. They can be used along with some medicines:    NSAID creams may reduce swelling and relieve pain.    Capsaicin cream is made from an ingredient found in chili peppers. It works by stopping production of a substance that helps send pain signals to the brain. It may cause a burning or stinging feeling when you first use it.    Other topical medicines provide pain relief by numbing the area to which they are applied.  Intra-articular medicines  Some people benefit from joint injections with:    Corticosteroids used for most joints    Hyaluronic acid preparation used for knees   If one medicine doesn't work for you, another may help. If you have any questions or concerns about your current medicines or other medicines choices, talk with your healthcare provider.  [email or web address] last reviewed this educational content on 6/1/2018 2000-2021 [employer]. All rights reserved. This information is not intended as a  substitute for professional medical care. Always follow your healthcare professional's instructions.             Return in about 3 months (around 2021), or if symptoms worsen or fail to improve, for Annual Wellness Exam.    Sushma Galloway MD  [facility] Lares is a 66 year old who presents for the following health issues     HPI     Chronic Opiates,. CSA . He is here to sign CSA and have some concerns on the joint procedures coming up.        Allergies   Allergen Reactions     No Clinical Screening - See Comments      Seasonal Allergies         Past Medical History:   Diagnosis Date     Acute respiratory failure with hypoxia (H) 2021     Arthritis      Benign positional vertigo      Carpal tunnel syndrome     mild     Chronic pain syndrome 2021     Hearing problem      LEFT WORSE THAN RIGHT  10/18/2012     Migraines      Nasal polyps      Obesity 2021     Osteoarthritis of multiple joints 2021     Pneumonia due to 2019 novel coronavirus 2021     Unspecified asthma(493.90) 10/17/2012     Unspecified asthma, with exacerbation 10/17/2012       Past Surgical History:   Procedure Laterality Date     ARTHROPLASTY SHOULDER Right 6/15/2021    Procedure: Right Total Shoulder Arthroplasty, Distal Clavicle Excision;  Surgeon: Yesica Deleon MD;  Location: [facility] OR     COLONOSCOPY       HC TOOTH EXTRACTION W/FORCEP       ORTHOPEDIC SURGERY      bilateral total knee replacements     TONSILLECTOMY      age 2 or 3       Family History   Problem Relation Age of Onset     Cancer Father      Family History Negative Father      Family History Negative Mother      Cancer Mother      Stomach Problem Mother      Breast Cancer Mother      Diabetes No family hx of      Coronary Artery Disease No family hx of      Hypertension No family hx of      Hyperlipidemia No family hx of      Cerebrovascular Disease No family hx of      Breast Cancer No  family hx of      Colon Cancer No family hx of      Prostate Cancer No family hx of      Other Cancer No family hx of      Depression No family hx of      Anxiety Disorder No family hx of      Mental Illness No family hx of      Substance Abuse No family hx of      Anesthesia Reaction No family hx of      Asthma No family hx of      Osteoporosis No family hx of      Genetic Disorder No family hx of      Thyroid Disease No family hx of      Obesity No family hx of      Unknown/Adopted No family hx of        Social History     Tobacco Use     Smoking status: Never Smoker     Smokeless tobacco: Never Used   Substance Use Topics     Alcohol use: Yes     Comment: case beer a week        Current Outpatient Medications   Medication     acetaminophen (TYLENOL) 325 MG tablet     acyclovir (ZOVIRAX) 400 MG tablet     albuterol (PROAIR HFA, PROVENTIL HFA, VENTOLIN HFA) 108 (90 BASE) MCG/ACT inhaler     allopurinol (ZYLOPRIM) 300 MG tablet     amoxicillin (AMOXIL) 500 MG capsule     diclofenac (VOLTAREN) 1 % topical gel     doxycycline hyclate (VIBRA-TABS) 100 MG tablet     fluticasone (FLONASE) 50 MCG/ACT nasal spray     HYDROcodone-acetaminophen (NORCO) 7.5-325 MG per tablet     ibuprofen (ADVIL/MOTRIN) 600 MG tablet     ipratropium (ATROVENT) 0.06 % nasal spray     lidocaine 5 % EX patch     multivitamin w/minerals (MULTI-VITAMIN) tablet     mupirocin (BACTROBAN) 2 % external ointment     naloxone (NARCAN) 4 MG/0.1ML nasal spray     order for DME     oxyCODONE (ROXICODONE) 5 MG tablet     polyethylene glycol (MIRALAX) 17 g packet     pseudoePHEDrine (SUDAFED) 30 MG tablet     senna-docusate (SENOKOT-S/PERICOLACE) 8.6-50 MG tablet     sildenafil (VIAGRA) 100 MG tablet     Current Facility-Administered Medications   Medication     betamethasone acet & sod phos (CELESTONE) injection 6 mg     lidocaine (PF) (XYLOCAINE) 1 % injection 1 mL        Review of Systems   Constitutional, HEENT, cardiovascular, pulmonary, GI, ,  "musculoskeletal, neuro, skin, endocrine and psych systems are negative, except as otherwise noted.      Objective    /82   Pulse 69   Temp 97.9  F (36.6  C) (Tympanic)   Resp 10   Ht 1.727 m (5' 8\")   Wt 113.9 kg (251 lb)   SpO2 97%   BMI 38.16 kg/m    Body mass index is 38.16 kg/m .  Physical Exam   GENERAL: healthy, alert and no distress  NECK: no adenopathy, no asymmetry, masses, or scars and thyroid normal to palpation  RESP: lungs clear to auscultation - no rales, rhonchi or wheezes  CV: regular rate and rhythm, normal S1 S2, no S3 or S4, no murmur, click or rub, no peripheral edema and peripheral pulses strong  ABDOMEN: soft, nontender, no hepatosplenomegaly, no masses and bowel sounds normal  MS: no gross musculoskeletal defects noted, no edema. Much improved Rt shoulder Arthroplasty ROM. Does have mild restriction of ROM on the left shoulder.       "

## 2021-09-09 NOTE — LETTER
Opioid / Opioid Plus Controlled Substance Agreement    This is an agreement between you and your provider about the safe and appropriate use of controlled substance/opioids prescribed by your care team. Controlled substances are medicines that can cause physical and mental dependence (abuse).    There are strict laws about having and using these medicines. We here at [facility] are committing to working with you in your efforts to get better. To support you in this work, we ll help you schedule regular office appointments for medicine refills. If we must cancel or change your appointment for any reason, we ll make sure you have enough medicine to last until your next appointment.     As a Provider, I will:    Listen carefully to your concerns and treat you with respect.     Recommend a treatment plan that I believe is in your best interest. This plan may involve therapies other than opioid pain medication.     Talk with you often about the possible benefits, and the risk of harm of any medicine that we prescribe for you.     Provide a plan on how to taper (discontinue or go off) using this medicine if the decision is made to stop its use.    As a Patient, I understand that opioid(s):     Are a controlled substance prescribed by my care team to help me function or work and manage my condition(s).     Are strong medicines and can cause serious side effects such as:    Drowsiness, which can seriously affect my driving ability    A lower breathing rate, enough to cause death    Harm to my thinking ability     Depression     Abuse of and addiction to this medicine    Need to be taken exactly as prescribed. Combining opioids with certain medicines or chemicals (such as illegal drugs, sedatives, sleeping pills, and benzodiazepines) can be dangerous or even fatal. If I stop opioids suddenly, I may have severe withdrawal symptoms.    Do not work for all types of pain nor for all patients. If they re not helpful, I may  be asked to stop them.      The risks, benefits and side effects of these medicine(s) were explained to me. I agree that:  1. I will take part in other treatments as advised by my care team. This may be psychiatry or counseling, physical therapy, behavioral therapy, group treatment or a referral to a specialist.     2. I will keep all my appointments. I understand that this is part of the monitoring of opioids. My care team may require an office visit for EVERY opioid/controlled substance refill. If I miss appointments or don t follow instructions, my care team may stop my medicine.    3. I will take my medicines as prescribed. I will not change the dose or schedule unless my care team tells me to. There will be no refills if I run out early.     4. I may be asked to come to the clinic and complete a urine drug test or complete a pill count at any time. If I don t give a urine sample or participate in a pill count, the care team may stop my medicine.    5. I will only receive prescriptions from this clinic for chronic pain. If I am treated by another provider for acute pain issues, I will tell them that I am taking opioid pain medication for chronic pain and that I have a treatment agreement with this provider. I will inform my [facility] care team within one business day if I am given a prescription for any pain medication by another healthcare provider. My [facility] care team can contact other providers and pharmacists about my use of any medicines.    6. It is up to me to make sure that I don t run out of my medicines on weekends or holidays. If my care team is willing to refill my opioid prescription without a visit, I must request refills only during office hours. Refills may take up to 3 business days to process. I will use one pharmacy to fill all my opioid and other controlled substance prescriptions. I will notify the clinic about any changes to my insurance or medication  availability.    7. I am responsible for my prescriptions. If the medicine/prescription is lost, stolen or destroyed, it will not be replaced. I also agree not to share controlled substance medicines with anyone.    8. I am aware I should not use any illegal or recreational drugs. I agree not to drink alcohol unless my care team says I can.       9. If I enroll in the [place] Medical Cannabis program, I will tell my care team prior to my next refill.     10. I will tell my care team right away if I become pregnant, have a new medical problem treated outside of my regular clinic, or have a change in my medications.    11. I understand that this medicine can affect my thinking, judgment and reaction time. Alcohol and drugs affect the brain and body, which can affect the safety of my driving. Being under the influence of alcohol or drugs can affect my decision-making, behaviors, personal safety, and the safety of others. Driving while impaired (DWI) can occur if a person is driving, operating, or in physical control of a car, motorcycle, boat, snowmobile, ATV, motorbike, off-road vehicle, or any other motor vehicle ([place] Statute 169A.20). I understand the risk if I choose to drive or operate any vehicle or machinery.    I understand that if I do not follow any of the conditions above, my prescriptions or treatment may be stopped or changed.          Opioids  What You Need to Know    What are opioids?   Opioids are pain medicines that must be prescribed by a doctor. They are also known as narcotics.     Examples are:   1. morphine (MS Contin, Caryn)  2. oxycodone (Oxycontin)  3. oxycodone and acetaminophen (Percocet)  4. hydrocodone and acetaminophen (Vicodin, Norco)   5. fentanyl patch (Duragesic)   6. hydromorphone (Dilaudid)   7. methadone  8. codeine (Tylenol #3)     What do opioids do well?   Opioids are best for severe short-term pain such as after a surgery or injury. They may work well for cancer pain. They may  help some people with long-lasting (chronic) pain.     What do opioids NOT do well?   Opioids never get rid of pain entirely, and they don t work well for most patients with chronic pain. Opioids don t reduce swelling, one of the causes of pain.                                    Other ways to manage chronic pain and improve function include:       Treat the health problem that may be causing pain    Anti-inflammation medicines, which reduce swelling and tenderness, such as ibuprofen (Advil, Motrin) or naproxen (Aleve)    Acetaminophen (Tylenol)    Antidepressants and anti-seizure medicines, especially for nerve pain    Topical treatments such as patches or creams    Injections or nerve blocks    Chiropractic or osteopathic treatment    Acupuncture, massage, deep breathing, meditation, visual imagery, aromatherapy    Use heat or ice at the pain site    Physical therapy     Exercise    Stop smoking    Take part in therapy       Risks and side effects     Talk to your doctor before you start or decide to keep taking opioids. Possible side effects include:      Lowering your breathing rate enough to cause death    Overdose, including death, especially if taking higher than prescribed doses    Worse depression symptoms; less pleasure in things you usually enjoy    Feeling tired or sluggish    Slower thoughts or cloudy thinking    Being more sensitive to pain over time; pain is harder to control    Trouble sleeping or restless sleep    Changes in hormone levels (for example, less testosterone)    Changes in sex drive or ability to have sex    Constipation    Unsafe driving    Itching and sweating    Dizziness    Nausea, throwing up and dry mouth    What else should I know about opioids?    Opioids may lead to dependence, tolerance, or addiction.      Dependence means that if you stop or reduce the medicine too quickly, you will have withdrawal symptoms. These include loose poop (diarrhea), jitters, flu-like symptoms,  nervousness and tremors. Dependence is not the same as addiction.                       Tolerance means needing higher doses over time to get the same effect. This may increase the chance of serious side effects.      Addiction is when people improperly use a substance that harms their body, their mind or their relations with others. Use of opiates can cause a relapse of addiction if you have a history of drug or alcohol abuse.      People who have used opioids for a long time may have a lower quality of life, worse depression, higher levels of pain and more visits to doctors.    You can overdose on opioids. Take these steps to lower your risk of overdose:    1. Recognize the signs:  Signs of overdose include decrease or loss of consciousness (blackout), slowed breathing, trouble waking up and blue lips. If someone is worried about overdose, they should call 911.    2. Talk to your doctor about Narcan (naloxone).   If you are at risk for overdose, you may be given a prescription for Narcan. This medicine very quickly reverses the effects of opioids.   If you overdose, a friend or family member can give you Narcan while waiting for the ambulance. They need to know the signs of overdose and how to give Narcan.     3. Don't use alcohol or street drugs.   Taking them with opioids can cause death.    4. Do not take any of these medicines unless your doctor says it s OK. Taking these with opioids can cause death:    Benzodiazepines, such as lorazepam (Ativan), alprazolam (Xanax) or diazepam (Valium)    Muscle relaxers, such as cyclobenzaprine (Flexeril)    Sleeping pills like zolpidem (Ambien)     Other opioids      How to keep you and other people safe while taking opioids:    1. Never share your opioids with others.  Opioid medicines are regulated by the Drug Enforcement Agency ([employer]IC[employer]). Selling or sharing medications is a criminal act.    2. Be sure to store opioids in a secure place, locked up if possible. Young children  can easily swallow them and overdose.    3. When you are traveling with your medicines, keep them in the original bottles. If you use a pill box, be sure you also carry a copy of your medicine list from your clinic or pharmacy.    4. Safe disposal of opioids    Most pharmacies have places to get rid of medicine, called disposal kiosks. Medicine disposal options are also available in every [facility]. Search your county and  medication disposal  to find more options. You can find more details at:  [email or web address] by [facility].[email or web address]-[email or web address]-medications     I agree that my provider, clinic care team, and pharmacy may work with any city, state or federal law enforcement agency that investigates the misuse, sale, or other diversion of my controlled medicine. I will allow my provider to discuss my care with, or share a copy of, this agreement with any other treating provider, pharmacy or emergency room where I receive care.    I have read this agreement and have asked questions about anything I did not understand.    _______________________________________________________  Patient Signature - Arnaud Bird _____________________                   Date     _______________________________________________________  Provider Signature - Sushma Galloway MD   _____________________                   Date     _______________________________________________________  Witness Signature (required if provider not present while patient signing)   _____________________                   Date

## 2021-09-15 ENCOUNTER — MYC MEDICAL ADVICE (OUTPATIENT)
Dept: FAMILY MEDICINE | Facility: CLINIC | Age: 66
End: 2021-09-15

## 2021-09-15 DIAGNOSIS — G89.4 CHRONIC PAIN SYNDROME: Chronic | ICD-10-CM

## 2021-09-15 DIAGNOSIS — Z96.652 STATUS POST TOTAL LEFT KNEE REPLACEMENT: ICD-10-CM

## 2021-09-15 DIAGNOSIS — M47.16 SPONDYLOSIS WITH MYELOPATHY, LUMBAR REGION: ICD-10-CM

## 2021-09-15 DIAGNOSIS — R26.9 GAIT DIFFICULTY: ICD-10-CM

## 2021-09-15 DIAGNOSIS — M25.661 STIFFNESS OF KNEE JOINT, RIGHT: Chronic | ICD-10-CM

## 2021-09-15 RX ORDER — HYDROCODONE BITARTRATE AND ACETAMINOPHEN 7.5; 325 MG/1; MG/1
1 TABLET ORAL EVERY 6 HOURS PRN
Qty: 120 TABLET | Refills: 0 | Status: CANCELLED | OUTPATIENT
Start: 2021-09-15

## 2021-09-15 NOTE — TELEPHONE ENCOUNTER
Reason for call:  Medication   If this is a refill request, has the caller requested the refill from the pharmacy already? No  Will the patient be using a [facility]? No  Name of the pharmacy and phone number for the current request: [facility]   Address: [address] [place] [address]  Phone: [phone]    Name of the medication requested: Hydrocodone-acetaminophen (NORCO) 7.5-325 MG per tablet     Other request: Patient has enough tablets to leave to get through the weekend and is leaving for [place] on Monday morning and requesting a refill. Patient stated refill is due on 9/19/2021.    Please call patient with any questions.    Phone number to reach patient:  Home number on file [phone] (home)    Best Time:  Any time     Can we leave a detailed message on this number?  YES

## 2021-09-17 ENCOUNTER — ANCILLARY PROCEDURE (OUTPATIENT)
Dept: GENERAL RADIOLOGY | Facility: CLINIC | Age: 66
End: 2021-09-17
Attending: FAMILY MEDICINE
Payer: COMMERCIAL

## 2021-09-17 ENCOUNTER — OFFICE VISIT (OUTPATIENT)
Dept: ORTHOPEDICS | Facility: CLINIC | Age: 66
End: 2021-09-17
Payer: COMMERCIAL

## 2021-09-17 DIAGNOSIS — M19.072 LOCALIZED OSTEOARTHRITIS OF LEFT ANKLE: ICD-10-CM

## 2021-09-17 DIAGNOSIS — M19.012 LOCALIZED OSTEOARTHRITIS OF LEFT SHOULDER: Primary | ICD-10-CM

## 2021-09-17 DIAGNOSIS — G89.29 CHRONIC PAIN OF LEFT ANKLE: ICD-10-CM

## 2021-09-17 DIAGNOSIS — M25.572 CHRONIC PAIN OF LEFT ANKLE: ICD-10-CM

## 2021-09-17 PROCEDURE — 73630 X-RAY EXAM OF FOOT: CPT | Mod: LT | Performed by: RADIOLOGY

## 2021-09-17 PROCEDURE — 73610 X-RAY EXAM OF ANKLE: CPT | Mod: LT | Performed by: RADIOLOGY

## 2021-09-17 PROCEDURE — 99212 OFFICE O/P EST SF 10 MIN: CPT | Mod: 25 | Performed by: FAMILY MEDICINE

## 2021-09-17 PROCEDURE — 20611 DRAIN/INJ JOINT/BURSA W/US: CPT | Mod: LT | Performed by: FAMILY MEDICINE

## 2021-09-17 RX ORDER — TRIAMCINOLONE ACETONIDE 40 MG/ML
40 INJECTION, SUSPENSION INTRA-ARTICULAR; INTRAMUSCULAR
Status: DISCONTINUED | OUTPATIENT
Start: 2021-09-17 | End: 2024-06-12

## 2021-09-17 RX ORDER — LIDOCAINE HYDROCHLORIDE 10 MG/ML
8 INJECTION, SOLUTION EPIDURAL; INFILTRATION; INTRACAUDAL; PERINEURAL
Status: DISCONTINUED | OUTPATIENT
Start: 2021-09-17 | End: 2024-06-12

## 2021-09-17 RX ADMIN — LIDOCAINE HYDROCHLORIDE 8 ML: 10 INJECTION, SOLUTION EPIDURAL; INFILTRATION; INTRACAUDAL; PERINEURAL at 11:06

## 2021-09-17 RX ADMIN — TRIAMCINOLONE ACETONIDE 40 MG: 40 INJECTION, SUSPENSION INTRA-ARTICULAR; INTRAMUSCULAR at 11:06

## 2021-09-17 NOTE — LETTER
"  9/17/2021      RE: Arnaud Bird  [address]  [facility] [address]           Assessment & Plan     Localized osteoarthritis of left shoulder  US guided shoulder injection    Chronic pain of left ankle  Osteoarthritis noted- consider future injection for pain if needed  - X-ray lt Foot G/E 3 vws*  - X-ray lt ankle G/E 3 views*    Localized osteoarthritis of left ankle    Left shoulder GH OA-  Procedure:The indications, risks, expected benefits were discussed.  Formal consent was obtained. The humeral head, glenoid, hyperechoic triangle indicating the posterior labrum were identified by ultrasound and curvilinear probe.  Initially, 4 mL 1% lidocaine  was injected with US guidance along the injection track for anesthesia.  Using sterile technique, a syringe with a 80 mm, 22 gauge needle containing 1 mL Kenalog 40 mg/mL and 4 mL 1% lidocaine  were injected using an US guided posterior approach into the left glenohumeral joint.  US used to guide needle placement and verify proper needle position.  Images and video indicating proper needle placement were recorded.  Upon completion of the injection, the wound was dressed with a bandaid. Follow up prn.       BMI:   Estimated body mass index is 38.16 kg/m  as calculated from the following:    Height as of 9/9/21: 1.727 m (5' 8\").    Weight as of 9/9/21: 113.9 kg (251 lb).   Weight management plan: Discussed healthy diet and exercise guidelines    Regular exercise    Return in about 6 months (around 3/17/2022), or if symptoms worsen or fail to improve.    Carmel Chan MD  [facility] SPORTS MEDICINE CLINIC [facility]   Arnaud is a 66 year old who presents for the following health issues  accompanied by himself    HPI     Pt did see Dr. Deleon earlier this month.  Pt is 3 mo s/p right shoulder replacement.  Here for left shoulder injection today.  Carpal tunnel injection back in august  Left ankle, using boots to " provide support    Review of Systems   Constitutional, HEENT, cardiovascular, pulmonary, gi and gu systems are negative, except as otherwise noted.      Objective    There were no vitals taken for this visit.  There is no height or weight on file to calculate BMI.  Physical Exam   Left ankle joint swells, painful laterally    Xray - Reviewed and interpreted by me.  Left ankle x-ray- OA medial aspect        Large Joint Injection/Arthocentesis: L glenohumeral joint    Date/Time: 9/17/2021 11:06 AM  Performed by: Carmel Chan MD  Authorized by: Carmel Chan MD     Indications:  Pain and osteoarthritis  Needle Size:  22 G  Guidance: ultrasound    Location:  Shoulder      Site:  L glenohumeral joint  Medications:  40 mg triamcinolone 40 MG/ML; 8 mL lidocaine (PF) 1 %  Outcome:  Tolerated well, no immediate complications  Procedure discussed: discussed risks, benefits, and alternatives    Consent Given by:  Patient  Timeout: timeout called immediately prior to procedure    Prep: patient was prepped and draped in usual sterile fashion     Louisa Hays ATC on 9/17/2021 at 11:07 AM        I agree with the following injection documentation.    ELROY Chan MD

## 2021-09-17 NOTE — PROGRESS NOTES
"    Assessment & Plan     Localized osteoarthritis of left shoulder  US guided shoulder injection    Chronic pain of left ankle  Osteoarthritis noted- consider future injection for pain if needed  - X-ray lt Foot G/E 3 vws*  - X-ray lt ankle G/E 3 views*    Localized osteoarthritis of left ankle    Left shoulder GH OA-  Procedure:The indications, risks, expected benefits were discussed.  Formal consent was obtained. The humeral head, glenoid, hyperechoic triangle indicating the posterior labrum were identified by ultrasound and curvilinear probe.  Initially, 4 mL 1% lidocaine  was injected with US guidance along the injection track for anesthesia.  Using sterile technique, a syringe with a 80 mm, 22 gauge needle containing 1 mL Kenalog 40 mg/mL and 4 mL 1% lidocaine  were injected using an US guided posterior approach into the left glenohumeral joint.  US used to guide needle placement and verify proper needle position.  Images and video indicating proper needle placement were recorded.  Upon completion of the injection, the wound was dressed with a bandaid. Follow up prn.       BMI:   Estimated body mass index is 38.16 kg/m  as calculated from the following:    Height as of 9/9/21: 1.727 m (5' 8\").    Weight as of 9/9/21: 113.9 kg (251 lb).   Weight management plan: Discussed healthy diet and exercise guidelines    Regular exercise    Return in about 6 months (around 3/17/2022), or if symptoms worsen or fail to improve.    Carmel Chan MD  [facility]ICINE CLIN[facility]   Arnaud is a 66 year old who presents for the following health issues  accompanied by himself    HPI     Pt did see Dr. Deleon earlier this month.  Pt is 3 mo s/p right shoulder replacement.  Here for left shoulder injection today.  Carpal tunnel injection back in august  Left ankle, using boots to provide support    Review of Systems   Constitutional, HEENT, cardiovascular, pulmonary, gi and gu " systems are negative, except as otherwise noted.      Objective    There were no vitals taken for this visit.  There is no height or weight on file to calculate BMI.  Physical Exam   Left ankle joint swells, painful laterally    Xray - Reviewed and interpreted by me.  Left ankle x-ray- OA medial aspect

## 2021-09-17 NOTE — PROGRESS NOTES
Large Joint Injection/Arthocentesis: L glenohumeral joint    Date/Time: 9/17/2021 11:06 AM  Performed by: Carmel Chan MD  Authorized by: Carmel Chan MD     Indications:  Pain and osteoarthritis  Needle Size:  22 G  Guidance: ultrasound    Location:  Shoulder      Site:  L glenohumeral joint  Medications:  40 mg triamcinolone 40 MG/ML; 8 mL lidocaine (PF) 1 %  Outcome:  Tolerated well, no immediate complications  Procedure discussed: discussed risks, benefits, and alternatives    Consent Given by:  Patient  Timeout: timeout called immediately prior to procedure    Prep: patient was prepped and draped in usual sterile fashion     Louisa Hasy ATC on 9/17/2021 at 11:07 AM        I agree with the following injection documentation.    ELROY Chan MD

## 2021-09-17 NOTE — NURSING NOTE
[address] [address]  Dept: [phone]  ______________________________________________________________________________    Patient: Arnaud Bird   : 1955   MRN: [id number]   2021    INVASIVE PROCEDURE SAFETY CHECKLIST    Date: 2021   Procedure:L GH USG CSI   Patient Name: Arnaud Bird  MRN: [id number]  YOB: 1955    Action: Complete sections as appropriate. Any discrepancy results in a HARD COPY until resolved.     PRE PROCEDURE:  Patient ID verified with 2 identifiers (name and  or MRN): Yes  Procedure and site verified with patient/designee (when able): Yes  Accurate consent documentation in medical record: Yes  H&P (or appropriate assessment) documented in medical record: Yes  H&P must be up to 20 days prior to procedure and updates within 24 hours of procedure as applicable: NA  Relevant diagnostic and radiology test results appropriately labeled and displayed as applicable: Yes  Procedure site(s) marked with provider initials: NA    TIMEOUT:  Time-Out performed immediately prior to starting procedure, including verbal and active participation of all team members addressing the following:Yes  * Correct patient identify  * Confirmed that the correct side and site are marked  * An accurate procedure consent form  * Agreement on the procedure to be done  * Correct patient position  * Relevant images and results are properly labeled and appropriately displayed  * The need to administer antibiotics or fluids for irrigation purposes during the procedure as applicable   * Safety precautions based on patient history or medication use    DURING PROCEDURE: Verification of correct person, site, and procedures any time the responsibility for care of the patient is transferred to another member of the care team.       Prior to injection, verified patient identity using patient's name and date of  birth.  Due to injection administration, patient instructed to remain in clinic for 15 minutes  afterwards, and to report any adverse reaction to me immediately.    Joint injection was performed.      Drug Amount Wasted:  Yes: 2 mg/ml   Vial/Syringe: Single dose vial  Expiration Date:  12/1/24      Louisa Hays ATC  September 17, 2021

## 2021-09-18 ENCOUNTER — NURSE TRIAGE (OUTPATIENT)
Dept: NURSING | Facility: CLINIC | Age: 66
End: 2021-09-18

## 2021-09-18 RX ORDER — HYDROCODONE BITARTRATE AND ACETAMINOPHEN 7.5; 325 MG/1; MG/1
1 TABLET ORAL EVERY 6 HOURS PRN
Qty: 120 TABLET | Refills: 0 | Status: SHIPPED | OUTPATIENT
Start: 2021-09-18 | End: 2021-10-18

## 2021-09-18 NOTE — TELEPHONE ENCOUNTER
"Arnaud requests refill of his Norco.     HYDROcodone-acetaminophen (NORCO) 7.5-325 MG per tablet  Last filled: 8/18/21 120 tablets  Sig - Route: Take 1 tablet by mouth every 6 hours as needed for pain (4 x daily as needed  maxium 4 per day) - Oral      Still has 8 tablets left, good until tomorrow night. Leaving for [place] on Monday 4 AM.    Signed a new PDMP on 9/9. Requested refill 2x on 9/15 but has not heard back.    Preferred pharmacy: [facility] [id number] [place] [address]    FNA paged on call Dr. Nahid Birch at 10:44 AM. On call called back immediately ans will look into pt's chart.    In basket message routed to on-call provider.    Brigid Ulloa RN/[place] Nurse Advisor      Reason for Disposition    [1] Request for URGENT new prescription or refill of \"essential\" medication (i.e., likelihood of harm to patient if not taken) AND [2] triager unable to fill per unit policy    Protocols used: MEDICATION QUESTION CALL-A-AH      "

## 2021-09-18 NOTE — TELEPHONE ENCOUNTER
PDMP reviewed - appears to be getting Norco refills exclusively from Dr. Galloway for the past six months. He is at the appropriate time interval for a refill.  Urine drug screen completed in July 2021 that was positive for expected substance and negative for all others.  Controlled substance agreement signed earlier this month. RN's are behind on refills, so may have caused some of the delay.  Given all this, will refill medication.  Refilled on 9/15/21 MyChart refill encounter - see that for specifics.      [id number] [place] [facility] [address] Birch MD

## 2021-09-18 NOTE — TELEPHONE ENCOUNTER
FNA called patient to update, advised that usually clinic does not refill controlled substances over the weekend, but given the situation described below, on call provider Eyal VARGAS sent one time ERx for Norco.    He verbalized appreciation.    Brigid Ulloa RN/[place] Nurse Advisor

## 2021-09-18 NOTE — TELEPHONE ENCOUNTER
Chart reviewed.  Rx sent to pt's preferred pharmacy.  See telephone encounter from 9/18/21.       [id number] [place] [place] [facility] [address] [facility]  [id number] [place] [place] - [address] [facility] [address] [place] [facility] - [place], [place] - [address] [place] [address] MIYA    Nahid Birch MD

## 2021-10-16 ENCOUNTER — MYC MEDICAL ADVICE (OUTPATIENT)
Dept: FAMILY MEDICINE | Facility: CLINIC | Age: 66
End: 2021-10-16

## 2021-10-16 DIAGNOSIS — M25.661 STIFFNESS OF KNEE JOINT, RIGHT: Chronic | ICD-10-CM

## 2021-10-16 DIAGNOSIS — M47.16 SPONDYLOSIS WITH MYELOPATHY, LUMBAR REGION: ICD-10-CM

## 2021-10-16 DIAGNOSIS — R26.9 GAIT DIFFICULTY: ICD-10-CM

## 2021-10-16 DIAGNOSIS — G89.4 CHRONIC PAIN SYNDROME: Chronic | ICD-10-CM

## 2021-10-16 DIAGNOSIS — Z96.652 STATUS POST TOTAL LEFT KNEE REPLACEMENT: ICD-10-CM

## 2021-10-18 DIAGNOSIS — Z98.818 OTHER DENTAL PROCEDURE STATUS: ICD-10-CM

## 2021-10-18 RX ORDER — HYDROCODONE BITARTRATE AND ACETAMINOPHEN 7.5; 325 MG/1; MG/1
1 TABLET ORAL EVERY 6 HOURS PRN
Qty: 120 TABLET | Refills: 0 | Status: SHIPPED | OUTPATIENT
Start: 2021-10-18 | End: 2021-11-19

## 2021-10-18 RX ORDER — AMOXICILLIN 500 MG/1
CAPSULE ORAL
Qty: 4 CAPSULE | Refills: 0 | Status: SHIPPED | OUTPATIENT
Start: 2021-10-18 | End: 2022-08-02

## 2021-10-18 NOTE — TELEPHONE ENCOUNTER
[place]:  reviewed- [place] concerns    [place]  [email or web address]    Refill done.  Sushma Galloway MD on 10/18/2021 at 3:56 PM

## 2021-10-28 DIAGNOSIS — G56.03 BILATERAL CARPAL TUNNEL SYNDROME: Primary | ICD-10-CM

## 2021-11-15 ENCOUNTER — MYC MEDICAL ADVICE (OUTPATIENT)
Dept: FAMILY MEDICINE | Facility: CLINIC | Age: 66
End: 2021-11-15
Payer: COMMERCIAL

## 2021-11-15 DIAGNOSIS — G89.4 CHRONIC PAIN SYNDROME: Chronic | ICD-10-CM

## 2021-11-15 DIAGNOSIS — Z96.652 STATUS POST TOTAL LEFT KNEE REPLACEMENT: ICD-10-CM

## 2021-11-15 DIAGNOSIS — R26.9 GAIT DIFFICULTY: ICD-10-CM

## 2021-11-15 DIAGNOSIS — M47.16 SPONDYLOSIS WITH MYELOPATHY, LUMBAR REGION: ICD-10-CM

## 2021-11-15 DIAGNOSIS — M25.661 STIFFNESS OF KNEE JOINT, RIGHT: Chronic | ICD-10-CM

## 2021-11-18 NOTE — TELEPHONE ENCOUNTER
Controlled Substance Refill Request for hydrocodone-acetaminophen 7.5-325 mg  Problem List Complete:    Yes    Last Written Prescription Date:  10/18/21  Last Fill Quantity: 120,   # refills: 0    THE MOST RECENT OFFICE VISIT MUST BE WITHIN THE PAST 3 MONTHS. AT LEAST ONE FACE TO FACE VISIT MUST OCCUR EVERY 6 MONTHS. ADDITIONAL VISITS CAN BE VIRTUAL.  (THIS STATEMENT SHOULD BE DELETED.)    [place] [facility] [place] Laverne    Future Office visit:     Controlled substance agreement:   CSA -- Encounter Level on 10/31/2016:    Controlled Substance Agreement - Scan on 11/15/2016  7:53 AM: CONTROLLED SUBSTANCE AGREEMENT     CSA -- Patient Level:    Controlled Substance Agreement - Opioid - Scan on 9/13/2021  4:37 PM  Controlled Substance Agreement - Opioid - Scan on 7/20/2020 12:15 PM  Controlled Substance Agreement - Opioid - Scan on 3/19/2019 11:01 AM         Last Urine Drug Screen:   Pain Drug SCR UR W RPTD Meds   Date Value Ref Range Status   09/09/2019 FINAL  Final     Comment:     (Note)  ====================================================================  [facility] COMP DRUG ANALYSIS,UR  ====================================================================  Test                             Result       Flag       Units        Drug Present and Declared for Prescription Verification   Hydrocodone                    665          EXPECTED   ng/mg creat   Hydromorphone                  93           EXPECTED   ng/mg creat   Dihydrocodeine                 149          EXPECTED   ng/mg creat   Norhydrocodone                 1732         EXPECTED   ng/mg creat    Sources of hydrocodone include scheduled prescription    medications. Hydromorphone, dihydrocodeine and norhydrocodone are    expected metabolites of hydrocodone. Hydromorphone and    dihydrocodeine are also available as scheduled prescription    medications.   Acetaminophen                  PRESENT      EXPECTED                Drug  Absent but Declared for Prescription Verification   Diclofenac                     Not Detected UNEXPECTED                Diclofenac, as indicated in the declared medication list, is not    always detected even when used as directed.  ====================================================================  Test                      Result    Flag   Units      Ref Range        Creatinine              171              mg/dL      >=20            ====================================================================  Declared Medications:  The flagging and interpretation on this report are based on the  following declared medications.  Unexpected results may arise from  inaccuracies in the declared medications.  **Note: The testing scope of this panel includes these medications:  Hydrocodone (Norco)  **Note: The testing scope of this panel does not include small to  moderate amounts of these reported medications:  Acetaminophen (Norco)  Diclofenac (Voltaren)  **Note: The testing scope of this panel does not include following  reported medications:  Acyclovir  Albuterol  Allopurinol  Amoxicillin (Augmentin)  Beclomethasone (QVAR)  Clavulinate (Augmentin)  Clindamycin (Cleocin)  Erythromycin  Fluticasone  Ipratropium (Atrovent)  Naloxone (Narcan)  Oxymetazoline (Afrin)  Sildenafil (Viagra)  Simvastatin (Zocor)  ====================================================================  For clinical consultation, please call [phone].  ====================================================================  Analysis performed by Ruth Kunstadter â€“ [employer] [place] [address]     , No results found for: COMDAT,   Cannabinoids (35-mea-1-carboxy-9-THC)   Date Value Ref Range Status   07/08/2021 Not Detected NDET^Not Detected ng/mL Final     Comment:     Cutoff for a negative cannabinoid is 50 ng/mL or less.     Phencyclidine (Phencyclidine)   Date Value Ref Range Status   07/08/2021 Not Detected NDET^Not Detected ng/mL Final      Comment:     Cutoff for a negative PCP is 25 ng/mL or less.     Cocaine (Benzoylecgonine)   Date Value Ref Range Status   07/08/2021 Not Detected NDET^Not Detected ng/mL Final     Comment:     Cutoff for a negative cocaine is 150 ng/ml or less.     Methamphetamine (d-Methamphetamine)   Date Value Ref Range Status   07/08/2021 Not Detected NDET^Not Detected ng/mL Final     Comment:     Cutoff for a negative methamphetamine is 500 ng/ml or less.     Opiates (Morphine)   Date Value Ref Range Status   07/08/2021 Detected, Abnormal Result (A) NDET^Not Detected ng/mL Final     Comment:     Cutoff for a positive opiate is greater than 100 ng/ml.  This is an unconfirmed screening result to be used for medical purposes only.   Order [id number] for confirmation or individual confirmation tests to [facility].       Amphetamine (d-Amphetamine)   Date Value Ref Range Status   07/08/2021 Not Detected NDET^Not Detected ng/mL Final     Comment:     Cutoff for a negative amphetamine is 500 ng/mL or less.     Benzodiazepines (Nordiazepam)   Date Value Ref Range Status   07/08/2021 Not Detected NDET^Not Detected ng/mL Final     Comment:     Cutoff for a negative benzodiazepine is 150 ng/ml or less.     Tricyclic Antidepressants (Desipramine)   Date Value Ref Range Status   07/08/2021 Not Detected NDET^Not Detected ng/mL Final     Comment:     Cutoff for a negative tricyclic antidepressant is 300 ng/ml or less.     Methadone (Methadone)   Date Value Ref Range Status   07/08/2021 Not Detected NDET^Not Detected ng/mL Final     Comment:     Cutoff for a negative methadone is 200 ng/ml or less.     Barbiturates (Butalbital)   Date Value Ref Range Status   07/08/2021 Not Detected NDET^Not Detected ng/mL Final     Comment:     Cutoff for a negative barbituate is 200 ng/ml or less.     Oxycodone (Oxycodone)   Date Value Ref Range Status   07/08/2021 Not Detected NDET^Not Detected ng/mL Final     Comment:     Cutoff for a negative Oxycodone is 100  ng/mL or less.     Propoxyphene (Norpropoxyphene)   Date Value Ref Range Status   07/08/2021 Not Detected NDET^Not Detected ng/mL Final     Comment:     Cutoff for a negative propoxyphene is 300 ng/ml or less     Buprenorphine (Buprenorphine)   Date Value Ref Range Status   07/08/2021 Not Detected NDET^Not Detected ng/mL Final     Comment:     Cutoff for a negative buprenorphine is 10 ng/ml or less        Processing:  Rx to be electronically transmitted to pharmacy by provider     [email or web address]   checked in past 3 months?      Lis AGUILAR RN  EP Triage

## 2021-11-19 RX ORDER — HYDROCODONE BITARTRATE AND ACETAMINOPHEN 7.5; 325 MG/1; MG/1
1 TABLET ORAL EVERY 6 HOURS PRN
Qty: 120 TABLET | Refills: 0 | Status: SHIPPED | OUTPATIENT
Start: 2021-11-19 | End: 2021-12-22

## 2021-12-20 DIAGNOSIS — Z96.652 STATUS POST TOTAL LEFT KNEE REPLACEMENT: ICD-10-CM

## 2021-12-20 DIAGNOSIS — G89.4 CHRONIC PAIN SYNDROME: Chronic | ICD-10-CM

## 2021-12-20 DIAGNOSIS — M47.16 SPONDYLOSIS WITH MYELOPATHY, LUMBAR REGION: ICD-10-CM

## 2021-12-20 DIAGNOSIS — M25.661 STIFFNESS OF KNEE JOINT, RIGHT: Chronic | ICD-10-CM

## 2021-12-20 DIAGNOSIS — R26.9 GAIT DIFFICULTY: ICD-10-CM

## 2021-12-20 DIAGNOSIS — B00.9 HSV (HERPES SIMPLEX VIRUS) INFECTION: ICD-10-CM

## 2021-12-22 RX ORDER — ACYCLOVIR 400 MG/1
TABLET ORAL
Qty: 28 TABLET | Refills: 4 | Status: SHIPPED | OUTPATIENT
Start: 2021-12-22 | End: 2023-01-03

## 2021-12-22 RX ORDER — HYDROCODONE BITARTRATE AND ACETAMINOPHEN 7.5; 325 MG/1; MG/1
1 TABLET ORAL EVERY 6 HOURS PRN
Qty: 120 TABLET | Refills: 0 | Status: SHIPPED | OUTPATIENT
Start: 2021-12-22 | End: 2022-01-17

## 2021-12-26 ENCOUNTER — TELEPHONE (OUTPATIENT)
Dept: FAMILY MEDICINE | Facility: CLINIC | Age: 66
End: 2021-12-26
Payer: COMMERCIAL

## 2021-12-26 NOTE — TELEPHONE ENCOUNTER
Please call the patient and schedule  AWV which he is due at this time, to further take care of his medical conditions and medications.     He will need follow up for his ongoing Opiate prescription for taking care of his future refills.     Thank you,  Sushma Galloway MD on 12/26/2021 at 4:11 PM

## 2021-12-28 NOTE — TELEPHONE ENCOUNTER
Patient called back to inform that he did schedule an appointment for 1/25/21 with Dr. Galloway.  Patient also states that he was only able to  a partial fill of his December hydrocodone. States that he picked up 100 tablets. Advised patient to remind clinic at time of next refill so that nurse can confirm with [facility] pharmacy. Darlyn Gamez RN

## 2022-01-12 ENCOUNTER — MYC MEDICAL ADVICE (OUTPATIENT)
Dept: ORTHOPEDICS | Facility: CLINIC | Age: 67
End: 2022-01-12
Payer: COMMERCIAL

## 2022-01-12 NOTE — TELEPHONE ENCOUNTER
After discussing with Dr. Chan, she would like a dedicated consult appointment for his left ankle prior to deciding about an ankle injection. Last time he was seen, they primarily focused on the shoulder. They did obtain left foot and ankle XR, but did not evaluate the foot fully. I informed him that she would be able to evaluate his foot/ankle to see if an injection is appropriate and if so we can create a plan for the injection. I also informed that generally we try to focus on one body part for sake of thoroughness and time, he reports the ankle if bothering him most and he would like to assess the ankle first then may schedule a later appointment for the hip. He was scheduled to see Dr. Chan on 1/14/22 for the left ankle. The appointment time and location was confirmed with the patient. All questions were answered at this time. Shannon Hoskins ATC on 1/12/2022 at 3:58 PM

## 2022-01-13 NOTE — TELEPHONE ENCOUNTER
DIAGNOSIS: Left ankle discuss possible injection   APPOINTMENT DATE: 1.14.22   NOTES STATUS DETAILS   OFFICE NOTE from referring provider Internal 1.2.22 [facility] Advice  9.17.21   OFFICE NOTE from other specialist N/A    DISCHARGE SUMMARY from hospital N/A    DISCHARGE REPORT from the ER N/A    OPERATIVE REPORT N/A    EMG report N/A    MEDICATION LIST Internal    MRI N/A    DEXA (osteoporosis/bone health) N/A    CT SCAN N/A    XRAYS (IMAGES & REPORTS) Internal 9.17.21 L ankle

## 2022-01-14 ENCOUNTER — OFFICE VISIT (OUTPATIENT)
Dept: ORTHOPEDICS | Facility: CLINIC | Age: 67
End: 2022-01-14
Payer: COMMERCIAL

## 2022-01-14 ENCOUNTER — PRE VISIT (OUTPATIENT)
Dept: ORTHOPEDICS | Facility: CLINIC | Age: 67
End: 2022-01-14
Payer: COMMERCIAL

## 2022-01-14 DIAGNOSIS — M19.072 LOCALIZED OSTEOARTHRITIS OF LEFT ANKLE: Primary | ICD-10-CM

## 2022-01-14 PROCEDURE — 99213 OFFICE O/P EST LOW 20 MIN: CPT | Mod: 25 | Performed by: FAMILY MEDICINE

## 2022-01-14 PROCEDURE — 20606 DRAIN/INJ JOINT/BURSA W/US: CPT | Mod: LT | Performed by: FAMILY MEDICINE

## 2022-01-14 RX ORDER — TRIAMCINOLONE ACETONIDE 40 MG/ML
40 INJECTION, SUSPENSION INTRA-ARTICULAR; INTRAMUSCULAR
Status: DISCONTINUED | OUTPATIENT
Start: 2022-01-14 | End: 2024-06-12

## 2022-01-14 RX ORDER — LIDOCAINE HYDROCHLORIDE 10 MG/ML
7 INJECTION, SOLUTION EPIDURAL; INFILTRATION; INTRACAUDAL; PERINEURAL
Status: DISCONTINUED | OUTPATIENT
Start: 2022-01-14 | End: 2024-06-12

## 2022-01-14 RX ADMIN — LIDOCAINE HYDROCHLORIDE 7 ML: 10 INJECTION, SOLUTION EPIDURAL; INFILTRATION; INTRACAUDAL; PERINEURAL at 16:17

## 2022-01-14 RX ADMIN — TRIAMCINOLONE ACETONIDE 40 MG: 40 INJECTION, SUSPENSION INTRA-ARTICULAR; INTRAMUSCULAR at 16:17

## 2022-01-14 NOTE — NURSING NOTE
[address] [address]  Dept: [phone]  ______________________________________________________________________________    Patient: Arnaud Bird   : 1955   MRN: [id number]   2022    INVASIVE PROCEDURE SAFETY CHECKLIST    Date: 2022   Procedure:Left tibiotalar joint CSI USG   Patient Name: Arnaud Bird  MRN: [id number]  YOB: 1955    Action: Complete sections as appropriate. Any discrepancy results in a HARD COPY until resolved.     PRE PROCEDURE:  Patient ID verified with 2 identifiers (name and  or MRN): Yes  Procedure and site verified with patient/designee (when able): Yes  Accurate consent documentation in medical record: Yes  H&P (or appropriate assessment) documented in medical record: Yes  H&P must be up to 20 days prior to procedure and updates within 24 hours of procedure as applicable: NA  Relevant diagnostic and radiology test results appropriately labeled and displayed as applicable: Yes  Procedure site(s) marked with provider initials: NA    TIMEOUT:  Time-Out performed immediately prior to starting procedure, including verbal and active participation of all team members addressing the following:Yes  * Correct patient identify  * Confirmed that the correct side and site are marked  * An accurate procedure consent form  * Agreement on the procedure to be done  * Correct patient position  * Relevant images and results are properly labeled and appropriately displayed  * The need to administer antibiotics or fluids for irrigation purposes during the procedure as applicable   * Safety precautions based on patient history or medication use    DURING PROCEDURE: Verification of correct person, site, and procedures any time the responsibility for care of the patient is transferred to another member of the care team.       Prior to injection, verified patient identity using patient's name  and date of birth.  Due to injection administration, patient instructed to remain in clinic for 15 minutes  afterwards, and to report any adverse reaction to me immediately.    Joint injection was performed.      Drug Amount Wasted:  Yes: 3 mg/ml   Vial/Syringe: Single dose vial  Expiration Date:  9/1/25      Louisa Hays ATC  January 14, 2022
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
## 2022-01-16 ENCOUNTER — MYC MEDICAL ADVICE (OUTPATIENT)
Dept: FAMILY MEDICINE | Facility: CLINIC | Age: 67
End: 2022-01-16
Payer: COMMERCIAL

## 2022-01-16 DIAGNOSIS — M47.16 SPONDYLOSIS WITH MYELOPATHY, LUMBAR REGION: ICD-10-CM

## 2022-01-16 DIAGNOSIS — R26.9 GAIT DIFFICULTY: ICD-10-CM

## 2022-01-16 DIAGNOSIS — M25.661 STIFFNESS OF KNEE JOINT, RIGHT: Chronic | ICD-10-CM

## 2022-01-16 DIAGNOSIS — G89.4 CHRONIC PAIN SYNDROME: Chronic | ICD-10-CM

## 2022-01-16 DIAGNOSIS — Z96.652 STATUS POST TOTAL LEFT KNEE REPLACEMENT: ICD-10-CM

## 2022-01-17 RX ORDER — HYDROCODONE BITARTRATE AND ACETAMINOPHEN 7.5; 325 MG/1; MG/1
1 TABLET ORAL EVERY 6 HOURS PRN
Qty: 120 TABLET | Refills: 0 | Status: SHIPPED | OUTPATIENT
Start: 2022-01-18 | End: 2022-02-19

## 2022-01-17 NOTE — TELEPHONE ENCOUNTER
Please review my chart message and advise.    Controlled Substance Refill Request for hydrocodone-acetaminophen 7.5-325 mg  Problem List Complete:    Yes    Last Written Prescription Date:  12/22/21  Last Fill Quantity: 120,   # refills: 0    THE MOST RECENT OFFICE VISIT MUST BE WITHIN THE PAST 3 MONTHS. AT LEAST ONE FACE TO FACE VISIT MUST OCCUR EVERY 6 MONTHS. ADDITIONAL VISITS CAN BE VIRTUAL.  (THIS STATEMENT SHOULD BE DELETED.)    Last Office Visit with [facility] primary care provider: 9/9/21 Laverne    Future Office visit:   Next 5 appointments (look out 90 days)    Jan 25, 2022  9:00 AM  (Arrive by 8:40 AM)  Provider Visit with Sushma Galloway MD  [facility] ([facility] ) [address] [place] [id number]  [phone]          Controlled substance agreement:   CSA -- Encounter Level on 10/31/2016:    Controlled Substance Agreement - Scan on 11/15/2016  7:53 AM: CONTROLLED SUBSTANCE AGREEMENT     CSA -- Patient Level:    Controlled Substance Agreement - Opioid - Scan on 9/13/2021  4:37 PM  Controlled Substance Agreement - Opioid - Scan on 7/20/2020 12:15 PM  Controlled Substance Agreement - Opioid - Scan on 3/19/2019 11:01 AM         Last Urine Drug Screen:   Pain Drug SCR UR W RPTD Meds   Date Value Ref Range Status   09/09/2019 FINAL  Final     Comment:     (Note)  ====================================================================  [facility] COMP DRUG ANALYSIS,UR  ====================================================================  Test                             Result       Flag       Units        Drug Present and Declared for Prescription Verification   Hydrocodone                    665          EXPECTED   ng/mg creat   Hydromorphone                  93           EXPECTED   ng/mg creat   Dihydrocodeine                 149          EXPECTED   ng/mg creat   Norhydrocodone                 1732         EXPECTED   ng/mg creat     Sources of hydrocodone include scheduled prescription    medications. Hydromorphone, dihydrocodeine and norhydrocodone are    expected metabolites of hydrocodone. Hydromorphone and    dihydrocodeine are also available as scheduled prescription    medications.   Acetaminophen                  PRESENT      EXPECTED                Drug Absent but Declared for Prescription Verification   Diclofenac                     Not Detected UNEXPECTED                Diclofenac, as indicated in the declared medication list, is not    always detected even when used as directed.  ====================================================================  Test                      Result    Flag   Units      Ref Range        Creatinine              171              mg/dL      >=20            ====================================================================  Declared Medications:  The flagging and interpretation on this report are based on the  following declared medications.  Unexpected results may arise from  inaccuracies in the declared medications.  **Note: The testing scope of this panel includes these medications:  Hydrocodone (Norco)  **Note: The testing scope of this panel does not include small to  moderate amounts of these reported medications:  Acetaminophen (Norco)  Diclofenac (Voltaren)  **Note: The testing scope of this panel does not include following  reported medications:  Acyclovir  Albuterol  Allopurinol  Amoxicillin (Augmentin)  Beclomethasone (QVAR)  Clavulinate (Augmentin)  Clindamycin (Cleocin)  Erythromycin  Fluticasone  Ipratropium (Atrovent)  Naloxone (Narcan)  Oxymetazoline (Afrin)  Sildenafil (Viagra)  Simvastatin (Zocor)  ====================================================================  For clinical consultation, please call [phone].  ====================================================================  Analysis performed by [employer] [place] [address]     , No results found for:  COMDAT,   Cannabinoids ([id number]-9-THC)   Date Value Ref Range Status   07/08/2021 Not Detected NDET^Not Detected ng/mL Final     Comment:     Cutoff for a negative cannabinoid is 50 ng/mL or less.     Phencyclidine (Phencyclidine)   Date Value Ref Range Status   07/08/2021 Not Detected NDET^Not Detected ng/mL Final     Comment:     Cutoff for a negative PCP is 25 ng/mL or less.     Cocaine (Benzoylecgonine)   Date Value Ref Range Status   07/08/2021 Not Detected NDET^Not Detected ng/mL Final     Comment:     Cutoff for a negative cocaine is 150 ng/ml or less.     Methamphetamine (d-Methamphetamine)   Date Value Ref Range Status   07/08/2021 Not Detected NDET^Not Detected ng/mL Final     Comment:     Cutoff for a negative methamphetamine is 500 ng/ml or less.     Opiates (Morphine)   Date Value Ref Range Status   07/08/2021 Detected, Abnormal Result (A) NDET^Not Detected ng/mL Final     Comment:     Cutoff for a positive opiate is greater than 100 ng/ml.  This is an unconfirmed screening result to be used for medical purposes only.   Order [id number] for confirmation or individual confirmation tests to [facility].       Amphetamine (d-Amphetamine)   Date Value Ref Range Status   07/08/2021 Not Detected NDET^Not Detected ng/mL Final     Comment:     Cutoff for a negative amphetamine is 500 ng/mL or less.     Benzodiazepines (Nordiazepam)   Date Value Ref Range Status   07/08/2021 Not Detected NDET^Not Detected ng/mL Final     Comment:     Cutoff for a negative benzodiazepine is 150 ng/ml or less.     Tricyclic Antidepressants (Desipramine)   Date Value Ref Range Status   07/08/2021 Not Detected NDET^Not Detected ng/mL Final     Comment:     Cutoff for a negative tricyclic antidepressant is 300 ng/ml or less.     Methadone (Methadone)   Date Value Ref Range Status   07/08/2021 Not Detected NDET^Not Detected ng/mL Final     Comment:     Cutoff for a negative methadone is 200 ng/ml or less.     Barbiturates  (Butalbital)   Date Value Ref Range Status   07/08/2021 Not Detected NDET^Not Detected ng/mL Final     Comment:     Cutoff for a negative barbituate is 200 ng/ml or less.     Oxycodone (Oxycodone)   Date Value Ref Range Status   07/08/2021 Not Detected NDET^Not Detected ng/mL Final     Comment:     Cutoff for a negative Oxycodone is 100 ng/mL or less.     Propoxyphene (Norpropoxyphene)   Date Value Ref Range Status   07/08/2021 Not Detected NDET^Not Detected ng/mL Final     Comment:     Cutoff for a negative propoxyphene is 300 ng/ml or less     Buprenorphine (Buprenorphine)   Date Value Ref Range Status   07/08/2021 Not Detected NDET^Not Detected ng/mL Final     Comment:     Cutoff for a negative buprenorphine is 10 ng/ml or less        Processing:  Rx to be electronically transmitted to pharmacy by provider     [email or web address]   checked in past 3 months?      Lis AGUILAR RN  EP Triage

## 2022-01-18 NOTE — TELEPHONE ENCOUNTER
[place]:  reviewed- no concerns    [place]:  [email or web address]    Refill done.  Susham Galloway MD on 1/17/2022 at 7:51 PM

## 2022-01-25 ENCOUNTER — OFFICE VISIT (OUTPATIENT)
Dept: FAMILY MEDICINE | Facility: CLINIC | Age: 67
End: 2022-01-25
Payer: COMMERCIAL

## 2022-01-25 VITALS
RESPIRATION RATE: 16 BRPM | DIASTOLIC BLOOD PRESSURE: 80 MMHG | SYSTOLIC BLOOD PRESSURE: 112 MMHG | BODY MASS INDEX: 41.12 KG/M2 | HEIGHT: 67 IN | TEMPERATURE: 97.3 F | OXYGEN SATURATION: 98 % | WEIGHT: 262 LBS | HEART RATE: 60 BPM

## 2022-01-25 DIAGNOSIS — Z96.611 STATUS POST TOTAL SHOULDER ARTHROPLASTY, RIGHT: ICD-10-CM

## 2022-01-25 DIAGNOSIS — E66.01 MORBID OBESITY (H): ICD-10-CM

## 2022-01-25 DIAGNOSIS — M15.0 PRIMARY OSTEOARTHRITIS INVOLVING MULTIPLE JOINTS: ICD-10-CM

## 2022-01-25 DIAGNOSIS — M1A.9XX0 CHRONIC GOUT INVOLVING TOE OF RIGHT FOOT WITHOUT TOPHUS, UNSPECIFIED CAUSE: ICD-10-CM

## 2022-01-25 DIAGNOSIS — Z00.00 ENCOUNTER FOR SUBSEQUENT ANNUAL WELLNESS VISIT (AWV) IN MEDICARE PATIENT: Primary | ICD-10-CM

## 2022-01-25 DIAGNOSIS — M51.16 LUMBAR DISC DISEASE WITH RADICULOPATHY: ICD-10-CM

## 2022-01-25 DIAGNOSIS — J45.30 MILD PERSISTENT ASTHMA WITHOUT COMPLICATION: Chronic | ICD-10-CM

## 2022-01-25 DIAGNOSIS — I72.3 ILIAC ARTERY ANEURYSM, LEFT (H): ICD-10-CM

## 2022-01-25 DIAGNOSIS — E55.9 VITAMIN D DEFICIENCY: ICD-10-CM

## 2022-01-25 DIAGNOSIS — G89.4 CHRONIC PAIN DISORDER: ICD-10-CM

## 2022-01-25 DIAGNOSIS — M47.16 SPONDYLOSIS WITH MYELOPATHY, LUMBAR REGION: ICD-10-CM

## 2022-01-25 DIAGNOSIS — Z79.891 CHRONIC USE OF OPIATE DRUG FOR THERAPEUTIC PURPOSE: ICD-10-CM

## 2022-01-25 DIAGNOSIS — R37 SEXUAL DYSFUNCTION: ICD-10-CM

## 2022-01-25 PROBLEM — J96.01 ACUTE RESPIRATORY FAILURE WITH HYPOXIA (H): Status: RESOLVED | Noted: 2021-04-14 | Resolved: 2022-01-25

## 2022-01-25 PROBLEM — R09.02 HYPOXIA: Status: RESOLVED | Noted: 2021-04-14 | Resolved: 2022-01-25

## 2022-01-25 LAB
ALBUMIN SERPL-MCNC: 3.6 G/DL (ref 3.4–5)
ALP SERPL-CCNC: 106 U/L (ref 40–150)
ALT SERPL W P-5'-P-CCNC: 27 U/L (ref 0–70)
ANION GAP SERPL CALCULATED.3IONS-SCNC: 5 MMOL/L (ref 3–14)
AST SERPL W P-5'-P-CCNC: 10 U/L (ref 0–45)
BILIRUB SERPL-MCNC: 0.5 MG/DL (ref 0.2–1.3)
BUN SERPL-MCNC: 19 MG/DL (ref 7–30)
CALCIUM SERPL-MCNC: 9.1 MG/DL (ref 8.5–10.1)
CHLORIDE BLD-SCNC: 109 MMOL/L (ref 94–109)
CHOLEST SERPL-MCNC: 196 MG/DL
CO2 SERPL-SCNC: 27 MMOL/L (ref 20–32)
CREAT SERPL-MCNC: 0.89 MG/DL (ref 0.66–1.25)
DEPRECATED CALCIDIOL+CALCIFEROL SERPL-MC: 20 UG/L (ref 20–75)
ERYTHROCYTE [DISTWIDTH] IN BLOOD BY AUTOMATED COUNT: 13.7 % (ref 10–15)
FASTING STATUS PATIENT QL REPORTED: YES
GFR SERPL CREATININE-BSD FRML MDRD: >90 ML/MIN/1.73M2
GLUCOSE BLD-MCNC: 91 MG/DL (ref 70–99)
HCT VFR BLD AUTO: 45 % (ref 40–53)
HDLC SERPL-MCNC: 63 MG/DL
HGB BLD-MCNC: 14.9 G/DL (ref 13.3–17.7)
LDLC SERPL CALC-MCNC: 116 MG/DL
MCH RBC QN AUTO: 30.6 PG (ref 26.5–33)
MCHC RBC AUTO-ENTMCNC: 33.1 G/DL (ref 31.5–36.5)
MCV RBC AUTO: 92 FL (ref 78–100)
NONHDLC SERPL-MCNC: 133 MG/DL
PLATELET # BLD AUTO: 187 10E3/UL (ref 150–450)
POTASSIUM BLD-SCNC: 4.4 MMOL/L (ref 3.4–5.3)
PROT SERPL-MCNC: 7.1 G/DL (ref 6.8–8.8)
RBC # BLD AUTO: 4.87 10E6/UL (ref 4.4–5.9)
SODIUM SERPL-SCNC: 141 MMOL/L (ref 133–144)
TRIGL SERPL-MCNC: 85 MG/DL
WBC # BLD AUTO: 5.2 10E3/UL (ref 4–11)

## 2022-01-25 PROCEDURE — 82306 VITAMIN D 25 HYDROXY: CPT | Performed by: INTERNAL MEDICINE

## 2022-01-25 PROCEDURE — 80053 COMPREHEN METABOLIC PANEL: CPT | Performed by: INTERNAL MEDICINE

## 2022-01-25 PROCEDURE — 36415 COLL VENOUS BLD VENIPUNCTURE: CPT | Performed by: INTERNAL MEDICINE

## 2022-01-25 PROCEDURE — 85027 COMPLETE CBC AUTOMATED: CPT | Performed by: INTERNAL MEDICINE

## 2022-01-25 PROCEDURE — G0438 PPPS, INITIAL VISIT: HCPCS | Performed by: INTERNAL MEDICINE

## 2022-01-25 PROCEDURE — 80061 LIPID PANEL: CPT | Performed by: INTERNAL MEDICINE

## 2022-01-25 PROCEDURE — 99214 OFFICE O/P EST MOD 30 MIN: CPT | Mod: 25 | Performed by: INTERNAL MEDICINE

## 2022-01-25 RX ORDER — TADALAFIL 10 MG/1
TABLET ORAL
Qty: 30 TABLET | Refills: 1 | Status: SHIPPED | OUTPATIENT
Start: 2022-01-25 | End: 2022-01-25

## 2022-01-25 RX ORDER — TADALAFIL 10 MG/1
TABLET ORAL
Qty: 30 TABLET | Refills: 1 | Status: SHIPPED | OUTPATIENT
Start: 2022-01-25 | End: 2023-04-24

## 2022-01-25 ASSESSMENT — ENCOUNTER SYMPTOMS
HEARTBURN: 0
ARTHRALGIAS: 1
CHILLS: 0
PALPITATIONS: 0
ABDOMINAL PAIN: 0
DYSURIA: 0
SHORTNESS OF BREATH: 1
MYALGIAS: 1
DIZZINESS: 0
COUGH: 0
PARESTHESIAS: 0
EYE PAIN: 0
SORE THROAT: 0
HEMATOCHEZIA: 0
JOINT SWELLING: 1
NAUSEA: 0
HEADACHES: 1
NERVOUS/ANXIOUS: 0
DIARRHEA: 0
CONSTIPATION: 0
FREQUENCY: 0
FEVER: 0
HEMATURIA: 0
WEAKNESS: 0

## 2022-01-25 ASSESSMENT — ANXIETY QUESTIONNAIRES
IF YOU CHECKED OFF ANY PROBLEMS ON THIS QUESTIONNAIRE, HOW DIFFICULT HAVE THESE PROBLEMS MADE IT FOR YOU TO DO YOUR WORK, TAKE CARE OF THINGS AT HOME, OR GET ALONG WITH OTHER PEOPLE: NOT DIFFICULT AT ALL
1. FEELING NERVOUS, ANXIOUS, OR ON EDGE: NOT AT ALL
3. WORRYING TOO MUCH ABOUT DIFFERENT THINGS: NOT AT ALL
2. NOT BEING ABLE TO STOP OR CONTROL WORRYING: NOT AT ALL
7. FEELING AFRAID AS IF SOMETHING AWFUL MIGHT HAPPEN: NOT AT ALL
GAD7 TOTAL SCORE: 1
6. BECOMING EASILY ANNOYED OR IRRITABLE: NOT AT ALL
5. BEING SO RESTLESS THAT IT IS HARD TO SIT STILL: SEVERAL DAYS

## 2022-01-25 ASSESSMENT — PATIENT HEALTH QUESTIONNAIRE - PHQ9: 5. POOR APPETITE OR OVEREATING: NOT AT ALL

## 2022-01-25 ASSESSMENT — MIFFLIN-ST. JEOR: SCORE: 1917.05

## 2022-01-25 ASSESSMENT — ASTHMA QUESTIONNAIRES: ACT_TOTALSCORE: 21

## 2022-01-25 ASSESSMENT — PAIN SCALES - GENERAL: PAINLEVEL: MILD PAIN (2)

## 2022-01-25 ASSESSMENT — ACTIVITIES OF DAILY LIVING (ADL): CURRENT_FUNCTION: NO ASSISTANCE NEEDED

## 2022-01-25 NOTE — PROGRESS NOTES
"SUBJECTIVE:   Arnaud Bird is a 66 year old male who presents for Preventive Visit.      Patient has been advised of split billing requirements and indicates understanding: Yes  Are you in the first 12 months of your Medicare coverage?  No    Healthy Habits:     In general, how would you rate your overall health?  Good    Frequency of exercise:  2-3 days/week    Duration of exercise:  Less than 15 minutes    Do you usually eat at least 4 servings of fruit and vegetables a day, include whole grains    & fiber and avoid regularly eating high fat or \"junk\" foods?  Yes    Taking medications regularly:  Yes    Medication side effects:  None    Ability to successfully perform activities of daily living:  No assistance needed    Home Safety:  No safety concerns identified    Hearing Impairment:  No hearing concerns    In the past 6 months, have you been bothered by leaking of urine?  No    In general, how would you rate your overall mental or emotional health?  Good      PHQ-2 Total Score: 2    Additional concerns today:  No    Do you feel safe in your environment? Yes    Have you ever done Advance Care Planning? (For example, a Health Directive, POLST, or a discussion with a medical provider or your loved ones about your wishes): N/A    Hearing : Pt is able to converse well on conversation    Fall risk  Fallen 2 or more times in the past year?: No  Any fall with injury in the past year?: No    Cognitive Screening   1) Repeat 3 items (Leader, Season, Table)    2) Clock draw: NORMAL  3) 3 item recall: Recalls 3 objects  Results: 3 items recalled: COGNITIVE IMPAIRMENT LESS LIKELY    Mini-CogTM Copyright ISAAK Wilcox. Licensed by the author for use in [facility]; reprinted with permission (stefany@.[facility]). All rights reserved.      Reviewed and updated as needed this visit by clinical staff  Tobacco  Allergies  Meds  Problems  Med Hx  Surg Hx  Fam Hx         Reviewed and updated as needed this visit by " Provider  Tobacco  Allergies  Meds  Problems  Med Hx  Surg Hx  Fam Hx        Social History     Tobacco Use     Smoking status: Never Smoker     Smokeless tobacco: Never Used   Substance Use Topics     Alcohol use: Yes     Comment: case beer a week     If you drink alcohol do you typically have >3 drinks per day or >7 drinks per week? Yes    Alcohol Use 1/25/2022   Prescreen: >3 drinks/day or >7 drinks/week? No   Prescreen: >3 drinks/day or >7 drinks/week? -   AUDIT SCORE  -     AUDIT - Alcohol Use Disorders Identification Test - Reproduced from the World Health Organization Audit 2001 (Second Edition) 9/9/2019   1.  How often do you have a drink containing alcohol? 2 to 3 times a week   2.  How many drinks containing alcohol do you have on a typical day when you are drinking? 3 or 4   3.  How often do you have five or more drinks on one occasion? Less than monthly   4.  How often during the last year have you found that you were not able to stop drinking once you had started? Never   5.  How often during the last year have you failed to do what was normally expected of you because of drinking? Never   6.  How often during the last year have you needed a first drink in the morning to get yourself going after a heavy drinking session? Never   7.  How often during the last year have you had a feeling of guilt or remorse after drinking? Never   8.  How often during the last year have you been unable to remember what happened the night before because of your drinking? Never   9.  Have you or someone else been injured because of your drinking? No   10. Has a relative, friend, doctor or other health care worker been concerned about your drinking or suggested you cut down? No   TOTAL SCORE 5       Current providers sharing in care for this patient include:   Patient Care Team:  Sushma Galloway MD as PCP - General (Internal Medicine)  Carmel Chan MD as MD (Family Medicine - Sports  "Medicine)  Alex Singh MD as MD (Otolaryngology)  Gisella Steve MD as MD (Internal Medicine)  Anna Watkins MD as MD (Orthopedics)  Yesica Deleon MD as Assigned Musculoskeletal Provider  Sushma Galloway MD as Assigned PCP  Arian Blanco MD as Assigned Heart and Vascular Provider    The following health maintenance items are reviewed in Epic and correct as of today:  Health Maintenance Due   Topic Date Due     ZOSTER IMMUNIZATION (1 of 2) Never done     COLORECTAL CANCER SCREENING  10/18/2020     ADVANCE CARE PLANNING  03/08/2021     COVID-19 Vaccine (2 - Pfizer 3-dose series) 10/08/2021     ASTHMA CONTROL TEST  01/08/2022     Lab work is in process  Labs reviewed in EPIC  Review of Systems   Constitutional: Negative for chills and fever.   HENT: Positive for congestion. Negative for ear pain, hearing loss and sore throat.    Eyes: Negative for pain and visual disturbance.   Respiratory: Positive for shortness of breath. Negative for cough.    Cardiovascular: Negative for chest pain, palpitations and peripheral edema.   Gastrointestinal: Negative for abdominal pain, constipation, diarrhea, heartburn, hematochezia and nausea.   Genitourinary: Positive for impotence. Negative for dysuria, frequency, genital sores, hematuria, penile discharge and urgency.   Musculoskeletal: Positive for arthralgias, joint swelling and myalgias.   Skin: Negative for rash.   Neurological: Positive for headaches. Negative for dizziness, weakness and paresthesias.   Psychiatric/Behavioral: Negative for mood changes. The patient is not nervous/anxious.      Constitutional, HEENT, cardiovascular, pulmonary, GI, , musculoskeletal, neuro, skin, endocrine and psych systems are negative, except as otherwise noted.    OBJECTIVE:   /80   Pulse 60   Temp 97.3  F (36.3  C) (Tympanic)   Resp 16   Ht 1.702 m (5' 7\")   Wt 118.8 kg (262 lb)   SpO2 98%   BMI 41.04 kg/m   Estimated body mass " "index is 41.04 kg/m  as calculated from the following:    Height as of this encounter: 1.702 m (5' 7\").    Weight as of this encounter: 118.8 kg (262 lb).  Physical Exam  GENERAL: healthy, alert and no distress  EYES: Eyes grossly normal to inspection, PERRL and conjunctivae and sclerae normal  HENT: ear canals and TM's normal, nose and mouth without ulcers or lesions  NECK: no adenopathy, no asymmetry, masses, or scars and thyroid normal to palpation  RESP: lungs clear to auscultation - no rales, rhonchi or wheezes  CV: regular rate and rhythm, normal S1 S2, no S3 or S4, no murmur, click or rub, no peripheral edema and peripheral pulses strong  ABDOMEN: soft, nontender, no hepatosplenomegaly, no masses and bowel sounds normal  MS: no gross musculoskeletal defects noted, no edema  SKIN: no suspicious lesions or rashes  NEURO: Normal strength and tone, mentation intact and speech normal  PSYCH: mentation appears normal, affect normal/bright    Diagnostic Test Results:  Labs reviewed in Epic    ASSESSMENT / PLAN:       Assessment and Plan  1. Encounter for subsequent annual wellness visit (AWV) in Medicare patient  Pt is here for - Med check. Recent left ankle intra articular injection for OA. Last labs in 6/2021 and 3/2021 . OK to rechecking labs as patient has risk factors and dyslipidemia in the past. Will make it all from Jan to Jan.   - Comprehensive metabolic panel (BMP + Alb, Alk Phos, ALT, AST, Total. Bili, TP); Future  - CBC with platelets; Future  - Lipid panel reflex to direct LDL Fasting; Future  - Vitamin D deficiency screening; Future  - Comprehensive metabolic panel (BMP + Alb, Alk Phos, ALT, AST, Total. Bili, TP)  - CBC with platelets  - Lipid panel reflex to direct LDL Fasting  - Vitamin D deficiency screening    2. Iliac artery aneurysm, left (H)  Last diagnosed on AAA screen in 2/2021 and seen by  Vascular. Needed Surveillance as it was 2.3 cm in diameter and OK for it to be monitored yearly " as per vascular recommendations and no procedure needed till 5 cm . Pt understood and agreed with the plan. Continue ASA and Statin. Will recheck and adjust the dosage if needed.   - US Abdominal Aorta Imaging; Future  - Lipid panel reflex to direct LDL Fasting; Future  - Lipid panel reflex to direct LDL Fasting    3. Chronic pain disorder  4. Spondylosis with myelopathy, lumbar region  5. Chronic use of opiate drug for therapeutic purpose  6. Status post total shoulder arthroplasty, right  7. Primary osteoarthritis involving multiple joints  8. Lumbar disc disease with radiculopathy  Ongoing intermittent flare ups of back pain inspite of Epidurals which patient refusing any new Spine specialists at this time as he states he is able to manage with his current Norco. All risks understood.   - Comprehensive metabolic panel (BMP + Alb, Alk Phos, ALT, AST, Total. Bili, TP); Future  - Comprehensive metabolic panel (BMP + Alb, Alk Phos, ALT, AST, Total. Bili, TP)    9. Chronic gout involving toe 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 tophus, unspecified cause  - CBC with platelets; Future  - CBC with platelets    10. Morbid obesity (H)  Discussed on need for weight reduction. Offered options which he will let us know if he decides for a referral.     11. Mild persistent asthma without complication  Well controlled, continue current inhalers.     12. Vitamin D deficiency  - Vitamin D deficiency screening; Future  - Vitamin D deficiency screening    13. Sexual dysfunction  Chronic problem, states Viagra has not been helping much. Wants to try alternative. Will do as requested.   - tadalafil (CIALIS) 10 MG tablet; Take 1 tab as needed for erectile dysfunction. Do not exceed more than 2 tabs per day.  Dispense: 30 tablet; Refill: 1     Patient Instructions     As discussed, please do the fasting lab work ordered.   Sent in new medication Cialis as discussed.     ======================       Patient Education     Managing Chronic Pain   Being  in pain can be exhausting. You may find you have trouble working, sleeping, or just doing day-to-day tasks. But you can learn to manage pain, feel better, and regain control of your life.    Understanding chronic pain  Chronic pain is a serious medical problem. It's defined as pain that lasts longer than 3 months. Chronic pain includes pain that you feel regularly, even if it comes and goes. The pain may be from an ongoing injury or health problem. Or it may be because of a chronic pain syndrome, such as fibromyalgia. Sometimes pain persists when no cause can be found.    Pain should be treated  You have a right to have your pain treated. Untreated chronic pain can affect your overall health. It can lead to depression, anxiety, anger, and personality changes. It can also disrupt work, sleep, relationships, and other aspects of normal life. It may not be possible to relieve all of your pain. But it can be reduced to a level you can cope with.    Your role in treatment  Your healthcare provider will work closely with you on a plan to manage your pain. But it s up to you to put this plan into action. Control of chronic pain is done mainly through self-management. This means that you take an active role in your care. Getting support from family and friends is important too.    Planning your treatment  Your healthcare provider will first look for a cause of your pain that can be treated. He or she will also assess your pain level. This may be done by asking you to rate your pain on a scale from 1 (low pain) to 10 (severe pain). Your provider will also ask you to describe the pain. For example, is your pain sharp or dull? Is it constant or does it come and go? You may be asked to keep a pain log. This is a diary in which you track your pain. It may help identify things that tend to make your pain worse. You and your healthcare provider can make a plan to help prevent and cope with pain on a daily basis. In some cases, you  may be referred to a special pain program or clinic. Your treatment plan may include:     Medicines    Complementary therapies    Mind and body therapies    Other medical treatments    Getting physical activity   Medicines  Medicine will most likely be a part of your treatment plan. Your provider will evaluate which are the best medicines for your pain. You may use over-the-counter or prescription medicines. You may need to take more than one medicine. It may take some time to find the best medicine or combination of medicines for your pain. Take all medicines as directed. Pain medicines can be used in many ways. You may take medicines:     Every day to help   stay ahead  of the pain so that it doesn t flare up    At times when pain is worse than usual    Before activities that tend to trigger pain    To decrease sensitivity to pain  Medicines for chronic pain include:    Nonsteroidal anti-inflammatory medicines (NSAIDs) for pain from swelling and inflammation. Your provider may prescribe a type of NSAID called a YUEN inhibitor.    Acetaminophen    Anticonvulsants to treat nerve pain called neuropathy    Antidepressants to treat neuropathy    Muscle relaxants for muscle spasms    Topical medicines. These are put on the skin.    Opioids, or narcotics, to treat severe pain. These very strong medicines can help ease certain kinds of pain. They most often are used for short periods of time. Your provider will monitor your care very closely if you take opioids to reduce the risk for addiction.   Complementary therapies  These are treatments that can be used along with medical care to help relieve pain. Look for a licensed practitioner with experience treating chronic pain. Talk with your healthcare provider about using complementary therapies such as:     Massage    Physical therapy    Acupuncture and acupressure    Chiropractic    Vitamins or herbal supplements     Naturopathy    Dry needling  Mind/body therapies  The  brain and the body are both part of the pain response. The brain reads the pain signals from the body. This means that your mind has some control over how pain signals are processed. Mind/body therapies may help change how your brain reads pain signals. They may be learned with the help of a trained therapist or in a class. They include:     Deep breathing    Distraction    Visualization    Meditation    Biofeedback     Yoga  Other medical treatments  If other treatments don t work for you, one of these procedures or devices may help:    Nerve blocks to numb nerves in a painful area    Trigger point injections for painful muscles    Steroid injections for joint pain     Transcutaneous electrical nerve stimulation (TENS) to block pain signals to the brain    Spinal stimulation to block spinal pain    Implanted spinal pump that contains pain medicine    Ablation using heat, cold, or chemicals to destroy painful nerves                                                                                                                    Getting physical activity  Being physically active has many benefits. It can improve your ability to cope with pain. It may also help improve your mood, sleep, and overall health. Your healthcare provider can help you plan an exercise program that s right for your needs. This may include:     Stretching and range-of-motion exercises    Low-impact exercise such as walking, biking, swimming, and other water exercise    Strength training using light weights    Walking up the stairs instead of taking the elevator    Riding a bike instead of driving    Parking your car farther from your destination   You may need to avoid high-impact activities. These involve jumping, running, or sudden starts, stops, or changes of direction. If you haven t exercised in a long time or you have physical limitations, your healthcare provider may refer you to a physical therapist. He or she can teach you stretches  and exercises that fit your condition and fitness level.    Being active and healthy  A healthier lifestyle makes it easier to cope with pain and function better. Follow these tips:     Choose a balance of healthy foods and drinks.    Limit alcohol and caffeine.    Go to bed at about the same time each day and get enough restful sleep.    Don t let pain keep you from others. Spend time with friends and family.    Keep your mind active. Read books or take classes.    If you re not working, volunteer or join a club or social group.   Getting support  A support group lets you talk with others who also have chronic pain. Chronic pain support groups can help you feel less isolated. They can also give you tips for coping with pain. To find a local support group, contact your nearest hospital or pain clinic.    You may also want to try counseling. Counseling can help you learn coping skills and methods such as visualization. It can also help with mood problems. When choosing a counselor, look for someone who has worked with people who have chronic pain.    See your provider for regular visits and let him or her know how well treatments are working for you. Also reach out to family and friends for help and support.                               For more support and information, contact these groups:    American Academy of Pain Medicine, www.painmed.org    American Chronic Pain Association, www.theacpa.org    National Pain Foundation, www.nationalpainfoundation.org  Cliff last reviewed this educational content on 8/1/2020 2000-2021 [employer]. All rights reserved. This information is not intended as a substitute for professional medical care. Always follow your healthcare professional's instructions.                     Patient Education   Personalized Prevention Plan  You are due for the preventive services outlined below.  Your care team is available to assist you in scheduling these services.  If you  "have already completed any of these items, please share that information with your care team to update in your medical record.  Health Maintenance Due   Topic Date Due     Zoster (Shingles) Vaccine (1 of 2) Never done     Colorectal Cancer Screening  10/18/2020     Discuss Advance Care Planning  03/08/2021     COVID-19 Vaccine (2 - Pfizer 3-dose series) 10/08/2021     Asthma Control Test  01/08/2022          Return in about 6 months (around 7/25/2022), or if symptoms worsen or fail to improve, for follow up.    Sushma Galloway MD  [facility]        Patient has been advised of split billing requirements and indicates understanding: Yes    COUNSELING:  Reviewed preventive health counseling, as reflected in patient instructions  Special attention given to:       Consider AAA screening for ages 65-75 and smoking history       Regular exercise       Healthy diet/nutrition       Vision screening       Hearing screening       Dental care       Bladder control       Fall risk prevention       Colon cancer screening       Prostate cancer screening    Estimated body mass index is 41.04 kg/m  as calculated from the following:    Height as of this encounter: 1.702 m (5' 7\").    Weight as of this encounter: 118.8 kg (262 lb).    Weight management plan: Discussed healthy diet and exercise guidelines    He reports that he has never smoked. He has never used smokeless tobacco.      Appropriate preventive services were discussed with this patient, including applicable screening as appropriate for cardiovascular disease, diabetes, osteopenia/osteoporosis, and glaucoma.  As appropriate for age/gender, discussed screening for colorectal cancer, prostate cancer, breast cancer, and cervical cancer. Checklist reviewing preventive services available has been given to the patient.    Reviewed patients plan of care and provided an AVS. The Basic Care Plan (routine screening as documented in Health Maintenance) " for Arnaud meets the Care Plan requirement. This Care Plan has been established and reviewed with the Patient.    Counseling Resources:  ATP IV Guidelines  Pooled Cohorts Equation Calculator  Breast Cancer Risk Calculator  Breast Cancer: Medication to Reduce Risk  FRAX Risk Assessment  ICSI Preventive Guidelines  Dietary Guidelines for Americans, 2010  USDA's MyPlate  ASA Prophylaxis  Lung CA Screening    Sushma Galloway MD  [facility]    Identified Health Risks:

## 2022-01-25 NOTE — PATIENT INSTRUCTIONS
As discussed, please do the fasting lab work ordered.   Sent in new medication Cialis as discussed.     ======================       Patient Education     Managing Chronic Pain   Being in pain can be exhausting. You may find you have trouble working, sleeping, or just doing day-to-day tasks. But you can learn to manage pain, feel better, and regain control of your life.    Understanding chronic pain  Chronic pain is a serious medical problem. It's defined as pain that lasts longer than 3 months. Chronic pain includes pain that you feel regularly, even if it comes and goes. The pain may be from an ongoing injury or health problem. Or it may be because of a chronic pain syndrome, such as fibromyalgia. Sometimes pain persists when no cause can be found.    Pain should be treated  You have a right to have your pain treated. Untreated chronic pain can affect your overall health. It can lead to depression, anxiety, anger, and personality changes. It can also disrupt work, sleep, relationships, and other aspects of normal life. It may not be possible to relieve all of your pain. But it can be reduced to a level you can cope with.    Your role in treatment  Your healthcare provider will work closely with you on a plan to manage your pain. But it s up to you to put this plan into action. Control of chronic pain is done mainly through self-management. This means that you take an active role in your care. Getting support from family and friends is important too.    Planning your treatment  Your healthcare provider will first look for a cause of your pain that can be treated. He or she will also assess your pain level. This may be done by asking you to rate your pain on a scale from 1 (low pain) to 10 (severe pain). Your provider will also ask you to describe the pain. For example, is your pain sharp or dull? Is it constant or does it come and go? You may be asked to keep a pain log. This is a diary in which you track your  pain. It may help identify things that tend to make your pain worse. You and your healthcare provider can make a plan to help prevent and cope with pain on a daily basis. In some cases, you may be referred to a special pain program or clinic. Your treatment plan may include:     Medicines    Complementary therapies    Mind and body therapies    Other medical treatments    Getting physical activity   Medicines  Medicine will most likely be a part of your treatment plan. Your provider will evaluate which are the best medicines for your pain. You may use over-the-counter or prescription medicines. You may need to take more than one medicine. It may take some time to find the best medicine or combination of medicines for your pain. Take all medicines as directed. Pain medicines can be used in many ways. You may take medicines:     Every day to help   stay ahead  of the pain so that it doesn t flare up    At times when pain is worse than usual    Before activities that tend to trigger pain    To decrease sensitivity to pain  Medicines for chronic pain include:    Nonsteroidal anti-inflammatory medicines (NSAIDs) for pain from swelling and inflammation. Your provider may prescribe a type of NSAID called a YUEN inhibitor.    Acetaminophen    Anticonvulsants to treat nerve pain called neuropathy    Antidepressants to treat neuropathy    Muscle relaxants for muscle spasms    Topical medicines. These are put on the skin.    Opioids, or narcotics, to treat severe pain. These very strong medicines can help ease certain kinds of pain. They most often are used for short periods of time. Your provider will monitor your care very closely if you take opioids to reduce the risk for addiction.   Complementary therapies  These are treatments that can be used along with medical care to help relieve pain. Look for a licensed practitioner with experience treating chronic pain. Talk with your healthcare provider about using complementary  therapies such as:     Massage    Physical therapy    Acupuncture and acupressure    Chiropractic    Vitamins or herbal supplements     Naturopathy    Dry needling  Mind/body therapies  The brain and the body are both part of the pain response. The brain reads the pain signals from the body. This means that your mind has some control over how pain signals are processed. Mind/body therapies may help change how your brain reads pain signals. They may be learned with the help of a trained therapist or in a class. They include:     Deep breathing    Distraction    Visualization    Meditation    Biofeedback     Yoga  Other medical treatments  If other treatments don t work for you, one of these procedures or devices may help:    Nerve blocks to numb nerves in a painful area    Trigger point injections for painful muscles    Steroid injections for joint pain     Transcutaneous electrical nerve stimulation (TENS) to block pain signals to the brain    Spinal stimulation to block spinal pain    Implanted spinal pump that contains pain medicine    Ablation using heat, cold, or chemicals to destroy painful nerves                                                                                                                    Getting physical activity  Being physically active has many benefits. It can improve your ability to cope with pain. It may also help improve your mood, sleep, and overall health. Your healthcare provider can help you plan an exercise program that s right for your needs. This may include:     Stretching and range-of-motion exercises    Low-impact exercise such as walking, biking, swimming, and other water exercise    Strength training using light weights    Walking up the stairs instead of taking the elevator    Riding a bike instead of driving    Parking your car farther from your destination   You may need to avoid high-impact activities. These involve jumping, running, or sudden starts, stops, or  changes of direction. If you haven t exercised in a long time or you have physical limitations, your healthcare provider may refer you to a physical therapist. He or she can teach you stretches and exercises that fit your condition and fitness level.    Being active and healthy  A healthier lifestyle makes it easier to cope with pain and function better. Follow these tips:     Choose a balance of healthy foods and drinks.    Limit alcohol and caffeine.    Go to bed at about the same time each day and get enough restful sleep.    Don t let pain keep you from others. Spend time with friends and family.    Keep your mind active. Read books or take classes.    If you re not working, volunteer or join a club or social group.   Getting support  A support group lets you talk with others who also have chronic pain. Chronic pain support groups can help you feel less isolated. They can also give you tips for coping with pain. To find a local support group, contact your nearest hospital or pain clinic.    You may also want to try counseling. Counseling can help you learn coping skills and methods such as visualization. It can also help with mood problems. When choosing a counselor, look for someone who has worked with people who have chronic pain.    See your provider for regular visits and let him or her know how well treatments are working for you. Also reach out to family and friends for help and support.                               For more support and information, contact these groups:    American Academy of Pain Medicine, www.painmed.org    American Chronic Pain Association, www.theacpa.org    National Pain Foundation, www.nationalpainfoundation.org  Cliff last reviewed this educational content on 8/1/2020 2000-2021 [employer]. All rights reserved. This information is not intended as a substitute for professional medical care. Always follow your healthcare professional's instructions.                      Patient Education   Personalized Prevention Plan  You are due for the preventive services outlined below.  Your care team is available to assist you in scheduling these services.  If you have already completed any of these items, please share that information with your care team to update in your medical record.  Health Maintenance Due   Topic Date Due     Zoster (Shingles) Vaccine (1 of 2) Never done     Colorectal Cancer Screening  10/18/2020     Discuss Advance Care Planning  03/08/2021     COVID-19 Vaccine (2 - Pfizer 3-dose series) 10/08/2021     Asthma Control Test  01/08/2022

## 2022-01-26 ENCOUNTER — TELEPHONE (OUTPATIENT)
Dept: FAMILY MEDICINE | Facility: CLINIC | Age: 67
End: 2022-01-26
Payer: COMMERCIAL

## 2022-01-26 ASSESSMENT — ANXIETY QUESTIONNAIRES: GAD7 TOTAL SCORE: 1

## 2022-01-26 NOTE — TELEPHONE ENCOUNTER
S/w pt and gave Dr. Galolway's reply below about lab results.    Pt states understanding.    Lis AGUILAR RN  EP Triage

## 2022-01-26 NOTE — TELEPHONE ENCOUNTER
----- Message from Sushma Galloway MD sent at 1/25/2022  9:40 PM CST -----  Your Cholesterol remaining high. Given your age and no cardiac risk factors , recommend dietery management of avoiding high fat foods and red meat . Life style measures on weight reduction recommended.     All your other labs normal, you may see some highlighted which do not have Clinical significance.     Please let me know if you have any questions.  Sushma Glaloway MD on 1/25/2022 at 9:38 PM  [facility]

## 2022-01-26 NOTE — RESULT ENCOUNTER NOTE
Your Cholesterol remaining high. Given your age and no cardiac risk factors , recommend dietery management of avoiding high fat foods and red meat . Life style measures on weight reduction recommended.     All your other labs normal, you may see some highlighted which do not have Clinical significance.    Please let me know if you have any questions.  Sushma Galloway MD on 1/25/2022 at 9:38 PM  [facility]

## 2022-02-03 ENCOUNTER — ANCILLARY PROCEDURE (OUTPATIENT)
Dept: ULTRASOUND IMAGING | Facility: CLINIC | Age: 67
End: 2022-02-03
Attending: INTERNAL MEDICINE
Payer: COMMERCIAL

## 2022-02-03 DIAGNOSIS — I72.3 ILIAC ARTERY ANEURYSM, LEFT (H): ICD-10-CM

## 2022-02-03 PROCEDURE — 76775 US EXAM ABDO BACK WALL LIM: CPT | Mod: GC | Performed by: RADIOLOGY

## 2022-02-04 ENCOUNTER — TELEPHONE (OUTPATIENT)
Dept: FAMILY MEDICINE | Facility: CLINIC | Age: 67
End: 2022-02-04
Payer: COMMERCIAL

## 2022-02-04 NOTE — TELEPHONE ENCOUNTER
----- Message from Sushma Galloway MD sent at 2/3/2022 10:59 PM CST -----  Your Ultrasound aorta is showing mild dilatation of the branches of aorta but not to the extent that you should be concerned at this time. Will follow up in one year recheck for any changes and do further recommendations.     Thank you,  Sushma Galloway MD on 2/3/2022

## 2022-02-09 DIAGNOSIS — J45.30 MILD PERSISTENT ASTHMA WITHOUT COMPLICATION: Primary | ICD-10-CM

## 2022-02-10 NOTE — TELEPHONE ENCOUNTER
Routing refill request to provider for review/approval because:  Medication is reported/historical    Lis AGUILAR RN  EP Triage

## 2022-02-11 RX ORDER — IPRATROPIUM BROMIDE 42 UG/1
SPRAY, METERED NASAL
Qty: 15 ML | Refills: 3 | Status: SHIPPED | OUTPATIENT
Start: 2022-02-11 | End: 2022-09-29

## 2022-04-06 ENCOUNTER — TELEPHONE (OUTPATIENT)
Dept: OTHER | Age: 67
End: 2022-04-06
Payer: COMMERCIAL

## 2022-04-06 NOTE — TELEPHONE ENCOUNTER
Hello,  I'm with ortho con.  Pt is referred to us for leg length discrepancy for a surgical appointment.  Who sees for this?

## 2022-04-07 NOTE — TELEPHONE ENCOUNTER
Patient was called and a message was left.  Clinic needs more information on if there is a referral for the leg length discrepancy or if it is caused by hip/knee pain/injury issues.  It was left that if he has a true leg length discrepancy the clinic would have to look into this more and come up with a solution other bahena he could see Dr. Chan to start the process and get him to the proper provider.  Our number was left to call back.

## 2022-04-29 ENCOUNTER — OFFICE VISIT (OUTPATIENT)
Dept: ORTHOPEDICS | Facility: CLINIC | Age: 67
End: 2022-04-29
Payer: COMMERCIAL

## 2022-04-29 DIAGNOSIS — M19.012 LOCALIZED OSTEOARTHRITIS OF LEFT SHOULDER: Primary | ICD-10-CM

## 2022-04-29 PROCEDURE — 20611 DRAIN/INJ JOINT/BURSA W/US: CPT | Mod: LT | Performed by: FAMILY MEDICINE

## 2022-04-29 PROCEDURE — 99207 PR DROP WITH A PROCEDURE: CPT | Performed by: FAMILY MEDICINE

## 2022-04-29 RX ORDER — LIDOCAINE HYDROCHLORIDE 10 MG/ML
8 INJECTION, SOLUTION EPIDURAL; INFILTRATION; INTRACAUDAL; PERINEURAL
Status: DISCONTINUED | OUTPATIENT
Start: 2022-04-29 | End: 2024-06-12

## 2022-04-29 RX ORDER — TRIAMCINOLONE ACETONIDE 40 MG/ML
40 INJECTION, SUSPENSION INTRA-ARTICULAR; INTRAMUSCULAR
Status: DISCONTINUED | OUTPATIENT
Start: 2022-04-29 | End: 2024-06-12

## 2022-04-29 RX ADMIN — LIDOCAINE HYDROCHLORIDE 8 ML: 10 INJECTION, SOLUTION EPIDURAL; INFILTRATION; INTRACAUDAL; PERINEURAL at 09:26

## 2022-04-29 RX ADMIN — TRIAMCINOLONE ACETONIDE 40 MG: 40 INJECTION, SUSPENSION INTRA-ARTICULAR; INTRAMUSCULAR at 09:26

## 2022-04-29 NOTE — PROGRESS NOTES
Assessment & Plan     Localized osteoarthritis of left shoulder  Left GH injection  - POC US GUIDANCE NEEDLE PLACEMENT  - POC US GUIDANCE NEEDLE PLACEMENT                 Return in about 6 months (around 10/29/2022), or if symptoms worsen or fail to improve.    Carmel Chan MD  [facility] BARON Lares is a 67 year old who presents for the following health issues  accompanied by his self.    HPI     Patient is a 67-year-old white male with known left glenohumeral osteoarthritis.  Patient did have a right shoulder replacement surgery.    Review of Systems   Constitutional, HEENT, cardiovascular, pulmonary, gi and gu systems are negative, except as otherwise noted.      Objective    There were no vitals taken for this visit.  There is no height or weight on file to calculate BMI.  Physical Exam   Decreased ROM    Xray - Reviewed and interpreted by me.  Reviewed past imaging    \plain      Procedure: Left shoulder glenohumeral osteoarthritis  The indications, risk, and benefits were discussed and patient was consented.  The humeral head, glenoid, hyperechoic triangle indicated the region of the posterior labrum were identified on ultrasound and using the curvilinear probe skin marker was used to rekha the area of injection.  Initially a 4 mm 1% lidocaine was used to anesthetize the area.  ChloraPrep was used to assist clean the area.  Using sterile technique, a syringe with a 80 mm 22-gauge needle containing 1 mL Kenalog 40 mg and 4 mg 1% lidocaine injected using the ultrasound guided with a posterior approach into the left glenohumeral joint.  Ultrasound used to guide the needle placement and verified proper needle position      Patient would like his shoulder injection to last through the summer.  He left in good condition and will see if the injection is beneficial.    Large Joint Injection/Arthocentesis: L glenohumeral joint    Date/Time: 4/29/2022  9:26 AM  Performed by: Carmel Chan MD  Authorized by: Carmel Chan MD     Indications:  Osteoarthritis  Needle Size:  22 G  Guidance: ultrasound    Approach:  Posterolateral  Location:  Shoulder      Site:  L glenohumeral joint  Medications:  40 mg triamcinolone 40 MG/ML; 8 mL lidocaine (PF) 1 %  Outcome:  Tolerated well, no immediate complications  Procedure discussed: discussed risks, benefits, and alternatives    Consent Given by:  Patient  Timeout: timeout called immediately prior to procedure    Prep: patient was prepped and draped in usual sterile fashion     Louisa Hays ATC on 4/29/2022 at 9:26 AM        I agree with the following injection documentation.    ELROY Chan MD

## 2022-04-29 NOTE — LETTER
4/29/2022      RE: Arnaud Bird  [address]  [place] [facility] [place] [address]     Dear Colleague,    Thank you for referring your patient, Arnaud Bird, to the [facility]. Please see a copy of my visit note below.      Assessment & Plan     Localized osteoarthritis of left shoulder  Left GH injection  - POC US GUIDANCE NEEDLE PLACEMENT  - POC US GUIDANCE NEEDLE PLACEMENT                 Return in about 6 months (around 10/29/2022), or if symptoms worsen or fail to improve.    Carmel Chan MD  [facility]    Subjective   Arnaud is a 67 year old who presents for the following health issues  accompanied by his self.    HPI     Patient is a 67-year-old white male with known left glenohumeral osteoarthritis.  Patient did have a right shoulder replacement surgery.    Review of Systems   Constitutional, HEENT, cardiovascular, pulmonary, gi and gu systems are negative, except as otherwise noted.      Objective    There were no vitals taken for this visit.  There is no height or weight on file to calculate BMI.  Physical Exam   Decreased ROM    Xray - Reviewed and interpreted by me.  Reviewed past imaging    \plain      Procedure: Left shoulder glenohumeral osteoarthritis  The indications, risk, and benefits were discussed and patient was consented.  The humeral head, glenoid, hyperechoic triangle indicated the region of the posterior labrum were identified on ultrasound and using the curvilinear probe skin marker was used to rekha the area of injection.  Initially a 4 mm 1% lidocaine was used to anesthetize the area.  ChloraPrep was used to assist clean the area.  Using sterile technique, a syringe with a 80 mm 22-gauge needle containing 1 mL Kenalog 40 mg and 4 mg 1% lidocaine injected using the ultrasound guided with a posterior approach into the left glenohumeral joint.  Ultrasound used to guide the needle  placement and verified proper needle position      Patient would like his shoulder injection to last through the summer.  He left in good condition and will see if the injection is beneficial.    Large Joint Injection/Arthocentesis: L glenohumeral joint    Date/Time: 4/29/2022 9:26 AM  Performed by: Carmel Chan MD  Authorized by: Carmel Chan MD     Indications:  Osteoarthritis  Needle Size:  22 G  Guidance: ultrasound    Approach:  Posterolateral  Location:  Shoulder      Site:  L glenohumeral joint  Medications:  40 mg triamcinolone 40 MG/ML; 8 mL lidocaine (PF) 1 %  Outcome:  Tolerated well, no immediate complications  Procedure discussed: discussed risks, benefits, and alternatives    Consent Given by:  Patient  Timeout: timeout called immediately prior to procedure    Prep: patient was prepped and draped in usual sterile fashion     Louisa Hays ATC on 4/29/2022 at 9:26 AM      I agree with the following injection documentation.    ELROY Chan MD

## 2022-04-29 NOTE — NURSING NOTE
[address] [address]  Dept: [phone]  ______________________________________________________________________________    Patient: Arnaud Bird   : 1955   MRN: [id number]   2022    INVASIVE PROCEDURE SAFETY CHECKLIST    Date: 2022   Procedure:L GH USG CSI   Patient Name: Arnaud Bird  MRN: [id number]  YOB: 1955    Action: Complete sections as appropriate. Any discrepancy results in a HARD COPY until resolved.     PRE PROCEDURE:  Patient ID verified with 2 identifiers (name and  or MRN): Yes  Procedure and site verified with patient/designee (when able): Yes  Accurate consent documentation in medical record: Yes  H&P (or appropriate assessment) documented in medical record: Yes  H&P must be up to 20 days prior to procedure and updates within 24 hours of procedure as applicable: NA  Relevant diagnostic and radiology test results appropriately labeled and displayed as applicable: Yes  Procedure site(s) marked with provider initials: NA    TIMEOUT:  Time-Out performed immediately prior to starting procedure, including verbal and active participation of all team members addressing the following:Yes  * Correct patient identify  * Confirmed that the correct side and site are marked  * An accurate procedure consent form  * Agreement on the procedure to be done  * Correct patient position  * Relevant images and results are properly labeled and appropriately displayed  * The need to administer antibiotics or fluids for irrigation purposes during the procedure as applicable   * Safety precautions based on patient history or medication use    DURING PROCEDURE: Verification of correct person, site, and procedures any time the responsibility for care of the patient is transferred to another member of the care team.       Prior to injection, verified patient identity using patient's name and date of birth.  Due  to injection administration, patient instructed to remain in clinic for 15 minutes  afterwards, and to report any adverse reaction to me immediately.    Joint injection was performed.      Drug Amount Wasted:  Yes: 2 mg/ml   Vial/Syringe: Single dose vial  Expiration Date:  1/1/25      Louisa Hays, ATC  April 29, 2022

## 2022-05-18 ENCOUNTER — MYC REFILL (OUTPATIENT)
Dept: FAMILY MEDICINE | Facility: CLINIC | Age: 67
End: 2022-05-18
Payer: COMMERCIAL

## 2022-05-18 DIAGNOSIS — G89.4 CHRONIC PAIN SYNDROME: Chronic | ICD-10-CM

## 2022-05-18 DIAGNOSIS — M47.16 SPONDYLOSIS WITH MYELOPATHY, LUMBAR REGION: ICD-10-CM

## 2022-05-18 DIAGNOSIS — M25.661 STIFFNESS OF KNEE JOINT, RIGHT: Chronic | ICD-10-CM

## 2022-05-18 DIAGNOSIS — R26.9 GAIT DIFFICULTY: ICD-10-CM

## 2022-05-18 DIAGNOSIS — Z96.652 STATUS POST TOTAL LEFT KNEE REPLACEMENT: ICD-10-CM

## 2022-05-19 NOTE — TELEPHONE ENCOUNTER
Routing refill request to provider for review/approval because:  Drug not on the [facility] refill protocol     Pending Prescriptions:                       Disp   Refills    HYDROcodone-acetaminophen (NORCO) 7.5-325*120 ta*0            Sig: Take 1 tablet by mouth every 6 hours as needed           for pain (4 x daily as needed  maxium 4 per day)  HYDROcodone-acetaminophen (NORCO) 7.5-325 MG per tablet  Last Written Prescription Date:  04/03/2022  Last Fill Quantity: 120,  # refills: 0   Last office visit: 1/25/2022 with prescribing provider:  01/25/2022  Future Office Visit:   Next 5 appointments (look out 90 days)    Jun 29, 2022 12:00 PM  (Arrive by 11:45 AM)  Return Visit with Meron Mccloud PA-C  [facility] [place] ([facility] ) [address] [place] [id number] [phone]           Rome Lopez RN  [facility] Triage Nurse

## 2022-05-20 RX ORDER — HYDROCODONE BITARTRATE AND ACETAMINOPHEN 7.5; 325 MG/1; MG/1
1 TABLET ORAL EVERY 6 HOURS PRN
Qty: 120 TABLET | Refills: 0 | Status: SHIPPED | OUTPATIENT
Start: 2022-05-23 | End: 2022-06-19

## 2022-05-20 NOTE — TELEPHONE ENCOUNTER
[place] reviewed:  reviewed- no concerns    [place]  [email or web address]    Refill done.  Sushma Galloway MD on 5/20/2022

## 2022-05-27 NOTE — PROGRESS NOTES
Assessment and Plan  1. Chronic bilateral low back pain without sciatica  2. Spondylosis with myelopathy, lumbar region  3. Lumbar disc disease with radiculopathy  4. Chronic pain disorder  Chronic problem Uncontrolled. Previous Epidural , IR guided procedure at [facility] as ordered by me in 8/2/2021 - [id number] - IR TRANSLAMINAR EPIDURAL LUMBAR INJ INCL IMAGING -- last time injection occurred: paralysis for 2 hours, back pain increased for a couple of weeks after. Felt that it did not help at all this time. Still having soreness; pain increases so bad that he cannot function. Pain pills prescribed, helped for a limited amount of time. Wondering if there is another location to get those shots, wondering if [facility] could be an option.   - Will do as requested. Please see AVS for details of the plan.  - IR Translaminar Epidural Lumbar Inj Incl Imaging; Future  - tiZANidine (ZANAFLEX) 4 MG tablet; Take 1 tablet (4 mg) by mouth nightly as needed for muscle spasms  Dispense: 30 tablet; Refill: 0    5. Screen for colon cancer  - Adult Gastro Ref - Procedure Only; Future     Patient Instructions   As discussed, placed  IR TRANSLAMINAR EPIDURAL LUMBAR INJ INCL IMAGING at [facility] as requested.   Your Insurance does plays as role in the location where ever its covered.     ================================            Return in about 3 months (around 8/31/2022), or if symptoms worsen or fail to improve, for Follow up of last visit.    Sushma Galloway MD  [facility] Lares is a 67 year old who presents for the following health issues      History of Present Illness       Reason for visit:  Referal for spine injection    He eats 2-3 servings of fruits and vegetables daily.He consumes 1 sweetened beverage(s) daily.He exercises with enough effort to increase his heart rate 9 or less minutes per day.  He exercises with enough effort to increase his heart rate 3 or less days  per week.   He is taking medications regularly.    Last seen pt on 1/2022 for AWV.        Allergies   Allergen Reactions     No Clinical Screening - See Comments      Seasonal Allergies         Past Medical History:   Diagnosis Date     Acute respiratory failure with hypoxia (H) 4/14/2021     Arthritis      Benign positional vertigo      Carpal tunnel syndrome     mild     Chronic pain syndrome 4/14/2021     Hearing problem      LEFT WORSE THAN RIGHT  10/18/2012     Migraines      Nasal polyps      Obesity 4/14/2021     Osteoarthritis of multiple joints 4/14/2021     Pneumonia due to 2019 novel coronavirus 4/14/2021     Unspecified asthma(493.90) 10/17/2012     Unspecified asthma, with exacerbation 10/17/2012       Past Surgical History:   Procedure Laterality Date     ARTHROPLASTY SHOULDER Right 6/15/2021    Procedure: Right Total Shoulder Arthroplasty, Distal Clavicle Excision;  Surgeon: Yesica Deleon MD;  Location: [facility] OR     COLONOSCOPY  2019     HC TOOTH EXTRACTION W/FORCEP       ORTHOPEDIC SURGERY      bilateral total knee replacements     TONSILLECTOMY      age 2 or 3       Family History   Problem Relation Age of Onset     Cancer Father      Family History Negative Father      Family History Negative Mother      Cancer Mother      Stomach Problem Mother      Breast Cancer Mother      Diabetes No family hx of      Coronary Artery Disease No family hx of      Hypertension No family hx of      Hyperlipidemia No family hx of      Cerebrovascular Disease No family hx of      Breast Cancer No family hx of      Colon Cancer No family hx of      Prostate Cancer No family hx of      Other Cancer No family hx of      Depression No family hx of      Anxiety Disorder No family hx of      Mental Illness No family hx of      Substance Abuse No family hx of      Anesthesia Reaction No family hx of      Asthma No family hx of      Osteoporosis No family hx of      Genetic Disorder No family hx of      Thyroid  Disease No family hx of      Obesity No family hx of      Unknown/Adopted No family hx of        Social History     Tobacco Use     Smoking status: Never Smoker     Smokeless tobacco: Never Used   Substance Use Topics     Alcohol use: Yes     Comment: case beer a week        Current Outpatient Medications   Medication     tiZANidine (ZANAFLEX) 4 MG tablet     acetaminophen (TYLENOL) 325 MG tablet     acyclovir (ZOVIRAX) 400 MG tablet     albuterol (PROAIR HFA, PROVENTIL HFA, VENTOLIN HFA) 108 (90 BASE) MCG/ACT inhaler     allopurinol (ZYLOPRIM) 300 MG tablet     amoxicillin (AMOXIL) 500 MG capsule     diclofenac (VOLTAREN) 1 % topical gel     diclofenac (VOLTAREN) 1 % topical gel     doxycycline hyclate (VIBRA-TABS) 100 MG tablet     fluticasone (FLONASE) 50 MCG/ACT nasal spray     HYDROcodone-acetaminophen (NORCO) 7.5-325 MG per tablet     ibuprofen (ADVIL/MOTRIN) 600 MG tablet     ipratropium (ATROVENT) 0.06 % nasal spray     lidocaine 5 % EX patch     multivitamin w/minerals (MULTI-VITAMIN) tablet     mupirocin (BACTROBAN) 2 % external ointment     naloxone (NARCAN) 4 MG/0.1ML nasal spray     order for DME     oxyCODONE (ROXICODONE) 5 MG tablet     polyethylene glycol (MIRALAX) 17 g packet     pseudoePHEDrine (SUDAFED) 30 MG tablet     senna-docusate (SENOKOT-S/PERICOLACE) 8.6-50 MG tablet     tadalafil (CIALIS) 10 MG tablet     Current Facility-Administered Medications   Medication     betamethasone acet & sod phos (CELESTONE) injection 6 mg     lidocaine (PF) (XYLOCAINE) 1 % injection 1 mL     lidocaine (PF) (XYLOCAINE) 1 % injection 7 mL     lidocaine (PF) (XYLOCAINE) 1 % injection 8 mL     lidocaine (PF) (XYLOCAINE) 1 % injection 8 mL     triamcinolone (KENALOG-40) injection 40 mg     triamcinolone (KENALOG-40) injection 40 mg     triamcinolone (KENALOG-40) injection 40 mg      Review of Systems   Constitutional, HEENT, cardiovascular, pulmonary, GI, , musculoskeletal, neuro, skin, endocrine and psych  "systems are negative, except as otherwise noted.      Objective    /84   Pulse 74   Temp 98.3  F (36.8  C) (Temporal)   Ht 1.702 m (5' 7\")   Wt 116.1 kg (256 lb)   SpO2 98%   BMI 40.10 kg/m    Body mass index is 40.1 kg/m .  Physical Exam   GENERAL: healthy, alert and no distress  NECK: no adenopathy, no asymmetry, masses, or scars and thyroid normal to palpation  RESP: lungs clear to auscultation - no rales, rhonchi or wheezes  CV: regular rate and rhythm, normal S1 S2, no S3 or S4, no murmur, click or rub, no peripheral edema and peripheral pulses strong  ABDOMEN: soft, nontender, no hepatosplenomegaly, no masses and bowel sounds normal  MS: no gross musculoskeletal defects noted, no edema  BACK : Positive for bilateral paraspinal muscle spasm on lumbosacral spine.       "

## 2022-05-31 ENCOUNTER — OFFICE VISIT (OUTPATIENT)
Dept: FAMILY MEDICINE | Facility: CLINIC | Age: 67
End: 2022-05-31
Payer: COMMERCIAL

## 2022-05-31 VITALS
OXYGEN SATURATION: 98 % | SYSTOLIC BLOOD PRESSURE: 138 MMHG | HEIGHT: 67 IN | TEMPERATURE: 98.3 F | DIASTOLIC BLOOD PRESSURE: 84 MMHG | BODY MASS INDEX: 40.18 KG/M2 | WEIGHT: 256 LBS | HEART RATE: 74 BPM

## 2022-05-31 DIAGNOSIS — M51.16 LUMBAR DISC DISEASE WITH RADICULOPATHY: ICD-10-CM

## 2022-05-31 DIAGNOSIS — G89.29 CHRONIC BILATERAL LOW BACK PAIN WITHOUT SCIATICA: Primary | ICD-10-CM

## 2022-05-31 DIAGNOSIS — Z12.11 SCREEN FOR COLON CANCER: ICD-10-CM

## 2022-05-31 DIAGNOSIS — M54.50 CHRONIC BILATERAL LOW BACK PAIN WITHOUT SCIATICA: Primary | ICD-10-CM

## 2022-05-31 DIAGNOSIS — G89.4 CHRONIC PAIN DISORDER: ICD-10-CM

## 2022-05-31 DIAGNOSIS — M47.16 SPONDYLOSIS WITH MYELOPATHY, LUMBAR REGION: ICD-10-CM

## 2022-05-31 PROCEDURE — 99214 OFFICE O/P EST MOD 30 MIN: CPT | Performed by: INTERNAL MEDICINE

## 2022-05-31 ASSESSMENT — PAIN SCALES - GENERAL: PAINLEVEL: MODERATE PAIN (4)

## 2022-05-31 NOTE — PATIENT INSTRUCTIONS
As discussed, placed  IR TRANSLAMINAR EPIDURAL LUMBAR INJ INCL IMAGING at [facility] as requested.   Your Insurance does plays as role in the location where ever its covered.     ================================

## 2022-06-03 DIAGNOSIS — J30.1 SEASONAL ALLERGIC RHINITIS DUE TO POLLEN: ICD-10-CM

## 2022-06-03 RX ORDER — FLUTICASONE PROPIONATE 50 MCG
2 SPRAY, SUSPENSION (ML) NASAL DAILY
Qty: 16 G | Refills: 11 | Status: SHIPPED | OUTPATIENT
Start: 2022-06-03

## 2022-06-03 NOTE — TELEPHONE ENCOUNTER
Prescription approved per [facility] Refill Protocol.  Rylan Tidwell RN  [facility] Triage Nurse

## 2022-06-24 DIAGNOSIS — M51.16 LUMBAR DISC DISEASE WITH RADICULOPATHY: ICD-10-CM

## 2022-06-24 DIAGNOSIS — G89.4 CHRONIC PAIN DISORDER: ICD-10-CM

## 2022-06-24 DIAGNOSIS — G89.29 CHRONIC BILATERAL LOW BACK PAIN WITHOUT SCIATICA: ICD-10-CM

## 2022-06-24 DIAGNOSIS — M54.50 CHRONIC BILATERAL LOW BACK PAIN WITHOUT SCIATICA: ICD-10-CM

## 2022-06-24 DIAGNOSIS — M47.16 SPONDYLOSIS WITH MYELOPATHY, LUMBAR REGION: ICD-10-CM

## 2022-06-27 NOTE — TELEPHONE ENCOUNTER
Returned call to patient, who states that he was seen by Dr. Chan on 9/9/20 for bilateral glenohumeral joint injections per Dr. Deleon's recommendation. He states that his joints feel great from these injections but he is having ongoing pain and weakness from the bicep tear. He wants to know if he can do physical therapy for this, or if there is another plan to care for the bicep. He was informed that this message would be sent over to Dr. Deleon's team and they would be able to give him a call back with the best plan of care. He verbalized understanding and had no further questions at this time.      Products Recommended: Elta Sunblock daily recommended. Detail Level: Generalized General Sunscreen Counseling: I recommended a broad spectrum sunscreen with a SPF of 30 or higher.  Sunscreens should be applied at least 15 minutes prior to expected sun exposure and then every 2 hours after that as long as sun exposure continues. If swimming or sweating, need to use a water resistant sunblock and it should have an 80 minute time listed on the bottle. Need to reapply every 80 minutes in those instances. Encouraged sun protective clothing and hats.

## 2022-07-07 ENCOUNTER — MYC MEDICAL ADVICE (OUTPATIENT)
Dept: FAMILY MEDICINE | Facility: CLINIC | Age: 67
End: 2022-07-07

## 2022-07-07 ENCOUNTER — TELEPHONE (OUTPATIENT)
Dept: GASTROENTEROLOGY | Facility: CLINIC | Age: 67
End: 2022-07-07

## 2022-07-07 NOTE — TELEPHONE ENCOUNTER
Screening Questions    BlueKIND OF PREP RedLOCATION [review exclusion criteria] GreenSEDATION TYPE      1. Are you active on mychart? y    2. What insurance is in the chart? [employer]/Medicare     3.   Ordering/Referring Provider: Laverne    4. BMI   (If greater than 40 review exclusion criteria [PAC APPT IF [MAC] @ [facility])  35.9 [If yes, BMI OVER 40-EXTENDED PREP]      **(Sedation review/consideration needed)**  Do you have a legal guardian or Medical Power of    and/or are you able to give consent for your medical care?     y    5. Have you had a positive covid test in the last 90 days?   n - n    6.  Are you currently on dialysis?   n [ If yes, G-PREP & HOSPITAL setting ONLY]     7.  Do you have chronic kidney disease?  n [ If yes, G-PREP ]    8.   Do you have a diagnosis of diabetes?   n   [ If yes, G-PREP ]    9.  On a regular basis do you go 3-5 days between bowel movements?   n   [ If yes, EXTENDED PREP]    10.  Are you taking any prescription pain medications on a routine schedule?    y -  [ If yes, EXTENDED PREP] [If yes, MAC]      11.   Do you have any chemical dependencies such as alcohol, street drugs, or methadone?    n [If yes, MAC]    12.   Do you have any history of post-traumatic stress syndrome, severe anxiety or history of psychosis?    n  [If yes, MAC]    13.  [FEMALES] Are you currently pregnant?     If yes, how many weeks?       Respiratory/Heart Screening:  [If yes to any of the following HOSPITAL setting only]     14. Do you have Pulmonary Hypertension [Lungs]?   n       15. Do you have UNCONTROLLED asthma?   n     16.  Do you use daily home oxygen?  n      17. Do you have mod to severe Obstructive Sleep Apnea?         (OKAY @ [facility]  [facility]  SH  PH  RI  MG - if pt is not on OXYGEN)  n      18.   Have you had a heart or lung transplant?   n      19.   Have you had a stroke or Transient ischemic attack (TIA - aka  mini stroke ) within 6 months?  (If yes, please review exclusion  criteria)  n     20.   In the past 6 months, have you had any heart related issues including cardiomyopathy or heart attack?   n           If yes, did it require cardiac stenting or other implantable device?   n      21.   Do you have any implantable devices in your body (pacemaker, defib, LVAD)? (If yes, please review exclusion criteria)  n     22. Do you take nitroglycerin?   n           If yes, how often? n  (if yes, HOSPITAL setting ONLY)    23.  Are you currently taking any blood thinners?    [If yes, INFORM patient to follw up w/ ORDERING PROVIDER FOR BRIDGING INSTRUCTIONS]     n    24.   Do you transfer independently?                (If NO, please HOSPITAL setting ONLY)  y    25.   Preferred LOCAL Pharmacy for Pre Prescription:         [id number] [place], [place] - [address]  [id number] [place], [place] - [address] [facility] [address] [place], [place] - [address]    Scheduling Details  (Please ask for phone number if not scheduled by patient)      Caller : Arnaud Bird    Date of Procedure: 8/15  Surgeon: Mark  Location:         Sedation Type: MAC l   Conscious Sedation- Needs  for 6 hours after the procedure  MAC/General-Needs  for 24 hours after procedure    y :[Pre-op Required] at [facility]  MG and OR for MAC sedation   (advise patient they will need a pre-op WITH IN 30 DAYS of procedure date)     Type of Procedure Scheduled:   Lower Endoscopy [Colonoscopy]    Which Colonoscopy Prep was Sent?:   Mprep    [facility]UANJ[facility]TS CF PATIENTS & GRO[facility]'S PATIENTS NEEDS EXTENDED PREP       Informed patient they will need an adult  y  Cannot take any type of public or medical transportation alone    Pre-Procedure Covid test to be completed at [facility] or Externally: y  **INFORMED OF HOME TESTING & LAB OPTION**        Confirmed Nurse will call to complete assessment y    Additional comments:

## 2022-07-13 ENCOUNTER — TELEPHONE (OUTPATIENT)
Dept: FAMILY MEDICINE | Facility: CLINIC | Age: 67
End: 2022-07-13

## 2022-07-13 DIAGNOSIS — M47.16 SPONDYLOSIS WITH MYELOPATHY, LUMBAR REGION: Primary | ICD-10-CM

## 2022-07-13 DIAGNOSIS — M51.16 LUMBAR DISC DISEASE WITH RADICULOPATHY: ICD-10-CM

## 2022-07-13 NOTE — TELEPHONE ENCOUNTER
Pt has complex history with severity of symptoms and also few side effects with his Epidurals. In such situations I would recommend to be with IR guided procedure. Please let us know what other locations come under patient insurance coverage so that we can place the referral accordingly . He did not want to go back to [facility] which he has taken in the past.    Thank you ,   Sushma Galloway MD on 7/13/2022 at 2:14 PM

## 2022-07-13 NOTE — TELEPHONE ENCOUNTER
----- Message from Sofierandell Thompson sent at 7/13/2022 11:08 AM CDT -----  Regarding: Lumbar Epidural  Hello,    Patient called to schedule a lumbar epidural injection. You ordered an IR Lumbar Epidural injection and patient called Radiology to schedule this. My department only schedules XR Lumbar Epidural Injections. Did you want patient to be seen by Interventional Radiology for this? If not, please change the order to XR Lumbar Epidural. Patient is waiting to hear back.    Thank you!

## 2022-07-18 ENCOUNTER — HOSPITAL ENCOUNTER (OUTPATIENT)
Facility: CLINIC | Age: 67
End: 2022-07-18
Admitting: RADIOLOGY
Payer: COMMERCIAL

## 2022-07-19 ENCOUNTER — MYC REFILL (OUTPATIENT)
Dept: FAMILY MEDICINE | Facility: CLINIC | Age: 67
End: 2022-07-19

## 2022-07-19 DIAGNOSIS — R26.9 GAIT DIFFICULTY: ICD-10-CM

## 2022-07-19 DIAGNOSIS — Z96.652 STATUS POST TOTAL LEFT KNEE REPLACEMENT: ICD-10-CM

## 2022-07-19 DIAGNOSIS — M25.661 STIFFNESS OF KNEE JOINT, RIGHT: Chronic | ICD-10-CM

## 2022-07-19 DIAGNOSIS — G89.4 CHRONIC PAIN SYNDROME: Chronic | ICD-10-CM

## 2022-07-19 DIAGNOSIS — M47.16 SPONDYLOSIS WITH MYELOPATHY, LUMBAR REGION: ICD-10-CM

## 2022-07-19 NOTE — TELEPHONE ENCOUNTER
Pt calling because he received a call from [facility] Interventional Office that his appt for lumbar puncture on 7/20/22 does not have the correct orders.    Interventional office [phone]    Read messages below from Dr. Galloway to clarify the situation to pt and let him know that Dr. Galloway would like him to have an IR guided lumbar epidural done-- not an XR. Pt states he has [facility] Medicare insurance and would like to have procedure as soon as possible. Care team to find out what location pt can go to to have IR epidural done that is covered by insurnace.     Team to call pt back at [phone] with updates. Please send [employer] message if pt does not answer his phone.    Syeda Marcelino,  [facility]

## 2022-07-19 NOTE — TELEPHONE ENCOUNTER
Orders placed.  Please have them look to check that the orders are correct or not.  If the orders are incorrect, please put the correct orders in and route it back for signature.    Teddy Acosta MD  [facility]

## 2022-07-19 NOTE — TELEPHONE ENCOUNTER
PCP can you please order IR guided lumbar epidural?   Once ordered please route to Team to call pt back at [phone].    Karolina Gandara RN  [facility]

## 2022-07-20 RX ORDER — HYDROCODONE BITARTRATE AND ACETAMINOPHEN 7.5; 325 MG/1; MG/1
1 TABLET ORAL EVERY 6 HOURS PRN
Qty: 120 TABLET | Refills: 0 | Status: SHIPPED | OUTPATIENT
Start: 2022-07-22 | End: 2022-08-16

## 2022-07-20 NOTE — TELEPHONE ENCOUNTER
Called and states appt was cancelled because wrong order was placed for what he wanted. States he has been receiving Lumbar injections not Lumbar Puncture for spinal pain in past.     Spoke w/ Shannon  From [facility]    New Order pended. Please advise    Pati PAYTON RN  EP Triage

## 2022-07-20 NOTE — TELEPHONE ENCOUNTER
[place] reviewed:  reviewed- no concerns    [place]:  [email or web address]    Refill done from 7/22/22 as per  review.    Thank you ,  Sushma Galloway MD on 7/20/2022 at 11:05 AM

## 2022-07-21 DIAGNOSIS — G89.4 CHRONIC PAIN DISORDER: ICD-10-CM

## 2022-07-21 DIAGNOSIS — M51.16 LUMBAR DISC DISEASE WITH RADICULOPATHY: ICD-10-CM

## 2022-07-21 DIAGNOSIS — M54.50 CHRONIC BILATERAL LOW BACK PAIN WITHOUT SCIATICA: ICD-10-CM

## 2022-07-21 DIAGNOSIS — M47.16 SPONDYLOSIS WITH MYELOPATHY, LUMBAR REGION: ICD-10-CM

## 2022-07-21 DIAGNOSIS — G89.29 CHRONIC BILATERAL LOW BACK PAIN WITHOUT SCIATICA: ICD-10-CM

## 2022-07-22 NOTE — TELEPHONE ENCOUNTER
Routing refill request to provider for review/approval because:  Drug not on the [facility] refill protocol   Sharita BLACKMON RN  [facility]

## 2022-07-22 NOTE — TELEPHONE ENCOUNTER
Called pt to inform that new orders were signed for lumbar injections. No answer, sent [employer] message per pt's request.     Syeda Marcelino,  [facility]

## 2022-07-25 ENCOUNTER — HOSPITAL ENCOUNTER (OUTPATIENT)
Facility: CLINIC | Age: 67
End: 2022-07-25
Payer: COMMERCIAL

## 2022-08-01 ENCOUNTER — MYC MEDICAL ADVICE (OUTPATIENT)
Dept: ORTHOPEDICS | Facility: CLINIC | Age: 67
End: 2022-08-01

## 2022-08-01 DIAGNOSIS — Z98.818 OTHER DENTAL PROCEDURE STATUS: ICD-10-CM

## 2022-08-02 ENCOUNTER — TELEPHONE (OUTPATIENT)
Dept: MEDSURG UNIT | Facility: CLINIC | Age: 67
End: 2022-08-02

## 2022-08-02 RX ORDER — AMOXICILLIN 500 MG/1
CAPSULE ORAL
Qty: 4 CAPSULE | Refills: 1 | Status: SHIPPED | OUTPATIENT
Start: 2022-08-02 | End: 2023-12-26

## 2022-08-02 NOTE — TELEPHONE ENCOUNTER
CSE, No bedtime, Clinical screening for allergies will need to be done prior to her injection on admit.

## 2022-08-02 NOTE — TELEPHONE ENCOUNTER
S/p right TSA, distal clavicle excision, DOS: 6/15/21.    Refilled abx per standing orders.    Edgardo Malin RN

## 2022-08-10 ENCOUNTER — OFFICE VISIT (OUTPATIENT)
Dept: FAMILY MEDICINE | Facility: CLINIC | Age: 67
End: 2022-08-10
Payer: COMMERCIAL

## 2022-08-10 VITALS
DIASTOLIC BLOOD PRESSURE: 60 MMHG | WEIGHT: 255 LBS | BODY MASS INDEX: 39.94 KG/M2 | RESPIRATION RATE: 16 BRPM | OXYGEN SATURATION: 97 % | SYSTOLIC BLOOD PRESSURE: 120 MMHG | HEART RATE: 64 BPM | TEMPERATURE: 98 F

## 2022-08-10 DIAGNOSIS — Z12.11 SCREEN FOR COLON CANCER: ICD-10-CM

## 2022-08-10 DIAGNOSIS — M51.16 LUMBAR DISC DISEASE WITH RADICULOPATHY: ICD-10-CM

## 2022-08-10 DIAGNOSIS — Z01.818 PREOPERATIVE CLEARANCE: Primary | ICD-10-CM

## 2022-08-10 DIAGNOSIS — Z79.891 CHRONIC USE OF OPIATE DRUG FOR THERAPEUTIC PURPOSE: ICD-10-CM

## 2022-08-10 DIAGNOSIS — J45.20 MILD INTERMITTENT ASTHMA WITHOUT COMPLICATION: ICD-10-CM

## 2022-08-10 DIAGNOSIS — G89.4 CHRONIC PAIN DISORDER: ICD-10-CM

## 2022-08-10 DIAGNOSIS — Z79.899 ENCOUNTER FOR LONG-TERM (CURRENT) USE OF MEDICATIONS: ICD-10-CM

## 2022-08-10 LAB
CANNABINOIDS UR QL SCN: NORMAL
CREAT UR-MCNC: 120 MG/DL
ERYTHROCYTE [DISTWIDTH] IN BLOOD BY AUTOMATED COUNT: 14.1 % (ref 10–15)
HCT VFR BLD AUTO: 46.6 % (ref 40–53)
HGB BLD-MCNC: 15.5 G/DL (ref 13.3–17.7)
MCH RBC QN AUTO: 31.6 PG (ref 26.5–33)
MCHC RBC AUTO-ENTMCNC: 33.3 G/DL (ref 31.5–36.5)
MCV RBC AUTO: 95 FL (ref 78–100)
PLATELET # BLD AUTO: 206 10E3/UL (ref 150–450)
RBC # BLD AUTO: 4.9 10E6/UL (ref 4.4–5.9)
WBC # BLD AUTO: 5.5 10E3/UL (ref 4–11)

## 2022-08-10 PROCEDURE — 80053 COMPREHEN METABOLIC PANEL: CPT | Performed by: INTERNAL MEDICINE

## 2022-08-10 PROCEDURE — 85027 COMPLETE CBC AUTOMATED: CPT | Performed by: INTERNAL MEDICINE

## 2022-08-10 PROCEDURE — 36415 COLL VENOUS BLD VENIPUNCTURE: CPT | Performed by: INTERNAL MEDICINE

## 2022-08-10 PROCEDURE — 80307 DRUG TEST PRSMV CHEM ANLYZR: CPT | Performed by: INTERNAL MEDICINE

## 2022-08-10 PROCEDURE — 99214 OFFICE O/P EST MOD 30 MIN: CPT | Performed by: INTERNAL MEDICINE

## 2022-08-10 ASSESSMENT — ASTHMA QUESTIONNAIRES
ACT_TOTALSCORE: 22
QUESTION_1 LAST FOUR WEEKS HOW MUCH OF THE TIME DID YOUR ASTHMA KEEP YOU FROM GETTING AS MUCH DONE AT WORK, SCHOOL OR AT HOME: NONE OF THE TIME
QUESTION_4 LAST FOUR WEEKS HOW OFTEN HAVE YOU USED YOUR RESCUE INHALER OR NEBULIZER MEDICATION (SUCH AS ALBUTEROL): ONCE A WEEK OR LESS
ACT_TOTALSCORE: 22
QUESTION_2 LAST FOUR WEEKS HOW OFTEN HAVE YOU HAD SHORTNESS OF BREATH: ONCE OR TWICE A WEEK
QUESTION_3 LAST FOUR WEEKS HOW OFTEN DID YOUR ASTHMA SYMPTOMS (WHEEZING, COUGHING, SHORTNESS OF BREATH, CHEST TIGHTNESS OR PAIN) WAKE YOU UP AT NIGHT OR EARLIER THAN USUAL IN THE MORNING: NOT AT ALL
QUESTION_5 LAST FOUR WEEKS HOW WOULD YOU RATE YOUR ASTHMA CONTROL: WELL CONTROLLED

## 2022-08-10 NOTE — PROGRESS NOTES
[address] [address]  Phone: [phone]  Primary Provider: Lorie More  Pre-op Performing Provider: LORIE MORE    PREOPERATIVE EVALUATION:  Today's date: 8/10/2022    Arnaud Bird is a 67 year old male who presents for a preoperative evaluation.    Surgical Information:  Surgery/Procedure: Colonoscopy  Surgery Location: [facility]  Surgeon: Mark  Surgery Date: 8/15/2022  Time of Surgery: 2:00 PM  Where patient plans to recover: At home with family  Fax number for surgical facility: Note does not need to be faxed, will be available electronically in Epic.    Type of Anesthesia Anticipated: MAC    Assessment & Plan     The proposed surgical procedure is considered INTERMEDIATE risk.    Assessment and Plan  1. Preoperative clearance  2. Screen for colon cancer  - Comprehensive metabolic panel (BMP + Alb, Alk Phos, ALT, AST, Total. Bili, TP); Future  - CBC with platelets; Future  - Comprehensive metabolic panel (BMP + Alb, Alk Phos, ALT, AST, Total. Bili, TP)  - CBC with platelets  - Colonscopy Screening  Referral; Future    3. Encounter for long-term (current) use of medications  4. Lumbar disc disease with radiculopathy  5. Chronic use of opiate drug for therapeutic purpose  6. Chronic pain disorder  CSA done in the office today , pt lives far and opting to do this in this visit.   - [id number] - Urine Drug Confirmation Panel (Comprehensive); Future  - [id number] - Urine Drug Confirmation Panel (Comprehensive)    7. Mild intermittent asthma without complication  Well controlled, continue current inhalers.      Patient Instructions     As discussed, will do pertinent labs for your preoperative clearance.    Have done CSA for your opiate refills.     =========================  Preparing for Your Surgery  Getting started  A nurse will call you to review your health history and instructions. They will give you  an arrival time based on your scheduled surgery time. Please be ready to share:    Your doctor's clinic name and phone number    Your medical, surgical and anesthesia history    A list of allergies and sensitivities    A list of medicines, including herbal treatments and over-the-counter drugs    Whether the patient has a legal guardian (ask how to send us the papers in advance)  Please tell us if you're pregnant--or if there's any chance you might be pregnant. Some surgeries may injure a fetus (unborn baby), so they require a pregnancy test. Surgeries that are safe for a fetus don't always need a test, and you can choose whether to have one.   If you have a child who's having surgery, please ask for a copy of Preparing for Your Child's Surgery.    Preparing for surgery  1. Within 30 days of surgery: Have a pre-op exam (sometimes called an H&P, or History and Physical). This can be done at a clinic or pre-operative center.  ? If you're having a , you may not need this exam. Talk to your care team.  2. At your pre-op exam, talk to your care team about all medicines you take. If you need to stop any medicines before surgery, ask when to start taking them again.  ? We do this for your safety. Many medicines can make you bleed too much during surgery. Some change how well surgery (anesthesia) drugs work.  3. Call your insurance company to let them know you're having surgery. (If you don't have insurance, call [phone].)  4. Call your clinic if there's any change in your health. This includes signs of a cold or flu (sore throat, runny nose, cough, rash, fever). It also includes a scrape or scratch near the surgery site.  5. If you have questions on the day of surgery, call your hospital or surgery center.  COVID testing  You may need to be tested for COVID-19 before having surgery. If so, we will give you instructions.  Eating and drinking guidelines  For your safety: Unless your surgeon tells you otherwise,  follow the guidelines below.    Eat and drink as usual until 8 hours before surgery. After that, no food or milk.    Drink clear liquids until 2 hours before surgery. These are liquids you can see through, like water, Gatorade and Propel Water. You may also have black coffee and tea (no cream or milk).    Nothing by mouth within 2 hours of surgery. This includes gum, candy and breath mints.    If you drink alcohol: Stop drinking it the night before surgery.    If your care team tells you to take medicine on the morning of surgery, it's okay to take it with a sip of water.  Preventing infection  1. Shower or bathe the night before and morning of your surgery. Follow the instructions your clinic gave you. (If no instructions, use regular soap.)  2. Don't shave or clip hair near your surgery site. We'll remove the hair if needed.  3. Don't smoke or vape the morning of surgery. You may chew nicotine gum up to 2 hours before surgery. A nicotine patch is okay.  ? Note: Some surgeries require you to completely quit smoking and nicotine. Check with your surgeon.  4. Your care team will make every effort to keep you safe from infection. We will:  ? Clean our hands often with soap and water (or an alcohol-based hand rub).  ? Clean the skin at your surgery site with a special soap that kills germs.  ? Give you a special gown to keep you warm. (Cold raises the risk of infection.)  ? Wear special hair covers, masks, gowns and gloves during surgery.  ? Give antibiotic medicine, if prescribed. Not all surgeries need antibiotics.  What to bring on the day of surgery  1. Photo ID and insurance card  2. Copy of your health care directive, if you have one  3. Glasses and hearing aides (bring cases)  ? You can't wear contacts during surgery  4. Inhaler and eye drops, if you use them (tell us about these when you arrive)  5. CPAP machine or breathing device, if you use them  6. A few personal items, if spending the night  7. If you have  . . .  ? A pacemaker, ICD (cardiac defibrillator) or other implant: Bring the ID card.  ? An implanted stimulator: Bring the remote control.  ? A legal guardian: Bring a copy of the certified (court-stamped) guardianship papers.  Please remove any jewelry, including body piercings. Leave jewelry and other valuables at home.  If you're going home the day of surgery    You must have a responsible adult drive you home. They should stay with you overnight as well.    If you don't have someone to stay with you, and you aren't safe to go home alone, we may keep you overnight. Insurance often won't pay for this.  After surgery  If it's hard to control your pain or you need more pain medicine, please call your surgeon's office.  Questions?   If you have any questions for your care team, list them here: _________________________________________________________________________________________________________________________________________________________________________ ____________________________________ ____________________________________ ____________________________________  For informational purposes only. Not to replace the advice of your health care provider. Copyright   2003, 2019 [employer]. All rights reserved. Clinically reviewed by Lali Mckeon MD. uTest [id number] - REV 07/21.      Return in about 3 months (around 11/10/2022), or if symptoms worsen or fail to improve, for chronic pain.    Sushma Galloway MD  [facility]KAROLINE BENAVIDESUofL Health - Peace HospitalTIFFANIE       Risks and Recommendations:  The patient has the following additional risks and recommendations for perioperative complications:   - Consult Hospitalist / IM to assist with post-op medical management  Pulmonary:    - Incentive spirometry post-op   - Consider Respiratory Therapy (Respiratory Care IP Consult) post-op   - Intermittent Asthma on inhalers  Social and Substance:    - High tolerance to opioid analgesics due to Chronic opiates    Medication  Instructions:  Patient is to take all scheduled medications on the day of surgery    RECOMMENDATION:  APPROVAL GIVEN to proceed with proposed procedure, without further diagnostic evaluation.    Review of external notes as documented above     30 minutes spent on the date of the encounter doing chart review, review of outside records, review of test results, interpretation of tests, patient visit and documentation         Subjective     HPI related to upcoming procedure:     Preop Questions 8/3/2022   1. Have you ever had a heart attack or stroke? No   2. Have you ever had surgery on your heart or blood vessels, such as a stent placement, a coronary artery bypass, or surgery on an artery in your head, neck, heart, or legs? No   3. Do you have chest pain with activity? No   4. Do you have a history of  heart failure? No   5. Do you currently have a cold, bronchitis or symptoms of other infection? No   6. Do you have a cough, shortness of breath, or wheezing? No   7. Do you or anyone in your family have previous history of blood clots? No   8. Do you or does anyone in your family have a serious bleeding problem such as prolonged bleeding following surgeries or cuts? No   9. Have you ever had problems with anemia or been told to take iron pills? No   10. Have you had any abnormal blood loss such as black, tarry or bloody stools? No   11. Have you ever had a blood transfusion? No   12. Are you willing to have a blood transfusion if it is medically needed before, during, or after your surgery? Yes   13. Have you or any of your relatives ever had problems with anesthesia? YES    14. Do you have sleep apnea, excessive snoring or daytime drowsiness? No   14a. Do you have a CPAP machine? -   15. Do you have any artifical heart valves or other implanted medical devices like a pacemaker, defibrillator, or continuous glucose monitor? No   16. Do you have artificial joints? YES    17. Are you allergic to latex? No       Health  Care Directive:  Patient does not have a Health Care Directive or Living Will: N/A    Preoperative Review of :   reviewed - controlled substances reflected in medication list.      Status of Chronic Conditions:  See problem list for active medical problems.  Problems all longstanding and stable, except as noted/documented.  See ROS for pertinent symptoms related to these conditions.      Review of Systems  CONSTITUTIONAL: NEGATIVE for fever, chills, change in weight  INTEGUMENTARY/SKIN: NEGATIVE for worrisome rashes, moles or lesions  EYES: NEGATIVE for vision changes or irritation  ENT/MOUTH: NEGATIVE for ear, mouth and throat problems  RESP: NEGATIVE for significant cough or SOB  CV: NEGATIVE for chest pain, palpitations or peripheral edema  GI: NEGATIVE for nausea, abdominal pain, heartburn, or change in bowel habits  : NEGATIVE for frequency, dysuria, or hematuria  MUSCULOSKELETAL: NEGATIVE for significant arthralgias or myalgia  NEURO: NEGATIVE for weakness, dizziness or paresthesias  ENDOCRINE: NEGATIVE for temperature intolerance, skin/hair changes  HEME: NEGATIVE for bleeding problems  PSYCHIATRIC: NEGATIVE for changes in mood or affect    Patient Active Problem List    Diagnosis Date Noted     Hyperlipidemia with target LDL less than 130 02/05/2014     Priority: High     Diagnosis updated by automated process. Provider to review and confirm.       Iliac artery aneurysm, left (H) 01/25/2022     Priority: Medium      2/2021 - iliac aneurysm ever approached 3.5-4.0 cm in diameter.  He will continue his medical regimen.  Vascular surgical follow-up will be with me in 1 year for a surveillance aortoiliac ultrasound.       Status post total shoulder arthroplasty, right 06/25/2021     Priority: Medium     Morbid obesity (H) 05/11/2021     Priority: Medium     SOB (shortness of breath) 04/14/2021     Priority: Medium     2019 novel coronavirus disease (COVID-19) 04/14/2021     Priority: Medium      Osteoarthritis of multiple joints 04/14/2021     Priority: Medium     Pneumonia due to severe acute respiratory syndrome coronavirus 2 (SARS-CoV-2) 04/14/2021     Priority: Medium     Arthritis of shoulder region, right 03/05/2021     Priority: Medium     Added automatically from request for surgery [id number]       Chronic use of opiate drug for therapeutic purpose 07/19/2020     Priority: Medium     Primary osteoarthritis of left shoulder 01/16/2020     Priority: Medium     Migraine without aura and without status migrainosus, not intractable 04/20/2018     Priority: Medium     Impingement syndrome, shoulder, right 03/26/2018     Priority: Medium     Chronic gout involving toe 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 tophus, unspecified cause 08/22/2017     Priority: Medium     Chronic pain disorder 09/22/2016     Priority: Medium     Patient is followed by ARGENIS MELO MD for ongoing prescription of pain medication.  All refills should only be approved by this provider, or covering partner.    Medication(s):  NORCO  7.5MG PO FOUR TIMES DAILY .   Maximum quantity per month:  120   Clinic visit frequency required: Q 3 months     Controlled substance agreement:  Encounter-Level CSA - 10/31/2016:    Controlled Substance Agreement - Scan on 11/15/2016  7:53 AM: CONTROLLED SUBSTANCE AGREEMENT     Patient-Level CSA:    Controlled Substance Agreement - Opioid - Scan on 3/19/2019 11:01 AM       Pain Clinic evaluation in the past: No    DIRE Total Score(s):  DIRE SCORE 7/2/2016   DIRE Total Score 18       RX [place] ([place]) reviewed:  reviewed- no concerns 12/21/2020    [place]:  [email or web address]             Chronic bilateral low back pain without sciatica 07/29/2016     Priority: Medium     ACP (advance care planning) 03/08/2016     Priority: Medium     Advance Care Planning 3/8/2016: ACP Review of Chart / Resources Provided:  Reviewed chart for advance care plan.  Arnaud Bird has no plan or code  status on file. Discussed available resources and provided with information.   Added by Mary Jo Hurt             Sexual dysfunction 11/19/2015     Priority: Medium     Lumbar disc disease with radiculopathy 10/02/2015     Priority: Medium     Groin strain, initial encounter 10/02/2015     Priority: Medium     IMO Regulatory Load OCT 2020       Mild persistent asthma 11/29/2013     Priority: Medium     CARDIOVASCULAR SCREENING; LDL GOAL LESS THAN 100 04/23/2013     Priority: Medium     LEFT WORSE THAN RIGHT  10/18/2012     Priority: Medium     Bilateral carpal tunnel syndrome 10/17/2012     Priority: Medium     Vitamin D deficiency 10/17/2012     Priority: Medium     Problem list name updated by automated process. Provider to review       Plantar fascial fibromatosis 10/17/2012     Priority: Medium     Mild intermittent asthma without complication 10/17/2012     Priority: Medium     Problem list name updated by automated process. Provider to review       Hypertrophy of prostate without urinary obstruction 10/17/2012     Priority: Medium     Problem list name updated by automated process. Provider to review       Obesity 10/17/2012     Priority: Medium     Problem list name updated by automated process. Provider to review       Hyperlipidemia 10/17/2012     Priority: Medium     Problem list name updated by automated process. Provider to review       Spondylosis with myelopathy, lumbar region 10/17/2012     Priority: Medium     Stiffness of knee joint, right 05/21/2009     Priority: Medium     Gait difficulty 05/21/2009     Priority: Medium     S/P TKR (total knee replacement) 05/21/2009     Priority: Medium     (Problem list name updated by automated process. Provider to review and confirm.)        Past Medical History:   Diagnosis Date     Acute respiratory failure with hypoxia (H) 4/14/2021     Arthritis      Benign positional vertigo      Carpal tunnel syndrome     mild     Chronic pain syndrome 4/14/2021      Hearing problem      LEFT WORSE THAN RIGHT  10/18/2012     Migraines      Nasal polyps      Obesity 4/14/2021     Osteoarthritis of multiple joints 4/14/2021     Pneumonia due to 2019 novel coronavirus 4/14/2021     Unspecified asthma(493.90) 10/17/2012     Unspecified asthma, with exacerbation 10/17/2012     Past Surgical History:   Procedure Laterality Date     ARTHROPLASTY SHOULDER Right 6/15/2021    Procedure: Right Total Shoulder Arthroplasty, Distal Clavicle Excision;  Surgeon: Yesica Deleon MD;  Location: [facility]     COLONOSCOPY  2019     HC TOOTH EXTRACTION W/FORCEP       ORTHOPEDIC SURGERY      bilateral total knee replacements     TONSILLECTOMY      age 2 or 3     Current Outpatient Medications   Medication Sig Dispense Refill     acetaminophen (TYLENOL) 325 MG tablet Take 2 tablets (650 mg) by mouth every 4 hours as needed for other 40 tablet 0     acyclovir (ZOVIRAX) 400 MG tablet TAKE TWO TABLETS BY MOUTH TWICE DAILY for 5 days as needed for genital herpes outbreak 28 tablet 4     albuterol (PROAIR HFA, PROVENTIL HFA, VENTOLIN HFA) 108 (90 BASE) MCG/ACT inhaler Inhale 2 puffs into the lungs every 6 hours as needed for shortness of breath / dyspnea 1 Inhaler 11     allopurinol (ZYLOPRIM) 300 MG tablet Take 1 tablet (300 mg) by mouth daily 90 tablet 3     amoxicillin (AMOXIL) 500 MG capsule Take 2 g orally one hour before the dental procedure 4 capsule 1     diclofenac (VOLTAREN) 1 % topical gel Apply 4 g topically 4 times daily 100 g 3     diclofenac (VOLTAREN) 1 % topical gel Place 4 g onto the skin 4 times daily 100 g 1     fluticasone (FLONASE) 50 MCG/ACT nasal spray Spray 2 sprays into both nostrils daily 16 g 11     HYDROcodone-acetaminophen (NORCO) 7.5-325 MG per tablet Take 1 tablet by mouth every 6 hours as needed for pain (4 x daily as needed  maxium 4 per day) 120 tablet 0     ibuprofen (ADVIL/MOTRIN) 600 MG tablet Take 1 tablet (600 mg) by mouth every 6 hours as needed 40 tablet 0      ipratropium (ATROVENT) 0.06 % nasal spray SPRAY 2 SPRAYS INTO BOTH NOSTRILS 4 TIMES DAILY 15 mL 3     lidocaine 5 % EX patch Place 1 patch onto the skin every 24 hours 30 patch 11     multivitamin w/minerals (THERA-VIT-M) tablet Take 1 tablet by mouth daily       mupirocin (BACTROBAN) 2 % external ointment Apply topically 3 times daily 30 g 1     naloxone (NARCAN) 4 MG/0.1ML nasal spray Spray 1 spray (4 mg) into one nostril alternating nostrils once as needed for opioid reversal Every 2-3 minutes until patient responsive or EMS arrives 0.2 mL 0     order for DME Equipment being ordered: Thumb spica splint for left hand 1 Device 0     oxyCODONE (ROXICODONE) 5 MG tablet Take 1 tablet by mouth every 6-8 hours for severe pain 20 tablet 0     polyethylene glycol (MIRALAX) 17 g packet Take 17 g by mouth daily 7 packet 0     pseudoePHEDrine (SUDAFED) 30 MG tablet Take by mouth every 4 hours as needed for congestion       senna-docusate (SENOKOT-S/PERICOLACE) 8.6-50 MG tablet Take 1 tablet by mouth 2 times daily 30 tablet 0     tadalafil (CIALIS) 10 MG tablet Take 1 tab as needed for erectile dysfunction. Do not exceed more than 2 tabs per day. 30 tablet 1     tiZANidine (ZANAFLEX) 4 MG tablet Take 1 tablet (4 mg) by mouth nightly as needed for muscle spasms 30 tablet 0     doxycycline hyclate (VIBRA-TABS) 100 MG tablet Take 1 tablet (100 mg) by mouth 2 times daily Do not take within 2 hours of antacids or calcium. (Patient not taking: No sig reported) 14 tablet 0       Allergies   Allergen Reactions     No Clinical Screening - See Comments      Seasonal Allergies         Social History     Tobacco Use     Smoking status: Never Smoker     Smokeless tobacco: Never Used   Substance Use Topics     Alcohol use: Yes     Comment: case beer a week     Family History   Problem Relation Age of Onset     Cancer Father      Family History Negative Father      Family History Negative Mother      Cancer Mother      Stomach Problem  Mother      Breast Cancer Mother      Diabetes No family hx of      Coronary Artery Disease No family hx of      Hypertension No family hx of      Hyperlipidemia No family hx of      Cerebrovascular Disease No family hx of      Breast Cancer No family hx of      Colon Cancer No family hx of      Prostate Cancer No family hx of      Other Cancer No family hx of      Depression No family hx of      Anxiety Disorder No family hx of      Mental Illness No family hx of      Substance Abuse No family hx of      Anesthesia Reaction No family hx of      Asthma No family hx of      Osteoporosis No family hx of      Genetic Disorder No family hx of      Thyroid Disease No family hx of      Obesity No family hx of      Unknown/Adopted No family hx of      History   Drug Use Unknown         Objective     /60 (BP Location: Left arm, Patient Position: Sitting, Cuff Size: Adult Large)   Pulse 64   Temp 98  F (36.7  C) (Tympanic)   Resp 16   Wt 115.7 kg (255 lb)   SpO2 97%   BMI 39.94 kg/m      Physical Exam    GENERAL APPEARANCE: healthy, alert and no distress     EYES: EOMI,  PERRL     HENT: ear canals and TM's normal and nose and mouth without ulcers or lesions     NECK: no adenopathy, no asymmetry, masses, or scars and thyroid normal to palpation     RESP: lungs clear to auscultation - no rales, rhonchi or wheezes     CV: regular rates and rhythm, normal S1 S2, no S3 or S4 and no murmur, click or rub     ABDOMEN:  soft, nontender, no HSM or masses and bowel sounds normal     MS: extremities normal- no gross deformities noted, no evidence of inflammation in joints, FROM in all extremities.     SKIN: no suspicious lesions or rashes     NEURO: Normal strength and tone, sensory exam grossly normal, mentation intact and speech normal     PSYCH: mentation appears normal. and affect normal/bright     LYMPHATICS: No cervical adenopathy    Recent Labs   Lab Test 01/25/22  0954 06/16/21  0702 06/01/21  0809 04/18/21  0559  04/15/21  0609 04/14/21  1139   HGB 14.9 13.1* 14.5 13.6*   < > 14.4     --   --  279   < > 338   INR  --   --   --   --   --  1.32*     --   --  140   < > 138   POTASSIUM 4.4  --  4.5 4.2   < > 3.8   CR 0.89  --  0.85 0.68*   < > 0.69    < > = values in this interval not displayed.        Diagnostics:  Labs pending at this time.  Results will be reviewed when available.  Recent Results (from the past 720 hour(s))   CBC with platelets    Collection Time: 08/10/22  2:03 PM   Result Value Ref Range    WBC Count 5.5 4.0 - 11.0 10e3/uL    RBC Count 4.90 4.40 - 5.90 10e6/uL    Hemoglobin 15.5 13.3 - 17.7 g/dL    Hematocrit 46.6 40.0 - 53.0 %    MCV 95 78 - 100 fL    MCH 31.6 26.5 - 33.0 pg    MCHC 33.3 31.5 - 36.5 g/dL    RDW 14.1 10.0 - 15.0 %    Platelet Count 206 150 - 450 10e3/uL      No EKG required, no history of coronary heart disease, significant arrhythmia, peripheral arterial disease or other structural heart disease.    Revised Cardiac Risk Index (RCRI):  The patient has the following serious cardiovascular risks for perioperative complications:   - No serious cardiac risks = 0 points     RCRI Interpretation: 0 points: Class I (very low risk - 0.4% complication rate)           Signed Electronically by: Sushma Galloway MD  Copy of this evaluation report is provided to requesting physician.

## 2022-08-10 NOTE — H&P (VIEW-ONLY)
[address] [address]  Phone: [phone]  Primary Provider: Lorie More  Pre-op Performing Provider: LORIE MORE    PREOPERATIVE EVALUATION:  Today's date: 8/10/2022    Arnaud Bird is a 67 year old male who presents for a preoperative evaluation.    Surgical Information:  Surgery/Procedure: Colonoscopy  Surgery Location: [facility]  Surgeon: Mark  Surgery Date: 8/15/2022  Time of Surgery: 2:00 PM  Where patient plans to recover: At home with family  Fax number for surgical facility: Note does not need to be faxed, will be available electronically in Epic.    Type of Anesthesia Anticipated: MAC    Assessment & Plan     The proposed surgical procedure is considered INTERMEDIATE risk.    Assessment and Plan  1. Preoperative clearance  2. Screen for colon cancer  - Comprehensive metabolic panel (BMP + Alb, Alk Phos, ALT, AST, Total. Bili, TP); Future  - CBC with platelets; Future  - Comprehensive metabolic panel (BMP + Alb, Alk Phos, ALT, AST, Total. Bili, TP)  - CBC with platelets  - Colonscopy Screening  Referral; Future    3. Encounter for long-term (current) use of medications  4. Lumbar disc disease with radiculopathy  5. Chronic use of opiate drug for therapeutic purpose  6. Chronic pain disorder  CSA done in the office today , pt lives far and opting to do this in this visit.   - [id number] - Urine Drug Confirmation Panel (Comprehensive); Future  - [id number] - Urine Drug Confirmation Panel (Comprehensive)    7. Mild intermittent asthma without complication  Well controlled, continue current inhalers.      Patient Instructions     As discussed, will do pertinent labs for your preoperative clearance.    Have done CSA for your opiate refills.     =========================  Preparing for Your Surgery  Getting started  A nurse will call you to review your health history and instructions. They will give you  an arrival time based on your scheduled surgery time. Please be ready to share:    Your doctor's clinic name and phone number    Your medical, surgical and anesthesia history    A list of allergies and sensitivities    A list of medicines, including herbal treatments and over-the-counter drugs    Whether the patient has a legal guardian (ask how to send us the papers in advance)  Please tell us if you're pregnant--or if there's any chance you might be pregnant. Some surgeries may injure a fetus (unborn baby), so they require a pregnancy test. Surgeries that are safe for a fetus don't always need a test, and you can choose whether to have one.   If you have a child who's having surgery, please ask for a copy of Preparing for Your Child's Surgery.    Preparing for surgery  1. Within 30 days of surgery: Have a pre-op exam (sometimes called an H&P, or History and Physical). This can be done at a clinic or pre-operative center.  ? If you're having a , you may not need this exam. Talk to your care team.  2. At your pre-op exam, talk to your care team about all medicines you take. If you need to stop any medicines before surgery, ask when to start taking them again.  ? We do this for your safety. Many medicines can make you bleed too much during surgery. Some change how well surgery (anesthesia) drugs work.  3. Call your insurance company to let them know you're having surgery. (If you don't have insurance, call [phone].)  4. Call your clinic if there's any change in your health. This includes signs of a cold or flu (sore throat, runny nose, cough, rash, fever). It also includes a scrape or scratch near the surgery site.  5. If you have questions on the day of surgery, call your hospital or surgery center.  COVID testing  You may need to be tested for COVID-19 before having surgery. If so, we will give you instructions.  Eating and drinking guidelines  For your safety: Unless your surgeon tells you otherwise,  follow the guidelines below.    Eat and drink as usual until 8 hours before surgery. After that, no food or milk.    Drink clear liquids until 2 hours before surgery. These are liquids you can see through, like water, Gatorade and Propel Water. You may also have black coffee and tea (no cream or milk).    Nothing by mouth within 2 hours of surgery. This includes gum, candy and breath mints.    If you drink alcohol: Stop drinking it the night before surgery.    If your care team tells you to take medicine on the morning of surgery, it's okay to take it with a sip of water.  Preventing infection  1. Shower or bathe the night before and morning of your surgery. Follow the instructions your clinic gave you. (If no instructions, use regular soap.)  2. Don't shave or clip hair near your surgery site. We'll remove the hair if needed.  3. Don't smoke or vape the morning of surgery. You may chew nicotine gum up to 2 hours before surgery. A nicotine patch is okay.  ? Note: Some surgeries require you to completely quit smoking and nicotine. Check with your surgeon.  4. Your care team will make every effort to keep you safe from infection. We will:  ? Clean our hands often with soap and water (or an alcohol-based hand rub).  ? Clean the skin at your surgery site with a special soap that kills germs.  ? Give you a special gown to keep you warm. (Cold raises the risk of infection.)  ? Wear special hair covers, masks, gowns and gloves during surgery.  ? Give antibiotic medicine, if prescribed. Not all surgeries need antibiotics.  What to bring on the day of surgery  1. Photo ID and insurance card  2. Copy of your health care directive, if you have one  3. Glasses and hearing aides (bring cases)  ? You can't wear contacts during surgery  4. Inhaler and eye drops, if you use them (tell us about these when you arrive)  5. CPAP machine or breathing device, if you use them  6. A few personal items, if spending the night  7. If you have  . . .  ? A pacemaker, ICD (cardiac defibrillator) or other implant: Bring the ID card.  ? An implanted stimulator: Bring the remote control.  ? A legal guardian: Bring a copy of the certified (court-stamped) guardianship papers.  Please remove any jewelry, including body piercings. Leave jewelry and other valuables at home.  If you're going home the day of surgery    You must have a responsible adult drive you home. They should stay with you overnight as well.    If you don't have someone to stay with you, and you aren't safe to go home alone, we may keep you overnight. Insurance often won't pay for this.  After surgery  If it's hard to control your pain or you need more pain medicine, please call your surgeon's office.  Questions?   If you have any questions for your care team, list them here: _________________________________________________________________________________________________________________________________________________________________________ ____________________________________ ____________________________________ ____________________________________  For informational purposes only. Not to replace the advice of your health care provider. Copyright   2003, 2019 [employer]. All rights reserved. Clinically reviewed by Lali Mckeon MD. [employer] [id number] - REV 07/21.      Return in about 3 months (around 11/10/2022), or if symptoms worsen or fail to improve, for chronic pain.    Sushma Galloway MD  Glacial [facility] HospitalKAROLINE BENAVIDESBaptist Health Deaconess MadisonvilleTIFFANIE       Risks and Recommendations:  The patient has the following additional risks and recommendations for perioperative complications:   - Consult Hospitalist / IM to assist with post-op medical management  Pulmonary:    - Incentive spirometry post-op   - Consider Respiratory Therapy (Respiratory Care IP Consult) post-op   - Intermittent Asthma on inhalers  Social and Substance:    - High tolerance to opioid analgesics due to Chronic opiates    Medication  Instructions:  Patient is to take all scheduled medications on the day of surgery    RECOMMENDATION:  APPROVAL GIVEN to proceed with proposed procedure, without further diagnostic evaluation.    Review of external notes as documented above     30 minutes spent on the date of the encounter doing chart review, review of outside records, review of test results, interpretation of tests, patient visit and documentation         Subjective     HPI related to upcoming procedure:     Preop Questions 8/3/2022   1. Have you ever had a heart attack or stroke? No   2. Have you ever had surgery on your heart or blood vessels, such as a stent placement, a coronary artery bypass, or surgery on an artery in your head, neck, heart, or legs? No   3. Do you have chest pain with activity? No   4. Do you have a history of  heart failure? No   5. Do you currently have a cold, bronchitis or symptoms of other infection? No   6. Do you have a cough, shortness of breath, or wheezing? No   7. Do you or anyone in your family have previous history of blood clots? No   8. Do you or does anyone in your family have a serious bleeding problem such as prolonged bleeding following surgeries or cuts? No   9. Have you ever had problems with anemia or been told to take iron pills? No   10. Have you had any abnormal blood loss such as black, tarry or bloody stools? No   11. Have you ever had a blood transfusion? No   12. Are you willing to have a blood transfusion if it is medically needed before, during, or after your surgery? Yes   13. Have you or any of your relatives ever had problems with anesthesia? YES    14. Do you have sleep apnea, excessive snoring or daytime drowsiness? No   14a. Do you have a CPAP machine? -   15. Do you have any artifical heart valves or other implanted medical devices like a pacemaker, defibrillator, or continuous glucose monitor? No   16. Do you have artificial joints? YES    17. Are you allergic to latex? No       Health  Care Directive:  Patient does not have a Health Care Directive or Living Will: N/A    Preoperative Review of :   reviewed - controlled substances reflected in medication list.      Status of Chronic Conditions:  See problem list for active medical problems.  Problems all longstanding and stable, except as noted/documented.  See ROS for pertinent symptoms related to these conditions.      Review of Systems  CONSTITUTIONAL: NEGATIVE for fever, chills, change in weight  INTEGUMENTARY/SKIN: NEGATIVE for worrisome rashes, moles or lesions  EYES: NEGATIVE for vision changes or irritation  ENT/MOUTH: NEGATIVE for ear, mouth and throat problems  RESP: NEGATIVE for significant cough or SOB  CV: NEGATIVE for chest pain, palpitations or peripheral edema  GI: NEGATIVE for nausea, abdominal pain, heartburn, or change in bowel habits  : NEGATIVE for frequency, dysuria, or hematuria  MUSCULOSKELETAL: NEGATIVE for significant arthralgias or myalgia  NEURO: NEGATIVE for weakness, dizziness or paresthesias  ENDOCRINE: NEGATIVE for temperature intolerance, skin/hair changes  HEME: NEGATIVE for bleeding problems  PSYCHIATRIC: NEGATIVE for changes in mood or affect    Patient Active Problem List    Diagnosis Date Noted     Hyperlipidemia with target LDL less than 130 02/05/2014     Priority: High     Diagnosis updated by automated process. Provider to review and confirm.       Iliac artery aneurysm, left (H) 01/25/2022     Priority: Medium      2/2021 - iliac aneurysm ever approached 3.5-4.0 cm in diameter.  He will continue his medical regimen.  Vascular surgical follow-up will be with me in 1 year for a surveillance aortoiliac ultrasound.       Status post total shoulder arthroplasty, right 06/25/2021     Priority: Medium     Morbid obesity (H) 05/11/2021     Priority: Medium     SOB (shortness of breath) 04/14/2021     Priority: Medium     2019 novel coronavirus disease (COVID-19) 04/14/2021     Priority: Medium      Osteoarthritis of multiple joints 04/14/2021     Priority: Medium     Pneumonia due to severe acute respiratory syndrome coronavirus 2 (SARS-CoV-2) 04/14/2021     Priority: Medium     Arthritis of shoulder region, right 03/05/2021     Priority: Medium     Added automatically from request for surgery [id number]       Chronic use of opiate drug for therapeutic purpose 07/19/2020     Priority: Medium     Primary osteoarthritis of left shoulder 01/16/2020     Priority: Medium     Migraine without aura and without status migrainosus, not intractable 04/20/2018     Priority: Medium     Impingement syndrome, shoulder, right 03/26/2018     Priority: Medium     Chronic gout involving toe 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 tophus, unspecified cause 08/22/2017     Priority: Medium     Chronic pain disorder 09/22/2016     Priority: Medium     Patient is followed by ARGENIS MELO MD for ongoing prescription of pain medication.  All refills should only be approved by this provider, or covering partner.    Medication(s):  NORCO  7.5MG PO FOUR TIMES DAILY .   Maximum quantity per month:  120   Clinic visit frequency required: Q 3 months     Controlled substance agreement:  Encounter-Level CSA - 10/31/2016:    Controlled Substance Agreement - Scan on 11/15/2016  7:53 AM: CONTROLLED SUBSTANCE AGREEMENT     Patient-Level CSA:    Controlled Substance Agreement - Opioid - Scan on 3/19/2019 11:01 AM       Pain Clinic evaluation in the past: No    DIRE Total Score(s):  DIRE SCORE 7/2/2016   DIRE Total Score 18       RX monitoring [place] ([place]) reviewed:  reviewed- no concerns 12/21/2020    [place] profile:  [email or web address]             Chronic bilateral low back pain without sciatica 07/29/2016     Priority: Medium     ACP (advance care planning) 03/08/2016     Priority: Medium     Advance Care Planning 3/8/2016: ACP Review of Chart / Resources Provided:  Reviewed chart for advance care plan.  Arnaud Bird has no plan or code  status on file. Discussed available resources and provided with information.   Added by Mary Jo Hurt             Sexual dysfunction 11/19/2015     Priority: Medium     Lumbar disc disease with radiculopathy 10/02/2015     Priority: Medium     Groin strain, initial encounter 10/02/2015     Priority: Medium     IMO Regulatory Load OCT 2020       Mild persistent asthma 11/29/2013     Priority: Medium     CARDIOVASCULAR SCREENING; LDL GOAL LESS THAN 100 04/23/2013     Priority: Medium     LEFT WORSE THAN RIGHT  10/18/2012     Priority: Medium     Bilateral carpal tunnel syndrome 10/17/2012     Priority: Medium     Vitamin D deficiency 10/17/2012     Priority: Medium     Problem list name updated by automated process. Provider to review       Plantar fascial fibromatosis 10/17/2012     Priority: Medium     Mild intermittent asthma without complication 10/17/2012     Priority: Medium     Problem list name updated by automated process. Provider to review       Hypertrophy of prostate without urinary obstruction 10/17/2012     Priority: Medium     Problem list name updated by automated process. Provider to review       Obesity 10/17/2012     Priority: Medium     Problem list name updated by automated process. Provider to review       Hyperlipidemia 10/17/2012     Priority: Medium     Problem list name updated by automated process. Provider to review       Spondylosis with myelopathy, lumbar region 10/17/2012     Priority: Medium     Stiffness of knee joint, right 05/21/2009     Priority: Medium     Gait difficulty 05/21/2009     Priority: Medium     S/P TKR (total knee replacement) 05/21/2009     Priority: Medium     (Problem list name updated by automated process. Provider to review and confirm.)        Past Medical History:   Diagnosis Date     Acute respiratory failure with hypoxia (H) 4/14/2021     Arthritis      Benign positional vertigo      Carpal tunnel syndrome     mild     Chronic pain syndrome 4/14/2021      Hearing problem      LEFT WORSE THAN RIGHT  10/18/2012     Migraines      Nasal polyps      Obesity 4/14/2021     Osteoarthritis of multiple joints 4/14/2021     Pneumonia due to 2019 novel coronavirus 4/14/2021     Unspecified asthma(493.90) 10/17/2012     Unspecified asthma, with exacerbation 10/17/2012     Past Surgical History:   Procedure Laterality Date     ARTHROPLASTY SHOULDER Right 6/15/2021    Procedure: Right Total Shoulder Arthroplasty, Distal Clavicle Excision;  Surgeon: Yesica Deleon MD;  Location: [facility]     COLONOSCOPY  2019     HC TOOTH EXTRACTION W/FORCEP       ORTHOPEDIC SURGERY      bilateral total knee replacements     TONSILLECTOMY      age 2 or 3     Current Outpatient Medications   Medication Sig Dispense Refill     acetaminophen (TYLENOL) 325 MG tablet Take 2 tablets (650 mg) by mouth every 4 hours as needed for other 40 tablet 0     acyclovir (ZOVIRAX) 400 MG tablet TAKE TWO TABLETS BY MOUTH TWICE DAILY for 5 days as needed for genital herpes outbreak 28 tablet 4     albuterol (PROAIR HFA, PROVENTIL HFA, VENTOLIN HFA) 108 (90 BASE) MCG/ACT inhaler Inhale 2 puffs into the lungs every 6 hours as needed for shortness of breath / dyspnea 1 Inhaler 11     allopurinol (ZYLOPRIM) 300 MG tablet Take 1 tablet (300 mg) by mouth daily 90 tablet 3     amoxicillin (AMOXIL) 500 MG capsule Take 2 g orally one hour before the dental procedure 4 capsule 1     diclofenac (VOLTAREN) 1 % topical gel Apply 4 g topically 4 times daily 100 g 3     diclofenac (VOLTAREN) 1 % topical gel Place 4 g onto the skin 4 times daily 100 g 1     fluticasone (FLONASE) 50 MCG/ACT nasal spray Spray 2 sprays into both nostrils daily 16 g 11     HYDROcodone-acetaminophen (NORCO) 7.5-325 MG per tablet Take 1 tablet by mouth every 6 hours as needed for pain (4 x daily as needed  maxium 4 per day) 120 tablet 0     ibuprofen (ADVIL/MOTRIN) 600 MG tablet Take 1 tablet (600 mg) by mouth every 6 hours as needed 40 tablet 0      ipratropium (ATROVENT) 0.06 % nasal spray SPRAY 2 SPRAYS INTO BOTH NOSTRILS 4 TIMES DAILY 15 mL 3     lidocaine 5 % EX patch Place 1 patch onto the skin every 24 hours 30 patch 11     multivitamin w/minerals (THERA-VIT-M) tablet Take 1 tablet by mouth daily       mupirocin (BACTROBAN) 2 % external ointment Apply topically 3 times daily 30 g 1     naloxone (NARCAN) 4 MG/0.1ML nasal spray Spray 1 spray (4 mg) into one nostril alternating nostrils once as needed for opioid reversal Every 2-3 minutes until patient responsive or EMS arrives 0.2 mL 0     order for DME Equipment being ordered: Thumb spica splint for left hand 1 Device 0     oxyCODONE (ROXICODONE) 5 MG tablet Take 1 tablet by mouth every 6-8 hours for severe pain 20 tablet 0     polyethylene glycol (MIRALAX) 17 g packet Take 17 g by mouth daily 7 packet 0     pseudoePHEDrine (SUDAFED) 30 MG tablet Take by mouth every 4 hours as needed for congestion       senna-docusate (SENOKOT-S/PERICOLACE) 8.6-50 MG tablet Take 1 tablet by mouth 2 times daily 30 tablet 0     tadalafil (CIALIS) 10 MG tablet Take 1 tab as needed for erectile dysfunction. Do not exceed more than 2 tabs per day. 30 tablet 1     tiZANidine (ZANAFLEX) 4 MG tablet Take 1 tablet (4 mg) by mouth nightly as needed for muscle spasms 30 tablet 0     doxycycline hyclate (VIBRA-TABS) 100 MG tablet Take 1 tablet (100 mg) by mouth 2 times daily Do not take within 2 hours of antacids or calcium. (Patient not taking: No sig reported) 14 tablet 0       Allergies   Allergen Reactions     No Clinical Screening - See Comments      Seasonal Allergies         Social History     Tobacco Use     Smoking status: Never Smoker     Smokeless tobacco: Never Used   Substance Use Topics     Alcohol use: Yes     Comment: case beer a week     Family History   Problem Relation Age of Onset     Cancer Father      Family History Negative Father      Family History Negative Mother      Cancer Mother      Stomach Problem  Mother      Breast Cancer Mother      Diabetes No family hx of      Coronary Artery Disease No family hx of      Hypertension No family hx of      Hyperlipidemia No family hx of      Cerebrovascular Disease No family hx of      Breast Cancer No family hx of      Colon Cancer No family hx of      Prostate Cancer No family hx of      Other Cancer No family hx of      Depression No family hx of      Anxiety Disorder No family hx of      Mental Illness No family hx of      Substance Abuse No family hx of      Anesthesia Reaction No family hx of      Asthma No family hx of      Osteoporosis No family hx of      Genetic Disorder No family hx of      Thyroid Disease No family hx of      Obesity No family hx of      Unknown/Adopted No family hx of      History   Drug Use Unknown         Objective     /60 (BP Location: Left arm, Patient Position: Sitting, Cuff Size: Adult Large)   Pulse 64   Temp 98  F (36.7  C) (Tympanic)   Resp 16   Wt 115.7 kg (255 lb)   SpO2 97%   BMI 39.94 kg/m      Physical Exam    GENERAL APPEARANCE: healthy, alert and no distress     EYES: EOMI,  PERRL     HENT: ear canals and TM's normal and nose and mouth without ulcers or lesions     NECK: no adenopathy, no asymmetry, masses, or scars and thyroid normal to palpation     RESP: lungs clear to auscultation - no rales, rhonchi or wheezes     CV: regular rates and rhythm, normal S1 S2, no S3 or S4 and no murmur, click or rub     ABDOMEN:  soft, nontender, no HSM or masses and bowel sounds normal     MS: extremities normal- no gross deformities noted, no evidence of inflammation in joints, FROM in all extremities.     SKIN: no suspicious lesions or rashes     NEURO: Normal strength and tone, sensory exam grossly normal, mentation intact and speech normal     PSYCH: mentation appears normal. and affect normal/bright     LYMPHATICS: No cervical adenopathy    Recent Labs   Lab Test 01/25/22  0954 06/16/21  0702 06/01/21  0809 04/18/21  0559  04/15/21  0609 04/14/21  1139   HGB 14.9 13.1* 14.5 13.6*   < > 14.4     --   --  279   < > 338   INR  --   --   --   --   --  1.32*     --   --  140   < > 138   POTASSIUM 4.4  --  4.5 4.2   < > 3.8   CR 0.89  --  0.85 0.68*   < > 0.69    < > = values in this interval not displayed.        Diagnostics:  Labs pending at this time.  Results will be reviewed when available.  Recent Results (from the past 720 hour(s))   CBC with platelets    Collection Time: 08/10/22  2:03 PM   Result Value Ref Range    WBC Count 5.5 4.0 - 11.0 10e3/uL    RBC Count 4.90 4.40 - 5.90 10e6/uL    Hemoglobin 15.5 13.3 - 17.7 g/dL    Hematocrit 46.6 40.0 - 53.0 %    MCV 95 78 - 100 fL    MCH 31.6 26.5 - 33.0 pg    MCHC 33.3 31.5 - 36.5 g/dL    RDW 14.1 10.0 - 15.0 %    Platelet Count 206 150 - 450 10e3/uL      No EKG required, no history of coronary heart disease, significant arrhythmia, peripheral arterial disease or other structural heart disease.    Revised Cardiac Risk Index (RCRI):  The patient has the following serious cardiovascular risks for perioperative complications:   - No serious cardiac risks = 0 points     RCRI Interpretation: 0 points: Class I (very low risk - 0.4% complication rate)           Signed Electronically by: Sushma Galloway MD  Copy of this evaluation report is provided to requesting physician.

## 2022-08-10 NOTE — PATIENT INSTRUCTIONS
As discussed, will do pertinent labs for your preoperative clearance.    Have done [facility]SA for your opiate refills.     =========================  Preparing for Your Surgery  Getting started  A nurse will call you to review your health history and instructions. They will give you an arrival time based on your scheduled surgery time. Please be ready to share:  Your doctor's clinic name and phone number  Your medical, surgical and anesthesia history  A list of allergies and sensitivities  A list of medicines, including herbal treatments and over-the-counter drugs  Whether the patient has a legal guardian (ask how to send us the papers in advance)  Please tell us if you're pregnant--or if there's any chance you might be pregnant. Some surgeries may injure a fetus (unborn baby), so they require a pregnancy test. Surgeries that are safe for a fetus don't always need a test, and you can choose whether to have one.   If you have a child who's having surgery, please ask for a copy of Preparing for Your Child's Surgery.    Preparing for surgery  Within 30 days of surgery: Have a pre-op exam (sometimes called an H&P, or History and Physical). This can be done at a clinic or pre-operative center.  If you're having a , you may not need this exam. Talk to your care team.  At your pre-op exam, talk to your care team about all medicines you take. If you need to stop any medicines before surgery, ask when to start taking them again.  We do this for your safety. Many medicines can make you bleed too much during surgery. Some change how well surgery (anesthesia) drugs work.  Call your insurance company to let them know you're having surgery. (If you don't have insurance, call [phone].)  Call your clinic if there's any change in your health. This includes signs of a cold or flu (sore throat, runny nose, cough, rash, fever). It also includes a scrape or scratch near the surgery site.  If you have questions on the day of  surgery, call your hospital or surgery center.  COVID testing  You may need to be tested for COVID-19 before having surgery. If so, we will give you instructions.  Eating and drinking guidelines  For your safety: Unless your surgeon tells you otherwise, follow the guidelines below.  Eat and drink as usual until 8 hours before surgery. After that, no food or milk.  Drink clear liquids until 2 hours before surgery. These are liquids you can see through, like water, Gatorade and Propel Water. You may also have black coffee and tea (no cream or milk).  Nothing by mouth within 2 hours of surgery. This includes gum, candy and breath mints.  If you drink alcohol: Stop drinking it the night before surgery.  If your care team tells you to take medicine on the morning of surgery, it's okay to take it with a sip of water.  Preventing infection  Shower or bathe the night before and morning of your surgery. Follow the instructions your clinic gave you. (If no instructions, use regular soap.)  Don't shave or clip hair near your surgery site. We'll remove the hair if needed.  Don't smoke or vape the morning of surgery. You may chew nicotine gum up to 2 hours before surgery. A nicotine patch is okay.  Note: Some surgeries require you to completely quit smoking and nicotine. Check with your surgeon.  Your care team will make every effort to keep you safe from infection. We will:  Clean our hands often with soap and water (or an alcohol-based hand rub).  Clean the skin at your surgery site with a special soap that kills germs.  Give you a special gown to keep you warm. (Cold raises the risk of infection.)  Wear special hair covers, masks, gowns and gloves during surgery.  Give antibiotic medicine, if prescribed. Not all surgeries need antibiotics.  What to bring on the day of surgery  Photo ID and insurance card  Copy of your health care directive, if you have one  Glasses and hearing aides (bring cases)  You can't wear contacts  during surgery  Inhaler and eye drops, if you use them (tell us about these when you arrive)  CPAP machine or breathing device, if you use them  A few personal items, if spending the night  If you have . . .  A pacemaker, ICD (cardiac defibrillator) or other implant: Bring the ID card.  An implanted stimulator: Bring the remote control.  A legal guardian: Bring a copy of the certified (court-stamped) guardianship papers.  Please remove any jewelry, including body piercings. Leave jewelry and other valuables at home.  If you're going home the day of surgery  You must have a responsible adult drive you home. They should stay with you overnight as well.  If you don't have someone to stay with you, and you aren't safe to go home alone, we may keep you overnight. Insurance often won't pay for this.  After surgery  If it's hard to control your pain or you need more pain medicine, please call your surgeon's office.  Questions?   If you have any questions for your care team, list them here: _________________________________________________________________________________________________________________________________________________________________________ ____________________________________ ____________________________________ ____________________________________  For informational purposes only. Not to replace the advice of your health care provider. Copyright   2003, 2019 [facility]. All rights reserved. Clinically reviewed by Lali Mckeon MD. [employer] [id number] - REV 07/21.

## 2022-08-10 NOTE — LETTER
Opioid / Opioid Plus Controlled Substance Agreement    This is an agreement between you and your provider about the safe and appropriate use of controlled substance/opioids prescribed by your care team. Controlled substances are medicines that can cause physical and mental dependence (abuse).    There are strict laws about having and using these medicines. We here at [facility] are committing to working with you in your efforts to get better. To support you in this work, we ll help you schedule regular office appointments for medicine refills. If we must cancel or change your appointment for any reason, we ll make sure you have enough medicine to last until your next appointment.     As a Provider, I will:    Listen carefully to your concerns and treat you with respect.     Recommend a treatment plan that I believe is in your best interest. This plan may involve therapies other than opioid pain medication.     Talk with you often about the possible benefits, and the risk of harm of any medicine that we prescribe for you.     Provide a plan on how to taper (discontinue or go off) using this medicine if the decision is made to stop its use.    As a Patient, I understand that opioid(s):     Are a controlled substance prescribed by my care team to help me function or work and manage my condition(s).     Are strong medicines and can cause serious side effects such as:    Drowsiness, which can seriously affect my driving ability    A lower breathing rate, enough to cause death    Harm to my thinking ability     Depression     Abuse of and addiction to this medicine    Need to be taken exactly as prescribed. Combining opioids with certain medicines or chemicals (such as illegal drugs, sedatives, sleeping pills, and benzodiazepines) can be dangerous or even fatal. If I stop opioids suddenly, I may have severe withdrawal symptoms.    Do not work for all types of pain nor for all patients. If they re not helpful, I may  be asked to stop them.      The risks, benefits and side effects of these medicine(s) were explained to me. I agree that:  1. I will take part in other treatments as advised by my care team. This may be psychiatry or counseling, physical therapy, behavioral therapy, group treatment or a referral to a specialist.     2. I will keep all my appointments. I understand that this is part of the monitoring of opioids. My care team may require an office visit for EVERY opioid/controlled substance refill. If I miss appointments or don t follow instructions, my care team may stop my medicine.    3. I will take my medicines as prescribed. I will not change the dose or schedule unless my care team tells me to. There will be no refills if I run out early.     4. I may be asked to come to the clinic and complete a urine drug test or complete a pill count at any time. If I don t give a urine sample or participate in a pill count, the care team may stop my medicine.    5. I will only receive prescriptions from this clinic for chronic pain. If I am treated by another provider for acute pain issues, I will tell them that I am taking opioid pain medication for chronic pain and that I have a treatment agreement with this provider. I will inform my [facility] care team within one business day if I am given a prescription for any pain medication by another healthcare provider. My [facility] care team can contact other providers and pharmacists about my use of any medicines.    6. It is up to me to make sure that I don t run out of my medicines on weekends or holidays. If my care team is willing to refill my opioid prescription without a visit, I must request refills only during office hours. Refills may take up to 3 business days to process. I will use one pharmacy to fill all my opioid and other controlled substance prescriptions. I will notify the clinic about any changes to my insurance or medication  availability.    7. I am responsible for my prescriptions. If the medicine/prescription is lost, stolen or destroyed, it will not be replaced. I also agree not to share controlled substance medicines with anyone.    8. I am aware I should not use any illegal or recreational drugs. I agree not to drink alcohol unless my care team says I can.       9. If I enroll in the [place] [facility] program, I will tell my care team prior to my next refill.     10. I will tell my care team right away if I become pregnant, have a new medical problem treated outside of my regular clinic, or have a change in my medications.    11. I understand that this medicine can affect my thinking, judgment and reaction time. Alcohol and drugs affect the brain and body, which can affect the safety of my driving. Being under the influence of alcohol or drugs can affect my decision-making, behaviors, personal safety, and the safety of others. Driving while impaired (DWI) can occur if a person is driving, operating, or in physical control of a car, motorcycle, boat, snowmobile, ATV, motorbike, off-road vehicle, or any other motor vehicle ([place] Statute 169A.20). I understand the risk if I choose to drive or operate any vehicle or machinery.    I understand that if I do not follow any of the conditions above, my prescriptions or treatment may be stopped or changed.          Opioids  What You Need to Know    What are opioids?   Opioids are pain medicines that must be prescribed by a doctor. They are also known as narcotics.     Examples are:   1. morphine (MS Contin, Caryn)  2. oxycodone (Oxycontin)  3. oxycodone and acetaminophen (Percocet)  4. hydrocodone and acetaminophen (Vicodin, Norco)   5. fentanyl patch (Duragesic)   6. hydromorphone (Dilaudid)   7. methadone  8. codeine (Tylenol #3)     What do opioids do well?   Opioids are best for severe short-term pain such as after a surgery or injury. They may work well for cancer pain. They may  help some people with long-lasting (chronic) pain.     What do opioids NOT do well?   Opioids never get rid of pain entirely, and they don t work well for most patients with chronic pain. Opioids don t reduce swelling, one of the causes of pain.                                    Other ways to manage chronic pain and improve function include:       Treat the health problem that may be causing pain    Anti-inflammation medicines, which reduce swelling and tenderness, such as ibuprofen (Advil, Motrin) or naproxen (Aleve)    Acetaminophen (Tylenol)    Antidepressants and anti-seizure medicines, especially for nerve pain    Topical treatments such as patches or creams    Injections or nerve blocks    Chiropractic or osteopathic treatment    Acupuncture, massage, deep breathing, meditation, visual imagery, aromatherapy    Use heat or ice at the pain site    Physical therapy     Exercise    Stop smoking    Take part in therapy       Risks and side effects     Talk to your doctor before you start or decide to keep taking opioids. Possible side effects include:      Lowering your breathing rate enough to cause death    Overdose, including death, especially if taking higher than prescribed doses    Worse depression symptoms; less pleasure in things you usually enjoy    Feeling tired or sluggish    Slower thoughts or cloudy thinking    Being more sensitive to pain over time; pain is harder to control    Trouble sleeping or restless sleep    Changes in hormone levels (for example, less testosterone)    Changes in sex drive or ability to have sex    Constipation    Unsafe driving    Itching and sweating    Dizziness    Nausea, throwing up and dry mouth    What else should I know about opioids?    Opioids may lead to dependence, tolerance, or addiction.      Dependence means that if you stop or reduce the medicine too quickly, you will have withdrawal symptoms. These include loose poop (diarrhea), jitters, flu-like symptoms,  nervousness and tremors. Dependence is not the same as addiction.                       Tolerance means needing higher doses over time to get the same effect. This may increase the chance of serious side effects.      Addiction is when people improperly use a substance that harms their body, their mind or their relations with others. Use of opiates can cause a relapse of addiction if you have a history of drug or alcohol abuse.      People who have used opioids for a long time may have a lower quality of life, worse depression, higher levels of pain and more visits to doctors.    You can overdose on opioids. Take these steps to lower your risk of overdose:    1. Recognize the signs:  Signs of overdose include decrease or loss of consciousness (blackout), slowed breathing, trouble waking up and blue lips. If someone is worried about overdose, they should call 911.    2. Talk to your doctor about Narcan (naloxone).   If you are at risk for overdose, you may be given a prescription for Narcan. This medicine very quickly reverses the effects of opioids.   If you overdose, a friend or family member can give you Narcan while waiting for the ambulance. They need to know the signs of overdose and how to give Narcan.     3. Don't use alcohol or street drugs.   Taking them with opioids can cause death.    4. Do not take any of these medicines unless your doctor says it s OK. Taking these with opioids can cause death:    Benzodiazepines, such as lorazepam (Ativan), alprazolam (Xanax) or diazepam (Valium)    Muscle relaxers, such as cyclobenzaprine (Flexeril)    Sleeping pills like zolpidem (Ambien)     Other opioids      How to keep you and other people safe while taking opioids:    1. Never share your opioids with others.  Opioid medicines are regulated by the Drug Enforcement Agency ([employer]). Selling or sharing medications is a criminal act.    2. Be sure to store opioids in a secure place, locked up if possible. Young children  can easily swallow them and overdose.    3. When you are traveling with your medicines, keep them in the original bottles. If you use a pill box, be sure you also carry a copy of your medicine list from your clinic or pharmacy.    4. Safe disposal of opioids    Most pharmacies have places to get rid of medicine, called disposal kiosks. Medicine disposal options are also available in every [facility]. Search your county and  medication disposal  to find more options. You can find more details at:  [email or web address]     I agree that my provider, clinic care team, and pharmacy may work with any city, state or federal law enforcement agency that investigates the misuse, sale, or other diversion of my controlled medicine. I will allow my provider to discuss my care with, or share a copy of, this agreement with any other treating provider, pharmacy or emergency room where I receive care.    I have read this agreement and have asked questions about anything I did not understand.    _______________________________________________________  Patient Signature - Arnaud Bird _____________________                   Date     _______________________________________________________  Provider Signature - Sushma Galloway MD   _____________________                   Date     _______________________________________________________  Witness Signature (required if provider not present while patient signing)   _____________________                   Date

## 2022-08-11 ENCOUNTER — TELEPHONE (OUTPATIENT)
Dept: MEDSURG UNIT | Facility: CLINIC | Age: 67
End: 2022-08-11

## 2022-08-11 LAB
ALBUMIN SERPL-MCNC: 3.9 G/DL (ref 3.4–5)
ALP SERPL-CCNC: 112 U/L (ref 40–150)
ALT SERPL W P-5'-P-CCNC: 23 U/L (ref 0–70)
ANION GAP SERPL CALCULATED.3IONS-SCNC: 6 MMOL/L (ref 3–14)
AST SERPL W P-5'-P-CCNC: 18 U/L (ref 0–45)
BILIRUB SERPL-MCNC: 0.7 MG/DL (ref 0.2–1.3)
BUN SERPL-MCNC: 15 MG/DL (ref 7–30)
CALCIUM SERPL-MCNC: 9 MG/DL (ref 8.5–10.1)
CHLORIDE BLD-SCNC: 107 MMOL/L (ref 94–109)
CO2 SERPL-SCNC: 26 MMOL/L (ref 20–32)
CREAT SERPL-MCNC: 0.76 MG/DL (ref 0.66–1.25)
GFR SERPL CREATININE-BSD FRML MDRD: >90 ML/MIN/1.73M2
GLUCOSE BLD-MCNC: 93 MG/DL (ref 70–99)
POTASSIUM BLD-SCNC: 5.1 MMOL/L (ref 3.4–5.3)
PROT SERPL-MCNC: 7.2 G/DL (ref 6.8–8.8)
SODIUM SERPL-SCNC: 139 MMOL/L (ref 133–144)

## 2022-08-15 ENCOUNTER — HOSPITAL ENCOUNTER (OUTPATIENT)
Facility: CLINIC | Age: 67
Discharge: HOME | End: 2022-08-15
Attending: INTERNAL MEDICINE | Admitting: INTERNAL MEDICINE
Payer: COMMERCIAL

## 2022-08-15 ENCOUNTER — ANESTHESIA (OUTPATIENT)
Dept: GASTROENTEROLOGY | Facility: CLINIC | Age: 67
End: 2022-08-15
Payer: COMMERCIAL

## 2022-08-15 ENCOUNTER — ANESTHESIA EVENT (OUTPATIENT)
Dept: GASTROENTEROLOGY | Facility: CLINIC | Age: 67
End: 2022-08-15
Payer: COMMERCIAL

## 2022-08-15 VITALS
OXYGEN SATURATION: 97 % | RESPIRATION RATE: 17 BRPM | WEIGHT: 250 LBS | SYSTOLIC BLOOD PRESSURE: 127 MMHG | BODY MASS INDEX: 35.79 KG/M2 | DIASTOLIC BLOOD PRESSURE: 91 MMHG | HEIGHT: 70 IN | HEART RATE: 55 BPM

## 2022-08-15 LAB
COLONOSCOPY: NORMAL
DHC UR CFM-MCNC: 752 NG/ML
DHC/CREAT UR: 627 NG/MG {CREAT}
HYDROCODONE UR CFM-MCNC: 2140 NG/ML
HYDROCODONE/CREAT UR: 1783 NG/MG {CREAT}
HYDROMORPHONE UR CFM-MCNC: 787 NG/ML
HYDROMORPHONE/CREAT UR: 656 NG/MG {CREAT}

## 2022-08-15 PROCEDURE — 88305 TISSUE EXAM BY PATHOLOGIST: CPT | Mod: 26 | Performed by: PATHOLOGY

## 2022-08-15 PROCEDURE — 45380 COLONOSCOPY AND BIOPSY: CPT | Performed by: INTERNAL MEDICINE

## 2022-08-15 PROCEDURE — 370N000017 HC ANESTHESIA TECHNICAL FEE, PER MIN: Performed by: INTERNAL MEDICINE

## 2022-08-15 PROCEDURE — 258N000003 HC RX IP 258 OP 636: Performed by: NURSE ANESTHETIST, CERTIFIED REGISTERED

## 2022-08-15 PROCEDURE — 88305 TISSUE EXAM BY PATHOLOGIST: CPT | Mod: TC | Performed by: INTERNAL MEDICINE

## 2022-08-15 PROCEDURE — 250N000009 HC RX 250: Performed by: NURSE ANESTHETIST, CERTIFIED REGISTERED

## 2022-08-15 PROCEDURE — 999N000010 HC STATISTIC ANES STAT CODE-CRNA PER MINUTE: Performed by: INTERNAL MEDICINE

## 2022-08-15 PROCEDURE — 250N000011 HC RX IP 250 OP 636: Performed by: NURSE ANESTHETIST, CERTIFIED REGISTERED

## 2022-08-15 RX ORDER — PROPOFOL 10 MG/ML
INJECTION, EMULSION INTRAVENOUS PRN
Status: DISCONTINUED | OUTPATIENT
Start: 2022-08-15 | End: 2022-08-15

## 2022-08-15 RX ORDER — SODIUM CHLORIDE, SODIUM LACTATE, POTASSIUM CHLORIDE, CALCIUM CHLORIDE 600; 310; 30; 20 MG/100ML; MG/100ML; MG/100ML; MG/100ML
INJECTION, SOLUTION INTRAVENOUS CONTINUOUS PRN
Status: DISCONTINUED | OUTPATIENT
Start: 2022-08-15 | End: 2022-08-15

## 2022-08-15 RX ORDER — LIDOCAINE HYDROCHLORIDE 20 MG/ML
INJECTION, SOLUTION INFILTRATION; PERINEURAL PRN
Status: DISCONTINUED | OUTPATIENT
Start: 2022-08-15 | End: 2022-08-15

## 2022-08-15 RX ORDER — ONDANSETRON 2 MG/ML
INJECTION INTRAMUSCULAR; INTRAVENOUS PRN
Status: DISCONTINUED | OUTPATIENT
Start: 2022-08-15 | End: 2022-08-15

## 2022-08-15 RX ORDER — PROPOFOL 10 MG/ML
INJECTION, EMULSION INTRAVENOUS CONTINUOUS PRN
Status: DISCONTINUED | OUTPATIENT
Start: 2022-08-15 | End: 2022-08-15

## 2022-08-15 RX ADMIN — PROPOFOL 30 MG: 10 INJECTION, EMULSION INTRAVENOUS at 14:57

## 2022-08-15 RX ADMIN — SODIUM CHLORIDE, POTASSIUM CHLORIDE, SODIUM LACTATE AND CALCIUM CHLORIDE: 600; 310; 30; 20 INJECTION, SOLUTION INTRAVENOUS at 14:44

## 2022-08-15 RX ADMIN — LIDOCAINE HYDROCHLORIDE 50 MG: 20 INJECTION, SOLUTION INFILTRATION; PERINEURAL at 14:46

## 2022-08-15 RX ADMIN — PROPOFOL 150 MCG/KG/MIN: 10 INJECTION, EMULSION INTRAVENOUS at 14:46

## 2022-08-15 RX ADMIN — ONDANSETRON 4 MG: 2 INJECTION INTRAMUSCULAR; INTRAVENOUS at 14:47

## 2022-08-15 RX ADMIN — PROPOFOL 30 MG: 10 INJECTION, EMULSION INTRAVENOUS at 14:49

## 2022-08-15 RX ADMIN — PROPOFOL 30 MG: 10 INJECTION, EMULSION INTRAVENOUS at 14:47

## 2022-08-15 ASSESSMENT — ENCOUNTER SYMPTOMS: SEIZURES: 0

## 2022-08-15 ASSESSMENT — LIFESTYLE VARIABLES: TOBACCO_USE: 0

## 2022-08-15 ASSESSMENT — ACTIVITIES OF DAILY LIVING (ADL): ADLS_ACUITY_SCORE: 35

## 2022-08-15 NOTE — ANESTHESIA CARE TRANSFER NOTE
Patient: Arnaud Bird    Procedure: Procedure(s):  COLONOSCOPY, WITH POLYPECTOMY AND BIOPSY       Diagnosis: Screen for colon cancer [Z12.11]  Diagnosis Additional Information: No value filed.    Anesthesia Type:   MAC     Note:    Oropharynx: oropharynx clear of all foreign objects  Level of Consciousness: awake  Oxygen Supplementation: room air    Independent Airway: airway patency satisfactory and stable  Dentition: dentition unchanged  Vital Signs Stable: post-procedure vital signs reviewed and stable  Report to RN Given: handoff report given  Patient transferred to: Phase II    Handoff Report: Identifed the Patient, Identified the Reponsible Provider, Reviewed the pertinent medical history, Discussed the surgical course, Reviewed Intra-OP anesthesia mangement and issues during anesthesia, Set expectations for post-procedure period and Allowed opportunity for questions and acknowledgement of understanding      Vitals:  Vitals Value Taken Time   BP     Temp     Pulse 70 08/15/22 1503   Resp 11 08/15/22 1503   SpO2 96 % 08/15/22 1503   Vitals shown include unvalidated device data.    Electronically Signed By: DAVIS Pereira CRNA  August 15, 2022  3:04 PM

## 2022-08-15 NOTE — ANESTHESIA POSTPROCEDURE EVALUATION
Patient: Arnaud Bird    Procedure: Procedure(s):  COLONOSCOPY, WITH POLYPECTOMY AND BIOPSY       Anesthesia Type:  MAC    Note:     Postop Pain Control: Uneventful            Sign Out: Well controlled pain   PONV: No   Neuro/Psych: Uneventful            Sign Out: Acceptable/Baseline neuro status   Airway/Respiratory: Uneventful            Sign Out: Acceptable/Baseline resp. status   CV/Hemodynamics: Uneventful            Sign Out: Acceptable CV status; No obvious hypovolemia; No obvious fluid overload   Other NRE: NONE   DID A NON-ROUTINE EVENT OCCUR? No           Last vitals:  Vitals Value Taken Time   /91 08/15/22 1530   Temp     Pulse 58 08/15/22 1530   Resp 17 08/15/22 1530   SpO2 97 % 08/15/22 1530   Vitals shown include unvalidated device data.    Electronically Signed By: Demetris He MD  August 15, 2022  3:38 PM

## 2022-08-15 NOTE — H&P
[facility]  Pre-Endoscopy History and Physical     Arnaud Bird MRN# [id number]   YOB: 1955 Age: 67 year old     Date of Procedure: 8/15/2022  Primary care provider: Sushma Galloway  Type of Endoscopy: colonoscopy  Reason for Procedure: Screening  Type of Anesthesia Anticipated: MAC    HPI:    Arnaud is a 67 year old male who will be undergoing the above procedure.      A history and physical has been performed. The patient's medications and allergies have been reviewed. The risks and benefits of the procedure and the sedation options and risks were discussed with the patient.  All questions were answered and informed consent was obtained.      He denies a personal or family history of anesthesia complications or bleeding disorders.     Allergies   Allergen Reactions     No Clinical Screening - See Comments      Seasonal Allergies         Prior to Admission Medications   Prescriptions Last Dose Informant Patient Reported? Taking?   HYDROcodone-acetaminophen (NORCO) 7.5-325 MG per tablet   No No   Sig: Take 1 tablet by mouth every 6 hours as needed for pain (4 x daily as needed  maxium 4 per day)   acetaminophen (TYLENOL) 325 MG tablet   No No   Sig: Take 2 tablets (650 mg) by mouth every 4 hours as needed for other   acyclovir (ZOVIRAX) 400 MG tablet   No No   Sig: TAKE TWO TABLETS BY MOUTH TWICE DAILY for 5 days as needed for genital herpes outbreak   albuterol (PROAIR HFA, PROVENTIL HFA, VENTOLIN HFA) 108 (90 BASE) MCG/ACT inhaler Past Month at Unknown time  No Yes   Sig: Inhale 2 puffs into the lungs every 6 hours as needed for shortness of breath / dyspnea   allopurinol (ZYLOPRIM) 300 MG tablet Past Week at Unknown time  No Yes   Sig: Take 1 tablet (300 mg) by mouth daily   amoxicillin (AMOXIL) 500 MG capsule   No No   Sig: Take 2 g orally one hour before the dental procedure   diclofenac (VOLTAREN) 1 % topical gel 8/14/2022 at Unknown time  No Yes   Sig: Place 4 g  onto the skin 4 times daily   diclofenac (VOLTAREN) 1 % topical gel   No No   Sig: Apply 4 g topically 4 times daily   doxycycline hyclate (VIBRA-TABS) 100 MG tablet   No No   Sig: Take 1 tablet (100 mg) by mouth 2 times daily Do not take within 2 hours of antacids or calcium.   Patient not taking: No sig reported   fluticasone (FLONASE) 50 MCG/ACT nasal spray 8/15/2022 at Unknown time  No Yes   Sig: Spray 2 sprays into both nostrils daily   ipratropium (ATROVENT) 0.06 % nasal spray   No No   Sig: SPRAY 2 SPRAYS INTO BOTH NOSTRILS 4 TIMES DAILY   lidocaine 5 % EX patch 8/14/2022 at Unknown time  No Yes   Sig: Place 1 patch onto the skin every 24 hours   multivitamin w/minerals (THERA-VIT-M) tablet   Yes No   Sig: Take 1 tablet by mouth daily   mupirocin (BACTROBAN) 2 % external ointment   No No   Sig: Apply topically 3 times daily   naloxone (NARCAN) 4 MG/0.1ML nasal spray   No No   Sig: Spray 1 spray (4 mg) into one nostril alternating nostrils once as needed for opioid reversal Every 2-3 minutes until patient responsive or EMS arrives   order for DME   No No   Sig: Equipment being ordered: Thumb spica splint for left hand   oxyCODONE (ROXICODONE) 5 MG tablet 8/15/2022 at Unknown time  No Yes   Sig: Take 1 tablet by mouth every 6-8 hours for severe pain   polyethylene glycol (MIRALAX) 17 g packet 8/15/2022 at Unknown time  No Yes   Sig: Take 17 g by mouth daily   pseudoePHEDrine (SUDAFED) 30 MG tablet 8/15/2022 at Unknown time  Yes Yes   Sig: Take by mouth every 4 hours as needed for congestion   senna-docusate (SENOKOT-S/PERICOLACE) 8.6-50 MG tablet   No No   Sig: Take 1 tablet by mouth 2 times daily   tadalafil (CIALIS) 10 MG tablet   No No   Sig: Take 1 tab as needed for erectile dysfunction. Do not exceed more than 2 tabs per day.   tiZANidine (ZANAFLEX) 4 MG tablet Past Week at Unknown time  No Yes   Sig: Take 1 tablet (4 mg) by mouth nightly as needed for muscle spasms      Facility-Administered Medications  Last Administration Doses Remaining   betamethasone acet & sod phos (CELESTONE) injection 6 mg 8/6/2021  9:48 AM    lidocaine (PF) (XYLOCAINE) 1 % injection 1 mL 8/6/2021  9:48 AM    lidocaine (PF) (XYLOCAINE) 1 % injection 7 mL 1/14/2022  4:17 PM    lidocaine (PF) (XYLOCAINE) 1 % injection 8 mL 9/17/2021 11:06 AM    lidocaine (PF) (XYLOCAINE) 1 % injection 8 mL 4/29/2022  9:26 AM    triamcinolone (KENALOG-40) injection 40 mg 9/17/2021 11:06 AM    triamcinolone (KENALOG-40) injection 40 mg 1/14/2022  4:17 PM    triamcinolone (KENALOG-40) injection 40 mg 4/29/2022  9:26 AM           Patient Active Problem List   Diagnosis     Stiffness of knee joint, right     Gait difficulty     S/P TKR (total knee replacement)     Bilateral carpal tunnel syndrome     Vitamin D deficiency     Plantar fascial fibromatosis     Mild intermittent asthma without complication     Hypertrophy of prostate without urinary obstruction     Obesity     Hyperlipidemia     Spondylosis with myelopathy, lumbar region     LEFT WORSE THAN RIGHT      CARDIOVASCULAR SCREENING; LDL GOAL LESS THAN 100     Mild persistent asthma     Hyperlipidemia with target LDL less than 130     Lumbar disc disease with radiculopathy     Groin strain, initial encounter     Sexual dysfunction     ACP (advance care planning)     Chronic bilateral low back pain without sciatica     Chronic pain disorder     Chronic gout involving toe 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 tophus, unspecified cause     Impingement syndrome, shoulder, right     Migraine without aura and without status migrainosus, not intractable     Primary osteoarthritis of left shoulder     Chronic use of opiate drug for therapeutic purpose     Arthritis of shoulder region, right     SOB (shortness of breath)     2019 novel coronavirus disease (COVID-19)     Osteoarthritis of multiple joints     Pneumonia due to severe acute respiratory syndrome coronavirus 2 (SARS-CoV-2)     Morbid obesity (H)     Status post total  shoulder arthroplasty, right     Iliac artery aneurysm, left (H)        Past Medical History:   Diagnosis Date     Acute respiratory failure with hypoxia (H) 4/14/2021     Arthritis      Benign positional vertigo      Carpal tunnel syndrome     mild     Chronic pain syndrome 4/14/2021     Hearing problem      LEFT WORSE THAN RIGHT  10/18/2012     Migraines      Nasal polyps      Obesity 4/14/2021     Osteoarthritis of multiple joints 4/14/2021     Pneumonia due to 2019 novel coronavirus 4/14/2021     Unspecified asthma(493.90) 10/17/2012     Unspecified asthma, with exacerbation 10/17/2012        Past Surgical History:   Procedure Laterality Date     ARTHROPLASTY SHOULDER Right 6/15/2021    Procedure: Right Total Shoulder Arthroplasty, Distal Clavicle Excision;  Surgeon: Yesica Deleon MD;  Location: [facility] OR     COLONOSCOPY  2019     HC TOOTH EXTRACTION W/FORCEP       ORTHOPEDIC SURGERY      bilateral total knee replacements     TONSILLECTOMY      age 2 or 3       Social History     Tobacco Use     Smoking status: Never Smoker     Smokeless tobacco: Never Used   Substance Use Topics     Alcohol use: Yes     Comment: case beer a week       Family History   Problem Relation Age of Onset     Cancer Father      Family History Negative Father      Family History Negative Mother      Cancer Mother      Stomach Problem Mother      Breast Cancer Mother      Diabetes No family hx of      Coronary Artery Disease No family hx of      Hypertension No family hx of      Hyperlipidemia No family hx of      Cerebrovascular Disease No family hx of      Breast Cancer No family hx of      Colon Cancer No family hx of      Prostate Cancer No family hx of      Other Cancer No family hx of      Depression No family hx of      Anxiety Disorder No family hx of      Mental Illness No family hx of      Substance Abuse No family hx of      Anesthesia Reaction No family hx of      Asthma No family hx of      Osteoporosis No family hx of  "     Genetic Disorder No family hx of      Thyroid Disease No family hx of      Obesity No family hx of      Unknown/Adopted No family hx of        REVIEW OF SYSTEMS:     5 point ROS negative except as noted above in HPI, including Gen., Resp., CV, GI &  system review.      PHYSICAL EXAM:   BP (!) 129/93   Pulse 79   Ht 1.778 m (5' 10\")   Wt 113.4 kg (250 lb)   SpO2 96%   BMI 35.87 kg/m   Estimated body mass index is 35.87 kg/m  as calculated from the following:    Height as of this encounter: 1.778 m (5' 10\").    Weight as of this encounter: 113.4 kg (250 lb).   GENERAL APPEARANCE: healthy, alert and no distress  MENTAL STATUS: alert  AIRWAY EXAM: Mallampatti Class I (visualization of the soft palate, fauces, uvula, anterior and posterior pillars)  RESP: lungs clear to auscultation - no rales, rhonchi or wheezes  CV: regular rates and rhythm      DIAGNOSTICS:    Not indicated      IMPRESSION   ASA Class 1 - Healthy patient, no medical problems        PLAN:       Plan for colonoscopy. We discussed the risks, benefits and alternatives and the patient wished to proceed.    The above has been forwarded to the consulting provider.      Signed Electronically by: Abelino Flores MD,MD  August 15, 2022      [facility]  Ph: [phone] Fax: [phone]    "

## 2022-08-15 NOTE — ANESTHESIA PREPROCEDURE EVALUATION
Anesthesia Pre-Procedure Evaluation    Patient: Arnaud Bird   MRN: [id number] : 1955        Procedure : Procedure(s):  COLONOSCOPY          Past Medical History:   Diagnosis Date     Acute respiratory failure with hypoxia (H) 2021     Arthritis      Benign positional vertigo      Carpal tunnel syndrome     mild     Chronic pain syndrome 2021     Hearing problem      LEFT WORSE THAN RIGHT  10/18/2012     Migraines      Nasal polyps      Obesity 2021     Osteoarthritis of multiple joints 2021     Pneumonia due to 2019 novel coronavirus 2021     Unspecified asthma(493.90) 10/17/2012     Unspecified asthma, with exacerbation 10/17/2012      Past Surgical History:   Procedure Laterality Date     ARTHROPLASTY SHOULDER Right 6/15/2021    Procedure: Right Total Shoulder Arthroplasty, Distal Clavicle Excision;  Surgeon: Yesica Deleon MD;  Location: [facility]     COLONOSCOPY  2019      TOOTH EXTRACTION W/FORCEP       ORTHOPEDIC SURGERY      bilateral total knee replacements     TONSILLECTOMY      age 2 or 3      Allergies   Allergen Reactions     No Clinical Screening - See Comments      Seasonal Allergies       Social History     Tobacco Use     Smoking status: Never Smoker     Smokeless tobacco: Never Used   Substance Use Topics     Alcohol use: Yes     Comment: case beer a week      Wt Readings from Last 1 Encounters:   08/15/22 113.4 kg (250 lb)        Anesthesia Evaluation   Pt has had prior anesthetic.         ROS/MED HX  ENT/Pulmonary:     (+) asthma  (-) tobacco use and sleep apnea   Neurologic:     (+) migraines,  (-) no seizures and no CVA   Cardiovascular:     (+) Dyslipidemia hypertension-----    METS/Exercise Tolerance:     Hematologic:       Musculoskeletal:   (+) arthritis,     GI/Hepatic:    (-) GERD and liver disease   Renal/Genitourinary:     (+) BPH,  (-) renal disease   Endo:     (+) Obesity,  (-) Type II DM and thyroid disease   Psychiatric/Substance Use:      (+) H/O chronic opiod use .     Infectious Disease:       Malignancy:       Other:      (+) , H/O Chronic Pain,        Physical Exam    Airway        Mallampati: II   TM distance: > 3 FB   Neck ROM: full   Mouth opening: > 3 cm    Respiratory Devices and Support         Dental  no notable dental history         Cardiovascular   cardiovascular exam normal          Pulmonary   pulmonary exam normal                OUTSIDE LABS:  CBC:   Lab Results   Component Value Date    WBC 5.5 08/10/2022    WBC 5.2 01/25/2022    HGB 15.5 08/10/2022    HGB 14.9 01/25/2022    HCT 46.6 08/10/2022    HCT 45.0 01/25/2022     08/10/2022     01/25/2022     BMP:   Lab Results   Component Value Date     08/10/2022     01/25/2022    POTASSIUM 5.1 08/10/2022    POTASSIUM 4.4 01/25/2022    CHLORIDE 107 08/10/2022    CHLORIDE 109 01/25/2022    CO2 26 08/10/2022    CO2 27 01/25/2022    BUN 15 08/10/2022    BUN 19 01/25/2022    CR 0.76 08/10/2022    CR 0.89 01/25/2022    GLC 93 08/10/2022    GLC 91 01/25/2022     COAGS:   Lab Results   Component Value Date    PTT 44 (H) 04/14/2021    INR 1.32 (H) 04/14/2021    FIBR 684 (H) 04/18/2021     POC:   Lab Results   Component Value Date     (H) 06/15/2021     HEPATIC:   Lab Results   Component Value Date    ALBUMIN 3.9 08/10/2022    PROTTOTAL 7.2 08/10/2022    ALT 23 08/10/2022    AST 18 08/10/2022    ALKPHOS 112 08/10/2022    BILITOTAL 0.7 08/10/2022     OTHER:   Lab Results   Component Value Date    A1C 5.4 12/30/2016    BELIA 9.0 08/10/2022    PHOS 3.1 04/14/2021    MAG 2.2 04/15/2021    LIPASE 108 04/14/2021    TSH 2.02 06/04/2018    CRP 3.0 (H) 04/18/2021    SED 9 06/04/2018       Anesthesia Plan    ASA Status:  2   NPO Status:  NPO Appropriate    Anesthesia Type: MAC.     - Reason for MAC: straight local not clinically adequate              Consents    Anesthesia Plan(s) and associated risks, benefits, and realistic alternatives discussed. Questions answered and  patient/representative(s) expressed understanding.    - Discussed:     - Discussed with:  Patient         Postoperative Care    Pain management: Multi-modal analgesia.   PONV prophylaxis: Ondansetron (or other 5HT-3)     Comments:                Demetris He MD

## 2022-08-15 NOTE — INTERVAL H&P NOTE
"I have reviewed the surgical (or preoperative) H&P that is linked to this encounter, and examined the patient. There are no significant changes    Clinical Conditions Present on Arrival:  Clinically Significant Risk Factors Present on Admission                   # Obesity: Estimated body mass index is 35.87 kg/m  as calculated from the following:    Height as of this encounter: 1.778 m (5' 10\").    Weight as of this encounter: 113.4 kg (250 lb).       "

## 2022-08-16 ENCOUNTER — MYC REFILL (OUTPATIENT)
Dept: FAMILY MEDICINE | Facility: CLINIC | Age: 67
End: 2022-08-16

## 2022-08-16 DIAGNOSIS — M25.661 STIFFNESS OF KNEE JOINT, RIGHT: Chronic | ICD-10-CM

## 2022-08-16 DIAGNOSIS — R26.9 GAIT DIFFICULTY: ICD-10-CM

## 2022-08-16 DIAGNOSIS — G89.4 CHRONIC PAIN SYNDROME: Chronic | ICD-10-CM

## 2022-08-16 DIAGNOSIS — Z96.652 STATUS POST TOTAL LEFT KNEE REPLACEMENT: ICD-10-CM

## 2022-08-16 DIAGNOSIS — M47.16 SPONDYLOSIS WITH MYELOPATHY, LUMBAR REGION: ICD-10-CM

## 2022-08-18 LAB
PATH REPORT.COMMENTS IMP SPEC: NORMAL
PATH REPORT.COMMENTS IMP SPEC: NORMAL
PATH REPORT.FINAL DX SPEC: NORMAL
PATH REPORT.GROSS SPEC: NORMAL
PATH REPORT.MICROSCOPIC SPEC OTHER STN: NORMAL
PATH REPORT.RELEVANT HX SPEC: NORMAL
PHOTO IMAGE: NORMAL

## 2022-08-18 NOTE — TELEPHONE ENCOUNTER
Routing refill request to provider for review/approval because:  Drug not on the [facility] refill protocol   Patient comment: on july 22 i recieved 110 tablets. 27 days worth. i would like my 120 filled by august 18 please    Niesha Sargent RN

## 2022-08-19 ENCOUNTER — HOSPITAL ENCOUNTER (OUTPATIENT)
Facility: CLINIC | Age: 67
Discharge: HOME | End: 2022-08-19
Admitting: PHYSICIAN ASSISTANT
Payer: COMMERCIAL

## 2022-08-19 ENCOUNTER — HOSPITAL ENCOUNTER (OUTPATIENT)
Dept: GENERAL RADIOLOGY | Facility: CLINIC | Age: 67
Discharge: HOME | End: 2022-08-19
Attending: PHYSICIAN ASSISTANT
Payer: COMMERCIAL

## 2022-08-19 VITALS — SYSTOLIC BLOOD PRESSURE: 133 MMHG | DIASTOLIC BLOOD PRESSURE: 81 MMHG | OXYGEN SATURATION: 65 % | HEART RATE: 97 BPM

## 2022-08-19 VITALS — OXYGEN SATURATION: 97 % | DIASTOLIC BLOOD PRESSURE: 82 MMHG | HEART RATE: 62 BPM | SYSTOLIC BLOOD PRESSURE: 140 MMHG

## 2022-08-19 DIAGNOSIS — M51.16 LUMBAR DISC DISEASE WITH RADICULOPATHY: ICD-10-CM

## 2022-08-19 DIAGNOSIS — M47.16 SPONDYLOSIS WITH MYELOPATHY, LUMBAR REGION: ICD-10-CM

## 2022-08-19 PROCEDURE — 250N000011 HC RX IP 250 OP 636: Performed by: PHYSICIAN ASSISTANT

## 2022-08-19 PROCEDURE — 255N000002 HC RX 255 OP 636: Performed by: PHYSICIAN ASSISTANT

## 2022-08-19 PROCEDURE — 62323 NJX INTERLAMINAR LMBR/SAC: CPT

## 2022-08-19 PROCEDURE — 250N000009 HC RX 250: Performed by: PHYSICIAN ASSISTANT

## 2022-08-19 PROCEDURE — 999N000154 HC STATISTIC RADIOLOGY XRAY, US, CT, MAR, NM

## 2022-08-19 RX ORDER — HYDROCODONE BITARTRATE AND ACETAMINOPHEN 7.5; 325 MG/1; MG/1
1 TABLET ORAL EVERY 6 HOURS PRN
Qty: 120 TABLET | Refills: 0 | Status: SHIPPED | OUTPATIENT
Start: 2022-08-19 | End: 2022-09-19

## 2022-08-19 RX ORDER — BETAMETHASONE SODIUM PHOSPHATE AND BETAMETHASONE ACETATE 3; 3 MG/ML; MG/ML
3 INJECTION, SUSPENSION INTRA-ARTICULAR; INTRALESIONAL; INTRAMUSCULAR; SOFT TISSUE ONCE
Status: COMPLETED | OUTPATIENT
Start: 2022-08-19 | End: 2022-08-19

## 2022-08-19 RX ORDER — IOPAMIDOL 408 MG/ML
10 INJECTION, SOLUTION INTRATHECAL ONCE
Status: COMPLETED | OUTPATIENT
Start: 2022-08-19 | End: 2022-08-19

## 2022-08-19 RX ADMIN — LIDOCAINE HYDROCHLORIDE 4 ML: 10 INJECTION, SOLUTION EPIDURAL; INFILTRATION; INTRACAUDAL; PERINEURAL at 11:14

## 2022-08-19 RX ADMIN — IOPAMIDOL 3.5 ML: 408 INJECTION, SOLUTION INTRATHECAL at 11:12

## 2022-08-19 RX ADMIN — LIDOCAINE HYDROCHLORIDE 3 ML: 10 INJECTION, SOLUTION EPIDURAL; INFILTRATION; INTRACAUDAL; PERINEURAL at 11:11

## 2022-08-19 RX ADMIN — BETAMETHASONE SODIUM PHOSPHATE AND BETAMETHASONE ACETATE 3 ML: 3; 3 INJECTION, SUSPENSION INTRA-ARTICULAR; INTRALESIONAL; INTRAMUSCULAR at 11:14

## 2022-08-19 ASSESSMENT — ACTIVITIES OF DAILY LIVING (ADL): ADLS_ACUITY_SCORE: 35

## 2022-08-19 NOTE — DISCHARGE SUMMARY
After 30 minutes post injection, injection site is CDI, no hematoma or swelling.  Patient states pain is relieved.  Tolerating fluids.  Denies numbness or tingling in hands and feet.  Patient discharged to home with . Discharge instructions reviewed with patient and patient verbalizes understanding.      Care Suites Discharge Nursing Note    Patient Information  Name: Arnaud Bird  Age: 67 year old    Discharge Education:  Discharge instructions reviewed: Yes  Additional education/resources provided: no  Patient/patient representative verbalizes understanding: Yes  Patient discharging on new medications: No  Medication education completed: Yes    Discharge Plans:   Discharge location: home  Discharge ride contacted: Yes  Approximate discharge time: 1230    Discharge Criteria:  Discharge criteria met and vital signs stable: Yes    Patient Belongs:  Patient belongings returned to patient: Yes    Diego Peña RN

## 2022-08-19 NOTE — TELEPHONE ENCOUNTER
Pt calling and states he will be leaving town today and is requesting medication as soon as possible. Please send to pharmacy if appropriate.    Syeda Marcelino,  [facility]

## 2022-08-19 NOTE — DISCHARGE INSTRUCTIONS
Steroid Injection Discharge Instructions     After you go home:    You may resume your normal diet.    Care of Puncture Site:    If you have a bandaid on your puncture site, you may remove it the next morning  You may shower tomorrow  No bath tubs, whirlpools or swimming pool for at least 48 hours  Use ice packs as needed for discomfort     Activity:    Minimize your activity today. You may gradually resume your normal activity as tolerated  Avoid vigorous or strenuous activity until your symptoms improve or as directed by your doctor  Do NOT drive a vehicle for a few hours after the injection - or longer if you develop numbness in your arm or leg    Medicines:    You may resume all medications  For minor discomfort, you may take Acetaminophen (Tylenol) or Ibuprofen (Advil)    Pain:     You may experience increased or different pain over the next 24-48 hours  For the next 48 hrs - you may use ice packs for discomfort     Call your primary care doctor if:    You have severe pain that does not improve with pain medication  You have chills or a fever greater than 101 F (38 C)  The site is red, swollen, hot or tender  Increase in pain, weakness or numbness  New problems with your bowel or bladder  Any questions or concerns    What to watch for:    It can be normal to have some bruising or slight swelling at the puncture site.   After the procedure, you may have some new weakness or numbness down your arm/leg from the numbing medicine. This should resolve in a few hours.   You may feel some temporary relief from the numbing medicine, but that will wear off within a few hours.  Your symptoms may return to pre procedure level, or can even be worse for the first 1-2 days.  For many people, the steroid begins to provide some relief within 2-3 days, but it can take up to 2 weeks to obtain the full results.  Some people will get lasting relief from a single injection. Others may require up to 3 injections to get results. If  you have more than one steroid injection, they should be given 2 weeks apart.  If you have no improvement in your symptoms after two weeks, please contact the doctor who ordered this procedure to discuss the next steps.  Side effects of your steroid injection are mild and will go away in 2-3 days  Insomnia  Irritability  Flushed face  Water retention  Restlessness  Difficulty sleeping  Increased appetite    If you have questions or concerns call:                  [facility] Radiology Dept @ [phone]                                    between 8am-4:30pm Mon-Fri    If you have urgent questions outside of these normal business hours, please contact the [facility] on call doctor @ [phone]

## 2022-08-20 NOTE — TELEPHONE ENCOUNTER
[place] reviewed:  reviewed- no concerns    [place]  [email or web address]    Refill done as requested 2 days before due date.   .  Sushma Galloway MD on 8/19/2022 at 7:22 PM

## 2022-08-23 DIAGNOSIS — G89.29 CHRONIC BILATERAL LOW BACK PAIN WITHOUT SCIATICA: ICD-10-CM

## 2022-08-23 DIAGNOSIS — M54.50 CHRONIC BILATERAL LOW BACK PAIN WITHOUT SCIATICA: ICD-10-CM

## 2022-08-23 DIAGNOSIS — M51.16 LUMBAR DISC DISEASE WITH RADICULOPATHY: ICD-10-CM

## 2022-08-23 DIAGNOSIS — G89.4 CHRONIC PAIN DISORDER: ICD-10-CM

## 2022-08-23 DIAGNOSIS — M47.16 SPONDYLOSIS WITH MYELOPATHY, LUMBAR REGION: ICD-10-CM

## 2022-08-23 NOTE — TELEPHONE ENCOUNTER
Routing refill request to provider for review/approval because:  Drug not on the [facility] refill protocol   Sandra Cedillo RN

## 2022-09-19 ENCOUNTER — MYC REFILL (OUTPATIENT)
Dept: FAMILY MEDICINE | Facility: CLINIC | Age: 67
End: 2022-09-19

## 2022-09-19 DIAGNOSIS — Z96.652 STATUS POST TOTAL LEFT KNEE REPLACEMENT: ICD-10-CM

## 2022-09-19 DIAGNOSIS — M47.16 SPONDYLOSIS WITH MYELOPATHY, LUMBAR REGION: ICD-10-CM

## 2022-09-19 DIAGNOSIS — G89.4 CHRONIC PAIN SYNDROME: Chronic | ICD-10-CM

## 2022-09-19 DIAGNOSIS — M25.661 STIFFNESS OF KNEE JOINT, RIGHT: Chronic | ICD-10-CM

## 2022-09-19 DIAGNOSIS — R26.9 GAIT DIFFICULTY: ICD-10-CM

## 2022-09-19 RX ORDER — HYDROCODONE BITARTRATE AND ACETAMINOPHEN 7.5; 325 MG/1; MG/1
1 TABLET ORAL EVERY 6 HOURS PRN
Qty: 120 TABLET | Refills: 0 | Status: SHIPPED | OUTPATIENT
Start: 2022-09-21 | End: 2022-10-17

## 2022-09-19 NOTE — TELEPHONE ENCOUNTER
[place] reviewed:  reviewed- no concerns    [place]:  [email or web address]      Refill done from 9/21/22 as per  check.     Thank you,.  Sushma Galloway MD on 9/19/2022 at 3:55 PM

## 2022-09-19 NOTE — TELEPHONE ENCOUNTER
Routing refill request to provider for review/approval because:  Drug not on the [facility] refill protocol LOV 8/10/2022     ISABEL Galicia  [facility]

## 2022-09-27 DIAGNOSIS — J45.30 MILD PERSISTENT ASTHMA WITHOUT COMPLICATION: ICD-10-CM

## 2022-09-29 RX ORDER — IPRATROPIUM BROMIDE 42 UG/1
SPRAY, METERED NASAL
Qty: 15 ML | Refills: 1 | Status: SHIPPED | OUTPATIENT
Start: 2022-09-29 | End: 2024-03-22

## 2022-09-29 NOTE — TELEPHONE ENCOUNTER
Prescription approved per [facility] Refill Protocol.  Alisa Honeycutt RN  [facility]

## 2022-10-09 ENCOUNTER — HEALTH MAINTENANCE LETTER (OUTPATIENT)
Age: 67
End: 2022-10-09

## 2022-10-17 DIAGNOSIS — M47.16 SPONDYLOSIS WITH MYELOPATHY, LUMBAR REGION: ICD-10-CM

## 2022-10-17 DIAGNOSIS — Z96.652 STATUS POST TOTAL LEFT KNEE REPLACEMENT: ICD-10-CM

## 2022-10-17 DIAGNOSIS — G89.4 CHRONIC PAIN SYNDROME: Chronic | ICD-10-CM

## 2022-10-17 DIAGNOSIS — M25.661 STIFFNESS OF KNEE JOINT, RIGHT: Chronic | ICD-10-CM

## 2022-10-17 DIAGNOSIS — R26.9 GAIT DIFFICULTY: ICD-10-CM

## 2022-10-17 RX ORDER — HYDROCODONE BITARTRATE AND ACETAMINOPHEN 7.5; 325 MG/1; MG/1
1 TABLET ORAL EVERY 6 HOURS PRN
Qty: 120 TABLET | Refills: 0 | Status: SHIPPED | OUTPATIENT
Start: 2022-10-21 | End: 2022-11-15

## 2022-10-17 NOTE — TELEPHONE ENCOUNTER
Reason for Call:  Other prescription    Detailed comments: PT NEEDS MED REFILL: HYDROCODONE    Phone Number Patient can be reached at: Cell number on file:    Telephone Information:   Mobile [phone]       Best Time: ANY    Can we leave a detailed message on this number? YES    Call taken on 10/17/2022 at 10:43 AM by Josette Villalpando

## 2022-10-17 NOTE — TELEPHONE ENCOUNTER
[place] reviewed:  reviewed- no concerns    [place]:  [email or web address]    Refill done from 10/21/2022 as per  check.     Thank you.  Sushma Galloway MD on 10/17/2022 at 2:58 PM

## 2022-10-17 NOTE — TELEPHONE ENCOUNTER
Called pt to ask which pharmacy he would like refill at [facility].    Syeda Marcelino,  [facility]

## 2022-10-27 ENCOUNTER — TELEPHONE (OUTPATIENT)
Dept: ORTHOPEDICS | Facility: CLINIC | Age: 67
End: 2022-10-27

## 2022-10-27 NOTE — TELEPHONE ENCOUNTER
[facility] Call Center    Phone Message    May a detailed message be left on voicemail: yes     Reason for Call: Other: Wants to now if Dr. Chan can do another cortisone inj for his left shoulder.     Action Taken: Message routed to:  [facility] ortho    Travel Screening: Not Applicable

## 2022-10-28 NOTE — TELEPHONE ENCOUNTER
ATC spoke with patient and informed him that he would be a candidate for a repeat left glenohumeral joint injection as his last injection was on 4/29/2022 with Dr. Chan. ATC assisted with scheduling injection appointment. Appointment was scheduled further out due to hunting season.    Patient was appreciative of the return call.    LUIS Rogers

## 2022-11-15 ENCOUNTER — MYC REFILL (OUTPATIENT)
Dept: FAMILY MEDICINE | Facility: CLINIC | Age: 67
End: 2022-11-15

## 2022-11-15 DIAGNOSIS — M47.16 SPONDYLOSIS WITH MYELOPATHY, LUMBAR REGION: ICD-10-CM

## 2022-11-15 DIAGNOSIS — R26.9 GAIT DIFFICULTY: ICD-10-CM

## 2022-11-15 DIAGNOSIS — Z96.652 STATUS POST TOTAL LEFT KNEE REPLACEMENT: ICD-10-CM

## 2022-11-15 DIAGNOSIS — G89.4 CHRONIC PAIN SYNDROME: Chronic | ICD-10-CM

## 2022-11-15 DIAGNOSIS — M25.661 STIFFNESS OF KNEE JOINT, RIGHT: Chronic | ICD-10-CM

## 2022-11-16 RX ORDER — HYDROCODONE BITARTRATE AND ACETAMINOPHEN 7.5; 325 MG/1; MG/1
1 TABLET ORAL EVERY 6 HOURS PRN
Qty: 120 TABLET | Refills: 0 | Status: SHIPPED | OUTPATIENT
Start: 2022-11-20 | End: 2022-12-16

## 2022-11-17 NOTE — TELEPHONE ENCOUNTER
[place] reviewed:  reviewed- no concerns    [place] profile:  [email or web address]      Pt will need follow up with PCP for future refills. OK to schedule for virtual visit in 2 weeks to use same day slot.     Thank you,   Sushma Galloway MD on 11/16/2022

## 2022-11-23 ENCOUNTER — OFFICE VISIT (OUTPATIENT)
Dept: ORTHOPEDICS | Facility: CLINIC | Age: 67
End: 2022-11-23
Payer: COMMERCIAL

## 2022-11-23 DIAGNOSIS — M19.012 LOCALIZED OSTEOARTHRITIS OF LEFT SHOULDER: Primary | ICD-10-CM

## 2022-11-23 PROCEDURE — 20611 DRAIN/INJ JOINT/BURSA W/US: CPT | Mod: LT | Performed by: FAMILY MEDICINE

## 2022-11-23 PROCEDURE — 99207 PR DROP WITH A PROCEDURE: CPT | Performed by: FAMILY MEDICINE

## 2022-11-23 RX ORDER — LIDOCAINE HYDROCHLORIDE 10 MG/ML
4 INJECTION, SOLUTION EPIDURAL; INFILTRATION; INTRACAUDAL; PERINEURAL
Status: DISCONTINUED | OUTPATIENT
Start: 2022-11-23 | End: 2024-06-12

## 2022-11-23 RX ORDER — TRIAMCINOLONE ACETONIDE 40 MG/ML
40 INJECTION, SUSPENSION INTRA-ARTICULAR; INTRAMUSCULAR
Status: DISCONTINUED | OUTPATIENT
Start: 2022-11-23 | End: 2024-06-12

## 2022-11-23 RX ADMIN — LIDOCAINE HYDROCHLORIDE 4 ML: 10 INJECTION, SOLUTION EPIDURAL; INFILTRATION; INTRACAUDAL; PERINEURAL at 09:20

## 2022-11-23 RX ADMIN — TRIAMCINOLONE ACETONIDE 40 MG: 40 INJECTION, SUSPENSION INTRA-ARTICULAR; INTRAMUSCULAR at 09:20

## 2022-11-23 NOTE — NURSING NOTE
[address] [address]  Dept: [phone]  ______________________________________________________________________________    Patient: Arnaud Bird   : 1955   MRN: [id number]   2022    INVASIVE PROCEDURE SAFETY CHECKLIST    Date: 2022   Procedure: left GH joint injection with kenalog and USG  Patient Name: Arnaud Bird  MRN: [id number]  YOB: 1955    Action: Complete sections as appropriate. Any discrepancy results in a HARD COPY until resolved.     PRE PROCEDURE:  Patient ID verified with 2 identifiers (name and  or MRN): Yes  Procedure and site verified with patient/designee (when able): Yes  Accurate consent documentation in medical record: Yes  H&P (or appropriate assessment) documented in medical record: Yes  H&P must be up to 20 days prior to procedure and updates within 24 hours of procedure as applicable: NA  Relevant diagnostic and radiology test results appropriately labeled and displayed as applicable: NA  Procedure site(s) marked with provider initials: NA    TIMEOUT:  Time-Out performed immediately prior to starting procedure, including verbal and active participation of all team members addressing the following:Yes  * Correct patient identify  * Confirmed that the correct side and site are marked  * An accurate procedure consent form  * Agreement on the procedure to be done  * Correct patient position  * Relevant images and results are properly labeled and appropriately displayed  * The need to administer antibiotics or fluids for irrigation purposes during the procedure as applicable   * Safety precautions based on patient history or medication use    DURING PROCEDURE: Verification of correct person, site, and procedures any time the responsibility for care of the patient is transferred to another member of the care team.       Prior to injection, verified patient identity using  patient's name and date of birth.  Due to injection administration, patient instructed to remain in clinic for 15 minutes  afterwards, and to report any adverse reaction to me immediately.    Joint injection was performed.      Lido  Drug Amount Wasted:  Yes: 2 mg/ml   Vial/Syringe: Single dose vial  Expiration Date:  08/01/2025    Kenalog  Drug Amount Wasted: No  Vial/Syringe: Single dose vial  Expiration Date: 07/01/2024    Sue Hale, ATC  November 23, 2022

## 2022-11-23 NOTE — LETTER
"  11/23/2022      RE: Arnaud Bird  [address]  [facility] [address]     Dear Colleague,    Thank you for referring your patient, Arnaud Bird, to the [facility]. Please see a copy of my visit note below.      Assessment & Plan     Localized osteoarthritis of left shoulder  Left US guided injection  - POC US GUIDANCE NEEDLE PLACEMENT    BMI:   Estimated body mass index is 35.87 kg/m  as calculated from the following:    Height as of 8/15/22: 1.778 m (5' 10\").    Weight as of 8/15/22: 113.4 kg (250 lb).   Weight management plan: Patient was referred to their PCP to discuss a diet and exercise plan.    See Patient Instructions    Return in about 3 months (around 2/23/2023), or if symptoms worsen or fail to improve.    Carmel Chan MD  [facility]    Subjective   Arnaud is a 67 year old accompanied by his self, presenting for the following health issues:  Injections of the Left Shoulder      HPI     Pt is a 68 yo white male with left GH OA. Pt is again considering timing of left shoulder surgery.      Review of Systems   Constitutional, HEENT, cardiovascular, pulmonary, gi and gu systems are negative, except as otherwise noted.     Objective    There were no vitals taken for this visit.  There is no height or weight on file to calculate BMI.  Physical Exam   NAD  nonlabored breathing                 Procedure: Left shoulder glenohumeral osteoarthritis  The indications, risk, and benefits were discussed and patient was consented.  The humeral head, glenoid, hyperechoic triangle indicated the region of the posterior labrum were identified on ultrasound and using the curvilinear probe skin marker was used to rekha the area of injection.  Initially a 4 mm 1% lidocaine was used to anesthetize the area.  ChloraPrep was used to assist clean the area.  Using sterile technique, a syringe with a 80 mm 22-gauge " needle containing 1 mL Kenalog 40 mg and 4 mg 1% lidocaine injected using the ultrasound guided with a posterior approach into the left glenohumeral joint.  Ultrasound used to guide the needle placement and verified proper needle position    Large Joint Injection: L glenohumeral joint    Date/Time: 11/23/2022 9:20 AM  Performed by: Carmel Chan MD  Authorized by: Carmel Chan MD     Indications:  Pain and osteoarthritis  Needle Size:  22 G  Guidance: ultrasound    Approach:  Posterolateral  Location:  Shoulder      Site:  L glenohumeral joint  Medications:  40 mg triamcinolone 40 MG/ML; 4 mL lidocaine (PF) 1 %  Medications comment:  Lidocaine 1%  4 mL for individual injection  NDC: 36034-467-36  LOT: [id number]  Exp: 08/01/2025  Outcome:  Tolerated well, no immediate complications  Procedure discussed: discussed risks, benefits, and alternatives    Consent Given by:  Patient  Timeout: timeout called immediately prior to procedure    Prep: patient was prepped and draped in usual sterile fashion        I agree with the following injection documentation.    ELROY Chan MD      Again, thank you for allowing me to participate in the care of your patient.      Sincerely,    Carmel Chan MD

## 2022-11-23 NOTE — NURSING NOTE
Patient was last seen on 4/29/2022 where he received a left glenohumeral joint injection. He reports he believes he received roughly 3 months of decreased symptoms in his left shoulder.

## 2022-11-23 NOTE — PROGRESS NOTES
"  Assessment & Plan     Localized osteoarthritis of left shoulder  Left US guided injection  - POC US GUIDANCE NEEDLE PLACEMENT             BMI:   Estimated body mass index is 35.87 kg/m  as calculated from the following:    Height as of 8/15/22: 1.778 m (5' 10\").    Weight as of 8/15/22: 113.4 kg (250 lb).   Weight management plan: Patient was referred to their PCP to discuss a diet and exercise plan.    See Patient Instructions    Return in about 3 months (around 2/23/2023), or if symptoms worsen or fail to improve.    Carmel Chan MD  [facility]    Antonio Lares is a 67 year old accompanied by his self, presenting for the following health issues:  Injections of the Left Shoulder      HPI     Pt is a 68 yo white male with left GH OA. Pt is again considering timing of left shoulder surgery.      Review of Systems   Constitutional, HEENT, cardiovascular, pulmonary, gi and gu systems are negative, except as otherwise noted.      Objective    There were no vitals taken for this visit.  There is no height or weight on file to calculate BMI.  Physical Exam   NAD  nonlabored breathing                  Procedure: Left shoulder glenohumeral osteoarthritis  The indications, risk, and benefits were discussed and patient was consented.  The humeral head, glenoid, hyperechoic triangle indicated the region of the posterior labrum were identified on ultrasound and using the curvilinear probe skin marker was used to rekha the area of injection.  Initially a 4 mm 1% lidocaine was used to anesthetize the area.  ChloraPrep was used to assist clean the area.  Using sterile technique, a syringe with a 80 mm 22-gauge needle containing 1 mL Kenalog 40 mg and 4 mg 1% lidocaine injected using the ultrasound guided with a posterior approach into the left glenohumeral joint.  Ultrasound used to guide the needle placement and verified proper needle position    Large Joint Injection: " L glenohumeral joint    Date/Time: 11/23/2022 9:20 AM  Performed by: Carmel Chan MD  Authorized by: Carmel Chan MD     Indications:  Pain and osteoarthritis  Needle Size:  22 G  Guidance: ultrasound    Approach:  Posterolateral  Location:  Shoulder      Site:  L glenohumeral joint  Medications:  40 mg triamcinolone 40 MG/ML; 4 mL lidocaine (PF) 1 %  Medications comment:  Lidocaine 1%  4 mL for individual injection  NDC: 02145-337-83  LOT: [id number]  Exp: 08/01/2025  Outcome:  Tolerated well, no immediate complications  Procedure discussed: discussed risks, benefits, and alternatives    Consent Given by:  Patient  Timeout: timeout called immediately prior to procedure    Prep: patient was prepped and draped in usual sterile fashion        I agree with the following injection documentation.    ELROY Chan MD

## 2022-12-16 DIAGNOSIS — R26.9 GAIT DIFFICULTY: ICD-10-CM

## 2022-12-16 DIAGNOSIS — Z96.652 STATUS POST TOTAL LEFT KNEE REPLACEMENT: ICD-10-CM

## 2022-12-16 DIAGNOSIS — M47.16 SPONDYLOSIS WITH MYELOPATHY, LUMBAR REGION: ICD-10-CM

## 2022-12-16 DIAGNOSIS — G89.4 CHRONIC PAIN SYNDROME: Chronic | ICD-10-CM

## 2022-12-16 DIAGNOSIS — M25.661 STIFFNESS OF KNEE JOINT, RIGHT: Chronic | ICD-10-CM

## 2022-12-16 RX ORDER — HYDROCODONE BITARTRATE AND ACETAMINOPHEN 7.5; 325 MG/1; MG/1
1 TABLET ORAL EVERY 6 HOURS PRN
Qty: 120 TABLET | Refills: 0 | Status: SHIPPED | OUTPATIENT
Start: 2022-12-21 | End: 2023-01-11

## 2022-12-16 NOTE — TELEPHONE ENCOUNTER
Medication Question or Refill        What medication are you calling about (include dose and sig)?: HYDROcodone-acetaminophen (NORCO) 7.5-325 MG per tablet    Controlled Substance Agreement on file:   CSA -- Patient Level:     [Media Unavailable] Controlled Substance Agreement - Opioid - Scan on 8/10/2022  5:34 PM   [Media Unavailable] Controlled Substance Agreement - Opioid - Scan on 9/13/2021  4:37 PM   [Media Unavailable] Controlled Substance Agreement - Opioid - Scan on 7/20/2020 12:15 PM   [Media Unavailable] Controlled Substance Agreement - Opioid - Scan on 3/19/2019 11:01 AM       Who prescribed the medication?: Dr. Galloway    Do you need a refill? Yes: Will be out on Wed 12/21    When did you use the medication last? today    Patient offered an appointment? Yes: declined    Do you have any questions or concerns?  No    Preferred Pharmacy:      [facility] [id number] [place] [address]  [address] [facility] [address]  Phone: [phone] Fax: [phone]      Could we send this information to you in [facility] or would you prefer to receive a phone call?:   Patient would prefer a phone call   Okay to leave a detailed message?: Yes at Cell number on file:    Telephone Information:   Mobile [phone]

## 2022-12-16 NOTE — TELEPHONE ENCOUNTER
Please call the patient and schedule Virtual visit -  Opiate follow up , which patient is due at this time, to further take care of the medical conditions and medications. OK to use same day slot / double book near another virtual slot in 2 weeks.     Thank you,  Sushma Galloway MD on 12/16/2022 at 4:37 PM     ====================================    [place] reviewed:  reviewed- no concerns    [place]  [email or web address]    Refill done. Future refills after virtual visit   Sushma Galloway MD on 12/16/2022 at 4:19 PM

## 2022-12-21 ENCOUNTER — OFFICE VISIT (OUTPATIENT)
Dept: ORTHOPEDICS | Facility: CLINIC | Age: 67
End: 2022-12-21
Payer: COMMERCIAL

## 2022-12-21 DIAGNOSIS — M19.012 LOCALIZED OSTEOARTHRITIS OF LEFT SHOULDER: Primary | ICD-10-CM

## 2022-12-21 DIAGNOSIS — M19.072 LOCALIZED OSTEOARTHRITIS OF LEFT ANKLE: ICD-10-CM

## 2022-12-21 PROCEDURE — 20606 DRAIN/INJ JOINT/BURSA W/US: CPT | Mod: 59 | Performed by: FAMILY MEDICINE

## 2022-12-21 PROCEDURE — 20606 DRAIN/INJ JOINT/BURSA W/US: CPT | Mod: LT | Performed by: FAMILY MEDICINE

## 2022-12-21 RX ORDER — LIDOCAINE HYDROCHLORIDE 10 MG/ML
2 INJECTION, SOLUTION EPIDURAL; INFILTRATION; INTRACAUDAL; PERINEURAL
Status: DISCONTINUED | OUTPATIENT
Start: 2022-12-21 | End: 2024-06-12

## 2022-12-21 RX ORDER — LIDOCAINE HYDROCHLORIDE 10 MG/ML
4 INJECTION, SOLUTION EPIDURAL; INFILTRATION; INTRACAUDAL; PERINEURAL
Status: DISCONTINUED | OUTPATIENT
Start: 2022-12-21 | End: 2024-06-12

## 2022-12-21 RX ORDER — TRIAMCINOLONE ACETONIDE 40 MG/ML
40 INJECTION, SUSPENSION INTRA-ARTICULAR; INTRAMUSCULAR
Status: DISCONTINUED | OUTPATIENT
Start: 2022-12-21 | End: 2024-06-12

## 2022-12-21 RX ADMIN — LIDOCAINE HYDROCHLORIDE 2 ML: 10 INJECTION, SOLUTION EPIDURAL; INFILTRATION; INTRACAUDAL; PERINEURAL at 12:40

## 2022-12-21 RX ADMIN — LIDOCAINE HYDROCHLORIDE 4 ML: 10 INJECTION, SOLUTION EPIDURAL; INFILTRATION; INTRACAUDAL; PERINEURAL at 12:54

## 2022-12-21 RX ADMIN — TRIAMCINOLONE ACETONIDE 40 MG: 40 INJECTION, SUSPENSION INTRA-ARTICULAR; INTRAMUSCULAR at 12:40

## 2022-12-21 RX ADMIN — TRIAMCINOLONE ACETONIDE 40 MG: 40 INJECTION, SUSPENSION INTRA-ARTICULAR; INTRAMUSCULAR at 12:54

## 2022-12-21 NOTE — PROGRESS NOTES
Medium Joint Injection: L acromioclavicular    Date/Time: 12/21/2022 12:40 PM  Performed by: Carmel Chan MD  Authorized by: Carmel Chan MD     Indications:  Pain  Needle Size:  25 G  Guidance: ultrasound    Approach:  Dorsal  Location:  Shoulder  Site:  L acromioclavicular  Medications:  40 mg triamcinolone 40 MG/ML; 2 mL lidocaine (PF) 1 %  Outcome:  Tolerated well, no immediate complications  Procedure discussed: discussed risks, benefits, and alternatives    Consent Given by:  Patient  Timeout: timeout called immediately prior to procedure    Prep: patient was prepped and draped in usual sterile fashion     Cara Ramirez ATC on 12/21/2022 at 12:42 PM      Subjective: Patient 67-year-old white gentleman with history of left shoulder AC joint pain and 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  Patient reports that he is making appointment with surgery to consider shoulder replacement.  Patient has had this done in the right side and has considered it a success.  Patient reports that he thinks the AC joint is adding to his current disability and pain.    Objective: Past MRI of the left shoulder was performed approximately 2 years ago this was reviewed demonstrating moderate degenerative changes in the AC joint and moderate to severe osteoarthrosis in the left shoulder glenohumeral joint    Assessment and plan: 67-year-old white male with past medical history of bilateral carpal tunnel presenting with moderate degenerative changes of the left shoulder AC joint  1.  Left shoulder AC arthrosis-  Patient here for ultrasound-guided AC joint injection.  Ultrasound is indicated due to the moderate degenerative changes making a landmark guided injection more difficult    Procedure:  Risks and benefits discussed and the patient is willing to proceed with the procedure.  With the patient in a seated position hockey-stick probe used to identify anatomy of  the AC joint.  Patient has a calcification but distended joint capsule.  Skin marker used to identify area of the probe.  ChloraPrep was used to clean the area.  1 cc 20 mg Kenalog and 2 mL 1% lidocaine.  With an in plane approach the medication was delivered with a 25-gauge 1-1/2 needle.  No bleeding or immediate complications.  Band-Aid was placed and stored images that have been labeled are available in the [facility] ultrasound  I agree with the following injection documentation.    ELROY Chan MD

## 2022-12-21 NOTE — PROGRESS NOTES
Medium Joint Injection: L ankle    Date/Time: 12/21/2022 12:54 PM  Performed by: Carmel Chan MD  Authorized by: Carmel Chan MD     Indications:  Pain  Needle Size:  25 G  Guidance: ultrasound    Approach:  Anterior  Location:  Ankle  Site:  L ankle  Medications:  40 mg triamcinolone 40 MG/ML; 4 mL lidocaine (PF) 1 %  Outcome:  Tolerated well, no immediate complications  Procedure discussed: discussed risks, benefits, and alternatives    Consent Given by:  Patient  Timeout: timeout called immediately prior to procedure    Prep: patient was prepped and draped in usual sterile fashion        Subjective: Patient 67-year-old male with left ankle tibiotalar joint degenerative change.  Patient reports that the ankle is starting to ache again with walking.  He has not been able to go deer hunting this year.    Objective: X-rays were taken previously and reviewed today demonstrating tibiotalar joint degenerative change    Assessment and plan: Patient 67-year-old male with past medical history of bilateral carpal tunnel syndrome right shoulder replacement and left shoulder osteoarthritis presenting with left ankle osteoarthritis    1.  Left ankle osteoarthritis-  Patient is agreeable to a tibiotalar joint injection via ultrasound.  Ultrasound was used due to underlying osteoarthritis    Procedure: Patient was informed of the risks and benefits including bleeding and possible infection.  Patient signed the consent form.  Using the [facility] ultrasound and hockey stick probe, used a hockey stick probe and skin marker to identify the tibial artery.  Patient's ankle joint was identified along with the extensor digitorum tendon and the anterior tibialis.  Using an in plane approach using the linear probe we used a skin marker to identify the placement of the probe.  This was followed by 4 cc of 1% lidocaine and 1 cc of 40 mg Kenalog injected into the tibiotalar joint.  Vessels were avoided and the  patient tolerated the procedure fairly well.  Band-Aid was placed.  Dr. Perez was present for planning and delivery of the medication.  Video and imaging was saved on the [facility] ultrasound.  I agree with the following injection documentation.    ELROY Chan MD

## 2022-12-21 NOTE — NURSING NOTE
[address] [address]  Dept: [phone]  ______________________________________________________________________________    Patient: Arnaud Bird   : 1955   MRN: [id number]   2022    INVASIVE PROCEDURE SAFETY CHECKLIST    Date: 2022   Procedure: Left AC joint injection and Left tibiotalar joint injection   Patient Name: Arnaud Bird  MRN: [id number]  YOB: 1955    Action: Complete sections as appropriate. Any discrepancy results in a HARD COPY until resolved.     PRE PROCEDURE:  Patient ID verified with 2 identifiers (name and  or MRN): Yes  Procedure and site verified with patient/designee (when able): Yes  Accurate consent documentation in medical record: Yes  H&P (or appropriate assessment) documented in medical record: Yes  H&P must be up to 20 days prior to procedure and updates within 24 hours of procedure as applicable: NA  Relevant diagnostic and radiology test results appropriately labeled and displayed as applicable: Yes  Procedure site(s) marked with provider initials: NA    TIMEOUT:  Time-Out performed immediately prior to starting procedure, including verbal and active participation of all team members addressing the following:Yes  * Correct patient identify  * Confirmed that the correct side and site are marked  * An accurate procedure consent form  * Agreement on the procedure to be done  * Correct patient position  * Relevant images and results are properly labeled and appropriately displayed  * The need to administer antibiotics or fluids for irrigation purposes during the procedure as applicable   * Safety precautions based on patient history or medication use    DURING PROCEDURE: Verification of correct person, site, and procedures any time the responsibility for care of the patient is transferred to another member of the care team.       Prior to injection, verified patient  identity using patient's name and date of birth.  Due to injection administration, patient instructed to remain in clinic for 15 minutes  afterwards, and to report any adverse reaction to me immediately.    Joint injection was performed.      Drug Amount Wasted:  Yes: 0.5 mg/ml   Vial/Syringe: Single dose vial  Expiration Date:  08/2024      Cara Ramirez [facility]  December 21, 2022

## 2022-12-21 NOTE — LETTER
12/21/2022      RE: Arnaud Bird  [address]  [facility] [address]     Dear Colleague,    Thank you for referring your patient, Arnaud Bird, to the [facility] [place]. Please see a copy of my visit note below.    Medium Joint Injection: L acromioclavicular    Date/Time: 12/21/2022 12:40 PM  Performed by: Carmel Chan MD  Authorized by: Carmel Chan MD     Indications:  Pain  Needle Size:  25 G  Guidance: ultrasound    Approach:  Dorsal  Location:  Shoulder  Site:  L acromioclavicular  Medications:  40 mg triamcinolone 40 MG/ML; 2 mL lidocaine (PF) 1 %  Outcome:  Tolerated well, no immediate complications  Procedure discussed: discussed risks, benefits, and alternatives    Consent Given by:  Patient  Timeout: timeout called immediately prior to procedure    Prep: patient was prepped and draped in usual sterile fashion     Cara Ramirez ATC on 12/21/2022 at 12:42 PM      Subjective: Patient 67-year-old white gentleman with history of left shoulder AC joint pain and 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  Patient reports that he is making appointment with surgery to consider shoulder replacement.  Patient has had this done in the right side and has considered it a success.  Patient reports that he thinks the AC joint is adding to his current disability and pain.    Objective: Past MRI of the left shoulder was performed approximately 2 years ago this was reviewed demonstrating moderate degenerative changes in the AC joint and moderate to severe osteoarthrosis in the left shoulder glenohumeral joint    Assessment and plan: 67-year-old white male with past medical history of bilateral carpal tunnel presenting with moderate degenerative changes of the left shoulder AC joint  1.  Left shoulder AC arthrosis-  Patient here for ultrasound-guided AC joint injection.  Ultrasound is indicated due to  the moderate degenerative changes making a landmark guided injection more difficult    Procedure:  Risks and benefits discussed and the patient is willing to proceed with the procedure.  With the patient in a seated position hockey-stick probe used to identify anatomy of the AC joint.  Patient has a calcification but distended joint capsule.  Skin marker used to identify area of the probe.  ChloraPrep was used to clean the area.  1 cc 20 mg Kenalog and 2 mL 1% lidocaine.  With an in plane approach the medication was delivered with a 25-gauge 1-1/2 needle.  No bleeding or immediate complications.  Band-Aid was placed and stored images that have been labeled are available in the [facility] ultrasound  I agree with the following injection documentation.    ELROY Chan MD    Medium Joint Injection: L ankle    Date/Time: 12/21/2022 12:54 PM  Performed by: Carmel Chan MD  Authorized by: Carmel Chan MD     Indications:  Pain  Needle Size:  25 G  Guidance: ultrasound    Approach:  Anterior  Location:  Ankle  Site:  L ankle  Medications:  40 mg triamcinolone 40 MG/ML; 4 mL lidocaine (PF) 1 %  Outcome:  Tolerated well, no immediate complications  Procedure discussed: discussed risks, benefits, and alternatives    Consent Given by:  Patient  Timeout: timeout called immediately prior to procedure    Prep: patient was prepped and draped in usual sterile fashion        Subjective: Patient 67-year-old male with left ankle tibiotalar joint degenerative change.  Patient reports that the ankle is starting to ache again with walking.  He has not been able to go deer hunting this year.    Objective: X-rays were taken previously and reviewed today demonstrating tibiotalar joint degenerative change    Assessment and plan: Patient 67-year-old male with past medical history of bilateral carpal tunnel syndrome right shoulder replacement and left shoulder osteoarthritis presenting with left ankle  osteoarthritis    1.  Left ankle osteoarthritis-  Patient is agreeable to a tibiotalar joint injection via ultrasound.  Ultrasound was used due to underlying osteoarthritis    Procedure: Patient was informed of the risks and benefits including bleeding and possible infection.  Patient signed the consent form.  Using the [facility] ultrasound and hockey stick probe, used a hockey stick probe and skin marker to identify the tibial artery.  Patient's ankle joint was identified along with the extensor digitorum tendon and the anterior tibialis.  Using an in plane approach using the linear probe we used a skin marker to identify the placement of the probe.  This was followed by 4 cc of 1% lidocaine and 1 cc of 40 mg Kenalog injected into the tibiotalar joint.  Vessels were avoided and the patient tolerated the procedure fairly well.  Band-Aid was placed.  Dr. Perez was present for planning and delivery of the medication.  Video and imaging was saved on the [facility] ultrasound.  I agree with the following injection documentation.    ELROY Chan MD      Again, thank you for allowing me to participate in the care of your patient.      Sincerely,    Carmel Chan MD

## 2023-01-03 ENCOUNTER — VIRTUAL VISIT (OUTPATIENT)
Dept: FAMILY MEDICINE | Facility: CLINIC | Age: 68
End: 2023-01-03
Payer: COMMERCIAL

## 2023-01-03 DIAGNOSIS — B00.9 HSV (HERPES SIMPLEX VIRUS) INFECTION: ICD-10-CM

## 2023-01-03 DIAGNOSIS — E66.01 MORBID OBESITY (H): ICD-10-CM

## 2023-01-03 DIAGNOSIS — M25.661 STIFFNESS OF KNEE JOINT, RIGHT: Chronic | ICD-10-CM

## 2023-01-03 DIAGNOSIS — Z96.652 STATUS POST TOTAL LEFT KNEE REPLACEMENT: ICD-10-CM

## 2023-01-03 DIAGNOSIS — G89.4 CHRONIC PAIN SYNDROME: Chronic | ICD-10-CM

## 2023-01-03 DIAGNOSIS — M47.16 SPONDYLOSIS WITH MYELOPATHY, LUMBAR REGION: ICD-10-CM

## 2023-01-03 DIAGNOSIS — I72.3 ILIAC ARTERY ANEURYSM, LEFT (H): ICD-10-CM

## 2023-01-03 PROCEDURE — 99214 OFFICE O/P EST MOD 30 MIN: CPT | Mod: TEL | Performed by: INTERNAL MEDICINE

## 2023-01-03 RX ORDER — ACYCLOVIR 400 MG/1
TABLET ORAL
Qty: 28 TABLET | Refills: 4 | Status: SHIPPED | OUTPATIENT
Start: 2023-01-03 | End: 2023-11-07

## 2023-01-03 NOTE — PATIENT INSTRUCTIONS
As discussed on your current Norco dosage and frequency will continue the same as opted.  Please follow-up with your orthopedist for need of intra-articular injections/epidurals for decreasing the opiate needs.    Refill your help this medication as requested.    Please update us when you are out of current short supply of refill of opiate before we send the next refill either sooner/extra tabs included in that.

## 2023-01-03 NOTE — PROGRESS NOTES
Arnaud is a 67 year old who is being evaluated via a billable telephone visit.      What phone number would you like to be contacted at? [phone]  How would you like to obtain your AVS? Al[place]    Distant Location (provider location):  On-site    Assessment and Plan  1. Chronic pain syndrome  2. Spondylosis with myelopathy, lumbar region  3. Stiffness of knee joint, right  4. Status post total left knee replacement  Ongoing intermittent flare ups of back pain inspite of Epidurals which patient refusing any new Spine specialists at this time as he states he is able to manage with his current Norco. All risks understood. Pt got 20 tabs less in recent refill as per  check due to short supply at pharmacy; will give 140 tabs in next refill.    5. HSV (herpes simplex virus) infection  Chronic condition, refill the medication for taking as needed for flareups.  - acyclovir (ZOVIRAX) 400 MG tablet; TAKE TWO TABLETS BY MOUTH TWICE DAILY for 5 days as needed for genital herpes outbreak  Dispense: 28 tablet; Refill: 4    6. Iliac artery aneurysm, left (H)  Continue current aspirin and statin, follow-up with vascular.  Will do surveillance AAA follow-up in your upcoming annual wellness visit.    7. Morbid obesity (H)  Chronic problem, patient already working on lifestyle modifications as he understands the benefits of weight reduction on his current comorbidities.     Patient Instructions   As discussed on your current Norco dosage and frequency will continue the same as opted.  Please follow-up with your orthopedist for need of intra-articular injections/epidurals for decreasing the opiate needs.    Refill your help this medication as requested.    Please update us when you are out of current short supply of refill of opiate before we send the next refill either sooner/extra tabs included in that.    Return in about 3 months (around 4/3/2023), or if symptoms worsen or fail to improve, for Annual Wellness Exam.    Sushma  MD Laverne  [facility] KAI Lares is a 67 year old, presenting for the following health issues:  Recheck Medication      HPI     Patient is here for chronic pain follow-up, currently on opiates.  Denies any side effects of this medication, no withdrawal symptoms.       Allergies   Allergen Reactions     No Clinical Screening - See Comments      Seasonal Allergies         Past Medical History:   Diagnosis Date     Acute respiratory failure with hypoxia (H) 4/14/2021     Arthritis      Benign positional vertigo      Carpal tunnel syndrome     mild     Chronic pain syndrome 4/14/2021     Hearing problem      LEFT WORSE THAN RIGHT  10/18/2012     Migraines      Nasal polyps      Obesity 4/14/2021     Osteoarthritis of multiple joints 4/14/2021     Pneumonia due to 2019 novel coronavirus 4/14/2021     Unspecified asthma(493.90) 10/17/2012     Unspecified asthma, with exacerbation 10/17/2012       Past Surgical History:   Procedure Laterality Date     ARTHROPLASTY SHOULDER Right 6/15/2021    Procedure: Right Total Shoulder Arthroplasty, Distal Clavicle Excision;  Surgeon: Yesica Deleon MD;  Location: [facility] OR     COLONOSCOPY  2019     COLONOSCOPY N/A 8/15/2022    Procedure: COLONOSCOPY, WITH POLYPECTOMY AND BIOPSY;  Surgeon: Abelino Flores MD;  Location:  GI     HC TOOTH EXTRACTION W/FORCEP       ORTHOPEDIC SURGERY      bilateral total knee replacements     TONSILLECTOMY      age 2 or 3       Family History   Problem Relation Age of Onset     Cancer Father      Family History Negative Father      Family History Negative Mother      Cancer Mother      Stomach Problem Mother      Breast Cancer Mother      Diabetes No family hx of      Coronary Artery Disease No family hx of      Hypertension No family hx of      Hyperlipidemia No family hx of      Cerebrovascular Disease No family hx of      Breast Cancer No family hx of      Colon Cancer No family hx of      Prostate  Cancer No family hx of      Other Cancer No family hx of      Depression No family hx of      Anxiety Disorder No family hx of      Mental Illness No family hx of      Substance Abuse No family hx of      Anesthesia Reaction No family hx of      Asthma No family hx of      Osteoporosis No family hx of      Genetic Disorder No family hx of      Thyroid Disease No family hx of      Obesity No family hx of      Unknown/Adopted No family hx of        Social History     Tobacco Use     Smoking status: Never     Smokeless tobacco: Never   Substance Use Topics     Alcohol use: Yes     Comment: case beer a week        Current Outpatient Medications   Medication     acetaminophen (TYLENOL) 325 MG tablet     acyclovir (ZOVIRAX) 400 MG tablet     albuterol (PROAIR HFA, PROVENTIL HFA, VENTOLIN HFA) 108 (90 BASE) MCG/ACT inhaler     allopurinol (ZYLOPRIM) 300 MG tablet     amoxicillin (AMOXIL) 500 MG capsule     diclofenac (VOLTAREN) 1 % topical gel     fluticasone (FLONASE) 50 MCG/ACT nasal spray     HYDROcodone-acetaminophen (NORCO) 7.5-325 MG per tablet     ipratropium (ATROVENT) 0.06 % nasal spray     lidocaine 5 % EX patch     multivitamin w/minerals (THERA-VIT-M) tablet     mupirocin (BACTROBAN) 2 % external ointment     naloxone (NARCAN) 4 MG/0.1ML nasal spray     polyethylene glycol (MIRALAX) 17 g packet     pseudoePHEDrine (SUDAFED) 30 MG tablet     senna-docusate (SENOKOT-S/PERICOLACE) 8.6-50 MG tablet     tadalafil (CIALIS) 10 MG tablet     tiZANidine (ZANAFLEX) 4 MG tablet     Current Facility-Administered Medications   Medication     betamethasone acet & sod phos (CELESTONE) injection 6 mg     lidocaine (PF) (XYLOCAINE) 1 % injection 1 mL     lidocaine (PF) (XYLOCAINE) 1 % injection 2 mL     lidocaine (PF) (XYLOCAINE) 1 % injection 4 mL     lidocaine (PF) (XYLOCAINE) 1 % injection 4 mL     lidocaine (PF) (XYLOCAINE) 1 % injection 7 mL     lidocaine (PF) (XYLOCAINE) 1 % injection 8 mL     lidocaine (PF) (XYLOCAINE) 1  % injection 8 mL     triamcinolone (KENALOG-40) injection 40 mg     triamcinolone (KENALOG-40) injection 40 mg     triamcinolone (KENALOG-40) injection 40 mg     triamcinolone (KENALOG-40) injection 40 mg     triamcinolone (KENALOG-40) injection 40 mg     triamcinolone (KENALOG-40) injection 40 mg        Review of Systems   Constitutional, HEENT, cardiovascular, pulmonary, GI, , musculoskeletal, neuro, skin, endocrine and psych systems are negative, except as otherwise noted.      Objective           Vitals:  No vitals were obtained today due to virtual visit.    Physical Exam   healthy, alert and no distress  PSYCH: Alert and oriented times 3; coherent speech, normal   rate and volume, able to articulate logical thoughts, able   to abstract reason, no tangential thoughts, no hallucinations   or delusions  His affect is normal  RESP: No cough, no audible wheezing, able to talk in full sentences  Remainder of exam unable to be completed due to telephone visits      Phone call duration: 6 minutes

## 2023-01-11 ENCOUNTER — TELEPHONE (OUTPATIENT)
Dept: FAMILY MEDICINE | Facility: CLINIC | Age: 68
End: 2023-01-11

## 2023-01-11 DIAGNOSIS — Z96.652 STATUS POST TOTAL LEFT KNEE REPLACEMENT: ICD-10-CM

## 2023-01-11 DIAGNOSIS — G89.4 CHRONIC PAIN SYNDROME: Chronic | ICD-10-CM

## 2023-01-11 DIAGNOSIS — M47.16 SPONDYLOSIS WITH MYELOPATHY, LUMBAR REGION: ICD-10-CM

## 2023-01-11 DIAGNOSIS — M25.661 STIFFNESS OF KNEE JOINT, RIGHT: Chronic | ICD-10-CM

## 2023-01-11 DIAGNOSIS — R26.9 GAIT DIFFICULTY: ICD-10-CM

## 2023-01-11 RX ORDER — HYDROCODONE BITARTRATE AND ACETAMINOPHEN 7.5; 325 MG/1; MG/1
1 TABLET ORAL EVERY 6 HOURS PRN
Qty: 120 TABLET | Refills: 0 | Status: SHIPPED | OUTPATIENT
Start: 2023-01-16 | End: 2023-02-13

## 2023-01-11 NOTE — TELEPHONE ENCOUNTER
Medication Question or Refill    Contacts       Type Contact Phone/Fax    01/11/2023 12:10 PM CST Phone (Incoming) Arnaud Bird (Self) [phone] (M)          What medication are you calling about (include dose and sig)?:    HYDROcodone-acetaminophen (NORCO) 7.5-325 MG per tablet  Controlled Substance Agreement on file:   CSA -- Patient Level:     [Media Unavailable] Controlled Substance Agreement - Opioid - Scan on 8/10/2022  5:34 PM   [Media Unavailable] Controlled Substance Agreement - Opioid - Scan on 9/13/2021  4:37 PM   [Media Unavailable] Controlled Substance Agreement - Opioid - Scan on 7/20/2020 12:15 PM   [Media Unavailable] Controlled Substance Agreement - Opioid - Scan on 3/19/2019 11:01 AM       Who prescribed the medication?: namballa    Do you need a refill? Yes: Pt did not get the full rx last month and needs a new rx    When did you use the medication last? today    Patient offered an appointment? No    Do you have any questions or concerns?  No    Preferred Pharmacy:        [facility] [id number] [place] [facility] - [place], [place] - [address]  [address] [address]  Phone: [phone] Fax: [phone]      Jody Cha/Dulce Maria-  [facility]

## 2023-01-12 NOTE — TELEPHONE ENCOUNTER
[place] reviewed:  reviewed- no concerns    [place]:  [email or web address]    Refill done from 1/16/2023 ( 5 days before ) as per  check.     Thank you,.  Sushma Galloway MD on 1/11/2023

## 2023-01-12 NOTE — TELEPHONE ENCOUNTER
Spoke with patient and he understands he can  the RX on Monday   the 16 th.     Alisa Honeycutt RN  [facility]

## 2023-02-04 DIAGNOSIS — G56.03 BILATERAL CARPAL TUNNEL SYNDROME: ICD-10-CM

## 2023-02-13 DIAGNOSIS — G89.4 CHRONIC PAIN SYNDROME: Chronic | ICD-10-CM

## 2023-02-13 DIAGNOSIS — R26.9 GAIT DIFFICULTY: ICD-10-CM

## 2023-02-13 DIAGNOSIS — M47.16 SPONDYLOSIS WITH MYELOPATHY, LUMBAR REGION: ICD-10-CM

## 2023-02-13 DIAGNOSIS — Z96.652 STATUS POST TOTAL LEFT KNEE REPLACEMENT: ICD-10-CM

## 2023-02-13 DIAGNOSIS — M25.661 STIFFNESS OF KNEE JOINT, RIGHT: Chronic | ICD-10-CM

## 2023-02-13 RX ORDER — HYDROCODONE BITARTRATE AND ACETAMINOPHEN 7.5; 325 MG/1; MG/1
1 TABLET ORAL EVERY 6 HOURS PRN
Qty: 120 TABLET | Refills: 0 | Status: SHIPPED | OUTPATIENT
Start: 2023-02-17 | End: 2023-03-14

## 2023-02-13 NOTE — TELEPHONE ENCOUNTER
Medication Question or Refill    Contacts       Type Contact Phone/Fax    02/13/2023 11:13 AM CST Phone (Incoming) Arnaud Bird (Self) [phone] (M)          What medication are you calling about (include dose and sig)?: hydrocodone 7.5, take 1 tablet every 6 hours as needed for pain      Preferred Pharmacy:   [facility], [address] [phone]    Controlled Substance Agreement on file:   CSA -- Patient Level:     [Media Unavailable] Controlled Substance Agreement - Opioid - Scan on 8/10/2022  5:34 PM   [Media Unavailable] Controlled Substance Agreement - Opioid - Scan on 9/13/2021  4:37 PM   [Media Unavailable] Controlled Substance Agreement - Opioid - Scan on 7/20/2020 12:15 PM   [Media Unavailable] Controlled Substance Agreement - Opioid - Scan on 3/19/2019 11:01 AM       Who prescribed the medication?: Dr. Galloway     Do you need a refill? Yes    When did you use the medication last? Toady    Patient offered an appointment? Yes: patient had a recent visit     Do you have any questions or concerns?  No      Could we send this information to you in [facility] or would you prefer to receive a phone call?:   Patient would prefer a phone call   Okay to leave a detailed message?: Yes at Cell number on file:    Telephone Information:   Mobile [phone]

## 2023-02-14 NOTE — TELEPHONE ENCOUNTER
[place]) reviewed:  reviewed- no concerns    [place]:  [email or web address]    Refill done from 2/17/23 as per .  Sushma Galloway MD on 2/13/2023 at 6:51 PM

## 2023-03-03 DIAGNOSIS — M51.16 LUMBAR DISC DISEASE WITH RADICULOPATHY: ICD-10-CM

## 2023-03-03 RX ORDER — LIDOCAINE 50 MG/G
1 PATCH TOPICAL EVERY 24 HOURS
Qty: 30 PATCH | Refills: 11 | Status: SHIPPED | OUTPATIENT
Start: 2023-03-03 | End: 2023-12-13

## 2023-03-03 NOTE — TELEPHONE ENCOUNTER
Medication Question or Refill    Contacts       Type Contact Phone/Fax    03/03/2023 01:05 PM CST Phone (Incoming) Arnaud Bird (Self) [phone] (M)          What medication are you calling about (include dose and sig)?: Lidocaine patches 5% Lidoderm    Preferred Pharmacy:      [id number] - [place], [place] - [address]  [address] [facility] [address]  Phone: [phone] Fax: [phone]      Controlled Substance Agreement on file:   CSA -- Patient Level:     [Media Unavailable] Controlled Substance Agreement - Opioid - Scan on 8/10/2022  5:34 PM   [Media Unavailable] Controlled Substance Agreement - Opioid - Scan on 9/13/2021  4:37 PM   [Media Unavailable] Controlled Substance Agreement - Opioid - Scan on 7/20/2020 12:15 PM   [Media Unavailable] Controlled Substance Agreement - Opioid - Scan on 3/19/2019 11:01 AM       Who prescribed the medication?: Dr Johnson Clements    Do you need a refill? Yes    When did you use the medication last? today    Patient offered an appointment? No    Do you have any questions or concerns?  No      Could we send this information to you in [facility] or would you prefer to receive a phone call?:   Patient would prefer a phone call   Okay to leave a detailed message?: Yes at Home number on file [phone] (home)    Jody Cha/Dulce Maria-  [facility]

## 2023-03-14 ENCOUNTER — MYC REFILL (OUTPATIENT)
Dept: FAMILY MEDICINE | Facility: CLINIC | Age: 68
End: 2023-03-14
Payer: COMMERCIAL

## 2023-03-14 DIAGNOSIS — M47.16 SPONDYLOSIS WITH MYELOPATHY, LUMBAR REGION: ICD-10-CM

## 2023-03-14 DIAGNOSIS — R26.9 GAIT DIFFICULTY: ICD-10-CM

## 2023-03-14 DIAGNOSIS — G89.4 CHRONIC PAIN SYNDROME: Chronic | ICD-10-CM

## 2023-03-14 DIAGNOSIS — Z96.652 STATUS POST TOTAL LEFT KNEE REPLACEMENT: ICD-10-CM

## 2023-03-14 DIAGNOSIS — M25.661 STIFFNESS OF KNEE JOINT, RIGHT: Chronic | ICD-10-CM

## 2023-03-15 RX ORDER — HYDROCODONE BITARTRATE AND ACETAMINOPHEN 7.5; 325 MG/1; MG/1
1 TABLET ORAL EVERY 6 HOURS PRN
Qty: 120 TABLET | Refills: 0 | Status: SHIPPED | OUTPATIENT
Start: 2023-03-17 | End: 2023-04-06

## 2023-03-15 NOTE — TELEPHONE ENCOUNTER
RX [place] ([place]) reviewed:  reviewed- no concerns    [place] profile:  [email or web address]    Refill done from 3/17/2023 as per .    Thank you,  Sushma Galloway MD on 3/15/2023 at 8:39 AM

## 2023-03-23 NOTE — PATIENT INSTRUCTIONS
"  Diabetes: Exchange List    What are the exchange lists?     The exchange lists show you portions of food that equal 1 exchange. Foods are divided into food lists. The foods on each list are called exchanges because they have a similar number of calories, protein, carbohydrate and fat content. Foods from each list can be traded or \"exchanged\" for any other food on the same list because they all have a similar exchange value. A dietitian will help you plan how much food your child should eat at each meal and from what lists the foods should come from. At first you should measure your food until you are able to make good estimates about serving sizes. The following list is a sample of foods found on the exchange lists. For more information, you can buy the Exchange Lists for Meal Planning from: The American Diabetes Association [address] [place] [address] 1-478.307.5369 http://www.diabetes.org    Carbohydrate group     Starch List: One starch exchange contains about 15 grams of carbohydrate, 3 grams of protein, 0 to 1 grams of fat, and 80 calories. A starch exchange is sometimes called a carb exchange. Examples of one starch (carb) exchange are:     one slice of bread     1/2 hamburger or hot dog bun     3/4 cup of unsweetened cereal     1/3 cup pasta     3 cups popcorn     crackers (6 small saltines, 3 squares of justin crackers, 3 of most other crackers)     1 pancake or waffle (5 inch)     15 to 20 fat-free or baked potato or corn chips.     The vegetables included in the starch exchanges include:     corn (1/2 cup or 1/2 cob)     white potato (1/4 large baked with skin or 1/2 cup mashed)     yam or sweet potato (1/2 cup)     green peas (1/2 cup)     squash, winter (1 cup)     lima beans (2/3 cup).     Fruit List: 1 fruit exchange contains about 15 grams of carbohydrate and 60 calories. Examples of one fruit exchange are:     grape juice (1/3 cup)     apple or pineapple juice (1/2 cup)     orange or " Follow up Nadine 3 weeks  Soft diet 2 weeks  No strenuous activity 2 weeks  Alternate tylenol and ibuprofen every 3 hours   "grapefruit juice (1/2 cup)     1 small apple     orange or peach     1/2 banana     1 cup raspberries     1/3 of a small cantaloupe     1 slice of watermelon.     Milk List: 1 milk exchange contains about 8 grams of protein and 12 grams of carbohydrate. Items on the milk list are divided into fat-free, reduced fat, and whole milk depending on the number of fat grams in the exchange. Examples of one milk exchange are: Fat-Free (0 to 3 grams of fat)     1 cup of skim or non-fat milk     1 cup of 1% milk (also includes 1/2 fat exchange)     6 ounces flavored fat-free yogurt     Reduced-Fat (5 grams of fat)     6 ounces of plain, low-fat yogurt     1 cup 2% milk (also includes one fat exchange).     Whole Milk (8 grams of fat)     8 ounces of plain yogurt (made from whole milk)     1 cup whole milk.     Vegetable List: One vegetable exchange has 5 grams of carbohydrate, 2 grams of protein, no fat, and 25 calories. One-half cup of cooked or a cup of raw vegetables is a good measure for 1 exchange of most vegetables. Raw lettuce may be taken in larger quantities, but salad dressing usually equals 1 fat exchange. Other Carbohydrates List: One \"other carbohydrate\" exchange has 15 grams of carbohydrate. Many of these foods count as a starch exchange and one or more fat exchanges.     brownie (2 inch square) = 1 carb, 1 fat exchange     fruit snack roll = 1 carb exchange     granola bar = 1 and 1/2 carb exchanges     ice cream (1/2 cup) = 1 carb, 2 fat exchanges     frozen yogurt (1/2 cup) = 1 carb, 0 to 1 fat exchanges     tortilla chips (6-12) = 1 carb, 2 fat exchanges.     Meat and Meat Substitute Group     Meats are divided into very lean meats, lean meats, medium-fat meats, and high-fat meats. People with diabetes should try to eat more lean and medium fat meats and stay away from the high fat choices. The Very Lean meat group includes foods that contain 7 grams of protein, 0 to 1 gram of fat, and 35 calories for 1 " exchange. Examples include:     1 ounce chicken or turkey (white meat, no skin)     1 ounce fresh fish     1 ounce fat-free cheese     2 egg whites     The Lean meat group includes foods that contain 7 grams of protein, 3 grams of fat, and 55 calories for 1 meat exchange. Examples include:     1 ounce chicken or turkey (dark meat, no skin)     1 ounce fish     1 ounce lean pork     1 ounce USDA Select or Choice grades of lean beef     1 ounce cheese (with 3 grams of fat or less per ounce).     The Medium-Fat group includes foods that have 7 grams of protein, 5 grams of fat, and 75 calories for 1 meat exchange. Examples include:     1 ounce of ground beef, most cuts of beef, pork, lamb or veal     1 ounce of cheese (5 grams of fat per ounce or less)     1 egg     1 ounce fried fish.     The High-Fat foods have 7 grams of protein, 8 grams of fat, and 100 calories for 1 meat exchange. This group includes:     1 ounce of pork sausage     1 ounce of spare ribs     1 oz of regular cheese (American, Swiss etc.)     1 oz of lunch meat     1 hot dog (turkey or chicken).     Fat Group     Fat List: Fat is necessary for the body and is particularly important during periods of fasting (overnight), when it is very slowly absorbed. 1 fat exchange contains 5 grams of fat and 45 calories. The monounsaturated fats and polyunsaturated fats are better for us than saturated fats. The fat list includes: 1 exchange of monounsaturated fats equals:     1/2 Tbsp peanut butter     6 almonds     1 tsp of oil (olive, peanut, canola).     1 exchange of polyunsaturated fats equals:     1 tsp margarine     1 tsp of any vegetable oil (except coconut).     1 exchange of saturated fat includes:     1 tsp butter     1 strip of farah     2 Tbsp of cream (half and half).     Free Foods     A free food contains less than 20 calories or less than 5 grams of carbohydrate per serving. If the food has a serving size listed on its package, it should be  limited to 3 servings spread throughout the day. Examples of free foods include:     4 Tbsp fat-free margarine     1 Tbsp fat-free Miracle Whip     sugar-free gelatin     diet soft drinks     catsup     soy sauce     spices.     Combination Foods     Many foods, such as casseroles, are mixed together. Your dietitian can help you figure out how many exchanges to count for combination foods. For example:     lasagna (1 cup) = 2 carb exchanges and 2 medium-fat meat exchanges     spaghetti with meatballs (1 cup) = 2 carb exchanges and 2 medium-fat meat exchanges     pizza, cheese (1/4 of 12 in.) = 2 carbs, 2 medium-fat meats     chicken noodle soup (1 cup) = 1 carb exchange     frozen entrée (less than 300 calories) = 2 carbs, 3 lean meat exchanges     macaroni and cheese (1 cup) = 2 carb exchanges and 2 medium-fat meat exchanges.     1. MODIFIED FAST 250 CALORIES TWICE DAILY FEMALES  300 CALORIES TWICE DAILY FOR MALES   OATMEAL AND COTTAGE CHEESE WORK NICELY   COPIOUS WATER BETWEEN   5/2 PLAN DR FERGUSON [facility]  NORMAL DIET 5 DAYS PER WEEK  SEMIFAST 2 DAYS PER WEEK  LOOK UP 5-2 PLAN ON THE INTERNET    Weight Loss Tips  1. Do not eat after 6 hrs before your expected bedtime  2. Have your heaviest meal for breakfast, a slightly lighter meal at lunch and a snack 6 hrs before bed  3. No sugar/calorie drinks except milk ie no fruit juice, pop, alcohol.  4. Drink milk OR WATER  30min before meals to decrease your hunger. Also it is excellent as part of your last meal of the day snack  5. Drink lots of water  6. Increase fiber in diet: all bran cereal, salads, popcorn etc  7. Have only one small serving of fruit a day about 1/2 cup (as this is high in sugar)  8. EXERCISE is the bottom line. Without it, you will gain weight even on a low calorie diet. Best if done 2-3X a day as can    2. The only way known to prevent diabetes or keep it from getting worse is exercise, 20-40 minutes 3 times a day around the time of  meals      SLEEP 8 HOURS PER DAY    BLACK AND BLUEBERRIES EVERY DAY ANTINFLAMMATORY BENEFIT     PLAIN YOGURT SEVERAL TIMES DAILY AS TOLERATED IF NOT ALLERGIC     AVOID HIGH FRUCTOSE SYRUP AND OLENE IN YOUR DIET     GREEN TEA EXTRACT    PROBIOTIC CAPSULE DAILY  HIGH QUALITY     DON'T EAT LATE AT NIGHT    CARSOCORRO REYESA HELPS WITH APPETITE    KEEP A [employer] JOURNAL    888 BREATHER WHEN STRESSED OR COUNT BACK FROM 100 WITH SLOW BREATHING    POSITIVE AFFIRMATIONS         FIT BIT OR PEDOMETER 10,000 STEPS PER DAY     FIT BIT CHRISTIANOICH RECOMMENDED      (Z12.11) Screen for colon cancer  (primary encounter diagnosis)  Comment:    Plan: GASTROENTEROLOGY ADULT REF PROCEDURE ONLY             (G89.4) Chronic pain syndrome  Comment:    Plan: HYDROcodone-acetaminophen (NORCO) 7.5-325 MG         per tablet, DISCONTINUED:         HYDROcodone-acetaminophen (NORCO) 7.5-325 MG         per tablet, DISCONTINUED:         HYDROcodone-acetaminophen (NORCO) 7.5-325 MG         per tablet             (M1A.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

## 2023-03-25 ENCOUNTER — HEALTH MAINTENANCE LETTER (OUTPATIENT)
Age: 68
End: 2023-03-25

## 2023-03-30 ENCOUNTER — TELEPHONE (OUTPATIENT)
Dept: ORTHOPEDICS | Facility: CLINIC | Age: 68
End: 2023-03-30
Payer: COMMERCIAL

## 2023-03-30 DIAGNOSIS — M25.519 ARTHRALGIA OF SHOULDER, UNSPECIFIED LATERALITY: Primary | ICD-10-CM

## 2023-03-30 NOTE — TELEPHONE ENCOUNTER
ATC called and spoke with patient and he lives 100 miles away and does not want to drive into the clinic and is requesting a prescription of steroid to help with pain. Patient stated other providers have done this in the past. Patient would like to come in later for another injection but at this time does not want to drive in.

## 2023-03-30 NOTE — TELEPHONE ENCOUNTER
Patient called and is asking for a call from Dr. Chan's care team. When I asked what message he would like me to send, he declined any details and stated Dr. Chan was aware of his situation. Please call patient at [phone].    Thank you!

## 2023-03-31 RX ORDER — MELOXICAM 15 MG/1
15 TABLET ORAL DAILY
Qty: 30 TABLET | Refills: 0 | Status: SHIPPED | OUTPATIENT
Start: 2023-03-31 | End: 2023-08-03

## 2023-03-31 NOTE — TELEPHONE ENCOUNTER
Problem with oral steroid is that it goes everywhere.  Has he established care by the [facility]?

## 2023-03-31 NOTE — TELEPHONE ENCOUNTER
[facility]oxicam sent to [facility]    Needs to establish PCP in [place] if going to live there on more permanent basis

## 2023-04-06 ENCOUNTER — MYC REFILL (OUTPATIENT)
Dept: FAMILY MEDICINE | Facility: CLINIC | Age: 68
End: 2023-04-06
Payer: COMMERCIAL

## 2023-04-06 DIAGNOSIS — G89.4 CHRONIC PAIN SYNDROME: Chronic | ICD-10-CM

## 2023-04-06 DIAGNOSIS — Z96.652 STATUS POST TOTAL LEFT KNEE REPLACEMENT: ICD-10-CM

## 2023-04-06 DIAGNOSIS — M25.661 STIFFNESS OF KNEE JOINT, RIGHT: Chronic | ICD-10-CM

## 2023-04-06 DIAGNOSIS — R26.9 GAIT DIFFICULTY: ICD-10-CM

## 2023-04-06 DIAGNOSIS — M47.16 SPONDYLOSIS WITH MYELOPATHY, LUMBAR REGION: ICD-10-CM

## 2023-04-08 ENCOUNTER — NURSE TRIAGE (OUTPATIENT)
Dept: NURSING | Facility: CLINIC | Age: 68
End: 2023-04-08
Payer: COMMERCIAL

## 2023-04-08 RX ORDER — HYDROCODONE BITARTRATE AND ACETAMINOPHEN 7.5; 325 MG/1; MG/1
1 TABLET ORAL EVERY 6 HOURS PRN
Qty: 120 TABLET | Refills: 0 | Status: SHIPPED | OUTPATIENT
Start: 2023-04-11 | End: 2023-05-08

## 2023-04-08 NOTE — TELEPHONE ENCOUNTER
See nurse triage documentation from 4/8. Original pharmacy in [place] did not have this prescription available.   Patient found that [facility] in [place] did and requesting that Rx. be sent to this pharmacy instead.

## 2023-04-08 NOTE — TELEPHONE ENCOUNTER
"\"My doctor from the [facility] called in a refill for my Hydrocodone.\" Arnaud states the original Rx was sent to [facility] in [place], but this location only had enough supply for 21 days. Patient would like the prescription to be sent to the [facility] in [place] as they do have this medication in stock.   [facility] in [place]  Phone number: [phone]  Address: [address] [place] [address] guidelines recommend patient to contact PCP during normal clinic business hours. [facility] RN attempted to resend this Rx to the [facility] in [place], but not able to e-prescribe as it needs to be signed by the ordering provider. Patient states he has just enough to get him through to Monday. RN advised that will send high priority message to PCP/ Care Team to address on Monday and advised to call back with any further questions or concerns. Arnaud verbalized understanding of and agreement with plan and had no further questions.     Reason for Disposition    Caller requesting a CONTROLLED substance prescription refill (e.g., narcotics, ADHD medicines)    Protocols used: MEDICATION REFILL AND RENEWAL CALL-A-DIETER Hoffman RN-BSN  [facility] Nurse Advisors     "

## 2023-04-08 NOTE — TELEPHONE ENCOUNTER
[place] reviewed:  reviewed- no concerns    [place]  [email or web address]      Pt has received 101 tabs instead of 120 tabs/month which is 25 days supply. Last filled in 3/17/2023and good till 4/10/23.     Sent in the refill for 30 days from 4/11/23 as per  check to the requested pharmacy.    Further refills after OV - Due for Opiate follow up & AWV.     Please call the patient and schedule AWV with me, which patient is due at this time, to further take care of the medical conditions and medications.OK to use same day slot / double book near another virtual slot in 3 weeks.     Thank you,  Sushma Galloway MD on 4/8/2023 at 3:45 PM

## 2023-04-10 ENCOUNTER — TELEPHONE (OUTPATIENT)
Dept: FAMILY MEDICINE | Facility: CLINIC | Age: 68
End: 2023-04-10
Payer: COMMERCIAL

## 2023-04-10 DIAGNOSIS — G89.4 CHRONIC PAIN SYNDROME: Chronic | ICD-10-CM

## 2023-04-10 DIAGNOSIS — R26.9 GAIT DIFFICULTY: ICD-10-CM

## 2023-04-10 DIAGNOSIS — M47.16 SPONDYLOSIS WITH MYELOPATHY, LUMBAR REGION: ICD-10-CM

## 2023-04-10 DIAGNOSIS — Z96.652 STATUS POST TOTAL LEFT KNEE REPLACEMENT: ICD-10-CM

## 2023-04-10 DIAGNOSIS — M25.661 STIFFNESS OF KNEE JOINT, RIGHT: Chronic | ICD-10-CM

## 2023-04-10 NOTE — TELEPHONE ENCOUNTER
HYDROcodone-acetaminophen (NORCO) 7.5-325 MG per tablet.    04/08/23 1547 Sushma Tavares MD Reorder from Order:[id number]     Outpatient Medication Detail     Disp Refills Start End ISIAH   HYDROcodone-acetaminophen (NORCO) 7.5-325 MG per tablet 120 tablet 0 4/11/2023  No   Sig - Route: Take 1 tablet by mouth every 6 hours as needed for pain (4 x daily as needed  maxium 4 per day) - Oral   Sent to pharmacy as: HYDROcodone-Acetaminophen 7.5-325 MG Oral Tablet (NORCO)   Class: E-Prescribe   Earliest Fill Date: 4/11/2023   Order: [id number]   E-Prescribing Status: Receipt confirmed by pharmacy (4/8/2023  3:47 PM CDT)     Reviewed with pt. Pt will contact pharmacy before pickup with further questions if needed.

## 2023-04-17 ENCOUNTER — TELEPHONE (OUTPATIENT)
Dept: FAMILY MEDICINE | Facility: CLINIC | Age: 68
End: 2023-04-17
Payer: COMMERCIAL

## 2023-04-17 NOTE — TELEPHONE ENCOUNTER
General Call      Reason for Call:  Patient wants to do DOT physical as well     What are your questions or concerns:  DOT physical    Date of last appointment with provider: 04/21/2023    Could we send this information to you in [facility] or would you prefer to receive a phone call?:   Patient would prefer a phone call   Okay to leave a detailed message?: Yes at Cell number on file:    Telephone Information:   Mobile [phone]       Jodie CEE    [facility]

## 2023-04-18 ENCOUNTER — TELEPHONE (OUTPATIENT)
Dept: FAMILY MEDICINE | Facility: CLINIC | Age: 68
End: 2023-04-18

## 2023-04-18 NOTE — TELEPHONE ENCOUNTER
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  We can do his preventive exam, but he will need to go to a different provider (usually referred by his place of employment) to obtain his DOT physical.

## 2023-04-18 NOTE — TELEPHONE ENCOUNTER
Spoke to pt, relayed below information that we do not complete DOT physicals.  Pt stated understanding.    Jodie CEE    [facility]

## 2023-04-18 NOTE — TELEPHONE ENCOUNTER
Please call the patient and notify regarding this update from Dr. Bass and let him know that we will be doing regular physicals and not the DOT exam.     He will need to check with his employer on the physicians who regularly do this for obtaining a DOT physical.     Thank you,   Sushma Galloway MD on 4/18/2023 at 10:43 AM

## 2023-04-18 NOTE — TELEPHONE ENCOUNTER
Patient Quality Outreach    Patient is due for the following:   Physical Annual Wellness Visit    Next Steps:   Schedule a Annual Wellness Visit    Type of outreach:    Sent [employer] message.     Physical Annual Wellness Visit is done.        Questions for provider review:    None     Madai Gallegos MA

## 2023-04-18 NOTE — TELEPHONE ENCOUNTER
As per my Knowledge -  DOT Physical does include all the routine physical we normally do.   Correct me if there is anything additional to this.     Thank you,  Sushma Galloway MD on 4/18/2023 at 10:05 AM    =================================================    [email or web address],the%20substances%[email or web address]%[email or web address].    What is included in a DOT physical examination?  The objective of a DOT physical examination is to evaluate the health and well-being of a commercial motor vehicle . A  who has health issues which may include sleep apnea, diabetes, poor vision, failed hearing or high blood pressure, could be cause serious harm in the event they were effected while driving. The DOT physical aims to keep unsafe CMV drivers off the road, and ensure the roads stay safe.    A DOT physical is used to evaluate the  s vision, blood pressure, general health, heart, and any relevant medical history. The goal of the physical is to ensure the  does not have any health conditions or issues which could negatively impact their ability to safely operate a commercial motor vehicle.    The following are typically included in a DOT physical examination:    Blood pressure and pulse check  Check for hernia or any abdomen abnormalities  Evaluation for any spine deformities  Hearing test, drivers must be able to hear a  forced whisper  from at least 5 feet away with one ear; a hearing aid can be used in the other ear  Heart and lung evaluation for murmurs or impaired respiratory functions  Mouth and throat evaluation to detect any issues with breathing or swallowing  Neurological exam, ensure the  has good coordination and reflexes  Urinalysis to detect any underlying medical conditions or the use of drugs  Vision test, at least 20/40 is required and corrective lenses are allowed; patients with monovision may be  disqualified    ==============================================

## 2023-04-20 ASSESSMENT — ENCOUNTER SYMPTOMS
DIARRHEA: 0
SORE THROAT: 0
CHILLS: 0
COUGH: 0
ABDOMINAL PAIN: 0
EYE PAIN: 0
HEMATOCHEZIA: 0
DIZZINESS: 0
HEADACHES: 0
HEMATURIA: 0
SHORTNESS OF BREATH: 0
DYSURIA: 0
CONSTIPATION: 0
NERVOUS/ANXIOUS: 0
PARESTHESIAS: 0
FREQUENCY: 0
HEARTBURN: 0
PALPITATIONS: 0
NAUSEA: 0
MYALGIAS: 0
WEAKNESS: 0
FEVER: 0
ARTHRALGIAS: 0
JOINT SWELLING: 0

## 2023-04-20 ASSESSMENT — ASTHMA QUESTIONNAIRES
ACT_TOTALSCORE: 23
QUESTION_3 LAST FOUR WEEKS HOW OFTEN DID YOUR ASTHMA SYMPTOMS (WHEEZING, COUGHING, SHORTNESS OF BREATH, CHEST TIGHTNESS OR PAIN) WAKE YOU UP AT NIGHT OR EARLIER THAN USUAL IN THE MORNING: NOT AT ALL
QUESTION_1 LAST FOUR WEEKS HOW MUCH OF THE TIME DID YOUR ASTHMA KEEP YOU FROM GETTING AS MUCH DONE AT WORK, SCHOOL OR AT HOME: NONE OF THE TIME
ACT_TOTALSCORE: 23
QUESTION_5 LAST FOUR WEEKS HOW WOULD YOU RATE YOUR ASTHMA CONTROL: WELL CONTROLLED
QUESTION_2 LAST FOUR WEEKS HOW OFTEN HAVE YOU HAD SHORTNESS OF BREATH: NOT AT ALL
QUESTION_4 LAST FOUR WEEKS HOW OFTEN HAVE YOU USED YOUR RESCUE INHALER OR NEBULIZER MEDICATION (SUCH AS ALBUTEROL): ONCE A WEEK OR LESS

## 2023-04-20 ASSESSMENT — ACTIVITIES OF DAILY LIVING (ADL): CURRENT_FUNCTION: NO ASSISTANCE NEEDED

## 2023-04-21 ENCOUNTER — OFFICE VISIT (OUTPATIENT)
Dept: FAMILY MEDICINE | Facility: CLINIC | Age: 68
End: 2023-04-21
Payer: COMMERCIAL

## 2023-04-21 ENCOUNTER — TELEPHONE (OUTPATIENT)
Dept: FAMILY MEDICINE | Facility: CLINIC | Age: 68
End: 2023-04-21

## 2023-04-21 VITALS
DIASTOLIC BLOOD PRESSURE: 84 MMHG | BODY MASS INDEX: 38.22 KG/M2 | WEIGHT: 267 LBS | HEIGHT: 70 IN | SYSTOLIC BLOOD PRESSURE: 128 MMHG | HEART RATE: 63 BPM | OXYGEN SATURATION: 99 % | RESPIRATION RATE: 17 BRPM | TEMPERATURE: 97.9 F

## 2023-04-21 DIAGNOSIS — F11.20 CONTINUOUS OPIOID DEPENDENCE (H): ICD-10-CM

## 2023-04-21 DIAGNOSIS — T14.8XXA FRICTION INJURY TO SKIN: ICD-10-CM

## 2023-04-21 DIAGNOSIS — E78.5 HYPERLIPIDEMIA WITH TARGET LDL LESS THAN 130: ICD-10-CM

## 2023-04-21 DIAGNOSIS — Z00.00 ENCOUNTER FOR MEDICARE ANNUAL WELLNESS EXAM: Primary | ICD-10-CM

## 2023-04-21 DIAGNOSIS — G89.4 CHRONIC PAIN SYNDROME: Chronic | ICD-10-CM

## 2023-04-21 DIAGNOSIS — Z96.652 STATUS POST TOTAL LEFT KNEE REPLACEMENT: ICD-10-CM

## 2023-04-21 DIAGNOSIS — M47.16 SPONDYLOSIS WITH MYELOPATHY, LUMBAR REGION: ICD-10-CM

## 2023-04-21 DIAGNOSIS — M51.16 LUMBAR DISC DISEASE WITH RADICULOPATHY: ICD-10-CM

## 2023-04-21 DIAGNOSIS — J45.20 MILD INTERMITTENT ASTHMA WITHOUT COMPLICATION: ICD-10-CM

## 2023-04-21 DIAGNOSIS — I72.3 ILIAC ARTERY ANEURYSM, LEFT (H): ICD-10-CM

## 2023-04-21 DIAGNOSIS — R37 SEXUAL DYSFUNCTION: ICD-10-CM

## 2023-04-21 LAB
ALBUMIN SERPL BCG-MCNC: 4.3 G/DL (ref 3.5–5.2)
ALP SERPL-CCNC: 104 U/L (ref 40–129)
ALT SERPL W P-5'-P-CCNC: 20 U/L (ref 10–50)
AMPHETAMINES UR QL: NOT DETECTED
ANION GAP SERPL CALCULATED.3IONS-SCNC: 11 MMOL/L (ref 7–15)
AST SERPL W P-5'-P-CCNC: 21 U/L (ref 10–50)
BARBITURATES UR QL SCN: NOT DETECTED
BENZODIAZ UR QL SCN: NOT DETECTED
BILIRUB SERPL-MCNC: 0.6 MG/DL
BUN SERPL-MCNC: 16.7 MG/DL (ref 8–23)
BUPRENORPHINE UR QL: NOT DETECTED
CALCIUM SERPL-MCNC: 9.3 MG/DL (ref 8.8–10.2)
CANNABINOIDS UR QL: NOT DETECTED
CHLORIDE SERPL-SCNC: 105 MMOL/L (ref 98–107)
CHOLEST SERPL-MCNC: 214 MG/DL
COCAINE UR QL SCN: NOT DETECTED
CREAT SERPL-MCNC: 0.87 MG/DL (ref 0.67–1.17)
D-METHAMPHET UR QL: NOT DETECTED
DEPRECATED HCO3 PLAS-SCNC: 23 MMOL/L (ref 22–29)
ERYTHROCYTE [DISTWIDTH] IN BLOOD BY AUTOMATED COUNT: 13.3 % (ref 10–15)
GFR SERPL CREATININE-BSD FRML MDRD: >90 ML/MIN/1.73M2
GLUCOSE SERPL-MCNC: 107 MG/DL (ref 70–99)
HCT VFR BLD AUTO: 48 % (ref 40–53)
HDLC SERPL-MCNC: 69 MG/DL
HGB BLD-MCNC: 16.3 G/DL (ref 13.3–17.7)
LDLC SERPL CALC-MCNC: 127 MG/DL
MCH RBC QN AUTO: 32.4 PG (ref 26.5–33)
MCHC RBC AUTO-ENTMCNC: 34 G/DL (ref 31.5–36.5)
MCV RBC AUTO: 95 FL (ref 78–100)
METHADONE UR QL SCN: NOT DETECTED
NONHDLC SERPL-MCNC: 145 MG/DL
OPIATES UR QL SCN: DETECTED
OXYCODONE UR QL SCN: NOT DETECTED
PCP UR QL SCN: NOT DETECTED
PLATELET # BLD AUTO: 198 10E3/UL (ref 150–450)
POTASSIUM SERPL-SCNC: 4.9 MMOL/L (ref 3.4–5.3)
PROPOXYPH UR QL: NOT DETECTED
PROT SERPL-MCNC: 7.3 G/DL (ref 6.4–8.3)
RBC # BLD AUTO: 5.03 10E6/UL (ref 4.4–5.9)
SODIUM SERPL-SCNC: 139 MMOL/L (ref 136–145)
TRICYCLICS UR QL SCN: NOT DETECTED
TRIGL SERPL-MCNC: 88 MG/DL
WBC # BLD AUTO: 5 10E3/UL (ref 4–11)

## 2023-04-21 PROCEDURE — 85027 COMPLETE CBC AUTOMATED: CPT | Performed by: INTERNAL MEDICINE

## 2023-04-21 PROCEDURE — 99214 OFFICE O/P EST MOD 30 MIN: CPT | Mod: 25 | Performed by: INTERNAL MEDICINE

## 2023-04-21 PROCEDURE — G0439 PPPS, SUBSEQ VISIT: HCPCS | Performed by: INTERNAL MEDICINE

## 2023-04-21 PROCEDURE — 80306 DRUG TEST PRSMV INSTRMNT: CPT | Performed by: INTERNAL MEDICINE

## 2023-04-21 PROCEDURE — 80061 LIPID PANEL: CPT | Performed by: INTERNAL MEDICINE

## 2023-04-21 PROCEDURE — 36415 COLL VENOUS BLD VENIPUNCTURE: CPT | Performed by: INTERNAL MEDICINE

## 2023-04-21 PROCEDURE — 80053 COMPREHEN METABOLIC PANEL: CPT | Performed by: INTERNAL MEDICINE

## 2023-04-21 RX ORDER — CLINDAMYCIN PHOSPHATE 10 MG/G
GEL TOPICAL 2 TIMES DAILY
Qty: 60 G | Refills: 3 | Status: SHIPPED | OUTPATIENT
Start: 2023-04-21 | End: 2024-06-27

## 2023-04-21 ASSESSMENT — ENCOUNTER SYMPTOMS
HEADACHES: 0
EYE PAIN: 0
HEMATURIA: 0
WEAKNESS: 0
FEVER: 0
JOINT SWELLING: 0
NERVOUS/ANXIOUS: 0
SORE THROAT: 0
PARESTHESIAS: 0
FREQUENCY: 0
DYSURIA: 0
SHORTNESS OF BREATH: 0
PALPITATIONS: 0
CONSTIPATION: 0
NAUSEA: 0
ARTHRALGIAS: 0
CHILLS: 0
HEMATOCHEZIA: 0
ABDOMINAL PAIN: 0
DIZZINESS: 0
COUGH: 0
DIARRHEA: 0
MYALGIAS: 0
HEARTBURN: 0

## 2023-04-21 ASSESSMENT — PAIN SCALES - GENERAL: PAINLEVEL: MODERATE PAIN (4)

## 2023-04-21 ASSESSMENT — ANXIETY QUESTIONNAIRES
2. NOT BEING ABLE TO STOP OR CONTROL WORRYING: SEVERAL DAYS
1. FEELING NERVOUS, ANXIOUS, OR ON EDGE: MORE THAN HALF THE DAYS
GAD7 TOTAL SCORE: 6
3. WORRYING TOO MUCH ABOUT DIFFERENT THINGS: SEVERAL DAYS
GAD7 TOTAL SCORE: 6
7. FEELING AFRAID AS IF SOMETHING AWFUL MIGHT HAPPEN: NOT AT ALL
6. BECOMING EASILY ANNOYED OR IRRITABLE: NOT AT ALL
5. BEING SO RESTLESS THAT IT IS HARD TO SIT STILL: SEVERAL DAYS

## 2023-04-21 ASSESSMENT — PATIENT HEALTH QUESTIONNAIRE - PHQ9
SUM OF ALL RESPONSES TO PHQ QUESTIONS 1-9: 4
5. POOR APPETITE OR OVEREATING: SEVERAL DAYS

## 2023-04-21 ASSESSMENT — ACTIVITIES OF DAILY LIVING (ADL): CURRENT_FUNCTION: NO ASSISTANCE NEEDED

## 2023-04-21 NOTE — LETTER
Opioid / Opioid Plus Controlled Substance Agreement    This is an agreement between you and your provider about the safe and appropriate use of controlled substance/opioids prescribed by your care team. Controlled substances are medicines that can cause physical and mental dependence (abuse).    There are strict laws about having and using these medicines. We here at [facility] are committing to working with you in your efforts to get better. To support you in this work, we ll help you schedule regular office appointments for medicine refills. If we must cancel or change your appointment for any reason, we ll make sure you have enough medicine to last until your next appointment.     As a Provider, I will:  Listen carefully to your concerns and treat you with respect.   Recommend a treatment plan that I believe is in your best interest. This plan may involve therapies other than opioid pain medication.   Talk with you often about the possible benefits, and the risk of harm of any medicine that we prescribe for you.   Provide a plan on how to taper (discontinue or go off) using this medicine if the decision is made to stop its use.    As a Patient, I understand that opioid(s):   Are a controlled substance prescribed by my care team to help me function or work and manage my condition(s).   Are strong medicines and can cause serious side effects such as:  Drowsiness, which can seriously affect my driving ability  A lower breathing rate, enough to cause death  Harm to my thinking ability   Depression   Abuse of and addiction to this medicine  Need to be taken exactly as prescribed. Combining opioids with certain medicines or chemicals (such as illegal drugs, sedatives, sleeping pills, and benzodiazepines) can be dangerous or even fatal. If I stop opioids suddenly, I may have severe withdrawal symptoms.  Do not work for all types of pain nor for all patients. If they re not helpful, I may be asked to stop  them.        The risks, benefits and side effects of these medicine(s) were explained to me. I agree that:  I will take part in other treatments as advised by my care team. This may be psychiatry or counseling, physical therapy, behavioral therapy, group treatment or a referral to a specialist.     I will keep all my appointments. I understand that this is part of the monitoring of opioids. My care team may require an office visit for EVERY opioid/controlled substance refill. If I miss appointments or don t follow instructions, my care team may stop my medicine.    I will take my medicines as prescribed. I will not change the dose or schedule unless my care team tells me to. There will be no refills if I run out early.     I may be asked to come to the clinic and complete a urine drug test or complete a pill count at any time. If I don t give a urine sample or participate in a pill count, the care team may stop my medicine.    I will only receive prescriptions from this clinic for chronic pain. If I am treated by another provider for acute pain issues, I will tell them that I am taking opioid pain medication for chronic pain and that I have a treatment agreement with this provider. I will inform my [facility] care team within one business day if I am given a prescription for any pain medication by another healthcare provider. My [facility] care team can contact other providers and pharmacists about my use of any medicines.    It is up to me to make sure that I don t run out of my medicines on weekends or holidays. If my care team is willing to refill my opioid prescription without a visit, I must request refills only during office hours. Refills may take up to 3 business days to process. I will use one pharmacy to fill all my opioid and other controlled substance prescriptions. I will notify the clinic about any changes to my insurance or medication availability.    I am responsible for my  prescriptions. If the medicine/prescription is lost, stolen or destroyed, it will not be replaced. I also agree not to share controlled substance medicines with anyone.    I am aware I should not use any illegal or recreational drugs. I agree not to drink alcohol unless my care team says I can.       If I enroll in the [place] Medical Cannabis program, I will tell my care team prior to my next refill.     I will tell my care team right away if I become pregnant, have a new medical problem treated outside of my regular clinic, or have a change in my medications.    I understand that this medicine can affect my thinking, judgment and reaction time. Alcohol and drugs affect the brain and body, which can affect the safety of my driving. Being under the influence of alcohol or drugs can affect my decision-making, behaviors, personal safety, and the safety of others. Driving while impaired (DWI) can occur if a person is driving, operating, or in physical control of a car, motorcycle, boat, snowmobile, ATV, motorbike, off-road vehicle, or any other motor vehicle ([place] Statute 169A.20). I understand the risk if I choose to drive or operate any vehicle or machinery.    I understand that if I do not follow any of the conditions above, my prescriptions or treatment may be stopped or changed.          Opioids  What You Need to Know    What are opioids?   Opioids are pain medicines that must be prescribed by a doctor. They are also known as narcotics.     Examples are:   morphine (MS Contin, Caryn)  oxycodone (Oxycontin)  oxycodone and acetaminophen (Percocet)  hydrocodone and acetaminophen (Vicodin, Norco)   fentanyl patch (Duragesic)   hydromorphone (Dilaudid)   methadone  codeine (Tylenol #3)     What do opioids do well?   Opioids are best for severe short-term pain such as after a surgery or injury. They may work well for cancer pain. They may help some people with long-lasting (chronic) pain.     What do opioids NOT do  well?   Opioids never get rid of pain entirely, and they don t work well for most patients with chronic pain. Opioids don t reduce swelling, one of the causes of pain.                                    Other ways to manage chronic pain and improve function include:     Treat the health problem that may be causing pain  Anti-inflammation medicines, which reduce swelling and tenderness, such as ibuprofen (Advil, Motrin) or naproxen (Aleve)  Acetaminophen (Tylenol)  Antidepressants and anti-seizure medicines, especially for nerve pain  Topical treatments such as patches or creams  Injections or nerve blocks  Chiropractic or osteopathic treatment  Acupuncture, massage, deep breathing, meditation, visual imagery, aromatherapy  Use heat or ice at the pain site  Physical therapy   Exercise  Stop smoking  Take part in therapy       Risks and side effects     Talk to your doctor before you start or decide to keep taking opioids. Possible side effects include:    Lowering your breathing rate enough to cause death  Overdose, including death, especially if taking higher than prescribed doses  Worse depression symptoms; less pleasure in things you usually enjoy  Feeling tired or sluggish  Slower thoughts or cloudy thinking  Being more sensitive to pain over time; pain is harder to control  Trouble sleeping or restless sleep  Changes in hormone levels (for example, less testosterone)  Changes in sex drive or ability to have sex  Constipation  Unsafe driving  Itching and sweating  Dizziness  Nausea, throwing up and dry mouth    What else should I know about opioids?    Opioids may lead to dependence, tolerance, or addiction.    Dependence means that if you stop or reduce the medicine too quickly, you will have withdrawal symptoms. These include loose poop (diarrhea), jitters, flu-like symptoms, nervousness and tremors. Dependence is not the same as addiction.                     Tolerance means needing higher doses over time to  get the same effect. This may increase the chance of serious side effects.    Addiction is when people improperly use a substance that harms their body, their mind or their relations with others. Use of opiates can cause a relapse of addiction if you have a history of drug or alcohol abuse.    People who have used opioids for a long time may have a lower quality of life, worse depression, higher levels of pain and more visits to doctors.    You can overdose on opioids. Take these steps to lower your risk of overdose:    Recognize the signs:  Signs of overdose include decrease or loss of consciousness (blackout), slowed breathing, trouble waking up and blue lips. If someone is worried about overdose, they should call 911.    Talk to your doctor about Narcan (naloxone).   If you are at risk for overdose, you may be given a prescription for Narcan. This medicine very quickly reverses the effects of opioids.   If you overdose, a friend or family member can give you Narcan while waiting for the ambulance. They need to know the signs of overdose and how to give Narcan.     Don't use alcohol or street drugs.   Taking them with opioids can cause death.    Do not take any of these medicines unless your doctor says it s OK. Taking these with opioids can cause death:  Benzodiazepines, such as lorazepam (Ativan), alprazolam (Xanax) or diazepam (Valium)  Muscle relaxers, such as cyclobenzaprine (Flexeril)  Sleeping pills like zolpidem (Ambien)   Other opioids      How to keep you and other people safe while taking opioids:    Never share your opioids with others.  Opioid medicines are regulated by the Drug Enforcement Agency ([employer]). Selling or sharing medications is a criminal act.    2. Be sure to store opioids in a secure place, locked up if possible. Young children can easily swallow them and overdose.    3. When you are traveling with your medicines, keep them in the original bottles. If you use a pill box, be sure you also  carry a copy of your medicine list from your clinic or pharmacy.    4. Safe disposal of opioids    Most pharmacies have places to get rid of medicine, called disposal kiosks. Medicine disposal options are also available in every [facility]. Search your county and  medication disposal  to find more options. You can find more details at:  https://www.[email or web address]./living-green/managing-[email or web address]-medications     I agree that my provider, clinic care team, and pharmacy may work with any city, state or federal law enforcement agency that investigates the misuse, sale, or other diversion of my controlled medicine. I will allow my provider to discuss my care with, or share a copy of, this agreement with any other treating provider, pharmacy or emergency room where I receive care.    I have read this agreement and have asked questions about anything I did not understand.    _______________________________________________________  Patient Signature - Arnaud Bird _____________________                   Date     _______________________________________________________  Provider Signature - Sushma Galloway MD   _____________________                   Date     _______________________________________________________  Witness Signature (required if provider not present while patient signing)   _____________________                   Date

## 2023-04-21 NOTE — PROGRESS NOTES
"SUBJECTIVE:   Arnaud is a 67 year old who presents for Preventive Visit.        4/21/2023     9:14 AM   Additional Questions   Roomed by Nohemi Martinez   Patient has been advised of split billing requirements and indicates understanding: Yes  Are you in the first 12 months of your Medicare coverage?  No    Healthy Habits:     In general, how would you rate your overall health?  Good    Frequency of exercise:  2-3 days/week    Duration of exercise:  30-45 minutes    Do you usually eat at least 4 servings of fruit and vegetables a day, include whole grains    & fiber and avoid regularly eating high fat or \"junk\" foods?  Yes    Taking medications regularly:  Yes    Medication side effects:  None    Ability to successfully perform activities of daily living:  No assistance needed    Home Safety:  No safety concerns identified    Hearing Impairment:  Need to ask people to speak up or repeat themselves    In the past 6 months, have you been bothered by leaking of urine?  No    In general, how would you rate your overall mental or emotional health?  Good      PHQ-2 Total Score: 0    Additional concerns today:  Yes      Have you ever done Advance Care Planning? (For example, a Health Directive, POLST, or a discussion with a medical provider or your loved ones about your wishes): No, advance care planning information given to patient to review.  Patient plans to discuss their wishes with loved ones or provider.         Fall risk  Fallen 2 or more times in the past year?: No  Any fall with injury in the past year?: No  click delete button to remove this line now  Cognitive Screening   1) Repeat 3 items (Leader, Season, Table)    2) Clock draw: NORMAL  3) 3 item recall: Recalls 3 objects  Results: 3 items recalled: COGNITIVE IMPAIRMENT LESS LIKELY    Mini-CogTM Copyright ISAAK Wilcox. Licensed by the author for use in [facility]; reprinted with permission (stefany@.[facility]). All rights reserved.      Do you have sleep " apnea, excessive snoring or daytime drowsiness?: no    Reviewed and updated as needed this visit by clinical staff   Tobacco  Allergies  Meds  Problems  Med Hx  Surg Hx  Fam Hx          Reviewed and updated as needed this visit by Provider   Tobacco  Allergies  Meds  Problems  Med Hx  Surg Hx  Fam Hx         Social History     Tobacco Use     Smoking status: Never     Smokeless tobacco: Never   Vaping Use     Vaping status: Never Used   Substance Use Topics     Alcohol use: Yes     Comment: case beer a week             4/20/2023     8:56 PM   Alcohol Use   Prescreen: >3 drinks/day or >7 drinks/week? No     Do you have a current opioid prescription? Yes   How severe is your pain on a scale from 1-10? 4/10     Current providers sharing in care for this patient include:   Patient Care Team:  Sushma Galloway MD as PCP - General (Internal Medicine)  Carmel Chan MD as MD (Family Medicine - Sports Medicine)  Alex Singh MD as MD (Otolaryngology)  Gisella Steve MD as MD (Internal Medicine)  Anna Watkins MD as MD (Orthopedics)  Sushma Galloway MD as Assigned PCP  Meron Mccloud PA-C as Physician Assistant (Dermatology)  Sushma Galloway MD as Assigned Pain Medication Provider    The following health maintenance items are reviewed in Epic and correct as of today:  Health Maintenance   Topic Date Due     ZOSTER IMMUNIZATION (1 of 2) Never done     PHQ-9  03/19/2020     COVID-19 Vaccine (2 - Pfizer series) 10/08/2021     ASTHMA ACTION PLAN  07/08/2022     INFLUENZA VACCINE (1) 09/01/2022     MEDICARE ANNUAL WELLNESS VISIT  01/25/2023     SOFIYA ASSESSMENT  01/25/2023     ANNUAL REVIEW OF HM ORDERS  05/31/2023     TREATMENT AGREEMENT FOR CHRONIC PAIN MANAGEMENT  08/10/2023     ASTHMA CONTROL TEST  10/21/2023     LIPID  04/21/2024     URINE DRUG SCREEN  04/21/2024     FALL RISK ASSESSMENT  04/21/2024     COLORECTAL CANCER SCREENING  08/15/2027     ADVANCE  "CARE PLANNING  04/21/2028     DTAP/TDAP/TD IMMUNIZATION (2 - Td or Tdap) 06/18/2029     HEPATITIS C SCREENING  Completed     MIGRAINE ACTION PLAN  Completed     PHQ-2 (once per calendar year)  Completed     Pneumococcal Vaccine: 65+ Years  Completed     AORTIC ANEURYSM SCREENING (SYSTEM ASSIGNED)  Completed     IPV IMMUNIZATION  Aged Out     MENINGITIS IMMUNIZATION  Aged Out     Lab work is in process  Labs reviewed in EPIC    Review of Systems   Constitutional: Negative for chills and fever.   HENT: Positive for ear pain and hearing loss. Negative for congestion and sore throat.    Eyes: Negative for pain and visual disturbance.   Respiratory: Negative for cough and shortness of breath.    Cardiovascular: Negative for chest pain, palpitations and peripheral edema.   Gastrointestinal: Negative for abdominal pain, constipation, diarrhea, heartburn, hematochezia and nausea.   Genitourinary: Negative for dysuria, frequency, genital sores, hematuria, impotence, penile discharge and urgency.   Musculoskeletal: Negative for arthralgias, joint swelling and myalgias.   Skin: Negative for rash.   Neurological: Negative for dizziness, weakness, headaches and paresthesias.   Psychiatric/Behavioral: Negative for mood changes. The patient is not nervous/anxious.      Constitutional, HEENT, cardiovascular, pulmonary, GI, , musculoskeletal, neuro, skin, endocrine and psych systems are negative, except as otherwise noted.    OBJECTIVE:   /84   Pulse 63   Temp 97.9  F (36.6  C) (Temporal)   Resp 17   Ht 1.778 m (5' 10\")   Wt 121.1 kg (267 lb)   SpO2 99%   BMI 38.31 kg/m   Estimated body mass index is 38.31 kg/m  as calculated from the following:    Height as of this encounter: 1.778 m (5' 10\").    Weight as of this encounter: 121.1 kg (267 lb).  Physical Exam  GENERAL: healthy, alert and no distress  EYES: Eyes grossly normal to inspection, PERRL and conjunctivae and sclerae normal  HENT: ear canals and TM's normal, " nose and mouth without ulcers or lesions  NECK: no adenopathy, no asymmetry, masses, or scars and thyroid normal to palpation  RESP: lungs clear to auscultation - no rales, rhonchi or wheezes  CV: regular rate and rhythm, normal S1 S2, no S3 or S4, no murmur, click or rub, no peripheral edema and peripheral pulses strong  ABDOMEN: soft, nontender, no hepatosplenomegaly, no masses and bowel sounds normal  MS: no gross musculoskeletal defects noted, no edema  SKIN: no suspicious lesions or rashes  NEURO: Normal strength and tone, mentation intact and speech normal  PSYCH: mentation appears normal, affect normal/bright    Diagnostic Test Results:  Labs reviewed in Epic    ASSESSMENT / PLAN:       Assessment and Plan    1. Encounter for Medicare annual wellness exam  - Drug Abuse Screen Panel 13, Urine (Pain Care Package) - lab collect; Future  - Lipid panel reflex to direct LDL Fasting; Future  - Comprehensive metabolic panel (BMP + Alb, Alk Phos, ALT, AST, Total. Bili, TP); Future  - CBC with platelets; Future  - IR Translaminar Epidural Lumbar Inj Incl Imaging; Future  - Drug Abuse Screen Panel 13, Urine (Pain Care Package) - lab collect  - Lipid panel reflex to direct LDL Fasting  - Comprehensive metabolic panel (BMP + Alb, Alk Phos, ALT, AST, Total. Bili, TP)  - CBC with platelets    2. F11.2 - Continuous opioid dependence (H)  Chronic problem, on opiates.  Patient UDS and CSA expires in August 2022 but patient requesting to get the CSA done today when he is in person as he has to travel 100 miles for making it again another visit in couple of months.  We will do as requested.  - Drug Abuse Screen Panel 13, Urine (Pain Care Package) - lab collect; Future  - Drug Abuse Screen Panel 13, Urine (Pain Care Package) - lab collect    3. Chronic pain syndrome  4. Status post total left knee replacemen  5. Spondylosis with myelopathy, lumbar region  6. Lumbar disc disease with radiculopathy  Ongoing problem, Q yearly  epidural injections as per the IR orders placed by PCP.  Patient requesting earlier injection given his ongoing yard work and worsening pain on the back.  - IR Translaminar Epidural Lumbar Inj Incl Imaging; Future  - Drug Abuse Screen Panel 13, Urine (Pain Care Package) - lab collect; Future  - Drug Abuse Screen Panel 13, Urine (Pain Care Package) - lab collect- Drug Abuse Screen Panel 13, Urine (Pain Care Package) - lab collect; Future  - Drug Abuse Screen Panel 13, Urine (Pain Care Package) - lab collect    7. Hyperlipidemia with target LDL less than 130  - Lipid panel reflex to direct LDL Fasting; Future  - Lipid panel reflex to direct LDL Fasting    8. Iliac artery aneurysm, left (H)  Continue current aspirin and statin with better control of blood pressure emphasized to the patient    9. Friction injury to skin  - clindamycin (CLINDAMAX) 1 % external gel; Apply topically 2 times daily  Dispense: 60 g; Refill: 3    10. Mild intermittent asthma without complication  - CBC with platelets; Future  - CBC with platelets           Patient Instructions     As discussed , please do fasting labs placed.     Ordered for Lumbar Epidural IR referral as discussed on your yearly epidural last in 8/2022. But wanting it sooner.     CSA done today for Opiate refills.     =========================================================      Patient Education  Personalized Prevention Plan  You are due for the preventive services outlined below.  Your care team is available to assist you in scheduling these services.  If you have already completed any of these items, please share that information with your care team to update in your medical record.  Health Maintenance Due   Topic Date Due     Zoster (Shingles) Vaccine (1 of 2) Never done     COVID-19 Vaccine (2 - Pfizer series) 10/08/2021     Asthma Action Plan - yearly  07/08/2022     Flu Vaccine (1) 09/01/2022     Annual Wellness Visit  01/25/2023     Cholesterol Lab  01/25/2023        Signs of Hearing Loss  Hearing loss is a problem shared by many people. In fact, it's one of the most common health problems, particularly as people age. Most people aged 65 and older have some hearing loss. By age 80, almost everyone does. Hearing loss often occurs slowly over the years. So, you may not realize your hearing has gotten worse.   When sudden hearing loss occurs, it's important to contact your healthcare provider right away. Your provider will do a medical exam and a hearing exam as soon as possible. This is to help find the cause and type of your sudden hearing loss. Based on your diagnosis, your healthcare provider will discuss possible treatments.      Hearing much better with one ear can be a sign of hearing loss.     Have your hearing checked  Call your healthcare provider if you:     Have to strain to hear normal conversation    Have to watch other people s faces very carefully to follow what they re saying    Need to ask people to repeat what they ve said    Often misunderstand what people are saying    Turn the volume of the television or radio up so high that others complain    Feel that people are mumbling when they re talking to you    Find that the effort to hear leaves you feeling tired and irritated    Notice, when using the phone, that you hear better with one ear than the other  MSI last reviewed this educational content on 6/1/2022 2000-2022 [employer]. All rights reserved. This information is not intended as a substitute for professional medical care. Always follow your healthcare professional's instructions.             Return in about 3 months (around 7/21/2023), or if symptoms worsen or fail to improve, for chronic pain Video visit .    Sushma Galloway MD  [facility]                            Patient has been advised of split billing requirements and indicates understanding: Yes      COUNSELING:  Reviewed preventive health  counseling, as reflected in patient instructions  Special attention given to:       Regular exercise       Healthy diet/nutrition       Vision screening       Hearing screening       Dental care       Bladder control       Fall risk prevention       Immunizations    Declined: Covid-19 and Zoster due to Concerns about side effects/safety               Prostate cancer screening        He reports that he has never smoked. He has never used smokeless tobacco.      Appropriate preventive services were discussed with this patient, including applicable screening as appropriate for cardiovascular disease, diabetes, osteopenia/osteoporosis, and glaucoma.  As appropriate for age/gender, discussed screening for colorectal cancer, prostate cancer, breast cancer, and cervical cancer. Checklist reviewing preventive services available has been given to the patient.    Reviewed patients plan of care and provided an AVS. The Basic Care Plan (routine screening as documented in Health Maintenance) for Arnaud meets the Care Plan requirement. This Care Plan has been established and reviewed with the Patient.      Sushma Galloway MD  [facility]    Identified Health Risks:    I have reviewed Opioid Use Disorder and Substance Use Disorder risk factors and made any needed referrals.

## 2023-04-21 NOTE — TELEPHONE ENCOUNTER
New Medication Request        What medication are you requesting?: Cialis    Reason for medication request: requested at appt    Have you taken this medication before?: Yes    Controlled Substance Agreement on file:   CSA -- Patient Level:     [Media Unavailable] Controlled Substance Agreement - Opioid - Scan on 8/10/2022  5:34 PM   [Media Unavailable] Controlled Substance Agreement - Opioid - Scan on 9/13/2021  4:37 PM   [Media Unavailable] Controlled Substance Agreement - Opioid - Scan on 7/20/2020 12:15 PM   [Media Unavailable] Controlled Substance Agreement - Opioid - Scan on 3/19/2019 11:01 AM         Patient offered an appointment? No had appt today    Preferred Pharmacy:  Written Rx    [facility] this [facility] or would you prefer to receive a phone call?:   Patient would prefer a phone call   Okay to leave a detailed message?: Yes at Cell number on file:    Telephone Information:   Mobile [phone]     Please mail written written Rx to [address]    [facility]

## 2023-04-21 NOTE — PROGRESS NOTES
"    The patient was provided with written information regarding signs of hearing loss.      Answers for HPI/ROS submitted by the patient on 4/20/2023  In general, how would you rate your overall physical health?: good  Frequency of exercise:: 2-3 days/week  Do you usually eat at least 4 servings of fruit and vegetables a day, include whole grains & fiber, and avoid regularly eating high fat or \"junk\" foods? : Yes  Taking medications regularly:: Yes  Medication side effects:: None  Activities of Daily Living: no assistance needed  Home safety: no safety concerns identified  Hearing Impairment:: need to ask people to speak up or repeat themselves  In the past 6 months, have you been bothered by leaking of urine?: No  abdominal pain: No  Blood in stool: No  Blood in urine: No  chest pain: No  chills: No  congestion: No  constipation: No  cough: No  diarrhea: No  dizziness: No  ear pain: Yes  eye pain: No  nervous/anxious: No  fever: No  frequency: No  genital sores: No  headaches: No  hearing loss: Yes  heartburn: No  arthralgias: No  joint swelling: No  peripheral edema: No  mood changes: No  myalgias: No  nausea: No  dysuria: No  palpitations: No  Skin sensation changes: No  sore throat: No  urgency: No  rash: No  shortness of breath: No  visual disturbance: No  weakness: No  impotence: No  penile discharge: No  In general, how would you rate your overall mental or emotional health?: good  Additional concerns today:: Yes  Duration of exercise:: 30-45 minutes      "

## 2023-04-21 NOTE — PATIENT INSTRUCTIONS
As discussed , please do fasting labs placed.     Ordered for Lumbar Epidural IR referral as discussed on your yearly epidural last in 8/2022. But wanting it sooner.     CSA done today for Opiate refills.     =========================================================      Patient Education   Personalized Prevention Plan  You are due for the preventive services outlined below.  Your care team is available to assist you in scheduling these services.  If you have already completed any of these items, please share that information with your care team to update in your medical record.  Health Maintenance Due   Topic Date Due    Zoster (Shingles) Vaccine (1 of 2) Never done    COVID-19 Vaccine (2 - Pfizer series) 10/08/2021    Asthma Action Plan - yearly  07/08/2022    Flu Vaccine (1) 09/01/2022    Annual Wellness Visit  01/25/2023    Cholesterol Lab  01/25/2023       Signs of Hearing Loss  Hearing loss is a problem shared by many people. In fact, it's one of the most common health problems, particularly as people age. Most people aged 65 and older have some hearing loss. By age 80, almost everyone does. Hearing loss often occurs slowly over the years. So, you may not realize your hearing has gotten worse.   When sudden hearing loss occurs, it's important to contact your healthcare provider right away. Your provider will do a medical exam and a hearing exam as soon as possible. This is to help find the cause and type of your sudden hearing loss. Based on your diagnosis, your healthcare provider will discuss possible treatments.      Hearing much better with one ear can be a sign of hearing loss.     Have your hearing checked  Call your healthcare provider if you:   Have to strain to hear normal conversation  Have to watch other people s faces very carefully to follow what they re saying  Need to ask people to repeat what they ve said  Often misunderstand what people are saying  Turn the volume of the television or radio up  so high that others complain  Feel that people are mumbling when they re talking to you  Find that the effort to hear leaves you feeling tired and irritated  Notice, when using the phone, that you hear better with one ear than the other  Cliff last reviewed this educational content on 6/1/2022 2000-2022 [employer]. All rights reserved. This information is not intended as a substitute for professional medical care. Always follow your healthcare professional's instructions.

## 2023-04-24 RX ORDER — TADALAFIL 10 MG/1
TABLET ORAL
Qty: 30 TABLET | Refills: 1 | Status: SHIPPED | OUTPATIENT
Start: 2023-04-24 | End: 2023-04-26

## 2023-04-24 NOTE — TELEPHONE ENCOUNTER
Patient called the clinic to follow up on his medication. Patient stated that he will still like the prescription printed and mailed to his home address. Routing back to PCP.    Niesha GROVER RN  [facility] Triage Team

## 2023-04-24 NOTE — TELEPHONE ENCOUNTER
Medication sent to his pharmacy.  But I remember that patient was asking a printed prescription for this, if he still requires a print prescription I may need to redo this prescription which we can mail to his home address.  Please call the patient and confirm on this to do the needful.    Thank you,  Sushma Galloway MD on 4/24/2023

## 2023-04-26 ENCOUNTER — TELEPHONE (OUTPATIENT)
Dept: FAMILY MEDICINE | Facility: CLINIC | Age: 68
End: 2023-04-26
Payer: COMMERCIAL

## 2023-04-26 DIAGNOSIS — M51.16 LUMBAR DISC DISEASE WITH RADICULOPATHY: ICD-10-CM

## 2023-04-26 DIAGNOSIS — M47.16 SPONDYLOSIS WITH MYELOPATHY, LUMBAR REGION: Primary | ICD-10-CM

## 2023-04-26 DIAGNOSIS — G89.29 CHRONIC BILATERAL LOW BACK PAIN WITHOUT SCIATICA: ICD-10-CM

## 2023-04-26 DIAGNOSIS — M54.50 CHRONIC BILATERAL LOW BACK PAIN WITHOUT SCIATICA: ICD-10-CM

## 2023-04-26 RX ORDER — TADALAFIL 10 MG/1
TABLET ORAL
Qty: 30 TABLET | Refills: 1 | Status: SHIPPED | OUTPATIENT
Start: 2023-04-26 | End: 2024-03-22

## 2023-04-26 NOTE — TELEPHONE ENCOUNTER
Paper prescription printed and given to my MA to mail to patient home address as requested by the patient    Thank you,  Sushma Galloway MD on 4/26/2023 at 12:36 PM.

## 2023-04-26 NOTE — TELEPHONE ENCOUNTER
----- Message from PREM McculloughMT sent at 4/26/2023 10:06 AM CDT -----  Regarding: IR Translaminar Epidural Lumbar Inj Incl Imagin    Hi Dr. Galloway,    On 7/25 M [facility] has started a new All Access Referral system for IR and Imaging procedural services.  From this point on we will need you to put in an IR Referral Order ([id number]) instead of the specific IR/Imaging Procedure orders.  Once the IR providers have approved the order, the IR Intake Team will get the correct order and will then call to schedule the procedure. (The exceptions are: Paracentesis, Thoracentesis, Lumbar Punctures, Thyroid FNA/BX and Epidural injections--these can just be entered with the XR or US order.)    So all you need to do is change the IR to an XR Translaminar Epidural order and that will taken care of by the Xray Central scheduling team.    Thank you,  Jody Ramos RT(R), ARRT  Lead Interventional Technologist  IR Intake Scheduling Team

## 2023-05-08 DIAGNOSIS — M25.661 STIFFNESS OF KNEE JOINT, RIGHT: Chronic | ICD-10-CM

## 2023-05-08 DIAGNOSIS — G89.4 CHRONIC PAIN SYNDROME: Chronic | ICD-10-CM

## 2023-05-08 DIAGNOSIS — Z96.652 STATUS POST TOTAL LEFT KNEE REPLACEMENT: ICD-10-CM

## 2023-05-08 DIAGNOSIS — M47.16 SPONDYLOSIS WITH MYELOPATHY, LUMBAR REGION: ICD-10-CM

## 2023-05-08 DIAGNOSIS — R26.9 GAIT DIFFICULTY: ICD-10-CM

## 2023-05-08 RX ORDER — HYDROCODONE BITARTRATE AND ACETAMINOPHEN 7.5; 325 MG/1; MG/1
1 TABLET ORAL EVERY 6 HOURS PRN
Qty: 120 TABLET | Refills: 0 | Status: SHIPPED | OUTPATIENT
Start: 2023-05-10 | End: 2023-06-06

## 2023-05-08 NOTE — TELEPHONE ENCOUNTER
[place] reviewed:  reviewed- no concerns    [place]:  [email or web address]    Refill done from 5/10/23 as per .     Thank you,  Sushma Galloway MD on 5/8/2023

## 2023-05-08 NOTE — TELEPHONE ENCOUNTER
Medication Question or Refill        What medication are you calling about (include dose and sig)?: HYDROcodone-acetaminophen (NORCO) 7.5-325 MG per tablet      Preferred Pharmacy:  [id number] - [facility] [address] [address]  Phone: [phone] Fax: [phone]      Controlled Substance Agreement on file:   CSA -- Patient Level:     [Media Unavailable] Controlled Substance Agreement - Opioid - Scan on 8/10/2022  5:34 PM   [Media Unavailable] Controlled Substance Agreement - Opioid - Scan on 9/13/2021  4:37 PM   [Media Unavailable] Controlled Substance Agreement - Opioid - Scan on 7/20/2020 12:15 PM   [Media Unavailable] Controlled Substance Agreement - Opioid - Scan on 3/19/2019 11:01 AM       Who prescribed the medication?: Namballa    Do you need a refill? Yes    When did you use the medication last? unknown    Patient offered an appointment? No    Do you have any questions or concerns?  No      Could we send this information to you in [facility] or would you prefer to receive a phone call?:   Patient would prefer a phone call   Okay to leave a detailed message?: Yes at Cell number on file:    Telephone Information:   Mobile [phone]     Jodie CEE    [facility]

## 2023-05-18 ENCOUNTER — TELEPHONE (OUTPATIENT)
Dept: MEDSURG UNIT | Facility: CLINIC | Age: 68
End: 2023-05-18
Payer: COMMERCIAL

## 2023-05-23 ENCOUNTER — HOSPITAL ENCOUNTER (OUTPATIENT)
Facility: CLINIC | Age: 68
Discharge: HOME | End: 2023-05-23
Admitting: PHYSICIAN ASSISTANT
Payer: COMMERCIAL

## 2023-05-23 ENCOUNTER — HOSPITAL ENCOUNTER (OUTPATIENT)
Dept: GENERAL RADIOLOGY | Facility: CLINIC | Age: 68
Discharge: HOME | End: 2023-05-23
Attending: INTERNAL MEDICINE
Payer: COMMERCIAL

## 2023-05-23 VITALS — HEART RATE: 76 BPM | OXYGEN SATURATION: 97 % | SYSTOLIC BLOOD PRESSURE: 127 MMHG | DIASTOLIC BLOOD PRESSURE: 95 MMHG

## 2023-05-23 VITALS
RESPIRATION RATE: 20 BRPM | OXYGEN SATURATION: 97 % | DIASTOLIC BLOOD PRESSURE: 91 MMHG | SYSTOLIC BLOOD PRESSURE: 142 MMHG | HEART RATE: 68 BPM

## 2023-05-23 DIAGNOSIS — Z00.00 ENCOUNTER FOR MEDICARE ANNUAL WELLNESS EXAM: ICD-10-CM

## 2023-05-23 DIAGNOSIS — M51.16 LUMBAR DISC DISEASE WITH RADICULOPATHY: ICD-10-CM

## 2023-05-23 PROCEDURE — 250N000009 HC RX 250: Performed by: INTERNAL MEDICINE

## 2023-05-23 PROCEDURE — 999N000154 HC STATISTIC RADIOLOGY XRAY, US, CT, MAR, NM

## 2023-05-23 PROCEDURE — 62323 NJX INTERLAMINAR LMBR/SAC: CPT

## 2023-05-23 PROCEDURE — 255N000002 HC RX 255 OP 636: Performed by: INTERNAL MEDICINE

## 2023-05-23 PROCEDURE — 250N000011 HC RX IP 250 OP 636: Performed by: INTERNAL MEDICINE

## 2023-05-23 RX ORDER — NICOTINE POLACRILEX 4 MG
15-30 LOZENGE BUCCAL
Status: DISCONTINUED | OUTPATIENT
Start: 2023-05-23 | End: 2023-05-24 | Stop reason: HOSPADM

## 2023-05-23 RX ORDER — DEXTROSE MONOHYDRATE 25 G/50ML
25-50 INJECTION, SOLUTION INTRAVENOUS
Status: DISCONTINUED | OUTPATIENT
Start: 2023-05-23 | End: 2023-05-24 | Stop reason: HOSPADM

## 2023-05-23 RX ORDER — BETAMETHASONE SODIUM PHOSPHATE AND BETAMETHASONE ACETATE 3; 3 MG/ML; MG/ML
3 INJECTION, SUSPENSION INTRA-ARTICULAR; INTRALESIONAL; INTRAMUSCULAR; SOFT TISSUE ONCE
Status: COMPLETED | OUTPATIENT
Start: 2023-05-23 | End: 2023-05-23

## 2023-05-23 RX ORDER — IOPAMIDOL 408 MG/ML
10 INJECTION, SOLUTION INTRATHECAL ONCE
Status: COMPLETED | OUTPATIENT
Start: 2023-05-23 | End: 2023-05-23

## 2023-05-23 RX ADMIN — BETAMETHASONE SODIUM PHOSPHATE AND BETAMETHASONE ACETATE 3 ML: 3; 3 INJECTION, SUSPENSION INTRA-ARTICULAR; INTRALESIONAL; INTRAMUSCULAR at 09:23

## 2023-05-23 RX ADMIN — IOPAMIDOL 3 ML: 408 INJECTION, SOLUTION INTRATHECAL at 09:22

## 2023-05-23 RX ADMIN — LIDOCAINE HYDROCHLORIDE 6 ML: 10 INJECTION, SOLUTION EPIDURAL; INFILTRATION; INTRACAUDAL; PERINEURAL at 09:21

## 2023-05-23 ASSESSMENT — ACTIVITIES OF DAILY LIVING (ADL)
ADLS_ACUITY_SCORE: 35
ADLS_ACUITY_SCORE: 35

## 2023-05-23 NOTE — DISCHARGE INSTRUCTIONS
Steroid Injection Discharge Instructions     After you go home:    You may resume your normal diet.    Care of Puncture Site:    If you have a bandaid on your puncture site, you may remove it the next morning  You may shower tomorrow  No bath tubs, whirlpools or swimming pool for at least 48 hours  Use ice packs as needed for discomfort     Activity:    Minimize your activity today. You may gradually resume your normal activity as tolerated  Avoid vigorous or strenuous activity until your symptoms improve or as directed by your doctor  Do NOT drive a vehicle for a few hours after the injection - or longer if you develop numbness in your arm or leg    Medicines:    You may resume all medications, including blood thinners  Resume your Warfarin/Coumadin at your regular dose today. Follow up with your provider to have your INR rechecked  Resume your Platelet Inhibitors and Aspirin tomorrow at your regular dose  For minor discomfort, you may take Acetaminophen (Tylenol) or Ibuprofen (Advil)    Pain:     You may experience increased or different pain over the next 24-48 hours  For the next 48 hrs - you may use ice packs for discomfort     Call your primary care doctor if:    You have severe pain that does not improve with pain medication  You have chills or a fever greater than 101 F (38 C)  The site is red, swollen, hot or tender  Increase in pain, weakness or numbness  New problems with your bowel or bladder  Any questions or concerns    What to watch for:    It can be normal to have some bruising or slight swelling at the puncture site.   After the procedure, you may have some new weakness or numbness down your arm/leg from the numbing medicine. This should resolve in a few hours.   You may feel some temporary relief from the numbing medicine, but that will wear off within a few hours.  Your symptoms may return to pre procedure level, or can even be worse for the first 1-2 days.  For many people, the steroid begins to  provide some relief within 2-3 days, but it can take up to 2 weeks to obtain the full results.  Some people will get lasting relief from a single injection. Others may require up to 3 injections to get results. If you have more than one steroid injection, they should be given 2 weeks apart.  If you have no improvement in your symptoms after two weeks, please contact the doctor who ordered this procedure to discuss the next steps.  Side effects of your steroid injection are mild and will go away in 2-3 days  Insomnia  Irritability  Flushed face  Water retention  Restlessness  Difficulty sleeping  Increased appetite  Increased blood sugar  If you are diabetic, monitor your blood sugar closely. Contact the provider who manages your diabetes to help you control your blood sugar if needed.    If you have questions or concerns call:                  [facility] Radiology Dept @ [phone]                                    between 8am-4:30pm Mon-Fri    If you have urgent questions outside of these normal business hours, please contact the [facility] on call doctor @ [phone]      The provider who performed your procedure was _________________.

## 2023-05-23 NOTE — PROCEDURES
[facility]    Procedure: Caudal HILARY    Date/Time: 5/23/2023 10:29 AM    Performed by: Rj Beebe PA-C  Authorized by: Rj Beebe PA-C      UNIVERSAL PROTOCOL   Site Marked: Yes  Prior Images Obtained and Reviewed:  Yes  Required items: Required blood products, implants, devices and special equipment available    Patient identity confirmed:  Verbally with patient  Patient was reevaluated immediately before administering moderate or deep sedation or anesthesia  Confirmation Checklist:  Patient's identity using two indicators, relevant allergies, procedure was appropriate and matched the consent or emergent situation and correct equipment/implants were available  Time out: Immediately prior to the procedure a time out was called    Universal Protocol: the Joint Commission Universal Protocol was followed    Preparation: Patient was prepped and draped in usual sterile fashion       ANESTHESIA    Local Anesthetic: Lidocaine 1% without epinephrine      SEDATION    Patient Sedated: No    See dictated procedure note for full details.    PROCEDURE    Patient Tolerance:  Patient tolerated the procedure well with no immediate complications  Length of time physician/provider present for 1:1 monitoring during sedation: 0

## 2023-05-23 NOTE — PROGRESS NOTES
Care Suites Post Procedure Note    Patient Information  Name: Arnaud Bird  Age: 68 year old    Post Procedure  Time patient returned to Care Suites: 0935  Concerns/abnormal assessment: None  If abnormal assessment, provider notified: N/A  Plan/Other: Pt states the injection site is up at the start ov buttocks. Unable to see site, no band-aid. Site intact, numbness at site. Denies numbness in legs. Rating pain before injection at 5/10 and after 2/10. Declined PO, VSS.    Kendra Johnson RN     Care Suites Discharge Nursing Note    Patient Information  Name: Arnaud Bird  Age: 68 year old    Discharge Education:  Discharge instructions reviewed: Yes  Additional education/resources provided: NA  Patient/patient representative verbalizes understanding: Yes  Patient discharging on new medications: No  Medication education completed: N/A    Discharge Plans:   Discharge location: home  Discharge ride contacted: Yes  Approximate discharge time: 0955    Discharge Criteria:  Discharge criteria met and vital signs stable: Yes    Patient Belongs:  Patient belongings returned to patient: Yes    Kendra Johnson RN

## 2023-06-05 ENCOUNTER — TELEPHONE (OUTPATIENT)
Dept: FAMILY MEDICINE | Facility: CLINIC | Age: 68
End: 2023-06-05
Payer: COMMERCIAL

## 2023-06-05 DIAGNOSIS — Z96.652 STATUS POST TOTAL LEFT KNEE REPLACEMENT: ICD-10-CM

## 2023-06-05 DIAGNOSIS — R26.9 GAIT DIFFICULTY: ICD-10-CM

## 2023-06-05 DIAGNOSIS — M47.16 SPONDYLOSIS WITH MYELOPATHY, LUMBAR REGION: ICD-10-CM

## 2023-06-05 DIAGNOSIS — G89.4 CHRONIC PAIN SYNDROME: Chronic | ICD-10-CM

## 2023-06-05 DIAGNOSIS — M25.661 STIFFNESS OF KNEE JOINT, RIGHT: Chronic | ICD-10-CM

## 2023-06-05 NOTE — TELEPHONE ENCOUNTER
Patient calling clinic to request refill for Hydrocodone tablets. Patient stated he has 3 to 4 days left of current prescription but wanted to request refill now so it would be on time. Patient did not have any additional questions or concerns.     Routing to PCP for review.

## 2023-06-06 RX ORDER — HYDROCODONE BITARTRATE AND ACETAMINOPHEN 7.5; 325 MG/1; MG/1
1 TABLET ORAL EVERY 6 HOURS PRN
Qty: 120 TABLET | Refills: 0 | Status: SHIPPED | OUTPATIENT
Start: 2023-06-08 | End: 2023-07-05

## 2023-06-07 NOTE — TELEPHONE ENCOUNTER
Patient Contact     S/w pt and relayed message from Dr. Galloway, see below. He stated his understanding.    Sharita BLACKMON RN  [facility]

## 2023-06-07 NOTE — TELEPHONE ENCOUNTER
[place] reviewed:  reviewed- no concerns    [place]:  [email or web address]    Refill done from 6/8/23 as per      Thank you,.  Sushma Galloway MD on 6/6/2023

## 2023-06-28 DIAGNOSIS — M54.2 NECK PAIN: Primary | ICD-10-CM

## 2023-07-05 ENCOUNTER — ANCILLARY PROCEDURE (OUTPATIENT)
Dept: GENERAL RADIOLOGY | Facility: CLINIC | Age: 68
End: 2023-07-05
Attending: FAMILY MEDICINE
Payer: COMMERCIAL

## 2023-07-05 ENCOUNTER — OFFICE VISIT (OUTPATIENT)
Dept: ORTHOPEDICS | Facility: CLINIC | Age: 68
End: 2023-07-05
Payer: COMMERCIAL

## 2023-07-05 DIAGNOSIS — M25.532 LEFT WRIST PAIN: ICD-10-CM

## 2023-07-05 DIAGNOSIS — M54.2 CERVICAL PAIN (NECK): Primary | ICD-10-CM

## 2023-07-05 DIAGNOSIS — M54.2 NECK PAIN: ICD-10-CM

## 2023-07-05 DIAGNOSIS — S62.337A CLOSED DISPLACED FRACTURE OF NECK OF FIFTH METACARPAL BONE OF LEFT HAND, INITIAL ENCOUNTER: ICD-10-CM

## 2023-07-05 PROCEDURE — 73130 X-RAY EXAM OF HAND: CPT | Mod: LT | Performed by: RADIOLOGY

## 2023-07-05 PROCEDURE — 73110 X-RAY EXAM OF WRIST: CPT | Mod: LT | Performed by: RADIOLOGY

## 2023-07-05 PROCEDURE — 29075 APPL CST ELBW FNGR SHORT ARM: CPT | Mod: LT | Performed by: FAMILY MEDICINE

## 2023-07-05 PROCEDURE — 72040 X-RAY EXAM NECK SPINE 2-3 VW: CPT | Performed by: RADIOLOGY

## 2023-07-05 PROCEDURE — 99214 OFFICE O/P EST MOD 30 MIN: CPT | Mod: 25 | Performed by: FAMILY MEDICINE

## 2023-07-05 NOTE — PROGRESS NOTES
ASSESSMENT/PLAN:    Cervical pain-  Occiput, levator scap area  PT and will send to  as may want to try cervical traction      Left hand pain- tender over 5th MCP- oblique displaced fracture  Scapholunate advanced collapse  Casted 5th MCP fracture, immobilized, x 3 weeks    Pt is a 68 year old male here today for:     Chronic neck pain    ESTABLISHED PATIENT FOLLOW-UP:  Follow Up of the Spine       HISTORY OF PRESENT ILLNESS  Mr. Bird is a pleasant RHD 68 year old year old male who presents to clinic today for follow-up of neck pain. Shocks and numbness were NOT present.  cramping on left side of neck at base of occiput. Takes 5-10 min to let go. Has muscle relaxers. A few left of stronger NSAIDs, rash on face.  Took some for left hand injury  Yard is full of rocks  [employer] [address], will be restoring a tractor  Has a bucket so he can move snow  Digging a trench for asparagus and hit a rock big as a bowling ball with left hand, bending back 4th and 5th fingers, bruising of thumb  voltaren on hand and knees  Using lidocaine patches, wrapped around left hand  [place], bought [employer] in [place]  Taxes on two places, taxes and insurance biggest expenses  So humid and hot out, slows him down    Date of injury: 5-6 months   Date last seen: first time seen for this issue  Following Therapeutic Plan: hasn't been established  Pain: 0-6/10   Function: Pain is intermittent  Interval History: Pain and cramping with neck flexion      Additional medical/Social/Surgical histories reviewed in [facility] and updated as appropriate.    REVIEW OF SYSTEMS (7/5/2023)  CONSTITUTIONAL: Denies fever and weight loss  GASTROINTESTINAL: Denies abdominal pain, nausea, vomiting  MUSCULOSKELETAL: See HPI  SKIN: Denies any recent rash or lesion  NEUROLOGICAL: Denies numbness or focal weakness      EXAM:   Neck:   ROM: flexion -full; extension -full; lateral rotation- R - full/ L - full ; side bend - R - full/ L -full.   Tenderness:  Bony - Yes/No; Paraspinal: Yes/No; Muscular: Yes/No; Scapula: Yes/No   Sensation: intact in bilateral upper extremities   Strength: Biceps- 5/5; Triceps- 5/5; wrist flexion 5/5; Extension 5/5; finger abduction- 5/5.   Neuro/ Maneuvers: Negative Spurling bilaterally. Negative Plaza's bilaterally. DTR's 2+.   left WRIST/HAND:   Inspection: Swelling - mild; Atrophy - none   Sensation: intact in median, radial, ulnar distribution   ROM:  in alignment with radius  Wrist: flexion- full/ extension- full/ pronation- full/ supination- full;   radial deviation - full; ulnar deviation- full   Hand: Full flexion at PIP/DIP, finger abduction/ adduction.   Strength: 5/5 in all motions   Bony tenderness:   Wrist: Carpal bones: nontender; Snuffbox: nontender   Hand: Metacarpals: tender over 5th; Phalanges: intact   Tendons: FDS/FDP/Extensor mechanism intact   Maneuvers: -Tinel's/Phalen; -Finkelstein. -TFCC grind test   Other: No nodules palpated in palmar aspect.    Past Medical History:   Diagnosis Date     Acute respiratory failure with hypoxia (H) 4/14/2021     Arthritis      Benign positional vertigo      Carpal tunnel syndrome     mild     Chronic pain syndrome 4/14/2021     Hearing problem      LEFT WORSE THAN RIGHT  10/18/2012     Migraines      Nasal polyps      Obesity 4/14/2021     Osteoarthritis of multiple joints 4/14/2021     Pneumonia due to 2019 novel coronavirus 4/14/2021     Unspecified asthma(493.90) 10/17/2012     Unspecified asthma, with exacerbation 10/17/2012      Past Surgical History:   Procedure Laterality Date     ARTHROPLASTY SHOULDER Right 6/15/2021    Procedure: Right Total Shoulder Arthroplasty, Distal Clavicle Excision;  Surgeon: Yesica Deleon MD;  Location: [facility] OR     COLONOSCOPY  2019     COLONOSCOPY N/A 8/15/2022    Procedure: COLONOSCOPY, WITH POLYPECTOMY AND BIOPSY;  Surgeon: Abelino Flores MD;  Location:  GI     HC TOOTH EXTRACTION W/FORCEP       ORTHOPEDIC SURGERY       bilateral total knee replacements     TONSILLECTOMY      age 2 or 3      Current Outpatient Medications   Medication Sig Dispense Refill     acetaminophen (TYLENOL) 325 MG tablet Take 2 tablets (650 mg) by mouth every 4 hours as needed for other 40 tablet 0     acyclovir (ZOVIRAX) 400 MG tablet TAKE TWO TABLETS BY MOUTH TWICE DAILY for 5 days as needed for genital herpes outbreak 28 tablet 4     albuterol (PROAIR HFA, PROVENTIL HFA, VENTOLIN HFA) 108 (90 BASE) MCG/ACT inhaler Inhale 2 puffs into the lungs every 6 hours as needed for shortness of breath / dyspnea 1 Inhaler 11     allopurinol (ZYLOPRIM) 300 MG tablet Take 1 tablet (300 mg) by mouth daily 90 tablet 3     amoxicillin (AMOXIL) 500 MG capsule Take 2 g orally one hour before the dental procedure 4 capsule 1     clindamycin (CLINDAMAX) 1 % external gel Apply topically 2 times daily 60 g 3     diclofenac (VOLTAREN) 1 % topical gel APPLY 4 GRAMS TOPICALLY 4 TIMES A  g 1     fluticasone (FLONASE) 50 MCG/ACT nasal spray Spray 2 sprays into both nostrils daily 16 g 11     HYDROcodone-acetaminophen (NORCO) 7.5-325 MG per tablet Take 1 tablet by mouth every 6 hours as needed for pain (4 x daily as needed  maxium 4 per day) 120 tablet 0     ipratropium (ATROVENT) 0.06 % nasal spray SPRAY 2 SPRAYS INTO BOTH NOSTRILS 4 TIMES DAILY 15 mL 1     lidocaine (LIDODERM) 5 % patch Place 1 patch onto the skin every 24 hours 30 patch 11     meloxicam (MOBIC) 15 MG tablet Take 1 tablet (15 mg) by mouth daily 30 tablet 0     multivitamin w/minerals (THERA-VIT-M) tablet Take 1 tablet by mouth daily       mupirocin (BACTROBAN) 2 % external ointment Apply topically 3 times daily 30 g 1     naloxone (NARCAN) 4 MG/0.1ML nasal spray Spray 1 spray (4 mg) into one nostril alternating nostrils once as needed for opioid reversal Every 2-3 minutes until patient responsive or EMS arrives 0.2 mL 0     polyethylene glycol (MIRALAX) 17 g packet Take 17 g by mouth daily 7 packet 0      pseudoePHEDrine (SUDAFED) 30 MG tablet Take by mouth every 4 hours as needed for congestion       senna-docusate (SENOKOT-S/PERICOLACE) 8.6-50 MG tablet Take 1 tablet by mouth 2 times daily 30 tablet 0     tadalafil (CIALIS) 10 MG tablet Take 1 tab as needed for erectile dysfunction. Do not exceed more than 2 tabs per day. 30 tablet 1     tiZANidine (ZANAFLEX) 4 MG tablet TAKE 1 TABLET (4 MG) BY MOUTH NIGHTLY AS NEEDED FOR MUSCLE SPASMS 30 tablet 0      Allergies   Allergen Reactions     No Clinical Screening - See Comments      Seasonal Allergies       ROS:   Gen- no fevers/chills   Rheum - no morning stiffness   Derm - no rash/ redness   Neuro - no numbness, no tingling   Remainder of ROS negative.     Exam:   There were no vitals taken for this visit.     Xray of cervical spine on July 5, 2023 at [facility] location - films personally reviewed with patient at time of visit     My impression: OA, straightening of spine    X-ray left hand and wrist- 5th MCP fracture, left wrist- significant OA    Cast/splint application    Date/Time: 7/5/2023 9:07 AM    Performed by: Cara Ramirez ATC  Authorized by: Carmel Chan MD    Consent:     Consent obtained:  Verbal    Consent given by:  Patient  Pre-procedure details:     Sensation:  Normal  Procedure details:     Laterality:  Left    Location:  Hand    Hand:  L hand    Cast type:  Ulnar gutter  Post-procedure details:     Pain:  Improved    Pain level:  0/10    Sensation:  Normal    Patient tolerance of procedure:  Tolerated well, no immediate complications    Patient provided with cast or splint care instructions: Yes      I agree with the following injection documentation.    ELROY Chan MD

## 2023-07-05 NOTE — LETTER
7/5/2023      RE: Arnaud Bird  [address]  [place] [facility] [place] [address]     Dear Colleague,    Thank you for referring your patient, Arnaud Bird, to the [facility] [place]. Please see a copy of my visit note below.    ASSESSMENT/PLAN:    Cervical pain-  Occiput, levator scap area  PT and will send to  as may want to try cervical traction      Left hand pain- tender over 5th MCP- oblique displaced fracture  Scapholunate advanced collapse  Casted 5th MCP fracture, immobilized, x 3 weeks    Pt is a 68 year old male here today for:     Chronic neck pain    ESTABLISHED PATIENT FOLLOW-UP:  Follow Up of the Spine       HISTORY OF PRESENT ILLNESS  Mr. Bird is a pleasant RHD 68 year old year old male who presents to clinic today for follow-up of neck pain. Shocks and numbness were NOT present.  cramping on left side of neck at base of occiput. Takes 5-10 min to let go. Has muscle relaxers. A few left of stronger NSAIDs, rash on face.  Took some for left hand injury  Yard is full of [employer] [address], will be restoring a tractor  Has a bucket so he can move snow  Digging a trench for asparagus and hit a rock big as a bowling ball with left hand, bending back 4th and 5th fingers, bruising of thumb  voltaren on hand and knees  Using lidocaine patches, wrapped around left hand  [place] bought [employer] [place] 1984  Taxes on two places, taxes and insurance biggest expenses  So humid and hot out, slows him down    Date of injury: 5-6 months   Date last seen: first time seen for this issue  Following Therapeutic Plan: hasn't been established  Pain: 0-6/10   Function: Pain is intermittent  Interval History: Pain and cramping with neck flexion      Additional medical/Social/Surgical histories reviewed in [facility] and updated as appropriate.    REVIEW OF SYSTEMS (7/5/2023)  CONSTITUTIONAL: Denies fever and weight loss  GASTROINTESTINAL: Denies abdominal pain,  nausea, vomiting  MUSCULOSKELETAL: See HPI  SKIN: Denies any recent rash or lesion  NEUROLOGICAL: Denies numbness or focal weakness      EXAM:   Neck:   ROM: flexion -full; extension -full; lateral rotation- R - full/ L - full ; side bend - R - full/ L -full.   Tenderness: Bony - Yes/No; Paraspinal: Yes/No; Muscular: Yes/No; Scapula: Yes/No   Sensation: intact in bilateral upper extremities   Strength: Biceps- 5/5; Triceps- 5/5; wrist flexion 5/5; Extension 5/5; finger abduction- 5/5.   Neuro/ Maneuvers: Negative Spurling bilaterally. Negative Plaza's bilaterally. DTR's 2+.   left WRIST/HAND:   Inspection: Swelling - mild; Atrophy - none   Sensation: intact in median, radial, ulnar distribution   ROM:  in alignment with radius  Wrist: flexion- full/ extension- full/ pronation- full/ supination- full;   radial deviation - full; ulnar deviation- full   Hand: Full flexion at PIP/DIP, finger abduction/ adduction.   Strength: 5/5 in all motions   Bony tenderness:   Wrist: Carpal bones: nontender; Snuffbox: nontender   Hand: Metacarpals: tender over 5th; Phalanges: intact   Tendons: FDS/FDP/Extensor mechanism intact   Maneuvers: -Tinel's/Phalen; -Finkelstein. -TFCC grind test   Other: No nodules palpated in palmar aspect.    Past Medical History:   Diagnosis Date    Acute respiratory failure with hypoxia (H) 4/14/2021    Arthritis     Benign positional vertigo     Carpal tunnel syndrome     mild    Chronic pain syndrome 4/14/2021    Hearing problem     LEFT WORSE THAN RIGHT  10/18/2012    Migraines     Nasal polyps     Obesity 4/14/2021    Osteoarthritis of multiple joints 4/14/2021    Pneumonia due to 2019 novel coronavirus 4/14/2021    Unspecified asthma(493.90) 10/17/2012    Unspecified asthma, with exacerbation 10/17/2012      Past Surgical History:   Procedure Laterality Date    ARTHROPLASTY SHOULDER Right 6/15/2021    Procedure: Right Total Shoulder Arthroplasty, Distal Clavicle Excision;  Surgeon: Pancho  Yesica Irizarry MD;  Location: [facility]    COLONOSCOPY  2019    COLONOSCOPY N/A 8/15/2022    Procedure: COLONOSCOPY, WITH POLYPECTOMY AND BIOPSY;  Surgeon: Abelino Flores MD;  Location:  GI    HC TOOTH EXTRACTION W/FORCEP      ORTHOPEDIC SURGERY      bilateral total knee replacements    TONSILLECTOMY      age 2 or 3      Current Outpatient Medications   Medication Sig Dispense Refill    acetaminophen (TYLENOL) 325 MG tablet Take 2 tablets (650 mg) by mouth every 4 hours as needed for other 40 tablet 0    acyclovir (ZOVIRAX) 400 MG tablet TAKE TWO TABLETS BY MOUTH TWICE DAILY for 5 days as needed for genital herpes outbreak 28 tablet 4    albuterol (PROAIR HFA, PROVENTIL HFA, VENTOLIN HFA) 108 (90 BASE) MCG/ACT inhaler Inhale 2 puffs into the lungs every 6 hours as needed for shortness of breath / dyspnea 1 Inhaler 11    allopurinol (ZYLOPRIM) 300 MG tablet Take 1 tablet (300 mg) by mouth daily 90 tablet 3    amoxicillin (AMOXIL) 500 MG capsule Take 2 g orally one hour before the dental procedure 4 capsule 1    clindamycin (CLINDAMAX) 1 % external gel Apply topically 2 times daily 60 g 3    diclofenac (VOLTAREN) 1 % topical gel APPLY 4 GRAMS TOPICALLY 4 TIMES A  g 1    fluticasone (FLONASE) 50 MCG/ACT nasal spray Spray 2 sprays into both nostrils daily 16 g 11    HYDROcodone-acetaminophen (NORCO) 7.5-325 MG per tablet Take 1 tablet by mouth every 6 hours as needed for pain (4 x daily as needed  maxium 4 per day) 120 tablet 0    ipratropium (ATROVENT) 0.06 % nasal spray SPRAY 2 SPRAYS INTO BOTH NOSTRILS 4 TIMES DAILY 15 mL 1    lidocaine (LIDODERM) 5 % patch Place 1 patch onto the skin every 24 hours 30 patch 11    meloxicam (MOBIC) 15 MG tablet Take 1 tablet (15 mg) by mouth daily 30 tablet 0    multivitamin w/minerals (THERA-VIT-M) tablet Take 1 tablet by mouth daily      mupirocin (BACTROBAN) 2 % external ointment Apply topically 3 times daily 30 g 1    naloxone (NARCAN) 4 MG/0.1ML nasal spray Spray 1  spray (4 mg) into one nostril alternating nostrils once as needed for opioid reversal Every 2-3 minutes until patient responsive or EMS arrives 0.2 mL 0    polyethylene glycol (MIRALAX) 17 g packet Take 17 g by mouth daily 7 packet 0    pseudoePHEDrine (SUDAFED) 30 MG tablet Take by mouth every 4 hours as needed for congestion      senna-docusate (SENOKOT-S/PERICOLACE) 8.6-50 MG tablet Take 1 tablet by mouth 2 times daily 30 tablet 0    tadalafil (CIALIS) 10 MG tablet Take 1 tab as needed for erectile dysfunction. Do not exceed more than 2 tabs per day. 30 tablet 1    tiZANidine (ZANAFLEX) 4 MG tablet TAKE 1 TABLET (4 MG) BY MOUTH NIGHTLY AS NEEDED FOR MUSCLE SPASMS 30 tablet 0      Allergies   Allergen Reactions    No Clinical Screening - See Comments     Seasonal Allergies       ROS:   Gen- no fevers/chills   Rheum - no morning stiffness   Derm - no rash/ redness   Neuro - no numbness, no tingling   Remainder of ROS negative.     Exam:   There were no vitals taken for this visit.     Xray of cervical spine on July 5, 2023 at [facility] location - films personally reviewed with patient at time of visit     My impression: OA, straightening of spine    X-ray left hand and wrist- 5th MCP fracture, left wrist- significant OA    Cast/splint application    Date/Time: 7/5/2023 9:07 AM    Performed by: Cara Ramirez ATC  Authorized by: Carmel Chan MD    Consent:     Consent obtained:  Verbal    Consent given by:  Patient  Pre-procedure details:     Sensation:  Normal  Procedure details:     Laterality:  Left    Location:  Hand    Hand:  L hand    Cast type:  Ulnar gutter  Post-procedure details:     Pain:  Improved    Pain level:  0/10    Sensation:  Normal    Patient tolerance of procedure:  Tolerated well, no immediate complications    Patient provided with cast or splint care instructions: Yes      I agree with the following injection documentation.    ELROY Chan MD

## 2023-07-12 ENCOUNTER — THERAPY VISIT (OUTPATIENT)
Dept: PHYSICAL THERAPY | Facility: CLINIC | Age: 68
End: 2023-07-12
Attending: FAMILY MEDICINE
Payer: COMMERCIAL

## 2023-07-12 DIAGNOSIS — M54.2 CERVICAL PAIN (NECK): ICD-10-CM

## 2023-07-12 PROCEDURE — 97161 PT EVAL LOW COMPLEX 20 MIN: CPT | Mod: GP

## 2023-07-12 PROCEDURE — 97140 MANUAL THERAPY 1/> REGIONS: CPT | Mod: GP

## 2023-07-12 PROCEDURE — 97012 MECHANICAL TRACTION THERAPY: CPT | Mod: GP

## 2023-07-12 NOTE — PROGRESS NOTES
PHYSICAL THERAPY EVALUATION  Type of Visit: Evaluation  Only    See electronic medical record for Abuse and Falls Screening details.    Subjective      Presenting condition or subjective complaint: neck pain when looking down Patient reports he has been getting inconsistent pain when looking down. Initially this would happen 1-2x a week but lately it has not been happening. He will also occasionally get neck spasms that pull his head up and to the left when pain would flare up.  Pain feels like a sharp cramp on the left. He brought in his cervical traction unit but he has not used it in awhile. He is not sure if it gives him relief. He has normally had relief with CSI. He has been taking opiods for his back and shoulder pain which also alleviates his neck pain. He will also apply topic pain cream and this provides relief. Denies weakness or radicular symptoms into the arm. Denies recent headaches or falls.  Date of onset: 06/28/23 (Chronic)    Relevant medical history: Arthritis; Hearing problems; Overweight   Dates & types of surgery: knee replacement 2009, shoulder replacement 2020    Prior diagnostic imaging/testing results: X-ray     Prior therapy history for the same diagnosis, illness or injury: No      Prior Level of Function   Transfers: Independent  Ambulation: Independent  ADL: Independent    Living Environment  Social support: With a significant other or spouse   Type of home: 2-story   Stairs to enter the home: Yes 3 Is there a railing: Yes   Ramp: No   Stairs inside the home: Yes 15 Is there a railing: Yes   Help at home: None  Equipment owned: [employer]     Employment: No    Hobbies/Interests: hunting    Patient goals for therapy: look down     Objective   CERVICAL SPINE EVALUATION  PAIN: No pain at rest. Pain sporadic throughout the week with looking down.  POSTURE: Sitting Posture: Rounded shoulders, Forward head, Thoracic kyphosis increased  ROM:   (Degrees) Left AROM Right AROM    Cervical Flexion  WFL, no pain or reproduction of symptoms    Cervical Extension WFL, no pain    Cervical Side bend 20*, no pain 15* no pain    Cervical Rotation 30*, compensatory left side bend 30*, compensatory side bend    Cervical Protrusion     Cervical Retraction     Thoracic Flexion     Thoracic Extension     Thoracic Rotation      PROM: WFL with mild loss of right rotation by 10*. No pain with PROM.   Pain: no pain with AROM  End Feel: firm, no pain  SPECIAL TESTS:    Left Right   Cervical Flexion-Rotation Negative  Negative    Cervical Rot/Lateral Flex Negative  Negative    Compression Negative  Negative    Distraction Negative  Negative           PALPATION: Pain with palpation along left paraspinals. No TTP along subocciptals, levator, scalenes.  SPINAL SEGMENTAL CONCLUSIONS: Hypomobility into right side glides and right rotation in mid cervical spine (C3-6). Hypomobility into upper cervical rotation to the right. Hypomobility with lower cervical extension (C5-7).    Assessment & Plan   CLINICAL IMPRESSIONS   Medical Diagnosis: Cervical pain    Treatment Diagnosis: Chronic neck pain   Impression/Assessment: Patient is a 68 year old male with chronic neck pain complaints.  The following significant findings have been identified: Pain, Decreased ROM/flexibility, Decreased joint mobility and Impaired posture. These impairments interfere with their ability to perform work tasks, recreational activities and driving  as compared to previous level of function.     Clinical Decision Making (Complexity):   Clinical Presentation: Stable/Uncomplicated  Clinical Presentation Rationale: based on medical and personal factors listed in PT evaluation  Clinical Decision Making (Complexity): Low complexity    PLAN OF CARE  Treatment Interventions:  Modalities: Mechanical Traction  Interventions: Manual Therapy, Therapeutic Exercise    Long Term Goals     PT Goal 1  Goal Identifier: Cervical Traction unit  Goal Description: Patient will  demonstrate appropriate management and utilization of cervical traction unit for individual home use  Rationale: to maximize safety and independence with performance of ADLs and functional tasks;to maximize safety and independence within the home  Target Date: 07/12/23  Date Met: 07/12/23      Frequency of Treatment: 1 visit  Duration of Treatment: Evaluation only  Risks and benefits of evaluation/treatment have been explained.   Patient/Family/caregiver agrees with Plan of Care.     Evaluation Time:     PT Eval, Low Complexity Minutes (84465): 15    Signing Clinician: Nate Watters PT [facility]                                                                                   OUTPATIENT PHYSICAL THERAPY      PLAN OF TREATMENT FOR OUTPATIENT REHABILITATION   Patient's Last Name, First Name, Arnaud Alcala YOB: 1955   Provider's [facility]   [facility]   Medical Record No.  [id number]     Onset Date: 06/28/23 (Chronic)  Start of Care Date: 07/12/23     Medical Diagnosis:  Cervical pain      PT Treatment Diagnosis:  Chronic neck pain Plan of Treatment  Frequency/Duration: 1 visit/ Evaluation only    Certification date from 07/12/23 to 07/12/23         See note for plan of treatment details and functional goals     Nate Watters, PT                         I CERTIFY THE NEED FOR THESE SERVICES FURNISHED UNDER        THIS PLAN OF TREATMENT AND WHILE UNDER MY CARE     (Physician attestation of this document indicates review and certification of the therapy plan).              Referring Provider:  Carmel Enamorado*      Initial Assessment  See Epic Evaluation- Start of Care Date: 07/12/23         DISCHARGE  Reason for Discharge: Patient only seeking evaluation to understand symptoms. Patient lives 100 miles away and not frequently in the area but looking for exercises to perform at home and specific instructions regarding  cervical traction unit.    Equipment Issued: none. Cervical traction unit brought in by patient.    Discharge Plan: Patient to continue home program.    Referring Provider:  Carmel Enamorado*

## 2023-07-20 DIAGNOSIS — S62.337A CLOSED DISPLACED FRACTURE OF NECK OF FIFTH METACARPAL BONE OF LEFT HAND, INITIAL ENCOUNTER: ICD-10-CM

## 2023-07-20 DIAGNOSIS — M25.532 LEFT WRIST PAIN: Primary | ICD-10-CM

## 2023-07-26 ENCOUNTER — ANCILLARY PROCEDURE (OUTPATIENT)
Dept: GENERAL RADIOLOGY | Facility: CLINIC | Age: 68
End: 2023-07-26
Attending: FAMILY MEDICINE
Payer: COMMERCIAL

## 2023-07-26 ENCOUNTER — OFFICE VISIT (OUTPATIENT)
Dept: ORTHOPEDICS | Facility: CLINIC | Age: 68
End: 2023-07-26
Payer: COMMERCIAL

## 2023-07-26 DIAGNOSIS — S62.337D CLOSED DISPLACED FRACTURE OF NECK OF FIFTH METACARPAL BONE OF LEFT HAND WITH ROUTINE HEALING: Primary | ICD-10-CM

## 2023-07-26 PROCEDURE — 73130 X-RAY EXAM OF HAND: CPT | Mod: LT | Performed by: RADIOLOGY

## 2023-07-26 PROCEDURE — 99213 OFFICE O/P EST LOW 20 MIN: CPT | Performed by: FAMILY MEDICINE

## 2023-07-26 NOTE — PROGRESS NOTES
ASSESSMENT/PLAN:    Pt with left hand 5th MCP fracture  Displaced fracture of neck of the 5th MCP, left hand-  Cast removed  Declines hand therapy  Pt has brace he will wear for 2 weeks  No heavy lifting, shoveling, or moving equipment    MyChart with concerns  RTC prn    Pt is a 68 year old male here today for:     Cast removal and then reports some stiffness but overall feels ok    ESTABLISHED PATIENT FOLLOW-UP:  Follow Up of the Left Hand       HISTORY OF PRESENT ILLNESS  Mr. Bird is a pleasant 68 year old year old male who presents to clinic today for follow-up of left hand 5th MCP fracture.  Wasps, stung on right fifth finger  Left hand without pain but noted stiffness  Has an ulnar gutter brace at home    Date of injury: 6 weeks   Date last seen: 7/5/23  Following Therapeutic Plan: Yes  Pain: Improved   Function: Improved   Interval History: Still having some pain when using it.      Additional medical/Social/Surgical histories reviewed in [facility] and updated as appropriate.    REVIEW OF SYSTEMS (7/26/2023)  CONSTITUTIONAL: Denies fever and weight loss  GASTROINTESTINAL: Denies abdominal pain, nausea, vomiting  MUSCULOSKELETAL: See HPI  SKIN: Denies any recent rash or lesion  NEUROLOGICAL: Denies numbness or focal weakness      EXAM:   left WRIST/HAND:   Inspection: Swelling - none; Atrophy - none   Sensation: intact in median, radial, ulnar distribution   ROM:   Wrist: flexion- decreased extension- full/ pronation- full/ supination- full;   radial deviation - decreased due to OA; ulnar deviation- full   Hand: Full flexion at PIP/DIP, finger abduction/ adduction.   Strength: 4/5 in all motions   Bony tenderness:   Wrist: Carpal bones: soreness; Snuffbox: soreness   Hand: Metacarpals: 5th tender; Phalanges: intact   Tendons: FDS/FDP/Extensor mechanism intact   Maneuvers: -Tinel's/Phalen; -Finkelstein. -TFCC grind test   Other: No nodules palpated in palmar aspect.           Past Medical History:   Diagnosis  Date    Acute respiratory failure with hypoxia (H) 4/14/2021    Arthritis     Benign positional vertigo     Carpal tunnel syndrome     mild    Chronic pain syndrome 4/14/2021    Hearing problem     LEFT WORSE THAN RIGHT  10/18/2012    Migraines     Nasal polyps     Obesity 4/14/2021    Osteoarthritis of multiple joints 4/14/2021    Pneumonia due to 2019 novel coronavirus 4/14/2021    Unspecified asthma(493.90) 10/17/2012    Unspecified asthma, with exacerbation 10/17/2012      Past Surgical History:   Procedure Laterality Date    ARTHROPLASTY SHOULDER Right 6/15/2021    Procedure: Right Total Shoulder Arthroplasty, Distal Clavicle Excision;  Surgeon: Yesica Deleon MD;  Location: [facility]    COLONOSCOPY  2019    COLONOSCOPY N/A 8/15/2022    Procedure: COLONOSCOPY, WITH POLYPECTOMY AND BIOPSY;  Surgeon: Abelino Flores MD;  Location:  GI    HC TOOTH EXTRACTION W/FORCEP      ORTHOPEDIC SURGERY      bilateral total knee replacements    TONSILLECTOMY      age 2 or 3      Current Outpatient Medications   Medication Sig Dispense Refill    acetaminophen (TYLENOL) 325 MG tablet Take 2 tablets (650 mg) by mouth every 4 hours as needed for other 40 tablet 0    acyclovir (ZOVIRAX) 400 MG tablet TAKE TWO TABLETS BY MOUTH TWICE DAILY for 5 days as needed for genital herpes outbreak 28 tablet 4    albuterol (PROAIR HFA, PROVENTIL HFA, VENTOLIN HFA) 108 (90 BASE) MCG/ACT inhaler Inhale 2 puffs into the lungs every 6 hours as needed for shortness of breath / dyspnea 1 Inhaler 11    allopurinol (ZYLOPRIM) 300 MG tablet Take 1 tablet (300 mg) by mouth daily 90 tablet 3    amoxicillin (AMOXIL) 500 MG capsule Take 2 g orally one hour before the dental procedure 4 capsule 1    clindamycin (CLINDAMAX) 1 % external gel Apply topically 2 times daily 60 g 3    diclofenac (VOLTAREN) 1 % topical gel APPLY 4 GRAMS TOPICALLY 4 TIMES A  g 1    fluticasone (FLONASE) 50 MCG/ACT nasal spray Spray 2 sprays into both nostrils  daily 16 g 11    HYDROcodone-acetaminophen (NORCO) 7.5-325 MG per tablet Take 1 tablet by mouth every 6 hours as needed for pain (4 x daily as needed  maxium 4 per day) 120 tablet 0    ipratropium (ATROVENT) 0.06 % nasal spray SPRAY 2 SPRAYS INTO BOTH NOSTRILS 4 TIMES DAILY 15 mL 1    lidocaine (LIDODERM) 5 % patch Place 1 patch onto the skin every 24 hours 30 patch 11    meloxicam (MOBIC) 15 MG tablet Take 1 tablet (15 mg) by mouth daily 30 tablet 0    multivitamin w/minerals (THERA-VIT-M) tablet Take 1 tablet by mouth daily      mupirocin (BACTROBAN) 2 % external ointment Apply topically 3 times daily 30 g 1    naloxone (NARCAN) 4 MG/0.1ML nasal spray Spray 1 spray (4 mg) into one nostril alternating nostrils once as needed for opioid reversal Every 2-3 minutes until patient responsive or EMS arrives 0.2 mL 0    polyethylene glycol (MIRALAX) 17 g packet Take 17 g by mouth daily 7 packet 0    pseudoePHEDrine (SUDAFED) 30 MG tablet Take by mouth every 4 hours as needed for congestion      senna-docusate (SENOKOT-S/PERICOLACE) 8.6-50 MG tablet Take 1 tablet by mouth 2 times daily 30 tablet 0    tadalafil (CIALIS) 10 MG tablet Take 1 tab as needed for erectile dysfunction. Do not exceed more than 2 tabs per day. 30 tablet 1    tiZANidine (ZANAFLEX) 4 MG tablet TAKE 1 TABLET (4 MG) BY MOUTH NIGHTLY AS NEEDED FOR MUSCLE SPASMS 30 tablet 0      Allergies   Allergen Reactions    No Clinical Screening - See Comments     Seasonal Allergies       ROS:   Gen- no fevers/chills   Rheum - no morning stiffness   Derm - no rash/ redness   Neuro - no numbness, no tingling   Remainder of ROS negative.     Exam:   There were no vitals taken for this visit.     Xray of fifth MCP on July 26, 2023 at [facility] location - films personally reviewed with patient at time of visit     My impression: some callus               Cast removal:    Relevant Diagnosis: 5th metacarpal fx     Patient educated on cast removal process: Yes     Nilo campos  cast was removed per physician instruction.    Skin was observed and found to be intact with no signs of concern:Yes     Concern noted: NA     Person(s) involved in removal:   Patient     Questions asked: NA    Patient sent to x-ray: Yes     I agree with the following cast removal documentation.    ELROY Chan MD

## 2023-07-26 NOTE — LETTER
7/26/2023      RE: Arnaud Bird  [address]  [place] [address]     Dear Colleague,    Thank you for referring your patient, Arnaud Bird, to the [facility] [place]. Please see a copy of my visit note below.    ASSESSMENT/PLAN:    Pt with left hand 5th MCP fracture  Displaced fracture of neck of the 5th MCP, left hand-  Cast removed  Declines hand therapy  Pt has brace he will wear for 2 weeks  No heavy lifting, shoveling, or moving equipment    MyChart with concerns  RTC prn    Pt is a 68 year old male here today for:     Cast removal and then reports some stiffness but overall feels ok    ESTABLISHED PATIENT FOLLOW-UP:  Follow Up of the Left Hand       HISTORY OF PRESENT ILLNESS  Mr. Bird is a pleasant 68 year old year old male who presents to clinic today for follow-up of left hand 5th MCP fracture.  Wasps, stung on right fifth finger  Left hand without pain but noted stiffness  Has an ulnar gutter brace at home    Date of injury: 6 weeks   Date last seen: 7/5/23  Following Therapeutic Plan: Yes  Pain: Improved   Function: Improved   Interval History: Still having some pain when using it.      Additional medical/Social/Surgical histories reviewed in [facility] and updated as appropriate.    REVIEW OF SYSTEMS (7/26/2023)  CONSTITUTIONAL: Denies fever and weight loss  GASTROINTESTINAL: Denies abdominal pain, nausea, vomiting  MUSCULOSKELETAL: See HPI  SKIN: Denies any recent rash or lesion  NEUROLOGICAL: Denies numbness or focal weakness      EXAM:   left WRIST/HAND:   Inspection: Swelling - none; Atrophy - none   Sensation: intact in median, radial, ulnar distribution   ROM:   Wrist: flexion- decreased extension- full/ pronation- full/ supination- full;   radial deviation - decreased due to OA; ulnar deviation- full   Hand: Full flexion at PIP/DIP, finger abduction/ adduction.   Strength: 4/5 in all motions   Bony tenderness:   Wrist: Carpal bones:  soreness; Snuffbox: soreness   Hand: Metacarpals: 5th tender; Phalanges: intact   Tendons: FDS/FDP/Extensor mechanism intact   Maneuvers: -Tinel's/Phalen; -Finkelstein. -TFCC grind test   Other: No nodules palpated in palmar aspect.           Past Medical History:   Diagnosis Date    Acute respiratory failure with hypoxia (H) 4/14/2021    Arthritis     Benign positional vertigo     Carpal tunnel syndrome     mild    Chronic pain syndrome 4/14/2021    Hearing problem     LEFT WORSE THAN RIGHT  10/18/2012    Migraines     Nasal polyps     Obesity 4/14/2021    Osteoarthritis of multiple joints 4/14/2021    Pneumonia due to 2019 novel coronavirus 4/14/2021    Unspecified asthma(493.90) 10/17/2012    Unspecified asthma, with exacerbation 10/17/2012      Past Surgical History:   Procedure Laterality Date    ARTHROPLASTY SHOULDER Right 6/15/2021    Procedure: Right Total Shoulder Arthroplasty, Distal Clavicle Excision;  Surgeon: Yesica Deleon MD;  [facility]    COLONOSCOPY  2019    COLONOSCOPY N/A 8/15/2022    Procedure: COLONOSCOPY, WITH POLYPECTOMY AND BIOPSY;  Surgeon: Abelino Flores MD;  Location:  GI    HC TOOTH EXTRACTION W/FORCEP      ORTHOPEDIC SURGERY      bilateral total knee replacements    TONSILLECTOMY      age 2 or 3      Current Outpatient Medications   Medication Sig Dispense Refill    acetaminophen (TYLENOL) 325 MG tablet Take 2 tablets (650 mg) by mouth every 4 hours as needed for other 40 tablet 0    acyclovir (ZOVIRAX) 400 MG tablet TAKE TWO TABLETS BY MOUTH TWICE DAILY for 5 days as needed for genital herpes outbreak 28 tablet 4    albuterol (PROAIR HFA, PROVENTIL HFA, VENTOLIN HFA) 108 (90 BASE) MCG/ACT inhaler Inhale 2 puffs into the lungs every 6 hours as needed for shortness of breath / dyspnea 1 Inhaler 11    allopurinol (ZYLOPRIM) 300 MG tablet Take 1 tablet (300 mg) by mouth daily 90 tablet 3    amoxicillin (AMOXIL) 500 MG capsule Take 2 g orally one hour before the  dental procedure 4 capsule 1    clindamycin (CLINDAMAX) 1 % external gel Apply topically 2 times daily 60 g 3    diclofenac (VOLTAREN) 1 % topical gel APPLY 4 GRAMS TOPICALLY 4 TIMES A  g 1    fluticasone (FLONASE) 50 MCG/ACT nasal spray Spray 2 sprays into both nostrils daily 16 g 11    HYDROcodone-acetaminophen (NORCO) 7.5-325 MG per tablet Take 1 tablet by mouth every 6 hours as needed for pain (4 x daily as needed  maxium 4 per day) 120 tablet 0    ipratropium (ATROVENT) 0.06 % nasal spray SPRAY 2 SPRAYS INTO BOTH NOSTRILS 4 TIMES DAILY 15 mL 1    lidocaine (LIDODERM) 5 % patch Place 1 patch onto the skin every 24 hours 30 patch 11    meloxicam (MOBIC) 15 MG tablet Take 1 tablet (15 mg) by mouth daily 30 tablet 0    multivitamin w/minerals (THERA-VIT-M) tablet Take 1 tablet by mouth daily      mupirocin (BACTROBAN) 2 % external ointment Apply topically 3 times daily 30 g 1    naloxone (NARCAN) 4 MG/0.1ML nasal spray Spray 1 spray (4 mg) into one nostril alternating nostrils once as needed for opioid reversal Every 2-3 minutes until patient responsive or EMS arrives 0.2 mL 0    polyethylene glycol (MIRALAX) 17 g packet Take 17 g by mouth daily 7 packet 0    pseudoePHEDrine (SUDAFED) 30 MG tablet Take by mouth every 4 hours as needed for congestion      senna-docusate (SENOKOT-S/PERICOLACE) 8.6-50 MG tablet Take 1 tablet by mouth 2 times daily 30 tablet 0    tadalafil (CIALIS) 10 MG tablet Take 1 tab as needed for erectile dysfunction. Do not exceed more than 2 tabs per day. 30 tablet 1    tiZANidine (ZANAFLEX) 4 MG tablet TAKE 1 TABLET (4 MG) BY MOUTH NIGHTLY AS NEEDED FOR MUSCLE SPASMS 30 tablet 0      Allergies   Allergen Reactions    No Clinical Screening - See Comments     Seasonal Allergies       ROS:   Gen- no fevers/chills   Rheum - no morning stiffness   Derm - no rash/ redness   Neuro - no numbness, no tingling   Remainder of ROS negative.     Exam:   There were no vitals taken for this visit.      [place] fifth [place] [facility] [place] personally reviewed with patient at time of visit     My impression: some callus               Cast removal:    Relevant Diagnosis: 5th metacarpal fx     Patient educated on cast removal process: Yes     Ulnar gutter cast was removed per physician instruction.    Skin was observed and found to be intact with no signs of concern:Yes     Concern noted: NA     Person(s) involved in removal:   Patient     Questions asked: NA    Patient sent to x-ray: Yes     I agree with the following cast removal documentation.    ELROY Chan MD      Again, thank you for allowing me to participate in the care of your patient.      Sincerely,    Carmel Chan MD

## 2023-08-03 ENCOUNTER — MYC REFILL (OUTPATIENT)
Dept: ORTHOPEDICS | Facility: CLINIC | Age: 68
End: 2023-08-03
Payer: COMMERCIAL

## 2023-08-03 ENCOUNTER — MYC REFILL (OUTPATIENT)
Dept: FAMILY MEDICINE | Facility: CLINIC | Age: 68
End: 2023-08-03
Payer: COMMERCIAL

## 2023-08-03 DIAGNOSIS — M25.519 ARTHRALGIA OF SHOULDER, UNSPECIFIED LATERALITY: ICD-10-CM

## 2023-08-03 DIAGNOSIS — M25.661 STIFFNESS OF KNEE JOINT, RIGHT: Chronic | ICD-10-CM

## 2023-08-03 DIAGNOSIS — R26.9 GAIT DIFFICULTY: ICD-10-CM

## 2023-08-03 DIAGNOSIS — G89.4 CHRONIC PAIN SYNDROME: Chronic | ICD-10-CM

## 2023-08-03 DIAGNOSIS — Z96.652 STATUS POST TOTAL LEFT KNEE REPLACEMENT: ICD-10-CM

## 2023-08-03 DIAGNOSIS — M47.16 SPONDYLOSIS WITH MYELOPATHY, LUMBAR REGION: ICD-10-CM

## 2023-08-03 RX ORDER — HYDROCODONE BITARTRATE AND ACETAMINOPHEN 7.5; 325 MG/1; MG/1
1 TABLET ORAL EVERY 6 HOURS PRN
Qty: 120 TABLET | Refills: 0 | Status: SHIPPED | OUTPATIENT
Start: 2023-08-03 | End: 2023-09-08

## 2023-08-03 RX ORDER — MELOXICAM 15 MG/1
15 TABLET ORAL DAILY
Qty: 30 TABLET | Refills: 0 | Status: SHIPPED | OUTPATIENT
Start: 2023-08-03 | End: 2024-05-31

## 2023-08-04 NOTE — TELEPHONE ENCOUNTER
RX [place] reviewed:  reviewed- no concerns    [place]  [email or web address]    Refill done.  Future refills only after follow up which he is due .     Please call the patient and schedule VV of Opiate follow up with me, which patient is due at this time, to further take care of the medical conditions and medications.OK to use same day slot / double book near another virtual slot in 1-2 weeks.     Thank you,  Sushma Galloway MD on 8/3/2023

## 2023-08-10 NOTE — TELEPHONE ENCOUNTER
Pt informed of providers message below. States he doesn't have good reception now and will schedule thorough my chart or call clinic back to schedule follow up VV in the next 1-2 weeks.

## 2023-08-10 NOTE — TELEPHONE ENCOUNTER
Called pt, LVM requesting callback to schedule VV with Dr. Galloway in the next 1-2 weeks. See notes below. 1st attempt    Savanah Rai RN on 8/10/2023 at 1:26 PM

## 2023-08-22 DIAGNOSIS — M1A.9XX0 CHRONIC GOUT INVOLVING TOE OF RIGHT FOOT WITHOUT TOPHUS, UNSPECIFIED CAUSE: ICD-10-CM

## 2023-08-22 RX ORDER — ALLOPURINOL 300 MG/1
1 TABLET ORAL DAILY
Qty: 90 TABLET | Refills: 0 | Status: SHIPPED | OUTPATIENT
Start: 2023-08-22 | End: 2023-08-22

## 2023-08-22 RX ORDER — ALLOPURINOL 300 MG/1
1 TABLET ORAL DAILY
Qty: 90 TABLET | Refills: 0 | Status: SHIPPED | OUTPATIENT
Start: 2023-08-22 | End: 2023-11-20

## 2023-08-23 ENCOUNTER — OFFICE VISIT (OUTPATIENT)
Dept: AUDIOLOGY | Facility: CLINIC | Age: 68
End: 2023-08-23
Payer: COMMERCIAL

## 2023-08-23 DIAGNOSIS — H90.6 MIXED HEARING LOSS, BILATERAL: Primary | ICD-10-CM

## 2023-08-23 PROCEDURE — 92550 TYMPANOMETRY & REFLEX THRESH: CPT | Performed by: AUDIOLOGIST

## 2023-08-23 PROCEDURE — 92557 COMPREHENSIVE HEARING TEST: CPT | Performed by: AUDIOLOGIST

## 2023-08-23 NOTE — PROGRESS NOTES
"AUDIOLOGY REPORT    SUBJECTIVE:  Arnaud Bird is a 68 year old male who was seen in the Audiology Clinic at the [facility] [place] for audiologic evaluation, referred by Sushma Galloway M.D. Patient reports gradual decrease in hearing bilaterally since previous test in 2018 and persistent bilateral tinnitus. He notes often being told that he turns up the TV too loudly. Patient reports his hearing improves briefly when he \"pops\" his ears. Patient reports chronic sinus issues and headaches. Patient reports ears sometimes feel \"wet\" in ear canals when they are itchy and he wakes up at night. Patient denies ear pain and previous ear surgeries. Patient has been retired for the past 7 years.     OBJECTIVE:  Otoscopic exam indicates ears are clear of cerumen bilaterally     Pure Tone Thresholds assessed using conventional audiometry with good reliability from 250-8000 Hz bilaterally using insert earphones and circumaural headphones     RIGHT:  Borderline normal-mild sloping to moderately severe to moderate mixed hearing loss (re 3/7/2018: 10-30 dB decrease across all frequencies).    LEFT: Mild sloping to moderately severe mixed hearing loss (re 3/7/2018: 5-15 dB decrease across all frequencies).        Tympanogram:    RIGHT: Restricted with negative pressure.    LEFT: Restricted with negative pressure.        Reflexes (reported by stimulus ear):  RIGHT: Ipsilateral is absent at frequencies tested  RIGHT: Contralateral is absent at frequencies tested  LEFT: Ipsilateral is present at normal levels  LEFT: Contralateral is absent at frequencies tested    Speech Reception Threshold:    RIGHT: 35 dB HL    LEFT: 40 dB HL  Word Recognition Score:     RIGHT: 96% at 75 dB HL using NU-6 recorded word list.    LEFT: 92% at 80 dB HL using NU-6 recorded word list.    ASSESSMENT:   Bilateral mixed hearing loss. Significant decrease bilaterally when compared to previous hearing test from " 3/7/2018.    PLAN:  Patient was counseled regarding today's results. Consult with ENT regarding bilateral mixed hearing loss. Patient reports that they will call to make this appointment. Patient is a good hearing aid candidate; schedule hearing aid consult (HAC) to discuss options if desires. Patient would like to look into insurance benefits for hearing aids prior to proceeding with HAC. Return for re-evaluation in 1-2 years to monitor hearing or sooner should new concerns arise. Please call this clinic with questions regarding these results or recommendations.      Daniel Garay. CCC-A  Licensed Audiologist   [place] #[id number]

## 2023-08-23 NOTE — TELEPHONE ENCOUNTER
Received message:   E-Prescribed Message Needing Your Attention  Received: Yesterday  Interface, Eprescribing  P Ec Refill  An error occurred while processing the e-prescribing message.    The message was not sent electronically to the requested pharmacy. Contact the pharmacy about the approved prescription.    Code: 601 -  unable to process  Prescription no longer active    This was a duplicate:  Reason for Discontinue: Duplicate Therapy (No eCancel)    E-Prescribing Status: Transmission to pharmacy failed (8/22/2023  9:30 PM CDT)      Receipt confirmed by pharmacy on 8/22/23:      allopurinol (ZYLOPRIM) 300 MG tablet 90 tablet 0 8/22/2023  No   Sig - Route: Take 1 tablet (300 mg) by mouth daily - Oral   Sent to pharmacy as: Allopurinol 300 MG Oral Tablet (ZYLOPRIM)   Class: E-Prescribe   Order: [id number]   E-Prescribing Status: Receipt confirmed by pharmacy (8/22/2023  9:32 PM CDT)     Sharita BLACKMON RN  [facility]

## 2023-09-02 ENCOUNTER — MYC REFILL (OUTPATIENT)
Dept: FAMILY MEDICINE | Facility: CLINIC | Age: 68
End: 2023-09-02
Payer: COMMERCIAL

## 2023-09-02 DIAGNOSIS — G89.4 CHRONIC PAIN SYNDROME: Chronic | ICD-10-CM

## 2023-09-02 DIAGNOSIS — Z96.652 STATUS POST TOTAL LEFT KNEE REPLACEMENT: ICD-10-CM

## 2023-09-02 DIAGNOSIS — R26.9 GAIT DIFFICULTY: ICD-10-CM

## 2023-09-02 DIAGNOSIS — M47.16 SPONDYLOSIS WITH MYELOPATHY, LUMBAR REGION: ICD-10-CM

## 2023-09-02 DIAGNOSIS — M25.661 STIFFNESS OF KNEE JOINT, RIGHT: Chronic | ICD-10-CM

## 2023-09-05 RX ORDER — HYDROCODONE BITARTRATE AND ACETAMINOPHEN 7.5; 325 MG/1; MG/1
1 TABLET ORAL EVERY 6 HOURS PRN
Qty: 120 TABLET | Refills: 0 | OUTPATIENT
Start: 2023-09-05

## 2023-09-05 NOTE — TELEPHONE ENCOUNTER
Pt supposed to see us on 7/2023 . Will await for his upcoming appointment for further refills.           Please help in getting pt CSA scanned in his chart which was done in his recent OV On 4/2023 but I see its not updated - Ccing my TC to help on this please.

## 2023-09-05 NOTE — TELEPHONE ENCOUNTER
Called patient and relayed provider's message. Patient stated understanding and had no further questions.    Routing to Team 3 to address Dr. Galloway's request.    Niesha GROVER RN  [facility] Triage Team

## 2023-09-08 ENCOUNTER — VIRTUAL VISIT (OUTPATIENT)
Dept: FAMILY MEDICINE | Facility: CLINIC | Age: 68
End: 2023-09-08
Payer: COMMERCIAL

## 2023-09-08 DIAGNOSIS — G89.4 CHRONIC PAIN SYNDROME: Chronic | ICD-10-CM

## 2023-09-08 DIAGNOSIS — M25.661 STIFFNESS OF KNEE JOINT, RIGHT: Chronic | ICD-10-CM

## 2023-09-08 DIAGNOSIS — M47.16 SPONDYLOSIS WITH MYELOPATHY, LUMBAR REGION: ICD-10-CM

## 2023-09-08 DIAGNOSIS — R26.9 GAIT DIFFICULTY: ICD-10-CM

## 2023-09-08 DIAGNOSIS — Z96.652 STATUS POST TOTAL LEFT KNEE REPLACEMENT: ICD-10-CM

## 2023-09-08 PROCEDURE — 99213 OFFICE O/P EST LOW 20 MIN: CPT | Mod: 95 | Performed by: INTERNAL MEDICINE

## 2023-09-08 RX ORDER — HYDROCODONE BITARTRATE AND ACETAMINOPHEN 7.5; 325 MG/1; MG/1
1 TABLET ORAL EVERY 6 HOURS PRN
Qty: 120 TABLET | Refills: 0 | Status: SHIPPED | OUTPATIENT
Start: 2023-09-08 | End: 2023-10-04

## 2023-09-08 NOTE — PROGRESS NOTES
Arnaud is a 68 year old who is being evaluated via a billable telephone visit.      What phone number would you like to be contacted at? [phone]  How would you like to obtain your AVS? Yvonhart    Distant Location (provider location):  On-site      Assessment and Plan  1. Chronic pain syndrome  2. Spondylosis with myelopathy, lumbar region  3. Stiffness of knee joint, right  4. Status post total left knee replacement  5. Gait difficulty  Ongoing intermittent flare ups of back pain inspite of Epidurals which patient refusing any new Spine specialists at this time as he states he is able to manage with his current Norco. All risks understood.   - HYDROcodone-acetaminophen (NORCO) 7.5-325 MG per tablet; Take 1 tablet by mouth every 6 hours as needed for pain (4 x daily as needed  maxium 4 per day)  Dispense: 120 tablet; Refill: 0         Please note that this note consists of symbols derived from keyboarding, dictation and/or voice recognition software. As a result, there may be errors in the script that have gone undetected. Please consider this when interpreting information found in this chart.    Patient Instructions   As discussed-did not refill your [place] at this time but I agree that you will have to make the next follow-up visit as in person visit since we have to do the controlled substance contract which got .  No follow-ups on file.    Sushma Galloway MD  [facility]   Arnaud is a 68 year old, presenting for the following health issues:  Recheck Medication      2023     1:58 PM   Additional Questions   Roomed by Sheila STANFORD       History of Present Illness       Reason for visit:  Med check    He eats 0-1 servings of fruits and vegetables daily.He consumes 1 sweetened beverage(s) daily.He exercises with enough effort to increase his heart rate 10 to 19 minutes per day.  He exercises with enough effort to increase his heart rate 3 or less days per week.   He  is taking medications regularly.       Medication Followup of Hydrocodone-acetaminophen (Narco) 7.5-325 MG per tablet  Taking Medication as prescribed: yes  Side Effects:  None  Medication Helping Symptoms:  yes      Last seen patient in April 2023 for annual wellness visit, he is here for follow-up on opiate refills.  In the interim he did have an accidental injury while he was doing some work at home with left little finger fracture.       Allergies   Allergen Reactions    No Clinical Screening - See Comments     Seasonal Allergies         Past Medical History:   Diagnosis Date    Acute respiratory failure with hypoxia (H) 4/14/2021    Arthritis     Benign positional vertigo     Carpal tunnel syndrome     mild    Chronic pain syndrome 4/14/2021    Hearing problem     LEFT WORSE THAN RIGHT  10/18/2012    Migraines     Nasal polyps     Obesity 4/14/2021    Osteoarthritis of multiple joints 4/14/2021    Pneumonia due to 2019 novel coronavirus 4/14/2021    Unspecified asthma(493.90) 10/17/2012    Unspecified asthma, with exacerbation 10/17/2012       Past Surgical History:   Procedure Laterality Date    ARTHROPLASTY SHOULDER Right 6/15/2021    Procedure: Right Total Shoulder Arthroplasty, Distal Clavicle Excision;  Surgeon: Yesica Deleon MD;  Location: [facility]    COLONOSCOPY  2019    COLONOSCOPY N/A 8/15/2022    Procedure: COLONOSCOPY, WITH POLYPECTOMY AND BIOPSY;  Surgeon: Abelino Flores MD;  Location:  GI    HC TOOTH EXTRACTION W/FORCEP      ORTHOPEDIC SURGERY      bilateral total knee replacements    TONSILLECTOMY      age 2 or 3       Family History   Problem Relation Age of Onset    Cancer Father     Family History Negative Father     Family History Negative Mother     Cancer Mother     Stomach Problem Mother     Breast Cancer Mother     Diabetes No family hx of     Coronary Artery Disease No family hx of     Hypertension No family hx of     Hyperlipidemia No family hx of     Cerebrovascular  Disease No family hx of     Breast Cancer No family hx of     Colon Cancer No family hx of     Prostate Cancer No family hx of     Other Cancer No family hx of     Depression No family hx of     Anxiety Disorder No family hx of     Mental Illness No family hx of     Substance Abuse No family hx of     Anesthesia Reaction No family hx of     Asthma No family hx of     Osteoporosis No family hx of     Genetic Disorder No family hx of     Thyroid Disease No family hx of     Obesity No family hx of     Unknown/Adopted No family hx of        Social History     Tobacco Use    Smoking status: Never    Smokeless tobacco: Never   Substance Use Topics    Alcohol use: Yes     Comment: case beer a week        Current Outpatient Medications   Medication    acetaminophen (TYLENOL) 325 MG tablet    acyclovir (ZOVIRAX) 400 MG tablet    albuterol (PROAIR HFA, PROVENTIL HFA, VENTOLIN HFA) 108 (90 BASE) MCG/ACT inhaler    allopurinol (ZYLOPRIM) 300 MG tablet    amoxicillin (AMOXIL) 500 MG capsule    clindamycin (CLINDAMAX) 1 % external gel    diclofenac (VOLTAREN) 1 % topical gel    fluticasone (FLONASE) 50 MCG/ACT nasal spray    HYDROcodone-acetaminophen (NORCO) 7.5-325 MG per tablet    ipratropium (ATROVENT) 0.06 % nasal spray    lidocaine (LIDODERM) 5 % patch    meloxicam (MOBIC) 15 MG tablet    multivitamin w/minerals (THERA-VIT-M) tablet    mupirocin (BACTROBAN) 2 % external ointment    naloxone (NARCAN) 4 MG/0.1ML nasal spray    polyethylene glycol (MIRALAX) 17 g packet    pseudoePHEDrine (SUDAFED) 30 MG tablet    senna-docusate (SENOKOT-S/PERICOLACE) 8.6-50 MG tablet    tadalafil (CIALIS) 10 MG tablet    tiZANidine (ZANAFLEX) 4 MG tablet     Current Facility-Administered Medications   Medication    betamethasone acet & sod phos (CELESTONE) injection 6 mg    lidocaine (PF) (XYLOCAINE) 1 % injection 1 mL    lidocaine (PF) (XYLOCAINE) 1 % injection 2 mL    lidocaine (PF) (XYLOCAINE) 1 % injection 4 mL    lidocaine (PF) (XYLOCAINE)  1 % injection 4 mL    lidocaine (PF) (XYLOCAINE) 1 % injection 7 mL    lidocaine (PF) (XYLOCAINE) 1 % injection 8 mL    lidocaine (PF) (XYLOCAINE) 1 % injection 8 mL    triamcinolone (KENALOG-40) injection 40 mg    triamcinolone (KENALOG-40) injection 40 mg    triamcinolone (KENALOG-40) injection 40 mg    triamcinolone (KENALOG-40) injection 40 mg    triamcinolone (KENALOG-40) injection 40 mg    triamcinolone (KENALOG-40) injection 40 mg        Review of Systems   Constitutional, HEENT, cardiovascular, pulmonary, GI, , musculoskeletal, neuro, skin, endocrine and psych systems are negative, except as otherwise noted.      Objective           Vitals:  No vitals were obtained today due to virtual visit.    Physical Exam   healthy, alert, and no distress  PSYCH: Alert and oriented times 3; coherent speech, normal   rate and volume, able to articulate logical thoughts, able   to abstract reason, no tangential thoughts, no hallucinations   or delusions  His affect is normal  RESP: No cough, no audible wheezing, able to talk in full sentences  Remainder of exam unable to be completed due to telephone visits        Phone call duration: 6 minutes

## 2023-09-08 NOTE — PROGRESS NOTES
Answers submitted by the patient for this visit:  General Questionnaire (Submitted on 9/2/2023)  Chief Complaint: Chronic problems general questions HPI Form  What is the reason for your visit today? : med check  How many servings of fruits and vegetables do you eat daily?: 0-1  On average, how many sweetened beverages do you drink each day (Examples: soda, juice, sweet tea, etc.  Do NOT count diet or artificially sweetened beverages)?: 1  How many minutes a day do you exercise enough to make your heart beat faster?: 10 to 19  How many days a week do you exercise enough to make your heart beat faster?: 3 or less  How many days per week do you miss taking your medication?: 0

## 2023-09-08 NOTE — PATIENT INSTRUCTIONS
As discussed-did not refill your [place] at this time but I agree that you will have to make the next follow-up visit as in person visit since we have to do the controlled substance contract which got .

## 2023-10-04 DIAGNOSIS — Z96.652 STATUS POST TOTAL LEFT KNEE REPLACEMENT: ICD-10-CM

## 2023-10-04 DIAGNOSIS — M47.16 SPONDYLOSIS WITH MYELOPATHY, LUMBAR REGION: ICD-10-CM

## 2023-10-04 DIAGNOSIS — M25.661 STIFFNESS OF KNEE JOINT, RIGHT: Chronic | ICD-10-CM

## 2023-10-04 DIAGNOSIS — R26.9 GAIT DIFFICULTY: ICD-10-CM

## 2023-10-04 DIAGNOSIS — G89.4 CHRONIC PAIN SYNDROME: Chronic | ICD-10-CM

## 2023-10-04 RX ORDER — HYDROCODONE BITARTRATE AND ACETAMINOPHEN 7.5; 325 MG/1; MG/1
1 TABLET ORAL EVERY 6 HOURS PRN
Qty: 120 TABLET | Refills: 0 | Status: SHIPPED | OUTPATIENT
Start: 2023-10-08 | End: 2023-10-12

## 2023-10-04 NOTE — TELEPHONE ENCOUNTER
Medication Question or Refill      What medication are you calling about (include dose and sig)?: HYDROcodone-acetaminophen (NORCO) 7.5-325 MG per tablet     Preferred Pharmacy:       [id number] [address] [address]  Phone: [phone] Fax: [phone]      Controlled Substance Agreement on file:   CSA -- Patient Level:     [Media Unavailable] Controlled Substance Agreement - Opioid - Scan on 8/10/2022  5:34 PM   [Media Unavailable] Controlled Substance Agreement - Opioid - Scan on 9/13/2021  4:37 PM   [Media Unavailable] Controlled Substance Agreement - Opioid - Scan on 7/20/2020 12:15 PM   [Media Unavailable] Controlled Substance Agreement - Opioid - Scan on 3/19/2019 11:01 AM       Who prescribed the medication?: PCP    Do you need a refill? Yes    When did you use the medication last? 10/04/2023    Patient offered an appointment? No, has appt on 10/10    Do you have any questions or concerns?  No      Could we send this information to you in [facility] or would you prefer to receive a phone call?:   Patient would prefer a phone call   Okay to leave a detailed message?: Yes at Home number on file [phone] (home)

## 2023-10-05 NOTE — TELEPHONE ENCOUNTER
RX [place]) reviewed:  reviewed- no concerns    [place]:  [email or web address]      Refill done from 10/8/2023 as per  check.     Patient CSA  since 2023. Future refills only after he signs new contract with inperson OV with me ASAP.       There are 2 back to back erroneous appointments on 10/10/2023 and 2023 for some reason ? Please cancel 2023 and change 10/10/23 Video visit as Inperson visit for signing CSA      Thank you,  Sushma Galloway MD on 10/4/2023

## 2023-10-10 ENCOUNTER — VIRTUAL VISIT (OUTPATIENT)
Dept: FAMILY MEDICINE | Facility: CLINIC | Age: 68
End: 2023-10-10
Payer: COMMERCIAL

## 2023-10-10 ENCOUNTER — TELEPHONE (OUTPATIENT)
Dept: FAMILY MEDICINE | Facility: CLINIC | Age: 68
End: 2023-10-10

## 2023-10-10 DIAGNOSIS — Z96.652 STATUS POST TOTAL LEFT KNEE REPLACEMENT: ICD-10-CM

## 2023-10-10 DIAGNOSIS — G89.4 CHRONIC PAIN SYNDROME: Chronic | ICD-10-CM

## 2023-10-10 DIAGNOSIS — M47.16 SPONDYLOSIS WITH MYELOPATHY, LUMBAR REGION: ICD-10-CM

## 2023-10-10 DIAGNOSIS — R26.9 GAIT DIFFICULTY: ICD-10-CM

## 2023-10-10 DIAGNOSIS — M25.661 STIFFNESS OF KNEE JOINT, RIGHT: Chronic | ICD-10-CM

## 2023-10-10 PROCEDURE — 99213 OFFICE O/P EST LOW 20 MIN: CPT | Mod: VID | Performed by: INTERNAL MEDICINE

## 2023-10-10 ASSESSMENT — ASTHMA QUESTIONNAIRES
QUESTION_4 LAST FOUR WEEKS HOW OFTEN HAVE YOU USED YOUR RESCUE INHALER OR NEBULIZER MEDICATION (SUCH AS ALBUTEROL): TWO OR THREE TIMES PER WEEK
QUESTION_3 LAST FOUR WEEKS HOW OFTEN DID YOUR ASTHMA SYMPTOMS (WHEEZING, COUGHING, SHORTNESS OF BREATH, CHEST TIGHTNESS OR PAIN) WAKE YOU UP AT NIGHT OR EARLIER THAN USUAL IN THE MORNING: NOT AT ALL
ACT_TOTALSCORE: 21
QUESTION_5 LAST FOUR WEEKS HOW WOULD YOU RATE YOUR ASTHMA CONTROL: WELL CONTROLLED
QUESTION_1 LAST FOUR WEEKS HOW MUCH OF THE TIME DID YOUR ASTHMA KEEP YOU FROM GETTING AS MUCH DONE AT WORK, SCHOOL OR AT HOME: NONE OF THE TIME
ACT_TOTALSCORE: 21
QUESTION_2 LAST FOUR WEEKS HOW OFTEN HAVE YOU HAD SHORTNESS OF BREATH: ONCE OR TWICE A WEEK

## 2023-10-10 NOTE — TELEPHONE ENCOUNTER
Patient was seen by us in April 2023 for annual wellness visit at that time, he has signed the controlled substance contract per my understanding on that day which I gave to the team but was never possibly scanned in the chart since the urine drug screen was done on the same day when he came in person to us.    What ever reason I went ahead and reprinted the CSA letter again so that he can come down to  and sign it again for us, please make sure this is scanned in patient chart after patient signs.  It is placed in my out box Redfolder    Thank you  Sushma Galloway MD on 10/10/2023 at 5:39 PM

## 2023-10-10 NOTE — PATIENT INSTRUCTIONS
As discussed all your lab work was done in your annual physical recently on 2023 which was all normal.  There is no new needs to recheck your labs at this time.     We will need you to sign on controlled substance contract which I thought of making this in person but since this is again a video visit please come to the  on the printouts placed with them and get them signed ASAP within this week because it has been  for a while, for your ongoing Norco prescriptions to happen.  Urine exam has been already done with your lab works.

## 2023-10-10 NOTE — PROGRESS NOTES
Arnaud is a 68 year old who is being evaluated via a billable video visit.      How would you like to obtain your AVS? MyChart  If the video visit is dropped, the invitation should be resent by: Text to cell phone: [phone]  Will anyone else be joining your video visit? No        Assessment and Plan  1. Chronic pain syndrome  2. Spondylosis with myelopathy, lumbar region  3. Stiffness of knee joint, right  4. Status post total left knee replacement  5. Gait difficulty  Chronic problem, intermittent flareups of back pain with patient already following spine specialist on epidurals.  Patient not opting for any new therapies at this time and wanting to continue the same Norco which she is continuously taking since long time.  Just refilled the medication on 2023, patient supposed to come and sign the CSA which was done at the time of his physical in 2023 but not seen in patient chart though urine drug screen was already done at that time.  Requested patient to come to the  within this week to sign CSA which she understood and agreed with the plan.         Please note that this note consists of symbols derived from keyboarding, dictation and/or voice recognition software. As a result, there may be errors in the script that have gone undetected. Please consider this when interpreting information found in this chart.    Patient Instructions   As discussed all your lab work was done in your annual physical recently on 2023 which was all normal.  There is no new needs to recheck your labs at this time.     We will need you to sign on controlled substance contract which I thought of making this in person but since this is again a video visit please come to the  on the printouts placed with them and get them signed ASAP within this week because it has been  for a while, for your ongoing Norco prescriptions to happen.  Urine exam has been already done with your lab  works.  Return in about 3 months (around 1/10/2024), or if symptoms worsen or fail to improve, for Follow up of last visit, If symptoms persist, video visit.    MD ANY [facility] Lares is a 68 year old, presenting for the following health issues:  Follow Up, Musculoskeletal Problem, and Recheck Medication        10/10/2023     2:27 PM   Additional Questions   Roomed by Savanah AGARWAL       History of Present Illness       Back Pain:  He presents for follow up of back pain. Patient's back pain is a chronic problem.  Location of back pain:  Right lower back, left lower back and left shoulder  Description of back pain: dull ache  Back pain spreads: nowhere    Since patient first noticed back pain, pain is: unchanged  Does back pain interfere with his job:  Not applicable   He consumes 1 sweetened beverage(s) daily. He exercises with enough effort to increase his heart rate 3 or less days per week.   He is taking medications regularly.     Last seen patient for opiate follow-up, patient is requesting for lab work which I went ahead and discussed all his previous labs and answered the questions.  No new labs needed at this time.  Patient understood and agreed with the plan       Allergies   Allergen Reactions    No Clinical Screening - See Comments     Seasonal Allergies         Past Medical History:   Diagnosis Date    Acute respiratory failure with hypoxia (H) 4/14/2021    Arthritis     Benign positional vertigo     Carpal tunnel syndrome     mild    Chronic pain syndrome 4/14/2021    Hearing problem     LEFT WORSE THAN RIGHT  10/18/2012    Migraines     Nasal polyps     Obesity 4/14/2021    Osteoarthritis of multiple joints 4/14/2021    Pneumonia due to 2019 novel coronavirus 4/14/2021    Unspecified asthma(493.90) 10/17/2012    Unspecified asthma, with exacerbation 10/17/2012       Past Surgical History:   Procedure Laterality Date    ARTHROPLASTY SHOULDER Right  6/15/2021    Procedure: Right Total Shoulder Arthroplasty, Distal Clavicle Excision;  Surgeon: Yesica Deleon MD;  Location: [facility]    COLONOSCOPY  2019    COLONOSCOPY N/A 8/15/2022    Procedure: COLONOSCOPY, WITH POLYPECTOMY AND BIOPSY;  Surgeon: Abelino Flores MD;  Location: [facility]    HC TOOTH EXTRACTION W/FORCEP      ORTHOPEDIC SURGERY      bilateral total knee replacements    TONSILLECTOMY      age 2 or 3       Family History   Problem Relation Age of Onset    Cancer Father     Family History Negative Father     Family History Negative Mother     Cancer Mother     Stomach Problem Mother     Breast Cancer Mother     Diabetes No family hx of     Coronary Artery Disease No family hx of     Hypertension No family hx of     Hyperlipidemia No family hx of     Cerebrovascular Disease No family hx of     Breast Cancer No family hx of     Colon Cancer No family hx of     Prostate Cancer No family hx of     Other Cancer No family hx of     Depression No family hx of     Anxiety Disorder No family hx of     Mental Illness No family hx of     Substance Abuse No family hx of     Anesthesia Reaction No family hx of     Asthma No family hx of     Osteoporosis No family hx of     Genetic Disorder No family hx of     Thyroid Disease No family hx of     Obesity No family hx of     Unknown/Adopted No family hx of        Social History     Tobacco Use    Smoking status: Never    Smokeless tobacco: Never   Substance Use Topics    Alcohol use: Yes     Comment: case beer a week        Current Outpatient Medications   Medication    acetaminophen (TYLENOL) 325 MG tablet    acyclovir (ZOVIRAX) 400 MG tablet    albuterol (PROAIR HFA, PROVENTIL HFA, VENTOLIN HFA) 108 (90 BASE) MCG/ACT inhaler    allopurinol (ZYLOPRIM) 300 MG tablet    amoxicillin (AMOXIL) 500 MG capsule    clindamycin (CLINDAMAX) 1 % external gel    diclofenac (VOLTAREN) 1 % topical gel    fluticasone (FLONASE) 50 MCG/ACT nasal spray     HYDROcodone-acetaminophen (NORCO) 7.5-325 MG per tablet    ipratropium (ATROVENT) 0.06 % nasal spray    lidocaine (LIDODERM) 5 % patch    meloxicam (MOBIC) 15 MG tablet    multivitamin w/minerals (THERA-VIT-M) tablet    mupirocin (BACTROBAN) 2 % external ointment    pseudoePHEDrine (SUDAFED) 30 MG tablet    tadalafil (CIALIS) 10 MG tablet    tiZANidine (ZANAFLEX) 4 MG tablet    naloxone (NARCAN) 4 MG/0.1ML nasal spray    polyethylene glycol (MIRALAX) 17 g packet    senna-docusate (SENOKOT-S/PERICOLACE) 8.6-50 MG tablet     Current Facility-Administered Medications   Medication    betamethasone acet & sod phos (CELESTONE) injection 6 mg    lidocaine (PF) (XYLOCAINE) 1 % injection 1 mL    lidocaine (PF) (XYLOCAINE) 1 % injection 2 mL    lidocaine (PF) (XYLOCAINE) 1 % injection 4 mL    lidocaine (PF) (XYLOCAINE) 1 % injection 4 mL    lidocaine (PF) (XYLOCAINE) 1 % injection 7 mL    lidocaine (PF) (XYLOCAINE) 1 % injection 8 mL    lidocaine (PF) (XYLOCAINE) 1 % injection 8 mL    triamcinolone (KENALOG-40) injection 40 mg    triamcinolone (KENALOG-40) injection 40 mg    triamcinolone (KENALOG-40) injection 40 mg    triamcinolone (KENALOG-40) injection 40 mg    triamcinolone (KENALOG-40) injection 40 mg    triamcinolone (KENALOG-40) injection 40 mg        Review of Systems   Constitutional, HEENT, cardiovascular, pulmonary, GI, , musculoskeletal, neuro, skin, endocrine and psych systems are negative, except as otherwise noted.      Objective           Vitals:  No vitals were obtained today due to virtual visit.    Physical Exam   GENERAL: Healthy, alert and no distress  EYES: Eyes grossly normal to inspection.  No discharge or erythema, or obvious scleral/conjunctival abnormalities.  RESP: No audible wheeze, cough, or visible cyanosis.  No visible retractions or increased work of breathing.    SKIN: Visible skin clear. No significant rash, abnormal pigmentation or lesions.  NEURO: Cranial nerves grossly intact.   Mentation and speech appropriate for age.  PSYCH: Mentation appears normal, affect normal/bright, judgement and insight intact, normal speech and appearance well-groomed.      Video-Visit Details    Type of service:  Video Visit   Originating Location (pt. Location): Home    Distant Location (provider location):  On-site  Platform used for Video Visit: Melva

## 2023-10-11 NOTE — TELEPHONE ENCOUNTER
Spoke with patient, he is not coming to sign because we have the other CSA signed in April.   Put the CSA at the  to scan into chart.

## 2023-10-12 RX ORDER — HYDROCODONE BITARTRATE AND ACETAMINOPHEN 7.5; 325 MG/1; MG/1
1 TABLET ORAL EVERY 6 HOURS PRN
Qty: 120 TABLET | Refills: 0 | Status: SHIPPED | OUTPATIENT
Start: 2023-10-12 | End: 2023-11-07

## 2023-10-12 NOTE — TELEPHONE ENCOUNTER
Pt calling in to follow-up on refill request.  Pharmacy has not received it yet.     Medication Question or Refill    Contacts         Type Contact Phone/Fax    10/04/2023 12:33 PM CDT Phone (Incoming) Arnaud Bird (Self) [phone] (M)          What medication are you calling about (include dose and sig)?:  HYDROcodone-acetaminophen (NORCO) 7.5-325 MG per tablet     Preferred Pharmacy:  [facility] [address] [address]  Phone: [phone] Fax: [phone]      Controlled Substance Agreement on file:   CSA -- Patient Level:     [Media Unavailable] Controlled Substance Agreement - Opioid - Scan on 10/11/2023  6:52 AM   [Media Unavailable] Controlled Substance Agreement - Opioid - Scan on 8/10/2022  5:34 PM   [Media Unavailable] Controlled Substance Agreement - Opioid - Scan on 9/13/2021  4:37 PM   [Media Unavailable] Controlled Substance Agreement - Opioid - Scan on 7/20/2020 12:15 PM   [Media Unavailable] Controlled Substance Agreement - Opioid - Scan on 3/19/2019 11:01 AM       Who prescribed the medication?:   Sushma Galloway MD     Do you need a refill? Yes    When did you use the medication last? OUT OF MEDICATION     Patient offered an appointment? No  Med recheck followup scheduled in January 2024.    Do you have any questions or concerns?  No    Could we send this information to you in [facility] or would you prefer to receive a phone call?:   Patient would prefer a phone call     Okay to leave a detailed message?: Yes at Cell number on file:    Telephone Information:   Mobile [phone]

## 2023-10-13 NOTE — TELEPHONE ENCOUNTER
RX [place] program ([place]) reviewed:  reviewed- no concerns    [place] profile:  [email or web address]    Refill done. Please let the pt know that we have refilled the medication on 10/8/2023 to his pharmacy on file. But pt has updated another pharmacy only today and we have sent in refills again today itself.     Thank you,  Sushma Galloway MD on 10/12/2023

## 2023-10-13 NOTE — TELEPHONE ENCOUNTER
Patient notified that his prescription for hydrocodone-APAP was sent to updated pharmacy. Patient was grateful for the call. Darlyn Gamez RN

## 2023-11-07 DIAGNOSIS — B00.9 HSV (HERPES SIMPLEX VIRUS) INFECTION: ICD-10-CM

## 2023-11-07 DIAGNOSIS — Z96.652 STATUS POST TOTAL LEFT KNEE REPLACEMENT: ICD-10-CM

## 2023-11-07 DIAGNOSIS — R26.9 GAIT DIFFICULTY: ICD-10-CM

## 2023-11-07 DIAGNOSIS — M47.16 SPONDYLOSIS WITH MYELOPATHY, LUMBAR REGION: ICD-10-CM

## 2023-11-07 DIAGNOSIS — M25.661 STIFFNESS OF KNEE JOINT, RIGHT: Chronic | ICD-10-CM

## 2023-11-07 DIAGNOSIS — G89.4 CHRONIC PAIN SYNDROME: Chronic | ICD-10-CM

## 2023-11-07 NOTE — TELEPHONE ENCOUNTER
Medication Question or Refill      What medication are you calling about (include dose and sig)?: HYDROcodone-acetaminophen (NORCO) 7.5-325 MG per tablet, acyclovir (ZOVIRAX) 400 MG tablet     Preferred Pharmacy:  [facility] - [facility] 60 [facility]  [address] [facility] [address]  Phone: [phone] Fax: [phone]      Controlled Substance Agreement on file:   CSA -- Patient Level:     [Media Unavailable] Controlled Substance Agreement - Opioid - Scan on 10/11/2023  6:52 AM   [Media Unavailable] Controlled Substance Agreement - Opioid - Scan on 8/10/2022  5:34 PM   [Media Unavailable] Controlled Substance Agreement - Opioid - Scan on 9/13/2021  4:37 PM   [Media Unavailable] Controlled Substance Agreement - Opioid - Scan on 7/20/2020 12:15 PM   [Media Unavailable] Controlled Substance Agreement - Opioid - Scan on 3/19/2019 11:01 AM       Who prescribed the medication?: Dr. Galloway    Do you need a refill? Yes    When did you use the medication last? Today    Patient offered an appointment? No    Do you have any questions or concerns?  No      Could we send this information to you in [employer] or would you prefer to receive a phone call?:   Patient would like to be contacted via [employer] Marcelino,  [facility]

## 2023-11-08 RX ORDER — ACYCLOVIR 400 MG/1
TABLET ORAL
Qty: 28 TABLET | Refills: 4 | Status: SHIPPED | OUTPATIENT
Start: 2023-11-08 | End: 2024-03-22

## 2023-11-08 RX ORDER — HYDROCODONE BITARTRATE AND ACETAMINOPHEN 7.5; 325 MG/1; MG/1
1 TABLET ORAL EVERY 6 HOURS PRN
Qty: 120 TABLET | Refills: 0 | Status: SHIPPED | OUTPATIENT
Start: 2023-11-12 | End: 2023-12-04

## 2023-11-08 NOTE — TELEPHONE ENCOUNTER
RX [place] ([place]) reviewed:  reviewed- no concerns    [place] profile:  [email or web address]    Refill done from 11/12/23 as per  check.     Sushma Galloway MD on 11/8/2023

## 2023-11-19 DIAGNOSIS — M1A.9XX0 CHRONIC GOUT INVOLVING TOE OF RIGHT FOOT WITHOUT TOPHUS, UNSPECIFIED CAUSE: ICD-10-CM

## 2023-11-20 RX ORDER — ALLOPURINOL 300 MG/1
1 TABLET ORAL DAILY
Qty: 90 TABLET | Refills: 0 | Status: SHIPPED | OUTPATIENT
Start: 2023-11-20 | End: 2024-03-08

## 2023-12-04 DIAGNOSIS — M25.661 STIFFNESS OF KNEE JOINT, RIGHT: Chronic | ICD-10-CM

## 2023-12-04 DIAGNOSIS — R26.9 GAIT DIFFICULTY: ICD-10-CM

## 2023-12-04 DIAGNOSIS — Z96.652 STATUS POST TOTAL LEFT KNEE REPLACEMENT: ICD-10-CM

## 2023-12-04 DIAGNOSIS — M47.16 SPONDYLOSIS WITH MYELOPATHY, LUMBAR REGION: ICD-10-CM

## 2023-12-04 DIAGNOSIS — G89.4 CHRONIC PAIN SYNDROME: Chronic | ICD-10-CM

## 2023-12-04 RX ORDER — HYDROCODONE BITARTRATE AND ACETAMINOPHEN 7.5; 325 MG/1; MG/1
1 TABLET ORAL EVERY 6 HOURS PRN
Qty: 120 TABLET | Refills: 0 | Status: SHIPPED | OUTPATIENT
Start: 2023-12-08 | End: 2024-01-05

## 2023-12-04 NOTE — TELEPHONE ENCOUNTER
Medication Question or Refill        What medication are you calling about (include dose and sig)?:     HYDROcodone-acetaminophen (NORCO) 7.5-325 MG per tablet       Preferred Pharmacy:       [id number] - [facility] [address] [address]  Phone: [phone] Fax: [phone]      Controlled Substance Agreement on file:   CSA -- Patient Level:     [Media Unavailable] Controlled Substance Agreement - Opioid - Scan on 10/11/2023  6:52 AM   [Media Unavailable] Controlled Substance Agreement - Opioid - Scan on 8/10/2022  5:34 PM   [Media Unavailable] Controlled Substance Agreement - Opioid - Scan on 9/13/2021  4:37 PM   [Media Unavailable] Controlled Substance Agreement - Opioid - Scan on 7/20/2020 12:15 PM   [Media Unavailable] Controlled Substance Agreement - Opioid - Scan on 3/19/2019 11:01 AM       Who prescribed the medication?: N/A    Do you need a refill? Yes    When did you use the medication last? N/A    Patient offered an appointment? No    Do you have any questions or concerns?  No      Could we send this information to you in [facility] or would you prefer to receive a phone call?:   Patient would prefer a phone call   Okay to leave a detailed message?: Yes at Home number on file [phone] (home)
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
## 2023-12-13 DIAGNOSIS — M51.16 LUMBAR DISC DISEASE WITH RADICULOPATHY: ICD-10-CM

## 2023-12-13 DIAGNOSIS — G56.03 BILATERAL CARPAL TUNNEL SYNDROME: ICD-10-CM

## 2023-12-13 RX ORDER — LIDOCAINE 50 MG/G
1 PATCH TOPICAL EVERY 24 HOURS
Qty: 30 PATCH | Refills: 11 | Status: SHIPPED | OUTPATIENT
Start: 2023-12-13

## 2023-12-13 NOTE — TELEPHONE ENCOUNTER
Pt called requesting refills for lidocaine patches and Voltaren gel for back pain. He denies acute injury or UTI symptoms. Pt has future appt with PCP for medication follow up 12/26/2023.    Future Appointments 12/13/2023 - 6/10/2024        Date Visit Type Length Department Provider     12/26/2023  1:30 PM OFFICE VISIT 30 min [facility] FP/IM/Sushma Hairston MD    Location Instructions:     [facility] is located at [address], across the [address] from [facility]. This is just south of the [address] exit off of [address]. Free lot parking is available.                        Routing refill request to provider for review/approval because:  Drug not active on patient's medication list  Medication is reported/historical

## 2023-12-21 NOTE — PROGRESS NOTES
DISCHARGE SUMMARY    Arnaud Bird was seen 1  times for evaluation and treatment.  Patient did not return for further treatment and current status is unknown.  Due to short treatment duration, no objective or functional changes were made.  Please see goal flow sheet from episode noted date below and initial evaluation for further information.  Patient is discharged from therapy and therapy episode is resolved as of 12/21/23.      Linked Episodes   Type: Episode: Status: Noted: Resolved: Last update: Updated by:   PHYSICAL THERAPY Neck Pain Active 7/12/2023 7/12/2023  3:04 PM Nate Watters, PT      Comments:

## 2023-12-26 ENCOUNTER — TELEPHONE (OUTPATIENT)
Dept: FAMILY MEDICINE | Facility: CLINIC | Age: 68
End: 2023-12-26

## 2023-12-26 ENCOUNTER — OFFICE VISIT (OUTPATIENT)
Dept: FAMILY MEDICINE | Facility: CLINIC | Age: 68
End: 2023-12-26
Payer: COMMERCIAL

## 2023-12-26 VITALS
TEMPERATURE: 98.2 F | DIASTOLIC BLOOD PRESSURE: 88 MMHG | HEART RATE: 65 BPM | OXYGEN SATURATION: 97 % | BODY MASS INDEX: 39.17 KG/M2 | SYSTOLIC BLOOD PRESSURE: 130 MMHG | RESPIRATION RATE: 13 BRPM | WEIGHT: 273 LBS

## 2023-12-26 DIAGNOSIS — Z96.611 STATUS POST TOTAL SHOULDER ARTHROPLASTY, RIGHT: ICD-10-CM

## 2023-12-26 DIAGNOSIS — Z79.891 CHRONIC USE OF OPIATE FOR THERAPEUTIC PURPOSE: ICD-10-CM

## 2023-12-26 DIAGNOSIS — Z96.652 STATUS POST TOTAL LEFT KNEE REPLACEMENT: ICD-10-CM

## 2023-12-26 DIAGNOSIS — G89.4 CHRONIC PAIN SYNDROME: Primary | Chronic | ICD-10-CM

## 2023-12-26 DIAGNOSIS — M25.661 STIFFNESS OF KNEE JOINT, RIGHT: Chronic | ICD-10-CM

## 2023-12-26 DIAGNOSIS — M47.16 SPONDYLOSIS WITH MYELOPATHY, LUMBAR REGION: ICD-10-CM

## 2023-12-26 DIAGNOSIS — M1A.9XX0 CHRONIC GOUT INVOLVING TOE OF RIGHT FOOT WITHOUT TOPHUS, UNSPECIFIED CAUSE: ICD-10-CM

## 2023-12-26 DIAGNOSIS — M19.012 PRIMARY OSTEOARTHRITIS OF LEFT SHOULDER: ICD-10-CM

## 2023-12-26 DIAGNOSIS — R03.0 ELEVATED BLOOD PRESSURE READING WITHOUT DIAGNOSIS OF HYPERTENSION: ICD-10-CM

## 2023-12-26 DIAGNOSIS — E66.01 MORBID OBESITY (H): ICD-10-CM

## 2023-12-26 DIAGNOSIS — I72.3 ILIAC ARTERY ANEURYSM, LEFT (H): ICD-10-CM

## 2023-12-26 DIAGNOSIS — F11.20 CONTINUOUS OPIOID DEPENDENCE (H): ICD-10-CM

## 2023-12-26 PROCEDURE — 99214 OFFICE O/P EST MOD 30 MIN: CPT | Performed by: INTERNAL MEDICINE

## 2023-12-26 RX ORDER — HYDROCODONE BITARTRATE AND ACETAMINOPHEN 7.5; 325 MG/1; MG/1
1 TABLET ORAL EVERY 6 HOURS PRN
Qty: 120 TABLET | Refills: 0 | Status: CANCELLED | OUTPATIENT
Start: 2023-12-26

## 2023-12-26 RX ORDER — COLCHICINE 0.6 MG/1
TABLET ORAL
Qty: 30 TABLET | Refills: 1 | Status: SHIPPED | OUTPATIENT
Start: 2023-12-26

## 2023-12-26 ASSESSMENT — PAIN SCALES - GENERAL: PAINLEVEL: NO PAIN (0)

## 2023-12-26 NOTE — PROGRESS NOTES
Assessment and Plan  1. Chronic pain syndrome  2. Chronic use of opiate for therapeutic purpose  3. Continuous opioid dependence (H)  4. Primary osteoarthritis of left shoulder  5. Status post total shoulder arthroplasty, right  6. Spondylosis with myelopathy, lumbar region  7. Stiffness of knee joint, right  8. Status post total left knee replacement  Multiple musculoskeletal concerns with ongoing left shoulder pain at this time given his new concerns as mentioned below of taking care of his wife who is hospitalized for MI and recently discharge.  Discussed on follow-up with orthopedics for needing left shoulder arthroplasty which has been postponed though he was having severe symptoms on the left side in the past also but had to undergo right shoulder arthroplasty as that was showing more worsening degeneration in the past.  -Shared decision to continue current opiate prescription with all the side effects explained, patient not due for refill at this time as per  check.    8. Elevated blood pressure reading without diagnosis of hypertension  Ongoing problem, blood pressure remaining elevated even in the past.  Patient endorses that he is having a lot of stress with his wife recently diagnosed with MI and currently in cardiac rehab.  Discussed on patient getting me the home BP log for his next visit before considering start of antihypertensives which she is currently not on it.  Okay to take DASH diet.    9. 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  Patient already on allopurinol 300 mg daily.  Currently he uses diclofenac gel and lidocaine patches for flareups.  His renal function is normal, discussed on colchicine which she understood and agreed with the plan.  Sent in the medication to pharmacy.  - colchicine (COLCRYS) 0.6 MG tablet; Take 2 tabs x 1, then 1 tab one hour later x 1 ( do not exceed more  than 3 tabs / day ) wait for 3 days and repeat the regimen.  Dispense: 30 tablet; Refill: 1  - PRIMARY CARE FOLLOW-UP SCHEDULING; Future     11. Iliac artery aneurysm, left (H24)  Chronic stable condition, denies any new symptoms at this time.  Will follow-up with vascular.    12. Morbid obesity (H)  Noted, patient is working on his diet and lifestyle modifications.  Emphasized on the benefits of weight reduction for improvement of his current medical conditions.      Please note that this note consists of symbols derived from keyboarding, dictation and/or voice recognition software. As a result, there may be errors in the script that have gone undetected. Please consider this when interpreting information found in this chart.    Patient Instructions   As discussed , please check your BP atleast 3 x/ week for making sure you may need BP medication if greater than 130/90 persistently     Started on Colchicine for your gout. Continue Allopurinol.     Refills taken care .    ==============================      Return in about 4 months (around 4/21/2024), or if symptoms worsen or fail to improve, for Annual Wellness Exam, If symptoms persist, Follow up of last visit.    Sushma Galloway MD  [facility] is a 68 year old, presenting for the following health issues:  Recheck Medication        12/26/2023     1:16 PM   Additional Questions   Roomed by Eduardo       History of Present Illness       Reason for visit:  Medication check    He eats 2-3 servings of fruits and vegetables daily.He consumes 0 sweetened beverage(s) daily.He exercises with enough effort to increase his heart rate 9 or less minutes per day.  He exercises with enough effort to increase his heart rate 3 or less days per week.   He is taking medications regularly.       Medication Followup of HYDROcodone-acetaminophen   Taking Medication as prescribed: yes  Side Effects:  None  Medication Helping Symptoms:   yes      Last seen patient in October 2023 for which he will visit on opiate follow-up at that time, patient is not due for CSA till April 2024.  Patient is here for similar opiate follow-up as opted by him to be seen in person.  All the lab work done in April 2023 showing normal except borderline dyslipidemia with LDL at 127.         Allergies   Allergen Reactions    No Clinical Screening - See Comments     Seasonal Allergies         Past Medical History:   Diagnosis Date    Acute respiratory failure with hypoxia (H) 4/14/2021    Arthritis     Benign positional vertigo     Carpal tunnel syndrome     mild    Chronic pain syndrome 4/14/2021    Hearing problem     LEFT WORSE THAN RIGHT  10/18/2012    Migraines     Nasal polyps     Obesity 4/14/2021    Osteoarthritis of multiple joints 4/14/2021    Pneumonia due to 2019 novel coronavirus 4/14/2021    Unspecified asthma(493.90) 10/17/2012    Unspecified asthma, with exacerbation 10/17/2012       Past Surgical History:   Procedure Laterality Date    ARTHROPLASTY SHOULDER Right 6/15/2021    Procedure: Right Total Shoulder Arthroplasty, Distal Clavicle Excision;  Surgeon: Yesica Deleon MD;  Location: [facility] OR    COLONOSCOPY  2019    COLONOSCOPY N/A 8/15/2022    Procedure: COLONOSCOPY, WITH POLYPECTOMY AND BIOPSY;  Surgeon: Abelino Flores MD;  Location:  GI    HC TOOTH EXTRACTION W/FORCEP      ORTHOPEDIC SURGERY      bilateral total knee replacements    TONSILLECTOMY      age 2 or 3       Family History   Problem Relation Age of Onset    Cancer Father     Family History Negative Father     Family History Negative Mother     Cancer Mother     Stomach Problem Mother     Breast Cancer Mother     Diabetes No family hx of     Coronary Artery Disease No family hx of     Hypertension No family hx of     Hyperlipidemia No family hx of     Cerebrovascular Disease No family hx of     Breast Cancer No family hx of     Colon Cancer No family hx of     Prostate Cancer  No family hx of     Other Cancer No family hx of     Depression No family hx of     Anxiety Disorder No family hx of     Mental Illness No family hx of     Substance Abuse No family hx of     Anesthesia Reaction No family hx of     Asthma No family hx of     Osteoporosis No family hx of     Genetic Disorder No family hx of     Thyroid Disease No family hx of     Obesity No family hx of     Unknown/Adopted No family hx of        Social History     Tobacco Use    Smoking status: Never    Smokeless tobacco: Never   Substance Use Topics    Alcohol use: Yes     Comment: case beer a week        Current Outpatient Medications   Medication    acetaminophen (TYLENOL) 325 MG tablet    acyclovir (ZOVIRAX) 400 MG tablet    albuterol (PROAIR HFA, PROVENTIL HFA, VENTOLIN HFA) 108 (90 BASE) MCG/ACT inhaler    allopurinol (ZYLOPRIM) 300 MG tablet    clindamycin (CLINDAMAX) 1 % external gel    colchicine (COLCRYS) 0.6 MG tablet    diclofenac (VOLTAREN) 1 % topical gel    fluticasone (FLONASE) 50 MCG/ACT nasal spray    ipratropium (ATROVENT) 0.06 % nasal spray    lidocaine (LIDODERM) 5 % patch    meloxicam (MOBIC) 15 MG tablet    multivitamin w/minerals (THERA-VIT-M) tablet    mupirocin (BACTROBAN) 2 % external ointment    naloxone (NARCAN) 4 MG/0.1ML nasal spray    polyethylene glycol (MIRALAX) 17 g packet    pseudoePHEDrine (SUDAFED) 30 MG tablet    senna-docusate (SENOKOT-S/PERICOLACE) 8.6-50 MG tablet    tadalafil (CIALIS) 10 MG tablet    tiZANidine (ZANAFLEX) 4 MG tablet    HYDROcodone-acetaminophen (NORCO) 7.5-325 MG per tablet     Current Facility-Administered Medications   Medication    betamethasone acet & sod phos (CELESTONE) injection 6 mg    lidocaine (PF) (XYLOCAINE) 1 % injection 1 mL    lidocaine (PF) (XYLOCAINE) 1 % injection 2 mL    lidocaine (PF) (XYLOCAINE) 1 % injection 4 mL    lidocaine (PF) (XYLOCAINE) 1 % injection 4 mL    lidocaine (PF) (XYLOCAINE) 1 % injection 7 mL    lidocaine (PF) (XYLOCAINE) 1 % injection  8 mL    lidocaine (PF) (XYLOCAINE) 1 % injection 8 mL    triamcinolone (KENALOG-40) injection 40 mg    triamcinolone (KENALOG-40) injection 40 mg    triamcinolone (KENALOG-40) injection 40 mg    triamcinolone (KENALOG-40) injection 40 mg    triamcinolone (KENALOG-40) injection 40 mg    triamcinolone (KENALOG-40) injection 40 mg        Review of Systems   Constitutional, HEENT, cardiovascular, pulmonary, GI, , musculoskeletal, neuro, skin, endocrine and psych systems are negative, except as otherwise noted.      Objective    /88   Pulse 65   Temp 98.2  F (36.8  C) (Temporal)   Resp 13   Wt 123.8 kg (273 lb)   SpO2 97%   BMI 39.17 kg/m    Body mass index is 39.17 kg/m .  Physical Exam   GENERAL: healthy, alert and no distress  NECK: no adenopathy, no asymmetry, masses, or scars and thyroid normal to palpation  RESP: lungs clear to auscultation - no rales, rhonchi or wheezes  CV: regular rate and rhythm, normal S1 S2, no S3 or S4, no murmur, click or rub, no peripheral edema and peripheral pulses strong  ABDOMEN: soft, nontender, no hepatosplenomegaly, no masses and bowel sounds normal  MS: no gross musculoskeletal defects noted, no edema  POSITIVE for subjective tenderness on palpation of multiple musculoskeletal joints including bilateral knee joints, bilateral shoulder more on the left side, spine and paraspinal regions-though there is no acute concerns at this time.

## 2023-12-26 NOTE — PATIENT INSTRUCTIONS
As discussed , please check your BP atleast 3 x/ week for making sure you may need BP medication if greater than 130/90 persistently     Started on Colchicine for your gout. Continue Allopurinol.     Refills taken care .    ==============================

## 2023-12-26 NOTE — TELEPHONE ENCOUNTER
Patient is supposed to come in person in April 2023, for signing CSA and also annual wellness visit at that time.    Since he lives far away, patient always does virtual visits for his opiate follow-up.  For some reason today it is scheduled as in person visit which is not needed, please change this to video visit.    Thank you,   Sushma Galloway MD on 12/26/2023 at 7:13 AM

## 2024-01-05 DIAGNOSIS — M47.16 SPONDYLOSIS WITH MYELOPATHY, LUMBAR REGION: ICD-10-CM

## 2024-01-05 DIAGNOSIS — R26.9 GAIT DIFFICULTY: ICD-10-CM

## 2024-01-05 DIAGNOSIS — Z96.652 STATUS POST TOTAL LEFT KNEE REPLACEMENT: ICD-10-CM

## 2024-01-05 DIAGNOSIS — M25.661 STIFFNESS OF KNEE JOINT, RIGHT: Chronic | ICD-10-CM

## 2024-01-05 DIAGNOSIS — G89.4 CHRONIC PAIN SYNDROME: Chronic | ICD-10-CM

## 2024-01-05 NOTE — TELEPHONE ENCOUNTER
Patient called and stated that he is needing a refill of his medication. Patient stated that he has a couple days left of medication. Routing to provider.     Niesha GROVER RN  [facility] Triage Team

## 2024-01-06 RX ORDER — HYDROCODONE BITARTRATE AND ACETAMINOPHEN 7.5; 325 MG/1; MG/1
1 TABLET ORAL EVERY 6 HOURS PRN
Qty: 120 TABLET | Refills: 0 | Status: SHIPPED | OUTPATIENT
Start: 2024-01-07 | End: 2024-02-05

## 2024-01-06 NOTE — TELEPHONE ENCOUNTER
[place] reviewed:  reviewed- no concerns    [place]  [email or web address]/    Refill done.  Sushma Galloway MD on 1/6/2024

## 2024-02-05 DIAGNOSIS — R26.9 GAIT DIFFICULTY: ICD-10-CM

## 2024-02-05 DIAGNOSIS — Z96.652 STATUS POST TOTAL LEFT KNEE REPLACEMENT: ICD-10-CM

## 2024-02-05 DIAGNOSIS — G89.4 CHRONIC PAIN SYNDROME: Chronic | ICD-10-CM

## 2024-02-05 DIAGNOSIS — M47.16 SPONDYLOSIS WITH MYELOPATHY, LUMBAR REGION: ICD-10-CM

## 2024-02-05 DIAGNOSIS — M25.661 STIFFNESS OF KNEE JOINT, RIGHT: Chronic | ICD-10-CM

## 2024-02-05 RX ORDER — HYDROCODONE BITARTRATE AND ACETAMINOPHEN 7.5; 325 MG/1; MG/1
1 TABLET ORAL EVERY 6 HOURS PRN
Qty: 120 TABLET | Refills: 0 | Status: SHIPPED | OUTPATIENT
Start: 2024-02-07 | End: 2024-05-04

## 2024-02-05 NOTE — TELEPHONE ENCOUNTER
[place] reviewed:  reviewed- no concerns    [place]:  [email or web address]    Refill done from 2/7/24 as per .    Thank you,  Sushma Galloway MD on 2/5/2024 at 4:41 PM

## 2024-02-06 ENCOUNTER — TRANSFERRED RECORDS (OUTPATIENT)
Dept: HEALTH INFORMATION MANAGEMENT | Facility: CLINIC | Age: 69
End: 2024-02-06
Payer: COMMERCIAL

## 2024-02-07 DIAGNOSIS — M25.512 PAIN IN JOINT OF LEFT SHOULDER: Primary | ICD-10-CM

## 2024-02-08 ENCOUNTER — TELEPHONE (OUTPATIENT)
Dept: FAMILY MEDICINE | Facility: CLINIC | Age: 69
End: 2024-02-08
Payer: COMMERCIAL

## 2024-02-08 DIAGNOSIS — Z79.891 CHRONIC USE OF OPIATE FOR THERAPEUTIC PURPOSE: ICD-10-CM

## 2024-02-08 DIAGNOSIS — M19.012 PRIMARY OSTEOARTHRITIS OF LEFT SHOULDER: ICD-10-CM

## 2024-02-08 DIAGNOSIS — M1A.9XX0 CHRONIC GOUT INVOLVING TOE OF RIGHT FOOT WITHOUT TOPHUS, UNSPECIFIED CAUSE: ICD-10-CM

## 2024-02-08 DIAGNOSIS — Z96.611 STATUS POST TOTAL SHOULDER ARTHROPLASTY, RIGHT: ICD-10-CM

## 2024-02-08 DIAGNOSIS — M47.16 SPONDYLOSIS WITH MYELOPATHY, LUMBAR REGION: ICD-10-CM

## 2024-02-08 DIAGNOSIS — Z96.652 STATUS POST TOTAL LEFT KNEE REPLACEMENT: ICD-10-CM

## 2024-02-08 DIAGNOSIS — F11.20 CONTINUOUS OPIOID DEPENDENCE (H): ICD-10-CM

## 2024-02-08 DIAGNOSIS — G89.4 CHRONIC PAIN SYNDROME: Primary | ICD-10-CM

## 2024-02-08 NOTE — TELEPHONE ENCOUNTER
Forms/Letter Request    Type of form/letter: [facility]- Advanced Level of Care Plan     Do we have the form/letter: Yes: Red folder placed on Dr Galloway's desk     How would you like the form/letter returned: Fax : [phone]

## 2024-02-10 NOTE — TELEPHONE ENCOUNTER
Received this letter from pt pharmacy asking for the high dose of Opiates pt is on since many years and want a plan to wean to a step down level along with need of an active CSA on file which is expiring in few days.     Please let the pt know that I went ahead placed referral to pain management for this purpose. We will discuss further when he comes in person.     Please call the patient and schedule inperson Opiate follow up visit with me for CSA , which patient is due on 4/18/2024 , to further take care of the medical conditions and medications.OK to use same day slot / double book near another virtual slot.    Forms signed and placed in my out box.  Please scan in patient's chart before doing the needful.    Thank you,  Sushma Galloway MD on 2/10/2024 at 2:05 PM

## 2024-02-23 ENCOUNTER — OFFICE VISIT (OUTPATIENT)
Dept: FAMILY MEDICINE | Facility: CLINIC | Age: 69
End: 2024-02-23
Payer: COMMERCIAL

## 2024-02-23 ENCOUNTER — ANCILLARY PROCEDURE (OUTPATIENT)
Dept: GENERAL RADIOLOGY | Facility: CLINIC | Age: 69
End: 2024-02-23
Attending: FAMILY MEDICINE
Payer: COMMERCIAL

## 2024-02-23 ENCOUNTER — OFFICE VISIT (OUTPATIENT)
Dept: ORTHOPEDICS | Facility: CLINIC | Age: 69
End: 2024-02-23
Payer: COMMERCIAL

## 2024-02-23 VITALS
HEART RATE: 74 BPM | WEIGHT: 270 LBS | SYSTOLIC BLOOD PRESSURE: 126 MMHG | OXYGEN SATURATION: 97 % | TEMPERATURE: 97.5 F | RESPIRATION RATE: 11 BRPM | DIASTOLIC BLOOD PRESSURE: 82 MMHG | BODY MASS INDEX: 38.74 KG/M2

## 2024-02-23 DIAGNOSIS — M47.16 SPONDYLOSIS WITH MYELOPATHY, LUMBAR REGION: ICD-10-CM

## 2024-02-23 DIAGNOSIS — M19.012 LOCALIZED OSTEOARTHRITIS OF LEFT SHOULDER: Primary | ICD-10-CM

## 2024-02-23 DIAGNOSIS — M25.661 STIFFNESS OF KNEE JOINT, RIGHT: Chronic | ICD-10-CM

## 2024-02-23 DIAGNOSIS — G89.4 CHRONIC PAIN SYNDROME: Chronic | ICD-10-CM

## 2024-02-23 DIAGNOSIS — F11.20 CONTINUOUS OPIOID DEPENDENCE (H): ICD-10-CM

## 2024-02-23 DIAGNOSIS — M25.512 PAIN IN JOINT OF LEFT SHOULDER: ICD-10-CM

## 2024-02-23 DIAGNOSIS — Z12.5 ENCOUNTER FOR PROSTATE CANCER SCREENING: ICD-10-CM

## 2024-02-23 DIAGNOSIS — M1A.9XX0 CHRONIC GOUT INVOLVING TOE OF RIGHT FOOT WITHOUT TOPHUS, UNSPECIFIED CAUSE: ICD-10-CM

## 2024-02-23 DIAGNOSIS — J45.20 MILD INTERMITTENT ASTHMA WITHOUT COMPLICATION: ICD-10-CM

## 2024-02-23 DIAGNOSIS — E66.01 MORBID OBESITY (H): ICD-10-CM

## 2024-02-23 DIAGNOSIS — R97.20 ELEVATED PROSTATE SPECIFIC ANTIGEN (PSA): ICD-10-CM

## 2024-02-23 DIAGNOSIS — I72.3 ILIAC ARTERY ANEURYSM, LEFT (H): ICD-10-CM

## 2024-02-23 DIAGNOSIS — E78.5 DYSLIPIDEMIA: ICD-10-CM

## 2024-02-23 DIAGNOSIS — Z96.652 STATUS POST TOTAL LEFT KNEE REPLACEMENT: ICD-10-CM

## 2024-02-23 DIAGNOSIS — Z79.891 CHRONIC USE OF OPIATE DRUG FOR THERAPEUTIC PURPOSE: Primary | ICD-10-CM

## 2024-02-23 LAB
ALBUMIN SERPL BCG-MCNC: 4.5 G/DL (ref 3.5–5.2)
ALP SERPL-CCNC: 122 U/L (ref 40–150)
ALT SERPL W P-5'-P-CCNC: 27 U/L (ref 0–70)
ANION GAP SERPL CALCULATED.3IONS-SCNC: 10 MMOL/L (ref 7–15)
AST SERPL W P-5'-P-CCNC: 22 U/L (ref 0–45)
BILIRUB SERPL-MCNC: 0.6 MG/DL
BUN SERPL-MCNC: 15.1 MG/DL (ref 8–23)
CALCIUM SERPL-MCNC: 9.4 MG/DL (ref 8.8–10.2)
CHLORIDE SERPL-SCNC: 104 MMOL/L (ref 98–107)
CHOLEST SERPL-MCNC: 225 MG/DL
CREAT SERPL-MCNC: 0.83 MG/DL (ref 0.67–1.17)
CREAT UR-MCNC: 40 MG/DL
DEPRECATED HCO3 PLAS-SCNC: 26 MMOL/L (ref 22–29)
EGFRCR SERPLBLD CKD-EPI 2021: >90 ML/MIN/1.73M2
ERYTHROCYTE [DISTWIDTH] IN BLOOD BY AUTOMATED COUNT: 12.9 % (ref 10–15)
FASTING STATUS PATIENT QL REPORTED: YES
GLUCOSE SERPL-MCNC: 108 MG/DL (ref 70–99)
HCT VFR BLD AUTO: 47.7 % (ref 40–53)
HDLC SERPL-MCNC: 64 MG/DL
HGB BLD-MCNC: 15.9 G/DL (ref 13.3–17.7)
LDLC SERPL CALC-MCNC: 137 MG/DL
MCH RBC QN AUTO: 31.7 PG (ref 26.5–33)
MCHC RBC AUTO-ENTMCNC: 33.3 G/DL (ref 31.5–36.5)
MCV RBC AUTO: 95 FL (ref 78–100)
NONHDLC SERPL-MCNC: 161 MG/DL
PLATELET # BLD AUTO: 187 10E3/UL (ref 150–450)
POTASSIUM SERPL-SCNC: 5.2 MMOL/L (ref 3.4–5.3)
PROT SERPL-MCNC: 7.3 G/DL (ref 6.4–8.3)
PSA SERPL DL<=0.01 NG/ML-MCNC: 48.9 NG/ML (ref 0–6.5)
RBC # BLD AUTO: 5.02 10E6/UL (ref 4.4–5.9)
SODIUM SERPL-SCNC: 140 MMOL/L (ref 135–145)
TRIGL SERPL-MCNC: 118 MG/DL
URATE SERPL-MCNC: 4.7 MG/DL (ref 3.4–7)
WBC # BLD AUTO: 6.3 10E3/UL (ref 4–11)

## 2024-02-23 PROCEDURE — 73030 X-RAY EXAM OF SHOULDER: CPT | Mod: LT | Performed by: RADIOLOGY

## 2024-02-23 PROCEDURE — 80061 LIPID PANEL: CPT | Performed by: INTERNAL MEDICINE

## 2024-02-23 PROCEDURE — 36415 COLL VENOUS BLD VENIPUNCTURE: CPT | Performed by: INTERNAL MEDICINE

## 2024-02-23 PROCEDURE — G0481 DRUG TEST DEF 8-14 CLASSES: HCPCS | Performed by: INTERNAL MEDICINE

## 2024-02-23 PROCEDURE — 20611 DRAIN/INJ JOINT/BURSA W/US: CPT | Mod: LT | Performed by: FAMILY MEDICINE

## 2024-02-23 PROCEDURE — G0103 PSA SCREENING: HCPCS | Performed by: INTERNAL MEDICINE

## 2024-02-23 PROCEDURE — 99215 OFFICE O/P EST HI 40 MIN: CPT | Performed by: INTERNAL MEDICINE

## 2024-02-23 PROCEDURE — 80053 COMPREHEN METABOLIC PANEL: CPT | Performed by: INTERNAL MEDICINE

## 2024-02-23 PROCEDURE — 99207 PR DROP WITH A PROCEDURE: CPT | Performed by: FAMILY MEDICINE

## 2024-02-23 PROCEDURE — 84550 ASSAY OF BLOOD/URIC ACID: CPT | Performed by: INTERNAL MEDICINE

## 2024-02-23 PROCEDURE — 85027 COMPLETE CBC AUTOMATED: CPT | Performed by: INTERNAL MEDICINE

## 2024-02-23 RX ORDER — TRIAMCINOLONE ACETONIDE 40 MG/ML
40 INJECTION, SUSPENSION INTRA-ARTICULAR; INTRAMUSCULAR
Status: SHIPPED | OUTPATIENT
Start: 2024-02-23

## 2024-02-23 RX ORDER — LIDOCAINE HYDROCHLORIDE 10 MG/ML
7 INJECTION, SOLUTION EPIDURAL; INFILTRATION; INTRACAUDAL; PERINEURAL
Status: DISCONTINUED | OUTPATIENT
Start: 2024-02-23 | End: 2024-06-12

## 2024-02-23 RX ORDER — HYDROCODONE BITARTRATE AND ACETAMINOPHEN 7.5; 325 MG/1; MG/1
1 TABLET ORAL EVERY 6 HOURS PRN
Qty: 120 TABLET | Refills: 0 | Status: CANCELLED | OUTPATIENT
Start: 2024-02-23

## 2024-02-23 RX ADMIN — TRIAMCINOLONE ACETONIDE 40 MG: 40 INJECTION, SUSPENSION INTRA-ARTICULAR; INTRAMUSCULAR at 09:27

## 2024-02-23 RX ADMIN — LIDOCAINE HYDROCHLORIDE 7 ML: 10 INJECTION, SOLUTION EPIDURAL; INFILTRATION; INTRACAUDAL; PERINEURAL at 09:27

## 2024-02-23 ASSESSMENT — PAIN SCALES - GENERAL: PAINLEVEL: MILD PAIN (3)

## 2024-02-23 NOTE — LETTER

## 2024-02-23 NOTE — LETTER
2/23/2024      RE: Arnaud Bird  [address]  [place] [place] [address]     Dear Colleague,    Thank you for referring your patient, Arnaud Bird, to the [facility] [place]. Please see a copy of my visit note below.    Subjective: Patient is a 68-year-old male who is very active.  [place] still owns a duplex where his grandson lives in the basement.  Patient reports that he still has wood to chop.  Patient's significant other suffered a heart attack and he has been doing more cares around the house.  Patient reports he is hoping to have a left shoulder replacement but feels that he needs to have his arms to help out.      Objective: X-rays were updated today showing progression of left shoulder arthritis      Procedure: Risks and benefits discussed and patient was consented.  Ultrasound was used due to body habitus and for needle placement.  Curvilinear probe was used to identify anatomy.  The area was then turned into a sterile procedure with ChloraPrep to clean the area.  3 cc 1% lidocaine used as a tract.  4 cc 1% lidocaine and 40 mg Kenalog injected at the glenohumeral joint with 80 mm pajunk needle.  Patient tolerated the procedure well gauze was placed and Band-Aid was placed.  We are unable to save pictures on the [facility] ultrasound due to malfunction.  Patient tolerated the procedure well and noted some relief with the lidocaine in the shoulder prior to discharge.    Large Joint Injection: L glenohumeral joint    Date/Time: 2/23/2024 9:27 AM    Performed by: Carmel Chan MD  Authorized by: Carmel Chan MD    Indications:  Pain  Needle Size:  22 G  Guidance: ultrasound    Approach:  Posterior  Location:  Shoulder      Site:  L glenohumeral joint  Medications:  40 mg triamcinolone 40 MG/ML; 7 mL lidocaine (PF) 1 %  Outcome:  Tolerated well, no immediate complications  Procedure discussed:  discussed risks, benefits, and alternatives    Consent Given by:  Patient  Timeout: timeout called immediately prior to procedure    Prep: patient was prepped and draped in usual sterile fashion      I agree with the following injection documentation.    ELROY Chan MD

## 2024-02-23 NOTE — PROGRESS NOTES
Assessment and Plan  1. Chronic use of opiate drug for therapeutic purpose  Most part of this appointment I have been discussing on patient later I have received from his pharmacy that we will need to have a pain regimen plan on tapering down his current Norco at 7.5 given his age of 68 years old requiring 120 tablets/month with 30 MME which patient states that given his multiple musculoskeletal disorders as mentioned below including his bilateral shoulders, low back which she is also receiving epidurals as needed by the interventional radiology and intra-articular injections by orthopedics for both shoulders and knees still ending up using this 120 tablets of Norco per month.  -Explained the plan of pain regimen today to the patient stating that we will cut down this dose to Norco 5 mg 3 times daily from next refill onwards and add gabapentin 100 mg 3 times daily for improvement of his symptoms.  Patient will update me if it does not work then we will resume back to his previous dose versus titrating the gabapentin up on the dosage.  Patient understood and agreed with the plan    2. Continuous opioid dependence (H)  5. Chronic pain syndrome  CSA done in the office today which is expiring in April but patient lives 100 miles away from our office for which we will do the CSA today in his office visit along with the fasting labs which she is requesting to do today itself instead of waiting for physical.  - Drug Confirmation Panel Urine with Creat - lab collect; Future  - Drug Confirmation Panel Urine with Creat - lab collect    3. Iliac artery aneurysm, left (H24)  Chronic stable condition, no new concerns of claudication in the thighs or lower extremities.  To continue current baby aspirin over-the-counter as well as will check a lipid panel and do further recommendations.    4. Morbid obesity (H)  Noted, patient motivated on weight reduction with diet and lifestyle modifications which is already following for  benefit of improvement on aforementioned medical conditions.    6. 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  Though it is working well he is requiring opiates for better pain control.  - Uric acid; Future    7. Spondylosis with myelopathy, lumbar region  8. Stiffness of knee joint, right  9. Status post total left knee replacement  - Comprehensive metabolic panel (BMP + Alb, Alk Phos, ALT, AST, Total. Bili, TP); Future  - Comprehensive metabolic panel (BMP + Alb, Alk Phos, ALT, AST, Total. Bili, TP)      10. Mild intermittent asthma without complication  - CBC with platelets; Future  - CBC with platelets    11. Dyslipidemia  - Lipid panel reflex to direct LDL Fasting; Future  - Lipid panel reflex to direct LDL Fasting    12. Encounter for prostate cancer screening  - PSA, screen; Future  - PSA, screen     13. Elevated prostate specific antigen (PSA)  New problem with PSA abnormally high at 48 concerning to make sure no prostate cancer, Referral to Urology placed as urgent for further recommendations.   - Adult Urology  Referral; Future      Over 40 minutes spent on reviewing patient chart,  face to face encounter, greater than 50% time spent with plan/cordination of care and documentation as above in my A/P.           Please note that this note consists of symbols derived from keyboarding, dictation and/or voice recognition software. As a result, there may be errors in the script that have gone undetected. Please consider this when interpreting information found in this chart.    Patient Instructions   As discussed, we will need to slowly come down a little bit on the opiate dosage. >> Norco 5 mg for next flll onwards and add gabapentin low dose 100 mg 3x/daily    Will check all Annual physical labs as  requested.     =============================          Return in about 3 months (around 2024), or if symptoms worsen or fail to improve, for video visit, If symptoms persist, Follow up of last visit.    Sushma Galloway MD  [facility] KAI Lares is a 68 year old, presenting for the following health issues:  Recheck Medication, Back Pain, and Pain Management        2024     2:22 PM   Additional Questions   Roomed by Eduardo     History of Present Illness       Back Pain:  He presents for follow up of back pain. Patient's back pain is a chronic problem.  Location of back pain:  Other  Description of back pain: dull ache  Back pain spreads: nowhere    Since patient first noticed back pain, pain is: always present, but gets better and worse  Does back pain interfere with his job:  Not applicable       He eats 0-1 servings of fruits and vegetables daily.He consumes 0 sweetened beverage(s) daily.He exercises with enough effort to increase his heart rate 10 to 19 minutes per day.  He exercises with enough effort to increase his heart rate 3 or less days per week.   He is taking medications regularly.       Last seen patient in 2023 for opiate follow-up, patient does have his pain agreement getting  for which is evaluated that we will need to have a plan for his pain regimen which she has been on this high-dose for very long time will need tapering versus pain clinical referral.       Allergies   Allergen Reactions    No Clinical Screening - See Comments     Seasonal Allergies         Past Medical History:   Diagnosis Date    Acute respiratory failure with hypoxia (H) 2021    Arthritis     Benign positional vertigo     Carpal tunnel syndrome     mild    Chronic pain syndrome 2021    Hearing problem     LEFT WORSE THAN RIGHT  10/18/2012    Migraines     Nasal polyps     Obesity 2021    Osteoarthritis of multiple joints 2021    Pneumonia due  to 2019 novel coronavirus 4/14/2021    Unspecified asthma(493.90) 10/17/2012    Unspecified asthma, with exacerbation 10/17/2012       Past Surgical History:   Procedure Laterality Date    ARTHROPLASTY SHOULDER Right 6/15/2021    Procedure: Right Total Shoulder Arthroplasty, Distal Clavicle Excision;  Surgeon: Yesica Deleon MD;  Location: [facility]    COLONOSCOPY  2019    COLONOSCOPY N/A 8/15/2022    Procedure: COLONOSCOPY, WITH POLYPECTOMY AND BIOPSY;  Surgeon: Abelino Flores MD;  Location:  GI    HC TOOTH EXTRACTION W/FORCEP      ORTHOPEDIC SURGERY      bilateral total knee replacements    TONSILLECTOMY      age 2 or 3       Family History   Problem Relation Age of Onset    Cancer Father     Family History Negative Father     Family History Negative Mother     Cancer Mother     Stomach Problem Mother     Breast Cancer Mother     Diabetes No family hx of     Coronary Artery Disease No family hx of     Hypertension No family hx of     Hyperlipidemia No family hx of     Cerebrovascular Disease No family hx of     Breast Cancer No family hx of     Colon Cancer No family hx of     Prostate Cancer No family hx of     Other Cancer No family hx of     Depression No family hx of     Anxiety Disorder No family hx of     Mental Illness No family hx of     Substance Abuse No family hx of     Anesthesia Reaction No family hx of     Asthma No family hx of     Osteoporosis No family hx of     Genetic Disorder No family hx of     Thyroid Disease No family hx of     Obesity No family hx of     Unknown/Adopted No family hx of        Social History     Tobacco Use    Smoking status: Never    Smokeless tobacco: Never   Substance Use Topics    Alcohol use: Yes     Comment: case beer a week        Current Outpatient Medications   Medication    acetaminophen (TYLENOL) 325 MG tablet    acyclovir (ZOVIRAX) 400 MG tablet    albuterol (PROAIR HFA, PROVENTIL HFA, VENTOLIN HFA) 108 (90 BASE) MCG/ACT inhaler    allopurinol  (ZYLOPRIM) 300 MG tablet    clindamycin (CLINDAMAX) 1 % external gel    colchicine (COLCRYS) 0.6 MG tablet    diclofenac (VOLTAREN) 1 % topical gel    fluticasone (FLONASE) 50 MCG/ACT nasal spray    HYDROcodone-acetaminophen (NORCO) 7.5-325 MG per tablet    ipratropium (ATROVENT) 0.06 % nasal spray    lidocaine (LIDODERM) 5 % patch    meloxicam (MOBIC) 15 MG tablet    multivitamin w/minerals (THERA-VIT-M) tablet    mupirocin (BACTROBAN) 2 % external ointment    naloxone (NARCAN) 4 MG/0.1ML nasal spray    polyethylene glycol (MIRALAX) 17 g packet    pseudoePHEDrine (SUDAFED) 30 MG tablet    senna-docusate (SENOKOT-S/PERICOLACE) 8.6-50 MG tablet    tadalafil (CIALIS) 10 MG tablet    tiZANidine (ZANAFLEX) 4 MG tablet     Current Facility-Administered Medications   Medication    betamethasone acet & sod phos (CELESTONE) injection 6 mg    lidocaine (PF) (XYLOCAINE) 1 % injection 1 mL    lidocaine (PF) (XYLOCAINE) 1 % injection 2 mL    lidocaine (PF) (XYLOCAINE) 1 % injection 4 mL    lidocaine (PF) (XYLOCAINE) 1 % injection 4 mL    lidocaine (PF) (XYLOCAINE) 1 % injection 7 mL    lidocaine (PF) (XYLOCAINE) 1 % injection 7 mL    lidocaine (PF) (XYLOCAINE) 1 % injection 8 mL    lidocaine (PF) (XYLOCAINE) 1 % injection 8 mL    triamcinolone (KENALOG-40) injection 40 mg    triamcinolone (KENALOG-40) injection 40 mg    triamcinolone (KENALOG-40) injection 40 mg    triamcinolone (KENALOG-40) injection 40 mg    triamcinolone (KENALOG-40) injection 40 mg    triamcinolone (KENALOG-40) injection 40 mg    triamcinolone (KENALOG-40) injection 40 mg          Review of Systems  Constitutional, HEENT, cardiovascular, pulmonary, GI, , musculoskeletal, neuro, skin, endocrine and psych systems are negative, except as otherwise noted.      Objective    /82   Pulse 74   Temp 97.5  F (36.4  C) (Temporal)   Resp 11   Wt 122.5 kg (270 lb)   SpO2 97%   BMI 38.74 kg/m    Body mass index is 38.74 kg/m .  Physical Exam   GENERAL:  alert and no distress  NECK: no adenopathy, no asymmetry, masses, or scars  RESP: lungs clear to auscultation - no rales, rhonchi or wheezes  CV: regular rate and rhythm, normal S1 S2, no S3 or S4, no murmur, click or rub, no peripheral edema  ABDOMEN: soft, nontender, no hepatosplenomegaly, no masses and bowel sounds normal  MS: no gross musculoskeletal defects noted, no edema      Signed Electronically by: Sushma Galloway MD

## 2024-02-23 NOTE — PROGRESS NOTES
Subjective: Patient is a 68-year-old male who is very active.  [place] still owns a duplex where his grandson lives in the basement.  Patient reports that he still has wood to chop.  Patient's significant other suffered a heart attack and he has been doing more cares around the house.  Patient reports he is hoping to have a left shoulder replacement but feels that he needs to have his arms to help out.      Objective: X-rays were updated today showing progression of left shoulder arthritis      Procedure: Risks and benefits discussed and patient was consented.  Ultrasound was used due to body habitus and for needle placement.  Curvilinear probe was used to identify anatomy.  The area was then turned into a sterile procedure with ChloraPrep to clean the area.  3 cc 1% lidocaine used as a tract.  4 cc 1% lidocaine and 40 mg Kenalog injected at the glenohumeral joint with 80 mm pajunk needle.  Patient tolerated the procedure well gauze was placed and Band-Aid was placed.  We are unable to save pictures on the [facility] ultrasound due to malfunction.  Patient tolerated the procedure well and noted some relief with the lidocaine in the shoulder prior to discharge.    Large Joint Injection: L glenohumeral joint    Date/Time: 2/23/2024 9:27 AM    Performed by: Carmel Chan MD  Authorized by: Carmel Chan MD    Indications:  Pain  Needle Size:  22 G  Guidance: ultrasound    Approach:  Posterior  Location:  Shoulder      Site:  L glenohumeral joint  Medications:  40 mg triamcinolone 40 MG/ML; 7 mL lidocaine (PF) 1 %  Outcome:  Tolerated well, no immediate complications  Procedure discussed: discussed risks, benefits, and alternatives    Consent Given by:  Patient  Timeout: timeout called immediately prior to procedure    Prep: patient was prepped and draped in usual sterile fashion      I agree with the following injection documentation.    ELROY Chan MD

## 2024-02-23 NOTE — NURSING NOTE
[address] [address]  Dept: [phone]  ______________________________________________________________________________    Patient: Arnaud Bird   : 1955   MRN: [id number]   2024    INVASIVE PROCEDURE SAFETY CHECKLIST    Date: 2024   Procedure:Left glenohumeral joint USGI  Patient Name: Arnaud Bird  MRN: [id number]  YOB: 1955    Action: Complete sections as appropriate. Any discrepancy results in a HARD COPY until resolved.     PRE PROCEDURE:  Patient ID verified with 2 identifiers (name and  or MRN): Yes  Procedure and site verified with patient/designee (when able): Yes  Accurate consent documentation in medical record: Yes  H&P (or appropriate assessment) documented in medical record: Yes  H&P must be up to 20 days prior to procedure and updates within 24 hours of procedure as applicable: NA  Relevant diagnostic and radiology test results appropriately labeled and displayed as applicable: Yes  Procedure site(s) marked with provider initials: NA    TIMEOUT:  Time-Out performed immediately prior to starting procedure, including verbal and active participation of all team members addressing the following:Yes  * Correct patient identify  * Confirmed that the correct side and site are marked  * An accurate procedure consent form  * Agreement on the procedure to be done  * Correct patient position  * Relevant images and results are properly labeled and appropriately displayed  * The need to administer antibiotics or fluids for irrigation purposes during the procedure as applicable   * Safety precautions based on patient history or medication use    DURING PROCEDURE: Verification of correct person, site, and procedures any time the responsibility for care of the patient is transferred to another member of the care team.       Prior to injection, verified patient identity using patient's name  and date of birth.  Due to injection administration, patient instructed to remain in clinic for 15 minutes  afterwards, and to report any adverse reaction to me immediately.    Joint injection was performed.      Drug Amount Wasted:  Yes: 3 mg/ml   Vial/Syringe: Single dose vial  Expiration Date:  05/2027      Shannon Hoskins, ATC  February 23, 2024

## 2024-02-23 NOTE — PATIENT INSTRUCTIONS
As discussed, we will need to slowly come down a little bit on the opiate dosage. >> Norco 5 mg for next flll onwards and add gabapentin low dose 100 mg 3x/daily    Will check all Annual physical labs as requested.     =============================          
Patent

## 2024-02-24 NOTE — RESULT ENCOUNTER NOTE
Your PSA levels are abnormally high which is concerning for making sure no prostate concerns. I have placed referral to Urology as priority. Please keep up the appointment with them for further recommendations.     Your Bad Cholesterol remaining borderline . Given your age and no cardiac risk factors , recommend dietery management of avoiding high fat foods and red meat . Life style measures on weight reduction recommended.     All your OTHER labs normal, you may see some highlighted which do not have Clinical significance.    Please let me know if you have any questions.  Sushma Galloway MD on 2/23/2024   [facility]

## 2024-02-26 ENCOUNTER — TELEPHONE (OUTPATIENT)
Dept: FAMILY MEDICINE | Facility: CLINIC | Age: 69
End: 2024-02-26
Payer: COMMERCIAL

## 2024-02-26 NOTE — TELEPHONE ENCOUNTER
Patient calling to inquire why urology called to schedule, Writer relayed PCP's message below, patient expressed verbal understanding.    Demetria Martino RN  [facility]

## 2024-02-26 NOTE — TELEPHONE ENCOUNTER
MEDICAL RECORDS REQUEST   [facility]  Urology Clinic  [address] [place] [address]  PHONE: [phone]  Fax: [phone]        FUTURE VISIT INFORMATION                                                   Arnaud Bird, : 1955 scheduled for future visit at [facility]    APPOINTMENT INFORMATION:  Date: 2024  Provider:  Jason Rebolledo PA-C  Reason for Visit/Diagnosis: Elevated prostate specific antigen (PSA)    REFERRAL INFORMATION:  Referring provider:  Sushma Galloway MD in [facility] FP/IM/PEDS      RECORDS REQUESTED FOR VISIT                                                     NOTES  STATUS/DETAILS   OFFICE NOTE from referring provider  yes, 2024 -- Sushma Galloway MD in [facility] FP/IM/PEDS   MEDICATION LIST  yes   LABS     PSA  yes     PRE-VISIT CHECKLIST      Joint diagnostic appointment coordinated correctly          (ensure right order & amount of time) Yes   RECORD COLLECTION COMPLETE Yes

## 2024-02-26 NOTE — TELEPHONE ENCOUNTER
----- Message from Sushma Galloway MD sent at 2/23/2024 10:41 PM CST -----  Your PSA levels are abnormally high which is concerning for making sure no prostate concerns. I have placed referral to Urology as priority. Please keep up the appointment with them for further recommendations.     Your Bad Cholesterol remaining borderline . Given your age and no cardiac risk factors , recommend dietery management of avoiding high fat foods and red meat . Life style measures on weight reduction recommended.     All your OTHER labs normal, you may see some highlighted which do not have Clinical significance.    Please let me know if you have any questions.  Sushma Galloway MD on 2/23/2024   [facility]

## 2024-02-27 LAB
DHC UR CFM-MCNC: 207 NG/ML
DHC/CREAT UR: 518 NG/MG {CREAT}
HYDROCODONE UR CFM-MCNC: 226 NG/ML
HYDROCODONE/CREAT UR: 565 NG/MG {CREAT}
HYDROMORPHONE UR CFM-MCNC: 162 NG/ML
HYDROMORPHONE/CREAT UR: 405 NG/MG {CREAT}

## 2024-02-28 ENCOUNTER — TELEPHONE (OUTPATIENT)
Dept: FAMILY MEDICINE | Facility: CLINIC | Age: 69
End: 2024-02-28
Payer: COMMERCIAL

## 2024-02-28 NOTE — TELEPHONE ENCOUNTER
Called patient and discussed message below with patient     Helena JACOB, Triage RN  [facility]       FW: Urology Referral  Received: 2 days ago  Sushma Galloway MD  P  Triage  Please call the patient and notify his Result note sent for his PSA elevated and Urology referral.    Thank you  Sushma Galloway MD on 2/26/24          Previous Messages       ----- Message -----  From: Ana Maria Joyner  Sent: 2/26/2024   9:41 AM CST  To: Sushma Galloway MD  Subject: Urology Referral                                Hi Dr. Gomes,    I contacted this patient this morning for the referral that you submitted. He was unaware of referral and would like you to call him to discuss.    Thank you,    Ana Maria  Referral Team

## 2024-03-07 ENCOUNTER — PRE VISIT (OUTPATIENT)
Dept: UROLOGY | Facility: CLINIC | Age: 69
End: 2024-03-07
Payer: COMMERCIAL

## 2024-03-07 DIAGNOSIS — M25.661 STIFFNESS OF KNEE JOINT, RIGHT: Chronic | ICD-10-CM

## 2024-03-07 DIAGNOSIS — G89.4 CHRONIC PAIN SYNDROME: Chronic | ICD-10-CM

## 2024-03-07 DIAGNOSIS — Z96.652 STATUS POST TOTAL LEFT KNEE REPLACEMENT: ICD-10-CM

## 2024-03-07 DIAGNOSIS — M47.16 SPONDYLOSIS WITH MYELOPATHY, LUMBAR REGION: ICD-10-CM

## 2024-03-07 DIAGNOSIS — R26.9 GAIT DIFFICULTY: ICD-10-CM

## 2024-03-07 RX ORDER — HYDROCODONE BITARTRATE AND ACETAMINOPHEN 7.5; 325 MG/1; MG/1
1 TABLET ORAL EVERY 6 HOURS PRN
Qty: 120 TABLET | Refills: 0 | Status: CANCELLED | OUTPATIENT
Start: 2024-03-07

## 2024-03-07 RX ORDER — GABAPENTIN 100 MG/1
100 CAPSULE ORAL 3 TIMES DAILY
Qty: 90 CAPSULE | Refills: 0 | Status: SHIPPED | OUTPATIENT
Start: 2024-03-07 | End: 2024-04-04

## 2024-03-07 RX ORDER — HYDROCODONE BITARTRATE AND ACETAMINOPHEN 5; 325 MG/1; MG/1
1 TABLET ORAL 3 TIMES DAILY PRN
Qty: 90 TABLET | Refills: 0 | Status: SHIPPED | OUTPATIENT
Start: 2024-03-07 | End: 2024-04-04

## 2024-03-07 NOTE — TELEPHONE ENCOUNTER
Reason for Visit: Consult on Elevated prostate specific antigen (PSA)    Diagnosis: Elevated prostate specific antigen (PSA)      Relevant information: [facility] FP/IM/PEDS Referral by Sushma Galloway MD, on 02/23/2024     Records/imaging/labs/orders: All available in Epic    Pt called: No need for a call    Rooming Requirements: Standard    Joshua Crain MA  3/7/2024  10:39 AM

## 2024-03-07 NOTE — TELEPHONE ENCOUNTER
Medication Question or Refill        What medication are you calling about (include dose and sig)?: Pended    Preferred Pharmacy:     [id number] - [place], [place] - [address]  [address] [facility] [address]  Phone: [phone] Fax: [phone]      Controlled Substance Agreement on file:   CSA -- Patient Level:     [Media Unavailable] Controlled Substance Agreement - Opioid - Scan on 2/26/2024 12:11 PM   [Media Unavailable] Controlled Substance Agreement - Opioid - Scan on 10/11/2023  6:52 AM   [Media Unavailable] Controlled Substance Agreement - Opioid - Scan on 8/10/2022  5:34 PM   [Media Unavailable] Controlled Substance Agreement - Opioid - Scan on 9/13/2021  4:37 PM   [Media Unavailable] Controlled Substance Agreement - Opioid - Scan on 7/20/2020 12:15 PM   [Media Unavailable] Controlled Substance Agreement - Opioid - Scan on 3/19/2019 11:01 AM       Who prescribed the medication?: PCP    Do you need a refill? Yes    When did you use the medication last? N/A    Patient offered an appointment? No    Do you have any questions or concerns?  No      Okay to leave a detailed message?: Yes at Cell number on file:    Telephone Information:   Mobile [phone]

## 2024-03-07 NOTE — PROGRESS NOTES
Virtual Visit Details    Type of service:  Video Visit   Video Start Time: 2:10 PM  Video End Time:2:29 PM    Originating Location (pt. Location): Home    Distant Location (provider location):  Off-site  Platform used for Video Visit: Doximity      Visit conducted via real-time audio/video technology by Jason Rebolledo PA-C to the patient in their home.    REFERRING PROVIDER  Sushma Galloway MD    REASON FOR VISIT  Elevated PSA     HISTORY OF PRESENT ILLNESS  Mr. Bird is a 68 year old male who speaking with today in regards to his recently discovered drastic elevated PSA.  His past medical history significant for chronic low back pain, migraines, cervical neck pain, asthma, vitamin D deficiency, obesity, hyperlipidemia, gout, iliac artery aneurysm, osteoarthritis, erectile dysfunction, and opioid dependence due to chronic pain.  I personally reviewed the family practice visit note from 2/23/2024 in preparation for today's visit.    Today:  Some baseline weak urinary stream - no medications   No new changes to urinary symptoms   Some erectile dysfunction on Cialis   No new pelvic pain  No history of gross hematuria or hematospermia   No history of retention   No history of UTIs or prostatitis     FAMILY HISTORY   Denies any known family history of urologic malignancy     SURGICAL HISTORY   No history of lower abdominal surgeries     PHYSICAL EXAMINATION  Deferred given virtual visit.    LABS  Lab Results   Component Value Date    PSA 48.90 (H) 02/23/2024      IMPRESSION  Elevated PSA     It was my pleasure to speak with Arnaud in regards to his elevated PSA finding.  After reviewing his clinical history, I discussed with him the many different etiologies of elevated PSA, but ultimately stated that a PSA of 48.9 is concerning for prostate cancer.  However, given that we do not have a history of PSAs, it is difficult to tell whether or not this is an isolated value.  I discussed with him it would likely be best  for us to assume this is real and plan for 4 things: Repeat PSA next Friday when he is coming to the clinic anyways, a first available prostate MRI after that repeat PSA, and then finally a first available prostate biopsy with one of our urologic surgeons with a follow-up office visit 1 week later with that same urologic surgeon to go over results.    I reviewed the specific procedure of the biopsy, including the risks and benefits as well as the prep he would need to do.  All is no guarantee he will need 1, I am concerned he will given his elevated PSA value.    After reviewing all of this information, [place] expressed understanding and agreement of moving forward with all 4 of these interventions.  All of his questions were answered to his satisfaction.    PLAN  Repeat PSA on 3/15/2024 alongside other visits at [address] [place] [address].  First available prostate MRI sometime after repeat PSA  First available prostate biopsy after prostate MRI with follow-up virtual visit 1 week later with same urologic surgeon who completes the biopsy    SIGNED    Jason Rebolledo PA-C      I spent a total of 22 minutes spent on the date of the encounter doing chart review, history and exam, documentation, and further activities as noted above.

## 2024-03-08 ENCOUNTER — PRE VISIT (OUTPATIENT)
Dept: UROLOGY | Facility: CLINIC | Age: 69
End: 2024-03-08

## 2024-03-08 ENCOUNTER — VIRTUAL VISIT (OUTPATIENT)
Dept: UROLOGY | Facility: CLINIC | Age: 69
End: 2024-03-08
Attending: INTERNAL MEDICINE
Payer: COMMERCIAL

## 2024-03-08 DIAGNOSIS — R97.20 ELEVATED PROSTATE SPECIFIC ANTIGEN (PSA): Primary | ICD-10-CM

## 2024-03-08 DIAGNOSIS — M1A.9XX0 CHRONIC GOUT INVOLVING TOE OF RIGHT FOOT WITHOUT TOPHUS, UNSPECIFIED CAUSE: ICD-10-CM

## 2024-03-08 PROCEDURE — 99204 OFFICE O/P NEW MOD 45 MIN: CPT | Mod: 95 | Performed by: STUDENT IN AN ORGANIZED HEALTH CARE EDUCATION/TRAINING PROGRAM

## 2024-03-08 RX ORDER — ALLOPURINOL 300 MG/1
1 TABLET ORAL DAILY
Qty: 90 TABLET | Refills: 0 | Status: SHIPPED | OUTPATIENT
Start: 2024-03-08 | End: 2024-07-29

## 2024-03-08 ASSESSMENT — PAIN SCALES - GENERAL: PAINLEVEL: NO PAIN (0)

## 2024-03-08 NOTE — NURSING NOTE
Is the patient currently in the state of [place]? YES    Visit mode:VIDEO    If the visit is dropped, the patient can be reconnected by: VIDEO VISIT: Text to cell phone:   Telephone Information:   Mobile [phone]   Patient is ok phone call as well.     Will anyone else be joining the visit? NO  (If patient encounters technical issues they should call [phone])    How would you like to obtain your AVS? MyChart    Are changes needed to the allergy or medication list? No    Reason for visit: Consult    Bibiana ANDERS

## 2024-03-08 NOTE — TELEPHONE ENCOUNTER
[place]:  reviewed- no concerns    [place]:  [email or web address]    Refill done with changes on pt pain plan as below which discussed in his last OV . Plans of weaning his current Opiates to lower dose and started on gabapentin.     Thank you,  Sushma Galloway MD on 3/7/2024

## 2024-03-08 NOTE — LETTER
3/8/2024       RE: Arnaud Bird  [address]  [facility] [address]     Dear Colleague,    Thank you for referring your patient, Arnaud Bird, to the [facility] [place] at [facility]. Please see a copy of my visit note below.    Virtual Visit Details    Type of service:  Video Visit   Video Start Time: 2:10 PM  Video End Time:2:29 PM    Originating Location (pt. Location): Home    Distant Location (provider location):  Off-site  Platform used for Video Visit: Doximity      Visit conducted via real-time audio/video technology by Jason Rebolledo PA-C to the patient in their home.    REFERRING PROVIDER  Sushma Galloway MD    REASON FOR VISIT  Elevated PSA     HISTORY OF PRESENT ILLNESS  Mr. Bird is a 68 year old male who speaking with today in regards to his recently discovered drastic elevated PSA.  His past medical history significant for chronic low back pain, migraines, cervical neck pain, asthma, vitamin D deficiency, obesity, hyperlipidemia, gout, iliac artery aneurysm, osteoarthritis, erectile dysfunction, and opioid dependence due to chronic pain.  I personally reviewed the family practice visit note from 2/23/2024 in preparation for today's visit.    Today:  Some baseline weak urinary stream - no medications   No new changes to urinary symptoms   Some erectile dysfunction on Cialis   No new pelvic pain  No history of gross hematuria or hematospermia   No history of retention   No history of UTIs or prostatitis     FAMILY HISTORY   Denies any known family history of urologic malignancy     SURGICAL HISTORY   No history of lower abdominal surgeries     PHYSICAL EXAMINATION  Deferred given virtual visit.    LABS  Lab Results   Component Value Date    PSA 48.90 (H) 02/23/2024      IMPRESSION  Elevated PSA     It was my pleasure to speak with Arnaud in regards to his elevated PSA finding.  After reviewing his clinical  history, I discussed with him the many different etiologies of elevated PSA, but ultimately stated that a PSA of 48.9 is concerning for prostate cancer.  However, given that we do not have a history of PSAs, it is difficult to tell whether or not this is an isolated value.  I discussed with him it would likely be best for us to assume this is real and plan for 4 things: Repeat PSA next Friday when he is coming to the clinic anyways, a first available prostate MRI after that repeat PSA, and then finally a first available prostate biopsy with one of our urologic surgeons with a follow-up office visit 1 week later with that same urologic surgeon to go over results.    I reviewed the specific procedure of the biopsy, including the risks and benefits as well as the prep he would need to do.  All is no guarantee he will need 1, I am concerned he will given his elevated PSA value.    After reviewing all of this information, Harpal expressed understanding and agreement of moving forward with all 4 of these interventions.  All of his questions were answered to his satisfaction.    PLAN  Repeat PSA on 3/15/2024 alongside other visits at [address] [place] [address].  First available prostate MRI sometime after repeat PSA  First available prostate biopsy after prostate MRI with follow-up virtual visit 1 week later with same urologic surgeon who completes the biopsy    SIGNED    Jason Rebolledo PA-C      I spent a total of 22 minutes spent on the date of the encounter doing chart review, history and exam, documentation, and further activities as noted above.

## 2024-03-08 NOTE — TELEPHONE ENCOUNTER
Medication Question or Refill    What medication are you calling about (include dose and sig)?:   allopurinol (ZYLOPRIM) 300 MG tablet     Preferred Pharmacy:  [id number] - [address] [address]  Phone: [phone] Fax: [phone]    Controlled Substance Agreement on file:   CSA -- Patient Level:     [Media Unavailable] Controlled Substance Agreement - Opioid - Scan on 2/26/2024 12:11 PM   [Media Unavailable] Controlled Substance Agreement - Opioid - Scan on 10/11/2023  6:52 AM   [Media Unavailable] Controlled Substance Agreement - Opioid - Scan on 8/10/2022  5:34 PM   [Media Unavailable] Controlled Substance Agreement - Opioid - Scan on 9/13/2021  4:37 PM   [Media Unavailable] Controlled Substance Agreement - Opioid - Scan on 7/20/2020 12:15 PM   [Media Unavailable] Controlled Substance Agreement - Opioid - Scan on 3/19/2019 11:01 AM       Who prescribed the medication?:   Sushma Galloway MD     Do you need a refill? Yes    When did you use the medication last? Use daily    Patient offered an appointment? Yes, declined. Pt was in recently    Do you have any questions or concerns?  No    Okay to leave a detailed message?: Yes at Cell number on file:    Telephone Information:   Mobile [phone]     Ela DAIANA Alfonso on 3/8/2024 at 3:56 PM

## 2024-03-15 ENCOUNTER — OFFICE VISIT (OUTPATIENT)
Dept: ORTHOPEDICS | Facility: CLINIC | Age: 69
End: 2024-03-15
Payer: COMMERCIAL

## 2024-03-15 ENCOUNTER — LAB (OUTPATIENT)
Dept: LAB | Facility: CLINIC | Age: 69
End: 2024-03-15
Payer: COMMERCIAL

## 2024-03-15 ENCOUNTER — TELEPHONE (OUTPATIENT)
Dept: UROLOGY | Facility: CLINIC | Age: 69
End: 2024-03-15

## 2024-03-15 DIAGNOSIS — R97.20 ELEVATED PROSTATE SPECIFIC ANTIGEN (PSA): ICD-10-CM

## 2024-03-15 DIAGNOSIS — M19.012 LOCALIZED OSTEOARTHRITIS OF LEFT SHOULDER: ICD-10-CM

## 2024-03-15 DIAGNOSIS — M19.019 OSTEOARTHRITIS OF AC (ACROMIOCLAVICULAR) JOINT: Primary | ICD-10-CM

## 2024-03-15 LAB — PSA SERPL DL<=0.01 NG/ML-MCNC: 53.15 NG/ML (ref 0–6.5)

## 2024-03-15 PROCEDURE — 84153 ASSAY OF PSA TOTAL: CPT | Performed by: PATHOLOGY

## 2024-03-15 PROCEDURE — 20606 DRAIN/INJ JOINT/BURSA W/US: CPT | Mod: LT | Performed by: FAMILY MEDICINE

## 2024-03-15 PROCEDURE — 36415 COLL VENOUS BLD VENIPUNCTURE: CPT | Performed by: PATHOLOGY

## 2024-03-15 RX ORDER — LIDOCAINE HYDROCHLORIDE 10 MG/ML
2 INJECTION, SOLUTION EPIDURAL; INFILTRATION; INTRACAUDAL; PERINEURAL
Status: SHIPPED | OUTPATIENT
Start: 2024-03-15

## 2024-03-15 RX ORDER — BETAMETHASONE SODIUM PHOSPHATE AND BETAMETHASONE ACETATE 3; 3 MG/ML; MG/ML
6 INJECTION, SUSPENSION INTRA-ARTICULAR; INTRALESIONAL; INTRAMUSCULAR; SOFT TISSUE
Status: SHIPPED | OUTPATIENT
Start: 2024-03-15

## 2024-03-15 RX ADMIN — LIDOCAINE HYDROCHLORIDE 2 ML: 10 INJECTION, SOLUTION EPIDURAL; INFILTRATION; INTRACAUDAL; PERINEURAL at 13:09

## 2024-03-15 RX ADMIN — BETAMETHASONE SODIUM PHOSPHATE AND BETAMETHASONE ACETATE 6 MG: 3; 3 INJECTION, SUSPENSION INTRA-ARTICULAR; INTRALESIONAL; INTRAMUSCULAR; SOFT TISSUE at 13:09

## 2024-03-15 NOTE — PROGRESS NOTES
Subjective: Arnaud is a very pleasant 68-year-old male with past medical history of bilateral carpal tunnel, osteoarthritis of multiple joints, here for left AC injection    Objective: reviewed moderate and severe degenerative AC joint      A/P:  1.left AC OA- injection performed under US, small joint US required          Procedure: US used given the amount of osteoarthritis at AC joint. Risks and benefits discussed and consented.  Patient anatomy checked with the linear probe and skin marker used to rekha the area.  Area was broken down, sterile procedure was started. Chloroprep, Sterile gel and tagaderm over probe. Ethyl chloride spray. 1 ml 1% lidocaine, 0.5cc betamethasone injected at joint with effusion present.  Pt tolerated procedure, Band Aid placed    Medium Joint Injection/Arthrocentesis: L acromioclavicular    Date/Time: 3/15/2024 1:09 PM    Performed by: Carmel Chan MD  Authorized by: Carmel Chan MD    Indications:  Pain  Needle Size:  25 G  Guidance: ultrasound    Approach:  Anterior  Location:  Shoulder  Site:  L acromioclavicular  Medications:  6 mg betamethasone acet & sod phos 6 (3-3) MG/ML; 2 mL lidocaine (PF) 1 %  Outcome:  Tolerated well, no immediate complications  Procedure discussed: discussed risks, benefits, and alternatives    Consent Given by:  Patient  Timeout: timeout called immediately prior to procedure    Prep: patient was prepped and draped in usual sterile fashion      I agree with the following injection documentation.    ELROY Chan MD

## 2024-03-15 NOTE — TELEPHONE ENCOUNTER
Clinic coordinators, would you please assist me with scheduling this patient for first available prostate MRI as well as a follow-up prostate biopsy, and finally a office visit with the same surgeon who completes his biopsy 1 week later?  Thank you!

## 2024-03-15 NOTE — NURSING NOTE
[address] [address]  Dept: [phone]  ______________________________________________________________________________    Patient: Arnaud Bird   : 1955   MRN: [id number]   March 15, 2024    INVASIVE PROCEDURE SAFETY CHECKLIST    Date: March 15, 2024   Procedure: Left AC CSI   Patient Name: Arnaud Bird  MRN: [id number]  YOB: 1955    Action: Complete sections as appropriate. Any discrepancy results in a HARD COPY until resolved.     PRE PROCEDURE:  Patient ID verified with 2 identifiers (name and  or MRN): Yes  Procedure and site verified with patient/designee (when able): Yes  Accurate consent documentation in medical record: Yes  H&P (or appropriate assessment) documented in medical record: Yes  H&P must be up to 20 days prior to procedure and updates within 24 hours of procedure as applicable: NA  Relevant diagnostic and radiology test results appropriately labeled and displayed as applicable: Yes  Procedure site(s) marked with provider initials: NA    TIMEOUT:  Time-Out performed immediately prior to starting procedure, including verbal and active participation of all team members addressing the following:Yes  * Correct patient identify  * Confirmed that the correct side and site are marked  * An accurate procedure consent form  * Agreement on the procedure to be done  * Correct patient position  * Relevant images and results are properly labeled and appropriately displayed  * The need to administer antibiotics or fluids for irrigation purposes during the procedure as applicable   * Safety precautions based on patient history or medication use    DURING PROCEDURE: Verification of correct person, site, and procedures any time the responsibility for care of the patient is transferred to another member of the care team.       Prior to injection, verified patient identity using patient's name and date of birth.  Due  to injection administration, patient instructed to remain in clinic for 15 minutes  afterwards, and to report any adverse reaction to me immediately.    Joint injection was performed.      Drug Amount Wasted:  Yes: 3 mg/ml   Vial/Syringe: Single dose vial  Expiration Date:  01/25      Cara Ramirez, [facility]  March 15, 2024

## 2024-03-15 NOTE — LETTER
3/15/2024      RE: Arnaud Bird  [address]  [facility] [address]     Dear Colleague,    Thank you for referring your patient, Arnaud Bird, to the [facility] [place]. Please see a copy of my visit note below.    Subjective: Arnaud is a very pleasant 68-year-old male with past medical history of bilateral carpal tunnel, osteoarthritis of multiple joints, here for left AC injection    Objective: reviewed moderate and severe degenerative AC joint      A/P:  1.left AC OA- injection performed under US, small joint US required          Procedure: Risks and benefits discussed and consented.  Patient anatomy checked with the linear probe and skin marker used to rekha the area.  Area was broken down, sterile procedure was started. Chloroprep, Sterile gel and tagaderm over probe. Ethyl chloride spray. 1 ml 1% lidocaine, 0.5cc betamethasone injected at joint with effusion present.  Pt tolerated procedure, Band Aid placed    Medium Joint Injection/Arthrocentesis: L acromioclavicular    Date/Time: 3/15/2024 1:09 PM    Performed by: Carmel Chan MD  Authorized by: Carmel Chan MD    Indications:  Pain  Needle Size:  25 G  Guidance: ultrasound    Approach:  Anterior  Location:  Shoulder  Site:  L acromioclavicular  Medications:  6 mg betamethasone acet & sod phos 6 (3-3) MG/ML; 2 mL lidocaine (PF) 1 %  Outcome:  Tolerated well, no immediate complications  Procedure discussed: discussed risks, benefits, and alternatives    Consent Given by:  Patient  Timeout: timeout called immediately prior to procedure    Prep: patient was prepped and draped in usual sterile fashion      I agree with the following injection documentation.    ELROY Chan MD      Again, thank you for allowing me to participate in the care of your patient.      Sincerely,    Carmel Chan MD

## 2024-03-19 ENCOUNTER — TELEPHONE (OUTPATIENT)
Dept: UROLOGY | Facility: CLINIC | Age: 69
End: 2024-03-19
Payer: COMMERCIAL

## 2024-03-22 ENCOUNTER — MYC REFILL (OUTPATIENT)
Dept: FAMILY MEDICINE | Facility: CLINIC | Age: 69
End: 2024-03-22
Payer: COMMERCIAL

## 2024-03-22 DIAGNOSIS — J45.30 MILD PERSISTENT ASTHMA WITHOUT COMPLICATION: ICD-10-CM

## 2024-03-22 DIAGNOSIS — J45.30 MILD PERSISTENT ASTHMA, UNCOMPLICATED: ICD-10-CM

## 2024-03-22 DIAGNOSIS — R37 SEXUAL DYSFUNCTION: ICD-10-CM

## 2024-03-22 DIAGNOSIS — B00.9 HSV (HERPES SIMPLEX VIRUS) INFECTION: ICD-10-CM

## 2024-03-22 RX ORDER — TADALAFIL 10 MG/1
TABLET ORAL
Qty: 30 TABLET | Refills: 1 | Status: SHIPPED | OUTPATIENT
Start: 2024-03-22 | End: 2024-09-16

## 2024-03-22 RX ORDER — ACYCLOVIR 400 MG/1
TABLET ORAL
Qty: 28 TABLET | Refills: 1 | Status: SHIPPED | OUTPATIENT
Start: 2024-03-22 | End: 2024-07-29

## 2024-03-23 RX ORDER — IPRATROPIUM BROMIDE 42 UG/1
SPRAY, METERED NASAL
Qty: 15 ML | Refills: 1 | Status: SHIPPED | OUTPATIENT
Start: 2024-03-23 | End: 2024-06-27

## 2024-03-23 NOTE — TELEPHONE ENCOUNTER
Refill done.     Since we last seen patient in person for CSA  , we are OK to do AWV upcoming , scheduled on 4/25/24 as Video visit.     But if patient is OK to come inperson , just leave it the same as it is now.  ( Pt lives 100 miles away and he drives to come to us )     Thank you,  Sushma Galloway MD on 3/23/2024 at 1:00 PM

## 2024-04-04 DIAGNOSIS — Z96.652 STATUS POST TOTAL LEFT KNEE REPLACEMENT: ICD-10-CM

## 2024-04-04 DIAGNOSIS — G89.4 CHRONIC PAIN SYNDROME: Chronic | ICD-10-CM

## 2024-04-04 DIAGNOSIS — M47.16 SPONDYLOSIS WITH MYELOPATHY, LUMBAR REGION: ICD-10-CM

## 2024-04-04 DIAGNOSIS — M25.661 STIFFNESS OF KNEE JOINT, RIGHT: Chronic | ICD-10-CM

## 2024-04-04 DIAGNOSIS — R26.9 GAIT DIFFICULTY: ICD-10-CM

## 2024-04-05 RX ORDER — HYDROCODONE BITARTRATE AND ACETAMINOPHEN 5; 325 MG/1; MG/1
1 TABLET ORAL 3 TIMES DAILY PRN
Qty: 90 TABLET | Refills: 0 | Status: SHIPPED | OUTPATIENT
Start: 2024-04-07 | End: 2024-06-03

## 2024-04-05 RX ORDER — ALBUTEROL SULFATE 90 UG/1
2 AEROSOL, METERED RESPIRATORY (INHALATION) EVERY 6 HOURS PRN
Qty: 18 G | Refills: 1 | Status: SHIPPED | OUTPATIENT
Start: 2024-04-05

## 2024-04-05 RX ORDER — GABAPENTIN 100 MG/1
100 CAPSULE ORAL 3 TIMES DAILY
Qty: 90 CAPSULE | Refills: 0 | Status: SHIPPED | OUTPATIENT
Start: 2024-04-05 | End: 2024-04-25

## 2024-04-05 NOTE — TELEPHONE ENCOUNTER
[place] reviewed:  reviewed- no concerns    [place]:  [email or web address]      Refill done from 4/7/24 as per     Thank you,  Sushma Galloway MD on 4/5/2024

## 2024-04-16 ENCOUNTER — ANCILLARY PROCEDURE (OUTPATIENT)
Dept: MRI IMAGING | Facility: CLINIC | Age: 69
End: 2024-04-16
Attending: STUDENT IN AN ORGANIZED HEALTH CARE EDUCATION/TRAINING PROGRAM
Payer: COMMERCIAL

## 2024-04-16 DIAGNOSIS — R97.20 ELEVATED PROSTATE SPECIFIC ANTIGEN (PSA): ICD-10-CM

## 2024-04-16 PROCEDURE — A9585 GADOBUTROL INJECTION: HCPCS | Performed by: RADIOLOGY

## 2024-04-16 PROCEDURE — 72197 MRI PELVIS W/O & W/DYE: CPT | Performed by: RADIOLOGY

## 2024-04-16 RX ORDER — GADOBUTROL 604.72 MG/ML
15 INJECTION INTRAVENOUS ONCE
Status: COMPLETED | OUTPATIENT
Start: 2024-04-16 | End: 2024-04-16

## 2024-04-16 RX ADMIN — GADOBUTROL 12 ML: 604.72 INJECTION INTRAVENOUS at 18:00

## 2024-04-22 ENCOUNTER — TELEPHONE (OUTPATIENT)
Dept: UROLOGY | Facility: CLINIC | Age: 69
End: 2024-04-22
Payer: COMMERCIAL

## 2024-04-22 NOTE — TELEPHONE ENCOUNTER
Good afternoon,    This is a patient who has a PSA of 53 as well as now prostate MRI results showing rather diffuse evidence of prostate cancer involving the urethral sphincter as well as concern for extraprostatic extension.  Biopsy is currently scheduled for 5/20/2024, but talk to Dr. Ribera and think this should be moved up if possible.  Could you to look at your schedules to see if we could find a sooner date for this biopsy?  Unfortunately Dr. Ribera does not have any sooner openings [place] [facility] [place] [facility].    Thank you!    Reza

## 2024-04-23 ENCOUNTER — PRE VISIT (OUTPATIENT)
Dept: UROLOGY | Facility: CLINIC | Age: 69
End: 2024-04-23
Payer: COMMERCIAL

## 2024-04-23 DIAGNOSIS — R97.20 ELEVATED PROSTATE SPECIFIC ANTIGEN (PSA): Primary | ICD-10-CM

## 2024-04-23 RX ORDER — LIDOCAINE HYDROCHLORIDE 10 MG/ML
10 INJECTION, SOLUTION EPIDURAL; INFILTRATION; INTRACAUDAL; PERINEURAL ONCE
Status: COMPLETED | OUTPATIENT
Start: 2024-04-23 | End: 2024-04-30

## 2024-04-23 RX ORDER — GENTAMICIN 40 MG/ML
80 INJECTION, SOLUTION INTRAMUSCULAR; INTRAVENOUS ONCE
Status: ACTIVE | OUTPATIENT
Start: 2024-04-23

## 2024-04-23 RX ORDER — CIPROFLOXACIN 500 MG/1
500 TABLET, FILM COATED ORAL 2 TIMES DAILY
Qty: 6 TABLET | Refills: 0 | Status: CANCELLED | OUTPATIENT
Start: 2024-04-23 | End: 2024-04-26

## 2024-04-23 NOTE — TELEPHONE ENCOUNTER
Reason for visit: fusion biopsy     Relevant information: elevated PSA     Records/imaging/labs/orders: all records available    Pt called: Yes, spoke with amrit Lares reviewed and [email or web address] message sent    At Rooming: iftikhar Donald CMA  4/23/2024  10:42 AM

## 2024-04-24 ENCOUNTER — TELEPHONE (OUTPATIENT)
Dept: UROLOGY | Facility: CLINIC | Age: 69
End: 2024-04-24
Payer: COMMERCIAL

## 2024-04-24 DIAGNOSIS — R97.20 ELEVATED PROSTATE SPECIFIC ANTIGEN (PSA): Primary | ICD-10-CM

## 2024-04-24 RX ORDER — SULFAMETHOXAZOLE/TRIMETHOPRIM 800-160 MG
1 TABLET ORAL 2 TIMES DAILY
Qty: 6 TABLET | Refills: 0 | Status: SHIPPED | OUTPATIENT
Start: 2024-04-24 | End: 2024-08-27

## 2024-04-25 ENCOUNTER — OFFICE VISIT (OUTPATIENT)
Dept: FAMILY MEDICINE | Facility: CLINIC | Age: 69
End: 2024-04-25
Attending: INTERNAL MEDICINE
Payer: COMMERCIAL

## 2024-04-25 VITALS
WEIGHT: 274 LBS | HEART RATE: 78 BPM | RESPIRATION RATE: 16 BRPM | SYSTOLIC BLOOD PRESSURE: 137 MMHG | BODY MASS INDEX: 40.58 KG/M2 | HEIGHT: 69 IN | TEMPERATURE: 97.4 F | DIASTOLIC BLOOD PRESSURE: 88 MMHG | OXYGEN SATURATION: 96 %

## 2024-04-25 DIAGNOSIS — Z79.891 CHRONIC USE OF OPIATE DRUG FOR THERAPEUTIC PURPOSE: ICD-10-CM

## 2024-04-25 DIAGNOSIS — M19.011 ARTHRITIS OF SHOULDER REGION, RIGHT: ICD-10-CM

## 2024-04-25 DIAGNOSIS — M1A.9XX0 CHRONIC GOUT INVOLVING TOE OF RIGHT FOOT WITHOUT TOPHUS, UNSPECIFIED CAUSE: ICD-10-CM

## 2024-04-25 DIAGNOSIS — N40.0 HYPERTROPHY OF PROSTATE WITHOUT URINARY OBSTRUCTION: ICD-10-CM

## 2024-04-25 DIAGNOSIS — I72.3 ILIAC ARTERY ANEURYSM, LEFT (H): ICD-10-CM

## 2024-04-25 DIAGNOSIS — M51.16 LUMBAR DISC DISEASE WITH RADICULOPATHY: ICD-10-CM

## 2024-04-25 DIAGNOSIS — E55.9 VITAMIN D DEFICIENCY: ICD-10-CM

## 2024-04-25 DIAGNOSIS — J45.20 MILD INTERMITTENT ASTHMA WITHOUT COMPLICATION: ICD-10-CM

## 2024-04-25 DIAGNOSIS — C61 PROSTATE CANCER (H): ICD-10-CM

## 2024-04-25 DIAGNOSIS — Z00.00 ENCOUNTER FOR MEDICARE ANNUAL WELLNESS EXAM: Primary | ICD-10-CM

## 2024-04-25 DIAGNOSIS — E66.01 MORBID OBESITY (H): ICD-10-CM

## 2024-04-25 DIAGNOSIS — R73.01 IFG (IMPAIRED FASTING GLUCOSE): ICD-10-CM

## 2024-04-25 DIAGNOSIS — F11.20 CONTINUOUS OPIOID DEPENDENCE (H): ICD-10-CM

## 2024-04-25 DIAGNOSIS — E78.5 HYPERLIPIDEMIA WITH TARGET LDL LESS THAN 130: ICD-10-CM

## 2024-04-25 DIAGNOSIS — R53.83 FATIGUE, UNSPECIFIED TYPE: ICD-10-CM

## 2024-04-25 LAB — HBA1C MFR BLD: 5.2 % (ref 0–5.6)

## 2024-04-25 PROCEDURE — 82306 VITAMIN D 25 HYDROXY: CPT | Performed by: INTERNAL MEDICINE

## 2024-04-25 PROCEDURE — 84443 ASSAY THYROID STIM HORMONE: CPT | Performed by: INTERNAL MEDICINE

## 2024-04-25 PROCEDURE — G0439 PPPS, SUBSEQ VISIT: HCPCS | Performed by: INTERNAL MEDICINE

## 2024-04-25 PROCEDURE — 36415 COLL VENOUS BLD VENIPUNCTURE: CPT | Performed by: INTERNAL MEDICINE

## 2024-04-25 PROCEDURE — 83036 HEMOGLOBIN GLYCOSYLATED A1C: CPT | Performed by: INTERNAL MEDICINE

## 2024-04-25 PROCEDURE — 99214 OFFICE O/P EST MOD 30 MIN: CPT | Mod: 25 | Performed by: INTERNAL MEDICINE

## 2024-04-25 SDOH — HEALTH STABILITY: PHYSICAL HEALTH: ON AVERAGE, HOW MANY DAYS PER WEEK DO YOU ENGAGE IN MODERATE TO STRENUOUS EXERCISE (LIKE A BRISK WALK)?: 3 DAYS

## 2024-04-25 ASSESSMENT — ASTHMA QUESTIONNAIRES
QUESTION_4 LAST FOUR WEEKS HOW OFTEN HAVE YOU USED YOUR RESCUE INHALER OR NEBULIZER MEDICATION (SUCH AS ALBUTEROL): TWO OR THREE TIMES PER WEEK
QUESTION_3 LAST FOUR WEEKS HOW OFTEN DID YOUR ASTHMA SYMPTOMS (WHEEZING, COUGHING, SHORTNESS OF BREATH, CHEST TIGHTNESS OR PAIN) WAKE YOU UP AT NIGHT OR EARLIER THAN USUAL IN THE MORNING: NOT AT ALL
ACT_TOTALSCORE: 21
QUESTION_5 LAST FOUR WEEKS HOW WOULD YOU RATE YOUR ASTHMA CONTROL: SOMEWHAT CONTROLLED
QUESTION_2 LAST FOUR WEEKS HOW OFTEN HAVE YOU HAD SHORTNESS OF BREATH: NOT AT ALL
ACT_TOTALSCORE: 21
QUESTION_1 LAST FOUR WEEKS HOW MUCH OF THE TIME DID YOUR ASTHMA KEEP YOU FROM GETTING AS MUCH DONE AT WORK, SCHOOL OR AT HOME: NONE OF THE TIME

## 2024-04-25 ASSESSMENT — ANXIETY QUESTIONNAIRES
8. IF YOU CHECKED OFF ANY PROBLEMS, HOW DIFFICULT HAVE THESE MADE IT FOR YOU TO DO YOUR WORK, TAKE CARE OF THINGS AT HOME, OR GET ALONG WITH OTHER PEOPLE?: NOT DIFFICULT AT ALL
7. FEELING AFRAID AS IF SOMETHING AWFUL MIGHT HAPPEN: NOT AT ALL
1. FEELING NERVOUS, ANXIOUS, OR ON EDGE: NOT AT ALL
7. FEELING AFRAID AS IF SOMETHING AWFUL MIGHT HAPPEN: NOT AT ALL
4. TROUBLE RELAXING: NOT AT ALL
6. BECOMING EASILY ANNOYED OR IRRITABLE: NOT AT ALL
IF YOU CHECKED OFF ANY PROBLEMS ON THIS QUESTIONNAIRE, HOW DIFFICULT HAVE THESE PROBLEMS MADE IT FOR YOU TO DO YOUR WORK, TAKE CARE OF THINGS AT HOME, OR GET ALONG WITH OTHER PEOPLE: NOT DIFFICULT AT ALL
3. WORRYING TOO MUCH ABOUT DIFFERENT THINGS: NOT AT ALL
2. NOT BEING ABLE TO STOP OR CONTROL WORRYING: NOT AT ALL
5. BEING SO RESTLESS THAT IT IS HARD TO SIT STILL: NOT AT ALL
GAD7 TOTAL SCORE: 0

## 2024-04-25 ASSESSMENT — PATIENT HEALTH QUESTIONNAIRE - PHQ9
SUM OF ALL RESPONSES TO PHQ QUESTIONS 1-9: 4
10. IF YOU CHECKED OFF ANY PROBLEMS, HOW DIFFICULT HAVE THESE PROBLEMS MADE IT FOR YOU TO DO YOUR WORK, TAKE CARE OF THINGS AT HOME, OR GET ALONG WITH OTHER PEOPLE: SOMEWHAT DIFFICULT
SUM OF ALL RESPONSES TO PHQ QUESTIONS 1-9: 4

## 2024-04-25 ASSESSMENT — SOCIAL DETERMINANTS OF HEALTH (SDOH): HOW OFTEN DO YOU GET TOGETHER WITH FRIENDS OR RELATIVES?: TWICE A WEEK

## 2024-04-25 ASSESSMENT — PAIN SCALES - GENERAL: PAINLEVEL: MILD PAIN (3)

## 2024-04-25 NOTE — PATIENT INSTRUCTIONS
As discussed, since all the labs were done on 2/2024     Gabapentin to be weaned down 2 tabs in night for 2 days >> 1 tab in the tablet in the night for next 4 days >> atlernate days for 3 more days and stop.     Will restart on Norco 7.5 mg from next refill onwards 4/8/24 onwards    ====================================  Preventive Care Advice   This is general advice given by our system to help you stay healthy. However, your care team may have specific advice just for you. Please talk to your care team about your preventive care needs.  Nutrition  Eat 5 or more servings of fruits and vegetables each day.  Try wheat bread, brown rice and whole grain pasta (instead of white bread, rice, and pasta).  Get enough calcium and vitamin D. Check the label on foods and aim for 100% of the RDA (recommended daily allowance).  Lifestyle  Exercise at least 150 minutes each week   (30 minutes a day, 5 days a week).  Do muscle strengthening activities 2 days a week. These help control your weight and prevent disease.  No smoking.  Wear sunscreen to prevent skin cancer.  Have a dental exam and cleaning every 6 months.  Yearly exams  See your health care team every year to talk about:  Any changes in your health.  Any medicines your care team has prescribed.  Preventive care, family planning, and ways to prevent chronic diseases.  Shots (vaccines)   HPV shots (up to age 26), if you've never had them before.  Hepatitis B shots (up to age 59), if you've never had them before.  COVID-19 shot: Get this shot when it's due.  Flu shot: Get a flu shot every year.  Tetanus shot: Get a tetanus shot every 10 years.  Pneumococcal, hepatitis A, and RSV shots: Ask your care team if you need these based on your risk.  Shingles shot (for age 50 and up).  General health tests  Diabetes screening:  Starting at age 35, Get screened for diabetes at least every 3 years.  If you are younger than age 35, ask your care team if you should be screened for  diabetes.  Cholesterol test: At age 39, start having a cholesterol test every 5 years, or more often if advised.  Bone density scan (DEXA): At age 50, ask your care team if you should have this scan for osteoporosis (brittle bones).  Hepatitis C: Get tested at least once in your life.  STIs (sexually transmitted infections)  Before age 24: Ask your care team if you should be screened for STIs.  After age 24: Get screened for STIs if you're at risk. You are at risk for STIs (including HIV) if:  You are sexually active with more than one person.  You don't use condoms every time.  You or a partner was diagnosed with a sexually transmitted infection.  If you are at risk for HIV, ask about PrEP medicine to prevent HIV.  Get tested for HIV at least once in your life, whether you are at risk for HIV or not.  Cancer screening tests  Cervical cancer screening: If you have a cervix, begin getting regular cervical cancer screening tests at age 21. Most people who have regular screenings with normal results can stop after age 65. Talk about this with your provider.  Breast cancer scan (mammogram): If you've ever had breasts, begin having regular mammograms starting at age 40. This is a scan to check for breast cancer.  Colon cancer screening: It is important to start screening for colon cancer at age 45.  Have a colonoscopy test every 10 years (or more often if you're at risk) Or, ask your provider about stool tests like a FIT test every year or Cologuard test every 3 years.  To learn more about your testing options, visit: https://[email or web address]/[email or web address].  For help making a decision, visit: https://bit.ly/ux10042.  Prostate cancer screening test: If you have a prostate and are age 55 to 69, ask your provider if you would benefit from a yearly prostate cancer screening test.  Lung cancer screening: If you are a current or former smoker age 50 to 80, ask your care team if ongoing lung cancer screenings are right for  you.  For informational purposes only. Not to replace the advice of your health care provider. Copyright   2023 [facility]. All rights reserved. Clinically reviewed by the  [employer] [facility]. [employer] 867006 - REV 01/24.    Hearing Loss: Care Instructions  Overview     Hearing loss is a sudden or slow decrease in how well you hear. It can range from slight to profound. Permanent hearing loss can occur with aging. It also can happen when you are exposed long-term to loud noise. Examples include listening to loud music, riding motorcycles, or being around other loud machines.  Hearing loss can affect your work and home life. It can make you feel lonely or depressed. You may feel that you have lost your independence. But hearing aids and other devices can help you hear better and feel connected to others.  Follow-up care is a key part of your treatment and safety. Be sure to make and go to all appointments, and call your doctor if you are having problems. It's also a good idea to know your test results and keep a list of the medicines you take.  How can you care for yourself at home?  Avoid loud noises whenever possible. This helps keep your hearing from getting worse.  Always wear hearing protection around loud noises.  Wear a hearing aid as directed.  A professional can help you pick a hearing aid that will work best for you.  You can also get hearing aids over the counter for mild to moderate hearing loss.  Have hearing tests as your doctor suggests. They can show whether your hearing has changed. Your hearing aid may need to be adjusted.  Use other devices as needed. These may include:  Telephone amplifiers and hearing aids that can connect to a television, stereo, radio, or microphone.  Devices that use lights or vibrations. These alert you to the doorbell, a ringing telephone, or a baby monitor.  Television closed-captioning. This shows the words at the bottom of the screen.  "Most new TVs can do this.  TTY (text telephone). This lets you type messages back and forth on the telephone instead of talking or listening. These devices are also called TDD. When messages are typed on the keyboard, they are sent over the phone line to a receiving TTY. The message is shown on a monitor.  Use text messaging, social media, and email if it is hard for you to communicate by telephone.  Try to learn a listening technique called speechreading. It is not lipreading. You pay attention to people's gestures, expressions, posture, and tone of voice. These clues can help you understand what a person is saying. Face the person you are talking to, and have them face you. Make sure the lighting is good. You need to see the other person's face clearly.  Think about counseling if you need help to adjust to your hearing loss.  When should you call for help?  Watch closely for changes in your health, and be sure to contact your doctor if:    You think your hearing is getting worse.     You have new symptoms, such as dizziness or nausea.   Where can you learn more?  Go to https://www.Bizible.net/patiented  Enter R798 in the search box to learn more about \"Hearing Loss: Care Instructions.\"  Current as of: September 27, 2023               Content Version: 14.0    3952-5487 [employer].   Care instructions adapted under license by your healthcare professional. If you have questions about a medical condition or this instruction, always ask your healthcare professional. [employer] disclaims any warranty or liability for your use of this information.      Learning About Sleeping Well  What does sleeping well mean?     Sleeping well means getting enough sleep to feel good and stay healthy. How much sleep is enough varies among people.  The number of hours you sleep and how you feel when you wake up are both important. If you do not feel refreshed, you probably need more sleep. Another sign of " "not getting enough sleep is feeling tired during the day.  Experts recommend that adults get at least 7 or more hours of sleep per day. Children and older adults need more sleep.  Why is getting enough sleep important?  Getting enough quality sleep is a basic part of good health. When your sleep suffers, your physical health, mood, and your thoughts can suffer too. You may find yourself feeling more grumpy or stressed. Not getting enough sleep also can lead to serious problems, including injury, accidents, anxiety, and depression.  What might cause poor sleeping?  Many things can cause sleep problems, including:  Changes to your sleep schedule.  Stress. Stress can be caused by fear about a single event, such as giving a speech. Or you may have ongoing stress, such as worry about work or school.  Depression, anxiety, and other mental or emotional conditions.  Changes in your sleep habits or surroundings. This includes changes that happen where you sleep, such as noise, light, or sleeping in a different bed. It also includes changes in your sleep pattern, such as having jet lag or working a late shift.  Health problems, such as pain, breathing problems, and restless legs syndrome.  Lack of regular exercise.  Using alcohol, nicotine, or caffeine before bed.  How can you help yourself?  Here are some tips that may help you sleep more soundly and wake up feeling more refreshed.  Your sleeping area   Use your bedroom only for sleeping and sex. A bit of light reading may help you fall asleep. But if it doesn't, do your reading elsewhere in the house. Try not to use your TV, computer, smartphone, or tablet while you are in bed.  Be sure your bed is big enough to stretch out comfortably, especially if you have a sleep partner.  Keep your bedroom quiet, dark, and cool. Use curtains, blinds, or a sleep mask to block out light. To block out noise, use earplugs, soothing music, or a \"white noise\" machine.  Your evening and " "bedtime routine   Create a relaxing bedtime routine. You might want to take a warm shower or bath, or listen to soothing music.  Go to bed at the same time every night. And get up at the same time every morning, even if you feel tired.  What to avoid   Limit caffeine (coffee, tea, caffeinated sodas) during the day, and don't have any for at least 6 hours before bedtime.  Avoid drinking alcohol before bedtime. Alcohol can cause you to wake up more often during the night.  Try not to smoke or use tobacco, especially in the evening. Nicotine can keep you awake.  Limit naps during the day, especially close to bedtime.  Avoid lying in bed awake for too long. If you can't fall asleep or if you wake up in the middle of the night and can't get back to sleep within about 20 minutes, get out of bed and go to another room until you feel sleepy.  Avoid taking medicine right before bed that may keep you awake or make you feel hyper or energized. Your doctor can tell you if your medicine may do this and if you can take it earlier in the day.  If you can't sleep   Imagine yourself in a peaceful, pleasant scene. Focus on the details and feelings of being in a place that is relaxing.  Get up and do a quiet or boring activity until you feel sleepy.  Avoid drinking any liquids before going to bed to help prevent waking up often to use the bathroom.  Where can you learn more?  Go to https://www.MediaWheel.net/patiented  Enter J942 in the search box to learn more about \"Learning About Sleeping Well.\"  Current as of: July 10, 2023               Content Version: 14.0    8383-3153 [employer].   Care instructions adapted under license by your healthcare professional. If you have questions about a medical condition or this instruction, always ask your healthcare professional. [employer] disclaims any warranty or liability for your use of this information.      Learning About Alcohol Use Disorder  What is alcohol " use disorder?  Alcohol use disorder means that a person drinks alcohol even though it causes harm to themselves or others. It can range from mild to severe. The more symptoms of this disorder you have, the more severe it may be. People who have it may find it hard to control their use of alcohol.  People who have this disorder may argue with others about how much they're drinking. Their job may be affected because of drinking. They may drink when it's dangerous or illegal, such as when they drive. They also may have a strong need, or craving, to drink. They may feel like they must drink just to get by. Their drinking may increase their risk of getting hurt or being in a car crash.  Over time, drinking too much alcohol may cause health problems. These may include high blood pressure, liver problems, or problems with digestion.  What are the symptoms?  Maybe you've wondered about your alcohol habits or how to tell if your drinking is becoming a problem.  Here are some of the symptoms of alcohol use disorder. You may have it if you have two or more of the following symptoms:  You drink larger amounts of alcohol than you ever meant to. Or you've been drinking for a longer time than you ever meant to.  You can't cut down or control your use. Or you constantly wish you could cut down.  You spend a lot of time getting or drinking alcohol or recovering from its effects.  You have strong cravings for alcohol.  You can no longer do your main jobs at work, at school, or at home.  You keep drinking alcohol, even though your use hurts your relationships.  You have stopped doing important activities because of your alcohol use.  You drink alcohol in situations where doing so is dangerous.  You keep drinking alcohol even though you know it's causing health problems.  You need more and more alcohol to get the same effect, or you get less effect from the same amount over time. This is called tolerance.  You have uncomfortable  "symptoms when you stop drinking alcohol or use less. This is called withdrawal.  Alcohol use disorder can range from mild to severe. The more symptoms you have, the more severe the disorder may be.  You might not realize that your drinking is a problem. You might not drink large amounts when you drink. Or you might go for days or weeks between drinking episodes. But even if you don't drink very often, your drinking could still be harmful and put you at risk.  How is alcohol use disorder treated?  Getting help is up to you. But you don't have to do it alone. There are many people and kinds of treatments that can help.  Treatment for alcohol use disorder can include:  Group therapy, one or more types of counseling, and alcohol education.  Medicines that help to:  Reduce withdrawal symptoms and help you safely stop drinking.  Reduce cravings for alcohol.  Support groups. These groups include Alcoholics Anonymous and [employer] (Self-Management and Recovery Training).  Some people are able to stop or cut back on drinking with help from a counselor. People who have moderate to severe alcohol use disorder may need medical treatment. They may need to stay in a hospital or treatment center.  You may have a treatment team to help you. This team may include a psychologist or psychiatrist, counselors, doctors, social workers, nurses, and a . A  helps plan and manage your treatment.  Follow-up care is a key part of your treatment and safety. Be sure to make and go to all appointments, and call your doctor if you are having problems. It's also a good idea to know your test results and keep a list of the medicines you take.  Where can you learn more?  Go to https://www.healthLe [place]ut[place] rouge.com.net/patiented  Enter H758 in the search box to learn more about \"Learning About Alcohol Use Disorder.\"  Current as of: November 15, 2023               Content Version: 14.0    6955-8443 [employer].   Care " instructions adapted under license by your healthcare professional. If you have questions about a medical condition or this instruction, always ask your healthcare professional. [employer], "[employer] disclaims any warranty or liability for your use of this information.      Substance Use Disorder: Care Instructions  Overview     You can improve your life and health by stopping your use of alcohol or drugs. When you don't drink or use drugs, you may feel and sleep better. You may get along better with your family, friends, and coworkers. There are medicines and programs that can help with substance use disorder.  How can you care for yourself at home?  Here are some ways to help you stay sober and prevent relapse.  If you have been given medicine to help keep you sober or reduce your cravings, be sure to take it exactly as prescribed.  Talk to your doctor about programs that can help you stop using drugs or drinking alcohol.  Do not keep alcohol or drugs in your home.  Plan ahead. Think about what you'll say if other people ask you to drink or use drugs. Try not to spend time with people who drink or use drugs.  Use the time and money spent on drinking or drugs to do something that's important to you.  Preventing a relapse  Have a plan to deal with relapse. Learn to recognize changes in your thinking that lead you to drink or use drugs. Get help before you start to drink or use drugs again.  Try to stay away from situations, friends, or places that may lead you to drink or use drugs.  If you feel the need to drink alcohol or use drugs again, seek help right away. Call a trusted friend or family member. Some people get support from organizations such as Narcotics Anonymous or [employer] or from treatment facilities.  If you relapse, get help as soon as you can. Some people make a plan with another person that outlines what they want that person to do for them if they relapse. The plan usually includes how to  handle the relapse and who to notify in case of relapse.  Don't give up. Remember that a relapse doesn't mean that you have failed. Use the experience to learn the triggers that lead you to drink or use drugs. Then quit again. Recovery is a lifelong process. Many people have several relapses before they are able to quit for good.  Follow-up care is a key part of your treatment and safety. Be sure to make and go to all appointments, and call your doctor if you are having problems. It's also a good idea to know your test results and keep a list of the medicines you take.  When should you call for help?   Call 911  anytime you think you may need emergency care. For example, call if you or someone else:    Has overdosed or has withdrawal signs. Be sure to tell the emergency workers that you are or someone else is using or trying to quit using drugs. Overdose or withdrawal signs may include:  Losing consciousness.  Seizure.  Seeing or hearing things that aren't there (hallucinations).     Is thinking or talking about suicide or harming others.   Where to get help 24 hours a day, 7 days a week   If you or someone you know talks about suicide, self-harm, a mental health crisis, a substance use crisis, or any other kind of emotional distress, get help right away. You can:    Call the Suicide and Crisis Lifeline at 168.     Call 2-255-688-UCYP (1-130.877.4244).     Text HOME to 334183 to access the Crisis Text Line.   Consider saving these numbers in your phone.  Go to YouRenew.org for more information or to chat online.  Call your doctor now or seek immediate medical care if:    You are having withdrawal symptoms. These may include nausea or vomiting, sweating, shakiness, and anxiety.   Watch closely for changes in your health, and be sure to contact your doctor if:    You have a relapse.     You need more help or support to stop.   Where can you learn more?  Go to https://www.healthwise.net/patiented  Enter H573 in the  "search box to learn more about \"Substance Use Disorder: Care Instructions.\"  Current as of: November 15, 2023               Content Version: 14.0    1199-8664 [employer].   Care instructions adapted under license by your healthcare professional. If you have questions about a medical condition or this instruction, always ask your healthcare professional. [employer] disclaims any warranty or liability for your use of this information.      Chronic Pain: Care Instructions  Your Care Instructions     Chronic pain is pain that lasts a long time (months or even years) and may or may not have a clear cause. It is different from acute pain, which usually does have a clear cause--like an injury or illness--and gets better over time. Chronic pain:  Lasts over time but may vary from day to day.  Does not go away despite efforts to end it.  May disrupt your sleep and lead to fatigue.  May cause depression or anxiety.  May make your muscles tense, causing more pain.  Can disrupt your work, hobbies, home life, and relationships with friends and family.  Chronic pain is a very real condition. It is not just in your head. Treatment can help and usually includes several methods used together, such as medicines, physical therapy, exercise, and other treatments. Learning how to relax and changing negative thought patterns can also help you cope.  Chronic pain is complex. Taking an active role in your treatment will help you better manage your pain. Tell your doctor if you have trouble dealing with your pain. You may have to try several things before you find what works best for you.  Follow-up care is a key part of your treatment and safety. Be sure to make and go to all appointments, and call your doctor if you are having problems. It's also a good idea to know your test results and keep a list of the medicines you take.  How can you care for yourself at home?  Pace yourself. Break up large jobs into " smaller tasks. Save harder tasks for days when you have less pain, or go back and forth between hard tasks and easier ones. Take rest breaks.  Relax, and reduce stress. Relaxation techniques such as deep breathing or meditation can help.  Keep moving. Gentle, daily exercise can help reduce pain over the long run. Try low- or no-impact exercises such as walking, swimming, and stationary biking. Do stretches to stay flexible.  Try heat, cold packs, and massage.  Get enough sleep. Chronic pain can make you tired and drain your energy. Talk with your doctor if you have trouble sleeping because of pain.  Think positive. Your thoughts can affect your pain level. Do things that you enjoy to distract yourself when you have pain instead of focusing on the pain. See a movie, read a book, listen to music, or spend time with a friend.  If you think you are depressed, talk to your doctor about treatment.  Keep a daily pain diary. Record how your moods, thoughts, sleep patterns, activities, and medicine affect your pain. You may find that your pain is worse during or after certain activities or when you are feeling a certain emotion. Having a record of your pain can help you and your doctor find the best ways to treat your pain.  Take pain medicines exactly as directed.  If the doctor gave you a prescription medicine for pain, take it as prescribed.  If you are not taking a prescription pain medicine, ask your doctor if you can take an over-the-counter medicine.  Reducing constipation caused by pain medicine  Talk to your doctor about a laxative. If a laxative doesn't work, your doctor may suggest a prescription medicine.  Include fruits, vegetables, beans, and whole grains in your diet each day. These foods are high in fiber.  If your doctor recommends it, get more exercise. Walking is a good choice. Bit by bit, increase the amount you walk every day. Try for at least 30 minutes on most days of the week.  Schedule time each day  "for a bowel movement. A daily routine may help. Take your time and do not strain when having a bowel movement.  When should you call for help?   Call your doctor now or seek immediate medical care if:    Your pain gets worse or is out of control.     You feel down or blue, or you do not enjoy things like you once did. You may be depressed, which is common in people with chronic pain. Depression can be treated.     You have vomiting or cramps for more than 2 hours.   Watch closely for changes in your health, and be sure to contact your doctor if:    You cannot sleep because of pain.     You are very worried or anxious about your pain.     You have trouble taking your pain medicine.     You have any concerns about your pain medicine.     You have trouble with bowel movements, such as:  No bowel movement in 3 days.  Blood in the anal area, in your stool, or on the toilet paper.  Diarrhea for more than 24 hours.   Where can you learn more?  Go to https://[email or web address].[employer].net/patiented  Enter N004 in the search box to learn more about \"Chronic Pain: Care Instructions.\"  Current as of: July 10, 2023               Content Version: 14.0    7644-8512 [employer].   Care instructions adapted under license by your healthcare professional. If you have questions about a medical condition or this instruction, always ask your healthcare professional. [employer] disclaims any warranty or liability for your use of this information.      "

## 2024-04-25 NOTE — PROGRESS NOTES
Preventive Care Visit  [facility] KAI Galloway MD, Internal Medicine  Apr 25, 2024        Assessment and Plan  1. Encounter for Medicare annual wellness exam    Last seen patient in February 2024 for follow-up on opiates, given that escalated from pharmacy to explain on his ongoing chronic prescription of opiates for the pain plan, shared decision by me and the patient to come down on his Norco from 7.5 to 5 mg 3 times daily as well as start on gabapentin 100 mg 3 times daily for improvement.  His CSA was done at that time.  Patient is here for annual physical.      All the labs done in February 2024 showing normal CMP, dyslipidemia with LDL at 137, PSA was very much high for which urology referral placed immediately and CBC within normal limits.    - PRIMARY CARE FOLLOW-UP SCHEDULING  - Hemoglobin A1c; Future  - Vitamin D deficiency screening; Future  - TSH with free T4 reflex; Future  - Hemoglobin A1c  - Vitamin D deficiency screening  - TSH with free T4 reflex      2. Prostate cancer (H)  3. Hypertrophy of prostate without urinary obstruction  New diagnosis of prostate cancer added to patient problem list, this was as confirmed by the radiologist read on the MRI of the pelvis done.   - Patient is scheduled on 4/30/2024 >> Prostate biopsy  - Reviewed the recent urology note on 3/8/2024 which the urologist plan was to check an MRI for elevated PSA levels at 48.9   ( IMPRESSION : Based on the most suspicious abnormality, this exam is  characterized as PIRADS 5 - Clinically significant cancer is highly  likely to be present.  The most suspicious abnormality is involving  the entire  transition zone, and to a lesser extent the anterior  peripheral zone,  anterior fibromuscular stroma, and urethral  sphincter and there is high suspicion of extraprostatic extension.  Additional PIRADS-4 lesion in the right mid gland peripheral zone.   2. No evidence of osseous metastasis.  3. There are a  few subcentimeter right external iliac, right internal  iliac, and bilateral inguinal lymph nodes, which are likely reactive,  however cannot definitely exclude metastatic involvement. Consider  PSMA PET for further evaluation if clinically indicated. )       4. Chronic use of opiate drug for therapeutic purpose  5. F11.2 - Continuous opioid dependence (H)  Ongoing problem, uncontrolled.  Patient states that he is not tolerating the current gabapentin which was started as he feels drowsy all the day and trying to take 3 tablets of gabapentin in the night itself which is not helping him at all on the pain for the whole day.  Requesting to resume back to the previous Norco dosage.  -Given the above new diagnosis of prostate cancer as well as his ongoing back pain along with multiple joint pains, Will go back to 7.5 Norco and discontinued gabapentin.  Emphasized to wean down gabapentin as mentioned in the AVS plan below to avoid withdrawal effects.    6. Iliac artery aneurysm, left (H24)  Chronic stable, no new concerns at this time.  Emphasized on getting the cholesterol normalized which she understands.    7. Morbid obesity (H)  Ongoing problem, patient working on diet and lifestyle modifications.  All risks and complications understood.    8. Mild intermittent asthma without complication  Well-controlled, continue current inhalers.    9. Chronic gout involving toe 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 tophus, unspecified cause  10. Arthritis of shoulder region, right  11. Lumbar disc disease with radiculopathy  Chronic problem, ongoing pains and aches.  Continue to follow orthopedic recommendations.  Continue current gout medications as managed by PCP.  - PRIMARY CARE FOLLOW-UP SCHEDULING    12. Vitamin D deficiency  - Vitamin D deficiency screening; Future  - Vitamin D deficiency screening    13. Fatigue, unspecified type  - TSH with free T4 reflex; Future  - TSH with free T4 reflex    14. IFG (impaired fasting glucose)  -  Hemoglobin A1c; Future  - Hemoglobin A1c    15. Hyperlipidemia with target LDL less than 130  Borderline elevated, patient opting to follow diet and lifestyle modifications.  All the risks understood.         Please note that this note consists of symbols derived from keyboarding, dictation and/or voice recognition software. As a result, there may be errors in the script that have gone undetected. Please consider this when interpreting information found in this chart.    Patient Instructions   As discussed, since all the labs were done on 2/2024     Gabapentin to be weaned down 2 tabs in night for 2 days >> 1 tab in the tablet in the night for next 4 days >> atlernate days for 3 more days and stop.     Will restart on Norco 7.5 mg from next refill onwards 4/8/24 onwards    ====================================  Preventive Care Advice   This is general advice given by our system to help you stay healthy. However, your care team may have specific advice just for you. Please talk to your care team about your preventive care needs.  Nutrition  Eat 5 or more servings of fruits and vegetables each day.  Try wheat bread, brown rice and whole grain pasta (instead of white bread, rice, and pasta).  Get enough calcium and vitamin D. Check the label on foods and aim for 100% of the RDA (recommended daily allowance).  Lifestyle  Exercise at least 150 minutes each week   (30 minutes a day, 5 days a week).  Do muscle strengthening activities 2 days a week. These help control your weight and prevent disease.  No smoking.  Wear sunscreen to prevent skin cancer.  Have a dental exam and cleaning every 6 months.  Yearly exams  See your health care team every year to talk about:  Any changes in your health.  Any medicines your care team has prescribed.  Preventive care, family planning, and ways to prevent chronic diseases.  Shots (vaccines)   HPV shots (up to age 26), if you've never had them before.  Hepatitis B shots (up to age 59),  if you've never had them before.  COVID-19 shot: Get this shot when it's due.  Flu shot: Get a flu shot every year.  Tetanus shot: Get a tetanus shot every 10 years.  Pneumococcal, hepatitis A, and RSV shots: Ask your care team if you need these based on your risk.  Shingles shot (for age 50 and up).  General health tests  Diabetes screening:  Starting at age 35, Get screened for diabetes at least every 3 years.  If you are younger than age 35, ask your care team if you should be screened for diabetes.  Cholesterol test: At age 39, start having a cholesterol test every 5 years, or more often if advised.  Bone density scan (DEXA): At age 50, ask your care team if you should have this scan for osteoporosis (brittle bones).  Hepatitis C: Get tested at least once in your life.  STIs (sexually transmitted infections)  Before age 24: Ask your care team if you should be screened for STIs.  After age 24: Get screened for STIs if you're at risk. You are at risk for STIs (including HIV) if:  You are sexually active with more than one person.  You don't use condoms every time.  You or a partner was diagnosed with a sexually transmitted infection.  If you are at risk for HIV, ask about PrEP medicine to prevent HIV.  Get tested for HIV at least once in your life, whether you are at risk for HIV or not.  Cancer screening tests  Cervical cancer screening: If you have a cervix, begin getting regular cervical cancer screening tests at age 21. Most people who have regular screenings with normal results can stop after age 65. Talk about this with your provider.  Breast cancer scan (mammogram): If you've ever had breasts, begin having regular mammograms starting at age 40. This is a scan to check for breast cancer.  Colon cancer screening: It is important to start screening for colon cancer at age 45.  Have a colonoscopy test every 10 years (or more often if you're at risk) Or, ask your provider about stool tests like a FIT test every  year or Cologuard test every 3 years.  To learn more about your testing options, visit: [email or web address].  For help making a decision, visit: https://bit.ly/mq37769.  Prostate cancer screening test: If you have a prostate and are age 55 to 69, ask your provider if you would benefit from a yearly prostate cancer screening test.  Lung cancer screening: If you are a current or former smoker age 50 to 80, ask your care team if ongoing lung cancer screenings are right for you.  For informational purposes only. Not to replace the advice of your health care provider. Copyright   2023 [employer]. All rights reserved. Clinically reviewed by the [employer] [facility]. [employer] [id number] - REV 01/24.    Hearing Loss: Care Instructions  Overview     Hearing loss is a sudden or slow decrease in how well you hear. It can range from slight to profound. Permanent hearing loss can occur with aging. It also can happen when you are exposed long-term to loud noise. Examples include listening to loud music, riding motorcycles, or being around other loud machines.  Hearing loss can affect your work and home life. It can make you feel lonely or depressed. You may feel that you have lost your independence. But hearing aids and other devices can help you hear better and feel connected to others.  Follow-up care is a key part of your treatment and safety. Be sure to make and go to all appointments, and call your doctor if you are having problems. It's also a good idea to know your test results and keep a list of the medicines you take.  How can you care for yourself at home?  Avoid loud noises whenever possible. This helps keep your hearing from getting worse.  Always wear hearing protection around loud noises.  Wear a hearing aid as directed.  A professional can help you pick a hearing aid that will work best for you.  You can also get hearing aids over the counter for mild to moderate hearing  "loss.  Have hearing tests as your doctor suggests. They can show whether your hearing has changed. Your hearing aid may need to be adjusted.  Use other devices as needed. These may include:  Telephone amplifiers and hearing aids that can connect to a television, stereo, radio, or microphone.  Devices that use lights or vibrations. These alert you to the doorbell, a ringing telephone, or a baby monitor.  Television closed-captioning. This shows the words at the bottom of the screen. Most new TVs can do this.  TTY (text telephone). This lets you type messages back and forth on the telephone instead of talking or listening. These devices are also called TDD. When messages are typed on the keyboard, they are sent over the phone line to a receiving TTY. The message is shown on a monitor.  Use text messaging, social media, and email if it is hard for you to communicate by telephone.  Try to learn a listening technique called speechreading. It is not lipreading. You pay attention to people's gestures, expressions, posture, and tone of voice. These clues can help you understand what a person is saying. Face the person you are talking to, and have them face you. Make sure the lighting is good. You need to see the other person's face clearly.  Think about counseling if you need help to adjust to your hearing loss.  When should you call for help?  Watch closely for changes in your health, and be sure to contact your doctor if:    You think your hearing is getting worse.     You have new symptoms, such as dizziness or nausea.   Where can you learn more?  Go to https://www.[employer].net/patiented  Enter R798 in the search box to learn more about \"Hearing Loss: Care Instructions.\"  Current as of: September 27, 2023               Content Version: 14.0    5511-4809 [employer].   Care instructions adapted under license by your healthcare professional. If you have questions about a medical condition or this " instruction, always ask your healthcare professional. Healthwise, [facility] disclaims any warranty or liability for your use of this information.      Learning About Sleeping Well  What does sleeping well mean?     Sleeping well means getting enough sleep to feel good and stay healthy. How much sleep is enough varies among people.  The number of hours you sleep and how you feel when you wake up are both important. If you do not feel refreshed, you probably need more sleep. Another sign of not getting enough sleep is feeling tired during the day.  Experts recommend that adults get at least 7 or more hours of sleep per day. Children and older adults need more sleep.  Why is getting enough sleep important?  Getting enough quality sleep is a basic part of good health. When your sleep suffers, your physical health, mood, and your thoughts can suffer too. You may find yourself feeling more grumpy or stressed. Not getting enough sleep also can lead to serious problems, including injury, accidents, anxiety, and depression.  What might cause poor sleeping?  Many things can cause sleep problems, including:  Changes to your sleep schedule.  Stress. Stress can be caused by fear about a single event, such as giving a speech. Or you may have ongoing stress, such as worry about work or school.  Depression, anxiety, and other mental or emotional conditions.  Changes in your sleep habits or surroundings. This includes changes that happen where you sleep, such as noise, light, or sleeping in a different bed. It also includes changes in your sleep pattern, such as having jet lag or working a late shift.  Health problems, such as pain, breathing problems, and restless legs syndrome.  Lack of regular exercise.  Using alcohol, nicotine, or caffeine before bed.  How can you help yourself?  Here are some tips that may help you sleep more soundly and wake up feeling more refreshed.  Your sleeping area   Use your bedroom only for sleeping  "and sex. A bit of light reading may help you fall asleep. But if it doesn't, do your reading elsewhere in the house. Try not to use your TV, computer, smartphone, or tablet while you are in bed.  Be sure your bed is big enough to stretch out comfortably, especially if you have a sleep partner.  Keep your bedroom quiet, dark, and cool. Use curtains, blinds, or a sleep mask to block out light. To block out noise, use earplugs, soothing music, or a \"white noise\" machine.  Your evening and bedtime routine   Create a relaxing bedtime routine. You might want to take a warm shower or bath, or listen to soothing music.  Go to bed at the same time every night. And get up at the same time every morning, even if you feel tired.  What to avoid   Limit caffeine (coffee, tea, caffeinated sodas) during the day, and don't have any for at least 6 hours before bedtime.  Avoid drinking alcohol before bedtime. Alcohol can cause you to wake up more often during the night.  Try not to smoke or use tobacco, especially in the evening. Nicotine can keep you awake.  Limit naps during the day, especially close to bedtime.  Avoid lying in bed awake for too long. If you can't fall asleep or if you wake up in the middle of the night and can't get back to sleep within about 20 minutes, get out of bed and go to another room until you feel sleepy.  Avoid taking medicine right before bed that may keep you awake or make you feel hyper or energized. Your doctor can tell you if your medicine may do this and if you can take it earlier in the day.  If you can't sleep   Imagine yourself in a peaceful, pleasant scene. Focus on the details and feelings of being in a place that is relaxing.  Get up and do a quiet or boring activity until you feel sleepy.  Avoid drinking any liquids before going to bed to help prevent waking up often to use the bathroom.  Where can you learn more?  Go to https://www.healthwise.net/patiented  Enter J942 in the search box to " "learn more about \"Learning About Sleeping Well.\"  Current as of: July 10, 2023               Content Version: 14.0    3411-5066 [employer].   Care instructions adapted under license by your healthcare professional. If you have questions about a medical condition or this instruction, always ask your healthcare professional. [employer] disclaims any warranty or liability for your use of this information.      Learning About Alcohol Use Disorder  What is alcohol use disorder?  Alcohol use disorder means that a person drinks alcohol even though it causes harm to themselves or others. It can range from mild to severe. The more symptoms of this disorder you have, the more severe it may be. People who have it may find it hard to control their use of alcohol.  People who have this disorder may argue with others about how much they're drinking. Their job may be affected because of drinking. They may drink when it's dangerous or illegal, such as when they drive. They also may have a strong need, or craving, to drink. They may feel like they must drink just to get by. Their drinking may increase their risk of getting hurt or being in a car crash.  Over time, drinking too much alcohol may cause health problems. These may include high blood pressure, liver problems, or problems with digestion.  What are the symptoms?  Maybe you've wondered about your alcohol habits or how to tell if your drinking is becoming a problem.  Here are some of the symptoms of alcohol use disorder. You may have it if you have two or more of the following symptoms:  You drink larger amounts of alcohol than you ever meant to. Or you've been drinking for a longer time than you ever meant to.  You can't cut down or control your use. Or you constantly wish you could cut down.  You spend a lot of time getting or drinking alcohol or recovering from its effects.  You have strong cravings for alcohol.  You can no longer do your main " jobs at work, at school, or at home.  You keep drinking alcohol, even though your use hurts your relationships.  You have stopped doing important activities because of your alcohol use.  You drink alcohol in situations where doing so is dangerous.  You keep drinking alcohol even though you know it's causing health problems.  You need more and more alcohol to get the same effect, or you get less effect from the same amount over time. This is called tolerance.  You have uncomfortable symptoms when you stop drinking alcohol or use less. This is called withdrawal.  Alcohol use disorder can range from mild to severe. The more symptoms you have, the more severe the disorder may be.  You might not realize that your drinking is a problem. You might not drink large amounts when you drink. Or you might go for days or weeks between drinking episodes. But even if you don't drink very often, your drinking could still be harmful and put you at risk.  How is alcohol use disorder treated?  Getting help is up to you. But you don't have to do it alone. There are many people and kinds of treatments that can help.  Treatment for alcohol use disorder can include:  Group therapy, one or more types of counseling, and alcohol education.  Medicines that help to:  Reduce withdrawal symptoms and help you safely stop drinking.  Reduce cravings for alcohol.  Support groups. These groups include Alcoholics Anonymous and [facility] (Self-Management and Recovery Training).  Some people are able to stop or cut back on drinking with help from a counselor. People who have moderate to severe alcohol use disorder may need medical treatment. They may need to stay in a hospital or treatment center.  You may have a treatment team to help you. This team may include a psychologist or psychiatrist, counselors, doctors, social workers, nurses, and a . A  helps plan and manage your treatment.  Follow-up care is a key part of your  "treatment and safety. Be sure to make and go to all appointments, and call your doctor if you are having problems. It's also a good idea to know your test results and keep a list of the medicines you take.  Where can you learn more?  Go to https://www.[employer].net/patiented  Enter H758 in the search box to learn more about \"Learning About Alcohol Use Disorder.\"  Current as of: November 15, 2023               Content Version: 14.0    4902-8323 [employer].   Care instructions adapted under license by your healthcare professional. If you have questions about a medical condition or this instruction, always ask your healthcare professional. [employer] disclaims any warranty or liability for your use of this information.      Substance Use Disorder: Care Instructions  Overview     You can improve your life and health by stopping your use of alcohol or drugs. When you don't drink or use drugs, you may feel and sleep better. You may get along better with your family, friends, and coworkers. There are medicines and programs that can help with substance use disorder.  How can you care for yourself at home?  Here are some ways to help you stay sober and prevent relapse.  If you have been given medicine to help keep you sober or reduce your cravings, be sure to take it exactly as prescribed.  Talk to your doctor about programs that can help you stop using drugs or drinking alcohol.  Do not keep alcohol or drugs in your home.  Plan ahead. Think about what you'll say if other people ask you to drink or use drugs. Try not to spend time with people who drink or use drugs.  Use the time and money spent on drinking or drugs to do something that's important to you.  Preventing a relapse  Have a plan to deal with relapse. Learn to recognize changes in your thinking that lead you to drink or use drugs. Get help before you start to drink or use drugs again.  Try to stay away from situations, friends, or " places that may lead you to drink or use drugs.  If you feel the need to drink alcohol or use drugs again, seek help right away. Call a trusted friend or family member. Some people get support from organizations such as Narcotics Anonymous or "[employer] or from treatment facilities.  If you relapse, get help as soon as you can. Some people make a plan with another person that outlines what they want that person to do for them if they relapse. The plan usually includes how to handle the relapse and who to notify in case of relapse.  Don't give up. Remember that a relapse doesn't mean that you have failed. Use the experience to learn the triggers that lead you to drink or use drugs. Then quit again. Recovery is a lifelong process. Many people have several relapses before they are able to quit for good.  Follow-up care is a key part of your treatment and safety. Be sure to make and go to all appointments, and call your doctor if you are having problems. It's also a good idea to know your test results and keep a list of the medicines you take.  When should you call for help?   Call 641  anytime you think you may need emergency care. For example, call if you or someone else:    Has overdosed or has withdrawal signs. Be sure to tell the emergency workers that you are or someone else is using or trying to quit using drugs. Overdose or withdrawal signs may include:  Losing consciousness.  Seizure.  Seeing or hearing things that aren't there (hallucinations).     Is thinking or talking about suicide or harming others.   Where to get help 24 hours a day, 7 days a week   If you or someone you know talks about suicide, self-harm, a mental health crisis, a substance use crisis, or any other kind of emotional distress, get help right away. You can:    Call the Suicide and Crisis Lifeline at 851.     Call 4-806-172-TALK (1-543.856.6964).     Text HOME to 764228 to access the Crisis Text Line.   Consider saving these numbers in  "your phone.  Go to [employer] for more information or to chat online.  Call your doctor now or seek immediate medical care if:    You are having withdrawal symptoms. These may include nausea or vomiting, sweating, shakiness, and anxiety.   Watch closely for changes in your health, and be sure to contact your doctor if:    You have a relapse.     You need more help or support to stop.   Where can you learn more?  Go to https://www.[employer].net/patiented  Enter H573 in the search box to learn more about \"Substance Use Disorder: Care Instructions.\"  Current as of: November 15, 2023               Content Version: 14.0    2537-7334 [employer].   Care instructions adapted under license by your healthcare professional. If you have questions about a medical condition or this instruction, always ask your healthcare professional. [employer] disclaims any warranty or liability for your use of this information.      Chronic Pain: Care Instructions  Your Care Instructions     Chronic pain is pain that lasts a long time (months or even years) and may or may not have a clear cause. It is different from acute pain, which usually does have a clear cause--like an injury or illness--and gets better over time. Chronic pain:  Lasts over time but may vary from day to day.  Does not go away despite efforts to end it.  May disrupt your sleep and lead to fatigue.  May cause depression or anxiety.  May make your muscles tense, causing more pain.  Can disrupt your work, hobbies, home life, and relationships with friends and family.  Chronic pain is a very real condition. It is not just in your head. Treatment can help and usually includes several methods used together, such as medicines, physical therapy, exercise, and other treatments. Learning how to relax and changing negative thought patterns can also help you cope.  Chronic pain is complex. Taking an active role in your treatment will help you better " manage your pain. Tell your doctor if you have trouble dealing with your pain. You may have to try several things before you find what works best for you.  Follow-up care is a key part of your treatment and safety. Be sure to make and go to all appointments, and call your doctor if you are having problems. It's also a good idea to know your test results and keep a list of the medicines you take.  How can you care for yourself at home?  Pace yourself. Break up large jobs into smaller tasks. Save harder tasks for days when you have less pain, or go back and forth between hard tasks and easier ones. Take rest breaks.  Relax, and reduce stress. Relaxation techniques such as deep breathing or meditation can help.  Keep moving. Gentle, daily exercise can help reduce pain over the long run. Try low- or no-impact exercises such as walking, swimming, and stationary biking. Do stretches to stay flexible.  Try heat, cold packs, and massage.  Get enough sleep. Chronic pain can make you tired and drain your energy. Talk with your doctor if you have trouble sleeping because of pain.  Think positive. Your thoughts can affect your pain level. Do things that you enjoy to distract yourself when you have pain instead of focusing on the pain. See a movie, read a book, listen to music, or spend time with a friend.  If you think you are depressed, talk to your doctor about treatment.  Keep a daily pain diary. Record how your moods, thoughts, sleep patterns, activities, and medicine affect your pain. You may find that your pain is worse during or after certain activities or when you are feeling a certain emotion. Having a record of your pain can help you and your doctor find the best ways to treat your pain.  Take pain medicines exactly as directed.  If the doctor gave you a prescription medicine for pain, take it as prescribed.  If you are not taking a prescription pain medicine, ask your doctor if you can take an over-the-counter  "medicine.  Reducing constipation caused by pain medicine  Talk to your doctor about a laxative. If a laxative doesn't work, your doctor may suggest a prescription medicine.  Include fruits, vegetables, beans, and whole grains in your diet each day. These foods are high in fiber.  If your doctor recommends it, get more exercise. Walking is a good choice. Bit by bit, increase the amount you walk every day. Try for at least 30 minutes on most days of the week.  Schedule time each day for a bowel movement. A daily routine may help. Take your time and do not strain when having a bowel movement.  When should you call for help?   Call your doctor now or seek immediate medical care if:    Your pain gets worse or is out of control.     You feel down or blue, or you do not enjoy things like you once did. You may be depressed, which is common in people with chronic pain. Depression can be treated.     You have vomiting or cramps for more than 2 hours.   Watch closely for changes in your health, and be sure to contact your doctor if:    You cannot sleep because of pain.     You are very worried or anxious about your pain.     You have trouble taking your pain medicine.     You have any concerns about your pain medicine.     You have trouble with bowel movements, such as:  No bowel movement in 3 days.  Blood in the anal area, in your stool, or on the toilet paper.  Diarrhea for more than 24 hours.   Where can you learn more?  Go to https://www.[email or web address].net/patiented  Enter N004 in the search box to learn more about \"Chronic Pain: Care Instructions.\"  Current as of: July 10, 2023               Content Version: 14.0    [id number] [employer].   Care instructions adapted under license by your healthcare professional. If you have questions about a medical condition or this instruction, always ask your healthcare professional. [employer] disclaims any warranty or liability for your use of this " information.      Return in about 3 months (around 7/25/2024), or if symptoms worsen or fail to improve, for Follow up of last visit, If symptoms persist, chronic pain.    Sushma Galloway MD  [facility] KAI Lares is a 68 year old, presenting for the following:  Physical        4/25/2024     2:59 PM   Additional Questions   Roomed by Amaal         Health Care Directive  Patient does not have a Health Care Directive or Living Will: N/A     HPI        4/25/2024   General Health   How would you rate your overall physical health? Good   Feel stress (tense, anxious, or unable to sleep) Not at all         4/25/2024   Nutrition   Diet: Regular (no restrictions)         4/25/2024   Exercise   Days per week of moderate/strenous exercise 3 days         4/25/2024   Social Factors   Frequency of gathering with friends or relatives Twice a week   Worry food won't last until get money to buy more No   Food not last or not have enough money for food? No   Do you have housing?  Yes   Are you worried about losing your housing? No   Lack of transportation? No   Unable to get utilities (heat,electricity)? No         4/25/2024   Fall Risk   Fallen 2 or more times in the past year? No   Trouble with walking or balance? No          4/25/2024   Activities of Daily Living- Home Safety   Needs help with the following daily activites None of the above   Safety concerns in the home None of the above         4/25/2024   Dental   Dentist two times every year? (!) NO         4/25/2024   Hearing Screening   Hearing concerns? (!) IT'S HARD TO FOLLOW A CONVERSATION IN A NOISY RESTAURANT OR CROWDED ROOM.    (!) TROUBLE UNDERSTANDING SOFT OR WHISPERED SPEECH.         4/25/2024   Driving Risk Screening   Patient/family members have concerns about driving No         4/25/2024   General Alertness/Fatigue Screening   Have you been more tired than usual lately? (!) YES         4/25/2024   Urinary Incontinence  Screening   Bothered by leaking urine in past 6 months No         4/25/2024   TB Screening   Were you born outside of the [place]? No       Today's PHQ-9 Score:       4/25/2024     2:46 PM   PHQ-9 SCORE   PHQ-9 Total Score MyChart 4 (Minimal depression)   PHQ-9 Total Score 4         4/25/2024   Substance Use   Alcohol more than 3/day or more than 7/wk Yes   How often do you have a drink containing alcohol 2 to 3 times a week   How many alcohol drinks on typical day 3 or 4   How often do you have 5+ drinks at one occasion Less than monthly   Audit 2/3 Score 2   How often not able to stop drinking once started Never   How often failed to do what normally expected Never   How often needed first drink in am after a heavy drinking session Never   How often feeling of guilt or remorse after drinking Never   How often unable to remember what happened the night before Never   Have you or someone else been injured because of your drinking No   Has anyone been concerned or suggested you cut down on drinking No   TOTAL SCORE - AUDIT 5   Do you have a current opioid prescription? (!) YES   How severe/bad is pain from 1 to 10? 6/10   Do you use any other substances recreationally? (!) ALCOHOL          No data to display              Low Risk (0-3)  Moderate Risk (4-7)  High Risk (>8)  Social History     Tobacco Use    Smoking status: Never    Smokeless tobacco: Never   Vaping Use    Vaping status: Never Used   Substance Use Topics    Alcohol use: Yes     Comment: case beer a week    Drug use: Never           4/25/2024   AAA Screening   Family history of Abdominal Aortic Aneurysm (AAA)? No   ASCVD Risk   The 10-year ASCVD risk score (Jayna GARCIA, et al., 2019) is: 19.2%    Values used to calculate the score:      Age: 68 years      Sex: Male      Is Non- : No      Diabetic: No      Tobacco smoker: No      Systolic Blood Pressure: 137 mmHg      Is BP treated: No      HDL Cholesterol: 64 mg/dL      Total  Cholesterol: 225 mg/dL    Reviewed and updated as needed this visit by Provider   Tobacco  Allergies  Meds  Problems  Med Hx  Surg Hx  Fam Hx            Past Medical History:   Diagnosis Date    Acute respiratory failure with hypoxia (H) 4/14/2021    Arthritis     Benign positional vertigo     Carpal tunnel syndrome     mild    Chronic pain syndrome 4/14/2021    Hearing problem     LEFT WORSE THAN RIGHT  10/18/2012    Migraines     Nasal polyps     Obesity 4/14/2021    Osteoarthritis of multiple joints 4/14/2021    Pneumonia due to 2019 novel coronavirus 4/14/2021    Unspecified asthma(493.90) 10/17/2012    Unspecified asthma, with exacerbation 10/17/2012     Past Surgical History:   Procedure Laterality Date    ARTHROPLASTY SHOULDER Right 6/15/2021    Procedure: Right Total Shoulder Arthroplasty, Distal Clavicle Excision;  Surgeon: Yesica Deleon MD;  Location: [facility] OR    COLONOSCOPY  2019    COLONOSCOPY N/A 8/15/2022    Procedure: COLONOSCOPY, WITH POLYPECTOMY AND BIOPSY;  Surgeon: Abelino Flores MD;  Location:  GI    HC TOOTH EXTRACTION W/FORCEP      ORTHOPEDIC SURGERY      bilateral total knee replacements    TONSILLECTOMY      age 2 or 3     Lab work is in process  Labs reviewed in EPIC  BP Readings from Last 3 Encounters:   04/25/24 137/88   02/23/24 126/82   12/26/23 130/88    Wt Readings from Last 3 Encounters:   04/25/24 124.3 kg (274 lb)   02/23/24 122.5 kg (270 lb)   12/26/23 123.8 kg (273 lb)                  Patient Active Problem List   Diagnosis    Stiffness of knee joint, right    Gait difficulty    S/P TKR (total knee replacement)    Bilateral carpal tunnel syndrome    Vitamin D deficiency    Plantar fascial fibromatosis    Mild intermittent asthma without complication    Hypertrophy of prostate without urinary obstruction    Obesity    Hyperlipidemia    Spondylosis with myelopathy, lumbar region    LEFT WORSE THAN RIGHT     CARDIOVASCULAR SCREENING; LDL GOAL LESS THAN 100     Mild persistent asthma    Hyperlipidemia with target LDL less than 130    Lumbar disc disease with radiculopathy    Groin strain, initial encounter    Sexual dysfunction    ACP (advance care planning)    Chronic bilateral low back pain without sciatica    Chronic pain disorder    Chronic gout involving toe 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 tophus, unspecified cause    Impingement syndrome, shoulder, right    Migraine without aura and without status migrainosus, not intractable    Primary osteoarthritis of left shoulder    Chronic use of opiate drug for therapeutic purpose    Arthritis of shoulder region, right    SOB (shortness of breath)    2019 novel coronavirus disease (COVID-19)    Osteoarthritis of multiple joints    Pneumonia due to severe acute respiratory syndrome coronavirus 2 (SARS-CoV-2)    Morbid obesity (H)    Status post total shoulder arthroplasty, right    Iliac artery aneurysm, left (H24)    F11.2 - Continuous opioid dependence (H)    Cervical pain (neck)    Prostate cancer (H)     Past Surgical History:   Procedure Laterality Date    ARTHROPLASTY SHOULDER Right 6/15/2021    Procedure: Right Total Shoulder Arthroplasty, Distal Clavicle Excision;  Surgeon: Yesica Deleon MD;  Location: [facility]    COLONOSCOPY  2019    COLONOSCOPY N/A 8/15/2022    Procedure: COLONOSCOPY, WITH POLYPECTOMY AND BIOPSY;  Surgeon: Abelino Flores MD;  Location:  GI    HC TOOTH EXTRACTION W/FORCEP      ORTHOPEDIC SURGERY      bilateral total knee replacements    TONSILLECTOMY      age 2 or 3       Social History     Tobacco Use    Smoking status: Never    Smokeless tobacco: Never   Substance Use Topics    Alcohol use: Yes     Comment: case beer a week     Family History   Problem Relation Age of Onset    Cancer Father     Family History Negative Father     Family History Negative Mother     Cancer Mother     Stomach Problem Mother     Breast Cancer Mother     Diabetes No family hx of     Coronary Artery Disease No family  hx of     Hypertension No family hx of     Hyperlipidemia No family hx of     Cerebrovascular Disease No family hx of     Breast Cancer No family hx of     Colon Cancer No family hx of     Prostate Cancer No family hx of     Other Cancer No family hx of     Depression No family hx of     Anxiety Disorder No family hx of     Mental Illness No family hx of     Substance Abuse No family hx of     Anesthesia Reaction No family hx of     Asthma No family hx of     Osteoporosis No family hx of     Genetic Disorder No family hx of     Thyroid Disease No family hx of     Obesity No family hx of     Unknown/Adopted No family hx of          Current Outpatient Medications   Medication Sig Dispense Refill    acetaminophen (TYLENOL) 325 MG tablet Take 2 tablets (650 mg) by mouth every 4 hours as needed for other 40 tablet 0    acyclovir (ZOVIRAX) 400 MG tablet TAKE TWO TABLETS BY MOUTH TWICE DAILY for 5 days as needed for genital herpes outbreak 28 tablet 1    albuterol (PROAIR HFA/PROVENTIL HFA/VENTOLIN HFA) 108 (90 Base) MCG/ACT inhaler Inhale 2 puffs into the lungs every 6 hours as needed for shortness of breath 18 g 1    allopurinol (ZYLOPRIM) 300 MG tablet Take 1 tablet (300 mg) by mouth daily 90 tablet 0    clindamycin (CLINDAMAX) 1 % external gel Apply topically 2 times daily 60 g 3    colchicine (COLCRYS) 0.6 MG tablet Take 2 tabs x 1, then 1 tab one hour later x 1 ( do not exceed more than 3 tabs / day ) wait for 3 days and repeat the regimen. 30 tablet 1    diclofenac (VOLTAREN) 1 % topical gel APPLY 4 GRAMS TOPICALLY 4 TIMES A  g 1    fluticasone (FLONASE) 50 MCG/ACT nasal spray Spray 2 sprays into both nostrils daily 16 g 11    HYDROcodone-acetaminophen (NORCO) 5-325 MG tablet Take 1 tablet by mouth 3 times daily as needed for pain 90 tablet 0    HYDROcodone-acetaminophen (NORCO) 7.5-325 MG per tablet Take 1 tablet by mouth every 6 hours as needed for pain (4 x daily as needed  maxium 4 per day) 120 tablet 0     ipratropium (ATROVENT) 0.06 % nasal spray SPRAY 2 SPRAYS INTO BOTH NOSTRILS 4 TIMES DAILY 15 mL 1    lidocaine (LIDODERM) 5 % patch Place 1 patch onto the skin every 24 hours 30 patch 11    meloxicam (MOBIC) 15 MG tablet Take 1 tablet (15 mg) by mouth daily 30 tablet 0    multivitamin w/minerals (THERA-VIT-M) tablet Take 1 tablet by mouth daily      mupirocin (BACTROBAN) 2 % external ointment Apply topically 3 times daily 30 g 1    naloxone (NARCAN) 4 MG/0.1ML nasal spray Spray 1 spray (4 mg) into one nostril alternating nostrils once as needed for opioid reversal Every 2-3 minutes until patient responsive or EMS arrives 0.2 mL 0    polyethylene glycol (MIRALAX) 17 g packet Take 17 g by mouth daily 7 packet 0    pseudoePHEDrine (SUDAFED) 30 MG tablet Take by mouth every 4 hours as needed for congestion      senna-docusate (SENOKOT-S/PERICOLACE) 8.6-50 MG tablet Take 1 tablet by mouth 2 times daily 30 tablet 0    sulfamethoxazole-trimethoprim (BACTRIM DS) 800-160 MG tablet Take 1 tablet by mouth 2 times daily 6 tablet 0    tadalafil (CIALIS) 10 MG tablet Take 1 tab as needed for erectile dysfunction. Do not exceed more than 2 tabs per day. 30 tablet 1    tiZANidine (ZANAFLEX) 4 MG tablet TAKE 1 TABLET (4 MG) BY MOUTH NIGHTLY AS NEEDED FOR MUSCLE SPASMS 30 tablet 0     Allergies   Allergen Reactions    No Clinical Screening - See Comments     Seasonal Allergies      Recent Labs   Lab Test 04/25/24  1544 02/23/24  1509 04/21/23  1015 08/10/22  1403 01/25/22  0954 06/01/21  0809 04/18/21  0559 04/18/21  0559 09/09/19  0945 06/04/18  0842 12/07/17  1023 12/30/16  0804   A1C 5.2  --   --   --   --   --   --   --   --   --   --  5.4   LDL  --  137* 127*  --  116*  --   --   --    < >  --    < >  --    HDL  --  64 69  --  63  --   --   --    < >  --    < >  --    TRIG  --  118 88  --  85  --   --   --    < >  --    < >  --    ALT  --  27 20 23 27  --   --  114*   < > 40   < >  --    CR  --  0.83 0.87 0.76 0.89 0.85  --   0.68*   < > 0.70   < >  --    GFRESTIMATED  --  >90 >90 >90 >90 89   < > >60   < > >90   < >  --    GFRESTBLACK  --   --   --   --   --  >90  --  >60   < > >90   < >  --    POTASSIUM  --  5.2 4.9 5.1 4.4 4.5  --  4.2   < > 4.2  --   --    TSH  --   --   --   --   --   --   --   --   --  2.02  --   --     < > = values in this interval not displayed.      Current providers sharing in care for this patient include:  Patient Care Team:  Sushma Galloway MD as PCP - General (Internal Medicine)  Carmel Chan MD as MD (Family Medicine - Sports Medicine)  Alex Singh MD as MD (Otolaryngology)  Gisella Steve MD as MD (Internal Medicine)  Anna Watkins MD as MD (Orthopedics)  Sushma Galloway MD as Assigned PCP  Meron Mccloud PA-C as Physician Assistant (Dermatology)  Sushma Galloway MD as Assigned Pain Medication Provider  Jason Rebolledo PA-C as Physician Assistant (Physician Assistant - Medical)  Jason Rebolledo PA-C as Assigned Surgical Provider    The following health maintenance items are reviewed in Epic and correct as of today:  Health Maintenance   Topic Date Due    HEPATITIS A IMMUNIZATION (1 of 2 - Risk 2-dose series) Never done    ZOSTER IMMUNIZATION (1 of 2) Never done    RSV VACCINE (Pregnancy & 60+) (1 - 1-dose 60+ series) Never done    ASTHMA ACTION PLAN  07/08/2022    INFLUENZA VACCINE (1) 09/01/2023    COVID-19 Vaccine (2 - 2023-24 season) 09/01/2023    ANNUAL REVIEW OF HM ORDERS  09/08/2024    ASTHMA CONTROL TEST  10/25/2024    LIPID  02/23/2025    URINE DRUG SCREEN  02/23/2025    CONTROLLED SUBSTANCE AGREEMENT FOR CHRONIC PAIN MANAGEMENT  02/23/2025    MEDICARE ANNUAL WELLNESS VISIT  04/25/2025    SOFIYA ASSESSMENT  04/25/2025    FALL RISK ASSESSMENT  04/25/2025    PHQ-9  04/25/2025    GLUCOSE  02/23/2027    COLORECTAL CANCER SCREENING  08/15/2027    ADVANCE CARE PLANNING  04/21/2028    DTAP/TDAP/TD IMMUNIZATION (2 - Td or Tdap) 06/18/2029     "HEPATITIS C SCREENING  Completed    MIGRAINE ACTION PLAN  Completed    PHQ-2 (once per calendar year)  Completed    Pneumococcal Vaccine: 65+ Years  Completed    IPV IMMUNIZATION  Aged Out    HPV IMMUNIZATION  Aged Out    MENINGITIS IMMUNIZATION  Aged Out    RSV MONOCLONAL ANTIBODY  Aged Out         Review of Systems  Constitutional, HEENT, cardiovascular, pulmonary, GI, , musculoskeletal, neuro, skin, endocrine and psych systems are negative, except as otherwise noted.     Objective    Exam  /88   Pulse 78   Temp 97.4  F (36.3  C) (Temporal)   Resp 16   Ht 1.753 m (5' 9\")   Wt 124.3 kg (274 lb)   SpO2 96%   BMI 40.46 kg/m     Estimated body mass index is 40.46 kg/m  as calculated from the following:    Height as of this encounter: 1.753 m (5' 9\").    Weight as of this encounter: 124.3 kg (274 lb).    Physical Exam  GENERAL: alert and no distress  EYES: Eyes grossly normal to inspection, PERRL and conjunctivae and sclerae normal  HENT: ear canals and TM's normal, nose and mouth without ulcers or lesions  NECK: no adenopathy, no asymmetry, masses, or scars  RESP: lungs clear to auscultation - no rales, rhonchi or wheezes  CV: regular rate and rhythm, normal S1 S2, no S3 or S4, no murmur, click or rub, no peripheral edema  ABDOMEN: soft, nontender, no hepatosplenomegaly, no masses and bowel sounds normal  MS: no gross musculoskeletal defects noted, no edema  SKIN: no suspicious lesions or rashes  NEURO: Normal strength and tone, mentation intact and speech normal  PSYCH: mentation appears normal, affect normal/bright        4/25/2024   Mini Cog   Clock Draw Score 2 Normal   3 Item Recall 3 objects recalled   Mini Cog Total Score 5              Signed Electronically by: Sushma Galloway MD    "

## 2024-04-26 LAB
TSH SERPL DL<=0.005 MIU/L-ACNC: 1.36 UIU/ML (ref 0.3–4.2)
VIT D+METAB SERPL-MCNC: 21 NG/ML (ref 20–50)

## 2024-04-30 ENCOUNTER — OFFICE VISIT (OUTPATIENT)
Dept: UROLOGY | Facility: CLINIC | Age: 69
End: 2024-04-30
Payer: COMMERCIAL

## 2024-04-30 VITALS
HEART RATE: 83 BPM | WEIGHT: 260 LBS | DIASTOLIC BLOOD PRESSURE: 83 MMHG | SYSTOLIC BLOOD PRESSURE: 128 MMHG | BODY MASS INDEX: 39.4 KG/M2 | HEIGHT: 68 IN

## 2024-04-30 DIAGNOSIS — R97.20 ELEVATED PROSTATE SPECIFIC ANTIGEN (PSA): Primary | ICD-10-CM

## 2024-04-30 PROCEDURE — 55700 PR BIOPSY OF PROSTATE,NEEDLE/PUNCH: CPT | Performed by: UROLOGY

## 2024-04-30 PROCEDURE — G0416 PROSTATE BIOPSY, ANY MTHD: HCPCS | Mod: 26 | Performed by: PATHOLOGY

## 2024-04-30 PROCEDURE — 76999 ECHO EXAMINATION PROCEDURE: CPT | Performed by: UROLOGY

## 2024-04-30 PROCEDURE — 88305 TISSUE EXAM BY PATHOLOGIST: CPT | Mod: TC | Performed by: UROLOGY

## 2024-04-30 PROCEDURE — 76872 US TRANSRECTAL: CPT | Performed by: UROLOGY

## 2024-04-30 RX ORDER — GENTAMICIN 40 MG/ML
80 INJECTION, SOLUTION INTRAMUSCULAR; INTRAVENOUS ONCE
Status: ACTIVE | OUTPATIENT
Start: 2024-04-30

## 2024-04-30 RX ADMIN — LIDOCAINE HYDROCHLORIDE 10 ML: 10 INJECTION, SOLUTION EPIDURAL; INFILTRATION; INTRACAUDAL; PERINEURAL at 08:00

## 2024-04-30 ASSESSMENT — PAIN SCALES - GENERAL: PAINLEVEL: NO PAIN (0)

## 2024-04-30 NOTE — LETTER
4/30/2024       RE: Arnaud Bird  [address]  [facility] [address]     Dear Colleague,    Thank you for referring your patient, Arnaud Bird, to the [facility] [place] [facility]. Please see a copy of my visit note below.    PREPROCEDURE DIAGNOSIS: Elevated PSA  POSTPROCEDURE DIAGNOSIS: Elevated PSA.   PROCEDURE: Transrectal ultrasound sizing and transrectal ultrasound guided prostatic needle biopsy, including MRI fusion targeting  for Arnaud Bird who is a 69 year old male. PSA 53.15.  SURGEON: Diego Dukes  ANESTHESIA: 5 mL of 1% periprostatic block bilaterally   We previously obtained an MRI of the prostate and identified all PIRADS 3-5 lesions for targeting    Target Lesion #1 PIRADS 5 - entire transition zone and anterior peripheral zone  Target Lesion #2 PIRADS 4 - right mid gland peripheral zone    DESCRIPTION OF PROCEDURE: The procedure, the outcome, the anesthesia, and the risks were discussed with the patient. Informed consent was obtained and signed and a timeout was completed prior to the procedure. Digital rectal examination was performed with the below findings noted. Anesthesia was administered as noted above and the transrectal ultrasound probe was inserted, sizing was performed, and the below findings were noted. Two core biopsies were taken from each of the MRI targets, and 12 core biopsies were taken as described below. The probe was then withdrawn. Patient tolerated the procedure well.   FINDINGS: Digital rectal exam reveals normal prostate. Total volume is 40 mL. Hypoechoic lesion bilaterally. US images were obtained and then fused with the MRI images.  The fused images were then used to guide the biopsy of the targeted lesions with 2 cores taken of each lesion.  We then performed random biopsies.  12 cores were taken with 6 on each side, base, mid and apex.  PLAN: Will follow-up next week to  review results.  Diego Dukes MD  Urology  [facility]

## 2024-04-30 NOTE — PATIENT INSTRUCTIONS
[facility]  Precautions Following a Prostate Biopsy    There are four conditions that you should watch for after a prostate biopsy:    Excessive pain  Bleeding irregularities (passing numerous  dime sized  clots or if your urine looks like cranberry juice)  Fever of 100 degrees or more  If you are unable to urinate      If any of these occur, call the Urology Clinic during normal business hours (M-F, 8:00-4:30) at [phone].  If you experience a problem after normal business hours, call our 24-hour phone number at [phone] and ask for the Urology Resident on call to be paged.    If you experience any discomfort following the biopsy, you may take Tylenol.  DO NOT TAKE ASPIRIN unless specified by your physician.   If the discomfort becomes severe or uncontrolled by medication, contact the Urology Clinic or Urology Resident (after normal business hours).    Do not be alarmed if you have some blood in your stool, in your urine, or ejaculate (semen).  This occurrence is normal and may last up to three (3) or four (4) days, usually intermittently.  Blood in the ejaculate (semen) may last several weeks, up to about a dozen ejaculations.  The blood in your ejaculate may appear as brown streaks, blood tinged, and immediately following a biopsy, it may appear bright red.    If you run a fever above 100 degrees, call the Urology Clinic or Urology Resident (after normal business hours) immediately.  If you are unable to reach your physician or the Resident on call, go to the nearest emergency room.  Explain that you have had a transrectal biopsy of your prostate and what problems you are experiencing.      You should attempt to urinate following your biopsy before you leave the clinic.  If you are unable to urinate four (4) to six (6) hours after you leave the clinic, you will need to contact the Urology Clinic or the Resident on call.  If you are unable to reach your physician or the  Resident on call, go to the nearest emergency room.            If you have any questions or concerns after your biopsy, feel free to contact the Urology Clinic at [phone] during M-F, 8:00-5pm business hours.  If you need to speak with someone after normal business hours, call [phone] and ask for the Resident on call to be paged.

## 2024-04-30 NOTE — PROGRESS NOTES
PREPROCEDURE DIAGNOSIS: Elevated PSA  POSTPROCEDURE DIAGNOSIS: Elevated PSA.   PROCEDURE: Transrectal ultrasound sizing and transrectal ultrasound guided prostatic needle biopsy, including MRI fusion targeting  for Arnaud Bird who is a 69 year old male. PSA 53.15.  SURGEON: Diego Dukes  ANESTHESIA: 5 mL of 1% periprostatic block bilaterally   We previously obtained an MRI of the prostate and identified all PIRADS 3-5 lesions for targeting    Target Lesion #1 PIRADS 5 - entire transition zone and anterior peripheral zone  Target Lesion #2 PIRADS 4 - right mid gland peripheral zone    DESCRIPTION OF PROCEDURE: The procedure, the outcome, the anesthesia, and the risks were discussed with the patient. Informed consent was obtained and signed and a timeout was completed prior to the procedure. Digital rectal examination was performed with the below findings noted. Anesthesia was administered as noted above and the transrectal ultrasound probe was inserted, sizing was performed, and the below findings were noted. Two core biopsies were taken from each of the MRI targets, and 12 core biopsies were taken as described below. The probe was then withdrawn. Patient tolerated the procedure well.   FINDINGS: Digital rectal exam reveals normal prostate. Total volume is 40 mL. Hypoechoic lesion bilaterally. US images were obtained and then fused with the MRI images.  The fused images were then used to guide the biopsy of the targeted lesions with 2 cores taken of each lesion.  We then performed random biopsies.  12 cores were taken with 6 on each side, base, mid and apex.  PLAN: Will follow-up next week to review results.  Diego Dukes MD  Urology  [facility]

## 2024-04-30 NOTE — NURSING NOTE
"Chief Complaint   Patient presents with    Prostate Biopsy       Blood pressure 128/83, pulse 83, height 1.727 m (5' 8\"), weight 117.9 kg (260 lb). Body mass index is 39.53 kg/m .    Patient Active Problem List   Diagnosis    Stiffness of knee joint, right    Gait difficulty    S/P TKR (total knee replacement)    Bilateral carpal tunnel syndrome    Vitamin D deficiency    Plantar fascial fibromatosis    Mild intermittent asthma without complication    Hypertrophy of prostate without urinary obstruction    Obesity    Hyperlipidemia    Spondylosis with myelopathy, lumbar region    LEFT WORSE THAN RIGHT     CARDIOVASCULAR SCREENING; LDL GOAL LESS THAN 100    Mild persistent asthma    Hyperlipidemia with target LDL less than 130    Lumbar disc disease with radiculopathy    Groin strain, initial encounter    Sexual dysfunction    ACP (advance care planning)    Chronic bilateral low back pain without sciatica    Chronic pain disorder    Chronic gout involving toe 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 tophus, unspecified cause    Impingement syndrome, shoulder, right    Migraine without aura and without status migrainosus, not intractable    Primary osteoarthritis of left shoulder    Chronic use of opiate drug for therapeutic purpose    Arthritis of shoulder region, right    SOB (shortness of breath)    2019 novel coronavirus disease (COVID-19)    Osteoarthritis of multiple joints    Pneumonia due to severe acute respiratory syndrome coronavirus 2 (SARS-CoV-2)    Morbid obesity (H)    Status post total shoulder arthroplasty, right    Iliac artery aneurysm, left (H24)    F11.2 - Continuous opioid dependence (H)    Cervical pain (neck)    Prostate cancer (H)       Allergies   Allergen Reactions    No Clinical Screening - See Comments     Seasonal Allergies        Current Outpatient Medications   Medication Sig Dispense Refill    acetaminophen (TYLENOL) 325 MG tablet Take 2 tablets (650 mg) by mouth every 4 hours as needed for other 40 " tablet 0    acyclovir (ZOVIRAX) 400 MG tablet TAKE TWO TABLETS BY MOUTH TWICE DAILY for 5 days as needed for genital herpes outbreak 28 tablet 1    albuterol (PROAIR HFA/PROVENTIL HFA/VENTOLIN HFA) 108 (90 Base) MCG/ACT inhaler Inhale 2 puffs into the lungs every 6 hours as needed for shortness of breath 18 g 1    allopurinol (ZYLOPRIM) 300 MG tablet Take 1 tablet (300 mg) by mouth daily 90 tablet 0    clindamycin (CLINDAMAX) 1 % external gel Apply topically 2 times daily 60 g 3    colchicine (COLCRYS) 0.6 MG tablet Take 2 tabs x 1, then 1 tab one hour later x 1 ( do not exceed more than 3 tabs / day ) wait for 3 days and repeat the regimen. 30 tablet 1    diclofenac (VOLTAREN) 1 % topical gel APPLY 4 GRAMS TOPICALLY 4 TIMES A  g 1    fluticasone (FLONASE) 50 MCG/ACT nasal spray Spray 2 sprays into both nostrils daily 16 g 11    HYDROcodone-acetaminophen (NORCO) 5-325 MG tablet Take 1 tablet by mouth 3 times daily as needed for pain 90 tablet 0    HYDROcodone-acetaminophen (NORCO) 7.5-325 MG per tablet Take 1 tablet by mouth every 6 hours as needed for pain (4 x daily as needed  maxium 4 per day) 120 tablet 0    ipratropium (ATROVENT) 0.06 % nasal spray SPRAY 2 SPRAYS INTO BOTH NOSTRILS 4 TIMES DAILY 15 mL 1    lidocaine (LIDODERM) 5 % patch Place 1 patch onto the skin every 24 hours 30 patch 11    meloxicam (MOBIC) 15 MG tablet Take 1 tablet (15 mg) by mouth daily 30 tablet 0    multivitamin w/minerals (THERA-VIT-M) tablet Take 1 tablet by mouth daily      mupirocin (BACTROBAN) 2 % external ointment Apply topically 3 times daily 30 g 1    naloxone (NARCAN) 4 MG/0.1ML nasal spray Spray 1 spray (4 mg) into one nostril alternating nostrils once as needed for opioid reversal Every 2-3 minutes until patient responsive or EMS arrives 0.2 mL 0    polyethylene glycol (MIRALAX) 17 g packet Take 17 g by mouth daily 7 packet 0    pseudoePHEDrine (SUDAFED) 30 MG tablet Take by mouth every 4 hours as needed for  congestion      senna-docusate (SENOKOT-S/PERICOLACE) 8.6-50 MG tablet Take 1 tablet by mouth 2 times daily 30 tablet 0    sulfamethoxazole-trimethoprim (BACTRIM DS) 800-160 MG tablet Take 1 tablet by mouth 2 times daily 6 tablet 0    tadalafil (CIALIS) 10 MG tablet Take 1 tab as needed for erectile dysfunction. Do not exceed more than 2 tabs per day. 30 tablet 1    tiZANidine (ZANAFLEX) 4 MG tablet TAKE 1 TABLET (4 MG) BY MOUTH NIGHTLY AS NEEDED FOR MUSCLE SPASMS 30 tablet 0       Social History     Tobacco Use    Smoking status: Never    Smokeless tobacco: Never   Vaping Use    Vaping status: Never Used   Substance Use Topics    Alcohol use: Yes     Comment: case beer a week    Drug use: Never       Invasive Procedure Safety Checklist:    Procedure: MRI fusion TRUS prostate biopsy    Action: Complete sections and checkboxes as appropriate.    Pre-procedure:  1. Patient ID Verified with 2 identifiers (Jaqueline and  or MRN) : YES    2. Procedure and site verified with patient/designee (when able) : YES    3. Accurate consent documentation in medical record : YES    4. H&P (or appropriate assessment) documented in medical record : N/A  H&P must be up to 30 days prior to procedure an updated within 24 hours of                 Procedure as applicable.     5. Relevant diagnostic and radiology test results appropriately labeled and displayed as applicable : YES    6. Blood products, implants, devices, and/or special equipment available for the procedure as applicable : YES    7. Procedure site(s) marked with provider initials [Exclusions: none] : NO    8. Marking not required. Reason : Yes  Procedure does not require site marking    Time Out:     Time-Out performed immediately prior to starting procedure, including verbal and active participation of all team members addressing: YES    1. Correct patient identity.  2. Confirmed that the correct side and site are marked.  3. An accurate procedure to be done.  4. Agreement  on the procedure to be done.  5. Correct patient position.  6. Relevant images and results are properly labeled and appropriately displayed.  7. The need to administer antibiotics or fluids for irrigation purposes during the procedure as applicable.  8. Safety precautions based on patient history or medication use.    During Procedure: Verification of correct person, site, and procedure occurs any time the responsibility for care of the patient is transferred to another member of the care team.      The following medication was given:     MEDICATION:  Lidocaine without epinephrine 1%  ROUTE: administered by physician - prostate nerve block   SITE: administered by physician - prostate nerve block    DOSE: 10 mL  LOT #: [id number]  : [employer] Document Management software  EXPIRATION DATE: 05/27  NDC#: 73504-181-24   Was there drug waste? Yes  Amount of drug waste (mL): 20 mL.  Reason for waste:  Single use vial    Prior to injection, verified patient identity using patient's name and date of birth.  Due to injection administration, patient instructed to remain in clinic for 15 minutes  afterwards, and to report any adverse reaction to me immediately.    Drug Amount Wasted:  Yes: 20 mL (10 mg/ml )  Vial/Syringe: Single dose vial    Marie Rodriguez  4/30/2024  7:28 AM

## 2024-04-30 NOTE — NURSING NOTE
The following medication was given:     MEDICATION:  Gentamicin  ROUTE: IM  SITE: Ventrogluteal - Right  DOSE: 80 mg/2 mL  LOT #: [id number]  : [employer]  EXPIRATION DATE: 05/25  NDC#: 19503-638-70   Was there drug waste? No      Pt tolerated injection well.    Alisa Donald CMA  04/30/24  9:49 AM

## 2024-05-01 ENCOUNTER — PRE VISIT (OUTPATIENT)
Dept: UROLOGY | Facility: CLINIC | Age: 69
End: 2024-05-01
Payer: COMMERCIAL

## 2024-05-01 NOTE — TELEPHONE ENCOUNTER
Reason for visit: bx results      Dx/Hx/Sx: elevated PSA     Records/imaging/labs/orders: in epic     At Rooming: video visit

## 2024-05-02 LAB
PATH REPORT.COMMENTS IMP SPEC: NORMAL
PATH REPORT.FINAL DX SPEC: NORMAL
PATH REPORT.GROSS SPEC: NORMAL
PATH REPORT.MICROSCOPIC SPEC OTHER STN: NORMAL
PATH REPORT.RELEVANT HX SPEC: NORMAL
PHOTO IMAGE: NORMAL

## 2024-05-03 DIAGNOSIS — C61 PROSTATE CANCER (H): Primary | ICD-10-CM

## 2024-05-03 NOTE — BRIEF OP NOTE
[facility]    Brief Operative Note    Pre-operative diagnosis: Arthritis of shoulder region, right [M19.011]  Post-operative diagnosis Same as pre-operative diagnosis    Procedure: Procedure(s):  Right Total Shoulder Arthroplasty, Distal Clavicle Excision  Surgeon: Surgeon(s) and Role:     * Yesica Deleon MD - Primary  Anesthesia: Choice   Estimated blood loss: 200 ml  Drains: None  Specimens: * No specimens in log *  Findings:   end stage arthrosis; AC arthrosis.  Complications: None.  Implants:   Implant Name Type Inv. Item Serial No.  Lot No. LRB No. Used Action   BONE CEMENT SIMPLEX W/TOBRAMYCIN 6197-9-001 - [id number] Cement, Bone BONE CEMENT SIMPLEX W/TOBRAMYCIN 6197-9-001  [employer] [id number] Right 1 Implanted   Arthrex Univers VaultLock Glenoid Large Total Joint Component/Insert   ARTHREX [id number] Right 1 Implanted   Arthrex Eclipse Trunion, 43 Total Joint Component/Insert   ARTHREX [id number] Right 1 Implanted   Arthrex Eclipse Cage Screw Large Total Joint Component/Insert   ARTHREX [id number] Right 1 Implanted   Arthrex Eclipse Humeral Head 45/19 Total Joint Component/Insert   ARTHREX [id number] Right 1 Implanted   Arthrex FiberTak  w labral tape & #2 tigertail    ARTHREX [id number] Right 1 Implanted   Arthrex FiberTak DR macdonald labral tape & #2 Tigertail    ARTHREX [id number] Right 1 Implanted   Arthrex Suture [place] Biocomposite Swivelock 3.9 x 17.9mm    ARTHREX [id number] Right 1 Implanted   Arthrex Suture [place] Biocomposite Swivelock 3.9 x 17.9mm    ARTHREX [id number] Right 1 Implanted            Airway patent, Nasal mucosa clear. Mouth with normal mucosa. Throat has no vesicles, no oropharyngeal exudates and uvula is midline.

## 2024-05-04 ENCOUNTER — MYC REFILL (OUTPATIENT)
Dept: FAMILY MEDICINE | Facility: CLINIC | Age: 69
End: 2024-05-04
Payer: COMMERCIAL

## 2024-05-04 DIAGNOSIS — Z96.652 STATUS POST TOTAL LEFT KNEE REPLACEMENT: ICD-10-CM

## 2024-05-04 DIAGNOSIS — R26.9 GAIT DIFFICULTY: ICD-10-CM

## 2024-05-04 DIAGNOSIS — G89.4 CHRONIC PAIN SYNDROME: Chronic | ICD-10-CM

## 2024-05-04 DIAGNOSIS — M25.661 STIFFNESS OF KNEE JOINT, RIGHT: Chronic | ICD-10-CM

## 2024-05-04 DIAGNOSIS — M47.16 SPONDYLOSIS WITH MYELOPATHY, LUMBAR REGION: ICD-10-CM

## 2024-05-06 RX ORDER — HYDROCODONE BITARTRATE AND ACETAMINOPHEN 7.5; 325 MG/1; MG/1
1 TABLET ORAL EVERY 6 HOURS PRN
Qty: 120 TABLET | Refills: 0 | Status: SHIPPED | OUTPATIENT
Start: 2024-05-08 | End: 2024-06-06

## 2024-05-06 NOTE — TELEPHONE ENCOUNTER
Patient has urine culture from [facility] that shows bacteria. Have sent prescription to pharmacy. It is unclear if this has been treated elsewhere.      Also, had recommended urology evaluation and unclear if that has occurred.    [place] reviewed:  reviewed- no concerns    [place]  [email or web address]    Refill done from 5/8/2024 as per  check.    Sushma Galloway MD on 5/6/2024 at 5:40 PM

## 2024-05-10 ENCOUNTER — PRE VISIT (OUTPATIENT)
Dept: UROLOGY | Facility: CLINIC | Age: 69
End: 2024-05-10
Payer: COMMERCIAL

## 2024-05-10 NOTE — TELEPHONE ENCOUNTER
Reason for visit: bx follow up      Dx/Hx/Sx: elevated PSA    Records/imaging/labs/orders: in epic     At Rooming: virtual visit

## 2024-05-21 ENCOUNTER — HOSPITAL ENCOUNTER (OUTPATIENT)
Dept: PET IMAGING | Facility: CLINIC | Age: 69
Setting detail: NUCLEAR MEDICINE
Discharge: HOME | End: 2024-05-21
Attending: UROLOGY | Admitting: UROLOGY
Payer: COMMERCIAL

## 2024-05-21 DIAGNOSIS — C61 PROSTATE CANCER (H): ICD-10-CM

## 2024-05-21 PROCEDURE — 71260 CT THORAX DX C+: CPT | Mod: 26 | Performed by: RADIOLOGY

## 2024-05-21 PROCEDURE — 250N000011 HC RX IP 250 OP 636: Performed by: UROLOGY

## 2024-05-21 PROCEDURE — 74177 CT ABD & PELVIS W/CONTRAST: CPT | Mod: 26 | Performed by: RADIOLOGY

## 2024-05-21 PROCEDURE — 78815 PET IMAGE W/CT SKULL-THIGH: CPT | Mod: 26 | Performed by: RADIOLOGY

## 2024-05-21 PROCEDURE — A9596 HC RX 343: HCPCS | Performed by: UROLOGY

## 2024-05-21 PROCEDURE — 71260 CT THORAX DX C+: CPT

## 2024-05-21 PROCEDURE — 343N000001 HC RX 343: Performed by: UROLOGY

## 2024-05-21 PROCEDURE — 78815 PET IMAGE W/CT SKULL-THIGH: CPT | Mod: PS

## 2024-05-21 RX ORDER — IOPAMIDOL 755 MG/ML
10-135 INJECTION, SOLUTION INTRAVASCULAR ONCE
Status: COMPLETED | OUTPATIENT
Start: 2024-05-21 | End: 2024-05-21

## 2024-05-21 RX ADMIN — KIT FOR THE PREPARATION OF GALLIUM GA 68 GOZETOTIDE INJECTION 5.63 MILLICURIE: KIT INTRAVENOUS at 10:22

## 2024-05-21 RX ADMIN — IOPAMIDOL 135 ML: 755 INJECTION, SOLUTION INTRAVENOUS at 11:31

## 2024-05-28 ENCOUNTER — PATIENT OUTREACH (OUTPATIENT)
Dept: ONCOLOGY | Facility: CLINIC | Age: 69
End: 2024-05-28

## 2024-05-28 ENCOUNTER — VIRTUAL VISIT (OUTPATIENT)
Dept: UROLOGY | Facility: CLINIC | Age: 69
End: 2024-05-28
Payer: COMMERCIAL

## 2024-05-28 DIAGNOSIS — C61 PROSTATE CANCER (H): Primary | ICD-10-CM

## 2024-05-28 PROCEDURE — 99214 OFFICE O/P EST MOD 30 MIN: CPT | Mod: 95 | Performed by: UROLOGY

## 2024-05-28 ASSESSMENT — PAIN SCALES - GENERAL: PAINLEVEL: MILD PAIN (3)

## 2024-05-28 NOTE — PROGRESS NOTES
"New Patient Hematology / Oncology Nurse Navigator Note     Referral Date: 5/28/24    Referring provider:   Diego Dukes MD   [address]   [facility] [facility] [address]   Phone: [phone]   Fax: [phone]       Referring Clinic/Organization: [facility]     Referred to: Radiation Oncology    Requested provider (if applicable): First available - did not specify     Evaluation for : \"high-risk prostate cancer; Sturgis 9, PSA 53, PSMA PET completed with no visible mets \"     Clinical History (per Nurse review of records provided):    4/30/24 Path:  Final Diagnosis   A. Target 1:  -Prostatic adenocarcinoma, grade group 5 (Sturgis score 4+5)  Involving 2 of 2 cores, 80% of the tissue   B. Target 2:  -Prostatic adenocarcinoma, grade group 3(America score 4+3), pattern 4: 70%  Involving 2 of 2 cores, discontinuously 70% of the tissue   C. Prostate, Base, Right:  -Prostatic adenocarcinoma, grade group 3(America score 4+3), pattern 4: 70%  Involving 1 of 2 cores, discontinuously 20% of the tissue     D. Prostate, Mid, Right:  -Prostatic adenocarcinoma, grade group 3([place] score 4+3), pattern 4: 70%  Involving 2 of 2 cores, discontinuously 70% of the tissue    E. Prostate, [place], Right:  -Prostatic adenocarcinoma, grade group 4 (America score 4+4)  Involving 2 of 2 cores, 70% of the tissue     F. Prostate, Base, Left:  - Focal high-grade prostatic intraepithelial neoplasia (HGPIN)     G. Prostate, Mid, Left:  -Prostatic adenocarcinoma, grade group 4 (America score 4+4)  Involving 2 of 2 cores, 25% of the tissue     H. Prostate, [place], Left:  -Prostatic adenocarcinoma, grade group 4 (America score 4+4)  Involving 1 of 3 cores, 30% of the tissue    5/21/24 PET/CT -- BOOKMARKED  3/15 PSA -- BOOKMARKED  Virtual Visit today with Urology (note pending)-- BOOKMARKED    Clinical Assessment / Barriers to Care (Per Nurse):  Pt lives in [place], [place]    Records Location: [facility]     Records Needed: "   N/A    Additional testing needed prior to consult:   N/A    Referral updates and Plan:   Referral received, chart reviewed by nursing, scheduling instructions updated, patient contacted and scheduled with [facility] as requested.     Brielle Abreu, BSN, RN, PHN, OCN  Hematology/Oncology Nurse Navigator  [facility]  [phone]

## 2024-05-28 NOTE — NURSING NOTE
Is the patient currently in the state of [place]? YES    Visit mode:VIDEO    If the visit is dropped, the patient can be reconnected by: VIDEO VISIT: Text to cell phone:   Telephone Information:   Mobile [phone]    and VIDEO VISIT: Send to e-mail at: [email or web address]    Will anyone else be joining the visit? NO  (If patient encounters technical issues they should call [phone])    How would you like to obtain your AVS? MyChart    Are changes needed to the allergy or medication list? Pt stated no changes to allergies and Pt declined med review individually, but stated no recent changes    Are refills needed on medications prescribed by this physician? NO    Reason for visit: RECHECK    Chelsey ANDERS

## 2024-05-28 NOTE — PROGRESS NOTES
Arnaud Bird is a 69 year old male who is being evaluated via a billable video visit.      How would you like to obtain your AVS? [employer]  If the video visit is dropped, the invitation should be resent by: Text to cell phone: [phone]  Will anyone else be joining your video visit? No    Video Start Time: 11:50 AM    CHIEF COMPLAINT   It was my pleasure to see Arnaud Bird who is a 69 year old male for follow-up of prostate cancer.      HPI   Arnaud Bird is a very pleasant 69 year old male who presents with a history of high risk prostate cancer. Underwood 4+5=9 disease, PSA 53.15. No complications from biopsy. He has had PSMA PET completed that demonstrates no evidence of metastatic disease.    PSA 53.15    Fusion Biopsy 4/30/2024  Final Diagnosis   A. Target 1:  -Prostatic adenocarcinoma, grade group 5 ([place]wood score 4+5)  Involving 2 of 2 cores, 80% of the tissue     B. Target 2:  -Prostatic adenocarcinoma, grade group 3(America score 4+3), pattern 4: 70%  Involving 2 of 2 cores, discontinuously 70% of the tissue     C. Prostate, Base, Right:  -Prostatic adenocarcinoma, grade group 3([place] score 4+3), pattern 4: 70%  Involving 1 of 2 cores, discontinuously 20% of the tissue     D. Prostate, Mid, Right:  -Prostatic adenocarcinoma, grade group 3(America score 4+3), pattern 4: 70%  Involving 2 of 2 cores, discontinuously 70% of the tissue     E. Prostate, [place], Right:  -Prostatic adenocarcinoma, grade group 4 (America score 4+4)  Involving 2 of 2 cores, 70% of the tissue     F. Prostate, Base, Left:  - Focal high-grade prostatic intraepithelial neoplasia (HGPIN)     G. Prostate, Mid, Left:  -Prostatic adenocarcinoma, grade group 4 (America score 4+4)  Involving 2 of 2 cores, 25% of the tissue     H. Prostate, [place], Left:  -Prostatic adenocarcinoma, grade group 4 (America score 4+4)  Involving 1 of 3 cores, 30% of the tissue        MRI Prostate 4/16/2024  Size: 39 grams   IMPRESSION:  1. Based on  the most suspicious abnormality, this exam is  characterized as PIRADS 5 - Clinically significant cancer is highly  likely to be present.  The most suspicious abnormality is involving  the entire  transition zone, and to a lesser extent the anterior  peripheral zone,  anterior fibromuscular stroma, and urethral  sphincter and there is high suspicion of extraprostatic extension.  Additional PIRADS-4 lesion in the right mid gland peripheral zone.   2. No evidence of osseous metastasis.  3. There are a few subcentimeter right external iliac, right internal  iliac, and bilateral inguinal lymph nodes, which are likely reactive,  however cannot definitely exclude metastatic involvement. Consider  PSMA PET for further evaluation if clinically indicated.     PSMA PET 5/21/2024  IMPRESSION:     1. Prostate: Intense diffuse tracer uptake. miPSMA Expression Score 3.  (High PSMA expression)     2. Juliot disease: None     3. Bones: None     4. Small cluster of tree-in-bud nodularity in the right middle lobe,  most likely infectious/inflammatory etiology.          Allergies:   No clinical screening - see comments and Seasonal allergies         Review of Systems:  From intake questionnaire     Skin: negative  Eyes: negative  Ears/Nose/Throat: negative  Respiratory: No shortness of breath, dyspnea on exertion, cough, or hemoptysis  Cardiovascular: No chest pain or palpitations  Gastrointestinal: negative; no nausea/vomiting, constipation or diarrhea  Genitourinary: as per HPI  Musculoskeletal: negative  Neurologic: negative  Psychiatric: negative  Hematologic/Lymphatic/Immunologic: negative  Endocrine: negative         Physical Exam:     Vitals:  No vitals were obtained today due to virtual visit.    Physical Exam   EYES: Eyes grossly normal to inspection.  No discharge or erythema, or obvious scleral/conjunctival abnormalities.  SKIN: Visible skin clear. No significant rash, abnormal pigmentation or lesions.  NEURO: Cranial nerves  grossly intact.  Mentation and speech appropriate for age.  GENERAL: Healthy, alert and no distress  RESP: No audible wheeze, cough, or visible cyanosis.  No visible retractions or increased work of breathing.    PSYCH: Mentation appears normal, affect normal/bright, judgement and insight intact, normal speech and appearance well-groomed.      Outside and Past Medical records:    Review of the result(s) of each unique test - PSA, MRI, pathology, PSMA PET         Assessment and Plan:     69 year old male with high-risk localized prostate cancer, PSA 53.15, Matador 4+5=9. We had an extensive discussion about the significance of localized, prostate cancer. We discussed the options for treatment of a localized prostate cancer including active surveillance, brachytherapy, external beam radiation therapy, and surgical removal. We discussed that based on his Matador score he would not be an ideal candidate for active surveillance.       We discussed the relative merits of robotic assisted radical prostatectomy. We also discussed the advantages and disadvantages and roles of open surgery vs. laparoscopic (and Da Eric assisted) surgery. The anticipated post-operative course was explained, including an anticipated 1-2 day hospital stay. Catheter will remain in place 7-10 days. At this point, however, given his high-risk disease and appearance on MRI, it is unlikely a curative resection could be completed with prostatectomy.    With this in mind, we discussed the role for combined therapy with ADT coupled with external beam radiotherapy. We reviewed the potential side effects of ADT, as well as the potential risks and side effects of radiation. While his PSA is markedly elevated, his PET scan suggests localized disease, and as such, radiation with hormones is reasonable. Will plan referral to radiation oncology to discuss further, and I will see him back as needed.     Plan:  Radiation oncology referral    Orders  Orders  Placed This Encounter   Procedures    Radiation Therapy Referral     Video-Visit Details    Type of service:  Video Visit    Video End Time:12:00 PM    Originating Location (pt. Location): Home    Distant Location (provider location):  On-site    Platform used for Video Visit: SmartHub    25 minutes spent on the date of the encounter including direct interaction with the patient, performing chart review, history and exam, documentation and further activities as noted above.    Diego Dukes MD  Urology  [facility]

## 2024-05-28 NOTE — Clinical Note
5/28/2024       RE: Arnaud Bird  [address]  [facility] [address]     Dear Colleague,    Thank you for referring your patient, Arnaud Bird, to the [facility] UROLOGY [facility] [place] at [facility]. Please see a copy of my visit note below.    Arnaud Bird is a 69 year old male who is being evaluated via a billable video visit.      How would you like to obtain your AVS? MyChart  If the video visit is dropped, the invitation should be resent by: Text to cell phone: [phone]  Will anyone else be joining your video visit? No    Video Start Time: 11:50 AM    CHIEF COMPLAINT   It was my pleasure to see Arnaud Bird who is a 69 year old male for follow-up of prostate cancer.      HPI   Arnaud Bird is a very pleasant 69 year old male who presents with a history of {Diagnoses:910456}.  ***    PSA 53.15    Fusion Biopsy 4/30/2024  Final Diagnosis   A. Target 1:  -Prostatic adenocarcinoma, grade group 5 ([place] score 4+5)  Involving 2 of 2 cores, 80% of the tissue     B. Target 2:  -Prostatic adenocarcinoma, grade group 3([place] score 4+3), pattern 4: 70%  Involving 2 of 2 cores, discontinuously 70% of the tissue     C. Prostate, Base, Right:  -Prostatic adenocarcinoma, grade group 3([place] score 4+3), pattern 4: 70%  Involving 1 of 2 cores, discontinuously 20% of the tissue     D. Prostate, Mid, Right:  -Prostatic adenocarcinoma, grade group 3([place] score 4+3), pattern 4: 70%  Involving 2 of 2 cores, discontinuously 70% of the tissue     E. Prostate, [place], Right:  -Prostatic adenocarcinoma, grade group 4 ([place] score 4+4)  Involving 2 of 2 cores, 70% of the tissue     F. Prostate, Base, Left:  - Focal high-grade prostatic intraepithelial neoplasia (HGPIN)     G. Prostate, Mid, Left:  -Prostatic adenocarcinoma, grade group 4 ([place] score 4+4)  Involving 2 of 2 cores, 25% of the tissue     H. Prostate, [place],  Left:  -Prostatic adenocarcinoma, grade group 4 ([place] score 4+4)  Involving 1 of 3 cores, 30% of the tissue        MRI Prostate 4/16/2024  Size: 39 grams   IMPRESSION:  1. Based on the most suspicious abnormality, this exam is  characterized as PIRADS 5 - Clinically significant cancer is highly  likely to be present.  The most suspicious abnormality is involving  the entire  transition zone, and to a lesser extent the anterior  peripheral zone,  anterior fibromuscular stroma, and urethral  sphincter and there is high suspicion of extraprostatic extension.  Additional PIRADS-4 lesion in the right mid gland peripheral zone.   2. No evidence of osseous metastasis.  3. There are a few subcentimeter right external iliac, right internal  iliac, and bilateral inguinal lymph nodes, which are likely reactive,  however cannot definitely exclude metastatic involvement. Consider  PSMA PET for further evaluation if clinically indicated.     PSMA PET 5/21/2024  IMPRESSION:     1. Prostate: Intense diffuse tracer uptake. miPSMA Expression Score 3.  (High PSMA expression)     2. Julito disease: None     3. Bones: None     4. Small cluster of tree-in-bud nodularity in the right middle lobe,  most likely infectious/inflammatory etiology.          Allergies:   No clinical screening - see comments and Seasonal allergies         Review of Systems:  From intake questionnaire     Skin: negative  Eyes: negative  Ears/Nose/Throat: negative  Respiratory: No shortness of breath, dyspnea on exertion, cough, or hemoptysis  Cardiovascular: No chest pain or palpitations  Gastrointestinal: negative; no nausea/vomiting, constipation or diarrhea  Genitourinary: as per HPI  Musculoskeletal: negative  Neurologic: negative  Psychiatric: negative  Hematologic/Lymphatic/Immunologic: negative  Endocrine: negative         Physical Exam:     Vitals:  No vitals were obtained today due to virtual visit.    Physical Exam   EYES: Eyes grossly normal to  inspection.  No discharge or erythema, or obvious scleral/conjunctival abnormalities.  SKIN: Visible skin clear. No significant rash, abnormal pigmentation or lesions.  NEURO: Cranial nerves grossly intact.  Mentation and speech appropriate for age.  GENERAL: Healthy, alert and no distress  RESP: No audible wheeze, cough, or visible cyanosis.  No visible retractions or increased work of breathing.    PSYCH: Mentation appears normal, affect normal/bright, judgement and insight intact, normal speech and appearance well-groomed.      Outside and Past Medical records:    Assessment requiring an independent historian(s) - {:884655}  Review of prior external note(s) from - {note reviewed source:517161}  Review of the result(s) of each unique test - ***         Assessment and Plan:     Assessment:   {Diagnoses:655200}    Plan:  ***    Orders  No orders of the defined types were placed in this encounter.        Video-Visit Details    Type of service:  Video Visit    Video End Time:12:00 PM    Originating Location (pt. Location): Home    Distant Location (provider location):  On-site    Platform used for Video Visit: [employer]    *** minutes spent on the date of the encounter including direct interaction with the patient, performing chart review, history and exam, documentation and further activities as noted above.    Diego Dukes MD  Urology  [facility]      Arnaud Bird is a 69 year old male who is being evaluated via a billable video visit.      How would you like to obtain your AVS? MyChart  If the video visit is dropped, the invitation should be resent by: Text to cell phone: [phone]  Will anyone else be joining your video visit? No    Video Start Time: 11:50 AM    CHIEF COMPLAINT   It was my pleasure to see Arnaud Bird who is a 69 year old male for follow-up of prostate cancer.      HPI   Arnaud Bird is a very pleasant 69 year old male who presents with a history of high  risk prostate cancer. [place] 4+5=9 disease, PSA 53.15. No complications from biopsy. He has had PSMA PET completed that demonstrates no evidence of metastatic disease.    PSA 53.15    Fusion Biopsy 4/30/2024  Final Diagnosis   A. Target 1:  -Prostatic adenocarcinoma, grade group 5 ([place] score 4+5)  Involving 2 of 2 cores, 80% of the tissue     B. Target 2:  -Prostatic adenocarcinoma, grade group 3([place] score 4+3), pattern 4: 70%  Involving 2 of 2 cores, discontinuously 70% of the tissue     C. Prostate, Base, Right:  -Prostatic adenocarcinoma, grade group 3([place] score 4+3), pattern 4: 70%  Involving 1 of 2 cores, discontinuously 20% of the tissue     D. Prostate, Mid, Right:  -Prostatic adenocarcinoma, grade group 3([place] score 4+3), pattern 4: 70%  Involving 2 of 2 cores, discontinuously 70% of the tissue     E. Prostate, [place], Right:  -Prostatic adenocarcinoma, grade group 4 (America score 4+4)  Involving 2 of 2 cores, 70% of the tissue     F. Prostate, Base, Left:  - Focal high-grade prostatic intraepithelial neoplasia (HGPIN)     G. Prostate, Mid, Left:  -Prostatic adenocarcinoma, grade group 4 ([place] score 4+4)  Involving 2 of 2 cores, 25% of the tissue     H. Prostate, [place], Left:  -Prostatic adenocarcinoma, grade group 4 ([place] score 4+4)  Involving 1 of 3 cores, 30% of the tissue        MRI Prostate 4/16/2024  Size: 39 grams   IMPRESSION:  1. Based on the most suspicious abnormality, this exam is  characterized as PIRADS 5 - Clinically significant cancer is highly  likely to be present.  The most suspicious abnormality is involving  the entire  transition zone, and to a lesser extent the anterior  peripheral zone,  anterior fibromuscular stroma, and urethral  sphincter and there is high suspicion of extraprostatic extension.  Additional PIRADS-4 lesion in the right mid gland peripheral zone.   2. No evidence of osseous metastasis.  3. There are a few subcentimeter right external iliac, right  internal  iliac, and bilateral inguinal lymph nodes, which are likely reactive,  however cannot definitely exclude metastatic involvement. Consider  PSMA PET for further evaluation if clinically indicated.     PSMA PET 5/21/2024  IMPRESSION:     1. Prostate: Intense diffuse tracer uptake. miPSMA Expression Score 3.  (High PSMA expression)     2. Julito disease: None     3. Bones: None     4. Small cluster of tree-in-bud nodularity in the right middle lobe,  most likely infectious/inflammatory etiology.          Allergies:   No clinical screening - see comments and Seasonal allergies         Review of Systems:  From intake questionnaire     Skin: negative  Eyes: negative  Ears/Nose/Throat: negative  Respiratory: No shortness of breath, dyspnea on exertion, cough, or hemoptysis  Cardiovascular: No chest pain or palpitations  Gastrointestinal: negative; no nausea/vomiting, constipation or diarrhea  Genitourinary: as per HPI  Musculoskeletal: negative  Neurologic: negative  Psychiatric: negative  Hematologic/Lymphatic/Immunologic: negative  Endocrine: negative         Physical Exam:     Vitals:  No vitals were obtained today due to virtual visit.    Physical Exam   EYES: Eyes grossly normal to inspection.  No discharge or erythema, or obvious scleral/conjunctival abnormalities.  SKIN: Visible skin clear. No significant rash, abnormal pigmentation or lesions.  NEURO: Cranial nerves grossly intact.  Mentation and speech appropriate for age.  GENERAL: Healthy, alert and no distress  RESP: No audible wheeze, cough, or visible cyanosis.  No visible retractions or increased work of breathing.    PSYCH: Mentation appears normal, affect normal/bright, judgement and insight intact, normal speech and appearance well-groomed.      Outside and Past Medical records:    Review of the result(s) of each unique test - PSA, MRI, pathology, PSMA PET         Assessment and Plan:     69 year old male with high-risk localized prostate cancer,  PSA 53.15, [place] 4+5=9. We had an extensive discussion about the significance of localized, prostate cancer. We discussed the options for treatment of a localized prostate cancer including active surveillance, brachytherapy, external beam radiation therapy, and surgical removal. We discussed that based on his [place] score he would not be an ideal candidate for active surveillance.       We discussed the relative merits of robotic assisted radical prostatectomy. We also discussed the advantages and disadvantages and roles of open surgery vs. laparoscopic (and Da Eric assisted) surgery. The anticipated post-operative course was explained, including an anticipated 1-2 day hospital stay. Catheter will remain in place 7-10 days. At this point, however, given his high-risk disease and appearance on MRI, it is unlikely a curative resection could be completed with prostatectomy.    With this in mind, we discussed the role for combined therapy with ADT coupled with external beam radiotherapy. We reviewed the potential side effects of ADT, as well as the potential risks and side effects of radiation. While his PSA is markedly elevated, his PET scan suggests localized disease, and as such, radiation with hormones is reasonable. Will plan referral to radiation oncology to discuss further, and I will see him back as needed.     Plan:  Radiation oncology referral    Orders  Orders Placed This Encounter   Procedures     Radiation Therapy Referral     Video-Visit Details    Type of service:  Video Visit    Video End Time:12:00 PM    Originating Location (pt. Location): Home    Distant Location (provider location):  On-site    Platform used for Video Visit: [employer]    25 minutes spent on the date of the encounter including direct interaction with the patient, performing chart review, history and exam, documentation and further activities as noted above.    Diego Dukes MD  Urology  [facility]        Again, thank you for allowing me to participate in the care of your patient.      Sincerely,    Diego Dukes MD

## 2024-05-30 NOTE — PROGRESS NOTES
RADIATION ONCOLOGY CONSULTATION  [facility]    DATE:  5/31/2024     PATIENT NAME: Arnaud Bird  MEDICAL RECORD NUMBER: [id number]    REFERRING PHYSICIAN:  Dr. Dukes    REASON FOR CONSULTATION: Consideration of  definitive radiotherapy for management of adenocarcinoma of the prostate.    Staging:  Clinical  T3a N0 M0  Pathology:    America's score: 4+5=9       PSA:     Prostate Specific Antigen Screen   Date Value Ref Range Status   02/23/2024 48.90 (H) 0.00 - 6.50 ng/mL Final     PSA Tumor Marker   Date Value Ref Range Status   03/15/2024 53.15 (H) 0.00 - 6.50 ng/mL Final       PSA doubling time: not available    Prostate Volume:   39 cc   PROSTATE MRI: PIRAD+     BRUNA+     SV-     LN-    NCCN :    Very high risk (T3b-T4, [place] primary 5, >4 cores with [place] 8-10)    [facility] RISK(%):  EPE+: 99%     SV+:  94%     LN+: 92%        HISTORY OF PRESENT ILLNESS:   Mr. Bird is a 69-year-old male found to have an elevated screening PSA of 48.9 on February 23, 2024.  Repeat PSA on March 15, 2024 measured 53.15. Prostate MRI on April 16, 2024 showed a 39 g prostate with 2 highly suspicious lesions; the first lesion being in the bilateral apex, mid gland, base peripheral and transition zone with likely extracapsular extension;; and the second lesion along the right mid gland peripheral zone.  There was no neurovascular bundle or seminal vesicle involvement.  There was no clear adenopathy, but there were indeterminate 5 mm pelvic lymph nodes.    Prostate biopsy on April 30, 2024 showed [place] 4+5 adenocarcinoma involving 2 out of 2 cores in target 1, [place] 4+3 adenocarcinoma involving 2 out of 2 cores from target 2, [place] 4+3 adenocarcinoma in 1 out of 2 cores from the right base, [place] 4+3 adenocarcinoma involving 2 out of 2 cores in the right mid gland, [place] 4+4 adenocarcinoma involving 2 out of 2 cores from the right apex, HGPIN in the left prostate base, [place] 4+4  adenocarcinoma in 2 out of 2 cores from the left mid gland, and [place] 4+4 adenocarcinoma involving 1 out of 3 cores from the left apex.    PSMA PET on May 21, 2024 demonstrated intense diffuse uptake within the prostate (miPSMA Expression Score 3), but no uptake in the lymph nodes or bones.    Mr. Bird's KYLIE score today measures 14 consistent with mild to moderate erectile dysfunction.  He is able to achieve erections with Cialis.  He is AUA score measures 14.  He reports urinary frequency, intermittency, weak stream, and hesitancy.  Nocturia x 2.    PMH:   Past Medical History:   Diagnosis Date    Acute respiratory failure with hypoxia (H) 4/14/2021    Arthritis     Benign positional vertigo     Carpal tunnel syndrome     mild    Chronic pain syndrome 4/14/2021    Hearing problem     LEFT WORSE THAN RIGHT  10/18/2012    Migraines     Nasal polyps     Obesity 4/14/2021    Osteoarthritis of multiple joints 4/14/2021    Pneumonia due to 2019 novel coronavirus 4/14/2021    Unspecified asthma(493.90) 10/17/2012    Unspecified asthma, with exacerbation 10/17/2012       PSH:  Past Surgical History:   Procedure Laterality Date    ARTHROPLASTY SHOULDER Right 6/15/2021    Procedure: Right Total Shoulder Arthroplasty, Distal Clavicle Excision;  Surgeon: Yesica Deleon MD;  Location: [facility] OR    COLONOSCOPY  2019    COLONOSCOPY N/A 8/15/2022    Procedure: COLONOSCOPY, WITH POLYPECTOMY AND BIOPSY;  Surgeon: Abelino Flores MD;  Location:  GI    HC TOOTH EXTRACTION W/FORCEP      ORTHOPEDIC SURGERY      bilateral total knee replacements    TONSILLECTOMY      age 2 or 3       MEDICATIONS:  Current Outpatient Medications   Medication Sig Dispense Refill    acetaminophen (TYLENOL) 325 MG tablet Take 2 tablets (650 mg) by mouth every 4 hours as needed for other 40 tablet 0    acyclovir (ZOVIRAX) 400 MG tablet TAKE TWO TABLETS BY MOUTH TWICE DAILY for 5 days as needed for genital herpes outbreak 28 tablet 1     albuterol (PROAIR HFA/PROVENTIL HFA/VENTOLIN HFA) 108 (90 Base) MCG/ACT inhaler Inhale 2 puffs into the lungs every 6 hours as needed for shortness of breath 18 g 1    allopurinol (ZYLOPRIM) 300 MG tablet Take 1 tablet (300 mg) by mouth daily 90 tablet 0    clindamycin (CLINDAMAX) 1 % external gel Apply topically 2 times daily 60 g 3    colchicine (COLCRYS) 0.6 MG tablet Take 2 tabs x 1, then 1 tab one hour later x 1 ( do not exceed more than 3 tabs / day ) wait for 3 days and repeat the regimen. 30 tablet 1    diclofenac (VOLTAREN) 1 % topical gel APPLY 4 GRAMS TOPICALLY 4 TIMES A  g 1    fluticasone (FLONASE) 50 MCG/ACT nasal spray Spray 2 sprays into both nostrils daily 16 g 11    HYDROcodone-acetaminophen (NORCO) 5-325 MG tablet Take 1 tablet by mouth 3 times daily as needed for pain 90 tablet 0    HYDROcodone-acetaminophen (NORCO) 7.5-325 MG per tablet Take 1 tablet by mouth every 6 hours as needed for pain (4 x daily as needed  maxium 4 per day) 120 tablet 0    ipratropium (ATROVENT) 0.06 % nasal spray SPRAY 2 SPRAYS INTO BOTH NOSTRILS 4 TIMES DAILY 15 mL 1    lidocaine (LIDODERM) 5 % patch Place 1 patch onto the skin every 24 hours 30 patch 11    meloxicam (MOBIC) 15 MG tablet Take 1 tablet (15 mg) by mouth daily 30 tablet 0    multivitamin w/minerals (THERA-VIT-M) tablet Take 1 tablet by mouth daily      mupirocin (BACTROBAN) 2 % external ointment Apply topically 3 times daily 30 g 1    naloxone (NARCAN) 4 MG/0.1ML nasal spray Spray 1 spray (4 mg) into one nostril alternating nostrils once as needed for opioid reversal Every 2-3 minutes until patient responsive or EMS arrives 0.2 mL 0    polyethylene glycol (MIRALAX) 17 g packet Take 17 g by mouth daily 7 packet 0    pseudoePHEDrine (SUDAFED) 30 MG tablet Take by mouth every 4 hours as needed for congestion      senna-docusate (SENOKOT-S/PERICOLACE) 8.6-50 MG tablet Take 1 tablet by mouth 2 times daily 30 tablet 0    sulfamethoxazole-trimethoprim  (BACTRIM DS) 800-160 MG tablet Take 1 tablet by mouth 2 times daily 6 tablet 0    tadalafil (CIALIS) 10 MG tablet Take 1 tab as needed for erectile dysfunction. Do not exceed more than 2 tabs per day. 30 tablet 1    tiZANidine (ZANAFLEX) 4 MG tablet TAKE 1 TABLET (4 MG) BY MOUTH NIGHTLY AS NEEDED FOR MUSCLE SPASMS 30 tablet 0       ALLERGY:  Allergies   Allergen Reactions    No Clinical Screening - See Comments     Seasonal Allergies        FAMILY HISTORY:  Family History   Problem Relation Age of Onset    Cancer Father     Family History Negative Father     Family History Negative Mother     Cancer Mother     Stomach Problem Mother     Breast Cancer Mother     Diabetes No family hx of     Coronary Artery Disease No family hx of     Hypertension No family hx of     Hyperlipidemia No family hx of     Cerebrovascular Disease No family hx of     Breast Cancer No family hx of     Colon Cancer No family hx of     Prostate Cancer No family hx of     Other Cancer No family hx of     Depression No family hx of     Anxiety Disorder No family hx of     Mental Illness No family hx of     Substance Abuse No family hx of     Anesthesia Reaction No family hx of     Asthma No family hx of     Osteoporosis No family hx of     Genetic Disorder No family hx of     Thyroid Disease No family hx of     Obesity No family hx of     Unknown/Adopted No family hx of        SOCIAL HISTORY  Social History     Tobacco Use    Smoking status: Never    Smokeless tobacco: Never   Substance Use Topics    Alcohol use: Yes     Comment: case beer a week        ROS: 10 point ROS reviewed as reported on the nursing assessment note.      PHYSICAL EXAMINATION:  VS: Vitals: BP (!) 151/94   Pulse 78   Wt 117.9 kg (260 lb)   BMI 39.53 kg/m    BMI= Body mass index is 39.53 kg/m .  Gen: Alert and Oriented X 3.  Lungs: CTAB  Heart: RRR, no m/r/g  Abd: Soft, NTND  Neuro: CN II-XII grossly intact  Extr: no clubbing, cyanosis, or edema  : KO deferred  today    IMPRESSION: 69-year-old male with very high risk prostate cancer (PSA 53, [place] 4+5, cT3a per MRI).    RECOMMENDATION:   Definitive treatment options include surgery versus radiotherapy, +/- ADT. I agree with Dr. Dukes's recommendation for radiotherapy, as surgery would likely still require salvage radiation.      I reviewed my recommendations with the patient and answered all questions. I explained the benefits of radiotherapy as well as related toxicities. I described the early and late toxicities associated with radiotherapy. The toxicities are itemized on the consent form for prostate radiotherapy.  After our discussion, the patient had no further questions and informed consent was signed.  The patient is also interested in ADT, so we will arrange for that as well. Total duration of ADT should be at least two years if tolerable. Simulation and followup is scheduled for 6 weeks from now, with the intent to begin radiotherapy approximately 8 weeks after ADT is started.    Ochoa Francois M.D.  Department of Radiation Oncology  [facility]

## 2024-05-31 ENCOUNTER — OFFICE VISIT (OUTPATIENT)
Dept: RADIATION ONCOLOGY | Facility: CLINIC | Age: 69
End: 2024-05-31
Attending: UROLOGY
Payer: COMMERCIAL

## 2024-05-31 ENCOUNTER — MYC REFILL (OUTPATIENT)
Dept: ORTHOPEDICS | Facility: CLINIC | Age: 69
End: 2024-05-31
Payer: COMMERCIAL

## 2024-05-31 ENCOUNTER — MYC REFILL (OUTPATIENT)
Dept: FAMILY MEDICINE | Facility: CLINIC | Age: 69
End: 2024-05-31
Payer: COMMERCIAL

## 2024-05-31 VITALS
DIASTOLIC BLOOD PRESSURE: 94 MMHG | BODY MASS INDEX: 39.53 KG/M2 | WEIGHT: 260 LBS | SYSTOLIC BLOOD PRESSURE: 151 MMHG | HEART RATE: 78 BPM

## 2024-05-31 DIAGNOSIS — M25.519 ARTHRALGIA OF SHOULDER, UNSPECIFIED LATERALITY: ICD-10-CM

## 2024-05-31 DIAGNOSIS — C61 PROSTATE CANCER (H): Primary | ICD-10-CM

## 2024-05-31 DIAGNOSIS — T63.301A SPIDER BITE WOUND, ACCIDENTAL OR UNINTENTIONAL, INITIAL ENCOUNTER: ICD-10-CM

## 2024-05-31 PROCEDURE — G0463 HOSPITAL OUTPT CLINIC VISIT: HCPCS | Performed by: RADIOLOGY

## 2024-05-31 PROCEDURE — 99205 OFFICE O/P NEW HI 60 MIN: CPT | Performed by: RADIOLOGY

## 2024-05-31 ASSESSMENT — ENCOUNTER SYMPTOMS
BACK PAIN: 1
GASTROINTESTINAL NEGATIVE: 1
RESPIRATORY NEGATIVE: 1
TINGLING: 1
PSYCHIATRIC NEGATIVE: 1
EYES NEGATIVE: 1
CARDIOVASCULAR NEGATIVE: 1

## 2024-05-31 NOTE — PROGRESS NOTES
HPI    INITIAL PATIENT ASSESSMENT    Diagnosis: Cancer    Prior radiation therapy: None    Prior chemotherapy: None    Prior hormonal therapy:No    Pain Eval:  Denies    Psychosocial  Living arrangements: Lives with wife  Fall Risk: independent   referral needs: Not needed    Advanced Directive: No  Implantable Cardiac Device? No    Review of Systems   Constitutional:  Positive for malaise/fatigue.   HENT:  Positive for hearing loss.         Hearing aids   Eyes: Negative.    Respiratory: Negative.     Cardiovascular: Negative.    Gastrointestinal: Negative.    Genitourinary: Negative.    Musculoskeletal:  Positive for back pain.   Skin: Negative.    Neurological:  Positive for tingling.        Right arm numbness and tingling   Endo/Heme/Allergies: Negative.    Psychiatric/Behavioral: Negative.

## 2024-05-31 NOTE — LETTER
5/31/2024         RE: Arnaud Bird  [address]  [facility] [address]        Dear Colleague,    Thank you for referring your patient, Arnaud Bird, to the [facility] RADIATION ONCOLOGY. Please see a copy of my visit note below.      RADIATION ONCOLOGY CONSULTATION  [facility] PHYSICIANS    DATE:  5/31/2024     PATIENT NAME: Arnaud Bird  MEDICAL RECORD NUMBER: [id number]    REFERRING PHYSICIAN:  Dr. Dukes    REASON FOR CONSULTATION: Consideration of  definitive radiotherapy for management of adenocarcinoma of the prostate.    Staging:  Clinical  T3a N0 M0  Pathology:    [place]'s score: 4+5=9       PSA:     Prostate Specific Antigen Screen   Date Value Ref Range Status   02/23/2024 48.90 (H) 0.00 - 6.50 ng/mL Final     PSA Tumor Marker   Date Value Ref Range Status   03/15/2024 53.15 (H) 0.00 - 6.50 ng/mL Final       PSA doubling time: not available    Prostate Volume:   39 cc   PROSTATE MRI: PIRAD+     BRUNA+     SV-     LN-    NCCN :    Very high risk (T3b-T4, [place] primary 5, >4 cores with [place] 8-10)    [facility] RISK(%):  EPE+: 99%     SV+:  94%     LN+: 92%        HISTORY OF PRESENT ILLNESS:   Mr. Bird is a 69-year-old male found to have an elevated screening PSA of 48.9 on February 23, 2024.  Repeat PSA on March 15, 2024 measured 53.15. Prostate MRI on April 16, 2024 showed a 39 g prostate with 2 highly suspicious lesions; the first lesion being in the bilateral apex, mid gland, base peripheral and transition zone with likely extracapsular extension;; and the second lesion along the right mid gland peripheral zone.  There was no neurovascular bundle or seminal vesicle involvement.  There was no clear adenopathy, but there were indeterminate 5 mm pelvic lymph nodes.    Prostate biopsy on April 30, 2024 showed [place] 4+5 adenocarcinoma involving 2 out of 2 cores in target 1, [place] 4+3 adenocarcinoma involving 2 out of 2 cores from target 2, [place]  4+3 adenocarcinoma in 1 out of 2 cores from the right base, [place] 4+3 adenocarcinoma involving 2 out of 2 cores in the right mid gland, [place] 4+4 adenocarcinoma involving 2 out of 2 cores from the right apex, HGPIN in the left prostate base, [place] 4+4 adenocarcinoma in 2 out of 2 cores from the left mid gland, and [place] 4+4 adenocarcinoma involving 1 out of 3 cores from the left apex.    PSMA PET on May 21, 2024 demonstrated intense diffuse uptake within the prostate (miPSMA Expression Score 3), but no uptake in the lymph nodes or bones.    Mr. Bird's KYLIE score today measures 14 consistent with mild to moderate erectile dysfunction.  He is able to achieve erections with Cialis.  He is AUA score measures 14.  He reports urinary frequency, intermittency, weak stream, and hesitancy.  Nocturia x 2.    PMH:   Past Medical History:   Diagnosis Date     Acute respiratory failure with hypoxia (H) 4/14/2021     Arthritis      Benign positional vertigo      Carpal tunnel syndrome     mild     Chronic pain syndrome 4/14/2021     Hearing problem      LEFT WORSE THAN RIGHT  10/18/2012     Migraines      Nasal polyps      Obesity 4/14/2021     Osteoarthritis of multiple joints 4/14/2021     Pneumonia due to 2019 novel coronavirus 4/14/2021     Unspecified asthma(493.90) 10/17/2012     Unspecified asthma, with exacerbation 10/17/2012       PSH:  Past Surgical History:   Procedure Laterality Date     ARTHROPLASTY SHOULDER Right 6/15/2021    Procedure: Right Total Shoulder Arthroplasty, Distal Clavicle Excision;  Surgeon: Yesica Deleon MD;  Location: [facility] OR     COLONOSCOPY  2019     COLONOSCOPY N/A 8/15/2022    Procedure: COLONOSCOPY, WITH POLYPECTOMY AND BIOPSY;  Surgeon: Abelino Flores MD;  Location:  GI     HC TOOTH EXTRACTION W/FORCEP       ORTHOPEDIC SURGERY      bilateral total knee replacements     TONSILLECTOMY      age 2 or 3       MEDICATIONS:  Current Outpatient Medications   Medication  Sig Dispense Refill     acetaminophen (TYLENOL) 325 MG tablet Take 2 tablets (650 mg) by mouth every 4 hours as needed for other 40 tablet 0     acyclovir (ZOVIRAX) 400 MG tablet TAKE TWO TABLETS BY MOUTH TWICE DAILY for 5 days as needed for genital herpes outbreak 28 tablet 1     albuterol (PROAIR HFA/PROVENTIL HFA/VENTOLIN HFA) 108 (90 Base) MCG/ACT inhaler Inhale 2 puffs into the lungs every 6 hours as needed for shortness of breath 18 g 1     allopurinol (ZYLOPRIM) 300 MG tablet Take 1 tablet (300 mg) by mouth daily 90 tablet 0     clindamycin (CLINDAMAX) 1 % external gel Apply topically 2 times daily 60 g 3     colchicine (COLCRYS) 0.6 MG tablet Take 2 tabs x 1, then 1 tab one hour later x 1 ( do not exceed more than 3 tabs / day ) wait for 3 days and repeat the regimen. 30 tablet 1     diclofenac (VOLTAREN) 1 % topical gel APPLY 4 GRAMS TOPICALLY 4 TIMES A  g 1     fluticasone (FLONASE) 50 MCG/ACT nasal spray Spray 2 sprays into both nostrils daily 16 g 11     HYDROcodone-acetaminophen (NORCO) 5-325 MG tablet Take 1 tablet by mouth 3 times daily as needed for pain 90 tablet 0     HYDROcodone-acetaminophen (NORCO) 7.5-325 MG per tablet Take 1 tablet by mouth every 6 hours as needed for pain (4 x daily as needed  maxium 4 per day) 120 tablet 0     ipratropium (ATROVENT) 0.06 % nasal spray SPRAY 2 SPRAYS INTO BOTH NOSTRILS 4 TIMES DAILY 15 mL 1     lidocaine (LIDODERM) 5 % patch Place 1 patch onto the skin every 24 hours 30 patch 11     meloxicam (MOBIC) 15 MG tablet Take 1 tablet (15 mg) by mouth daily 30 tablet 0     multivitamin w/minerals (THERA-VIT-M) tablet Take 1 tablet by mouth daily       mupirocin (BACTROBAN) 2 % external ointment Apply topically 3 times daily 30 g 1     naloxone (NARCAN) 4 MG/0.1ML nasal spray Spray 1 spray (4 mg) into one nostril alternating nostrils once as needed for opioid reversal Every 2-3 minutes until patient responsive or EMS arrives 0.2 mL 0     polyethylene glycol  (MIRALAX) 17 g packet Take 17 g by mouth daily 7 packet 0     pseudoePHEDrine (SUDAFED) 30 MG tablet Take by mouth every 4 hours as needed for congestion       senna-docusate (SENOKOT-S/PERICOLACE) 8.6-50 MG tablet Take 1 tablet by mouth 2 times daily 30 tablet 0     sulfamethoxazole-trimethoprim (BACTRIM DS) 800-160 MG tablet Take 1 tablet by mouth 2 times daily 6 tablet 0     tadalafil (CIALIS) 10 MG tablet Take 1 tab as needed for erectile dysfunction. Do not exceed more than 2 tabs per day. 30 tablet 1     tiZANidine (ZANAFLEX) 4 MG tablet TAKE 1 TABLET (4 MG) BY MOUTH NIGHTLY AS NEEDED FOR MUSCLE SPASMS 30 tablet 0       ALLERGY:  Allergies   Allergen Reactions     No Clinical Screening - See Comments      Seasonal Allergies        FAMILY HISTORY:  Family History   Problem Relation Age of Onset     Cancer Father      Family History Negative Father      Family History Negative Mother      Cancer Mother      Stomach Problem Mother      Breast Cancer Mother      Diabetes No family hx of      Coronary Artery Disease No family hx of      Hypertension No family hx of      Hyperlipidemia No family hx of      Cerebrovascular Disease No family hx of      Breast Cancer No family hx of      Colon Cancer No family hx of      Prostate Cancer No family hx of      Other Cancer No family hx of      Depression No family hx of      Anxiety Disorder No family hx of      Mental Illness No family hx of      Substance Abuse No family hx of      Anesthesia Reaction No family hx of      Asthma No family hx of      Osteoporosis No family hx of      Genetic Disorder No family hx of      Thyroid Disease No family hx of      Obesity No family hx of      Unknown/Adopted No family hx of        SOCIAL HISTORY  Social History     Tobacco Use     Smoking status: Never     Smokeless tobacco: Never   Substance Use Topics     Alcohol use: Yes     Comment: case beer a week        ROS: 10 point ROS reviewed as reported on the nursing assessment  note.      PHYSICAL EXAMINATION:  VS: Vitals: BP (!) 151/94   Pulse 78   Wt 117.9 kg (260 lb)   BMI 39.53 kg/m    BMI= Body mass index is 39.53 kg/m .  Gen: Alert and Oriented X 3.  Lungs: CTAB  Heart: RRR, no m/r/g  Abd: Soft, NTND  Neuro: CN II-XII grossly intact  Extr: no clubbing, cyanosis, or edema  : KO deferred today    IMPRESSION: 69-year-old male with very high risk prostate cancer (PSA 53, [place] 4+5, cT3a per MRI).    RECOMMENDATION:   Definitive treatment options include surgery versus radiotherapy, +/- ADT. I agree with Dr. Dukes's recommendation for radiotherapy, as surgery would likely still require salvage radiation.      I reviewed my recommendations with the patient and answered all questions. I explained the benefits of radiotherapy as well as related toxicities. I described the early and late toxicities associated with radiotherapy. The toxicities are itemized on the consent form for prostate radiotherapy.  After our discussion, the patient had no further questions and informed consent was signed.  The patient is also interested in ADT, so we will arrange for that as well. Total duration of ADT should be at least two years if tolerable. Simulation and followup is scheduled for 6 weeks from now, with the intent to begin radiotherapy approximately 8 weeks after ADT is started.    Ochoa Francois M.D.  Department of Radiation Oncology  [facility]       HPI    INITIAL PATIENT ASSESSMENT    Diagnosis: Cancer    Prior radiation therapy: None    Prior chemotherapy: None    Prior hormonal therapy:No    Pain Eval:  Denies    Psychosocial  Living arrangements: Lives with wife  Fall Risk: independent   referral needs: Not needed    Advanced Directive: No  Implantable Cardiac Device? No    Review of Systems   Constitutional:  Positive for malaise/fatigue.   HENT:  Positive for hearing loss.         Hearing aids   Eyes: Negative.    Respiratory: Negative.      Cardiovascular: Negative.    Gastrointestinal: Negative.    Genitourinary: Negative.    Musculoskeletal:  Positive for back pain.   Skin: Negative.    Neurological:  Positive for tingling.        Right arm numbness and tingling   Endo/Heme/Allergies: Negative.    Psychiatric/Behavioral: Negative.                   Again, thank you for allowing me to participate in the care of your patient.        Sincerely,        Ochoa Francois MD

## 2024-05-31 NOTE — NURSING NOTE
MELISA Becerra.  You1 minute ago (3:41 PM)       Agree, patient should probably have procedure done in the hospital.  Patient may hold Eliquis 48 hours prior to procedure and Plavix 7 days prior to procedure.  Patient may proceed with procedure and resume Eliquis and Plavix as soon as cleared by ophthalmology.    Patient looks like she was lost to follow-up.  Please ensure patient has follow-up appointment approximately 1 to 2 months with Dr. Walker or his care team       Called Mona HEAD CM, discussed above per JG. Will draft letter and fax to Dr. Mejia and Dr. Krishnamurthy. Advised Mona that pt needs to be sceen in our office within the next month or two. She advised reaching out to pt the week after next as he will be undergoing both eye procedure and dialysis and also needs to coordinate transport.    Letter faxed to:    Dr. Krishnamurthy PCP  [phone] F    [facility]  Dr. Mejia  [phone] P  [phone] F    Received receipt for confirmation.     Reason For Visit:   Chief Complaint   Patient presents with     Surgical Followup     2 week pop DOS 6/15/21 Right Total Shoulder Arthroplasty (Right) Distal Clavicle Excision        PCP: Johnson Clements  Ref: Dr. Chan     ?  No  Occupation Retired .  Currently working? No.  Work status?  Retired.  Date of injury: November 2019  Type of injury: Up in the woods and cutting trees and felt the biceps tear.  Fell in ice in January 2020.  Date of surgery: 6/15/21  Type of surgery:  1. Right total shoulder arthroplasty.   2. Right distal clavicle excision  Smoker: No  Request smoking cessation information: No     Right hand dominant       SANE score  Affected shoulder: Right  Right shoulder SANE: 30  Left shoulder SANE: 60-70    There were no vitals taken for this visit.      Pain Assessment  Patient Currently in Pain: Yes  0-10 Pain Scale: 4  Primary Pain Location: Shoulder(Right)  Pain Descriptors: Aching, Other (comment)(General)  Alleviating Factors: Pain medication  Aggravating Factors: Movement    Marie Linares, ATC

## 2024-06-01 RX ORDER — MUPIROCIN 20 MG/G
OINTMENT TOPICAL 3 TIMES DAILY
Qty: 30 G | Refills: 1 | Status: SHIPPED | OUTPATIENT
Start: 2024-06-01

## 2024-06-04 RX ORDER — MELOXICAM 15 MG/1
15 TABLET ORAL DAILY
Qty: 30 TABLET | Refills: 1 | Status: SHIPPED | OUTPATIENT
Start: 2024-06-04

## 2024-06-06 ENCOUNTER — MYC REFILL (OUTPATIENT)
Dept: FAMILY MEDICINE | Facility: CLINIC | Age: 69
End: 2024-06-06
Payer: COMMERCIAL

## 2024-06-06 DIAGNOSIS — M47.16 SPONDYLOSIS WITH MYELOPATHY, LUMBAR REGION: ICD-10-CM

## 2024-06-06 DIAGNOSIS — G89.4 CHRONIC PAIN SYNDROME: Chronic | ICD-10-CM

## 2024-06-06 DIAGNOSIS — M25.661 STIFFNESS OF KNEE JOINT, RIGHT: Chronic | ICD-10-CM

## 2024-06-06 DIAGNOSIS — R26.9 GAIT DIFFICULTY: ICD-10-CM

## 2024-06-06 DIAGNOSIS — Z96.652 STATUS POST TOTAL LEFT KNEE REPLACEMENT: ICD-10-CM

## 2024-06-06 RX ORDER — HYDROCODONE BITARTRATE AND ACETAMINOPHEN 7.5; 325 MG/1; MG/1
1 TABLET ORAL EVERY 6 HOURS PRN
Qty: 120 TABLET | Refills: 0 | Status: SHIPPED | OUTPATIENT
Start: 2024-06-07 | End: 2024-06-27

## 2024-06-07 NOTE — TELEPHONE ENCOUNTER
[place] reviewed:  reviewed- no concerns    [place]:  [email or web address]    Refill done.  Sushma Galloway MD on 6/6/2024

## 2024-06-12 ENCOUNTER — INFUSION THERAPY VISIT (OUTPATIENT)
Dept: ONCOLOGY | Facility: CLINIC | Age: 69
End: 2024-06-12
Attending: RADIOLOGY
Payer: COMMERCIAL

## 2024-06-12 VITALS
DIASTOLIC BLOOD PRESSURE: 107 MMHG | TEMPERATURE: 98 F | RESPIRATION RATE: 20 BRPM | SYSTOLIC BLOOD PRESSURE: 157 MMHG | OXYGEN SATURATION: 95 % | HEART RATE: 77 BPM

## 2024-06-12 DIAGNOSIS — C61 PROSTATE CANCER (H): Primary | ICD-10-CM

## 2024-06-12 PROCEDURE — 96402 CHEMO HORMON ANTINEOPL SQ/IM: CPT

## 2024-06-12 PROCEDURE — 250N000011 HC RX IP 250 OP 636: Mod: JZ | Performed by: RADIOLOGY

## 2024-06-12 RX ADMIN — LEUPROLIDE ACETATE 22.5 MG: KIT at 09:11

## 2024-06-12 NOTE — PROGRESS NOTES
Infusion Nursing Note:  Arnaud Bird presents today for C1D1 Lupron.    Patient seen by provider today: No   present during visit today: Not Applicable.    Note: Arnaud presents to infusion today feeling ok. He rates his low back pain at 3/10 which is normal for him. He denies any infectious symptoms or other concerns. BP elevated today at 157/107. Patient states he is nervous and just drank a redbull. His wife monitors it at home and it has been normal. Printed information provided about Lupron.    Patient new to oncology infusion.  New patient teaching done previously.  All medications and side effects reviewed with patient. Patient instructed to call triage with questions/concerns or if they have chills and/or temperature greater than or equal to 100.5, pt verbalized understanding of plan.     Intravenous Access:  No Intravenous access/labs at this visit.    Treatment Conditions:  Not Applicable.      Post Infusion Assessment:  Patient tolerated injection to R ventrogluteal without incident.       Discharge Plan:   Discharge instructions reviewed with: Patient and Family.  Patient and/or family verbalized understanding of discharge instructions and all questions answered.  AVS to patient via [employer].  Patient will return in 3 months for next appointment.   Patient discharged in stable condition accompanied by: wife.  Departure Mode: Ambulatory.      Renuka Encarnacion RN

## 2024-06-12 NOTE — PATIENT INSTRUCTIONS
Contact Numbers  [facility]: [phone] (for symptom and scheduling needs)    Please call the [facility] Triage line if you experience a temperature greater than or equal to 100.4, shaking chills, have uncontrolled nausea, vomiting and/or diarrhea, dizziness, shortness of breath, chest pain, bleeding, unexplained bruising, or if you have any other new/concerning symptoms, questions or concerns.     If you are having any concerning symptoms or wish to speak to a provider before your next infusion visit, please call your care coordinator or triage to notify them so we can adequately serve you.     If you need a refill on a narcotic prescription or other medication, please call triage before your infusion appointment.

## 2024-06-27 ENCOUNTER — MYC REFILL (OUTPATIENT)
Dept: FAMILY MEDICINE | Facility: CLINIC | Age: 69
End: 2024-06-27
Payer: COMMERCIAL

## 2024-06-27 ENCOUNTER — TELEPHONE (OUTPATIENT)
Dept: FAMILY MEDICINE | Facility: CLINIC | Age: 69
End: 2024-06-27
Payer: COMMERCIAL

## 2024-06-27 DIAGNOSIS — J45.30 MILD PERSISTENT ASTHMA WITHOUT COMPLICATION: ICD-10-CM

## 2024-06-27 DIAGNOSIS — T14.8XXA FRICTION INJURY TO SKIN: ICD-10-CM

## 2024-06-27 DIAGNOSIS — R26.9 GAIT DIFFICULTY: ICD-10-CM

## 2024-06-27 DIAGNOSIS — M25.661 STIFFNESS OF KNEE JOINT, RIGHT: Chronic | ICD-10-CM

## 2024-06-27 DIAGNOSIS — G89.4 CHRONIC PAIN SYNDROME: Chronic | ICD-10-CM

## 2024-06-27 DIAGNOSIS — Z96.652 STATUS POST TOTAL LEFT KNEE REPLACEMENT: ICD-10-CM

## 2024-06-27 DIAGNOSIS — G56.03 BILATERAL CARPAL TUNNEL SYNDROME: ICD-10-CM

## 2024-06-27 DIAGNOSIS — M47.16 SPONDYLOSIS WITH MYELOPATHY, LUMBAR REGION: ICD-10-CM

## 2024-06-27 RX ORDER — CLINDAMYCIN PHOSPHATE 10 MG/G
GEL TOPICAL 2 TIMES DAILY
Qty: 60 G | Refills: 2 | Status: SHIPPED | OUTPATIENT
Start: 2024-06-27 | End: 2024-07-03

## 2024-06-27 RX ORDER — CLINDAMYCIN PHOSPHATE 10 MG/G
GEL TOPICAL 2 TIMES DAILY
Qty: 60 G | Refills: 2 | Status: CANCELLED | OUTPATIENT
Start: 2024-06-27

## 2024-06-27 RX ORDER — HYDROCODONE BITARTRATE AND ACETAMINOPHEN 7.5; 325 MG/1; MG/1
1 TABLET ORAL EVERY 6 HOURS PRN
Qty: 120 TABLET | Refills: 0 | Status: SHIPPED | OUTPATIENT
Start: 2024-07-07 | End: 2024-07-08

## 2024-06-27 RX ORDER — IPRATROPIUM BROMIDE 42 UG/1
SPRAY, METERED NASAL
Qty: 15 ML | Refills: 0 | Status: SHIPPED | OUTPATIENT
Start: 2024-06-27

## 2024-06-27 NOTE — TELEPHONE ENCOUNTER
Patient calling in this request early to be filled on 6/6 or 6/7 due to holiday coming up. He does not want to have issues come up later on so he wanted to be proactive in this request.

## 2024-06-27 NOTE — TELEPHONE ENCOUNTER
Amador, Pharmacist states that needs more information on clindamycin cream for billing purposes.     Needs to state area of application or grams per day or how long a tube should last the patient.     Please resend Rx.    Chelsey Beebe RN

## 2024-06-28 NOTE — TELEPHONE ENCOUNTER
[place] reviewed:  reviewed- no concerns    [place]:  [email or web address]    Refill done from 7/7/2024 as per      Sushma Galloway MD on 6/27/2024

## 2024-06-29 NOTE — TELEPHONE ENCOUNTER
Pt has asked for refill of this medication which he has skin issues in the past. I will need to re-evaluate the site and location.     Please give verbal discontinuation of Clindamycin gel at this time which was refilled recently.     Thank you  Sushma Galloway MD on 6/28/2024

## 2024-07-01 NOTE — TELEPHONE ENCOUNTER
Left detailed message at [facility] cancelling clindamycin gel prescription.    Routing to team to please schedule patient for appointment for evaluation of skin problem. Per Dr. Galloway: I will need to re-evaluate the site and location Darlyn Gamez RN

## 2024-07-03 ENCOUNTER — VIRTUAL VISIT (OUTPATIENT)
Dept: FAMILY MEDICINE | Facility: CLINIC | Age: 69
End: 2024-07-03
Payer: COMMERCIAL

## 2024-07-03 DIAGNOSIS — Z96.652 STATUS POST TOTAL LEFT KNEE REPLACEMENT: ICD-10-CM

## 2024-07-03 DIAGNOSIS — R26.9 GAIT DIFFICULTY: ICD-10-CM

## 2024-07-03 DIAGNOSIS — M47.16 SPONDYLOSIS WITH MYELOPATHY, LUMBAR REGION: ICD-10-CM
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
## 2024-07-03 DIAGNOSIS — M25.661 STIFFNESS OF KNEE JOINT, RIGHT: Chronic | ICD-10-CM

## 2024-07-03 DIAGNOSIS — T14.8XXA FRICTION INJURY TO SKIN: ICD-10-CM

## 2024-07-03 DIAGNOSIS — S80.861S INSECT BITE OF RIGHT LOWER LEG, SEQUELA: Primary | ICD-10-CM

## 2024-07-03 DIAGNOSIS — G89.4 CHRONIC PAIN SYNDROME: Chronic | ICD-10-CM

## 2024-07-03 DIAGNOSIS — C61 PROSTATE CANCER (H): ICD-10-CM

## 2024-07-03 PROCEDURE — 99214 OFFICE O/P EST MOD 30 MIN: CPT | Mod: 95 | Performed by: INTERNAL MEDICINE

## 2024-07-03 RX ORDER — CLINDAMYCIN PHOSPHATE 10 MG/G
GEL TOPICAL 2 TIMES DAILY
Qty: 60 G | Refills: 2 | Status: SHIPPED | OUTPATIENT
Start: 2024-07-03 | End: 2024-07-03

## 2024-07-03 RX ORDER — CLINDAMYCIN PHOSPHATE 10 MG/G
GEL TOPICAL 2 TIMES DAILY
Qty: 60 G | Refills: 2 | Status: SHIPPED | OUTPATIENT
Start: 2024-07-03

## 2024-07-03 ASSESSMENT — ENCOUNTER SYMPTOMS: BACK PAIN: 1

## 2024-07-03 NOTE — PROGRESS NOTES
Arnaud is a 69 year old who is being evaluated via a billable video visit.    How would you like to obtain your AVS? MyChart  If the video visit is dropped, the invitation should be resent by: Text to cell phone: [phone]  Will anyone else be joining your video visit? No        Assessment and Plan  1. Insect bite of right lower leg, sequela  2. Friction injury to skin  Patient is requesting for his clindamycin ointment which worked well for him on the lower leg insect bite injuries which she is recurrent and intermittent flareups.  Will do as requested  - clindamycin (CLEOCIN-T) 1 % external gel; Apply topically 2 times daily On the left lower leg at the site of insect bite as well as friction rub injuries to prevent infection for 2 weeks.  Dispense: 60 g; Refill: 2    3. Prostate cancer (H)  Recently diagnosed prostate cancer with ongoing therapies as managed by urology.    4. Chronic pain syndrome  5. Spondylosis with myelopathy, lumbar region  6. Stiffness of knee joint, right  7. Status post total left knee replacement  8. Gait difficulty  Ongoing chronic pain with multiple musculoskeletal problems with new diagnosis of prostate cancer as mentioned above and ongoing hormone therapy by urology.  Patient experiencing generalized body pains with his chronic pain syndrome and requesting to keep the current Norco dose at the same dose at this time.  Will do as requested.         Please note that this note consists of symbols derived from keyboarding, dictation and/or voice recognition software. As a result, there may be errors in the script that have gone undetected. Please consider this when interpreting information found in this chart.    Patient Instructions   As discussed, sent in requested antibiotic cream for insect bites    Please follow-up with urology recommendations on the prostate cancer treatment ongoing.    Follow-up with orthopedics for intra-articular injections as well as will take care of your  current opiate prescription at same dose as requested.  Return in about 3 months (around 10/3/2024), or if symptoms worsen or fail to improve, for chronic pain, video visit.    Sushma Galloway MD  [facility]OL[facility] Lares is a 69 year old, presenting for the following health issues:  Derm Problem (Bug bites, on arms and legs would like refill on clindamycin), Shoulder Pain (Was wondering if he needed a new referral to get shoulder injection for appt in August with sports medicine dr), Back Pain, and Recheck Medication        7/3/2024     9:49 AM   Additional Questions   Roomed by Eduardo       History of Present Illness       Back Pain:  He presents for follow up of back pain. Patient's back pain is a chronic problem.  Location of back pain:  Right middle of back and left middle of back  Description of back pain: gnawing, sharp and stabbing  Back pain spreads: nowhere    Since patient first noticed back pain, pain is: unchanged  Does back pain interfere with his job:  Yes       He eats 2-3 servings of fruits and vegetables daily.He consumes 1 sweetened beverage(s) daily.He exercises with enough effort to increase his heart rate 20 to 29 minutes per day.  He exercises with enough effort to increase his heart rate 3 or less days per week.   He is taking medications regularly.       Medication Followup of HYDROcodone-Acetaminophen   Taking Medication as prescribed: yes  Side Effects:  None  Medication Helping Symptoms:  yes         Allergies   Allergen Reactions    No Clinical Screening - See Comments     Seasonal Allergies         Past Medical History:   Diagnosis Date    Acute respiratory failure with hypoxia (H) 4/14/2021    Arthritis     Benign positional vertigo     Carpal tunnel syndrome     mild    Chronic pain syndrome 4/14/2021    Hearing problem     LEFT WORSE THAN RIGHT  10/18/2012    Migraines     Nasal polyps     Obesity 4/14/2021    Osteoarthritis of multiple joints  4/14/2021    Pneumonia due to 2019 novel coronavirus 4/14/2021    Unspecified asthma(493.90) 10/17/2012    Unspecified asthma, with exacerbation 10/17/2012       Past Surgical History:   Procedure Laterality Date    ARTHROPLASTY SHOULDER Right 6/15/2021    Procedure: Right Total Shoulder Arthroplasty, Distal Clavicle Excision;  Surgeon: Yesica Deleon MD;  Location: [facility]    COLONOSCOPY  2019    COLONOSCOPY N/A 8/15/2022    Procedure: COLONOSCOPY, WITH POLYPECTOMY AND BIOPSY;  Surgeon: Abelino Flores MD;  Location: [facility]    HC TOOTH EXTRACTION W/FORCEP      ORTHOPEDIC SURGERY      bilateral total knee replacements    TONSILLECTOMY      age 2 or 3       Family History   Problem Relation Age of Onset    Cancer Father     Family History Negative Father     Family History Negative Mother     Cancer Mother     Stomach Problem Mother     Breast Cancer Mother     Diabetes No family hx of     Coronary Artery Disease No family hx of     Hypertension No family hx of     Hyperlipidemia No family hx of     Cerebrovascular Disease No family hx of     Breast Cancer No family hx of     Colon Cancer No family hx of     Prostate Cancer No family hx of     Other Cancer No family hx of     Depression No family hx of     Anxiety Disorder No family hx of     Mental Illness No family hx of     Substance Abuse No family hx of     Anesthesia Reaction No family hx of     Asthma No family hx of     Osteoporosis No family hx of     Genetic Disorder No family hx of     Thyroid Disease No family hx of     Obesity No family hx of     Unknown/Adopted No family hx of        Social History     Tobacco Use    Smoking status: Never    Smokeless tobacco: Never   Substance Use Topics    Alcohol use: Yes     Comment: case beer a week        Current Outpatient Medications   Medication Sig Dispense Refill    acetaminophen (TYLENOL) 325 MG tablet Take 2 tablets (650 mg) by mouth every 4 hours as needed for other 40 tablet 0    acyclovir  (ZOVIRAX) 400 MG tablet TAKE TWO TABLETS BY MOUTH TWICE DAILY for 5 days as needed for genital herpes outbreak 28 tablet 1    albuterol (PROAIR HFA/PROVENTIL HFA/VENTOLIN HFA) 108 (90 Base) MCG/ACT inhaler Inhale 2 puffs into the lungs every 6 hours as needed for shortness of breath 18 g 1    allopurinol (ZYLOPRIM) 300 MG tablet Take 1 tablet (300 mg) by mouth daily 90 tablet 0    clindamycin (CLEOCIN-T) 1 % external gel Apply topically 2 times daily On the left lower leg at the site of insect bite as well as friction rub injuries to prevent infection for 2 weeks. 60 g 2    colchicine (COLCRYS) 0.6 MG tablet Take 2 tabs x 1, then 1 tab one hour later x 1 ( do not exceed more than 3 tabs / day ) wait for 3 days and repeat the regimen. 30 tablet 1    diclofenac (VOLTAREN) 1 % topical gel APPLY 4 GRAMS TOPICALLY 4 TIMES A  g 2    fluticasone (FLONASE) 50 MCG/ACT nasal spray Spray 2 sprays into both nostrils daily 16 g 11    [START ON 7/7/2024] HYDROcodone-acetaminophen (NORCO) 7.5-325 MG per tablet Take 1 tablet by mouth every 6 hours as needed for pain (4 x daily as needed  maxium 4 per day) 120 tablet 0    ipratropium (ATROVENT) 0.06 % nasal spray SPRAY 2 SPRAYS INTO BOTH NOSTRILS 4 TIMES DAILY 15 mL 0    lidocaine (LIDODERM) 5 % patch Place 1 patch onto the skin every 24 hours 30 patch 11    meloxicam (MOBIC) 15 MG tablet Take 1 tablet (15 mg) by mouth daily 30 tablet 1    multivitamin w/minerals (THERA-VIT-M) tablet Take 1 tablet by mouth daily      mupirocin (BACTROBAN) 2 % external ointment Apply topically 3 times daily 30 g 1    naloxone (NARCAN) 4 MG/0.1ML nasal spray Spray 1 spray (4 mg) into one nostril alternating nostrils once as needed for opioid reversal Every 2-3 minutes until patient responsive or EMS arrives 0.2 mL 0    pseudoePHEDrine (SUDAFED) 30 MG tablet Take by mouth every 4 hours as needed for congestion      senna-docusate (SENOKOT-S/PERICOLACE) 8.6-50 MG tablet Take 1 tablet by mouth 2  times daily 30 tablet 0    sulfamethoxazole-trimethoprim (BACTRIM DS) 800-160 MG tablet Take 1 tablet by mouth 2 times daily 6 tablet 0    tadalafil (CIALIS) 10 MG tablet Take 1 tab as needed for erectile dysfunction. Do not exceed more than 2 tabs per day. 30 tablet 1    tiZANidine (ZANAFLEX) 4 MG tablet TAKE 1 TABLET (4 MG) BY MOUTH NIGHTLY AS NEEDED FOR MUSCLE SPASMS 30 tablet 0     Current Facility-Administered Medications   Medication Dose Route Frequency Provider Last Rate Last Admin    betamethasone acet & sod phos (CELESTONE) injection 6 mg  6 mg   Carmel Chan MD   6 mg at 03/15/24 1309    gentamicin (GARAMYCIN) injection 80 mg  80 mg Intramuscular Once         gentamicin (GARAMYCIN) injection 80 mg  80 mg Intramuscular Once         lidocaine (PF) (XYLOCAINE) 1 % injection 2 mL  2 mL   Carmel Chan MD   2 mL at 03/15/24 1309    triamcinolone (KENALOG-40) injection 40 mg  40 mg   Carmel Chan MD   40 mg at 02/23/24 0927        Review of Systems  Constitutional, HEENT, cardiovascular, pulmonary, GI, , musculoskeletal, neuro, skin, endocrine and psych systems are negative, except as otherwise noted.      Objective    Vitals - Patient Reported  Pain Score: Moderate Pain (4)  Pain Loc: Mid Back        Physical Exam   GENERAL: alert and no distress  EYES: Eyes grossly normal to inspection.  No discharge or erythema, or obvious scleral/conjunctival abnormalities.  RESP: No audible wheeze, cough, or visible cyanosis.    SKIN: Visible skin clear. No significant rash, abnormal pigmentation or lesions.  NEURO: Cranial nerves grossly intact.  Mentation and speech appropriate for age.  PSYCH: Appropriate affect, tone, and pace of words      Video-Visit Details    Type of service:  Video Visit   Originating Location (pt. Location): Home    Distant Location (provider location):  On-site  Platform used for Video Visit: Melva  Signed Electronically by: Sushma Galloway  MD

## 2024-07-03 NOTE — PATIENT INSTRUCTIONS
As discussed, sent in requested antibiotic cream for insect bites    Please follow-up with urology recommendations on the prostate cancer treatment ongoing.    Follow-up with orthopedics for intra-articular injections as well as will take care of your current opiate prescription at same dose as requested.

## 2024-07-08 ENCOUNTER — MYC MEDICAL ADVICE (OUTPATIENT)
Dept: FAMILY MEDICINE | Facility: CLINIC | Age: 69
End: 2024-07-08
Payer: COMMERCIAL

## 2024-07-08 DIAGNOSIS — Z96.652 STATUS POST TOTAL LEFT KNEE REPLACEMENT: ICD-10-CM

## 2024-07-08 DIAGNOSIS — M25.661 STIFFNESS OF KNEE JOINT, RIGHT: Chronic | ICD-10-CM

## 2024-07-08 DIAGNOSIS — R26.9 GAIT DIFFICULTY: ICD-10-CM

## 2024-07-08 DIAGNOSIS — G89.4 CHRONIC PAIN SYNDROME: Chronic | ICD-10-CM

## 2024-07-08 DIAGNOSIS — M47.16 SPONDYLOSIS WITH MYELOPATHY, LUMBAR REGION: ICD-10-CM

## 2024-07-08 RX ORDER — HYDROCODONE BITARTRATE AND ACETAMINOPHEN 7.5; 325 MG/1; MG/1
1 TABLET ORAL EVERY 6 HOURS PRN
Qty: 120 TABLET | Refills: 0 | Status: SHIPPED | OUTPATIENT
Start: 2024-07-08 | End: 2024-08-05

## 2024-07-08 NOTE — TELEPHONE ENCOUNTER
Medication Refill    What medication are you calling about (include dose and sig)?:   HYDROcodone-acetaminophen (NORCO) 7.5-325 MG per tablet     Preferred Pharmacy:  [id number] - [place], [place] - [address] AT [address]  P: [phone]  F: [phone]  Address    [address] [place] [address]   Store number: [id number]   Near the intersection of: [address]    Operation       Hours: Not open 24 hours   E-Prescribing   E-Prescribing controlled substances   Mode: Retail   Type: External      Controlled Substance Agreement on file:   CSA -- Patient Level:     [Media Unavailable] Controlled Substance Agreement - Opioid - Scan on 2/26/2024 12:11 PM   [Media Unavailable] Controlled Substance Agreement - Opioid - Scan on 10/11/2023  6:52 AM   [Media Unavailable] Controlled Substance Agreement - Opioid - Scan on 8/10/2022  5:34 PM   [Media Unavailable] Controlled Substance Agreement - Opioid - Scan on 9/13/2021  4:37 PM   [Media Unavailable] Controlled Substance Agreement - Opioid - Scan on 7/20/2020 12:15 PM   [Media Unavailable] Controlled Substance Agreement - Opioid - Scan on 3/19/2019 11:01 AM     Who prescribed the medication?:   Sushma Galloway MD     Do you need a refill? Yes - PHARMACY OUT OF STOCK.  NEED PHARMACY CORRECTION.  2-3 pills remaining. Takes 4 a day.    Patient offered an appointment? Yes, just had a VV with Dr. Galloway a couple days ago.     Do you have any questions or concerns?  No    Could we send this information to you in [employer] or would you prefer to receive a phone call?:   Patient would prefer a phone call     Okay to leave a detailed message?: Yes at Cell number on file:    Telephone Information:   Mobile [phone]     Ela Alfonso on 7/8/2024 at 1:09 PM

## 2024-07-09 NOTE — TELEPHONE ENCOUNTER
Notified the patient that rx was approved. Patient states that he has already picked them up. Darlyn Gamez RN

## 2024-07-09 NOTE — TELEPHONE ENCOUNTER
[place] reviewed:  reviewed- no concerns    [place]:  [email or web address]      Refill done again - as past pharmacy done 1 week back is out of stock.    Thank you  Sushma Galloway MD on 7/8/2024

## 2024-07-11 ENCOUNTER — APPOINTMENT (OUTPATIENT)
Dept: RADIATION ONCOLOGY | Facility: CLINIC | Age: 69
End: 2024-07-11
Attending: UROLOGY
Payer: COMMERCIAL

## 2024-07-11 DIAGNOSIS — C61 PROSTATE CANCER (H): Primary | ICD-10-CM

## 2024-07-11 PROCEDURE — 77334 RADIATION TREATMENT AID(S): CPT | Mod: 26 | Performed by: RADIOLOGY

## 2024-07-11 PROCEDURE — 77334 RADIATION TREATMENT AID(S): CPT | Performed by: RADIOLOGY

## 2024-07-11 PROCEDURE — G0463 HOSPITAL OUTPT CLINIC VISIT: HCPCS | Mod: 25 | Performed by: RADIOLOGY

## 2024-07-11 NOTE — PROGRESS NOTES
Radiation Therapy Patient Education    Person involved with teaching: Patient    Patient educational needs for self management of treatment-related side effects assessment completed.  [facility] Patient Ed tab contains Patient Learning Assessment    Education Materials Given  Radiation Therapy and You    Educational Topics Discussed  Side effects expected, Pain management, Nutrition and weight loss, and When to call MD/RN    Response To Teaching  Verbalizes understanding    GYN Only  Vaginal Dilator-given and educated: N/A    Referrals sent: None    Chemotherapy?  No

## 2024-07-11 NOTE — LETTER
7/11/2024      Arnaud Bird  [address]  [place] [facility] [address]      Dear Colleague,    Thank you for referring your patient, Arnaud Bird, to the [facility] RADIATION ONCOLOGY. Please see a copy of my visit note below.    Radiation Therapy Patient Education    Person involved with teaching: Patient    Patient educational needs for self management of treatment-related side effects assessment completed.  EP[facility] Patient Ed tab contains Patient Learning Assessment    Education Materials Given  Radiation Therapy and You    Educational Topics Discussed  Side effects expected, Pain management, Nutrition and weight loss, and When to call MD/RN    Response To Teaching  Verbalizes understanding    GYN Only  Vaginal Dilator-given and educated: N/A    Referrals sent: None    Chemotherapy?  No       RADIATION ONCOLOGY FOLLOW UP NOTE    DATE OF FOLLOW UP: 7/11/2024     PATIENT NAME: Arnaud Bird    DIAGNOSIS:   69-year-old male with very high risk prostate cancer (PSA 53, [place] 4+5, cT3a per MRI).         SUBJECTIVE:  Mr. Bird returns today for simulation. He received a 3-month Lupron injection on 6/12/24.    OBJECTIVE: There were no vitals taken for this visit.    ASSESSMENT: 69-year-old male with very high risk prostate cancer (PSA 53, [place] 4+5, cT3a per MRI) s/p his first 3-month Lupron injection 6/12/24.    RECOMMENDATION:   Side effects of radiotherapy were reviewed today prior to simulation today. Informed consent was signed. These side effects include, but are not limited to: fatigue, skin irritation, gas, bloating, small bowel obstruction, loss of erections, damage to bladder (urgency, frequency, decreased stream, dysuria, hematuria), damage to rectum (loose stools, blood in stool, urgency, frequency), and secondary malignancy,  Simulation today.    Ochoa Francois M.D.  Department of Radiation Oncology  [facility]       Again, thank you for allowing  me to participate in the care of your patient.        Sincerely,        Ochoa Francois MD

## 2024-07-15 NOTE — PROGRESS NOTES
RADIATION ONCOLOGY FOLLOW UP NOTE    DATE OF FOLLOW UP: 7/11/2024     PATIENT NAME: Arnaud Bird    DIAGNOSIS:   69-year-old male with very high risk prostate cancer (PSA 53, [place] 4+5, cT3a per MRI).         SUBJECTIVE:  Mr. Bird returns today for simulation. He received a 3-month Lupron injection on 6/12/24.    OBJECTIVE: There were no vitals taken for this visit.    ASSESSMENT: 69-year-old male with very high risk prostate cancer (PSA 53, [place] 4+5, cT3a per MRI) s/p his first 3-month Lupron injection 6/12/24.    RECOMMENDATION:   Side effects of radiotherapy were reviewed today prior to simulation today. Informed consent was signed. These side effects include, but are not limited to: fatigue, skin irritation, gas, bloating, small bowel obstruction, loss of erections, damage to bladder (urgency, frequency, decreased stream, dysuria, hematuria), damage to rectum (loose stools, blood in stool, urgency, frequency), and secondary malignancy,  Simulation today.    Ochoa Francois M.D.  Department of Radiation Oncology  [facility]

## 2024-07-25 ENCOUNTER — APPOINTMENT (OUTPATIENT)
Dept: RADIATION ONCOLOGY | Facility: CLINIC | Age: 69
End: 2024-07-25
Attending: RADIOLOGY
Payer: COMMERCIAL

## 2024-07-25 PROCEDURE — 77385 HC IMRT TREATMENT DELIVERY, SIMPLE: CPT | Performed by: RADIOLOGY

## 2024-07-25 PROCEDURE — 77014 PR CT GUIDE FOR PLACEMENT RADIATION THERAPY FIELDS: CPT | Mod: 26 | Performed by: RADIOLOGY

## 2024-07-26 ENCOUNTER — APPOINTMENT (OUTPATIENT)
Dept: RADIATION ONCOLOGY | Facility: CLINIC | Age: 69
End: 2024-07-26
Attending: RADIOLOGY
Payer: COMMERCIAL

## 2024-07-26 PROCEDURE — 77014 PR CT GUIDE FOR PLACEMENT RADIATION THERAPY FIELDS: CPT | Mod: 26 | Performed by: RADIOLOGY

## 2024-07-29 ENCOUNTER — MYC REFILL (OUTPATIENT)
Dept: FAMILY MEDICINE | Facility: CLINIC | Age: 69
End: 2024-07-29

## 2024-07-29 ENCOUNTER — APPOINTMENT (OUTPATIENT)
Dept: RADIATION ONCOLOGY | Facility: CLINIC | Age: 69
End: 2024-07-29
Attending: RADIOLOGY
Payer: COMMERCIAL

## 2024-07-29 DIAGNOSIS — B00.9 HSV (HERPES SIMPLEX VIRUS) INFECTION: ICD-10-CM

## 2024-07-29 DIAGNOSIS — M1A.9XX0 CHRONIC GOUT INVOLVING TOE OF RIGHT FOOT WITHOUT TOPHUS, UNSPECIFIED CAUSE: ICD-10-CM

## 2024-07-29 PROCEDURE — 77014 PR CT GUIDE FOR PLACEMENT RADIATION THERAPY FIELDS: CPT | Mod: 26 | Performed by: RADIOLOGY

## 2024-07-30 ENCOUNTER — APPOINTMENT (OUTPATIENT)
Dept: RADIATION ONCOLOGY | Facility: CLINIC | Age: 69
End: 2024-07-30
Attending: RADIOLOGY
Payer: COMMERCIAL

## 2024-07-30 ENCOUNTER — OFFICE VISIT (OUTPATIENT)
Dept: RADIATION ONCOLOGY | Facility: CLINIC | Age: 69
End: 2024-07-30
Attending: UROLOGY
Payer: COMMERCIAL

## 2024-07-30 VITALS
BODY MASS INDEX: 41.4 KG/M2 | WEIGHT: 272.3 LBS | SYSTOLIC BLOOD PRESSURE: 145 MMHG | HEART RATE: 60 BPM | DIASTOLIC BLOOD PRESSURE: 78 MMHG

## 2024-07-30 DIAGNOSIS — C61 PROSTATE CANCER (H): Primary | ICD-10-CM

## 2024-07-30 PROCEDURE — 77385 HC IMRT TREATMENT DELIVERY, SIMPLE: CPT | Performed by: RADIOLOGY

## 2024-07-30 RX ORDER — ACYCLOVIR 400 MG/1
TABLET ORAL
Qty: 28 TABLET | Refills: 1 | Status: SHIPPED | OUTPATIENT
Start: 2024-07-30

## 2024-07-30 RX ORDER — ALLOPURINOL 300 MG/1
1 TABLET ORAL DAILY
Qty: 90 TABLET | Refills: 0 | Status: SHIPPED | OUTPATIENT
Start: 2024-07-30

## 2024-07-30 NOTE — LETTER
2024      Arnaud Bird  [address]  [place] [address]      Dear Colleague,    Thank you for referring your patient, Arnaud Bird, to the [facility] RADIATION ONCOLOGY. Please see a copy of my visit note below.    RADIATION ONCOLOGY WEEKLY ON TREATMENT VISIT   Encounter Date: 2024    Patient Name: Arnaud Bird  MRN: [id number]  : 1955     Disease and Stage: High risk prostate cancer (PSA 53, [place] 4+5, cT3a per MRI)   Treatment Site: Prostate and pelvic nodes  Current Dose/Planned Total Dose: [1080] cGy / [7020] cGy    Concurrent Chemotherapy: No  Drug and Frequency: Not applicable    Subjective: Mr. Bird presents to clinic today for his weekly on-treatment visit.  His main complaint is hot flashes from Lupron.  He states that Cialis gives him a headache.    Nursing ROS:   Skin  Skin Reaction: 0 - No changes     ENT and Mouth Exam  Mucositis - Current: 0 - None   Cardiovascular  Respiratory effort: 1 - Normal - without distress  Gastrointestinal  Nausea: 0 - None  Genitourinary  Urinary Status: 0 - Normal     Pain Assessment  0-10 Pain Scale: 0    PEG Tube: No  Electronic Cardiac Implant: No     Objective:   BP (!) 145/78   Pulse 60   Wt 123.5 kg (272 lb 4.8 oz)   BMI 41.40 kg/m    Gen: Appears well, NAD  Skin: No erythema    Laboratory:  No new studies    Treatment-related toxicities (CTCAE v5.0):  Anorexia: Grade 0: No toxicity  Fatigue: Grade 0: No toxicity  Nausea: Grade 0: No toxicity  Pain: Grade 0: No toxicity  Diarrhea: Grade 0: No toxicity  Urinary urgency: Grade 0: No toxicity  Dermatitis: Grade 0: No toxicity    ED visits/Hospitalizations:  None    Missed Treatments:  None    Mosaiq chart and setup information reviewed  IGRT images reviewed    Medication Review  Med list reviewed with patient?: Yes  Med list printed and given: Offered and declined    Assessment:    Mr. Bird is a 69 year old male with a high risk prostate cancer  (PSA 53, [place] 4+5, cT3a per MRI) who is receiving prostate and pelvic hola radiation with Lupron.  He is most bothered by hot flashes.  He has previously taking gabapentin for other reason and did not tolerate it well due to sedation and lack of concentration.    Plan:   1.  Continue radiation treatment as planned  2.  May resume amino acid supplements as patient feels this may help with erectile dysfunction    Claire Lan  Department of Radiation Oncology  [facility]                 Again, thank you for allowing me to participate in the care of your patient.        Sincerely,        Claire Lan MD

## 2024-07-30 NOTE — PROGRESS NOTES
RADIATION ONCOLOGY WEEKLY ON TREATMENT VISIT   Encounter Date: 2024    Patient Name: Arnaud Bird  MRN: [id number]  : 1955     Disease and Stage: High risk prostate cancer (PSA 53, [place] 4+5, cT3a per MRI)   Treatment Site: Prostate and pelvic nodes  Current Dose/Planned Total Dose: [1080] cGy / [7020] cGy    Concurrent Chemotherapy: No  Drug and Frequency: Not applicable    Subjective: Mr. Bird presents to clinic today for his weekly on-treatment visit.  His main complaint is hot flashes from Lupron.  He states that Cialis gives him a headache.    Nursing ROS:   Skin  Skin Reaction: 0 - No changes     ENT and Mouth Exam  Mucositis - Current: 0 - None   Cardiovascular  Respiratory effort: 1 - Normal - without distress  Gastrointestinal  Nausea: 0 - None  Genitourinary  Urinary Status: 0 - Normal     Pain Assessment  0-10 Pain Scale: 0    PEG Tube: No  Electronic Cardiac Implant: No     Objective:   BP (!) 145/78   Pulse 60   Wt 123.5 kg (272 lb 4.8 oz)   BMI 41.40 kg/m    Gen: Appears well, NAD  Skin: No erythema    Laboratory:  No new studies    Treatment-related toxicities (CTCAE v5.0):  Anorexia: Grade 0: No toxicity  Fatigue: Grade 0: No toxicity  Nausea: Grade 0: No toxicity  Pain: Grade 0: No toxicity  Diarrhea: Grade 0: No toxicity  Urinary urgency: Grade 0: No toxicity  Dermatitis: Grade 0: No toxicity    ED visits/Hospitalizations:  None    Missed Treatments:  None    Mosaiq chart and setup information reviewed  IGRT images reviewed    Medication Review  Med list reviewed with patient?: Yes  Med list printed and given: Offered and declined    Assessment:    Mr. Bird is a 69 year old male with a high risk prostate cancer (PSA 53, [place] 4+5, cT3a per MRI) who is receiving prostate and pelvic hola radiation with Lupron.  He is most bothered by hot flashes.  He has previously taking gabapentin for other reason and did not tolerate it well due to sedation and lack of  concentration.    Plan:   1.  Continue radiation treatment as planned  2.  May resume amino acid supplements as patient feels this may help with erectile dysfunction    Claire Lan  Department of Radiation Oncology  [facility]

## 2024-07-31 ENCOUNTER — APPOINTMENT (OUTPATIENT)
Dept: RADIATION ONCOLOGY | Facility: CLINIC | Age: 69
End: 2024-07-31
Attending: RADIOLOGY
Payer: COMMERCIAL

## 2024-07-31 PROCEDURE — 77385 HC IMRT TREATMENT DELIVERY, SIMPLE: CPT | Performed by: RADIOLOGY

## 2024-07-31 PROCEDURE — 77336 RADIATION PHYSICS CONSULT: CPT | Performed by: RADIOLOGY

## 2024-07-31 PROCEDURE — 77014 PR CT GUIDE FOR PLACEMENT RADIATION THERAPY FIELDS: CPT | Mod: 26 | Performed by: RADIOLOGY

## 2024-07-31 PROCEDURE — 77427 RADIATION TX MANAGEMENT X5: CPT | Performed by: RADIOLOGY

## 2024-07-31 NOTE — TELEPHONE ENCOUNTER
Called patient, is aware of approved refills. [facility]      Perlita Hu MA on 7/31/2024 at 10:36 AM

## 2024-07-31 NOTE — TELEPHONE ENCOUNTER
Refill done.    Pt will be due for Opiate follow up in 10/2024. Please assist in scheduling.     Thank you  Sushma Galloway MD on 7/30/2024

## 2024-08-01 ENCOUNTER — APPOINTMENT (OUTPATIENT)
Dept: RADIATION ONCOLOGY | Facility: CLINIC | Age: 69
End: 2024-08-01
Attending: RADIOLOGY
Payer: COMMERCIAL

## 2024-08-01 VITALS — SYSTOLIC BLOOD PRESSURE: 127 MMHG | DIASTOLIC BLOOD PRESSURE: 84 MMHG | WEIGHT: 271 LBS | BODY MASS INDEX: 41.21 KG/M2

## 2024-08-01 DIAGNOSIS — C61 PROSTATE CANCER (H): Primary | ICD-10-CM

## 2024-08-01 PROCEDURE — 77385 HC IMRT TREATMENT DELIVERY, SIMPLE: CPT | Performed by: RADIOLOGY

## 2024-08-01 NOTE — LETTER
2024      Arnaud Bird  [address]  [place] [address]      Dear Colleague,    Thank you for referring your patient, Arnaud Bird, to the [facility] RADIATION ONCOLOGY. Please see a copy of my visit note below.    [facility] PHYSIC[facility]  SPECIALIZING IN BREAKTHROUGHS  Radiation Oncology    On Treatment Visit Note      Arnaud Bird      Date: 2024   MRN: [id number]   : 1955  Diagnosis: Prostate Cancer      Reason for Visit:  On Radiation Treatment Visit     Treatment Summary to Date  Pelvis and prostate    Current Dose: 1620/7020 cGy Fractions:       Chemo concurrent with radx?: No    ED Visit/Hospital Admission: none    Treatment Breaks: none    Subjective:     1st week of RT. No complaints with exception of hot flashes from ADT.    Nursing ROS:      Skin  Skin Reaction: 0 - No changes     ENT and Mouth Exam  Mucositis - Current: 0 - None   Cardiovascular  Respiratory effort: 1 - Normal - without distress  Gastrointestinal  Nausea: 0 - None  Genitourinary  Urinary Status: 0 - Normal     Pain Assessment  0-10 Pain Scale: 0    Objective:   /84   Wt 122.9 kg (271 lb)   BMI 41.21 kg/m    Gen: Appears well, in no acute distress  Skin: No erythema    Labs:  CBC RESULTS:   Recent Labs   Lab Test 24  1509   WBC 6.3   RBC 5.02   HGB 15.9   HCT 47.7   MCV 95   MCH 31.7   MCHC 33.3   RDW 12.9        ELECTROLYTES:  Recent Labs   Lab Test 24  1509      POTASSIUM 5.2   CHLORIDE 104   BELIA 9.4   CO2 26   BUN 15.1   CR 0.83   *       Assessment:    Tolerating radiation therapy well.  All questions and concerns addressed.    Toxicities:  None    Plan:   Continue radiation treatments as planned.    Ports/IGRT reviewed          Medication Review  Med list reviewed with patient?: Yes  Med list printed and given: Offered and declined             Ochoa Francois MD  [phone] clinic       Again, thank you for allowing me to  participate in the care of your patient.        Sincerely,        Ochoa Francois MD

## 2024-08-01 NOTE — PROGRESS NOTES
[facility] PHYSICIANS  SPECIALIZING IN BREAKTHROUGHS  Radiation Oncology    On Treatment Visit Note      Arnaud Bird      Date: 2024   MRN: [id number]   : 1955  Diagnosis: Prostate Cancer      Reason for Visit:  On Radiation Treatment Visit     Treatment Summary to Date  Pelvis and prostate    Current Dose: 1620/7020 cGy Fractions:       Chemo concurrent with radx?: No    ED Visit/Hospital Admission: none    Treatment Breaks: none    Subjective:     1st week of RT. No complaints with exception of hot flashes from ADT.    Nursing ROS:      Skin  Skin Reaction: 0 - No changes     ENT and Mouth Exam  Mucositis - Current: 0 - None   Cardiovascular  Respiratory effort: 1 - Normal - without distress  Gastrointestinal  Nausea: 0 - None  Genitourinary  Urinary Status: 0 - Normal     Pain Assessment  0-10 Pain Scale: 0    Objective:   /84   Wt 122.9 kg (271 lb)   BMI 41.21 kg/m    Gen: Appears well, in no acute distress  Skin: No erythema    Labs:  CBC RESULTS:   Recent Labs   Lab Test 24  1509   WBC 6.3   RBC 5.02   HGB 15.9   HCT 47.7   MCV 95   MCH 31.7   MCHC 33.3   RDW 12.9        ELECTROLYTES:  Recent Labs   Lab Test 24  1509      POTASSIUM 5.2   CHLORIDE 104   BELIA 9.4   CO2 26   BUN 15.1   CR 0.83   *       Assessment:    Tolerating radiation therapy well.  All questions and concerns addressed.    Toxicities:  None    Plan:   Continue radiation treatments as planned.    Ports/IGRT reviewed          Medication Review  Med list reviewed with patient?: Yes  Med list printed and given: Offered and declined             Ochoa Francois MD  [phone] [facility]

## 2024-08-02 ENCOUNTER — APPOINTMENT (OUTPATIENT)
Dept: RADIATION ONCOLOGY | Facility: CLINIC | Age: 69
End: 2024-08-02
Attending: RADIOLOGY
Payer: COMMERCIAL

## 2024-08-02 PROCEDURE — 77385 HC IMRT TREATMENT DELIVERY, SIMPLE: CPT | Performed by: RADIOLOGY

## 2024-08-02 PROCEDURE — 77014 PR CT GUIDE FOR PLACEMENT RADIATION THERAPY FIELDS: CPT | Mod: 26 | Performed by: RADIOLOGY

## 2024-08-05 ENCOUNTER — APPOINTMENT (OUTPATIENT)
Dept: RADIATION ONCOLOGY | Facility: CLINIC | Age: 69
End: 2024-08-05
Attending: RADIOLOGY
Payer: COMMERCIAL

## 2024-08-05 DIAGNOSIS — Z96.652 STATUS POST TOTAL LEFT KNEE REPLACEMENT: ICD-10-CM

## 2024-08-05 DIAGNOSIS — M47.16 SPONDYLOSIS WITH MYELOPATHY, LUMBAR REGION: ICD-10-CM

## 2024-08-05 DIAGNOSIS — R26.9 GAIT DIFFICULTY: ICD-10-CM

## 2024-08-05 DIAGNOSIS — M25.661 STIFFNESS OF KNEE JOINT, RIGHT: Chronic | ICD-10-CM

## 2024-08-05 DIAGNOSIS — G89.4 CHRONIC PAIN SYNDROME: Chronic | ICD-10-CM

## 2024-08-05 PROCEDURE — 77014 PR CT GUIDE FOR PLACEMENT RADIATION THERAPY FIELDS: CPT | Mod: 26 | Performed by: RADIOLOGY

## 2024-08-05 PROCEDURE — 77385 HC IMRT TREATMENT DELIVERY, SIMPLE: CPT | Performed by: RADIOLOGY

## 2024-08-06 ENCOUNTER — APPOINTMENT (OUTPATIENT)
Dept: RADIATION ONCOLOGY | Facility: CLINIC | Age: 69
End: 2024-08-06
Attending: RADIOLOGY
Payer: COMMERCIAL

## 2024-08-06 PROCEDURE — 77014 PR CT GUIDE FOR PLACEMENT RADIATION THERAPY FIELDS: CPT | Mod: 26 | Performed by: RADIOLOGY

## 2024-08-06 PROCEDURE — 77385 HC IMRT TREATMENT DELIVERY, SIMPLE: CPT | Performed by: RADIOLOGY

## 2024-08-06 RX ORDER — HYDROCODONE BITARTRATE AND ACETAMINOPHEN 7.5; 325 MG/1; MG/1
1 TABLET ORAL EVERY 6 HOURS PRN
Qty: 120 TABLET | Refills: 0 | Status: SHIPPED | OUTPATIENT
Start: 2024-08-08 | End: 2024-09-06

## 2024-08-07 ENCOUNTER — APPOINTMENT (OUTPATIENT)
Dept: RADIATION ONCOLOGY | Facility: CLINIC | Age: 69
End: 2024-08-07
Attending: RADIOLOGY
Payer: COMMERCIAL

## 2024-08-07 PROCEDURE — 77014 PR CT GUIDE FOR PLACEMENT RADIATION THERAPY FIELDS: CPT | Mod: 26 | Performed by: RADIOLOGY

## 2024-08-07 PROCEDURE — 77385 HC IMRT TREATMENT DELIVERY, SIMPLE: CPT | Performed by: RADIOLOGY

## 2024-08-07 PROCEDURE — 77427 RADIATION TX MANAGEMENT X5: CPT | Performed by: RADIOLOGY

## 2024-08-07 PROCEDURE — 77336 RADIATION PHYSICS CONSULT: CPT | Performed by: RADIOLOGY

## 2024-08-07 NOTE — TELEPHONE ENCOUNTER
RX [place] ([place]) reviewed:  reviewed- no concerns    [place]:  [email or web address]    Refill done from 8/8/2024 as per .    Thank you,.  Sushma Galloway MD on 8/6/2024

## 2024-08-08 ENCOUNTER — OFFICE VISIT (OUTPATIENT)
Dept: RADIATION ONCOLOGY | Facility: CLINIC | Age: 69
End: 2024-08-08
Attending: RADIOLOGY
Payer: COMMERCIAL

## 2024-08-08 VITALS
DIASTOLIC BLOOD PRESSURE: 84 MMHG | BODY MASS INDEX: 40.9 KG/M2 | SYSTOLIC BLOOD PRESSURE: 142 MMHG | WEIGHT: 269 LBS | HEART RATE: 88 BPM

## 2024-08-08 DIAGNOSIS — C61 PROSTATE CANCER (H): Primary | ICD-10-CM

## 2024-08-08 PROCEDURE — 77385 HC IMRT TREATMENT DELIVERY, SIMPLE: CPT | Performed by: RADIOLOGY

## 2024-08-08 NOTE — PROGRESS NOTES
"[facility] PHYSICIANS  SPECIALIZING IN BREAKTHROUGHS  Radiation Oncology    On Treatment Visit Note      Arnaud Bird      Date: 2024   MRN: [id number]   : 1955  Diagnosis: Prostate Cancer      Reason for Visit:  On Radiation Treatment Visit     Treatment Summary to Date    Current Dose: 2970/7020 cGy Fractions:       Chemotherapy  Chemo concurrent with radx?: No    ED Visit/Hospital Admission: none    Treatment Breaks: none    Subjective:     Arnaud continues to do well with his radiotherapy. His biggest complaints are fatigue and hot flashes. He has no urinary or bladder complaints. He has previously declined neurontin (he felt \"off\" when he took it in the past for pain) and effexor.    Nursing ROS:      Skin  Skin Reaction: 0 - No changes     ENT and Mouth Exam  Mucositis - Current: 0 - None   Cardiovascular  Respiratory effort: 1 - Normal - without distress  Gastrointestinal  Nausea: 0 - None  Genitourinary  Urinary Status: 0 - Normal     Pain Assessment  0-10 Pain Scale: 0    Objective:   BP (!) 142/84   Pulse 88   Wt 122 kg (269 lb)   BMI 40.90 kg/m    Gen: Appears well, in no acute distress  Skin: Mild diffuse erythema over treatment field    Labs:  CBC RESULTS:   Recent Labs   Lab Test 24  1509   WBC 6.3   RBC 5.02   HGB 15.9   HCT 47.7   MCV 95   MCH 31.7   MCHC 33.3   RDW 12.9        ELECTROLYTES:  Recent Labs   Lab Test 24  1509      POTASSIUM 5.2   CHLORIDE 104   BELIA 9.4   CO2 26   BUN 15.1   CR 0.83   *       Assessment:    Tolerating radiation therapy well.  All questions and concerns addressed.    Toxicities:  Fatigue: Grade 2: Fatigue not relieved by rest; limiting instrumental ADL    Plan:   Continue radiation treatments as planned.    Ports/IGRT reviewed          Medication Review  Med list reviewed with patient?: Yes  Med list printed and given: Offered and declined             Ochoa Francois MD  [phone] clinic       "

## 2024-08-08 NOTE — LETTER
"2024      Arnaud Bird  [address]  [place] [address]      Dear Colleague,    Thank you for referring your patient, Arnaud Bird, to the [facility] RADIATION ONCOLOGY. Please see a copy of my visit note below.    [facility] PHYSICIANS  SPECIALIZING IN BREAKTHROUGHS  Radiation Oncology    On Treatment Visit Note      Arnaud Bird      Date: 2024   MRN: [id number]   : 1955  Diagnosis: Prostate Cancer      Reason for Visit:  On Radiation Treatment Visit     Treatment Summary to Date    Current Dose: 2970/7020 cGy Fractions:       Chemotherapy  Chemo concurrent with radx?: No    ED Visit/Hospital Admission: none    Treatment Breaks: none    Subjective:     Arnaud continues to do well with his radiotherapy. His biggest complaints are fatigue and hot flashes. He has no urinary or bladder complaints. He has previously declined neurontin (he felt \"off\" when he took it in the past for pain) and effexor.    Nursing ROS:      Skin  Skin Reaction: 0 - No changes     ENT and Mouth Exam  Mucositis - Current: 0 - None   Cardiovascular  Respiratory effort: 1 - Normal - without distress  Gastrointestinal  Nausea: 0 - None  Genitourinary  Urinary Status: 0 - Normal     Pain Assessment  0-10 Pain Scale: 0    Objective:   BP (!) 142/84   Pulse 88   Wt 122 kg (269 lb)   BMI 40.90 kg/m    Gen: Appears well, in no acute distress  Skin: Mild diffuse erythema over treatment field    Labs:  CBC RESULTS:   Recent Labs   Lab Test 24  1509   WBC 6.3   RBC 5.02   HGB 15.9   HCT 47.7   MCV 95   MCH 31.7   MCHC 33.3   RDW 12.9        ELECTROLYTES:  Recent Labs   Lab Test 24  1509      POTASSIUM 5.2   CHLORIDE 104   BELIA 9.4   CO2 26   BUN 15.1   CR 0.83   *       Assessment:    Tolerating radiation therapy well.  All questions and concerns addressed.    Toxicities:  Fatigue: Grade 2: Fatigue not relieved by rest; limiting instrumental " ADL    Plan:   Continue radiation treatments as planned.    Ports/IGRT reviewed          Medication Review  Med list reviewed with patient?: Yes  Med list printed and given: Offered and declined             Ochoa Francois MD  [phone] clinic         Again, thank you for allowing me to participate in the care of your patient.        Sincerely,        Ochoa Francois MD

## 2024-08-09 ENCOUNTER — APPOINTMENT (OUTPATIENT)
Dept: RADIATION ONCOLOGY | Facility: CLINIC | Age: 69
End: 2024-08-09
Attending: RADIOLOGY
Payer: COMMERCIAL

## 2024-08-09 ENCOUNTER — OFFICE VISIT (OUTPATIENT)
Dept: ORTHOPEDICS | Facility: CLINIC | Age: 69
End: 2024-08-09
Payer: COMMERCIAL

## 2024-08-09 DIAGNOSIS — M19.019 OSTEOARTHRITIS OF AC (ACROMIOCLAVICULAR) JOINT: Primary | ICD-10-CM

## 2024-08-09 PROCEDURE — 77385 HC IMRT TREATMENT DELIVERY, SIMPLE: CPT | Performed by: RADIOLOGY

## 2024-08-09 PROCEDURE — 20606 DRAIN/INJ JOINT/BURSA W/US: CPT | Mod: LT | Performed by: FAMILY MEDICINE

## 2024-08-09 RX ORDER — BETAMETHASONE SODIUM PHOSPHATE AND BETAMETHASONE ACETATE 3; 3 MG/ML; MG/ML
0.5 INJECTION, SUSPENSION INTRA-ARTICULAR; INTRALESIONAL; INTRAMUSCULAR; SOFT TISSUE
Status: SHIPPED | OUTPATIENT
Start: 2024-08-09

## 2024-08-09 RX ADMIN — BETAMETHASONE SODIUM PHOSPHATE AND BETAMETHASONE ACETATE 0.5 MG: 3; 3 INJECTION, SUSPENSION INTRA-ARTICULAR; INTRALESIONAL; INTRAMUSCULAR; SOFT TISSUE at 07:56

## 2024-08-09 NOTE — LETTER
8/9/2024      RE: Arnaud Bird  [address]  [place] [address]     Dear Colleague,    Thank you for referring your patient, Arnaud Bird, to the [facility] [place]. Please see a copy of my visit note below.    S: Patient recently diagnosed with prostate cancer and is undergoing radiation treatments.  Patient reports some hot flashes with Lupron treatment.  Patient has a new neighbor who bought hunting land and they are figuring out their hunting areas.  Drives between new home up [place] and has home on [address]  Left AC injection back in 3/15/24  Hard time sleeping when it flares    Objective: x-ray- left shoulder AC joint OA    Procedure: US used given the amount of osteoarthritis at AC joint. Risks and benefits discussed and consented.  Patient anatomy checked with the linear probe and skin marker used to rekha the area.  Area was broken down, sterile procedure was started. Chloroprep, Sterile gel and tagaderm over probe. 2 ml 1% lidocaine, 0.5cc betamethasone injected at joint with effusion present.  Video on [email or web address]. Pt tolerated procedure, Band Aid placed           Medium Joint Injection: L acromioclavicular    Date/Time: 8/9/2024 7:56 AM    Performed by: Carmel Chan MD  Authorized by: Carmel Chan MD    Indications:  Pain  Needle Size:  25 G  Guidance: ultrasound    Approach:  Anterior  Location:  Shoulder  Site:  L acromioclavicular  Medications:  0.5 mg betamethasone acet & sod phos 6 (3-3) MG/ML  Outcome:  Tolerated well, no immediate complications  Procedure discussed: discussed risks, benefits, and alternatives    Consent Given by:  Patient  Timeout: timeout called immediately prior to procedure    Prep: patient was prepped and draped in usual sterile fashion      I agree with the following injection documentation.    ELROY Chan MD      Again, thank you for allowing me to participate in the care of your patient.       Sincerely,    Carmel Chan MD

## 2024-08-09 NOTE — NURSING NOTE
[address] [address]  Dept: [phone]  ______________________________________________________________________________    Patient: Arnaud Bird   : 1955   MRN: [id number]   2024    INVASIVE PROCEDURE SAFETY CHECKLIST    Date: 2024   Procedure: Left AC joint USGI   Patient Name: Arnaud Bird  MRN: [id number]  YOB: 1955    Action: Complete sections as appropriate. Any discrepancy results in a HARD COPY until resolved.     PRE PROCEDURE:  Patient ID verified with 2 identifiers (name and  or MRN): Yes  Procedure and site verified with patient/designee (when able): Yes  Accurate consent documentation in medical record: Yes  H&P (or appropriate assessment) documented in medical record: Yes  H&P must be up to 20 days prior to procedure and updates within 24 hours of procedure as applicable: NA  Relevant diagnostic and radiology test results appropriately labeled and displayed as applicable: Yes  Procedure site(s) marked with provider initials: NA    TIMEOUT:  Time-Out performed immediately prior to starting procedure, including verbal and active participation of all team members addressing the following:Yes  * Correct patient identify  * Confirmed that the correct side and site are marked  * An accurate procedure consent form  * Agreement on the procedure to be done  * Correct patient position  * Relevant images and results are properly labeled and appropriately displayed  * The need to administer antibiotics or fluids for irrigation purposes during the procedure as applicable   * Safety precautions based on patient history or medication use    DURING PROCEDURE: Verification of correct person, site, and procedures any time the responsibility for care of the patient is transferred to another member of the care team.       Prior to injection, verified patient identity using patient's name and date of  birth.  Due to injection administration, patient instructed to remain in clinic for 15 minutes  afterwards, and to report any adverse reaction to me immediately.    Joint injection was performed.      Drug Amount Wasted:  Yes: 3 mg/ml  lidocaine and 5.5 mg/ml celestone  Vial/Syringe: Single dose vial  Expiration Date:  lidocaine 05/27 and celestone 5/25      Shannon Hoskins ATC  August 9, 2024

## 2024-08-09 NOTE — PROGRESS NOTES
S: Patient recently diagnosed with prostate cancer and is undergoing radiation treatments.  Patient reports some hot flashes with Lupron treatment.  Patient has a new neighbor who bought hunting land and they are figuring out their hunting areas.  [place] home on [address] [facility] [address] [facility]  Left AC injection back in 3/15/24  Hard time sleeping when it flares    Objective: x-ray- left shoulder AC joint OA    Procedure: US used given the amount of osteoarthritis at AC joint. Risks and benefits discussed and consented.  Patient anatomy checked with the linear probe and skin marker used to rekha the area.  Area was broken down, sterile procedure was started. Chloroprep, Sterile gel and tagaderm over probe. 2 ml 1% lidocaine, 0.5cc betamethasone injected at joint with effusion present.  Video on [employer]. Pt tolerated procedure, Band Aid placed           Medium Joint Injection: L acromioclavicular    Date/Time: 8/9/2024 7:56 AM    Performed by: Carmel Chan MD  Authorized by: Carmel Chan MD    Indications:  Pain  Needle Size:  25 G  Guidance: ultrasound    Approach:  Anterior  Location:  Shoulder  Site:  L acromioclavicular  Medications:  0.5 mg betamethasone acet & sod phos 6 (3-3) MG/ML  Outcome:  Tolerated well, no immediate complications  Procedure discussed: discussed risks, benefits, and alternatives    Consent Given by:  Patient  Timeout: timeout called immediately prior to procedure    Prep: patient was prepped and draped in usual sterile fashion      I agree with the following injection documentation.    ELROY Chan MD

## 2024-08-12 ENCOUNTER — APPOINTMENT (OUTPATIENT)
Dept: RADIATION ONCOLOGY | Facility: CLINIC | Age: 69
End: 2024-08-12
Attending: RADIOLOGY
Payer: COMMERCIAL

## 2024-08-12 PROCEDURE — 77014 PR CT GUIDE FOR PLACEMENT RADIATION THERAPY FIELDS: CPT | Mod: 26 | Performed by: RADIOLOGY

## 2024-08-12 PROCEDURE — 77385 HC IMRT TREATMENT DELIVERY, SIMPLE: CPT | Performed by: RADIOLOGY

## 2024-08-13 ENCOUNTER — APPOINTMENT (OUTPATIENT)
Dept: RADIATION ONCOLOGY | Facility: CLINIC | Age: 69
End: 2024-08-13
Attending: RADIOLOGY
Payer: COMMERCIAL

## 2024-08-13 PROCEDURE — 77385 HC IMRT TREATMENT DELIVERY, SIMPLE: CPT | Performed by: RADIOLOGY

## 2024-08-13 PROCEDURE — 77014 PR CT GUIDE FOR PLACEMENT RADIATION THERAPY FIELDS: CPT | Mod: 26 | Performed by: RADIOLOGY

## 2024-08-16 ENCOUNTER — APPOINTMENT (OUTPATIENT)
Dept: RADIATION ONCOLOGY | Facility: CLINIC | Age: 69
End: 2024-08-16
Attending: RADIOLOGY
Payer: COMMERCIAL

## 2024-08-16 VITALS
OXYGEN SATURATION: 94 % | SYSTOLIC BLOOD PRESSURE: 135 MMHG | DIASTOLIC BLOOD PRESSURE: 96 MMHG | BODY MASS INDEX: 41.3 KG/M2 | HEART RATE: 63 BPM | WEIGHT: 271.6 LBS | RESPIRATION RATE: 16 BRPM

## 2024-08-16 DIAGNOSIS — C61 PROSTATE CANCER (H): Primary | ICD-10-CM

## 2024-08-16 PROCEDURE — 77336 RADIATION PHYSICS CONSULT: CPT | Performed by: RADIOLOGY

## 2024-08-16 PROCEDURE — 77385 HC IMRT TREATMENT DELIVERY, SIMPLE: CPT | Performed by: RADIOLOGY

## 2024-08-16 PROCEDURE — 77014 PR CT GUIDE FOR PLACEMENT RADIATION THERAPY FIELDS: CPT | Mod: 26 | Performed by: RADIOLOGY

## 2024-08-16 PROCEDURE — 77427 RADIATION TX MANAGEMENT X5: CPT | Performed by: RADIOLOGY

## 2024-08-16 NOTE — PROGRESS NOTES
WEEKLY MANAGEMENT NOTE  Radiation Oncology  8/16/2024           Patient Name: Arnaud Bird  MRN: [id number]       Pelvis  4050 cGy/ 7020 cGy   15/26         DAILY DOSE:     270  cGy/ day,  5 times/week    DISEASE UNDER TREATMENT: Adenocarcinoma of Prostate.   PSA 53, [place] 4+5, cT3a per MRI     ADT: Lupron, 22.5 mg (6/12/2024)    SUBJECTIVE:    CTC V5.0 Toxicity Criteria  Fatigue: Grade 1: Fatigue relieved by rest  Pain Score:  0/10    :   Urinary Frequency/Urgency: Grade 2: Increase > 2X normal but less than hourly  Urinary Incontinence: Grade 0: No change from baseline  Dysuria:Grade 0: No change from baseline  Nocturia: 5 (baseline 2)  Comment:    GI:  Diarrhea:Grade 1  Increase of <4 stools/day over baseline  Proctitis: Grade 0 No symptom  Comment: Has nausea X 4      OBJECTIVE:BP (!) 135/96   Pulse 63   Resp 16   Wt 123.2 kg (271 lb 9.6 oz)   SpO2 94%   BMI 41.30 kg/m     Wt Readings from Last 2 Encounters:   08/16/24 123.2 kg (271 lb 9.6 oz)   08/08/24 122 kg (269 lb)         IMPRESSION: The patient is tolerating the treatment.  The patient set up, dose, and cone beam CT images were reviewed.  Unexplained nausea. It is unlikely to be from prostate radiotherapy    PLAN: Continue radiotherapy    PAIN MANAGEMENT PLAN: The patient will continue current pain medication for his shoulder    LLen Schaeffer M.D.  Department of Radiation Oncology  [facility]

## 2024-08-16 NOTE — LETTER
8/16/2024      Arnaud Bird  [address]  [place] [address]      Dear Colleague,    Thank you for referring your patient, Arnaud Bird, to the [facility] RADIATION ONCOLOGY. Please see a copy of my visit note below.      WEEKLY MANAGEMENT NOTE  Radiation Oncology  8/16/2024           Patient Name: Arnaud Bird  MRN: [id number]       Pelvis  4050 cGy/ 7020 cGy   15/26         DAILY DOSE:     270  cGy/ day,  5 times/week    DISEASE UNDER TREATMENT: Adenocarcinoma of Prostate.   PSA 53, [place] 4+5, cT3a per MRI     ADT: Lupron, 22.5 mg (6/12/2024)    SUBJECTIVE:    CTC V5.0 Toxicity Criteria  Fatigue: Grade 1: Fatigue relieved by rest  Pain Score:  0/10    :   Urinary Frequency/Urgency: Grade 2: Increase > 2X normal but less than hourly  Urinary Incontinence: Grade 0: No change from baseline  Dysuria:Grade 0: No change from baseline  Nocturia: 5 (baseline 2)  Comment:    GI:  Diarrhea:Grade 1  Increase of <4 stools/day over baseline  Proctitis: Grade 0 No symptom  Comment: Has nausea X 4      OBJECTIVE:BP (!) 135/96   Pulse 63   Resp 16   Wt 123.2 kg (271 lb 9.6 oz)   SpO2 94%   BMI 41.30 kg/m     Wt Readings from Last 2 Encounters:   08/16/24 123.2 kg (271 lb 9.6 oz)   08/08/24 122 kg (269 lb)         IMPRESSION: The patient is tolerating the treatment.  The patient set up, dose, and cone beam CT images were reviewed.  Unexplained nausea. It is unlikely to be from prostate radiotherapy    PLAN: Continue radiotherapy    PAIN MANAGEMENT PLAN: The patient will continue current pain medication for his shoulder    L. Bakari Schaeffer M.D.  Department of Radiation Oncology  [facility]       Again, thank you for allowing me to participate in the care of your patient.        Sincerely,        Bakari Schaeffer MD

## 2024-08-19 ENCOUNTER — TELEPHONE (OUTPATIENT)
Dept: FAMILY MEDICINE | Facility: CLINIC | Age: 69
End: 2024-08-19
Payer: COMMERCIAL

## 2024-08-19 ENCOUNTER — APPOINTMENT (OUTPATIENT)
Dept: RADIATION ONCOLOGY | Facility: CLINIC | Age: 69
End: 2024-08-19
Attending: RADIOLOGY
Payer: COMMERCIAL

## 2024-08-19 PROCEDURE — 77014 PR CT GUIDE FOR PLACEMENT RADIATION THERAPY FIELDS: CPT | Mod: 26 | Performed by: RADIOLOGY

## 2024-08-19 PROCEDURE — 77385 HC IMRT TREATMENT DELIVERY, SIMPLE: CPT | Performed by: RADIOLOGY

## 2024-08-19 NOTE — TELEPHONE ENCOUNTER
General Call    Reason for Call:  Med check follow-up scheduled 10/09/24    What are your questions or concerns:    Does PCP want to see pt sooner?    Date of last appointment with provider: 7/3/24 VV (pt left?)    Could we send this information to you in new test company or would you prefer to receive a phone call?:   Patient would prefer a phone call   Okay to leave a detailed message?: Yes at Cell number on file:    Telephone Information:   Mobile [phone]     Ela Alfonso on 8/19/2024 at 4:11 PM

## 2024-08-20 ENCOUNTER — APPOINTMENT (OUTPATIENT)
Dept: RADIATION ONCOLOGY | Facility: CLINIC | Age: 69
End: 2024-08-20
Attending: RADIOLOGY
Payer: COMMERCIAL

## 2024-08-20 PROCEDURE — 77014 PR CT GUIDE FOR PLACEMENT RADIATION THERAPY FIELDS: CPT | Mod: 26 | Performed by: RADIOLOGY

## 2024-08-20 PROCEDURE — 77385 HC IMRT TREATMENT DELIVERY, SIMPLE: CPT | Performed by: RADIOLOGY

## 2024-08-20 NOTE — TELEPHONE ENCOUNTER
"OK to see on current scheduled date.     I am unsure = on recent VV on 7/2024 we have completed the visit successfully . Not sure why this concern of \" Pt left \"which is not correct.         Thank you  Sushma Galloway MD on 8/19/2024 at 11:24 PM    "

## 2024-08-21 ENCOUNTER — APPOINTMENT (OUTPATIENT)
Dept: RADIATION ONCOLOGY | Facility: CLINIC | Age: 69
End: 2024-08-21
Attending: RADIOLOGY
Payer: COMMERCIAL

## 2024-08-21 PROCEDURE — 77385 HC IMRT TREATMENT DELIVERY, SIMPLE: CPT | Performed by: RADIOLOGY

## 2024-08-21 PROCEDURE — 77014 PR CT GUIDE FOR PLACEMENT RADIATION THERAPY FIELDS: CPT | Mod: 26 | Performed by: RADIOLOGY

## 2024-08-22 ENCOUNTER — OFFICE VISIT (OUTPATIENT)
Dept: RADIATION ONCOLOGY | Facility: CLINIC | Age: 69
End: 2024-08-22
Attending: RADIOLOGY
Payer: COMMERCIAL

## 2024-08-22 VITALS
OXYGEN SATURATION: 96 % | HEART RATE: 68 BPM | DIASTOLIC BLOOD PRESSURE: 89 MMHG | SYSTOLIC BLOOD PRESSURE: 142 MMHG | BODY MASS INDEX: 40.9 KG/M2 | WEIGHT: 269 LBS

## 2024-08-22 DIAGNOSIS — C61 PROSTATE CANCER (H): Primary | ICD-10-CM

## 2024-08-22 PROCEDURE — 77014 PR CT GUIDE FOR PLACEMENT RADIATION THERAPY FIELDS: CPT | Mod: 26 | Performed by: RADIOLOGY

## 2024-08-22 PROCEDURE — 77336 RADIATION PHYSICS CONSULT: CPT | Performed by: RADIOLOGY

## 2024-08-22 PROCEDURE — 77385 HC IMRT TREATMENT DELIVERY, SIMPLE: CPT | Performed by: RADIOLOGY

## 2024-08-22 NOTE — LETTER
2024      Arnaud Bird  [address]  [place] [address]      Dear Colleague,    Thank you for referring your patient, Arnaud Bird, to the [facility] RADIATION ONCOLOGY. Please see a copy of my visit note below.    [facility] PHYSICIANS  SPECIALIZING IN BREAKTHROUGHS  Radiation Oncology    On Treatment Visit Note      Arnaud Bird      Date: 2024   MRN: [id number]   : 1955  Diagnosis: Prostate Cancer      Reason for Visit:  On Radiation Treatment Visit     Treatment Summary to Date  Treatment Site: Prostate and LN Current Dose: 5130/7020 cGy Fractions:       Chemotherapy  Chemo concurrent with radx?: No    ED Visit/Hospital Admission: none    Treatment Breaks: none    Subjective:     Nausea progress from last week has self resolved.  Arnaud has been experiencing diarrhea this week, but has been eating a high-fiber diet.  Despite this, his loose stools are typically controlled with one Imodium in the morning.  He reports no significant change in his urinary function.    Nursing ROS:   Nutrition Alteration  Diet Type: Patient's Preference  Nutrition Note: reviewed low fiber, nor freshe fruits and veggies     Cardiovascular  Respiratory effort: 1 - Normal - without distress  Gastrointestinal  Diarrhea: 2 - Three to five soft or liquid bowel movements per day (reviewed imodium use and gave diarrhea diet suggestions)  Genitourinary  Urinary Status: 0 - Normal   Note: Nocuria 2-3/night  Psychosocial  Psychosocial Note: no complaints  Pain Assessment  0-10 Pain Scale: 0    Objective:   BP (!) 142/89   Pulse 68   Wt 122 kg (269 lb)   SpO2 96%   BMI 40.90 kg/m    Gen: Appears well, in no acute distress  Skin: No erythema    Labs:  CBC RESULTS:   Recent Labs   Lab Test 24  1509   WBC 6.3   RBC 5.02   HGB 15.9   HCT 47.7   MCV 95   MCH 31.7   MCHC 33.3   RDW 12.9        ELECTROLYTES:  Recent Labs   Lab Test 24  1509       POTASSIUM 5.2   CHLORIDE 104   BELIA 9.4   CO2 26   BUN 15.1   CR 0.83   *       Assessment:    Tolerating radiation therapy well.  All questions and concerns addressed.    Toxicities:  Fatigue: Grade 1: Fatigue relieved by rest  Diarrhea: Grade 1: Increase of <4 stools per day over baseline; mild increase in ostomy output compared to baseline    Plan:   Continue radiation treatments as planned.    Ports/IGRT reviewed  Patient given guidelines for low residue diet.          Medication Review  Medication changes: No changes per pt    Educational Topic Discussed  Additional Instructions: continue radation therapy  Education Instructions: reviewed SE radhament        Ochao Francois MD  [phone] clinic       Again, thank you for allowing me to participate in the care of your patient.        Sincerely,        Ochoa Francois MD

## 2024-08-22 NOTE — PROGRESS NOTES
[facility] PHYSICIANS  SPECIALIZING IN BREAKTHROUGHS  Radiation Oncology    On Treatment Visit Note      Arnaud Bird      Date: 2024   MRN: [id number]   : 1955  Diagnosis: Prostate Cancer      Reason for Visit:  On Radiation Treatment Visit     Treatment Summary to Date  Treatment Site: Prostate and LN Current Dose: 5130/7020 cGy Fractions:       Chemotherapy  Chemo concurrent with radx?: No    ED Visit/Hospital Admission: none    Treatment Breaks: none    Subjective:     Nausea progress from last week has self resolved.  Arnaud has been experiencing diarrhea this week, but has been eating a high-fiber diet.  Despite this, his loose stools are typically controlled with one Imodium in the morning.  He reports no significant change in his urinary function.    Nursing ROS:   Nutrition Alteration  Diet Type: Patient's Preference  Nutrition Note: reviewed low fiber, nor freshe fruits and veggies     Cardiovascular  Respiratory effort: 1 - Normal - without distress  Gastrointestinal  Diarrhea: 2 - Three to five soft or liquid bowel movements per day (reviewed imodium use and gave diarrhea diet suggestions)  Genitourinary  Urinary Status: 0 - Normal   Note: Nocuria 2-3/night  Psychosocial  Psychosocial Note: no complaints  Pain Assessment  0-10 Pain Scale: 0    Objective:   BP (!) 142/89   Pulse 68   Wt 122 kg (269 lb)   SpO2 96%   BMI 40.90 kg/m    Gen: Appears well, in no acute distress  Skin: No erythema    Labs:  CBC RESULTS:   Recent Labs   Lab Test 24  1509   WBC 6.3   RBC 5.02   HGB 15.9   HCT 47.7   MCV 95   MCH 31.7   MCHC 33.3   RDW 12.9        ELECTROLYTES:  Recent Labs   Lab Test 24  1509      POTASSIUM 5.2   CHLORIDE 104   BELIA 9.4   CO2 26   BUN 15.1   CR 0.83   *       Assessment:    Tolerating radiation therapy well.  All questions and concerns addressed.    Toxicities:  Fatigue: Grade 1: Fatigue relieved by rest  Diarrhea: Grade 1:  Increase of <4 stools per day over baseline; mild increase in ostomy output compared to baseline    Plan:   Continue radiation treatments as planned.    Ports/IGRT reviewed  Patient given guidelines for low residue diet.          Medication Review  Medication changes: No changes per pt    Educational Topic Discussed  Additional Instructions: continue radation therapy  Education Instructions: reviewed SE mamie Francois MD  [phone] clinic

## 2024-08-26 ENCOUNTER — APPOINTMENT (OUTPATIENT)
Dept: RADIATION ONCOLOGY | Facility: CLINIC | Age: 69
End: 2024-08-26
Attending: RADIOLOGY
Payer: COMMERCIAL

## 2024-08-26 DIAGNOSIS — C61 PROSTATE CANCER (H): Primary | ICD-10-CM

## 2024-08-26 PROCEDURE — 77385 HC IMRT TREATMENT DELIVERY, SIMPLE: CPT | Performed by: RADIOLOGY

## 2024-08-26 PROCEDURE — 77014 PR CT GUIDE FOR PLACEMENT RADIATION THERAPY FIELDS: CPT | Mod: 26 | Performed by: RADIOLOGY

## 2024-08-26 PROCEDURE — 77427 RADIATION TX MANAGEMENT X5: CPT | Performed by: RADIOLOGY

## 2024-08-26 RX ORDER — TAMSULOSIN HYDROCHLORIDE 0.4 MG/1
0.4 CAPSULE ORAL DAILY
Qty: 30 CAPSULE | Refills: 1 | Status: SHIPPED | OUTPATIENT
Start: 2024-08-26 | End: 2024-10-02

## 2024-08-27 ENCOUNTER — APPOINTMENT (OUTPATIENT)
Dept: RADIATION ONCOLOGY | Facility: CLINIC | Age: 69
End: 2024-08-27
Attending: RADIOLOGY
Payer: COMMERCIAL

## 2024-08-27 ENCOUNTER — OFFICE VISIT (OUTPATIENT)
Dept: FAMILY MEDICINE | Facility: CLINIC | Age: 69
End: 2024-08-27
Payer: COMMERCIAL

## 2024-08-27 VITALS
WEIGHT: 273.9 LBS | TEMPERATURE: 98.2 F | RESPIRATION RATE: 16 BRPM | OXYGEN SATURATION: 97 % | BODY MASS INDEX: 41.51 KG/M2 | HEART RATE: 82 BPM | DIASTOLIC BLOOD PRESSURE: 84 MMHG | SYSTOLIC BLOOD PRESSURE: 134 MMHG | HEIGHT: 68 IN

## 2024-08-27 DIAGNOSIS — C61 PROSTATE CANCER (H): Primary | ICD-10-CM

## 2024-08-27 DIAGNOSIS — Z96.652 STATUS POST TOTAL LEFT KNEE REPLACEMENT: ICD-10-CM

## 2024-08-27 DIAGNOSIS — R26.9 GAIT DIFFICULTY: ICD-10-CM

## 2024-08-27 DIAGNOSIS — F41.9 ANXIETY AND DEPRESSION: ICD-10-CM

## 2024-08-27 DIAGNOSIS — M47.16 SPONDYLOSIS WITH MYELOPATHY, LUMBAR REGION: ICD-10-CM

## 2024-08-27 DIAGNOSIS — R11.0 NAUSEA: ICD-10-CM

## 2024-08-27 DIAGNOSIS — G89.4 CHRONIC PAIN SYNDROME: Chronic | ICD-10-CM

## 2024-08-27 DIAGNOSIS — M25.661 STIFFNESS OF KNEE JOINT, RIGHT: Chronic | ICD-10-CM

## 2024-08-27 DIAGNOSIS — F32.A ANXIETY AND DEPRESSION: ICD-10-CM

## 2024-08-27 LAB
ALBUMIN SERPL BCG-MCNC: 4.1 G/DL (ref 3.5–5.2)
ALP SERPL-CCNC: 94 U/L (ref 40–150)
ALT SERPL W P-5'-P-CCNC: 43 U/L (ref 0–70)
ANION GAP SERPL CALCULATED.3IONS-SCNC: 9 MMOL/L (ref 7–15)
AST SERPL W P-5'-P-CCNC: 28 U/L (ref 0–45)
BASOPHILS # BLD AUTO: 0 10E3/UL (ref 0–0.2)
BASOPHILS NFR BLD AUTO: 0 %
BILIRUB SERPL-MCNC: 0.4 MG/DL
BUN SERPL-MCNC: 12.9 MG/DL (ref 8–23)
CALCIUM SERPL-MCNC: 9.2 MG/DL (ref 8.8–10.4)
CHLORIDE SERPL-SCNC: 105 MMOL/L (ref 98–107)
CREAT SERPL-MCNC: 0.81 MG/DL (ref 0.67–1.17)
EGFRCR SERPLBLD CKD-EPI 2021: >90 ML/MIN/1.73M2
EOSINOPHIL # BLD AUTO: 0 10E3/UL (ref 0–0.7)
EOSINOPHIL NFR BLD AUTO: 1 %
ERYTHROCYTE [DISTWIDTH] IN BLOOD BY AUTOMATED COUNT: 13.3 % (ref 10–15)
GLUCOSE SERPL-MCNC: 95 MG/DL (ref 70–99)
HCO3 SERPL-SCNC: 25 MMOL/L (ref 22–29)
HCT VFR BLD AUTO: 39.9 % (ref 40–53)
HGB BLD-MCNC: 13.7 G/DL (ref 13.3–17.7)
IMM GRANULOCYTES # BLD: 0 10E3/UL
IMM GRANULOCYTES NFR BLD: 0 %
LYMPHOCYTES # BLD AUTO: 0.6 10E3/UL (ref 0.8–5.3)
LYMPHOCYTES NFR BLD AUTO: 15 %
MCH RBC QN AUTO: 32.5 PG (ref 26.5–33)
MCHC RBC AUTO-ENTMCNC: 34.3 G/DL (ref 31.5–36.5)
MCV RBC AUTO: 95 FL (ref 78–100)
MONOCYTES # BLD AUTO: 0.4 10E3/UL (ref 0–1.3)
MONOCYTES NFR BLD AUTO: 10 %
NEUTROPHILS # BLD AUTO: 2.8 10E3/UL (ref 1.6–8.3)
NEUTROPHILS NFR BLD AUTO: 74 %
PLATELET # BLD AUTO: 151 10E3/UL (ref 150–450)
POTASSIUM SERPL-SCNC: 4 MMOL/L (ref 3.4–5.3)
PROT SERPL-MCNC: 6.8 G/DL (ref 6.4–8.3)
RBC # BLD AUTO: 4.21 10E6/UL (ref 4.4–5.9)
SODIUM SERPL-SCNC: 139 MMOL/L (ref 135–145)
WBC # BLD AUTO: 3.8 10E3/UL (ref 4–11)

## 2024-08-27 PROCEDURE — G2211 COMPLEX E/M VISIT ADD ON: HCPCS | Performed by: INTERNAL MEDICINE

## 2024-08-27 PROCEDURE — 85025 COMPLETE CBC W/AUTO DIFF WBC: CPT | Performed by: INTERNAL MEDICINE

## 2024-08-27 PROCEDURE — 96127 BRIEF EMOTIONAL/BEHAV ASSMT: CPT | Performed by: INTERNAL MEDICINE

## 2024-08-27 PROCEDURE — 77385 HC IMRT TREATMENT DELIVERY, SIMPLE: CPT | Performed by: RADIOLOGY

## 2024-08-27 PROCEDURE — 99214 OFFICE O/P EST MOD 30 MIN: CPT | Performed by: INTERNAL MEDICINE

## 2024-08-27 PROCEDURE — 80053 COMPREHEN METABOLIC PANEL: CPT | Performed by: INTERNAL MEDICINE

## 2024-08-27 PROCEDURE — 36415 COLL VENOUS BLD VENIPUNCTURE: CPT | Performed by: INTERNAL MEDICINE

## 2024-08-27 RX ORDER — ONDANSETRON 4 MG/1
4 TABLET, FILM COATED ORAL EVERY 8 HOURS PRN
Qty: 30 TABLET | Refills: 5 | Status: SHIPPED | OUTPATIENT
Start: 2024-08-27

## 2024-08-27 RX ORDER — ESCITALOPRAM OXALATE 10 MG/1
10 TABLET ORAL DAILY
Qty: 90 TABLET | Refills: 1 | Status: SHIPPED | OUTPATIENT
Start: 2024-08-27 | End: 2024-08-28

## 2024-08-27 RX ORDER — ESCITALOPRAM OXALATE 10 MG/1
10 TABLET ORAL DAILY
Qty: 90 TABLET | Refills: 1 | Status: SHIPPED | OUTPATIENT
Start: 2024-08-27 | End: 2024-08-27

## 2024-08-27 ASSESSMENT — ANXIETY QUESTIONNAIRES
IF YOU CHECKED OFF ANY PROBLEMS ON THIS QUESTIONNAIRE, HOW DIFFICULT HAVE THESE PROBLEMS MADE IT FOR YOU TO DO YOUR WORK, TAKE CARE OF THINGS AT HOME, OR GET ALONG WITH OTHER PEOPLE: NOT DIFFICULT AT ALL
5. BEING SO RESTLESS THAT IT IS HARD TO SIT STILL: NOT AT ALL
1. FEELING NERVOUS, ANXIOUS, OR ON EDGE: NOT AT ALL
2. NOT BEING ABLE TO STOP OR CONTROL WORRYING: NOT AT ALL
6. BECOMING EASILY ANNOYED OR IRRITABLE: SEVERAL DAYS
7. FEELING AFRAID AS IF SOMETHING AWFUL MIGHT HAPPEN: NOT AT ALL
GAD7 TOTAL SCORE: 1
GAD7 TOTAL SCORE: 1
3. WORRYING TOO MUCH ABOUT DIFFERENT THINGS: NOT AT ALL

## 2024-08-27 ASSESSMENT — PATIENT HEALTH QUESTIONNAIRE - PHQ9
SUM OF ALL RESPONSES TO PHQ QUESTIONS 1-9: 12
5. POOR APPETITE OR OVEREATING: NOT AT ALL

## 2024-08-27 ASSESSMENT — PAIN SCALES - GENERAL: PAINLEVEL: NO PAIN (0)

## 2024-08-27 NOTE — PROGRESS NOTES
Assessment and Plan  1. Prostate cancer (H)  2. Nausea  Ongoing problem, following urology and radiation oncology at this time. Recently diagnosed prostate cancer given the elevated PSA levels and urology referral given prior to that.    - Last Urology visit on 5/2024 with high risk prostate cancer with PET scan negative for metastasis and plans of combined therapy with ADT ( Androgen deprivation therapy (ADT), also known as hormone therapy) coupled with external beam radiotherapy.   -Patient does states that he is having ongoing recurrence of nausea symptoms though it is radiation therapy.  Requesting for as needed medication will do as requested.  - ondansetron (ZOFRAN) 4 MG tablet; Take 1 tablet (4 mg) by mouth every 8 hours as needed for nausea.  Dispense: 30 tablet; Refill: 5  - Comprehensive metabolic panel (BMP + Alb, Alk Phos, ALT, AST, Total. Bili, TP); Future  - CBC with platelets and differential; Future  - Comprehensive metabolic panel (BMP + Alb, Alk Phos, ALT, AST, Total. Bili, TP)  - CBC with platelets and differential    3. Chronic pain syndrome  4. Spondylosis with myelopathy, lumbar region  5. Stiffness of knee joint, right  6. Status post total left knee replacement  7. Gait difficulty  Chronic stable, continue current opiates at the same dose with  check on the refills not due until September 10, 2024.  Patient not due for UDS & CSA till February 2025.    8. Anxiety and depression  New problem added to patient on list, he endorses that he has ongoing hot flashes due to his current androgen deprivation treatment which she is undergoing for his prostate cancer.  -Discussed on [place] state regulations for controlled substances and cannot give benzodiazepines which he understands as he is already on opiates managed by PCP.  -Shared decision to start off on Lexapro 10 mg daily for improvement on his symptoms.  Patient understood and agreed with plan.  - escitalopram (LEXAPRO) 10 MG tablet; Take  1 tablet (10 mg) by mouth daily.  Dispense: 90 tablet; Refill: 1       The longitudinal plan of care for the diagnosis(es)/condition(s) as documented were addressed during this visit. Due to the added complexity in care, I will continue to support Arnaud in the subsequent management and with ongoing continuity of care.      Please note that this note consists of symbols derived from keyboarding, dictation and/or voice recognition software. As a result, there may be errors in the script that have gone undetected. Please consider this when interpreting information found in this chart.    Patient Instructions   As discussed , will consider this as pain follow up visit     Urine exam not due until 2/2025.     Will consider starting on Lexapro for your anxiety depression     ==============================      Return in about 3 months (around 11/27/2024), or if symptoms worsen or fail to improve, for If symptoms persist, Follow up of last visit.    MD ANY [facility]          Antonio   Arnaud is a 69 year old, presenting for the following health issues:  Pain, Prostate Problem, and Follow Up        8/27/2024     2:01 PM   Additional Questions   Roomed by Savanah maguire     History of Present Illness       Reason for visit:  Lab work   He is taking medications regularly.       Pain History:  When did you first notice your pain? Since 2009   Have you seen this provider for your pain in the past? Yes   Where in your body do you have pain? Lower and Left shoulder   Are you seeing anyone else for your pain? No        4/21/2023     5:41 PM 4/25/2024     2:46 PM 8/27/2024     2:07 PM   PHQ-9 SCORE   PHQ-9 Total Score MyChart  4 (Minimal depression)    PHQ-9 Total Score 4 4 12           4/21/2023     5:41 PM 4/25/2024     2:47 PM 8/27/2024     2:07 PM   SOFIYA-7 SCORE   Total Score  0 (minimal anxiety)    Total Score 6 0 1               4/21/2023     5:41 PM 4/25/2024     2:46 PM 8/27/2024      2:07 PM   PHQ-9 SCORE   PHQ-9 Total Score MyChart  4 (Minimal depression)    PHQ-9 Total Score 4 4 12           4/21/2023     5:41 PM 4/25/2024     2:47 PM 8/27/2024     2:07 PM   SOFIYA-7 SCORE   Total Score  0 (minimal anxiety)    Total Score 6 0 1       Chronic Pain Follow Up:    Location of pain: Lower back and Left shoulder   Analgesia/pain control:    - Recent changes:  no    - Overall control: Tolerable with discomfort    - Current treatments: cortisone shot and hydrocodone pills    Adherence:     - Do you ever take more pain medicine than prescribed? No    - When did you take your last dose of pain medicine?  Noon today    Adverse effects: No   PDMP Review         Value Time User    State PDMP site checked  Yes 8/27/2024  5:59 PM Sushma Galloway MD          Last CSA Agreement:   CSA -- Patient Level:     [Media Unavailable] Controlled Substance Agreement - Opioid - Scan on 2/26/2024 12:11 PM   [Media Unavailable] Controlled Substance Agreement - Opioid - Scan on 10/11/2023  6:52 AM   [Media Unavailable] Controlled Substance Agreement - Opioid - Scan on 8/10/2022  5:34 PM   [Media Unavailable] Controlled Substance Agreement - Opioid - Scan on 9/13/2021  4:37 PM   [Media Unavailable] Controlled Substance Agreement - Opioid - Scan on 7/20/2020 12:15 PM   [Media Unavailable] Controlled Substance Agreement - Opioid - Scan on 3/19/2019 11:01 AM       Last UDS: 2/27/2024      Chronic/Recurring Back Pain Follow Up    Where is your back pain located? (Select all that apply) low back bilateral  How would you describe your back pain?  Constant pressure   Where does your back pain spread? nowhere  Since your last clinic visit for back pain, how has your pain changed? unchanged  Does your back pain interfere with your job? Not applicable  Since your last visit, have you tried any new treatment? No  How many servings of fruits and vegetables do you eat daily?  2-3  On average, how many sweetened beverages do you drink  each day (Examples: soda, juice, sweet tea, etc.  Do NOT count diet or artificially sweetened beverages)?   0  How many days per week do you exercise enough to make your heart beat faster? 3 or less  How many minutes a day do you exercise enough to make your heart beat faster? 10 - 19  How many days per week do you miss taking your medication? 0      Last seen patient in April 2024 for annual wellness visit, he did have recently diagnosed prostate cancer given the elevated PSA levels and urology referral given prior to that.      Patient is also on chronic opiate management by me for which she is here for the follow-up on this video visit.  Patient does have skin concerns as he endorses.       Allergies   Allergen Reactions    No Clinical Screening - See Comments     Seasonal Allergies         Past Medical History:   Diagnosis Date    Acute respiratory failure with hypoxia (H) 4/14/2021    Arthritis     Benign positional vertigo     Carpal tunnel syndrome     mild    Chronic pain syndrome 4/14/2021    Hearing problem     LEFT WORSE THAN RIGHT  10/18/2012    Migraines     Nasal polyps     Obesity 4/14/2021    Osteoarthritis of multiple joints 4/14/2021    Pneumonia due to 2019 novel coronavirus 4/14/2021    Unspecified asthma(493.90) 10/17/2012    Unspecified asthma, with exacerbation 10/17/2012       Past Surgical History:   Procedure Laterality Date    ARTHROPLASTY SHOULDER Right 6/15/2021    Procedure: Right Total Shoulder Arthroplasty, Distal Clavicle Excision;  Surgeon: Yesica Deleon MD;  Location: [facility]    COLONOSCOPY  2019    COLONOSCOPY N/A 8/15/2022    Procedure: COLONOSCOPY, WITH POLYPECTOMY AND BIOPSY;  Surgeon: Abelino Flores MD;  Location:  GI    HC TOOTH EXTRACTION W/FORCEP      ORTHOPEDIC SURGERY      bilateral total knee replacements    TONSILLECTOMY      age 2 or 3       Family History   Problem Relation Age of Onset    Cancer Father     Family History Negative Father     Family  History Negative Mother     Cancer Mother     Stomach Problem Mother     Breast Cancer Mother     Diabetes No family hx of     Coronary Artery Disease No family hx of     Hypertension No family hx of     Hyperlipidemia No family hx of     Cerebrovascular Disease No family hx of     Breast Cancer No family hx of     Colon Cancer No family hx of     Prostate Cancer No family hx of     Other Cancer No family hx of     Depression No family hx of     Anxiety Disorder No family hx of     Mental Illness No family hx of     Substance Abuse No family hx of     Anesthesia Reaction No family hx of     Asthma No family hx of     Osteoporosis No family hx of     Genetic Disorder No family hx of     Thyroid Disease No family hx of     Obesity No family hx of     Unknown/Adopted No family hx of        Social History     Tobacco Use    Smoking status: Never    Smokeless tobacco: Never   Substance Use Topics    Alcohol use: Yes     Comment: case beer a week        Current Outpatient Medications   Medication Sig Dispense Refill    acetaminophen (TYLENOL) 325 MG tablet Take 2 tablets (650 mg) by mouth every 4 hours as needed for other 40 tablet 0    acyclovir (ZOVIRAX) 400 MG tablet TAKE TWO TABLETS BY MOUTH TWICE DAILY for 5 days as needed for genital herpes outbreak 28 tablet 1    albuterol (PROAIR HFA/PROVENTIL HFA/VENTOLIN HFA) 108 (90 Base) MCG/ACT inhaler Inhale 2 puffs into the lungs every 6 hours as needed for shortness of breath 18 g 1    allopurinol (ZYLOPRIM) 300 MG tablet Take 1 tablet (300 mg) by mouth daily 90 tablet 0    clindamycin (CLEOCIN-T) 1 % external gel Apply topically 2 times daily On the left lower leg at the site of insect bite as well as friction rub injuries to prevent infection for 2 weeks. 60 g 2    colchicine (COLCRYS) 0.6 MG tablet Take 2 tabs x 1, then 1 tab one hour later x 1 ( do not exceed more than 3 tabs / day ) wait for 3 days and repeat the regimen. 30 tablet 1    diclofenac (VOLTAREN) 1 %  topical gel APPLY 4 GRAMS TOPICALLY 4 TIMES A  g 2    escitalopram (LEXAPRO) 10 MG tablet Take 1 tablet (10 mg) by mouth daily. 90 tablet 1    fluticasone (FLONASE) 50 MCG/ACT nasal spray Spray 2 sprays into both nostrils daily 16 g 11    HYDROcodone-acetaminophen (NORCO) 7.5-325 MG per tablet Take 1 tablet by mouth every 6 hours as needed for pain (4 x daily as needed  maxium 4 per day) 120 tablet 0    ipratropium (ATROVENT) 0.06 % nasal spray SPRAY 2 SPRAYS INTO BOTH NOSTRILS 4 TIMES DAILY 15 mL 0    lidocaine (LIDODERM) 5 % patch Place 1 patch onto the skin every 24 hours 30 patch 11    meloxicam (MOBIC) 15 MG tablet Take 1 tablet (15 mg) by mouth daily 30 tablet 1    multivitamin w/minerals (THERA-VIT-M) tablet Take 1 tablet by mouth daily      mupirocin (BACTROBAN) 2 % external ointment Apply topically 3 times daily 30 g 1    naloxone (NARCAN) 4 MG/0.1ML nasal spray Spray 1 spray (4 mg) into one nostril alternating nostrils once as needed for opioid reversal Every 2-3 minutes until patient responsive or EMS arrives 0.2 mL 0    ondansetron (ZOFRAN) 4 MG tablet Take 1 tablet (4 mg) by mouth every 8 hours as needed for nausea. 30 tablet 5    pseudoePHEDrine (SUDAFED) 30 MG tablet Take by mouth every 4 hours as needed for congestion      senna-docusate (SENOKOT-S/PERICOLACE) 8.6-50 MG tablet Take 1 tablet by mouth 2 times daily 30 tablet 0    tadalafil (CIALIS) 10 MG tablet Take 1 tab as needed for erectile dysfunction. Do not exceed more than 2 tabs per day. 30 tablet 1    tamsulosin (FLOMAX) 0.4 MG capsule Take 1 capsule (0.4 mg) by mouth daily. 30 capsule 1    tiZANidine (ZANAFLEX) 4 MG tablet TAKE 1 TABLET (4 MG) BY MOUTH NIGHTLY AS NEEDED FOR MUSCLE SPASMS 30 tablet 0     Current Facility-Administered Medications   Medication Dose Route Frequency Provider Last Rate Last Admin    betamethasone acet & sod phos (CELESTONE) injection 0.5 mg  0.5 mg      0.5 mg at 08/09/24 9166    betamethasone acet & sod  "phos (CELESTONE) injection 6 mg  6 mg   Carmel Chan MD   6 mg at 03/15/24 1309    gentamicin (GARAMYCIN) injection 80 mg  80 mg Intramuscular Once         gentamicin (GARAMYCIN) injection 80 mg  80 mg Intramuscular Once         lidocaine (PF) (XYLOCAINE) 1 % injection 2 mL  2 mL   Carmel Chan MD   2 mL at 03/15/24 1309    triamcinolone (KENALOG-40) injection 40 mg  40 mg   Carmel Chan MD   40 mg at 02/23/24 0927          Review of Systems  Constitutional, HEENT, cardiovascular, pulmonary, GI, , musculoskeletal, neuro, skin, endocrine and psych systems are negative, except as otherwise noted.      Objective    /84   Pulse 82   Temp 98.2  F (36.8  C) (Tympanic)   Resp 16   Ht 1.727 m (5' 8\")   Wt 124.2 kg (273 lb 14.4 oz)   SpO2 97%   BMI 41.65 kg/m    Body mass index is 41.65 kg/m .  Physical Exam   GENERAL: alert and no distress  NECK: no adenopathy, no asymmetry, masses, or scars  RESP: lungs clear to auscultation - no rales, rhonchi or wheezes  CV: regular rate and rhythm, normal S1 S2, no S3 or S4, no murmur, click or rub, no peripheral edema  ABDOMEN: soft, nontender, no hepatosplenomegaly, no masses and bowel sounds normal  MS: no gross musculoskeletal defects noted, no edema        Signed Electronically by: Sushma Galloway MD    "

## 2024-08-27 NOTE — PATIENT INSTRUCTIONS
As discussed , will consider this as pain follow up visit     Urine exam not due until 2/2025.     Will consider starting on Lexapro for your anxiety depression     ==============================

## 2024-08-28 ENCOUNTER — APPOINTMENT (OUTPATIENT)
Dept: RADIATION ONCOLOGY | Facility: CLINIC | Age: 69
End: 2024-08-28
Attending: RADIOLOGY
Payer: COMMERCIAL

## 2024-08-28 PROCEDURE — 77385 HC IMRT TREATMENT DELIVERY, SIMPLE: CPT | Performed by: RADIOLOGY

## 2024-08-28 PROCEDURE — 77014 PR CT GUIDE FOR PLACEMENT RADIATION THERAPY FIELDS: CPT | Mod: 26 | Performed by: RADIOLOGY

## 2024-08-28 RX ORDER — ESCITALOPRAM OXALATE 10 MG/1
10 TABLET ORAL DAILY
Qty: 90 TABLET | Refills: 1 | Status: SHIPPED | OUTPATIENT
Start: 2024-08-28

## 2024-08-29 ENCOUNTER — OFFICE VISIT (OUTPATIENT)
Dept: RADIATION ONCOLOGY | Facility: CLINIC | Age: 69
End: 2024-08-29
Attending: RADIOLOGY
Payer: COMMERCIAL

## 2024-08-29 VITALS
BODY MASS INDEX: 41.51 KG/M2 | SYSTOLIC BLOOD PRESSURE: 142 MMHG | WEIGHT: 273 LBS | DIASTOLIC BLOOD PRESSURE: 102 MMHG | HEART RATE: 82 BPM

## 2024-08-29 DIAGNOSIS — C61 PROSTATE CANCER (H): Primary | ICD-10-CM

## 2024-08-29 PROCEDURE — 77385 HC IMRT TREATMENT DELIVERY, SIMPLE: CPT | Performed by: RADIOLOGY

## 2024-08-29 PROCEDURE — 77336 RADIATION PHYSICS CONSULT: CPT | Performed by: RADIOLOGY

## 2024-08-29 NOTE — LETTER
2024      Arnaud Bird  [address]  [place] [facility] [place] [facility] [address]      Dear Colleague,    Thank you for referring your patient, Arnaud Bird, to the [facility] RADIATION ONCOLOGY. Please see a copy of my visit note below.    [facility] PHYSICIANS  SPECIALIZING IN BREAKTHROUGHS  Radiation Oncology    On Treatment Visit Note      Arnaud Bird      Date: 2024   MRN: [id number]   : 1955  Diagnosis: Prostate Cancer      Reason for Visit:  On Radiation Treatment Visit     Treatment Summary to Date  Treatment Site: Prostate and LN Current Dose: 6210/7020 cGy Fractions:       Chemotherapy  Chemo concurrent with radx?: No    ED Visit/Hospital Admission: none    Treatment Breaks: none    Subjective:     Patient opted to start Flomax earlier this week, he notes daytime flow is already improving. Nocturia x2-3, but he is up with wife already anyways. Loose stools controlled with low-residue diet and one tab imodium in AM. Hot flahses ongoing.    Nursing ROS:   Nutrition Alteration  Diet Type: Patient's Preference           Cardiovascular  Respiratory effort: 1 - Normal - without distress  Gastrointestinal  Diarrhea: 2 - Three to five soft or liquid bowel movements per day (reviewed imodium use and gave diarrhea diet suggestions)  Genitourinary  Urinary Status: 0 - Normal (Flomax effective)   Note: Nocuria 2-3/night  Psychosocial  Psychosocial Note: no complaints  Pain Assessment  0-10 Pain Scale: 0    Objective:   BP (!) 142/102   Pulse 82   Wt 123.8 kg (273 lb)   BMI 41.51 kg/m    Gen: Appears well, in no acute distress  Skin: No erythema    Labs:  CBC RESULTS:   Recent Labs   Lab Test 24  1440   WBC 3.8*   RBC 4.21*   HGB 13.7   HCT 39.9*   MCV 95   MCH 32.5   MCHC 34.3   RDW 13.3        ELECTROLYTES:  Recent Labs   Lab Test 24  1440      POTASSIUM 4.0   CHLORIDE 105   BELIA 9.2   CO2 25   BUN 12.9   CR 0.81   GLC 95        Assessment:    Tolerating radiation therapy well.  All questions and concerns addressed.    Toxicities:  Fatigue: Grade 1: Fatigue relieved by rest  Diarrhea: Grade 1: Increase of <4 stools per day over baseline; mild increase in ostomy output compared to baseline  Urinary frequency: Grade 1: Present    Plan:   Continue radiation treatments as planned.    Ports/IGRT reviewed          Medication Review  Medication changes: No changes per pt             Ochoa Francois MD  [phone] clinic       Again, thank you for allowing me to participate in the care of your patient.        Sincerely,        Ochoa Francois MD

## 2024-08-29 NOTE — PROGRESS NOTES
[facility] PHYSICIANS  SPECIALIZING IN BREAKTHROUGHS  Radiation Oncology    On Treatment Visit Note      Arnaud Bird      Date: 2024   MRN: [id number]   : 1955  Diagnosis: Prostate Cancer      Reason for Visit:  On Radiation Treatment Visit     Treatment Summary to Date  Treatment Site: Prostate and LN Current Dose: 6210/7020 cGy Fractions:       Chemotherapy  Chemo concurrent with radx?: No    ED Visit/Hospital Admission: none    Treatment Breaks: none    Subjective:     Patient opted to start Flomax earlier this week, he notes daytime flow is already improving. Nocturia x2-3, but he is up with wife already anyways. Loose stools controlled with low-residue diet and one tab imodium in AM. Hot flahses ongoing.    Nursing ROS:   Nutrition Alteration  Diet Type: Patient's Preference           Cardiovascular  Respiratory effort: 1 - Normal - without distress  Gastrointestinal  Diarrhea: 2 - Three to five soft or liquid bowel movements per day (reviewed imodium use and gave diarrhea diet suggestions)  Genitourinary  Urinary Status: 0 - Normal (Flomax effective)   Note: Nocuria 2-3/night  Psychosocial  Psychosocial Note: no complaints  Pain Assessment  0-10 Pain Scale: 0    Objective:   BP (!) 142/102   Pulse 82   Wt 123.8 kg (273 lb)   BMI 41.51 kg/m    Gen: Appears well, in no acute distress  Skin: No erythema    Labs:  CBC RESULTS:   Recent Labs   Lab Test 24  1440   WBC 3.8*   RBC 4.21*   HGB 13.7   HCT 39.9*   MCV 95   MCH 32.5   MCHC 34.3   RDW 13.3        ELECTROLYTES:  Recent Labs   Lab Test 24  1440      POTASSIUM 4.0   CHLORIDE 105   BELIA 9.2   CO2 25   BUN 12.9   CR 0.81   GLC 95       Assessment:    Tolerating radiation therapy well.  All questions and concerns addressed.    Toxicities:  Fatigue: Grade 1: Fatigue relieved by rest  Diarrhea: Grade 1: Increase of <4 stools per day over baseline; mild increase in ostomy output compared to  baseline  Urinary frequency: Grade 1: Present    Plan:   Continue radiation treatments as planned.    Ports/IGRT reviewed          Medication Review  Medication changes: No changes per pt             Ochoa Francois MD  [phone] clinic

## 2024-08-30 ENCOUNTER — APPOINTMENT (OUTPATIENT)
Dept: RADIATION ONCOLOGY | Facility: CLINIC | Age: 69
End: 2024-08-30
Attending: RADIOLOGY
Payer: COMMERCIAL

## 2024-08-30 PROCEDURE — 77427 RADIATION TX MANAGEMENT X5: CPT | Performed by: RADIOLOGY

## 2024-08-30 PROCEDURE — 77014 PR CT GUIDE FOR PLACEMENT RADIATION THERAPY FIELDS: CPT | Mod: 26 | Performed by: RADIOLOGY

## 2024-08-30 PROCEDURE — 77385 HC IMRT TREATMENT DELIVERY, SIMPLE: CPT | Performed by: RADIOLOGY

## 2024-09-03 ENCOUNTER — APPOINTMENT (OUTPATIENT)
Dept: RADIATION ONCOLOGY | Facility: CLINIC | Age: 69
End: 2024-09-03
Attending: RADIOLOGY
Payer: COMMERCIAL

## 2024-09-03 PROCEDURE — 77014 PR CT GUIDE FOR PLACEMENT RADIATION THERAPY FIELDS: CPT | Mod: 26 | Performed by: RADIOLOGY

## 2024-09-03 PROCEDURE — 77385 HC IMRT TREATMENT DELIVERY, SIMPLE: CPT | Performed by: RADIOLOGY

## 2024-09-04 ENCOUNTER — APPOINTMENT (OUTPATIENT)
Dept: RADIATION ONCOLOGY | Facility: CLINIC | Age: 69
End: 2024-09-04
Attending: RADIOLOGY
Payer: COMMERCIAL

## 2024-09-04 PROCEDURE — 77385 HC IMRT TREATMENT DELIVERY, SIMPLE: CPT | Performed by: RADIOLOGY

## 2024-09-04 PROCEDURE — 77014 PR CT GUIDE FOR PLACEMENT RADIATION THERAPY FIELDS: CPT | Mod: 26 | Performed by: RADIOLOGY

## 2024-09-06 ENCOUNTER — ONCOLOGY VISIT (OUTPATIENT)
Dept: RADIATION ONCOLOGY | Facility: CLINIC | Age: 69
End: 2024-09-06
Payer: COMMERCIAL

## 2024-09-06 DIAGNOSIS — Z96.652 STATUS POST TOTAL LEFT KNEE REPLACEMENT: ICD-10-CM

## 2024-09-06 DIAGNOSIS — M25.661 STIFFNESS OF KNEE JOINT, RIGHT: Chronic | ICD-10-CM

## 2024-09-06 DIAGNOSIS — M47.16 SPONDYLOSIS WITH MYELOPATHY, LUMBAR REGION: ICD-10-CM

## 2024-09-06 DIAGNOSIS — R26.9 GAIT DIFFICULTY: ICD-10-CM

## 2024-09-06 DIAGNOSIS — G89.4 CHRONIC PAIN SYNDROME: Chronic | ICD-10-CM

## 2024-09-06 RX ORDER — HYDROCODONE BITARTRATE AND ACETAMINOPHEN 7.5; 325 MG/1; MG/1
1 TABLET ORAL EVERY 6 HOURS PRN
Qty: 120 TABLET | Refills: 0 | Status: SHIPPED | OUTPATIENT
Start: 2024-09-09 | End: 2024-10-07

## 2024-09-06 NOTE — TELEPHONE ENCOUNTER
Pt called requesting a refill for his Desirae JACOB Triage RN  [facility]

## 2024-09-06 NOTE — LETTER
9/6/2024      Arnaud Bird  [address]  [facility] [address]      Dear Colleague,    Thank you for referring your patient, Arnaud Bird, to the [facility] RADIATION ONCOLOGY. Please see a copy of my visit note below.    No notes on file    Again, thank you for allowing me to participate in the care of your patient.        Sincerely,        Ochoa Francois MD

## 2024-09-07 NOTE — PROCEDURES
Radiotherapy Treatment Summary          Date of Report: 2024     PATIENT: DON BIRD  MEDICAL RECORD NO: [id number]  : 1955     DIAGNOSIS: C61 Malignant neoplasm of prostate  INTENT OF RADIOTHERAPY: Cure  PATHOLOGY: G4+5 adenocarcinoma                                  STAGE: Very high-risk prostate cancer (PSA 53, [place] 4+5, cT3a per MRI).  CONCURRENT SYSTEMIC THERAPY: Eligard                   Details of the treatments summarized below are found in records kept in the Department of Radiation Oncology at [facility].     Treatment Summary:  Radiation Oncology - Course: 1 Protocol: with neoadjuvant ADT  Treatment Site Modality Dose (cGy) per Fraction Number of Fractions Total Dose (cGy) Dates of Treatment Elapsed Days   1 Prostate and LN    06 X 180cGy to pelvic nodes with SIB of 270cGy to prostate 26 4,680cGy to pelvic nodes with SIB of 7,020cGy to prostate 2024 to 2024   41       COMMENTS:                      Mr. Bird tolerated his radiotherapy well. Flomax was started during the last week of radiotherapy to help   with LUTS. Loose stools controlled with low-residue diet and one tab imodium in AM. His main complaint was   that of hot flashes on ADT.     ED visits/hospitalizations: none     Missed treatments: none     Acute Toxicity Profile by CTC v5.0:  Fatigue: Grade 1: Fatigue relieved by rest  Diarrhea: Grade 1: Increase of <4 stools per day over baseline; mild increase in ostomy output compared to   baseline  Urinary frequency: Grade 1: Present     PAIN MANAGEMENT: none                             FOLLOW UP PLAN:                         Return to clinic for followup in one month.        Staff Physician: Ochoa Francois M.D.     CC: Diego Dukes MD           Radiation Oncology:  [facility] [id number] [place] [address]

## 2024-09-07 NOTE — TELEPHONE ENCOUNTER
[place]:  reviewed- no concerns    [place]  [email or web address]    Refill done from 9/9/24 as per  check.     Thank you,  Sushma Galloway MD on 9/6/2024

## 2024-09-16 ENCOUNTER — MYC REFILL (OUTPATIENT)
Dept: FAMILY MEDICINE | Facility: CLINIC | Age: 69
End: 2024-09-16
Payer: COMMERCIAL

## 2024-09-16 DIAGNOSIS — R37 SEXUAL DYSFUNCTION: ICD-10-CM

## 2024-09-16 RX ORDER — TADALAFIL 10 MG/1
TABLET ORAL
Qty: 30 TABLET | Refills: 1 | Status: SHIPPED | OUTPATIENT
Start: 2024-09-16 | End: 2024-09-16

## 2024-09-17 NOTE — TELEPHONE ENCOUNTER
Please let the pt know to contact his Urology for his Erectile dysfunction problem. Studies show that Cialis not much useful in preventing ED when pt undergoing Radiation for prostate cancer.     Thank you  Sushma Galloway MD on 9/16/2024

## 2024-09-23 NOTE — TELEPHONE ENCOUNTER
Pt calling with update that he has spoken to a urologist.  Pt states he is done with radiation and understands that there is a possible interaction with tamsululosin.     Pt states Rx tadalafil (CIALIS) 10 MG tablet needs to go to :  [id number] - [place], [place] - [address] [[phone]]. - PENDED    No refill at pharmacy yet.  RX crossed out in med list - NOT PENDED.    Ela Alfonso on 9/23/2024 at 4:49 PM

## 2024-09-24 NOTE — TELEPHONE ENCOUNTER
Patient given message from Dr. Galloway.  Patient agrees to contact urologist for refill of Cialis. Darlyn Gamez RN

## 2024-09-24 NOTE — TELEPHONE ENCOUNTER
Request Urology to take care of this Cialis management - given his Prostate cancer management .     Thank you  Sushma Galloway MD on 9/23/2024

## 2024-09-27 DIAGNOSIS — C61 PROSTATE CANCER (H): ICD-10-CM

## 2024-10-02 DIAGNOSIS — C61 PROSTATE CANCER (H): ICD-10-CM

## 2024-10-02 RX ORDER — TAMSULOSIN HYDROCHLORIDE 0.4 MG/1
0.4 CAPSULE ORAL DAILY
Qty: 90 CAPSULE | Refills: 3 | Status: SHIPPED | OUTPATIENT
Start: 2024-10-02

## 2024-10-07 ENCOUNTER — MYC REFILL (OUTPATIENT)
Dept: FAMILY MEDICINE | Facility: CLINIC | Age: 69
End: 2024-10-07
Payer: COMMERCIAL

## 2024-10-07 DIAGNOSIS — M25.661 STIFFNESS OF KNEE JOINT, RIGHT: Chronic | ICD-10-CM

## 2024-10-07 DIAGNOSIS — M47.16 SPONDYLOSIS WITH MYELOPATHY, LUMBAR REGION: ICD-10-CM

## 2024-10-07 DIAGNOSIS — Z96.652 STATUS POST TOTAL LEFT KNEE REPLACEMENT: ICD-10-CM

## 2024-10-07 DIAGNOSIS — R26.9 GAIT DIFFICULTY: ICD-10-CM

## 2024-10-07 DIAGNOSIS — G89.4 CHRONIC PAIN SYNDROME: Chronic | ICD-10-CM

## 2024-10-07 RX ORDER — HYDROCODONE BITARTRATE AND ACETAMINOPHEN 7.5; 325 MG/1; MG/1
1 TABLET ORAL EVERY 6 HOURS PRN
Qty: 120 TABLET | Refills: 0 | Status: SHIPPED | OUTPATIENT
Start: 2024-10-10 | End: 2024-11-05

## 2024-10-07 NOTE — TELEPHONE ENCOUNTER
Refill done from 10/10/2024 as per      Due for Opiate follow up Videovisit due in 11/2024- pt yet to schedule.      Please call the patient and schedule Video Visit of opiate with me, which patient is due on 11/27/24, to further take care of the medical conditions and medications.OK to use same day slot / double book near another virtual slot in    weeks.      Thank you,  Sushma Galloway MD on 10/7/2024 at 4:42 PM

## 2024-10-10 ENCOUNTER — PRE VISIT (OUTPATIENT)
Dept: UROLOGY | Facility: CLINIC | Age: 69
End: 2024-10-10
Payer: COMMERCIAL

## 2024-10-10 NOTE — TELEPHONE ENCOUNTER
Reason for visit: Medication questions     Relevant information: wanted to talk about ED medications as there may be some adverse reactions    Records/imaging/labs/orders: all records available    At Rooming: Virtual visit      Jayden Lang  10/10/2024  2:59 PM

## 2024-10-10 NOTE — PROGRESS NOTES
Virtual Visit Details    Type of service:  Video Visit   Video Start Time: 10:24 AM  Video End Time:10:41 AM    Originating Location (pt. Location): Home    Distant Location (provider location):  Off-site  Platform used for Video Visit: [facility]    Visit conducted via real-time audio/video technology by Jason Rebolledo PA-C to the patient in their home.    Subjective      REASON FOR VISIT  Erectile dysfunction follow-up    HISTORY OF PRESENT ILLNESS  Mr. Bird is a 69 year old male on speaking with today in regards to his desire to be put back on Cialis.  I have met with Arnaud previously in regards to his drastically elevated PSA of 53.  He recently underwent a workup with some of my surgical colleagues and was found to have America 9 prostate cancer with additional cores of both [place] 8 and unfavorable intermediate [place] 7.  Biopsy was completed on 4/30/2024 and he met virtually with Dr. Dukes on 5/28/2024 for discussion of results.  Ultimately was referred to radiation oncology.    More recently, reach back out to our department for discussion of being put back on Cialis.  However, due to him being on tamsulosin, felt it was best for us to have a discussion about the potential interactions between the 2 medications and decide how best to proceed.    Today:  Unsure if he needs to do more of his ADT shots   Recovery from his radiation treatments and his ADT has been taxing on him  Does admit that when he takes the Cialis he can get pretty bad headaches and stuffy nose    Objective      PHYSICAL EXAMINATION  Deferred given virtual visit.    Assessment & Plan    Erectile dysfunction     It was my pleasure to speak with Arnaud in regards to his erectile dysfunction and his ability to take tadalafil.  After reviewing his clinical history, we discussed the reasoning behind concern when taking both tadalafil and tamsulosin at the same time.  However, in my experience patients  these medications out by at  least 4 hours generally speaking reduces detrimental side effects from taking both, and especially if he is only taking the tadalafil as needed versus every day this should reduce the incidence of problematic compound side effects.    Given the side effects he gets from the Cialis we did discuss other options including a vacuum erection device or intracavernosal injections.  At this time Arnaud would prefer to stick with the Cialis but we will stay in contact about this.  If nothing else I will plan to see him back virtually in 1 year.    Mr. Bird expressed understanding and agreement to the above discussion and plan and all of his questions were answered to his satisfaction.     PLAN  Renewal of tadalafil to be taken as needed for erectile dysfunction, separate from tamsulosin by at least 4 hours   Follow-up virtual visit with me in 1 year    SIGNED    Jason Rebolledo PA-C      I spent a total of 23 minutes spent on the date of the encounter doing chart review, history and exam, documentation, and further activities as noted above.

## 2024-10-11 ENCOUNTER — VIRTUAL VISIT (OUTPATIENT)
Dept: UROLOGY | Facility: CLINIC | Age: 69
End: 2024-10-11
Payer: COMMERCIAL

## 2024-10-11 DIAGNOSIS — N52.9 ERECTILE DYSFUNCTION, UNSPECIFIED ERECTILE DYSFUNCTION TYPE: Primary | ICD-10-CM

## 2024-10-11 PROCEDURE — 99213 OFFICE O/P EST LOW 20 MIN: CPT | Mod: 95 | Performed by: STUDENT IN AN ORGANIZED HEALTH CARE EDUCATION/TRAINING PROGRAM

## 2024-10-11 RX ORDER — TADALAFIL 5 MG/1
5 TABLET ORAL DAILY
Qty: 90 TABLET | Refills: 4 | Status: SHIPPED | OUTPATIENT
Start: 2024-10-11

## 2024-10-11 ASSESSMENT — PAIN SCALES - GENERAL: PAINLEVEL: MODERATE PAIN (5)

## 2024-10-11 NOTE — NURSING NOTE
Current patient location: [address]  [place] [facility] [address]    Is the patient currently in the state of [place]? YES    Visit mode:VIDEO    If the visit is dropped, the patient can be reconnected by: VIDEO VISIT: Text to cell phone:   Telephone Information:   Mobile [phone]       Will anyone else be joining the visit? NO  (If patient encounters technical issues they should call [phone]    Are changes needed to the allergy or medication list? No    Are refills needed on medications prescribed by this physician? Discuss with provider    Rooming Documentation:  Not applicable    Reason for visit: RECHECK    Chelsey ANDERS

## 2024-10-11 NOTE — LETTER
10/11/2024       RE: Arnaud Bird  [address]  [place] [place] [facility] [address]     Dear Colleague,    Thank you for referring your patient, Arnaud Bird, to the [facility] [place] [facility]. Please see a copy of my visit note below.    Virtual Visit Details    Type of service:  Video Visit   Video Start Time: 10:24 AM  Video End Time:10:41 AM    Originating Location (pt. Location): Home    Distant Location (provider location):  Off-site  Platform used for Video Visit: [facility]    Visit conducted via real-time audio/video technology by Jason Rebolledo PA-C to the patient in their home.    Subjective     REASON FOR VISIT  Erectile dysfunction follow-up    HISTORY OF PRESENT ILLNESS  Mr. Bird is a 69 year old male on speaking with today in regards to his desire to be put back on Cialis.  I have met with Arnaud previously in regards to his drastically elevated PSA of 53.  He recently underwent a workup with some of my surgical colleagues and was found to have America 9 prostate cancer with additional cores of both [place] 8 and unfavorable intermediate [place] 7.  Biopsy was completed on 4/30/2024 and he met virtually with Dr. Dukes on 5/28/2024 for discussion of results.  Ultimately was referred to radiation oncology.    More recently, reach back out to our department for discussion of being put back on Cialis.  However, due to him being on tamsulosin, felt it was best for us to have a discussion about the potential interactions between the 2 medications and decide how best to proceed.    Today:  Unsure if he needs to do more of his ADT shots   Recovery from his radiation treatments and his ADT has been taxing on him  Does admit that when he takes the Cialis he can get pretty bad headaches and stuffy nose    Objective     PHYSICAL EXAMINATION  Deferred given virtual visit.    Assessment & Plan   Erectile dysfunction     It was my  pleasure to speak with Arnaud in regards to his erectile dysfunction and his ability to take tadalafil.  After reviewing his clinical history, we discussed the reasoning behind concern when taking both tadalafil and tamsulosin at the same time.  However, in my experience patients  these medications out by at least 4 hours generally speaking reduces detrimental side effects from taking both, and especially if he is only taking the tadalafil as needed versus every day this should reduce the incidence of problematic compound side effects.    Given the side effects he gets from the Cialis we did discuss other options including a vacuum erection device or intracavernosal injections.  At this time Arnaud would prefer to stick with the Cialis but we will stay in contact about this.  If nothing else I will plan to see him back virtually in 1 year.    Mr. Bird expressed understanding and agreement to the above discussion and plan and all of his questions were answered to his satisfaction.     PLAN  Renewal of tadalafil to be taken as needed for erectile dysfunction, separate from tamsulosin by at least 4 hours   Follow-up virtual visit with me in 1 year    SIGNED    Jason Rebolledo PA-C      I spent a total of 23 minutes spent on the date of the encounter doing chart review, history and exam, documentation, and further activities as noted above.      Again, thank you for allowing me to participate in the care of your patient.      Sincerely,    Jason Rebolledo PA-C

## 2024-10-17 ENCOUNTER — VIRTUAL VISIT (OUTPATIENT)
Dept: RADIATION ONCOLOGY | Facility: CLINIC | Age: 69
End: 2024-10-17
Attending: RADIOLOGY
Payer: COMMERCIAL

## 2024-10-17 DIAGNOSIS — C61 PROSTATE CANCER (H): Primary | ICD-10-CM

## 2024-10-17 NOTE — LETTER
10/17/2024      Arnaud Bird  [address]  [place] [address]      Dear Colleague,    Thank you for referring your patient, Arnaud Bird, to the [facility] RADIATION ONCOLOGY. Please see a copy of my visit note below.    RADIATION ONCOLOGY PHONE FOLLOW UP NOTE    DATE OF FOLLOW UP: 10/17/2024     PATIENT NAME: Arnaud Bird    DIAGNOSIS: Very high-risk prostate cancer (PSA 53, [place] 4+5, cT3a per MRI)         RADIATION HISTORY:  Radiation Oncology - Course: 1 Protocol: with neoadjuvant ADT  Treatment Site Modality Dose (cGy) per Fraction Number of Fractions Total Dose (cGy) Dates of Treatment Elapsed Days   1 Prostate and LN    06 X 180cGy to pelvic nodes with SIB of 270cGy to prostate 26 4,680cGy to pelvic nodes with SIB of 7,020cGy to prostate 7/25/2024 to 9/04/2024   41      SUBJECTIVE:    Mr. Bird was contacted today by phone for his 1 month follow-up.  He reports being extremely fatigued over the past month from radiotherapy, but states that it has improved significantly.  He appears to still be fatigued from ADT.  He is happy with his urinary function on Flomax.  Loose stools appear to be resolved, except when he takes his SSRI. He appears to have missed his last Eligard injection which was due last month.    OBJECTIVE: There were no vitals taken for this visit.    RECOMMENDATION:   Return to clinic for followup in 3 months with PSA.  Next Eligard pending.    Ochoa Francois M.D.  Department of Radiation Oncology  [facility]      Oncology Rooming Note    October 17, 2024 10:25 AM   Arnaud Bird is a 69 year old male who presents for:    Chief Complaint   Patient presents with     Oncology Clinic Visit     Follow up:Prostate Cancer: Prostate and LN 7020 cGy completed 09/04/24     Initial Vitals: There were no vitals taken for this visit. Estimated body mass index is 41.51 kg/m  as calculated from the following:    Height as of 8/27/24:  "1.727 m (5' 8\").    Weight as of 24: 123.8 kg (273 lb). There is no height or weight on file to calculate BSA.  Data Unavailable Comment: Data Unavailable   No LMP for male patient.  Allergies reviewed: Yes  Medications reviewed: Yes    Medications: Medication refills not needed today.  Pharmacy name entered into EPIC:    [id number] [place] [facility] - [place], [place] - [address]  [id number] - [place], [place] - [address] [facility]  [id number] - [place], [place] - [address] AT [place] [address] & [facility] - [place], [place] - [address] [facility]    Frailty Screening:   Is the patient here for a new oncology consult visit in cancer care? 2. No      Clinical concerns: Follow up       Chelsey Swann RN  FOLLOW-UP VISIT    Patient Name: Arnaud Bird      : 1955     Age: 69 year old        ______________________________________________________________________________     Chief Complaint   Patient presents with     Oncology Clinic Visit     Follow up:Prostate Cancer: Prostate and LN 7020 cGy completed 24       Pain  Denies    Labs  Other Labs: No    Imaging  None        PSA:   Prostate Specific Antigen Screen   Date Value Ref Range Status   2024 48.90 (H) 0.00 - 6.50 ng/mL Final     PSA Tumor Marker   Date Value Ref Range Status   03/15/2024 53.15 (H) 0.00 - 6.50 ng/mL Final     Testosterone Level:   Testosterone Total   Date Value Ref Range Status   2018 188 (L) 240 - 950 ng/dL Final     Comment:     This test was developed and its performance characteristics determined by the   [facility],  Special Chemistry Laboratory. It has   not been cleared or approved by the FDA. The laboratory is regulated under   CLIA as qualified to perform high-complexity testing. This test is used for   clinical purposes. It should not be regarded as investigational or for   research.     2018 203 (L) 240 - 950 ng/dL " Final     Comment:     This test was developed and its performance characteristics determined by the   [facility],  Special Chemistry Laboratory. It has   not been cleared or approved by the FDA. The laboratory is regulated under   CLIA as qualified to perform high-complexity testing. This test is used for   clinical purposes. It should not be regarded as investigational or for   research.     06/04/2018 163 (L) 240 - 950 ng/dL Final     Comment:     This test was developed and its performance characteristics determined by the   [facility],  Special Chemistry Laboratory. It has   not been cleared or approved by the FDA. The laboratory is regulated under   CLIA as qualified to perform high-complexity testing. This test is used for   clinical purposes. It should not be regarded as investigational or for   research.         On-Study AUA Symptom Score (PQ)  AUA (American Urological Association) Symptom Score  1. Over the past month, how often have you had a sensation of not emptying your bladder completely after you finished urinating?: About half the time  2. Over the past month, how often have you had to urinate again less than two hours after you finished urinating?: About half the time  3. Over the past month, how often have you found you stopped and started again several times when you urinated?: Not at all  4. Over the past month, how often have you found it difficult to postpone urination?: Less than half the time  5. Over the past month, how often have you had a weak urine stream?: Almost always  6. Over the past month, how often have you had to push or strain to begin urinating?: Not at all  7. Over the past month, how many times did you typically get up to urinate from the time you went to bed at night until the time you got up in the morning?: 2 times  The Disease-specific Quality of Life Question: If you were to spend the rest of your life with your  urinary condition just the way it is now, how would you feel about that? (highlight or underline): Mostly satisfied  AUA Score: 15    Diarrhea: 0- None    Constipation: 0- None    Other Appointments:     MD Name:  Appointment Date:    MD Name: Appointment Date:   MD Name: Appointment Date:   Other Appointment Notes:     Residual Radiation side effect: Urinary issues, hot flashes                       Again, thank you for allowing me to participate in the care of your patient.        Sincerely,        Ochoa Francois MD

## 2024-10-17 NOTE — PROGRESS NOTES
"Oncology Rooming Note    2024 10:25 AM   Arnaud Bird is a 69 year old male who presents for:    Chief Complaint   Patient presents with    Oncology Clinic Visit     Follow up:Prostate Cancer: Prostate and LN 7020 cGy completed 24     Initial Vitals: There were no vitals taken for this visit. Estimated body mass index is 41.51 kg/m  as calculated from the following:    Height as of 24: 1.727 m (5' 8\").    Weight as of 24: 123.8 kg (273 lb). There is no height or weight on file to calculate BSA.  Data Unavailable Comment: Data Unavailable   No LMP for male patient.  Allergies reviewed: Yes  Medications reviewed: Yes    Medications: Medication refills not needed today.  Pharmacy name entered into [email or web address]:    [id number] [place] [facility] [address] [place], [place] [address] - [place], [place] - [address] [id number] - [place] [facility] [address] [address] [address]  [facility] [place], [place] - [address] [facility]    Frailty Screening:   Is the patient here for a new oncology consult visit in cancer care? 2. No      Clinical concerns: Follow up       Chelsey Swann RN  FOLLOW-UP VISIT    Patient Name: Arnaud Bird      : 1955     Age: 69 year old        ______________________________________________________________________________     Chief Complaint   Patient presents with    Oncology Clinic Visit     Follow up:Prostate Cancer: Prostate and LN 7020 cGy completed 24       Pain  Denies    Labs  Other Labs: No    Imaging  None        PSA:   Prostate Specific Antigen Screen   Date Value Ref Range Status   2024 48.90 (H) 0.00 - 6.50 ng/mL Final     PSA Tumor Marker   Date Value Ref Range Status   03/15/2024 53.15 (H) 0.00 - 6.50 ng/mL Final     Testosterone Level:   Testosterone Total   Date Value Ref Range Status   2018 188 (L) 240 - 950 ng/dL Final     Comment:     This test was " developed and its performance characteristics determined by the   [facility],  Special Chemistry Laboratory. It has   not been cleared or approved by the FDA. The laboratory is regulated under   CLIA as qualified to perform high-complexity testing. This test is used for   clinical purposes. It should not be regarded as investigational or for   research.     06/12/2018 203 (L) 240 - 950 ng/dL Final     Comment:     This test was developed and its performance characteristics determined by the   [facility],  Special Chemistry Laboratory. It has   not been cleared or approved by the FDA. The laboratory is regulated under   CLIA as qualified to perform high-complexity testing. This test is used for   clinical purposes. It should not be regarded as investigational or for   research.     06/04/2018 163 (L) 240 - 950 ng/dL Final     Comment:     This test was developed and its performance characteristics determined by the   [facility],  Special Chemistry Laboratory. It has   not been cleared or approved by the FDA. The laboratory is regulated under   CLIA as qualified to perform high-complexity testing. This test is used for   clinical purposes. It should not be regarded as investigational or for   research.         On-Study AUA Symptom Score (PQ)  AUA (American Urological Association) Symptom Score  1. Over the past month, how often have you had a sensation of not emptying your bladder completely after you finished urinating?: About half the time  2. Over the past month, how often have you had to urinate again less than two hours after you finished urinating?: About half the time  3. Over the past month, how often have you found you stopped and started again several times when you urinated?: Not at all  4. Over the past month, how often have you found it difficult to postpone urination?: Less than half the time  5. Over the past month, how  often have you had a weak urine stream?: Almost always  6. Over the past month, how often have you had to push or strain to begin urinating?: Not at all  7. Over the past month, how many times did you typically get up to urinate from the time you went to bed at night until the time you got up in the morning?: 2 times  The Disease-specific Quality of Life Question: If you were to spend the rest of your life with your urinary condition just the way it is now, how would you feel about that? (highlight or underline): Mostly satisfied  AUA Score: 15    Diarrhea: 0- None    Constipation: 0- None    Other Appointments:     MD Name:  Appointment Date:    MD Name: Appointment Date:   MD Name: Appointment Date:   Other Appointment Notes:     Residual Radiation side effect: Urinary issues, hot flashes

## 2024-10-17 NOTE — PROGRESS NOTES
RADIATION ONCOLOGY PHONE FOLLOW UP NOTE    DATE OF FOLLOW UP: 10/17/2024     PATIENT NAME: Arnaud Bird    DIAGNOSIS: Very high-risk prostate cancer (PSA 53, [place] 4+5, cT3a per MRI)         RADIATION HISTORY:  Radiation Oncology - Course: 1 Protocol: with neoadjuvant ADT  Treatment Site Modality Dose (cGy) per Fraction Number of Fractions Total Dose (cGy) Dates of Treatment Elapsed Days   1 Prostate and LN    06 X 180cGy to pelvic nodes with SIB of 270cGy to prostate 26 4,680cGy to pelvic nodes with SIB of 7,020cGy to prostate 7/25/2024 to 9/04/2024   41      SUBJECTIVE:    Mr. Bird was contacted today by phone for his 1 month follow-up.  He reports being extremely fatigued over the past month from radiotherapy, but states that it has improved significantly.  He appears to still be fatigued from ADT.  He is happy with his urinary function on Flomax.  Loose stools appear to be resolved, except when he takes his SSRI. He appears to have missed his last Eligard injection which was due last month.    OBJECTIVE: There were no vitals taken for this visit.    RECOMMENDATION:   Return to clinic for followup in 3 months with PSA.  Next Eligard pending.    Ochoa Francois M.D.  Department of Radiation Oncology  [facility]

## 2024-11-05 ENCOUNTER — VIRTUAL VISIT (OUTPATIENT)
Dept: FAMILY MEDICINE | Facility: CLINIC | Age: 69
End: 2024-11-05
Payer: COMMERCIAL

## 2024-11-05 DIAGNOSIS — R26.9 GAIT DIFFICULTY: ICD-10-CM

## 2024-11-05 DIAGNOSIS — F32.A ANXIETY AND DEPRESSION: ICD-10-CM

## 2024-11-05 DIAGNOSIS — Z79.891 CHRONIC USE OF OPIATE FOR THERAPEUTIC PURPOSE: Primary | ICD-10-CM

## 2024-11-05 DIAGNOSIS — G89.4 CHRONIC PAIN SYNDROME: Chronic | ICD-10-CM

## 2024-11-05 DIAGNOSIS — M47.16 SPONDYLOSIS WITH MYELOPATHY, LUMBAR REGION: ICD-10-CM

## 2024-11-05 DIAGNOSIS — M1A.9XX0 CHRONIC GOUT INVOLVING TOE OF RIGHT FOOT WITHOUT TOPHUS, UNSPECIFIED CAUSE: ICD-10-CM

## 2024-11-05 DIAGNOSIS — F41.9 ANXIETY AND DEPRESSION: ICD-10-CM

## 2024-11-05 DIAGNOSIS — C61 PROSTATE CANCER (H): ICD-10-CM

## 2024-11-05 DIAGNOSIS — G56.03 BILATERAL CARPAL TUNNEL SYNDROME: ICD-10-CM

## 2024-11-05 DIAGNOSIS — M25.661 STIFFNESS OF KNEE JOINT, RIGHT: Chronic | ICD-10-CM

## 2024-11-05 DIAGNOSIS — Z96.652 STATUS POST TOTAL LEFT KNEE REPLACEMENT: ICD-10-CM

## 2024-11-05 PROCEDURE — G2211 COMPLEX E/M VISIT ADD ON: HCPCS | Mod: 95 | Performed by: INTERNAL MEDICINE

## 2024-11-05 PROCEDURE — 99214 OFFICE O/P EST MOD 30 MIN: CPT | Mod: 95 | Performed by: INTERNAL MEDICINE

## 2024-11-05 RX ORDER — ALLOPURINOL 300 MG/1
1 TABLET ORAL DAILY
Qty: 90 TABLET | Refills: 0 | Status: SHIPPED | OUTPATIENT
Start: 2024-11-05

## 2024-11-05 RX ORDER — ESCITALOPRAM OXALATE 10 MG/1
10 TABLET ORAL DAILY
Qty: 90 TABLET | Refills: 1 | Status: SHIPPED | OUTPATIENT
Start: 2024-11-05

## 2024-11-05 RX ORDER — HYDROCODONE BITARTRATE AND ACETAMINOPHEN 7.5; 325 MG/1; MG/1
1 TABLET ORAL EVERY 6 HOURS PRN
Qty: 120 TABLET | Refills: 0 | Status: SHIPPED | OUTPATIENT
Start: 2024-11-09

## 2024-11-05 ASSESSMENT — ASTHMA QUESTIONNAIRES
QUESTION_5 LAST FOUR WEEKS HOW WOULD YOU RATE YOUR ASTHMA CONTROL: WELL CONTROLLED
ACT_TOTALSCORE: 23
QUESTION_3 LAST FOUR WEEKS HOW OFTEN DID YOUR ASTHMA SYMPTOMS (WHEEZING, COUGHING, SHORTNESS OF BREATH, CHEST TIGHTNESS OR PAIN) WAKE YOU UP AT NIGHT OR EARLIER THAN USUAL IN THE MORNING: NOT AT ALL
ACT_TOTALSCORE: 23
QUESTION_4 LAST FOUR WEEKS HOW OFTEN HAVE YOU USED YOUR RESCUE INHALER OR NEBULIZER MEDICATION (SUCH AS ALBUTEROL): ONCE A WEEK OR LESS
QUESTION_2 LAST FOUR WEEKS HOW OFTEN HAVE YOU HAD SHORTNESS OF BREATH: NOT AT ALL
QUESTION_1 LAST FOUR WEEKS HOW MUCH OF THE TIME DID YOUR ASTHMA KEEP YOU FROM GETTING AS MUCH DONE AT WORK, SCHOOL OR AT HOME: NONE OF THE TIME

## 2024-11-05 NOTE — PROGRESS NOTES
Arnaud is a 69 year old who is being evaluated via a billable video visit.    How would you like to obtain your AVS? MyChart  If the video visit is dropped, the invitation should be resent by: Text to cell phone: [phone]  Will anyone else be joining your video visit? No        Assessment and Plan  1. Chronic use of opiate for therapeutic purpose (Primary)  2. Chronic pain syndrome  3. Spondylosis with myelopathy, lumbar region  4. Gait difficulty  5. Stiffness of knee joint, right  6. Status post total left knee replacement  Chronic problem, well-controlled with current Norco 7.5 mg every 6 hourly as he endorses that this is working well for him and requesting not to do any changes.  Previously offered him to taper down as well as started on gabapentin which he tried and failed.  Given the new diagnosis of prostate cancer with ongoing pains will continue the same at this time.  Patient understood and agreed the plan with the [facility] regulations on controlled substance prescriptions, will avoid any other controlled substances given this.  Patient understood and agreed with the plan.  - HYDROcodone-acetaminophen (NORCO) 7.5-325 MG per tablet; Take 1 tablet by mouth every 6 hours as needed for pain (4 x daily as needed  maxium 4 per day).  Dispense: 120 tablet; Refill: 0    7. Prostate cancer (H)  Chronic stable, ongoing management by oncology and radiation oncology.  Continue to follow recommendations as managed by them.  Does have side effects of ongoing hot flashes and anxiety for which she was started on Lexapro in the past.  Will continue the same which is working well for him.    8. Chronic gout involving toe 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 tophus, unspecified cause  - allopurinol (ZYLOPRIM) 300 MG tablet; Take 1 tablet (300 mg) by mouth daily.  Dispense: 90 tablet; Refill: 0    9. Bilateral carpal tunnel syndrome  - diclofenac (VOLTAREN) 1 % topical gel; APPLY 4 GRAMS TOPICALLY 4 TIMES A DAY  Dispense: 100 g; Refill:  2    10. Anxiety and depression  - escitalopram (LEXAPRO) 10 MG tablet; Take 1 tablet (10 mg) by mouth daily.  Dispense: 90 tablet; Refill: 1       The longitudinal plan of care for the diagnosis(es)/condition(s) as documented were addressed during this visit. Due to the added complexity in care, I will continue to support Arnaud in the subsequent management and with ongoing continuity of care.      Please note that this note consists of symbols derived from keyboarding, dictation and/or voice recognition software. As a result, there may be errors in the script that have gone undetected. Please consider this when interpreting information found in this chart.    Patient Instructions   As discussed went ahead and refill your medication as per the  check from November 9 onwards.    As requested please make a video visit around February 5 th 2024 when you are also due for an urine drug screen and controlled substance contract on or around February 23, 2024 -at which time you can come to the  and sign off on the sheet as well as urine exam at that time.        Return in about 3 months (around 2/5/2025), or if symptoms worsen or fail to improve, for video visit , chronic pain & pain contract renewal due on 2/23/2025 .    MD ANY Tolbert [facility]   Arnaud is a 69 year old, presenting for the following health issues:  Recheck Medication        11/5/2024     4:19 PM   Additional Questions   Roomed by Savanah AGARWAL       History of Present Illness       Reason for visit:  Med recheck    He eats 2-3 servings of fruits and vegetables daily.He consumes 0 sweetened beverage(s) daily.He exercises with enough effort to increase his heart rate 9 or less minutes per day.  He exercises with enough effort to increase his heart rate 3 or less days per week.   He is taking medications regularly.          Allergies   Allergen Reactions    No Clinical Screening - See Comments      Seasonal Allergies         Past Medical History:   Diagnosis Date    Acute respiratory failure with hypoxia (H) 4/14/2021    Arthritis     Benign positional vertigo     Carpal tunnel syndrome     mild    Chronic pain syndrome 4/14/2021    Hearing problem     LEFT WORSE THAN RIGHT  10/18/2012    Migraines     Nasal polyps     Obesity 4/14/2021    Osteoarthritis of multiple joints 4/14/2021    Pneumonia due to 2019 novel coronavirus 4/14/2021    Unspecified asthma(493.90) 10/17/2012    Unspecified asthma, with exacerbation 10/17/2012       Past Surgical History:   Procedure Laterality Date    ARTHROPLASTY SHOULDER Right 6/15/2021    Procedure: Right Total Shoulder Arthroplasty, Distal Clavicle Excision;  Surgeon: Yesica Deleon MD;  Location: [facility] OR    COLONOSCOPY  2019    COLONOSCOPY N/A 8/15/2022    Procedure: COLONOSCOPY, WITH POLYPECTOMY AND BIOPSY;  Surgeon: Abelino Flores MD;  Location:  GI    HC TOOTH EXTRACTION W/FORCEP      ORTHOPEDIC SURGERY      bilateral total knee replacements    TONSILLECTOMY      age 2 or 3       Family History   Problem Relation Age of Onset    Cancer Father     Family History Negative Father     Family History Negative Mother     Cancer Mother     Stomach Problem Mother     Breast Cancer Mother     Diabetes No family hx of     Coronary Artery Disease No family hx of     Hypertension No family hx of     Hyperlipidemia No family hx of     Cerebrovascular Disease No family hx of     Breast Cancer No family hx of     Colon Cancer No family hx of     Prostate Cancer No family hx of     Other Cancer No family hx of     Depression No family hx of     Anxiety Disorder No family hx of     Mental Illness No family hx of     Substance Abuse No family hx of     Anesthesia Reaction No family hx of     Asthma No family hx of     Osteoporosis No family hx of     Genetic Disorder No family hx of     Thyroid Disease No family hx of     Obesity No family hx of     Unknown/Adopted No  family hx of        Social History     Tobacco Use    Smoking status: Never    Smokeless tobacco: Never   Substance Use Topics    Alcohol use: Yes     Comment: case beer a week        Current Outpatient Medications   Medication Sig Dispense Refill    acetaminophen (TYLENOL) 325 MG tablet Take 2 tablets (650 mg) by mouth every 4 hours as needed for other 40 tablet 0    acyclovir (ZOVIRAX) 400 MG tablet TAKE TWO TABLETS BY MOUTH TWICE DAILY for 5 days as needed for genital herpes outbreak 28 tablet 1    albuterol (PROAIR HFA/PROVENTIL HFA/VENTOLIN HFA) 108 (90 Base) MCG/ACT inhaler Inhale 2 puffs into the lungs every 6 hours as needed for shortness of breath 18 g 1    allopurinol (ZYLOPRIM) 300 MG tablet Take 1 tablet (300 mg) by mouth daily. 90 tablet 0    clindamycin (CLEOCIN-T) 1 % external gel Apply topically 2 times daily On the left lower leg at the site of insect bite as well as friction rub injuries to prevent infection for 2 weeks. 60 g 2    diclofenac (VOLTAREN) 1 % topical gel APPLY 4 GRAMS TOPICALLY 4 TIMES A  g 2    escitalopram (LEXAPRO) 10 MG tablet Take 1 tablet (10 mg) by mouth daily. 90 tablet 1    fluticasone (FLONASE) 50 MCG/ACT nasal spray Spray 2 sprays into both nostrils daily 16 g 11    [START ON 11/9/2024] HYDROcodone-acetaminophen (NORCO) 7.5-325 MG per tablet Take 1 tablet by mouth every 6 hours as needed for pain (4 x daily as needed  maxium 4 per day). 120 tablet 0    ipratropium (ATROVENT) 0.06 % nasal spray SPRAY 2 SPRAYS INTO BOTH NOSTRILS 4 TIMES DAILY 15 mL 0    lidocaine (LIDODERM) 5 % patch Place 1 patch onto the skin every 24 hours 30 patch 11    multivitamin w/minerals (THERA-VIT-M) tablet Take 1 tablet by mouth daily      mupirocin (BACTROBAN) 2 % external ointment Apply topically 3 times daily 30 g 1    ondansetron (ZOFRAN) 4 MG tablet Take 1 tablet (4 mg) by mouth every 8 hours as needed for nausea. 30 tablet 5    pseudoePHEDrine (SUDAFED) 30 MG tablet Take by mouth  every 4 hours as needed for congestion      tadalafil (CIALIS) 5 MG tablet Take 1 tablet (5 mg) by mouth daily. Can take an additional 5-15 mg as needed 2-3 hours prior to anticipated sexual activity. Please separate these pills from your tamsulosin by at least 4 hours. 90 tablet 4    tamsulosin (FLOMAX) 0.4 MG capsule Take 1 capsule (0.4 mg) by mouth daily. 90 capsule 3    colchicine (COLCRYS) 0.6 MG tablet Take 2 tabs x 1, then 1 tab one hour later x 1 ( do not exceed more than 3 tabs / day ) wait for 3 days and repeat the regimen. (Patient not taking: Reported on 11/5/2024) 30 tablet 1    meloxicam (MOBIC) 15 MG tablet Take 1 tablet (15 mg) by mouth daily (Patient not taking: Reported on 11/5/2024) 30 tablet 1    naloxone (NARCAN) 4 MG/0.1ML nasal spray Spray 1 spray (4 mg) into one nostril alternating nostrils once as needed for opioid reversal Every 2-3 minutes until patient responsive or EMS arrives (Patient not taking: Reported on 11/5/2024) 0.2 mL 0    senna-docusate (SENOKOT-S/PERICOLACE) 8.6-50 MG tablet Take 1 tablet by mouth 2 times daily (Patient not taking: Reported on 11/5/2024) 30 tablet 0    tiZANidine (ZANAFLEX) 4 MG tablet TAKE 1 TABLET (4 MG) BY MOUTH NIGHTLY AS NEEDED FOR MUSCLE SPASMS (Patient not taking: Reported on 11/5/2024) 30 tablet 0     Current Facility-Administered Medications   Medication Dose Route Frequency Provider Last Rate Last Admin    betamethasone acet & sod phos (CELESTONE) injection 0.5 mg  0.5 mg      0.5 mg at 08/09/24 0756    betamethasone acet & sod phos (CELESTONE) injection 6 mg  6 mg   Carmel Chan MD   6 mg at 03/15/24 1309    gentamicin (GARAMYCIN) injection 80 mg  80 mg Intramuscular Once         gentamicin (GARAMYCIN) injection 80 mg  80 mg Intramuscular Once         lidocaine (PF) (XYLOCAINE) 1 % injection 2 mL  2 mL   Carmel Chan MD   2 mL at 03/15/24 1309    triamcinolone (KENALOG-40) injection 40 mg  40 mg   Carmel Chan  MD Teresita   40 mg at 02/23/24 0927          Review of Systems  Constitutional, HEENT, cardiovascular, pulmonary, GI, , musculoskeletal, neuro, skin, endocrine and psych systems are negative, except as otherwise noted.      Objective           Vitals:  No vitals were obtained today due to virtual visit.    Physical Exam   GENERAL: alert and no distress  EYES: Eyes grossly normal to inspection.  No discharge or erythema, or obvious scleral/conjunctival abnormalities.  RESP: No audible wheeze, cough, or visible cyanosis.    SKIN: Visible skin clear. No significant rash, abnormal pigmentation or lesions.  NEURO: Cranial nerves grossly intact.  Mentation and speech appropriate for age.  PSYCH: Appropriate affect, tone, and pace of words      Video-Visit Details    Type of service:  Video Visit   Originating Location (pt. Location): Home    Distant Location (provider location):  On-site  Platform used for Video Visit: Melva  Signed Electronically by: Sushma Galloway MD

## 2024-11-05 NOTE — PATIENT INSTRUCTIONS
As discussed went ahead and refill your medication as per the  check from November 9 onwards.    As requested please make a video visit around February 5 th 2024 when you are also due for an urine drug screen and controlled substance contract on or around February 23, 2024 -at which time you can come to the  and sign off on the sheet as well as urine exam at that time.

## 2024-11-16 ENCOUNTER — MYC REFILL (OUTPATIENT)
Dept: FAMILY MEDICINE | Facility: CLINIC | Age: 69
End: 2024-11-16
Payer: COMMERCIAL

## 2024-11-16 DIAGNOSIS — M51.16 LUMBAR DISC DISEASE WITH RADICULOPATHY: ICD-10-CM

## 2024-11-18 RX ORDER — LIDOCAINE 50 MG/G
1 PATCH TOPICAL EVERY 24 HOURS
Qty: 30 PATCH | Refills: 5 | Status: SHIPPED | OUTPATIENT
Start: 2024-11-18

## 2024-11-19 ENCOUNTER — MYC MEDICAL ADVICE (OUTPATIENT)
Dept: FAMILY MEDICINE | Facility: CLINIC | Age: 69
End: 2024-11-19
Payer: COMMERCIAL

## 2025-01-03 ENCOUNTER — MYC REFILL (OUTPATIENT)
Dept: FAMILY MEDICINE | Facility: CLINIC | Age: 70
End: 2025-01-03
Payer: COMMERCIAL

## 2025-01-03 DIAGNOSIS — M47.16 SPONDYLOSIS WITH MYELOPATHY, LUMBAR REGION: ICD-10-CM

## 2025-01-03 DIAGNOSIS — G89.4 CHRONIC PAIN SYNDROME: Chronic | ICD-10-CM

## 2025-01-03 DIAGNOSIS — R26.9 GAIT DIFFICULTY: ICD-10-CM

## 2025-01-03 DIAGNOSIS — M25.661 STIFFNESS OF KNEE JOINT, RIGHT: Chronic | ICD-10-CM

## 2025-01-03 DIAGNOSIS — Z96.652 STATUS POST TOTAL LEFT KNEE REPLACEMENT: ICD-10-CM

## 2025-01-03 DIAGNOSIS — Z79.891 CHRONIC USE OF OPIATE FOR THERAPEUTIC PURPOSE: ICD-10-CM

## 2025-01-06 RX ORDER — HYDROCODONE BITARTRATE AND ACETAMINOPHEN 7.5; 325 MG/1; MG/1
1 TABLET ORAL EVERY 6 HOURS PRN
Qty: 120 TABLET | Refills: 0 | Status: SHIPPED | OUTPATIENT
Start: 2025-01-06

## 2025-01-07 NOTE — TELEPHONE ENCOUNTER
[place] reviewed:  reviewed- no concerns    [place]  [email or web address]    Refill done.     Future refills after follow up due in 2/5/2025 - due for CSA renewal. Please assist pt to schedule - either VV or inperson as per pt preferance.( He can come to  and sign contract after VV if he can do that way )   OK to use same day slot .     Thank you,  Sushma Galloway MD on 1/6/2025

## 2025-01-07 NOTE — TELEPHONE ENCOUNTER
Spoke to pt, helped schedule appointment for 2/5/25 with PCP.     Savanah Rai RN on 1/7/2025 at 3:45 PM

## 2025-01-20 ENCOUNTER — LAB (OUTPATIENT)
Dept: LAB | Facility: CLINIC | Age: 70
End: 2025-01-20
Payer: COMMERCIAL

## 2025-01-20 DIAGNOSIS — C61 PROSTATE CANCER (H): ICD-10-CM

## 2025-01-20 LAB
PSA FREE MFR SERPL: NORMAL %
PSA FREE SERPL-MCNC: <0.1 NG/ML
PSA SERPL DL<=0.01 NG/ML-MCNC: 0.12 NG/ML (ref 0–6.5)

## 2025-01-20 PROCEDURE — 36415 COLL VENOUS BLD VENIPUNCTURE: CPT | Performed by: PATHOLOGY

## 2025-01-20 PROCEDURE — 84153 ASSAY OF PSA TOTAL: CPT | Performed by: RADIOLOGY

## 2025-01-20 PROCEDURE — 99000 SPECIMEN HANDLING OFFICE-LAB: CPT | Performed by: PATHOLOGY

## 2025-01-22 ENCOUNTER — TELEPHONE (OUTPATIENT)
Dept: ORTHOPEDICS | Facility: CLINIC | Age: 70
End: 2025-01-22
Payer: COMMERCIAL

## 2025-01-22 NOTE — TELEPHONE ENCOUNTER
Spoke to Arnaud regarding his shoulder pain. He mentioned that he would like to come in to see Dr. Chan for a possible CSI into his shoulder. I assisted him in scheduling this. He mentioned that the posterior shoulder is where is pain is now so he believes he should have a GH USG CSI and not the ACJ. I informed him that Dr. Chan can evaluate him at the time of the visit and make a decision then based upon the exam. He appreciated the call and had no further questions.           Francis Hernandez M.A., LAT, ATC  Certified Athletic Trainer

## 2025-01-22 NOTE — TELEPHONE ENCOUNTER
Other: Patient would like another cortisone shot deeper in shoulder and would like to discuss with care team about options and when that could be done, did not want to schedule at the time     Could we send this information to you in [employer] or would you prefer to receive a phone call?:   Patient would prefer a phone call   Okay to leave a detailed message?: Yes at Cell number on file:    Telephone Information:   Mobile [phone]

## 2025-02-05 ENCOUNTER — TELEPHONE (OUTPATIENT)
Dept: FAMILY MEDICINE | Facility: CLINIC | Age: 70
End: 2025-02-05

## 2025-02-05 ENCOUNTER — ANCILLARY PROCEDURE (OUTPATIENT)
Dept: ULTRASOUND IMAGING | Facility: CLINIC | Age: 70
End: 2025-02-05
Attending: FAMILY MEDICINE
Payer: COMMERCIAL

## 2025-02-05 ENCOUNTER — OFFICE VISIT (OUTPATIENT)
Dept: FAMILY MEDICINE | Facility: CLINIC | Age: 70
End: 2025-02-05
Payer: COMMERCIAL

## 2025-02-05 ENCOUNTER — OFFICE VISIT (OUTPATIENT)
Dept: ORTHOPEDICS | Facility: CLINIC | Age: 70
End: 2025-02-05
Payer: COMMERCIAL

## 2025-02-05 VITALS
HEART RATE: 65 BPM | TEMPERATURE: 96.9 F | WEIGHT: 279.5 LBS | SYSTOLIC BLOOD PRESSURE: 128 MMHG | DIASTOLIC BLOOD PRESSURE: 86 MMHG | HEIGHT: 69 IN | RESPIRATION RATE: 18 BRPM | BODY MASS INDEX: 41.4 KG/M2 | OXYGEN SATURATION: 98 %

## 2025-02-05 DIAGNOSIS — D72.819 LEUKOPENIA, UNSPECIFIED TYPE: ICD-10-CM

## 2025-02-05 DIAGNOSIS — G89.4 CHRONIC PAIN SYNDROME: Chronic | ICD-10-CM

## 2025-02-05 DIAGNOSIS — M1A.9XX0 CHRONIC GOUT INVOLVING TOE OF RIGHT FOOT WITHOUT TOPHUS, UNSPECIFIED CAUSE: ICD-10-CM

## 2025-02-05 DIAGNOSIS — R26.9 GAIT DIFFICULTY: ICD-10-CM

## 2025-02-05 DIAGNOSIS — F11.20 CONTINUOUS OPIOID DEPENDENCE (H): ICD-10-CM

## 2025-02-05 DIAGNOSIS — Z02.89 ENCOUNTER FOR COMPLETION OF FORM WITH PATIENT: ICD-10-CM

## 2025-02-05 DIAGNOSIS — M19.012 PRIMARY OSTEOARTHRITIS OF LEFT SHOULDER: Primary | ICD-10-CM

## 2025-02-05 DIAGNOSIS — C61 PROSTATE CANCER (H): ICD-10-CM

## 2025-02-05 DIAGNOSIS — M25.661 STIFFNESS OF KNEE JOINT, RIGHT: Chronic | ICD-10-CM

## 2025-02-05 DIAGNOSIS — Z96.652 STATUS POST TOTAL LEFT KNEE REPLACEMENT: ICD-10-CM

## 2025-02-05 DIAGNOSIS — J45.30 MILD PERSISTENT ASTHMA WITHOUT COMPLICATION: ICD-10-CM

## 2025-02-05 DIAGNOSIS — M47.16 SPONDYLOSIS WITH MYELOPATHY, LUMBAR REGION: ICD-10-CM

## 2025-02-05 DIAGNOSIS — R11.0 NAUSEA: ICD-10-CM

## 2025-02-05 DIAGNOSIS — M19.012 LOCALIZED OSTEOARTHRITIS OF LEFT SHOULDER: Primary | ICD-10-CM

## 2025-02-05 DIAGNOSIS — Z79.891 CHRONIC USE OF OPIATE FOR THERAPEUTIC PURPOSE: ICD-10-CM

## 2025-02-05 DIAGNOSIS — E78.5 HYPERLIPIDEMIA WITH TARGET LDL LESS THAN 130: ICD-10-CM

## 2025-02-05 LAB
AMPHETAMINES UR QL SCN: ABNORMAL
ANION GAP SERPL CALCULATED.3IONS-SCNC: 13 MMOL/L (ref 7–15)
BARBITURATES UR QL SCN: ABNORMAL
BASOPHILS # BLD AUTO: 0 10E3/UL (ref 0–0.2)
BASOPHILS NFR BLD AUTO: 0 %
BENZODIAZ UR QL SCN: ABNORMAL
BUN SERPL-MCNC: 19.9 MG/DL (ref 8–23)
BZE UR QL SCN: ABNORMAL
CALCIUM SERPL-MCNC: 9.4 MG/DL (ref 8.8–10.4)
CANNABINOIDS UR QL SCN: ABNORMAL
CHLORIDE SERPL-SCNC: 105 MMOL/L (ref 98–107)
CHOLEST SERPL-MCNC: 222 MG/DL
CREAT SERPL-MCNC: 0.81 MG/DL (ref 0.67–1.17)
EGFRCR SERPLBLD CKD-EPI 2021: >90 ML/MIN/1.73M2
EOSINOPHIL # BLD AUTO: 0 10E3/UL (ref 0–0.7)
EOSINOPHIL NFR BLD AUTO: 0 %
ERYTHROCYTE [DISTWIDTH] IN BLOOD BY AUTOMATED COUNT: 13.3 % (ref 10–15)
FASTING STATUS PATIENT QL REPORTED: NO
FASTING STATUS PATIENT QL REPORTED: NO
FENTANYL UR QL: ABNORMAL
GLUCOSE SERPL-MCNC: 111 MG/DL (ref 70–99)
HCO3 SERPL-SCNC: 23 MMOL/L (ref 22–29)
HCT VFR BLD AUTO: 45.4 % (ref 40–53)
HDLC SERPL-MCNC: 74 MG/DL
HGB BLD-MCNC: 15.2 G/DL (ref 13.3–17.7)
IMM GRANULOCYTES # BLD: 0 10E3/UL
IMM GRANULOCYTES NFR BLD: 1 %
LDLC SERPL CALC-MCNC: 126 MG/DL
LYMPHOCYTES # BLD AUTO: 0.7 10E3/UL (ref 0.8–5.3)
LYMPHOCYTES NFR BLD AUTO: 13 %
MCH RBC QN AUTO: 32.3 PG (ref 26.5–33)
MCHC RBC AUTO-ENTMCNC: 33.5 G/DL (ref 31.5–36.5)
MCV RBC AUTO: 96 FL (ref 78–100)
MONOCYTES # BLD AUTO: 0.2 10E3/UL (ref 0–1.3)
MONOCYTES NFR BLD AUTO: 3 %
NEUTROPHILS # BLD AUTO: 4.5 10E3/UL (ref 1.6–8.3)
NEUTROPHILS NFR BLD AUTO: 83 %
NONHDLC SERPL-MCNC: 148 MG/DL
OPIATES UR QL SCN: ABNORMAL
PCP QUAL URINE (ROCHE): ABNORMAL
PLATELET # BLD AUTO: 179 10E3/UL (ref 150–450)
POTASSIUM SERPL-SCNC: 4.2 MMOL/L (ref 3.4–5.3)
RBC # BLD AUTO: 4.71 10E6/UL (ref 4.4–5.9)
SODIUM SERPL-SCNC: 141 MMOL/L (ref 135–145)
TRIGL SERPL-MCNC: 110 MG/DL
URATE SERPL-MCNC: 5.1 MG/DL (ref 3.4–7)
WBC # BLD AUTO: 5.5 10E3/UL (ref 4–11)

## 2025-02-05 PROCEDURE — 84550 ASSAY OF BLOOD/URIC ACID: CPT | Performed by: INTERNAL MEDICINE

## 2025-02-05 PROCEDURE — 85025 COMPLETE CBC W/AUTO DIFF WBC: CPT | Performed by: INTERNAL MEDICINE

## 2025-02-05 PROCEDURE — 80307 DRUG TEST PRSMV CHEM ANLYZR: CPT | Performed by: INTERNAL MEDICINE

## 2025-02-05 PROCEDURE — 36415 COLL VENOUS BLD VENIPUNCTURE: CPT | Performed by: INTERNAL MEDICINE

## 2025-02-05 PROCEDURE — 80048 BASIC METABOLIC PNL TOTAL CA: CPT | Performed by: INTERNAL MEDICINE

## 2025-02-05 PROCEDURE — 99214 OFFICE O/P EST MOD 30 MIN: CPT | Performed by: INTERNAL MEDICINE

## 2025-02-05 PROCEDURE — G2211 COMPLEX E/M VISIT ADD ON: HCPCS | Performed by: INTERNAL MEDICINE

## 2025-02-05 PROCEDURE — 80061 LIPID PANEL: CPT | Performed by: INTERNAL MEDICINE

## 2025-02-05 PROCEDURE — 20611 DRAIN/INJ JOINT/BURSA W/US: CPT | Mod: LT | Performed by: FAMILY MEDICINE

## 2025-02-05 RX ORDER — LIDOCAINE HYDROCHLORIDE 10 MG/ML
3 INJECTION, SOLUTION EPIDURAL; INFILTRATION; INTRACAUDAL; PERINEURAL
Status: COMPLETED | OUTPATIENT
Start: 2025-02-05 | End: 2025-02-05

## 2025-02-05 RX ORDER — IPRATROPIUM BROMIDE 42 UG/1
SPRAY, METERED NASAL
Qty: 15 ML | Refills: 0 | Status: SHIPPED | OUTPATIENT
Start: 2025-02-05

## 2025-02-05 RX ORDER — TRIAMCINOLONE ACETONIDE 40 MG/ML
40 INJECTION, SUSPENSION INTRA-ARTICULAR; INTRAMUSCULAR
Status: COMPLETED | OUTPATIENT
Start: 2025-02-05 | End: 2025-02-05

## 2025-02-05 RX ORDER — LIDOCAINE HYDROCHLORIDE 10 MG/ML
4 INJECTION, SOLUTION EPIDURAL; INFILTRATION; INTRACAUDAL; PERINEURAL
Status: COMPLETED | OUTPATIENT
Start: 2025-02-05 | End: 2025-02-05

## 2025-02-05 RX ORDER — ONDANSETRON 4 MG/1
4 TABLET, ORALLY DISINTEGRATING ORAL EVERY 8 HOURS PRN
Qty: 30 TABLET | Refills: 1 | Status: SHIPPED | OUTPATIENT
Start: 2025-02-05

## 2025-02-05 RX ADMIN — LIDOCAINE HYDROCHLORIDE 3 ML: 10 INJECTION, SOLUTION EPIDURAL; INFILTRATION; INTRACAUDAL; PERINEURAL at 08:05

## 2025-02-05 RX ADMIN — LIDOCAINE HYDROCHLORIDE 4 ML: 10 INJECTION, SOLUTION EPIDURAL; INFILTRATION; INTRACAUDAL; PERINEURAL at 08:05

## 2025-02-05 RX ADMIN — TRIAMCINOLONE ACETONIDE 40 MG: 40 INJECTION, SUSPENSION INTRA-ARTICULAR; INTRAMUSCULAR at 08:05

## 2025-02-05 ASSESSMENT — PAIN SCALES - GENERAL: PAINLEVEL_OUTOF10: MODERATE PAIN (4)

## 2025-02-05 NOTE — PATIENT INSTRUCTIONS
As discussed , please do pertinent work up for your pain contract renewal.     Will take care of the removal     DMV disability form filled in today.     ======================

## 2025-02-05 NOTE — PROGRESS NOTES
Assessment and Plan  1. Primary osteoarthritis of left shoulder (Primary)  2. Continuous opioid dependence (H)  Chronic stable, patient opting to keep the current opiate dose at the same prescription without any changes which is helping his pain on various musculoskeletal issues as well as recent prostate cancer status post chemoradiotherapy's.  -CSA renewal done in the office today.  - Urine Drug Screen; Future  - Basic metabolic panel  (Ca, Cl, CO2, Creat, Gluc, K, Na, BUN); Future  - Urine Drug Screen  - Basic metabolic panel  (Ca, Cl, CO2, Creat, Gluc, K, Na, BUN)  - Urine Drug Screen; Future  - Urine Drug Screen    3. Prostate cancer (H)  4. Body mass index (BMI) 40.0-44.9, adult (H)  5. Status post total left knee replacement  Ongoing problem, improving.  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  Continue to follow his urology recommendations.  Patient does have ongoing polyuria for which he has to stop his vehicle multiple times with parking to use the restroom as he states.  Requesting for DMV disability placard given his comorbidities, will do as requested.    6. Encounter for completion of form with patient  DMV disability placard form filled and given to the patient.    7. Hyperlipidemia with target LDL less than 130  - Lipid panel reflex to direct LDL Non-fasting; Future  - Lipid panel reflex to direct LDL Non-fasting    8. Chronic gout involving toe 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 tophus, unspecified cause  - Uric acid; Future  - Uric acid    9. Mild persistent asthma without complication  - ipratropium (ATROVENT) 0.06 % nasal spray; SPRAY 2 SPRAYS INTO BOTH NOSTRILS 4 TIMES DAILY  Dispense: 15 mL; Refill: 0    10. Nausea  - ondansetron (ZOFRAN ODT) 4 MG ODT tab; Take 1 tablet (4 mg) by mouth every 8 hours as needed for nausea.  Dispense: 30 tablet; Refill: 1    11.  Leukopenia, unspecified type  - CBC with platelets and differential; Future  - CBC with platelets and differential      12. Chronic use of opiate for therapeutic purpose  13. Chronic pain syndrome  14. Spondylosis with myelopathy, lumbar region  15. Stiffness of knee joint, right  16. Gait difficulty  - HYDROcodone-acetaminophen (NORCO) 7.5-325 MG per tablet; Take 1 tablet by mouth every 6 hours as needed for pain (4 x daily as needed  maxium 4 per day).  Dispense: 120 tablet; Refill: 0           The longitudinal plan of care for the diagnosis(es)/condition(s) as documented were addressed during this visit. Due to the added complexity in care, I will continue to support Arnaud in the subsequent management and with ongoing continuity of care.      Please note that this note consists of symbols derived from keyboarding, dictation and/or voice recognition software. As a result, there may be errors in the script that have gone undetected. Please consider this when interpreting information found in this chart.    Patient Instructions   As discussed , please do pertinent work up for your pain contract renewal.     Will take care of the removal     DMV disability form filled in today.     ======================      Return in about 3 months (around 5/5/2025), or if symptoms worsen or fail to improve, for chronic pain, video visit.    Sushma Galloway MD  [facility]   Arnaud is a 69 year old, presenting for the following health issues:  Medication Follow-up (Lower back pain. )        2/5/2025     9:56 AM   Additional Questions   Roomed by Perlita SANTANA     History of Present Illness       Back Pain:  He presents for follow up of back pain. Patient's back pain is a chronic problem.  Location of back pain:  Right lower back and left lower back  Description of back pain: dull ache  Back pain spreads: nowhere    Since patient first noticed back pain, pain is: always present, but gets  better and worse  Does back pain interfere with his job:  Not applicable       He eats 0-1 servings of fruits and vegetables daily.He consumes 0 sweetened beverage(s) daily.He exercises with enough effort to increase his heart rate 10 to 19 minutes per day.  He exercises with enough effort to increase his heart rate 3 or less days per week.   He is taking medications regularly.       Medication Followup of Norco  Taking Medication as prescribed: yes  Side Effects:  None  Medication Helping Symptoms:  yes      Last seen patient in normal 2024 for video visit of opiate follow-up at that time, he is here for CSA renewal and medication follow-up today.    Does have medical conditions of prostate cancer with ongoing heme oncology and radiation oncology managing his chemotherapy, has been having hot flashes as a side effect for which she is currently on Lexapro which is helping him.       Allergies   Allergen Reactions    No Clinical Screening - See Comments     Seasonal Allergies         Past Medical History:   Diagnosis Date    Acute respiratory failure with hypoxia (H) 4/14/2021    Arthritis     Benign positional vertigo     Carpal tunnel syndrome     mild    Chronic pain syndrome 4/14/2021    Hearing problem     LEFT WORSE THAN RIGHT  10/18/2012    Migraines     Nasal polyps     Obesity 4/14/2021    Osteoarthritis of multiple joints 4/14/2021    Pneumonia due to 2019 novel coronavirus 4/14/2021    Unspecified asthma(493.90) 10/17/2012    Unspecified asthma, with exacerbation 10/17/2012       Past Surgical History:   Procedure Laterality Date    ARTHROPLASTY SHOULDER Right 6/15/2021    Procedure: Right Total Shoulder Arthroplasty, Distal Clavicle Excision;  Surgeon: Yesica Deleon MD;  Location: [facility] OR    COLONOSCOPY  2019    COLONOSCOPY N/A 8/15/2022    Procedure: COLONOSCOPY, WITH POLYPECTOMY AND BIOPSY;  Surgeon: Abelino Flores MD;  Location:  GI    HC TOOTH EXTRACTION W/FORCEP      ORTHOPEDIC  SURGERY      bilateral total knee replacements    TONSILLECTOMY      age 2 or 3       Family History   Problem Relation Age of Onset    Cancer Father     Family History Negative Father     Family History Negative Mother     Cancer Mother     Stomach Problem Mother     Breast Cancer Mother     Diabetes No family hx of     Coronary Artery Disease No family hx of     Hypertension No family hx of     Hyperlipidemia No family hx of     Cerebrovascular Disease No family hx of     Breast Cancer No family hx of     Colon Cancer No family hx of     Prostate Cancer No family hx of     Other Cancer No family hx of     Depression No family hx of     Anxiety Disorder No family hx of     Mental Illness No family hx of     Substance Abuse No family hx of     Anesthesia Reaction No family hx of     Asthma No family hx of     Osteoporosis No family hx of     Genetic Disorder No family hx of     Thyroid Disease No family hx of     Obesity No family hx of     Unknown/Adopted No family hx of        Social History     Tobacco Use    Smoking status: Never    Smokeless tobacco: Never   Substance Use Topics    Alcohol use: Yes     Comment: case beer a week        Current Outpatient Medications   Medication Sig Dispense Refill    acyclovir (ZOVIRAX) 400 MG tablet TAKE TWO TABLETS BY MOUTH TWICE DAILY for 5 days as needed for genital herpes outbreak 28 tablet 1    albuterol (PROAIR HFA/PROVENTIL HFA/VENTOLIN HFA) 108 (90 Base) MCG/ACT inhaler Inhale 2 puffs into the lungs every 6 hours as needed for shortness of breath 18 g 1    allopurinol (ZYLOPRIM) 300 MG tablet Take 1 tablet (300 mg) by mouth daily. 90 tablet 0    clindamycin (CLEOCIN-T) 1 % external gel Apply topically 2 times daily On the left lower leg at the site of insect bite as well as friction rub injuries to prevent infection for 2 weeks. 60 g 2    colchicine (COLCRYS) 0.6 MG tablet Take 2 tabs x 1, then 1 tab one hour later x 1 ( do not exceed more than 3 tabs / day ) wait for  3 days and repeat the regimen. 30 tablet 1    diclofenac (VOLTAREN) 1 % topical gel APPLY 4 GRAMS TOPICALLY 4 TIMES A  g 2    fluticasone (FLONASE) 50 MCG/ACT nasal spray Spray 2 sprays into both nostrils daily 16 g 11    HYDROcodone-acetaminophen (NORCO) 7.5-325 MG per tablet Take 1 tablet by mouth every 6 hours as needed for pain (4 x daily as needed  maxium 4 per day). 120 tablet 0    ipratropium (ATROVENT) 0.06 % nasal spray SPRAY 2 SPRAYS INTO BOTH NOSTRILS 4 TIMES DAILY 15 mL 0    lidocaine (LIDODERM) 5 % patch Place 1 patch onto the skin every 24 hours. 30 patch 5    multivitamin w/minerals (THERA-VIT-M) tablet Take 1 tablet by mouth daily      mupirocin (BACTROBAN) 2 % external ointment Apply topically 3 times daily 30 g 1    naloxone (NARCAN) 4 MG/0.1ML nasal spray Spray 1 spray (4 mg) into one nostril alternating nostrils once as needed for opioid reversal Every 2-3 minutes until patient responsive or EMS arrives 0.2 mL 0    ondansetron (ZOFRAN ODT) 4 MG ODT tab Take 1 tablet (4 mg) by mouth every 8 hours as needed for nausea. 30 tablet 1    ondansetron (ZOFRAN) 4 MG tablet Take 1 tablet (4 mg) by mouth every 8 hours as needed for nausea. 30 tablet 5    pseudoePHEDrine (SUDAFED) 30 MG tablet Take by mouth every 4 hours as needed for congestion.      tadalafil (CIALIS) 5 MG tablet Take 1 tablet (5 mg) by mouth daily. Can take an additional 5-15 mg as needed 2-3 hours prior to anticipated sexual activity. Please separate these pills from your tamsulosin by at least 4 hours. 90 tablet 4    meloxicam (MOBIC) 15 MG tablet Take 1 tablet (15 mg) by mouth daily (Patient not taking: Reported on 2/5/2025) 30 tablet 1    tiZANidine (ZANAFLEX) 4 MG tablet TAKE 1 TABLET (4 MG) BY MOUTH NIGHTLY AS NEEDED FOR MUSCLE SPASMS (Patient not taking: Reported on 2/5/2025) 30 tablet 0     Current Facility-Administered Medications   Medication Dose Route Frequency Provider Last Rate Last Admin    gentamicin (GARAMYCIN)  "injection 80 mg  80 mg Intramuscular Once         gentamicin (GARAMYCIN) injection 80 mg  80 mg Intramuscular Once               Review of Systems  Constitutional, HEENT, cardiovascular, pulmonary, GI, , musculoskeletal, neuro, skin, endocrine and psych systems are negative, except as otherwise noted.      Objective    /86   Pulse 65   Temp 96.9  F (36.1  C) (Temporal)   Resp 18   Ht 1.74 m (5' 8.5\")   Wt 126.8 kg (279 lb 8 oz)   SpO2 98%   BMI 41.87 kg/m    Body mass index is 41.87 kg/m .  Physical Exam   GENERAL: alert and no distress  NECK: no adenopathy, no asymmetry, masses, or scars  RESP: lungs clear to auscultation - no rales, rhonchi or wheezes  CV: regular rate and rhythm, normal S1 S2, no S3 or S4, no murmur, click or rub, no peripheral edema  ABDOMEN: soft, nontender, no hepatosplenomegaly, no masses and bowel sounds normal  MS: no gross musculoskeletal defects noted, no edema        Signed Electronically by: Sushma Galloway MD    "

## 2025-02-05 NOTE — NURSING NOTE
[address] [address]  Dept: [phone]  ______________________________________________________________________________    Patient: Arnaud Bird   : 1955   MRN: [id number]   2025    INVASIVE PROCEDURE SAFETY CHECKLIST    Date: 2025   Procedure:Left GH USG CSI   Patient Name: Arnaud Bird  MRN: [id number]  YOB: 1955    Action: Complete sections as appropriate. Any discrepancy results in a HARD COPY until resolved.     PRE PROCEDURE:  Patient ID verified with 2 identifiers (name and  or MRN): Yes  Procedure and site verified with patient/designee (when able): Yes  Accurate consent documentation in medical record: Yes  H&P (or appropriate assessment) documented in medical record: Yes  H&P must be up to 20 days prior to procedure and updates within 24 hours of procedure as applicable: Yes  Relevant diagnostic and radiology test results appropriately labeled and displayed as applicable: Yes  Procedure site(s) marked with provider initials: NA    TIMEOUT:  Time-Out performed immediately prior to starting procedure, including verbal and active participation of all team members addressing the following:Yes  * Correct patient identify  * Confirmed that the correct side and site are marked  * An accurate procedure consent form  * Agreement on the procedure to be done  * Correct patient position  * Relevant images and results are properly labeled and appropriately displayed  * The need to administer antibiotics or fluids for irrigation purposes during the procedure as applicable   * Safety precautions based on patient history or medication use    DURING PROCEDURE: Verification of correct person, site, and procedures any time the responsibility for care of the patient is transferred to another member of the care team.       Prior to injection, verified patient identity using patient's name and date of  birth.  Due to injection administration, patient instructed to remain in clinic for 15 minutes  afterwards, and to report any adverse reaction to me immediately.    Joint injection was performed.      Drug Amount Wasted:  None.  Vial/Syringe: Single dose vial  Expiration Date:  9/30/26 7/30/28      Louisa Hays ATC  February 5, 2025

## 2025-02-05 NOTE — LETTER

## 2025-02-05 NOTE — LETTER
2/5/2025      RE: Arnaud Bird  [address]  [place] [address]     Dear Colleague,    Thank you for referring your patient, Arnaud Bird, to the [facility] [place]. Please see a copy of my visit note below.    Subjective: [place]  Hard to keep up with firewood.  Likes to fish and hunt.  Left shoulder has been painful.  Would like AC joint injection and GH joint injection  Went through prostate cancer treatments    O: left GH OA, reviewed x-rays    A/P: 68 yo white male with PMhx of bilateral carpal tunnel, chronic pain syndrome, presenting with left shoulder OA    Left shoulder mod to severe GH OA-    Procedure: Risks and benefits discussed and patient was consented.  Ultrasound was used due to body habitus and for needle placement.  Curvilinear probe was used to identify anatomy.  The area was then turned into a sterile procedure with ChloraPrep to clean the area.  3 cc 1% lidocaine used as a tract.  4 cc 1% lidocaine and 40 mg Kenalog injected at the glenohumeral joint with 3.5 spinal needle.  Patient tolerated the procedure well gauze was placed and Band-Aid was placed.  We  saved the pictures on the New ultrasound. Sent to PACs.  Patient tolerated the procedure well and noted some relief with the lidocaine in the shoulder prior to discharge.     Discussed: if desires left shoulder surgery, no injections 3 mo prior to surgery    Large Joint Injection/Arthocentesis: L glenohumeral joint    Date/Time: 2/5/2025 8:05 AM    Performed by: Carmel Chan MD  Authorized by: Carmel Chan MD    Indications:  Osteoarthritis  Needle Size:  22 G  Guidance: ultrasound    Approach:  Posterior  Location:  Shoulder      Site:  L glenohumeral joint  Medications:  40 mg triamcinolone 40 MG/ML; 3 mL lidocaine (PF) 1 %; 4 mL lidocaine (PF) 1 %  Outcome:  Tolerated well, no immediate complications  Procedure discussed: discussed risks,  benefits, and alternatives    Consent Given by:  Patient  Timeout: timeout called immediately prior to procedure    Prep: patient was prepped and draped in usual sterile fashion      I agree with the following injection documentation.    ELROY Chan MD      Again, thank you for allowing me to participate in the care of your patient.      Sincerely,    Carmel Chan MD

## 2025-02-05 NOTE — PROGRESS NOTES
Subjective[place]  Hard to keep up with firewood.  Likes to fish and hunt.  Left shoulder has been painful.  Would like AC joint injection and GH joint injection  Went through prostate cancer treatments    O: left GH OA, reviewed x-rays    A/P: 70 yo white male with PMhx of bilateral carpal tunnel, chronic pain syndrome, presenting with left shoulder OA    Left shoulder mod to severe GH OA-    Procedure: Risks and benefits discussed and patient was consented.  Ultrasound was used due to body habitus and for needle placement.  Curvilinear probe was used to identify anatomy.  The area was then turned into a sterile procedure with ChloraPrep to clean the area.  3 cc 1% lidocaine used as a tract.  4 cc 1% lidocaine and 40 mg Kenalog injected at the glenohumeral joint with 3.5 spinal needle.  Patient tolerated the procedure well gauze was placed and Band-Aid was placed.  We  saved the pictures on the New ultrasound. Sent to PACs.  Patient tolerated the procedure well and noted some relief with the lidocaine in the shoulder prior to discharge.     Discussed: if desires left shoulder surgery, no injections 3 mo prior to surgery    Large Joint Injection/Arthocentesis: L glenohumeral joint    Date/Time: 2/5/2025 8:05 AM    Performed by: Carmel Chan MD  Authorized by: Carmel Chan MD    Indications:  Osteoarthritis  Needle Size:  22 G  Guidance: ultrasound    Approach:  Posterior  Location:  Shoulder      Site:  L glenohumeral joint  Medications:  40 mg triamcinolone 40 MG/ML; 3 mL lidocaine (PF) 1 %; 4 mL lidocaine (PF) 1 %  Outcome:  Tolerated well, no immediate complications  Procedure discussed: discussed risks, benefits, and alternatives    Consent Given by:  Patient  Timeout: timeout called immediately prior to procedure    Prep: patient was prepped and draped in usual sterile fashion      I agree with the following injection documentation.    ELROY Chan MD

## 2025-02-06 DIAGNOSIS — Z79.891 CHRONIC USE OF OPIATE FOR THERAPEUTIC PURPOSE: ICD-10-CM

## 2025-02-06 DIAGNOSIS — M25.661 STIFFNESS OF KNEE JOINT, RIGHT: Chronic | ICD-10-CM

## 2025-02-06 DIAGNOSIS — M47.16 SPONDYLOSIS WITH MYELOPATHY, LUMBAR REGION: ICD-10-CM

## 2025-02-06 DIAGNOSIS — R26.9 GAIT DIFFICULTY: ICD-10-CM

## 2025-02-06 DIAGNOSIS — Z96.652 STATUS POST TOTAL LEFT KNEE REPLACEMENT: ICD-10-CM

## 2025-02-06 DIAGNOSIS — G89.4 CHRONIC PAIN SYNDROME: Chronic | ICD-10-CM

## 2025-02-06 RX ORDER — HYDROCODONE BITARTRATE AND ACETAMINOPHEN 7.5; 325 MG/1; MG/1
TABLET ORAL
Qty: 120 TABLET | Refills: 0 | OUTPATIENT
Start: 2025-02-06

## 2025-02-06 RX ORDER — HYDROCODONE BITARTRATE AND ACETAMINOPHEN 7.5; 325 MG/1; MG/1
1 TABLET ORAL EVERY 6 HOURS PRN
Qty: 120 TABLET | Refills: 0 | Status: SHIPPED | OUTPATIENT
Start: 2025-02-06

## 2025-02-06 NOTE — TELEPHONE ENCOUNTER
[employer] disability Forms signed and placed in my out box.  Please scan in patient's chart before doing the needful.    We may need to mail it to patient home address after confirming with him as he lives far away.    Thank you,  Sushma Galloway MD on 2/5/2025

## 2025-02-06 NOTE — TELEPHONE ENCOUNTER
Spoke with patient, gave me the address;     [address] [place] [address]    Mailed out and made a copy and placed in team 1 drawer.

## 2025-03-04 ENCOUNTER — MYC REFILL (OUTPATIENT)
Dept: FAMILY MEDICINE | Facility: CLINIC | Age: 70
End: 2025-03-04
Payer: COMMERCIAL

## 2025-03-04 ENCOUNTER — MYC REFILL (OUTPATIENT)
Dept: ORTHOPEDICS | Facility: CLINIC | Age: 70
End: 2025-03-04
Payer: COMMERCIAL

## 2025-03-04 DIAGNOSIS — Z96.652 STATUS POST TOTAL LEFT KNEE REPLACEMENT: ICD-10-CM

## 2025-03-04 DIAGNOSIS — G89.4 CHRONIC PAIN SYNDROME: Chronic | ICD-10-CM

## 2025-03-04 DIAGNOSIS — M25.519 ARTHRALGIA OF SHOULDER, UNSPECIFIED LATERALITY: ICD-10-CM

## 2025-03-04 DIAGNOSIS — M25.661 STIFFNESS OF KNEE JOINT, RIGHT: Chronic | ICD-10-CM

## 2025-03-04 DIAGNOSIS — Z79.891 CHRONIC USE OF OPIATE FOR THERAPEUTIC PURPOSE: ICD-10-CM

## 2025-03-04 DIAGNOSIS — M47.16 SPONDYLOSIS WITH MYELOPATHY, LUMBAR REGION: ICD-10-CM

## 2025-03-04 DIAGNOSIS — R26.9 GAIT DIFFICULTY: ICD-10-CM

## 2025-03-04 RX ORDER — MELOXICAM 15 MG/1
15 TABLET ORAL DAILY
Qty: 30 TABLET | Refills: 1 | Status: SHIPPED | OUTPATIENT
Start: 2025-03-04

## 2025-03-05 RX ORDER — HYDROCODONE BITARTRATE AND ACETAMINOPHEN 7.5; 325 MG/1; MG/1
1 TABLET ORAL EVERY 6 HOURS PRN
Qty: 120 TABLET | Refills: 0 | Status: SHIPPED | OUTPATIENT
Start: 2025-03-05

## 2025-03-05 NOTE — TELEPHONE ENCOUNTER
[place] reviewed:  reviewed- no concerns    [place]  [email or web address]    Refill done.  Sushma Galloway MD on 3/5/2025

## 2025-03-26 ENCOUNTER — PATIENT OUTREACH (OUTPATIENT)
Dept: CARE COORDINATION | Facility: CLINIC | Age: 70
End: 2025-03-26
Payer: COMMERCIAL

## 2025-04-01 ENCOUNTER — MYC REFILL (OUTPATIENT)
Dept: FAMILY MEDICINE | Facility: CLINIC | Age: 70
End: 2025-04-01
Payer: COMMERCIAL

## 2025-04-01 DIAGNOSIS — M25.661 STIFFNESS OF KNEE JOINT, RIGHT: Chronic | ICD-10-CM

## 2025-04-01 DIAGNOSIS — G89.4 CHRONIC PAIN SYNDROME: Chronic | ICD-10-CM

## 2025-04-01 DIAGNOSIS — Z79.891 CHRONIC USE OF OPIATE FOR THERAPEUTIC PURPOSE: ICD-10-CM

## 2025-04-01 DIAGNOSIS — Z96.652 STATUS POST TOTAL LEFT KNEE REPLACEMENT: ICD-10-CM

## 2025-04-01 DIAGNOSIS — M47.16 SPONDYLOSIS WITH MYELOPATHY, LUMBAR REGION: ICD-10-CM

## 2025-04-01 DIAGNOSIS — R26.9 GAIT DIFFICULTY: ICD-10-CM

## 2025-04-02 RX ORDER — HYDROCODONE BITARTRATE AND ACETAMINOPHEN 7.5; 325 MG/1; MG/1
1 TABLET ORAL EVERY 6 HOURS PRN
Qty: 120 TABLET | Refills: 0 | Status: SHIPPED | OUTPATIENT
Start: 2025-04-05

## 2025-04-03 NOTE — TELEPHONE ENCOUNTER
RX [place]) reviewed:  reviewed- no concerns    [place]:  [email or web address]      Refill done from 4/5/25 on  wards as per  check.  Please assist pt schedule VV for pain follow up due on 5/5/2025    Thank you  Sushma Galloway MD on 4/2/2025

## 2025-04-09 ENCOUNTER — PATIENT OUTREACH (OUTPATIENT)
Dept: CARE COORDINATION | Facility: CLINIC | Age: 70
End: 2025-04-09
Payer: COMMERCIAL

## 2025-05-02 ENCOUNTER — MYC REFILL (OUTPATIENT)
Dept: FAMILY MEDICINE | Facility: CLINIC | Age: 70
End: 2025-05-02
Payer: COMMERCIAL

## 2025-05-02 DIAGNOSIS — G89.4 CHRONIC PAIN SYNDROME: Chronic | ICD-10-CM

## 2025-05-02 DIAGNOSIS — Z79.891 CHRONIC USE OF OPIATE FOR THERAPEUTIC PURPOSE: ICD-10-CM

## 2025-05-02 DIAGNOSIS — M47.16 SPONDYLOSIS WITH MYELOPATHY, LUMBAR REGION: ICD-10-CM

## 2025-05-02 DIAGNOSIS — R26.9 GAIT DIFFICULTY: ICD-10-CM

## 2025-05-02 DIAGNOSIS — M25.661 STIFFNESS OF KNEE JOINT, RIGHT: Chronic | ICD-10-CM

## 2025-05-02 DIAGNOSIS — Z96.652 STATUS POST TOTAL LEFT KNEE REPLACEMENT: ICD-10-CM

## 2025-05-03 ASSESSMENT — ASTHMA QUESTIONNAIRES
QUESTION_4 LAST FOUR WEEKS HOW OFTEN HAVE YOU USED YOUR RESCUE INHALER OR NEBULIZER MEDICATION (SUCH AS ALBUTEROL): TWO OR THREE TIMES PER WEEK
QUESTION_3 LAST FOUR WEEKS HOW OFTEN DID YOUR ASTHMA SYMPTOMS (WHEEZING, COUGHING, SHORTNESS OF BREATH, CHEST TIGHTNESS OR PAIN) WAKE YOU UP AT NIGHT OR EARLIER THAN USUAL IN THE MORNING: ONCE OR TWICE
QUESTION_5 LAST FOUR WEEKS HOW WOULD YOU RATE YOUR ASTHMA CONTROL: WELL CONTROLLED
ACT_TOTALSCORE: 20
QUESTION_1 LAST FOUR WEEKS HOW MUCH OF THE TIME DID YOUR ASTHMA KEEP YOU FROM GETTING AS MUCH DONE AT WORK, SCHOOL OR AT HOME: NONE OF THE TIME
QUESTION_2 LAST FOUR WEEKS HOW OFTEN HAVE YOU HAD SHORTNESS OF BREATH: ONCE OR TWICE A WEEK

## 2025-05-04 RX ORDER — HYDROCODONE BITARTRATE AND ACETAMINOPHEN 7.5; 325 MG/1; MG/1
1 TABLET ORAL EVERY 6 HOURS PRN
Qty: 120 TABLET | Refills: 0 | Status: SHIPPED | OUTPATIENT
Start: 2025-05-04 | End: 2025-05-06

## 2025-05-04 NOTE — TELEPHONE ENCOUNTER
RX [place] reviewed:  reviewed- no concerns    [place]:  [email or web address]    Refill done.    Pt to keep up scheduled appt on 5/6/25    Thank you  Sushma Galloway MD on 5/4/2025

## 2025-05-06 ENCOUNTER — VIRTUAL VISIT (OUTPATIENT)
Dept: FAMILY MEDICINE | Facility: CLINIC | Age: 70
End: 2025-05-06
Payer: COMMERCIAL

## 2025-05-06 DIAGNOSIS — M47.16 SPONDYLOSIS WITH MYELOPATHY, LUMBAR REGION: ICD-10-CM

## 2025-05-06 DIAGNOSIS — M54.50 CHRONIC BILATERAL LOW BACK PAIN WITHOUT SCIATICA: ICD-10-CM

## 2025-05-06 DIAGNOSIS — R26.9 GAIT DIFFICULTY: ICD-10-CM

## 2025-05-06 DIAGNOSIS — C61 PROSTATE CANCER (H): ICD-10-CM

## 2025-05-06 DIAGNOSIS — E66.01 MORBID OBESITY (H): ICD-10-CM

## 2025-05-06 DIAGNOSIS — M19.012 PRIMARY OSTEOARTHRITIS OF LEFT SHOULDER: ICD-10-CM

## 2025-05-06 DIAGNOSIS — E66.813 CLASS 3 OBESITY (H): ICD-10-CM

## 2025-05-06 DIAGNOSIS — G89.29 CHRONIC BILATERAL LOW BACK PAIN WITHOUT SCIATICA: ICD-10-CM

## 2025-05-06 DIAGNOSIS — G89.4 CHRONIC PAIN SYNDROME: Chronic | ICD-10-CM

## 2025-05-06 DIAGNOSIS — Z96.652 STATUS POST TOTAL LEFT KNEE REPLACEMENT: ICD-10-CM

## 2025-05-06 DIAGNOSIS — M25.661 STIFFNESS OF KNEE JOINT, RIGHT: Chronic | ICD-10-CM

## 2025-05-06 DIAGNOSIS — Z79.891 CHRONIC USE OF OPIATE FOR THERAPEUTIC PURPOSE: Primary | ICD-10-CM

## 2025-05-06 DIAGNOSIS — Z96.611 STATUS POST TOTAL SHOULDER ARTHROPLASTY, RIGHT: ICD-10-CM

## 2025-05-06 RX ORDER — HYDROCODONE BITARTRATE AND ACETAMINOPHEN 7.5; 325 MG/1; MG/1
1 TABLET ORAL EVERY 6 HOURS PRN
Qty: 120 TABLET | Refills: 0 | Status: SHIPPED | OUTPATIENT
Start: 2025-05-06

## 2025-05-06 NOTE — PATIENT INSTRUCTIONS
As discussed we will take care of your [place] refills at the same dose without any changes as opted.  Please follow-up with your orthopedics on the ongoing shoulder joint issues.    Will need in person visit on the next 3 months for annual physical due at that time.

## 2025-05-06 NOTE — PROGRESS NOTES
Arnaud is a 70 year old who is being evaluated via a billable video visit.    How would you like to obtain your AVS? MyChart  If the video visit is dropped, the invitation should be resent by: Text to cell phone: [phone]  Will anyone else be joining your video visit? No          Assessment and Plan  1. Chronic use of opiate for therapeutic purpose (Primary)  2. Chronic pain syndrome  Chronic problem, patient states that he is having fatigue symptoms due to ongoing pains.  Emphasized on possible opiate side effect also has fatigue which I emphasized that if he would like to come down on the dosage, given the patient's risk factors below and ongoing shoulder pain which is bothering him lately requesting not to come down on the dosage.  Will do as requested.  All the risks and complications explained given his age of 70 years.  Patient understands.  - HYDROcodone-acetaminophen (NORCO) 7.5-325 MG per tablet; Take 1 tablet by mouth every 6 hours as needed for pain (4 x daily as needed  maxium 4 per day).  Dispense: 120 tablet; Refill: 0    3. Spondylosis with myelopathy, lumbar region  4. Chronic bilateral low back pain without sciatica  - HYDROcodone-acetaminophen (NORCO) 7.5-325 MG per tablet; Take 1 tablet by mouth every 6 hours as needed for pain (4 x daily as needed  maxium 4 per day).  Dispense: 120 tablet; Refill: 0    5. Morbid obesity (H)  6. Class 3 obesity (H)  Ongoing problem, patient working on diet and lifestyle modifications.  To continue same.    6. Prostate cancer (H)  Chronic stable, patient not on any chemoradiotherapy at this time.  Continue to follow urology recommendations.    7. Status post total left knee replacement  8. Stiffness of knee joint, right  9. Gait difficulty  - HYDROcodone-acetaminophen (NORCO) 7.5-325 MG per tablet; Take 1 tablet by mouth every 6 hours as needed for pain (4 x daily as needed  maxium 4 per day).  Dispense: 120 tablet; Refill: 0    10. Primary osteoarthritis of  left shoulder  11. Status post total shoulder arthroplasty, right  Ongoing problem, patient endorses that his left shoulder has been bothering him lately and is looking forward for replacement surgery on that side as well.  Pain medications helping him and requesting not to change the dose or titrate down at this time.  Will do as requested.  To follow-up with his orthopedics for further recommendations.       The longitudinal plan of care for the diagnosis(es)/condition(s) as documented were addressed during this visit. Due to the added complexity in care, I will continue to support Arnaud in the subsequent management and with ongoing continuity of care.      Please note that this note consists of symbols derived from keyboarding, dictation and/or voice recognition software. As a result, there may be errors in the script that have gone undetected. Please consider this when interpreting information found in this chart.    Patient Instructions   As discussed we will take care of your [place] refills at the same dose without any changes as opted.  Please follow-up with your orthopedics on the ongoing shoulder joint issues.    Will need in person visit on the next 3 months for annual physical due at that time.      Return in about 3 months (around 8/6/2025), or if symptoms worsen or fail to improve, for Annual Wellness Exam.    MD ANY [facility]   Arnaud is a 70 year old, presenting for the following health issues:  Pain        5/6/2025    12:58 PM   Additional Questions   Roomed by Jose Alfredo AGARWAL         History of Present Illness       Back Pain:  He presents for follow up of back pain. Patient's back pain is a chronic problem.  Location of back pain:  Other  Description of back pain: dull ache  Back pain spreads: nowhere    Since patient first noticed back pain, pain is: always present, but gets better and worse  Does back pain interfere with his job:  Not applicable        He eats 2-3 servings of fruits and vegetables daily.He consumes 0 sweetened beverage(s) daily.He exercises with enough effort to increase his heart rate 10 to 19 minutes per day.  He exercises with enough effort to increase his heart rate 3 or less days per week.   He is taking medications regularly.             Allergies   Allergen Reactions    No Clinical Screening - See Comments     Seasonal Allergies         Past Medical History:   Diagnosis Date    Acute respiratory failure with hypoxia (H) 4/14/2021    Arthritis     Benign positional vertigo     Carpal tunnel syndrome     mild    Chronic pain syndrome 4/14/2021    Hearing problem     LEFT WORSE THAN RIGHT  10/18/2012    Migraines     Nasal polyps     Obesity 4/14/2021    Osteoarthritis of multiple joints 4/14/2021    Pneumonia due to 2019 novel coronavirus 4/14/2021    Unspecified asthma(493.90) 10/17/2012    Unspecified asthma, with exacerbation 10/17/2012       Past Surgical History:   Procedure Laterality Date    ARTHROPLASTY SHOULDER Right 6/15/2021    Procedure: Right Total Shoulder Arthroplasty, Distal Clavicle Excision;  Surgeon: Yesica Deleon MD;  Location: [facility] OR    COLONOSCOPY  2019    COLONOSCOPY N/A 8/15/2022    Procedure: COLONOSCOPY, WITH POLYPECTOMY AND BIOPSY;  Surgeon: Abelino Flores MD;  Location:  GI    HC TOOTH EXTRACTION W/FORCEP      ORTHOPEDIC SURGERY      bilateral total knee replacements    TONSILLECTOMY      age 2 or 3       Family History   Problem Relation Age of Onset    Cancer Father     Family History Negative Father     Family History Negative Mother     Cancer Mother     Stomach Problem Mother     Breast Cancer Mother     Diabetes No family hx of     Coronary Artery Disease No family hx of     Hypertension No family hx of     Hyperlipidemia No family hx of     Cerebrovascular Disease No family hx of     Breast Cancer No family hx of     Colon Cancer No family hx of     Prostate Cancer No family hx of      Other Cancer No family hx of     Depression No family hx of     Anxiety Disorder No family hx of     Mental Illness No family hx of     Substance Abuse No family hx of     Anesthesia Reaction No family hx of     Asthma No family hx of     Osteoporosis No family hx of     Genetic Disorder No family hx of     Thyroid Disease No family hx of     Obesity No family hx of     Unknown/Adopted No family hx of        Social History     Tobacco Use    Smoking status: Never    Smokeless tobacco: Never   Substance Use Topics    Alcohol use: Yes     Comment: case beer a week        Current Outpatient Medications   Medication Sig Dispense Refill    HYDROcodone-acetaminophen (NORCO) 7.5-325 MG per tablet Take 1 tablet by mouth every 6 hours as needed for pain (4 x daily as needed  maxium 4 per day). 120 tablet 0    acyclovir (ZOVIRAX) 400 MG tablet TAKE TWO TABLETS BY MOUTH TWICE DAILY for 5 days as needed for genital herpes outbreak 28 tablet 1    albuterol (PROAIR HFA/PROVENTIL HFA/VENTOLIN HFA) 108 (90 Base) MCG/ACT inhaler Inhale 2 puffs into the lungs every 6 hours as needed for shortness of breath 18 g 1    allopurinol (ZYLOPRIM) 300 MG tablet Take 1 tablet (300 mg) by mouth daily. 90 tablet 0    clindamycin (CLEOCIN-T) 1 % external gel Apply topically 2 times daily On the left lower leg at the site of insect bite as well as friction rub injuries to prevent infection for 2 weeks. 60 g 2    colchicine (COLCRYS) 0.6 MG tablet Take 2 tabs x 1, then 1 tab one hour later x 1 ( do not exceed more than 3 tabs / day ) wait for 3 days and repeat the regimen. 30 tablet 1    diclofenac (VOLTAREN) 1 % topical gel APPLY 4 GRAMS TOPICALLY 4 TIMES A  g 2    fluticasone (FLONASE) 50 MCG/ACT nasal spray Spray 2 sprays into both nostrils daily 16 g 11    ipratropium (ATROVENT) 0.06 % nasal spray SPRAY 2 SPRAYS INTO BOTH NOSTRILS 4 TIMES DAILY 15 mL 0    lidocaine (LIDODERM) 5 % patch Place 1 patch onto the skin every 24 hours. 30  patch 5    meloxicam (MOBIC) 15 MG tablet Take 1 tablet (15 mg) by mouth daily. 30 tablet 1    multivitamin w/minerals (THERA-VIT-M) tablet Take 1 tablet by mouth daily      mupirocin (BACTROBAN) 2 % external ointment Apply topically 3 times daily 30 g 1    naloxone (NARCAN) 4 MG/0.1ML nasal spray Spray 1 spray (4 mg) into one nostril alternating nostrils once as needed for opioid reversal Every 2-3 minutes until patient responsive or EMS arrives 0.2 mL 0    ondansetron (ZOFRAN ODT) 4 MG ODT tab Take 1 tablet (4 mg) by mouth every 8 hours as needed for nausea. 30 tablet 1    ondansetron (ZOFRAN) 4 MG tablet Take 1 tablet (4 mg) by mouth every 8 hours as needed for nausea. 30 tablet 5    pseudoePHEDrine (SUDAFED) 30 MG tablet Take by mouth every 4 hours as needed for congestion.      tadalafil (CIALIS) 5 MG tablet Take 1 tablet (5 mg) by mouth daily. Can take an additional 5-15 mg as needed 2-3 hours prior to anticipated sexual activity. Please separate these pills from your tamsulosin by at least 4 hours. 90 tablet 4    tiZANidine (ZANAFLEX) 4 MG tablet TAKE 1 TABLET (4 MG) BY MOUTH NIGHTLY AS NEEDED FOR MUSCLE SPASMS (Patient not taking: Reported on 2/5/2025) 30 tablet 0     Current Facility-Administered Medications   Medication Dose Route Frequency Provider Last Rate Last Admin    gentamicin (GARAMYCIN) injection 80 mg  80 mg Intramuscular Once         gentamicin (GARAMYCIN) injection 80 mg  80 mg Intramuscular Once               Review of Systems  Constitutional, HEENT, cardiovascular, pulmonary, GI, , musculoskeletal, neuro, skin, endocrine and psych systems are negative, except as otherwise noted.      Objective           Vitals:  No vitals were obtained today due to virtual visit.    Physical Exam   GENERAL: alert and no distress  EYES: Eyes grossly normal to inspection.  No discharge or erythema, or obvious scleral/conjunctival abnormalities.  RESP: No audible wheeze, cough, or visible cyanosis.    SKIN:  Visible skin clear. No significant rash, abnormal pigmentation or lesions.  NEURO: Cranial nerves grossly intact.  Mentation and speech appropriate for age.  PSYCH: Appropriate affect, tone, and pace of words      Video-Visit Details    Type of service:  Video Visit   Originating Location (pt. Location): Home    Distant Location (provider location):  On-site  Platform used for Video Visit: Melva  Signed Electronically by: Sushma Galloway MD

## 2025-05-08 PROBLEM — E66.01 MORBID OBESITY (H): Status: RESOLVED | Noted: 2021-05-11 | Resolved: 2025-05-08

## 2025-05-27 DIAGNOSIS — M1A.9XX0 CHRONIC GOUT INVOLVING TOE OF RIGHT FOOT WITHOUT TOPHUS, UNSPECIFIED CAUSE: ICD-10-CM

## 2025-05-27 RX ORDER — ALLOPURINOL 300 MG/1
1 TABLET ORAL DAILY
Qty: 90 TABLET | Refills: 2 | Status: SHIPPED | OUTPATIENT
Start: 2025-05-27

## 2025-05-30 ENCOUNTER — MYC REFILL (OUTPATIENT)
Dept: FAMILY MEDICINE | Facility: CLINIC | Age: 70
End: 2025-05-30
Payer: COMMERCIAL

## 2025-05-30 DIAGNOSIS — Z96.652 STATUS POST TOTAL LEFT KNEE REPLACEMENT: ICD-10-CM

## 2025-05-30 DIAGNOSIS — M25.661 STIFFNESS OF KNEE JOINT, RIGHT: Chronic | ICD-10-CM

## 2025-05-30 DIAGNOSIS — G89.4 CHRONIC PAIN SYNDROME: Chronic | ICD-10-CM

## 2025-05-30 DIAGNOSIS — Z79.891 CHRONIC USE OF OPIATE FOR THERAPEUTIC PURPOSE: ICD-10-CM

## 2025-05-30 DIAGNOSIS — M47.16 SPONDYLOSIS WITH MYELOPATHY, LUMBAR REGION: ICD-10-CM

## 2025-05-30 DIAGNOSIS — R26.9 GAIT DIFFICULTY: ICD-10-CM

## 2025-06-01 RX ORDER — HYDROCODONE BITARTRATE AND ACETAMINOPHEN 7.5; 325 MG/1; MG/1
1 TABLET ORAL EVERY 6 HOURS PRN
Qty: 120 TABLET | Refills: 0 | Status: SHIPPED | OUTPATIENT
Start: 2025-06-04

## 2025-06-01 NOTE — TELEPHONE ENCOUNTER
RX [place] program ([place]) reviewed:  reviewed- no concerns    [place]:  [email or web address]      Refill done from 6/4/25 as per  check.     Pt to keep up scheduled visit on 8/6/25 for further evaluation    Thank you,  Sushma Galloway MD on 6/1/2025

## 2025-06-02 NOTE — TELEPHONE ENCOUNTER
Called [facility] to pt inform to rx has been refilled and to keep scheduled appt for 8/06/25 w/pcp .    Laci Rubalcava CMA on 6/2/2025 at 9:27 AM

## 2025-06-18 ENCOUNTER — OFFICE VISIT (OUTPATIENT)
Dept: URGENT CARE | Facility: URGENT CARE | Age: 70
End: 2025-06-18
Payer: COMMERCIAL

## 2025-06-18 ENCOUNTER — NURSE TRIAGE (OUTPATIENT)
Dept: FAMILY MEDICINE | Facility: CLINIC | Age: 70
End: 2025-06-18
Payer: COMMERCIAL

## 2025-06-18 VITALS
SYSTOLIC BLOOD PRESSURE: 148 MMHG | RESPIRATION RATE: 19 BRPM | TEMPERATURE: 98.1 F | BODY MASS INDEX: 39.7 KG/M2 | WEIGHT: 265 LBS | DIASTOLIC BLOOD PRESSURE: 93 MMHG | OXYGEN SATURATION: 95 % | HEART RATE: 91 BPM
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
## 2025-06-18 DIAGNOSIS — L03.313 CELLULITIS OF CHEST WALL: ICD-10-CM

## 2025-06-18 DIAGNOSIS — S20.361A TICK BITE OF RIGHT FRONT WALL OF THORAX, INITIAL ENCOUNTER: Primary | ICD-10-CM

## 2025-06-18 PROCEDURE — 3077F SYST BP >= 140 MM HG: CPT | Performed by: PHYSICIAN ASSISTANT

## 2025-06-18 PROCEDURE — 99213 OFFICE O/P EST LOW 20 MIN: CPT | Performed by: PHYSICIAN ASSISTANT

## 2025-06-18 PROCEDURE — 3080F DIAST BP >= 90 MM HG: CPT | Performed by: PHYSICIAN ASSISTANT

## 2025-06-18 RX ORDER — CEPHALEXIN 500 MG/1
500 CAPSULE ORAL 3 TIMES DAILY
Qty: 21 CAPSULE | Refills: 0 | Status: SHIPPED | OUTPATIENT
Start: 2025-06-18 | End: 2025-06-25

## 2025-06-18 NOTE — PROGRESS NOTES
Urgent Care Clinic Visit    Chief Complaint   Patient presents with    Urgent Care     - 2 Days  - Right Chest   - Pull off tick  - Area now has increased redness  - Very itchy  - Concerned of infection  - Nurse triage advised to come to               6/18/2025     6:17 PM   Additional Questions   Roomed by Reza AHMADI   Accompanied by None

## 2025-06-18 NOTE — PROGRESS NOTES
Tick bite of right front wall of thorax, initial encounter  - cephALEXin (KEFLEX) 500 MG capsule; Take 1 capsule (500 mg) by mouth 3 times daily for 7 days.    Low suspicion for Lyme's disease as the tick was a wood tick and does not seem to have been attached for very long.    Cellulitis of chest wall  - cephALEXin (KEFLEX) 500 MG capsule; Take 1 capsule (500 mg) by mouth 3 times daily for 7 days.    General Tips for All Ages:    Keep the Affected Area Clean:  Why: Reduces the risk of infection.  What to Do:  Gently clean the affected area with mild soap and water.    Elevate the Limb:  Why: Aids in reducing swelling.  What to Do:  Elevate the affected limb whenever possible.    OTC Pain Relievers:  Why: Manages pain and reduces inflammation.  What to Do:  Take over-the-counter pain relievers like acetaminophen or ibuprofen as directed.    For Children (Under 12 Years):    Topical Antibiotic Ointment (if approved by pediatrician):  Why: Prevents infection.  What to Do:  Apply the ointment as recommended by your child's pediatrician.    Avoid Scratching:  Why: Prevents further irritation.  What to Do:  Discourage your child from scratching the affected area.    For Adolescents (12-17 Years):    Topical Antibiotic Ointment:  Why: Prevents infection.  What to Do:  Apply the ointment as directed.    Warm Compress:  Why: Aids in reducing swelling and discomfort.  What to Do:  Use a warm compress on the affected area for 15-20 minutes, several times a day.    For Adults (18 Years and Older):    Oral Antibiotics (if prescribed):  Why: Treats the bacterial infection.  What to Do:  Take prescribed oral antibiotics as directed by your healthcare provider.    Rest and Avoid Strenuous Activity:  Why: Supports healing.  What to Do:  Rest and avoid activities that may strain the affected area.    All Ages:  Stay Hydrated:    Why: Promotes overall health and healing.  What to Do:  Drink plenty of water throughout the  day.    Avoid Tight Clothing:  Why: Prevents further irritation.  What to Do:  Wear loose-fitting clothing over the affected area    Watch for Warning Signs:  When: Throughout the recovery process.  What to Do:  Be vigilant for increased redness, swelling, or spreading of the infection. Seek prompt medical attention if you notice these signs.    Seek Emergency Care:  When: If you experience severe pain, high fever, or difficulty breathing.  What to Do:  Seek immediate medical attention for severe symptoms.  Remember, cellulitis requires timely and appropriate care. Follow these guidelines, and if you have concerns or if your symptoms persist, contact your healthcare provider.    Follow-Up Care:    Patient advised to return to clinic for reevaluation (either UC or PCP) if symptoms do not improve in 7 days. Patient educated on red flag symptoms and asked to go directly to the ED if these symptoms present themselves.     Wishing you a seay and complete recovery!      Imtiaz Cardona PA-C  [facility]    Subjective   70 year old who presents to clinic today for the following health issues:    Urgent Care       HPI     - 2 Days  - Right Chest  - Pull off tick  - Area now has increased redness  - Very itchy  - Concerned of infection  - Nurse triage advised to come to UC   -Patient states that he noticed the tick on Monday and it may have been attached for 2 days.   Patient brought the tick with him today and it looks to be a dog tick.   Patient denies any fever, chills, or body aches. Patient states that he just recently had radiation for prostate cancer so he usually feels fatigued but this has not worsened.     Review of Systems   Review of Systems   See HPI    Objective    Temp: 98.1  F (36.7  C) Temp src: Oral BP: (!) 148/93 Pulse: 91   Resp: 19 SpO2: 95 %       Physical Exam   Physical Exam  Constitutional:       General: He is not in acute distress.     Appearance: Normal appearance. He is normal  weight. He is not ill-appearing, toxic-appearing or diaphoretic.   HENT:      Head: Normocephalic and atraumatic.   Cardiovascular:      Rate and Rhythm: Normal rate.      Pulses: Normal pulses.   Pulmonary:      Effort: Pulmonary effort is normal. No respiratory distress.   Abdominal:      Tenderness: There is no right CVA tenderness or left CVA tenderness.   Skin:            Comments: The area shown above has erythema, swelling, warmth, and mild tenderness.   Neurological:      General: No focal deficit present.      Mental Status: He is alert and oriented to person, place, and time. Mental status is at baseline.      Gait: Gait normal.   Psychiatric:         Mood and Affect: Mood normal.         Behavior: Behavior normal.         Thought Content: Thought content normal.         Judgment: Judgment normal.          No results found for this or any previous visit (from the past 24 hours).    All labs that were finalized during the visit were reviewed by me personally. Any pending labs will be reviewed by urgent care staff in the following days. Patient will be notified either via [employer] message or phone call of any pertinent abnormal results. Patient was informed that we will not necessarily reach out if pending lab results are normal.

## 2025-06-18 NOTE — TELEPHONE ENCOUNTER
"Pt reporting redness and swelling around a tick bite. Triage completed and dispo given. Pt agrees to be seen in  due to no clinic appointments available.     1. ATTACHED:  \"Is the tick still on the skin?\"  (e.g., yes, no, unsure)      no  2. ONSET - TICK STILL ATTACHED:  \"How long do you think the tick has been on your skin?\" (e.g., hours, days, unsure)  Note:  Is there a recent activity (camping, hiking) where the caller may have been exposed?      Couple days, pt has a house about 100 miles away and was mowing grass   3. ONSET - TICK NOT STILL ATTACHED: \"If the tick has been removed, how long do you think the tick was attached before you removed it?\" (e.g., 5 hours, 2 days). \"When was this?\"      Couple days, red half [place] about the size of a dime  4. LOCATION: \"Where is the tick bite located?\" (e.g., arm, leg)      Under axillary, right   5. TYPE of TICK: \"Is it a wood tick or a deer tick?\" (e.g., deer tick, wood tick; unsure)      Unsure   6. SIZE of TICK: \"How big is the tick?\" (e.g., size of poppy seed, apple seed, watermelon seed; unsure) Note: Deer ticks can be the size of a poppy seed (nymph) or an apple seed (adult).        Apple seed   7. ENGORGED: \"Did the tick look flat or engorged (full, swollen)?\" (e.g., flat, engorged; unsure)      Engorged   8. OTHER SYMPTOMS: \"Do you have any other symptoms?\" (e.g., fever, rash, redness at bite area, red ring around bite)      Getting worse, lump around the insect bite  9. PREGNANCY: \"Is there any chance you are pregnant?\" \"When was your last menstrual period?\"      No       Reason for Disposition   Red ring or bull's-eye rash occurs around a deer tick bite    Additional Information   Negative: Not a tick bite   Negative: Patient sounds very sick or weak to the triager   Negative: Fever or severe headache occurs, 2 to 14 days following the bite   Negative: Widespread rash occurs, 2 to 14 days following the bite   Negative: Can't remove live tick (after using " Care Advice)   Negative: Fever and spreading red area or streak   Negative: Fever and area is very tender to touch   Negative: Red streak or red line and length > 2 inches (5 cm)   Negative: Red or very tender (to touch) area and started over 24 hours after the bite    Protocols used: Tick Bite-A-OH

## 2025-07-01 ENCOUNTER — MYC REFILL (OUTPATIENT)
Dept: FAMILY MEDICINE | Facility: CLINIC | Age: 70
End: 2025-07-01
Payer: COMMERCIAL

## 2025-07-01 DIAGNOSIS — G89.4 CHRONIC PAIN SYNDROME: Chronic | ICD-10-CM

## 2025-07-01 DIAGNOSIS — R26.9 GAIT DIFFICULTY: ICD-10-CM

## 2025-07-01 DIAGNOSIS — Z96.652 STATUS POST TOTAL LEFT KNEE REPLACEMENT: ICD-10-CM

## 2025-07-01 DIAGNOSIS — M47.16 SPONDYLOSIS WITH MYELOPATHY, LUMBAR REGION: ICD-10-CM

## 2025-07-01 DIAGNOSIS — M25.661 STIFFNESS OF KNEE JOINT, RIGHT: Chronic | ICD-10-CM

## 2025-07-01 DIAGNOSIS — Z79.891 CHRONIC USE OF OPIATE FOR THERAPEUTIC PURPOSE: ICD-10-CM

## 2025-07-03 RX ORDER — HYDROCODONE BITARTRATE AND ACETAMINOPHEN 7.5; 325 MG/1; MG/1
1 TABLET ORAL EVERY 6 HOURS PRN
Qty: 120 TABLET | Refills: 0 | Status: SHIPPED | OUTPATIENT
Start: 2025-07-03

## 2025-07-03 NOTE — TELEPHONE ENCOUNTER
[place] reviewed:  reviewed- no concerns    [place]  [email or web address]    Refill done.    Pt to keep up scheduled visit on 8/6/25 for further evaluatiion    Thank you  Sushma Galloway MD on 7/3/2025

## 2025-07-23 ENCOUNTER — MYC REFILL (OUTPATIENT)
Dept: UROLOGY | Facility: CLINIC | Age: 70
End: 2025-07-23
Payer: COMMERCIAL

## 2025-07-23 ENCOUNTER — MYC REFILL (OUTPATIENT)
Dept: ORTHOPEDICS | Facility: CLINIC | Age: 70
End: 2025-07-23
Payer: COMMERCIAL

## 2025-07-23 DIAGNOSIS — N52.9 ERECTILE DYSFUNCTION, UNSPECIFIED ERECTILE DYSFUNCTION TYPE: ICD-10-CM

## 2025-07-23 DIAGNOSIS — M25.519 ARTHRALGIA OF SHOULDER, UNSPECIFIED LATERALITY: ICD-10-CM

## 2025-07-23 RX ORDER — MELOXICAM 15 MG/1
15 TABLET ORAL DAILY
Qty: 30 TABLET | Refills: 1 | Status: CANCELLED | OUTPATIENT
Start: 2025-07-23

## 2025-07-23 RX ORDER — TADALAFIL 5 MG/1
5 TABLET ORAL DAILY
Qty: 90 TABLET | Refills: 1 | Status: SHIPPED | OUTPATIENT
Start: 2025-07-23

## 2025-07-23 NOTE — TELEPHONE ENCOUNTER
ATC attempted to call patient to ask him for a recent BP in order to refill medication. Patient did not answer and could not leave voicemail.

## 2025-07-24 ENCOUNTER — LAB (OUTPATIENT)
Dept: LAB | Facility: CLINIC | Age: 70
End: 2025-07-24
Payer: COMMERCIAL

## 2025-07-24 DIAGNOSIS — C61 PROSTATE CANCER (H): ICD-10-CM

## 2025-07-24 LAB — PSA SERPL DL<=0.01 NG/ML-MCNC: 0.13 NG/ML (ref 0–6.5)

## 2025-07-26 DIAGNOSIS — M25.519 ARTHRALGIA OF SHOULDER, UNSPECIFIED LATERALITY: ICD-10-CM

## 2025-07-29 RX ORDER — MELOXICAM 15 MG/1
15 TABLET ORAL DAILY
Qty: 30 TABLET | Refills: 1 | Status: SHIPPED | OUTPATIENT
Start: 2025-07-29

## 2025-07-29 NOTE — TELEPHONE ENCOUNTER
Last Written Prescription:  meloxicam (MOBIC) 15 MG tablet 30 tablet 1 3/4/2025     ----------------------  Last Visit Date: 2/5/25  Future Visit Date: 8/6/25  ----------------------      Refill decision:     [x] Medication unable to be refilled by RN due to: Other:    One refill received since last appt      Request from pharmacy:  Requested Prescriptions   Pending Prescriptions Disp Refills    meloxicam (MOBIC) 15 MG tablet [Pharmacy Med Name: MELOXICAM 15MG TABLET] 30 tablet 1     Sig: TAKE 1 TABLET BY MOUTH DAILY       NSAID Medications Failed - 7/29/2025 11:55 AM        Failed - Most recent blood pressure under 140/90 in past 12 months     BP Readings from Last 3 Encounters:   06/18/25 (!) 148/93   02/05/25 128/86   01/24/25 136/86       Systolic (Patient Reported): 139 (11/5/2024  4:53 PM)  Diastolic (Patient Reported): 89 (11/5/2024  4:53 PM)              Failed - Patient is age 6-64 years        Failed - Always Fail Criteria - Chart Review Required     Validate Diagnosis. If the medication is requested for an acute flare of a chronic pain associated with a musculoskeletal or rheumatologic condition; okay to authorize if all other criteria are met. If not, then forward to provider for review.          Passed - Normal CBC on file in past 12 months     Recent Labs   Lab Test 02/05/25  1043   WBC 5.5   RBC 4.71   HGB 15.2   HCT 45.4                    Passed - Medication is active on med list and the sig matches. RN to manually verify dose and sig if red X/fail.     If the protocol passes (green check), you do not need to verify med dose and sig.    A prescription matches if they are the same clinical intention.    For Example: once daily and every morning are the same.    The protocol can not identify upper and lower case letters as matching and will fail.     For Example: Take 1 tablet (50 mg) by mouth daily     TAKE 1 TABLET (50 MG) BY MOUTH DAILY    For all fails (red x), verify dose and sig.    If  the refill does match what is on file, the RN can still proceed to approve the refill request.       If they do not match, route to the appropriate provider.             Passed - Normal GFR on file in past 12 months     Recent Labs   Lab Test 02/05/25  1043 01/25/22  0954 06/01/21  0809   GFRESTIMATED >90   < > 89   GFRESTBLACK  --   --  >90    < > = values in this interval not displayed.             Passed - Recent (12 month) or future (90 days) visit with authorizing provider's specialty (provided they have been seen in the past 15 months)     The patient must have completed an in-person or virtual visit within the past 12 months or has a future visit scheduled within the next 90 days with the authorizing provider s specialty.  Urgent care and e-visits do not qualify as an office visit for this protocol.

## 2025-07-30 ENCOUNTER — MYC REFILL (OUTPATIENT)
Dept: FAMILY MEDICINE | Facility: CLINIC | Age: 70
End: 2025-07-30
Payer: COMMERCIAL

## 2025-07-30 DIAGNOSIS — G89.4 CHRONIC PAIN SYNDROME: Chronic | ICD-10-CM

## 2025-07-30 DIAGNOSIS — J45.30 MILD PERSISTENT ASTHMA WITHOUT COMPLICATION: ICD-10-CM

## 2025-07-30 DIAGNOSIS — R26.9 GAIT DIFFICULTY: ICD-10-CM

## 2025-07-30 DIAGNOSIS — M47.16 SPONDYLOSIS WITH MYELOPATHY, LUMBAR REGION: ICD-10-CM

## 2025-07-30 DIAGNOSIS — Z96.652 STATUS POST TOTAL LEFT KNEE REPLACEMENT: ICD-10-CM

## 2025-07-30 DIAGNOSIS — Z79.891 CHRONIC USE OF OPIATE FOR THERAPEUTIC PURPOSE: ICD-10-CM

## 2025-07-30 DIAGNOSIS — M25.661 STIFFNESS OF KNEE JOINT, RIGHT: Chronic | ICD-10-CM

## 2025-07-31 RX ORDER — HYDROCODONE BITARTRATE AND ACETAMINOPHEN 7.5; 325 MG/1; MG/1
1 TABLET ORAL EVERY 6 HOURS PRN
Qty: 120 TABLET | Refills: 0 | Status: SHIPPED | OUTPATIENT
Start: 2025-07-31

## 2025-07-31 RX ORDER — IPRATROPIUM BROMIDE 42 UG/1
SPRAY, METERED NASAL
Qty: 15 ML | Refills: 0 | Status: SHIPPED | OUTPATIENT
Start: 2025-07-31

## 2025-07-31 NOTE — TELEPHONE ENCOUNTER
[place] reviewed:  reviewed- no concerns    [place]  [email or web address]      Refill done.    Pt to keep up scheduled appt on 8/6/25 for further evaluation    Thank you  Sushma Galloway MD on 7/31/2025

## 2025-08-05 SDOH — HEALTH STABILITY: PHYSICAL HEALTH: ON AVERAGE, HOW MANY DAYS PER WEEK DO YOU ENGAGE IN MODERATE TO STRENUOUS EXERCISE (LIKE A BRISK WALK)?: 2 DAYS

## 2025-08-05 SDOH — HEALTH STABILITY: PHYSICAL HEALTH: ON AVERAGE, HOW MANY MINUTES DO YOU ENGAGE IN EXERCISE AT THIS LEVEL?: 10 MIN

## 2025-08-05 ASSESSMENT — SOCIAL DETERMINANTS OF HEALTH (SDOH): HOW OFTEN DO YOU GET TOGETHER WITH FRIENDS OR RELATIVES?: TWICE A WEEK

## 2025-08-06 ENCOUNTER — OFFICE VISIT (OUTPATIENT)
Dept: ORTHOPEDICS | Facility: CLINIC | Age: 70
End: 2025-08-06
Payer: COMMERCIAL

## 2025-08-06 ENCOUNTER — OFFICE VISIT (OUTPATIENT)
Dept: RADIATION ONCOLOGY | Facility: CLINIC | Age: 70
End: 2025-08-06
Attending: RADIOLOGY
Payer: COMMERCIAL

## 2025-08-06 ENCOUNTER — OFFICE VISIT (OUTPATIENT)
Dept: FAMILY MEDICINE | Facility: CLINIC | Age: 70
End: 2025-08-06
Attending: INTERNAL MEDICINE
Payer: COMMERCIAL

## 2025-08-06 VITALS
SYSTOLIC BLOOD PRESSURE: 130 MMHG | OXYGEN SATURATION: 95 % | RESPIRATION RATE: 18 BRPM | BODY MASS INDEX: 40.42 KG/M2 | TEMPERATURE: 97.6 F | WEIGHT: 266.7 LBS | DIASTOLIC BLOOD PRESSURE: 85 MMHG | HEIGHT: 68 IN | HEART RATE: 69 BPM

## 2025-08-06 DIAGNOSIS — Z79.891 CHRONIC USE OF OPIATE DRUG FOR THERAPEUTIC PURPOSE: ICD-10-CM

## 2025-08-06 DIAGNOSIS — Z00.00 ENCOUNTER FOR MEDICARE ANNUAL WELLNESS EXAM: Primary | ICD-10-CM

## 2025-08-06 DIAGNOSIS — M19.012 LOCALIZED OSTEOARTHRITIS OF LEFT SHOULDER: Primary | ICD-10-CM

## 2025-08-06 DIAGNOSIS — G89.29 CHRONIC BILATERAL LOW BACK PAIN WITHOUT SCIATICA: ICD-10-CM

## 2025-08-06 DIAGNOSIS — M54.50 CHRONIC BILATERAL LOW BACK PAIN WITHOUT SCIATICA: ICD-10-CM

## 2025-08-06 DIAGNOSIS — J45.20 MILD INTERMITTENT ASTHMA WITHOUT COMPLICATION: ICD-10-CM

## 2025-08-06 DIAGNOSIS — C61 PROSTATE CANCER (H): ICD-10-CM

## 2025-08-06 DIAGNOSIS — Z96.653 STATUS POST TOTAL BILATERAL KNEE REPLACEMENT: ICD-10-CM

## 2025-08-06 DIAGNOSIS — E55.9 VITAMIN D DEFICIENCY: ICD-10-CM

## 2025-08-06 DIAGNOSIS — R35.0 URINARY FREQUENCY: Primary | ICD-10-CM

## 2025-08-06 DIAGNOSIS — M1A.9XX0 CHRONIC GOUT INVOLVING TOE OF RIGHT FOOT WITHOUT TOPHUS, UNSPECIFIED CAUSE: ICD-10-CM

## 2025-08-06 DIAGNOSIS — F11.20 CONTINUOUS OPIOID DEPENDENCE (H): ICD-10-CM

## 2025-08-06 DIAGNOSIS — I72.3 ILIAC ARTERY ANEURYSM, LEFT: ICD-10-CM

## 2025-08-06 DIAGNOSIS — R06.83 SNORING: ICD-10-CM

## 2025-08-06 DIAGNOSIS — M19.012 PRIMARY OSTEOARTHRITIS OF LEFT SHOULDER: ICD-10-CM

## 2025-08-06 DIAGNOSIS — Z96.611 STATUS POST TOTAL SHOULDER ARTHROPLASTY, RIGHT: ICD-10-CM

## 2025-08-06 LAB
ALBUMIN SERPL BCG-MCNC: 4.2 G/DL (ref 3.5–5.2)
ALP SERPL-CCNC: 137 U/L (ref 40–150)
ALT SERPL W P-5'-P-CCNC: 47 U/L (ref 0–70)
ANION GAP SERPL CALCULATED.3IONS-SCNC: 10 MMOL/L (ref 7–15)
AST SERPL W P-5'-P-CCNC: 34 U/L (ref 0–45)
BILIRUB SERPL-MCNC: 0.5 MG/DL
BUN SERPL-MCNC: 24 MG/DL (ref 8–23)
CALCIUM SERPL-MCNC: 9.2 MG/DL (ref 8.8–10.4)
CHLORIDE SERPL-SCNC: 104 MMOL/L (ref 98–107)
CREAT SERPL-MCNC: 0.82 MG/DL (ref 0.67–1.17)
EGFRCR SERPLBLD CKD-EPI 2021: >90 ML/MIN/1.73M2
EST. AVERAGE GLUCOSE BLD GHB EST-MCNC: 94 MG/DL
GLUCOSE SERPL-MCNC: 108 MG/DL (ref 70–99)
HBA1C MFR BLD: 4.9 % (ref 0–5.6)
HCO3 SERPL-SCNC: 24 MMOL/L (ref 22–29)
POTASSIUM SERPL-SCNC: 4.8 MMOL/L (ref 3.4–5.3)
PROT SERPL-MCNC: 7 G/DL (ref 6.4–8.3)
SODIUM SERPL-SCNC: 138 MMOL/L (ref 135–145)
URATE SERPL-MCNC: 5.7 MG/DL (ref 3.4–7)
VIT D+METAB SERPL-MCNC: 24 NG/ML (ref 20–50)

## 2025-08-06 PROCEDURE — 99214 OFFICE O/P EST MOD 30 MIN: CPT

## 2025-08-06 PROCEDURE — 1125F AMNT PAIN NOTED PAIN PRSNT: CPT

## 2025-08-06 PROCEDURE — G0463 HOSPITAL OUTPT CLINIC VISIT: HCPCS

## 2025-08-06 RX ORDER — LIDOCAINE HYDROCHLORIDE 10 MG/ML
4 INJECTION, SOLUTION EPIDURAL; INFILTRATION; INTRACAUDAL; PERINEURAL
Status: COMPLETED | OUTPATIENT
Start: 2025-08-06 | End: 2025-08-06

## 2025-08-06 RX ORDER — TRIAMCINOLONE ACETONIDE 40 MG/ML
40 INJECTION, SUSPENSION INTRA-ARTICULAR; INTRAMUSCULAR
Status: COMPLETED | OUTPATIENT
Start: 2025-08-06 | End: 2025-08-06

## 2025-08-06 RX ORDER — TAMSULOSIN HYDROCHLORIDE 0.4 MG/1
0.4 CAPSULE ORAL DAILY
Qty: 30 CAPSULE | Refills: 5 | Status: SHIPPED | OUTPATIENT
Start: 2025-08-06

## 2025-08-06 RX ORDER — LIDOCAINE HYDROCHLORIDE 10 MG/ML
3 INJECTION, SOLUTION EPIDURAL; INFILTRATION; INTRACAUDAL; PERINEURAL
Status: COMPLETED | OUTPATIENT
Start: 2025-08-06 | End: 2025-08-06

## 2025-08-06 RX ADMIN — LIDOCAINE HYDROCHLORIDE 3 ML: 10 INJECTION, SOLUTION EPIDURAL; INFILTRATION; INTRACAUDAL; PERINEURAL at 14:28

## 2025-08-06 RX ADMIN — LIDOCAINE HYDROCHLORIDE 4 ML: 10 INJECTION, SOLUTION EPIDURAL; INFILTRATION; INTRACAUDAL; PERINEURAL at 14:28

## 2025-08-06 RX ADMIN — TRIAMCINOLONE ACETONIDE 40 MG: 40 INJECTION, SUSPENSION INTRA-ARTICULAR; INTRAMUSCULAR at 14:28

## 2025-08-06 ASSESSMENT — PATIENT HEALTH QUESTIONNAIRE - PHQ9: SUM OF ALL RESPONSES TO PHQ QUESTIONS 1-9: 2

## 2025-08-06 ASSESSMENT — PAIN SCALES - GENERAL: PAINLEVEL_OUTOF10: MODERATE PAIN (5)

## 2025-08-07 ENCOUNTER — PATIENT OUTREACH (OUTPATIENT)
Dept: CARE COORDINATION | Facility: CLINIC | Age: 70
End: 2025-08-07
Payer: COMMERCIAL

## 2025-08-11 ENCOUNTER — PATIENT OUTREACH (OUTPATIENT)
Dept: CARE COORDINATION | Facility: CLINIC | Age: 70
End: 2025-08-11
Payer: COMMERCIAL

## 2025-09-01 ENCOUNTER — MYC REFILL (OUTPATIENT)
Dept: FAMILY MEDICINE | Facility: CLINIC | Age: 70
End: 2025-09-01
Payer: COMMERCIAL

## 2025-09-01 DIAGNOSIS — M47.16 SPONDYLOSIS WITH MYELOPATHY, LUMBAR REGION: ICD-10-CM

## 2025-09-01 DIAGNOSIS — M25.661 STIFFNESS OF KNEE JOINT, RIGHT: Chronic | ICD-10-CM

## 2025-09-01 DIAGNOSIS — Z79.891 CHRONIC USE OF OPIATE FOR THERAPEUTIC PURPOSE: ICD-10-CM

## 2025-09-01 DIAGNOSIS — Z96.652 STATUS POST TOTAL LEFT KNEE REPLACEMENT: ICD-10-CM

## 2025-09-01 DIAGNOSIS — R26.9 GAIT DIFFICULTY: ICD-10-CM

## 2025-09-01 DIAGNOSIS — G89.4 CHRONIC PAIN SYNDROME: Chronic | ICD-10-CM

## 2025-09-02 RX ORDER — HYDROCODONE BITARTRATE AND ACETAMINOPHEN 7.5; 325 MG/1; MG/1
1 TABLET ORAL EVERY 6 HOURS PRN
Qty: 120 TABLET | Refills: 0 | Status: SHIPPED | OUTPATIENT
Start: 2025-09-02

## 2025-09-04 ENCOUNTER — OFFICE VISIT (OUTPATIENT)
Dept: FAMILY MEDICINE | Facility: CLINIC | Age: 70
End: 2025-09-04
Payer: COMMERCIAL

## 2025-09-04 VITALS
OXYGEN SATURATION: 97 % | HEART RATE: 58 BPM | RESPIRATION RATE: 18 BRPM | BODY MASS INDEX: 38.77 KG/M2 | WEIGHT: 255.8 LBS | HEIGHT: 68 IN | SYSTOLIC BLOOD PRESSURE: 129 MMHG | DIASTOLIC BLOOD PRESSURE: 84 MMHG

## 2025-09-04 DIAGNOSIS — Z90.49 STATUS POST LAPAROSCOPIC CHOLECYSTECTOMY: Primary | ICD-10-CM

## 2025-09-04 DIAGNOSIS — K80.01 CALCULUS OF GALLBLADDER WITH ACUTE CHOLECYSTITIS AND OBSTRUCTION: ICD-10-CM

## 2025-09-04 DIAGNOSIS — K80.42 CHOLEDOCHOLITHIASIS WITH ACUTE CHOLECYSTITIS: ICD-10-CM

## 2025-09-04 DIAGNOSIS — R79.89 ELEVATED LFTS: ICD-10-CM

## 2025-09-04 PROBLEM — K80.40 CHOLEDOCHOLITHIASIS WITH CHOLECYSTITIS: Status: ACTIVE | Noted: 2025-08-17

## 2025-09-04 PROBLEM — M1A.09X0 CHRONIC GOUT OF MULTIPLE SITES: Status: ACTIVE | Noted: 2025-08-17

## 2025-09-04 LAB
ERYTHROCYTE [DISTWIDTH] IN BLOOD BY AUTOMATED COUNT: 12.9 % (ref 10–15)
HCT VFR BLD AUTO: 40 % (ref 40–53)
HGB BLD-MCNC: 13.7 G/DL (ref 13.3–17.7)
MCH RBC QN AUTO: 32.1 PG (ref 26.5–33)
MCHC RBC AUTO-ENTMCNC: 34.3 G/DL (ref 31.5–36.5)
MCV RBC AUTO: 93.7 FL (ref 78–100)
PLATELET # BLD AUTO: 225 10E3/UL (ref 150–450)
RBC # BLD AUTO: 4.27 10E6/UL (ref 4.4–5.9)
WBC # BLD AUTO: 4.87 10E3/UL (ref 4–11)

## 2025-09-04 RX ORDER — OXYCODONE HYDROCHLORIDE 5 MG/1
5-10 TABLET ORAL
COMMUNITY
Start: 2025-08-21

## 2025-09-04 RX ORDER — ACETAMINOPHEN 500 MG
500-1000 TABLET ORAL
COMMUNITY
Start: 2025-08-21

## 2025-09-04 ASSESSMENT — ANXIETY QUESTIONNAIRES
7. FEELING AFRAID AS IF SOMETHING AWFUL MIGHT HAPPEN: NOT AT ALL
GAD7 TOTAL SCORE: 0
3. WORRYING TOO MUCH ABOUT DIFFERENT THINGS: NOT AT ALL
8. IF YOU CHECKED OFF ANY PROBLEMS, HOW DIFFICULT HAVE THESE MADE IT FOR YOU TO DO YOUR WORK, TAKE CARE OF THINGS AT HOME, OR GET ALONG WITH OTHER PEOPLE?: NOT DIFFICULT AT ALL
GAD7 TOTAL SCORE: 0
5. BEING SO RESTLESS THAT IT IS HARD TO SIT STILL: NOT AT ALL
6. BECOMING EASILY ANNOYED OR IRRITABLE: NOT AT ALL
GAD7 TOTAL SCORE: 0
1. FEELING NERVOUS, ANXIOUS, OR ON EDGE: NOT AT ALL
2. NOT BEING ABLE TO STOP OR CONTROL WORRYING: NOT AT ALL
IF YOU CHECKED OFF ANY PROBLEMS ON THIS QUESTIONNAIRE, HOW DIFFICULT HAVE THESE PROBLEMS MADE IT FOR YOU TO DO YOUR WORK, TAKE CARE OF THINGS AT HOME, OR GET ALONG WITH OTHER PEOPLE: NOT DIFFICULT AT ALL
7. FEELING AFRAID AS IF SOMETHING AWFUL MIGHT HAPPEN: NOT AT ALL
4. TROUBLE RELAXING: NOT AT ALL

## 2025-09-04 ASSESSMENT — PAIN SCALES - GENERAL: PAINLEVEL_OUTOF10: NO PAIN (0)

## (undated) DEVICE — PREP DURAPREP 26ML APL 8630

## (undated) DEVICE — LINEN ORTHO PACK 5446

## (undated) DEVICE — SPONGE COTTONOID 1/2X1" 80-1402

## (undated) DEVICE — SYR BULB IRRIG 50ML LATEX FREE 0035280

## (undated) DEVICE — SOL HYDROGEN PEROXIDE 3% 4OZ BOTTLE F0010

## (undated) DEVICE — DRAPE IOBAN INCISE 23X17" 6650EZ

## (undated) DEVICE — SU MONOCRYL 3-0 PS-1 27" Y936H

## (undated) DEVICE — SPONGE LAP 18X18" X8435

## (undated) DEVICE — DRAPE U-DRAPE 1015NSD NON-STERILE

## (undated) DEVICE — Device

## (undated) DEVICE — IMM KIT SHOULDER STABILIZATION 7210573

## (undated) DEVICE — BONE CLEANING TIP INTERPULSE  0210-010-000

## (undated) DEVICE — SYR 50ML CATH TIP W/O NDL 309620

## (undated) DEVICE — STRAP KNEE/BODY 31143004

## (undated) DEVICE — SU SILK 2-0 TIE 12X30" A305H

## (undated) DEVICE — ESU GROUND PAD ADULT W/CORD E7507

## (undated) DEVICE — DRAPE U-POUCH 34X29" 1067

## (undated) DEVICE — SOL NACL 0.9% IRRIG 1000ML BOTTLE 2F7124

## (undated) DEVICE — PACK UNIVERSAL SPLIT 29131

## (undated) DEVICE — SU FIBERWIRE 2 38"  AR-7200

## (undated) DEVICE — GLOVE PROTEXIS POWDER FREE 7.5 ORTHOPEDIC 2D73ET75

## (undated) DEVICE — PACK SET-UP STD 9102

## (undated) DEVICE — ESU CLEANER TIP 31142717

## (undated) DEVICE — GOWN XLG DISP 9545

## (undated) DEVICE — COVER CAMERA IN-LIGHT DISP LT-C02

## (undated) DEVICE — ESU ELEC NDL 1" E1552

## (undated) DEVICE — RESTRAINT LIMB HOLDER ANKLE/WRIST FOAM W/QUICK RELEASE 2533

## (undated) DEVICE — DEVICE RETRIEVER HEWSON 71111579

## (undated) DEVICE — DRSG STERI STRIP 1/2X4" R1547

## (undated) DEVICE — ESU PENCIL W/SMOKE EVAC NEPTUNE STRYKER 0703-046-000

## (undated) DEVICE — KIT ENDO INST 2.6MM DIA DBL ROW ANCHR W/SPR TROC AR-3650DS

## (undated) DEVICE — SOL NACL 0.9% IRRIG 3000ML BAG 2B7477

## (undated) DEVICE — SU ETHIBOND 2 V-37 4X30" MX69G

## (undated) DEVICE — DRAPE POUCH INSTRUMENT 1018

## (undated) DEVICE — GLOVE PROTEXIS W/NEU-THERA 7.5  2D73TE75

## (undated) DEVICE — SUCTION MANIFOLD NEPTUNE 2 SYS 4 PORT 0702-020-000

## (undated) DEVICE — LINEN DRAPE 54X72" 5467

## (undated) DEVICE — SU MONOCRYL 2-0 SH 27" UND Y417H

## (undated) DEVICE — DRSG AQUACEL AG 3.5X9.75" HYDROFIBER 412011

## (undated) DEVICE — SOL WATER IRRIG 1000ML BOTTLE 2F7114

## (undated) DEVICE — ATTENTION CASE CART PLEASE PICK

## (undated) DEVICE — IMM KIT SHOULDER TMAX MASK FACE 7210559

## (undated) DEVICE — SU ETHIBOND 0 CT-1 CR 8X18" CX21D

## (undated) DEVICE — EYE PREP BETADINE 5% SOLUTION 30ML 0065-0411-30

## (undated) DEVICE — BONE CEMENT MIXEVAC III HI VAC KIT  0206-015-000

## (undated) DEVICE — LINEN TOWEL PACK X5 5464

## (undated) DEVICE — BRUSH SURGICAL SCRUB W/PCMX 4457A

## (undated) DEVICE — SUCTION IRR SYSTEM W/O TIP INTERPULSE HANDPIECE 0210-100-000

## (undated) RX ORDER — LIDOCAINE HYDROCHLORIDE 10 MG/ML
INJECTION, SOLUTION EPIDURAL; INFILTRATION; INTRACAUDAL; PERINEURAL
Status: DISPENSED
Start: 2025-02-05

## (undated) RX ORDER — LIDOCAINE HYDROCHLORIDE 10 MG/ML
INJECTION, SOLUTION EPIDURAL; INFILTRATION; INTRACAUDAL; PERINEURAL
Status: DISPENSED
Start: 2024-08-09

## (undated) RX ORDER — LIDOCAINE HYDROCHLORIDE 10 MG/ML
INJECTION, SOLUTION EPIDURAL; INFILTRATION; INTRACAUDAL; PERINEURAL
Status: DISPENSED
Start: 2021-09-17

## (undated) RX ORDER — LIDOCAINE HYDROCHLORIDE 10 MG/ML
INJECTION, SOLUTION EPIDURAL; INFILTRATION; INTRACAUDAL; PERINEURAL
Status: DISPENSED
Start: 2022-04-29

## (undated) RX ORDER — LIDOCAINE HYDROCHLORIDE 10 MG/ML
INJECTION, SOLUTION EPIDURAL; INFILTRATION; INTRACAUDAL; PERINEURAL
Status: DISPENSED
Start: 2022-01-14

## (undated) RX ORDER — GENTAMICIN 40 MG/ML
INJECTION, SOLUTION INTRAMUSCULAR; INTRAVENOUS
Status: DISPENSED
Start: 2024-04-30

## (undated) RX ORDER — TRIAMCINOLONE ACETONIDE 40 MG/ML
INJECTION, SUSPENSION INTRA-ARTICULAR; INTRAMUSCULAR
Status: DISPENSED
Start: 2019-11-07

## (undated) RX ORDER — LIDOCAINE HYDROCHLORIDE 10 MG/ML
INJECTION, SOLUTION EPIDURAL; INFILTRATION; INTRACAUDAL; PERINEURAL
Status: DISPENSED
Start: 2024-04-30

## (undated) RX ORDER — TRIAMCINOLONE ACETONIDE 40 MG/ML
INJECTION, SUSPENSION INTRA-ARTICULAR; INTRAMUSCULAR
Status: DISPENSED
Start: 2020-03-12

## (undated) RX ORDER — LIDOCAINE HYDROCHLORIDE 10 MG/ML
INJECTION, SOLUTION EPIDURAL; INFILTRATION; INTRACAUDAL; PERINEURAL
Status: DISPENSED
Start: 2024-03-15

## (undated) RX ORDER — TRIAMCINOLONE ACETONIDE 40 MG/ML
INJECTION, SUSPENSION INTRA-ARTICULAR; INTRAMUSCULAR
Status: DISPENSED
Start: 2022-04-29

## (undated) RX ORDER — LIDOCAINE HYDROCHLORIDE 20 MG/ML
INJECTION, SOLUTION EPIDURAL; INFILTRATION; INTRACAUDAL; PERINEURAL
Status: DISPENSED
Start: 2021-06-15

## (undated) RX ORDER — BUPIVACAINE HYDROCHLORIDE 2.5 MG/ML
INJECTION, SOLUTION EPIDURAL; INFILTRATION; INTRACAUDAL
Status: DISPENSED
Start: 2021-06-15

## (undated) RX ORDER — BETAMETHASONE SODIUM PHOSPHATE AND BETAMETHASONE ACETATE 3; 3 MG/ML; MG/ML
INJECTION, SUSPENSION INTRA-ARTICULAR; INTRALESIONAL; INTRAMUSCULAR; SOFT TISSUE
Status: DISPENSED
Start: 2021-08-06

## (undated) RX ORDER — HYDROMORPHONE HYDROCHLORIDE 1 MG/ML
INJECTION, SOLUTION INTRAMUSCULAR; INTRAVENOUS; SUBCUTANEOUS
Status: DISPENSED
Start: 2021-06-15

## (undated) RX ORDER — ROPIVACAINE HYDROCHLORIDE 5 MG/ML
INJECTION, SOLUTION EPIDURAL; INFILTRATION; PERINEURAL
Status: DISPENSED
Start: 2020-03-04

## (undated) RX ORDER — TRIAMCINOLONE ACETONIDE 40 MG/ML
INJECTION, SUSPENSION INTRA-ARTICULAR; INTRAMUSCULAR
Status: DISPENSED
Start: 2017-10-25

## (undated) RX ORDER — FENTANYL CITRATE 50 UG/ML
INJECTION, SOLUTION INTRAMUSCULAR; INTRAVENOUS
Status: DISPENSED
Start: 2021-06-15

## (undated) RX ORDER — ROPIVACAINE HYDROCHLORIDE 5 MG/ML
INJECTION, SOLUTION EPIDURAL; INFILTRATION; PERINEURAL
Status: DISPENSED
Start: 2019-11-07

## (undated) RX ORDER — TRIAMCINOLONE ACETONIDE 40 MG/ML
INJECTION, SUSPENSION INTRA-ARTICULAR; INTRAMUSCULAR
Status: DISPENSED
Start: 2020-09-09

## (undated) RX ORDER — OXYCODONE HYDROCHLORIDE 5 MG/1
TABLET ORAL
Status: DISPENSED
Start: 2021-06-15

## (undated) RX ORDER — LIDOCAINE HYDROCHLORIDE 10 MG/ML
INJECTION, SOLUTION EPIDURAL; INFILTRATION; INTRACAUDAL; PERINEURAL
Status: DISPENSED
Start: 2025-08-06

## (undated) RX ORDER — ROPIVACAINE HYDROCHLORIDE 5 MG/ML
INJECTION, SOLUTION EPIDURAL; INFILTRATION; PERINEURAL
Status: DISPENSED
Start: 2018-08-24

## (undated) RX ORDER — LIDOCAINE HYDROCHLORIDE 10 MG/ML
INJECTION, SOLUTION EPIDURAL; INFILTRATION; INTRACAUDAL; PERINEURAL
Status: DISPENSED
Start: 2024-02-23

## (undated) RX ORDER — BETAMETHASONE SODIUM PHOSPHATE AND BETAMETHASONE ACETATE 3; 3 MG/ML; MG/ML
INJECTION, SUSPENSION INTRA-ARTICULAR; INTRALESIONAL; INTRAMUSCULAR; SOFT TISSUE
Status: DISPENSED
Start: 2024-08-09

## (undated) RX ORDER — ONDANSETRON 2 MG/ML
INJECTION INTRAMUSCULAR; INTRAVENOUS
Status: DISPENSED
Start: 2021-06-15

## (undated) RX ORDER — TRIAMCINOLONE ACETONIDE 40 MG/ML
INJECTION, SUSPENSION INTRA-ARTICULAR; INTRAMUSCULAR
Status: DISPENSED
Start: 2021-09-17

## (undated) RX ORDER — ROPIVACAINE HYDROCHLORIDE 5 MG/ML
INJECTION, SOLUTION EPIDURAL; INFILTRATION; PERINEURAL
Status: DISPENSED
Start: 2020-09-09

## (undated) RX ORDER — TRIAMCINOLONE ACETONIDE 40 MG/ML
INJECTION, SUSPENSION INTRA-ARTICULAR; INTRAMUSCULAR
Status: DISPENSED
Start: 2020-03-04

## (undated) RX ORDER — TRIAMCINOLONE ACETONIDE 40 MG/ML
INJECTION, SUSPENSION INTRA-ARTICULAR; INTRAMUSCULAR
Status: DISPENSED
Start: 2018-08-24

## (undated) RX ORDER — LIDOCAINE HYDROCHLORIDE 10 MG/ML
INJECTION, SOLUTION EPIDURAL; INFILTRATION; INTRACAUDAL; PERINEURAL
Status: DISPENSED
Start: 2020-03-12

## (undated) RX ORDER — LIDOCAINE HYDROCHLORIDE 10 MG/ML
INJECTION, SOLUTION EPIDURAL; INFILTRATION; INTRACAUDAL; PERINEURAL
Status: DISPENSED
Start: 2023-05-23

## (undated) RX ORDER — TRIAMCINOLONE ACETONIDE 40 MG/ML
INJECTION, SUSPENSION INTRA-ARTICULAR; INTRAMUSCULAR
Status: DISPENSED
Start: 2024-02-23

## (undated) RX ORDER — LIDOCAINE HYDROCHLORIDE 10 MG/ML
INJECTION, SOLUTION EPIDURAL; INFILTRATION; INTRACAUDAL; PERINEURAL
Status: DISPENSED
Start: 2022-08-19

## (undated) RX ORDER — CEFAZOLIN SODIUM 2 G/100ML
INJECTION, SOLUTION INTRAVENOUS
Status: DISPENSED
Start: 2021-06-15

## (undated) RX ORDER — LIDOCAINE HYDROCHLORIDE 10 MG/ML
INJECTION, SOLUTION EPIDURAL; INFILTRATION; INTRACAUDAL; PERINEURAL
Status: DISPENSED
Start: 2021-08-06

## (undated) RX ORDER — TRIAMCINOLONE ACETONIDE 40 MG/ML
INJECTION, SUSPENSION INTRA-ARTICULAR; INTRAMUSCULAR
Status: DISPENSED
Start: 2022-11-23

## (undated) RX ORDER — LIDOCAINE HYDROCHLORIDE 10 MG/ML
INJECTION, SOLUTION EPIDURAL; INFILTRATION; INTRACAUDAL; PERINEURAL
Status: DISPENSED
Start: 2022-12-21

## (undated) RX ORDER — TRIAMCINOLONE ACETONIDE 40 MG/ML
INJECTION, SUSPENSION INTRA-ARTICULAR; INTRAMUSCULAR
Status: DISPENSED
Start: 2022-01-14

## (undated) RX ORDER — KETOROLAC TROMETHAMINE 30 MG/ML
INJECTION, SOLUTION INTRAMUSCULAR; INTRAVENOUS
Status: DISPENSED
Start: 2021-06-15

## (undated) RX ORDER — ROPIVACAINE HYDROCHLORIDE 5 MG/ML
INJECTION, SOLUTION EPIDURAL; INFILTRATION; PERINEURAL
Status: DISPENSED
Start: 2019-04-12

## (undated) RX ORDER — BETAMETHASONE SODIUM PHOSPHATE AND BETAMETHASONE ACETATE 3; 3 MG/ML; MG/ML
INJECTION, SUSPENSION INTRA-ARTICULAR; INTRALESIONAL; INTRAMUSCULAR; SOFT TISSUE
Status: DISPENSED
Start: 2023-05-23

## (undated) RX ORDER — TRIAMCINOLONE ACETONIDE 40 MG/ML
INJECTION, SUSPENSION INTRA-ARTICULAR; INTRAMUSCULAR
Status: DISPENSED
Start: 2025-02-05

## (undated) RX ORDER — DEXAMETHASONE SODIUM PHOSPHATE 4 MG/ML
INJECTION, SOLUTION INTRA-ARTICULAR; INTRALESIONAL; INTRAMUSCULAR; INTRAVENOUS; SOFT TISSUE
Status: DISPENSED
Start: 2021-06-15

## (undated) RX ORDER — TRANEXAMIC ACID 650 MG/1
TABLET ORAL
Status: DISPENSED
Start: 2021-06-15

## (undated) RX ORDER — ACETAMINOPHEN 325 MG/1
TABLET ORAL
Status: DISPENSED
Start: 2021-06-15

## (undated) RX ORDER — LIDOCAINE HYDROCHLORIDE 10 MG/ML
INJECTION, SOLUTION EPIDURAL; INFILTRATION; INTRACAUDAL; PERINEURAL
Status: DISPENSED
Start: 2022-11-23

## (undated) RX ORDER — TRIAMCINOLONE ACETONIDE 40 MG/ML
INJECTION, SUSPENSION INTRA-ARTICULAR; INTRAMUSCULAR
Status: DISPENSED
Start: 2018-12-05

## (undated) RX ORDER — LIDOCAINE HYDROCHLORIDE 10 MG/ML
INJECTION, SOLUTION INFILTRATION; PERINEURAL
Status: DISPENSED
Start: 2017-10-25

## (undated) RX ORDER — FENTANYL CITRATE-0.9 % NACL/PF 10 MCG/ML
PLASTIC BAG, INJECTION (ML) INTRAVENOUS
Status: DISPENSED
Start: 2021-06-15

## (undated) RX ORDER — ROPIVACAINE HYDROCHLORIDE 5 MG/ML
INJECTION, SOLUTION EPIDURAL; INFILTRATION; PERINEURAL
Status: DISPENSED
Start: 2018-12-05

## (undated) RX ORDER — BETAMETHASONE SODIUM PHOSPHATE AND BETAMETHASONE ACETATE 3; 3 MG/ML; MG/ML
INJECTION, SUSPENSION INTRA-ARTICULAR; INTRALESIONAL; INTRAMUSCULAR; SOFT TISSUE
Status: DISPENSED
Start: 2024-03-15

## (undated) RX ORDER — TRIAMCINOLONE ACETONIDE 40 MG/ML
INJECTION, SUSPENSION INTRA-ARTICULAR; INTRAMUSCULAR
Status: DISPENSED
Start: 2019-07-24

## (undated) RX ORDER — TRIAMCINOLONE ACETONIDE 40 MG/ML
INJECTION, SUSPENSION INTRA-ARTICULAR; INTRAMUSCULAR
Status: DISPENSED
Start: 2022-12-21

## (undated) RX ORDER — TRIAMCINOLONE ACETONIDE 40 MG/ML
INJECTION, SUSPENSION INTRA-ARTICULAR; INTRAMUSCULAR
Status: DISPENSED
Start: 2025-08-06

## (undated) RX ORDER — VANCOMYCIN HYDROCHLORIDE 1 G/20ML
INJECTION, POWDER, LYOPHILIZED, FOR SOLUTION INTRAVENOUS
Status: DISPENSED
Start: 2021-06-15

## (undated) RX ORDER — ROPIVACAINE HYDROCHLORIDE 5 MG/ML
INJECTION, SOLUTION EPIDURAL; INFILTRATION; PERINEURAL
Status: DISPENSED
Start: 2019-07-24

## (undated) RX ORDER — BETAMETHASONE SODIUM PHOSPHATE AND BETAMETHASONE ACETATE 3; 3 MG/ML; MG/ML
INJECTION, SUSPENSION INTRA-ARTICULAR; INTRALESIONAL; INTRAMUSCULAR; SOFT TISSUE
Status: DISPENSED
Start: 2022-08-19

## (undated) RX ORDER — TRIAMCINOLONE ACETONIDE 40 MG/ML
INJECTION, SUSPENSION INTRA-ARTICULAR; INTRAMUSCULAR
Status: DISPENSED
Start: 2019-04-12